# Patient Record
Sex: MALE | Race: BLACK OR AFRICAN AMERICAN | Employment: OTHER | URBAN - METROPOLITAN AREA
[De-identification: names, ages, dates, MRNs, and addresses within clinical notes are randomized per-mention and may not be internally consistent; named-entity substitution may affect disease eponyms.]

---

## 2022-08-31 ENCOUNTER — APPOINTMENT (OUTPATIENT)
Dept: ULTRASOUND IMAGING | Age: 57
DRG: 871 | End: 2022-08-31
Payer: MEDICARE

## 2022-08-31 ENCOUNTER — HOSPITAL ENCOUNTER (INPATIENT)
Age: 57
LOS: 7 days | Discharge: HOME HEALTH | DRG: 871 | End: 2022-09-07
Attending: GENERAL PRACTICE | Admitting: FAMILY MEDICINE
Payer: MEDICARE

## 2022-08-31 ENCOUNTER — APPOINTMENT (OUTPATIENT)
Dept: CT IMAGING | Age: 57
DRG: 871 | End: 2022-08-31
Payer: MEDICARE

## 2022-08-31 ENCOUNTER — APPOINTMENT (OUTPATIENT)
Dept: GENERAL RADIOLOGY | Age: 57
DRG: 871 | End: 2022-08-31
Payer: MEDICARE

## 2022-08-31 DIAGNOSIS — E11.621 DIABETIC ULCER OF BOTH FEET (HCC): ICD-10-CM

## 2022-08-31 DIAGNOSIS — E16.2 HYPOGLYCEMIA: ICD-10-CM

## 2022-08-31 DIAGNOSIS — L97.519 DIABETIC ULCER OF BOTH FEET (HCC): ICD-10-CM

## 2022-08-31 DIAGNOSIS — R53.1 ACUTE WEAKNESS: ICD-10-CM

## 2022-08-31 DIAGNOSIS — N17.9 ACUTE KIDNEY INJURY (HCC): Primary | ICD-10-CM

## 2022-08-31 DIAGNOSIS — E87.6 HYPOKALEMIA: ICD-10-CM

## 2022-08-31 DIAGNOSIS — L97.529 DIABETIC ULCER OF BOTH FEET (HCC): ICD-10-CM

## 2022-08-31 DIAGNOSIS — N10 ACUTE PYELONEPHRITIS: ICD-10-CM

## 2022-08-31 PROBLEM — E87.1 HYPONATREMIA: Status: ACTIVE | Noted: 2022-08-31

## 2022-08-31 PROBLEM — K27.9 PUD (PEPTIC ULCER DISEASE): Chronic | Status: ACTIVE | Noted: 2021-05-10

## 2022-08-31 PROBLEM — E78.5 HYPERLIPIDEMIA: Status: ACTIVE | Noted: 2017-10-16

## 2022-08-31 PROBLEM — K29.50 CHRONIC GASTRITIS: Status: ACTIVE | Noted: 2021-03-12

## 2022-08-31 PROBLEM — B96.89 ACUTE PYELONEPHRITIS DUE TO BACTERIA: Status: ACTIVE | Noted: 2022-08-31

## 2022-08-31 PROBLEM — E11.42 DIABETIC PERIPHERAL NEUROPATHY (HCC): Status: ACTIVE | Noted: 2017-10-16

## 2022-08-31 PROBLEM — N18.31 ACUTE RENAL FAILURE SUPERIMPOSED ON STAGE 3A CHRONIC KIDNEY DISEASE (HCC): Status: ACTIVE | Noted: 2022-08-31

## 2022-08-31 PROBLEM — E83.51 HYPOCALCEMIA: Status: ACTIVE | Noted: 2022-08-31

## 2022-08-31 PROBLEM — E88.09 HYPOALBUMINEMIA: Status: ACTIVE | Noted: 2022-08-31

## 2022-08-31 PROBLEM — F10.20 ALCOHOL DEPENDENCE (HCC): Status: ACTIVE | Noted: 2017-10-16

## 2022-08-31 PROBLEM — H10.9 CONJUNCTIVITIS: Status: ACTIVE | Noted: 2022-05-13

## 2022-08-31 PROBLEM — K27.9 PEPTIC ULCER: Status: ACTIVE | Noted: 2021-05-10

## 2022-08-31 PROBLEM — Z79.4 TYPE 2 DIABETES MELLITUS WITH HYPOGLYCEMIA, WITH LONG-TERM CURRENT USE OF INSULIN (HCC): Chronic | Status: ACTIVE | Noted: 2022-08-31

## 2022-08-31 PROBLEM — K52.9 ACUTE GASTROENTERITIS: Status: ACTIVE | Noted: 2022-08-31

## 2022-08-31 PROBLEM — E11.649 TYPE 2 DIABETES MELLITUS WITH HYPOGLYCEMIA, WITH LONG-TERM CURRENT USE OF INSULIN (HCC): Chronic | Status: ACTIVE | Noted: 2022-08-31

## 2022-08-31 LAB
ABO + RH BLD: NORMAL
ALBUMIN SERPL-MCNC: 2.1 G/DL (ref 3.5–5)
ALBUMIN/GLOB SERPL: 0.4 {RATIO} (ref 1.2–3.5)
ALP SERPL-CCNC: 122 U/L (ref 50–136)
ALT SERPL-CCNC: 25 U/L (ref 12–65)
ANION GAP SERPL CALC-SCNC: 10 MMOL/L (ref 4–13)
APPEARANCE UR: CLEAR
AST SERPL-CCNC: 53 U/L (ref 15–37)
BACTERIA URNS QL MICRO: 0 /HPF
BASOPHILS # BLD: 0.1 K/UL (ref 0–0.2)
BASOPHILS NFR BLD: 0 % (ref 0–2)
BILIRUB SERPL-MCNC: 1 MG/DL (ref 0.2–1.1)
BILIRUB UR QL: NEGATIVE
BILIRUB UR QL: NEGATIVE
BLOOD BANK CMNT PATIENT-IMP: NORMAL
BLOOD GROUP ANTIBODIES SERPL: NORMAL
BUN SERPL-MCNC: 16 MG/DL (ref 6–23)
CALCIUM SERPL-MCNC: 6.8 MG/DL (ref 8.3–10.4)
CHLORIDE SERPL-SCNC: 98 MMOL/L (ref 101–110)
CO2 SERPL-SCNC: 23 MMOL/L (ref 21–32)
COLOR UR: ABNORMAL
CREAT SERPL-MCNC: 1.6 MG/DL (ref 0.8–1.5)
DIFFERENTIAL METHOD BLD: ABNORMAL
EOSINOPHIL # BLD: 0.3 K/UL (ref 0–0.8)
EOSINOPHIL NFR BLD: 2 % (ref 0.5–7.8)
ERYTHROCYTE [DISTWIDTH] IN BLOOD BY AUTOMATED COUNT: 14.2 % (ref 11.9–14.6)
FERRITIN SERPL-MCNC: 287 NG/ML (ref 8–388)
FLUAV AG NPH QL IA: NEGATIVE
FLUBV AG NPH QL IA: NEGATIVE
GLOBULIN SER CALC-MCNC: 5.3 G/DL (ref 2.3–3.5)
GLUCOSE BLD STRIP.AUTO-MCNC: 29 MG/DL (ref 65–100)
GLUCOSE BLD STRIP.AUTO-MCNC: 51 MG/DL (ref 65–100)
GLUCOSE BLD STRIP.AUTO-MCNC: 55 MG/DL (ref 65–100)
GLUCOSE BLD STRIP.AUTO-MCNC: 88 MG/DL (ref 65–100)
GLUCOSE BLD STRIP.AUTO-MCNC: 95 MG/DL (ref 65–100)
GLUCOSE SERPL-MCNC: 50 MG/DL (ref 65–100)
GLUCOSE UR QL STRIP.AUTO: NEGATIVE MG/DL
GLUCOSE UR STRIP.AUTO-MCNC: 100 MG/DL
HCT VFR BLD AUTO: 21.7 % (ref 41.1–50.3)
HCT VFR BLD AUTO: 23.4 % (ref 41.1–50.3)
HEMOCCULT STL QL: NEGATIVE
HGB BLD-MCNC: 7.7 G/DL (ref 13.6–17.2)
HGB BLD-MCNC: 8.3 G/DL (ref 13.6–17.2)
HGB UR QL STRIP: NEGATIVE
IMM GRANULOCYTES # BLD AUTO: 0.2 K/UL (ref 0–0.5)
IMM GRANULOCYTES NFR BLD AUTO: 2 % (ref 0–5)
INR PPP: 1.2
IRON SATN MFR SERPL: 24 %
IRON SERPL-MCNC: 34 UG/DL (ref 35–150)
KETONES UR QL STRIP.AUTO: NEGATIVE MG/DL
KETONES UR-MCNC: NEGATIVE MG/DL
LEUKOCYTE ESTERASE UR QL STRIP.AUTO: NEGATIVE
LEUKOCYTE ESTERASE UR QL STRIP: NEGATIVE
LIPASE SERPL-CCNC: 130 U/L (ref 73–393)
LYMPHOCYTES # BLD: 2 K/UL (ref 0.5–4.6)
LYMPHOCYTES NFR BLD: 15 % (ref 13–44)
MAGNESIUM SERPL-MCNC: 1.1 MG/DL (ref 1.8–2.4)
MCH RBC QN AUTO: 30.3 PG (ref 26.1–32.9)
MCHC RBC AUTO-ENTMCNC: 35.5 G/DL (ref 31.4–35)
MCV RBC AUTO: 85.4 FL (ref 79.6–97.8)
MONOCYTES # BLD: 1.9 K/UL (ref 0.1–1.3)
MONOCYTES NFR BLD: 14 % (ref 4–12)
NEUTS SEG # BLD: 9.1 K/UL (ref 1.7–8.2)
NEUTS SEG NFR BLD: 67 % (ref 43–78)
NITRITE UR QL STRIP.AUTO: NEGATIVE
NITRITE UR QL: NEGATIVE
NRBC # BLD: 0 K/UL (ref 0–0.2)
PH UR STRIP: 6 [PH] (ref 5–9)
PH UR: 6 [PH] (ref 5–9)
PLATELET # BLD AUTO: 270 K/UL (ref 150–450)
PMV BLD AUTO: 11.9 FL (ref 9.4–12.3)
POTASSIUM SERPL-SCNC: 2.5 MMOL/L (ref 3.5–5.1)
POTASSIUM SERPL-SCNC: 2.6 MMOL/L (ref 3.5–5.1)
PROT SERPL-MCNC: 7.4 G/DL (ref 6.3–8.2)
PROT UR QL: NEGATIVE MG/DL
PROT UR STRIP-MCNC: NEGATIVE MG/DL
PROTHROMBIN TIME: 15.5 SEC (ref 12.6–14.5)
RBC # BLD AUTO: 2.74 M/UL (ref 4.23–5.6)
RBC # UR STRIP: NEGATIVE /UL
SARS-COV-2 RDRP RESP QL NAA+PROBE: NOT DETECTED
SERVICE CMNT-IMP: ABNORMAL
SERVICE CMNT-IMP: NORMAL
SODIUM SERPL-SCNC: 131 MMOL/L (ref 138–145)
SOURCE: NORMAL
SP GR UR REFRACTOMETRY: 1.02 (ref 1–1.02)
SP GR UR: 1.01 (ref 1–1.02)
SPECIMEN EXP DATE BLD: NORMAL
SPECIMEN SOURCE: NORMAL
TIBC SERPL-MCNC: 142 UG/DL (ref 250–450)
UROBILINOGEN UR QL STRIP.AUTO: 1 EU/DL (ref 0.2–1)
UROBILINOGEN UR QL: 1 EU/DL (ref 0.2–1)
WBC # BLD AUTO: 13.5 K/UL (ref 4.3–11.1)
WBC #/AREA STL HPF: NORMAL /HPF (ref 0–4)

## 2022-08-31 PROCEDURE — 87804 INFLUENZA ASSAY W/OPTIC: CPT

## 2022-08-31 PROCEDURE — 82962 GLUCOSE BLOOD TEST: CPT

## 2022-08-31 PROCEDURE — 1100000003 HC PRIVATE W/ TELEMETRY

## 2022-08-31 PROCEDURE — 2580000003 HC RX 258: Performed by: GENERAL PRACTICE

## 2022-08-31 PROCEDURE — 87046 STOOL CULTR AEROBIC BACT EA: CPT

## 2022-08-31 PROCEDURE — 2580000003 HC RX 258: Performed by: STUDENT IN AN ORGANIZED HEALTH CARE EDUCATION/TRAINING PROGRAM

## 2022-08-31 PROCEDURE — 80053 COMPREHEN METABOLIC PANEL: CPT

## 2022-08-31 PROCEDURE — 6370000000 HC RX 637 (ALT 250 FOR IP): Performed by: FAMILY MEDICINE

## 2022-08-31 PROCEDURE — 87086 URINE CULTURE/COLONY COUNT: CPT

## 2022-08-31 PROCEDURE — 73650 X-RAY EXAM OF HEEL: CPT

## 2022-08-31 PROCEDURE — 84132 ASSAY OF SERUM POTASSIUM: CPT

## 2022-08-31 PROCEDURE — 1100000000 HC RM PRIVATE

## 2022-08-31 PROCEDURE — 87449 NOS EACH ORGANISM AG IA: CPT

## 2022-08-31 PROCEDURE — 81003 URINALYSIS AUTO W/O SCOPE: CPT

## 2022-08-31 PROCEDURE — 96374 THER/PROPH/DIAG INJ IV PUSH: CPT

## 2022-08-31 PROCEDURE — 82272 OCCULT BLD FECES 1-3 TESTS: CPT

## 2022-08-31 PROCEDURE — 6360000002 HC RX W HCPCS: Performed by: FAMILY MEDICINE

## 2022-08-31 PROCEDURE — 87209 SMEAR COMPLEX STAIN: CPT

## 2022-08-31 PROCEDURE — 87186 SC STD MICRODIL/AGAR DIL: CPT

## 2022-08-31 PROCEDURE — 87077 CULTURE AEROBIC IDENTIFY: CPT

## 2022-08-31 PROCEDURE — 2500000003 HC RX 250 WO HCPCS: Performed by: GENERAL PRACTICE

## 2022-08-31 PROCEDURE — 93971 EXTREMITY STUDY: CPT

## 2022-08-31 PROCEDURE — 6360000004 HC RX CONTRAST MEDICATION: Performed by: GENERAL PRACTICE

## 2022-08-31 PROCEDURE — 85610 PROTHROMBIN TIME: CPT

## 2022-08-31 PROCEDURE — 83540 ASSAY OF IRON: CPT

## 2022-08-31 PROCEDURE — 2580000003 HC RX 258: Performed by: FAMILY MEDICINE

## 2022-08-31 PROCEDURE — 6360000002 HC RX W HCPCS: Performed by: GENERAL PRACTICE

## 2022-08-31 PROCEDURE — 83690 ASSAY OF LIPASE: CPT

## 2022-08-31 PROCEDURE — 96376 TX/PRO/DX INJ SAME DRUG ADON: CPT

## 2022-08-31 PROCEDURE — 87641 MR-STAPH DNA AMP PROBE: CPT

## 2022-08-31 PROCEDURE — 87635 SARS-COV-2 COVID-19 AMP PRB: CPT

## 2022-08-31 PROCEDURE — 83735 ASSAY OF MAGNESIUM: CPT

## 2022-08-31 PROCEDURE — 87040 BLOOD CULTURE FOR BACTERIA: CPT

## 2022-08-31 PROCEDURE — 87075 CULTR BACTERIA EXCEPT BLOOD: CPT

## 2022-08-31 PROCEDURE — 85025 COMPLETE CBC W/AUTO DIFF WBC: CPT

## 2022-08-31 PROCEDURE — 86901 BLOOD TYPING SEROLOGIC RH(D): CPT

## 2022-08-31 PROCEDURE — 96361 HYDRATE IV INFUSION ADD-ON: CPT

## 2022-08-31 PROCEDURE — 87177 OVA AND PARASITES SMEARS: CPT

## 2022-08-31 PROCEDURE — 85014 HEMATOCRIT: CPT

## 2022-08-31 PROCEDURE — 87070 CULTURE OTHR SPECIMN AEROBIC: CPT

## 2022-08-31 PROCEDURE — 82728 ASSAY OF FERRITIN: CPT

## 2022-08-31 PROCEDURE — 89055 LEUKOCYTE ASSESSMENT FECAL: CPT

## 2022-08-31 PROCEDURE — 99285 EMERGENCY DEPT VISIT HI MDM: CPT

## 2022-08-31 PROCEDURE — 74177 CT ABD & PELVIS W/CONTRAST: CPT

## 2022-08-31 RX ORDER — ENOXAPARIN SODIUM 100 MG/ML
40 INJECTION SUBCUTANEOUS DAILY
Status: DISCONTINUED | OUTPATIENT
Start: 2022-08-31 | End: 2022-08-31

## 2022-08-31 RX ORDER — TOBRAMYCIN 3 MG/ML
1 SOLUTION/ DROPS OPHTHALMIC
Status: DISCONTINUED | OUTPATIENT
Start: 2022-09-29 | End: 2022-09-07 | Stop reason: HOSPADM

## 2022-08-31 RX ORDER — POTASSIUM CHLORIDE 7.45 MG/ML
10 INJECTION INTRAVENOUS
Status: COMPLETED | OUTPATIENT
Start: 2022-09-01 | End: 2022-09-01

## 2022-08-31 RX ORDER — DEXTROSE MONOHYDRATE 100 MG/ML
25 INJECTION, SOLUTION INTRAVENOUS
Status: COMPLETED | OUTPATIENT
Start: 2022-08-31 | End: 2022-08-31

## 2022-08-31 RX ORDER — SODIUM CHLORIDE 0.9 % (FLUSH) 0.9 %
5-40 SYRINGE (ML) INJECTION PRN
Status: DISCONTINUED | OUTPATIENT
Start: 2022-08-31 | End: 2022-09-07 | Stop reason: HOSPADM

## 2022-08-31 RX ORDER — CALCIUM CARBONATE 200(500)MG
500 TABLET,CHEWABLE ORAL 3 TIMES DAILY PRN
Status: DISCONTINUED | OUTPATIENT
Start: 2022-08-31 | End: 2022-09-03

## 2022-08-31 RX ORDER — MAGNESIUM SULFATE IN WATER 40 MG/ML
4000 INJECTION, SOLUTION INTRAVENOUS ONCE
Status: COMPLETED | OUTPATIENT
Start: 2022-09-01 | End: 2022-09-01

## 2022-08-31 RX ORDER — SODIUM CHLORIDE 0.9 % (FLUSH) 0.9 %
5-40 SYRINGE (ML) INJECTION EVERY 12 HOURS SCHEDULED
Status: DISCONTINUED | OUTPATIENT
Start: 2022-08-31 | End: 2022-09-07 | Stop reason: HOSPADM

## 2022-08-31 RX ORDER — POTASSIUM CHLORIDE 7.45 MG/ML
10 INJECTION INTRAVENOUS
Status: COMPLETED | OUTPATIENT
Start: 2022-08-31 | End: 2022-08-31

## 2022-08-31 RX ORDER — DEXTROSE MONOHYDRATE 25 G/50ML
25 INJECTION, SOLUTION INTRAVENOUS
Status: COMPLETED | OUTPATIENT
Start: 2022-08-31 | End: 2022-08-31

## 2022-08-31 RX ORDER — POTASSIUM CHLORIDE 20 MEQ/1
40 TABLET, EXTENDED RELEASE ORAL EVERY 6 HOURS
Status: COMPLETED | OUTPATIENT
Start: 2022-08-31 | End: 2022-09-01

## 2022-08-31 RX ORDER — SODIUM CHLORIDE 9 MG/ML
INJECTION, SOLUTION INTRAVENOUS PRN
Status: DISCONTINUED | OUTPATIENT
Start: 2022-08-31 | End: 2022-09-07 | Stop reason: HOSPADM

## 2022-08-31 RX ORDER — ONDANSETRON 4 MG/1
4 TABLET, ORALLY DISINTEGRATING ORAL EVERY 8 HOURS PRN
Status: DISCONTINUED | OUTPATIENT
Start: 2022-08-31 | End: 2022-09-07 | Stop reason: HOSPADM

## 2022-08-31 RX ORDER — PANTOPRAZOLE SODIUM 40 MG/1
40 TABLET, DELAYED RELEASE ORAL
Status: DISCONTINUED | OUTPATIENT
Start: 2022-09-01 | End: 2022-09-05

## 2022-08-31 RX ORDER — INSULIN LISPRO 100 [IU]/ML
0-8 INJECTION, SOLUTION INTRAVENOUS; SUBCUTANEOUS
Status: DISCONTINUED | OUTPATIENT
Start: 2022-09-01 | End: 2022-09-07 | Stop reason: HOSPADM

## 2022-08-31 RX ORDER — DEXTROSE MONOHYDRATE 100 MG/ML
INJECTION, SOLUTION INTRAVENOUS CONTINUOUS PRN
Status: DISCONTINUED | OUTPATIENT
Start: 2022-08-31 | End: 2022-09-07 | Stop reason: HOSPADM

## 2022-08-31 RX ORDER — DEXTROSE MONOHYDRATE 100 MG/ML
INJECTION, SOLUTION INTRAVENOUS
Status: DISPENSED
Start: 2022-08-31 | End: 2022-09-01

## 2022-08-31 RX ORDER — SODIUM CHLORIDE 0.9 % (FLUSH) 0.9 %
10 SYRINGE (ML) INJECTION
Status: COMPLETED | OUTPATIENT
Start: 2022-08-31 | End: 2022-08-31

## 2022-08-31 RX ORDER — INSULIN LISPRO 100 [IU]/ML
0-4 INJECTION, SOLUTION INTRAVENOUS; SUBCUTANEOUS NIGHTLY
Status: DISCONTINUED | OUTPATIENT
Start: 2022-08-31 | End: 2022-09-07 | Stop reason: HOSPADM

## 2022-08-31 RX ORDER — THIAMINE HYDROCHLORIDE 100 MG/ML
500 INJECTION, SOLUTION INTRAMUSCULAR; INTRAVENOUS DAILY
Status: COMPLETED | OUTPATIENT
Start: 2022-09-01 | End: 2022-09-03

## 2022-08-31 RX ORDER — 0.9 % SODIUM CHLORIDE 0.9 %
1000 INTRAVENOUS SOLUTION INTRAVENOUS ONCE
Status: DISCONTINUED | OUTPATIENT
Start: 2022-08-31 | End: 2022-09-07 | Stop reason: HOSPADM

## 2022-08-31 RX ORDER — LANOLIN ALCOHOL/MO/W.PET/CERES
1000 CREAM (GRAM) TOPICAL DAILY
Status: DISCONTINUED | OUTPATIENT
Start: 2022-09-01 | End: 2022-09-07 | Stop reason: HOSPADM

## 2022-08-31 RX ORDER — ACETAMINOPHEN 650 MG/1
650 SUPPOSITORY RECTAL EVERY 6 HOURS PRN
Status: DISCONTINUED | OUTPATIENT
Start: 2022-08-31 | End: 2022-09-07 | Stop reason: HOSPADM

## 2022-08-31 RX ORDER — FOLIC ACID 1 MG/1
1 TABLET ORAL DAILY
Status: DISCONTINUED | OUTPATIENT
Start: 2022-09-01 | End: 2022-09-07 | Stop reason: HOSPADM

## 2022-08-31 RX ORDER — AMLODIPINE BESYLATE 10 MG/1
10 TABLET ORAL DAILY
Status: DISCONTINUED | OUTPATIENT
Start: 2022-09-01 | End: 2022-09-07 | Stop reason: HOSPADM

## 2022-08-31 RX ORDER — ONDANSETRON 2 MG/ML
4 INJECTION INTRAMUSCULAR; INTRAVENOUS EVERY 6 HOURS PRN
Status: DISCONTINUED | OUTPATIENT
Start: 2022-08-31 | End: 2022-09-07 | Stop reason: HOSPADM

## 2022-08-31 RX ORDER — ACETAMINOPHEN 325 MG/1
650 TABLET ORAL EVERY 6 HOURS PRN
Status: DISCONTINUED | OUTPATIENT
Start: 2022-08-31 | End: 2022-09-07 | Stop reason: HOSPADM

## 2022-08-31 RX ORDER — 0.9 % SODIUM CHLORIDE 0.9 %
1000 INTRAVENOUS SOLUTION INTRAVENOUS
Status: COMPLETED | OUTPATIENT
Start: 2022-08-31 | End: 2022-08-31

## 2022-08-31 RX ORDER — 0.9 % SODIUM CHLORIDE 0.9 %
100 INTRAVENOUS SOLUTION INTRAVENOUS
Status: COMPLETED | OUTPATIENT
Start: 2022-08-31 | End: 2022-08-31

## 2022-08-31 RX ORDER — DEXTROSE, SODIUM CHLORIDE, AND POTASSIUM CHLORIDE 5; .45; .3 G/100ML; G/100ML; G/100ML
INJECTION INTRAVENOUS CONTINUOUS
Status: DISCONTINUED | OUTPATIENT
Start: 2022-08-31 | End: 2022-09-01

## 2022-08-31 RX ORDER — POLYETHYLENE GLYCOL 3350 17 G/17G
17 POWDER, FOR SOLUTION ORAL DAILY PRN
Status: DISCONTINUED | OUTPATIENT
Start: 2022-08-31 | End: 2022-09-07 | Stop reason: HOSPADM

## 2022-08-31 RX ORDER — METRONIDAZOLE 500 MG/100ML
500 INJECTION, SOLUTION INTRAVENOUS EVERY 8 HOURS
Status: COMPLETED | OUTPATIENT
Start: 2022-08-31 | End: 2022-09-06

## 2022-08-31 RX ADMIN — DEXTROSE MONOHYDRATE 25 G: 25 INJECTION, SOLUTION INTRAVENOUS at 18:05

## 2022-08-31 RX ADMIN — IOPAMIDOL 100 ML: 755 INJECTION, SOLUTION INTRAVENOUS at 17:42

## 2022-08-31 RX ADMIN — VANCOMYCIN HYDROCHLORIDE 1500 MG: 10 INJECTION, POWDER, LYOPHILIZED, FOR SOLUTION INTRAVENOUS at 22:06

## 2022-08-31 RX ADMIN — SODIUM CHLORIDE 100 ML: 9 INJECTION, SOLUTION INTRAVENOUS at 17:42

## 2022-08-31 RX ADMIN — DEXTROSE MONOHYDRATE 25 G: 100 INJECTION, SOLUTION INTRAVENOUS at 21:33

## 2022-08-31 RX ADMIN — SODIUM CHLORIDE, PRESERVATIVE FREE 10 ML: 5 INJECTION INTRAVENOUS at 17:42

## 2022-08-31 RX ADMIN — SODIUM CHLORIDE 1000 ML: 900 INJECTION, SOLUTION INTRAVENOUS at 16:19

## 2022-08-31 RX ADMIN — POTASSIUM CHLORIDE 10 MEQ: 7.46 INJECTION, SOLUTION INTRAVENOUS at 18:40

## 2022-08-31 RX ADMIN — CEFTRIAXONE 2000 MG: 2 INJECTION, POWDER, FOR SOLUTION INTRAMUSCULAR; INTRAVENOUS at 21:00

## 2022-08-31 RX ADMIN — POTASSIUM CHLORIDE 40 MEQ: 1500 TABLET, EXTENDED RELEASE ORAL at 21:00

## 2022-08-31 RX ADMIN — POTASSIUM CHLORIDE 10 MEQ: 7.46 INJECTION, SOLUTION INTRAVENOUS at 17:42

## 2022-08-31 ASSESSMENT — ENCOUNTER SYMPTOMS
SORE THROAT: 0
NAUSEA: 1
DIARRHEA: 1
SHORTNESS OF BREATH: 0
COUGH: 0
EYE DISCHARGE: 1
TROUBLE SWALLOWING: 0
ABDOMINAL PAIN: 1
COLOR CHANGE: 1
VOMITING: 0

## 2022-08-31 ASSESSMENT — PAIN - FUNCTIONAL ASSESSMENT: PAIN_FUNCTIONAL_ASSESSMENT: 0-10

## 2022-08-31 ASSESSMENT — PAIN SCALES - GENERAL
PAINLEVEL_OUTOF10: 0
PAINLEVEL_OUTOF10: 0

## 2022-08-31 NOTE — ED TRIAGE NOTES
50-year-old male patient with history of hypertension, GERD, hyperlipidemia, diabetes presents complaining of diarrhea for the past 2 weeks. States he has had approximately 10 episodes of loose stools daily. He describes them as dark black. He states he has been using Pepto-Bismol but notes that he also has history of having low hemoglobin. He notes associated weakness and fatigue today. Denies chest pain, dyspnea, abdominal pain. Physical exam shows middle-aged male in no acute distress. Nontender abdomen. Normal lung sounds. Patient evaluated initially in triage. Rapid Medical Evaluation was conducted and necessary orders have been placed. I have performed a medical screening exam.  Care will now be transferred to the provider in the back of the emergency department.   LAN Nguyen 4:09 PM

## 2022-08-31 NOTE — ED PROVIDER NOTES
patient: no  Obtain history from someone other than the patient: no  Review and summarize past medical records: no  Discuss the patient with other providers: no  Independent visualization of images, tracings, or specimens: yes    Risk of Complications, Morbidity, and/or Mortality  Presenting problems: moderate  Diagnostic procedures: low  Management options: moderate    Patient Progress  Patient progress: stable       Orders Placed This Encounter   Procedures    Clostridium Difficile Toxin/Antigen    Ova and Parasite Examination    CT ABDOMEN PELVIS W IV CONTRAST Additional Contrast? None    CBC with Diff    CMP    Lipase    Protime-INR    Stool for WBC #1    Diet NPO    POCT Urine Dipstick    Enteric Contact Isolation    POCT Glucose    TYPE AND SCREEN    Saline lock IV        Medications   0.9 % sodium chloride bolus (1,000 mLs IntraVENous New Bag 8/31/22 1619)   potassium chloride 10 mEq/100 mL IVPB (Peripheral Line) (has no administration in time range)   0.9 % sodium chloride bolus (has no administration in time range)       New Prescriptions    No medications on file        Providence Favre is a 65 y.o. male who presents to the Emergency Department with chief complaint of    Chief Complaint   Patient presents with    Hypotension      HPI      Review of Systems   All other systems reviewed and are negative. No past medical history on file. No past surgical history on file. No family history on file. Social History     Socioeconomic History    Marital status:          Patient has no known allergies. Previous Medications    No medications on file        Vitals signs and nursing note reviewed. Patient Vitals for the past 4 hrs:   Temp Pulse Resp BP SpO2   08/31/22 1715 -- 72 -- (!) 149/90 100 %   08/31/22 1611 98.2 °F (36.8 °C) 75 18 82/64 100 %          Physical Exam  Constitutional:       General: He is not in acute distress. HENT:      Head: Normocephalic and atraumatic.       Right Ear: External ear normal.      Left Ear: External ear normal.      Nose: No congestion or rhinorrhea. Mouth/Throat:      Mouth: Mucous membranes are moist.   Eyes:      Extraocular Movements: Extraocular movements intact. Pupils: Pupils are equal, round, and reactive to light. Cardiovascular:      Rate and Rhythm: Normal rate and regular rhythm. Heart sounds: Normal heart sounds. Pulmonary:      Effort: Pulmonary effort is normal.      Breath sounds: Normal breath sounds. Abdominal:      General: There is no distension. Palpations: Abdomen is soft. Tenderness: There is no abdominal tenderness. Musculoskeletal:         General: No swelling or tenderness. Normal range of motion. Cervical back: Normal range of motion. Skin:     Findings: No rash. Neurological:      General: No focal deficit present. Mental Status: He is alert and oriented to person, place, and time. Psychiatric:         Mood and Affect: Mood normal.        Procedures      [unfilled]     CT ABDOMEN PELVIS W IV CONTRAST Additional Contrast? None    (Results Pending)                          Voice dictation software was used during the making of this note. This software is not perfect and grammatical and other typographical errors may be present. This note has not been completely proofread for errors.      Ricky Wylie DO  08/31/22 1953

## 2022-08-31 NOTE — ED TRIAGE NOTES
Pt arrives via [facility] from home, called out for diarrhea. Pt has had diarrhea for two weeks, approx 10 episodes of watery stools, states his stool has been black in color. Pt adds he has also had intermittent \"sharp pains that shoot across my abdomen. \"  Pt denies fever/chills, body aches. Pt has a hx of normocytic, normochromic anemia requiring blood transfusion. Pt denies taking blood thinners. Pt's last colonoscopy was in 2016 and unremarkable, per pt.

## 2022-09-01 ENCOUNTER — APPOINTMENT (OUTPATIENT)
Dept: ULTRASOUND IMAGING | Age: 57
DRG: 871 | End: 2022-09-01
Payer: MEDICARE

## 2022-09-01 LAB
25(OH)D3 SERPL-MCNC: 13.1 NG/ML (ref 30–100)
ALBUMIN SERPL-MCNC: 2.1 G/DL (ref 3.5–5)
ALBUMIN/GLOB SERPL: 0.4 {RATIO} (ref 1.2–3.5)
ALP SERPL-CCNC: 97 U/L (ref 50–136)
ALT SERPL-CCNC: 24 U/L (ref 12–65)
ANION GAP SERPL CALC-SCNC: 9 MMOL/L (ref 4–13)
AST SERPL-CCNC: 53 U/L (ref 15–37)
BACTERIA SPEC CULT: ABNORMAL
BACTERIA SPEC CULT: ABNORMAL
BILIRUB SERPL-MCNC: 0.9 MG/DL (ref 0.2–1.1)
BUN SERPL-MCNC: 12 MG/DL (ref 6–23)
C DIFF GDH STL QL: NORMAL
C DIFF TOX A+B STL QL IA: NORMAL
C DIFF TOXIN INTERPRETATION: NORMAL
CALCIUM SERPL-MCNC: 6.4 MG/DL (ref 8.3–10.4)
CHLORIDE SERPL-SCNC: 101 MMOL/L (ref 101–110)
CLINICAL CONSIDERATION: NORMAL
CO2 SERPL-SCNC: 19 MMOL/L (ref 21–32)
CREAT SERPL-MCNC: 1.2 MG/DL (ref 0.8–1.5)
GLOBULIN SER CALC-MCNC: 5 G/DL (ref 2.3–3.5)
GLUCOSE BLD STRIP.AUTO-MCNC: 106 MG/DL (ref 65–100)
GLUCOSE BLD STRIP.AUTO-MCNC: 152 MG/DL (ref 65–100)
GLUCOSE BLD STRIP.AUTO-MCNC: 188 MG/DL (ref 65–100)
GLUCOSE BLD STRIP.AUTO-MCNC: 50 MG/DL (ref 65–100)
GLUCOSE BLD STRIP.AUTO-MCNC: 69 MG/DL (ref 65–100)
GLUCOSE BLD STRIP.AUTO-MCNC: 72 MG/DL (ref 65–100)
GLUCOSE BLD STRIP.AUTO-MCNC: 91 MG/DL (ref 65–100)
GLUCOSE SERPL-MCNC: 49 MG/DL (ref 65–100)
MAGNESIUM SERPL-MCNC: 1.8 MG/DL (ref 1.8–2.4)
PHOSPHATE SERPL-MCNC: 2.6 MG/DL (ref 2.5–4.5)
POTASSIUM SERPL-SCNC: 3.9 MMOL/L (ref 3.5–5.1)
PROT SERPL-MCNC: 7.1 G/DL (ref 6.3–8.2)
REFLEX: NORMAL
SERVICE CMNT-IMP: ABNORMAL
SERVICE CMNT-IMP: NORMAL
SODIUM SERPL-SCNC: 129 MMOL/L (ref 138–145)

## 2022-09-01 PROCEDURE — 6370000000 HC RX 637 (ALT 250 FOR IP): Performed by: FAMILY MEDICINE

## 2022-09-01 PROCEDURE — 2580000003 HC RX 258: Performed by: FAMILY MEDICINE

## 2022-09-01 PROCEDURE — 83735 ASSAY OF MAGNESIUM: CPT

## 2022-09-01 PROCEDURE — 83036 HEMOGLOBIN GLYCOSYLATED A1C: CPT

## 2022-09-01 PROCEDURE — 1100000003 HC PRIVATE W/ TELEMETRY

## 2022-09-01 PROCEDURE — 97530 THERAPEUTIC ACTIVITIES: CPT

## 2022-09-01 PROCEDURE — 80053 COMPREHEN METABOLIC PANEL: CPT

## 2022-09-01 PROCEDURE — 6360000002 HC RX W HCPCS: Performed by: FAMILY MEDICINE

## 2022-09-01 PROCEDURE — 82306 VITAMIN D 25 HYDROXY: CPT

## 2022-09-01 PROCEDURE — 2500000003 HC RX 250 WO HCPCS: Performed by: FAMILY MEDICINE

## 2022-09-01 PROCEDURE — 97535 SELF CARE MNGMENT TRAINING: CPT

## 2022-09-01 PROCEDURE — 36415 COLL VENOUS BLD VENIPUNCTURE: CPT

## 2022-09-01 PROCEDURE — 93922 UPR/L XTREMITY ART 2 LEVELS: CPT

## 2022-09-01 PROCEDURE — 97165 OT EVAL LOW COMPLEX 30 MIN: CPT

## 2022-09-01 PROCEDURE — 85025 COMPLETE CBC W/AUTO DIFF WBC: CPT

## 2022-09-01 PROCEDURE — 84100 ASSAY OF PHOSPHORUS: CPT

## 2022-09-01 PROCEDURE — 97161 PT EVAL LOW COMPLEX 20 MIN: CPT

## 2022-09-01 PROCEDURE — 82962 GLUCOSE BLOOD TEST: CPT

## 2022-09-01 PROCEDURE — 94760 N-INVAS EAR/PLS OXIMETRY 1: CPT

## 2022-09-01 RX ORDER — DEXTROSE MONOHYDRATE 50 MG/ML
INJECTION, SOLUTION INTRAVENOUS CONTINUOUS
Status: DISCONTINUED | OUTPATIENT
Start: 2022-09-01 | End: 2022-09-03

## 2022-09-01 RX ADMIN — AMLODIPINE BESYLATE 10 MG: 10 TABLET ORAL at 08:17

## 2022-09-01 RX ADMIN — DEXTROSE MONOHYDRATE: 50 INJECTION, SOLUTION INTRAVENOUS at 08:26

## 2022-09-01 RX ADMIN — PANTOPRAZOLE SODIUM 40 MG: 40 TABLET, DELAYED RELEASE ORAL at 06:08

## 2022-09-01 RX ADMIN — METRONIDAZOLE 500 MG: 500 INJECTION, SOLUTION INTRAVENOUS at 06:08

## 2022-09-01 RX ADMIN — TUBERCULIN PURIFIED PROTEIN DERIVATIVE 5 UNITS: 5 INJECTION, SOLUTION INTRADERMAL at 00:33

## 2022-09-01 RX ADMIN — POTASSIUM CHLORIDE 40 MEQ: 1500 TABLET, EXTENDED RELEASE ORAL at 08:17

## 2022-09-01 RX ADMIN — DEXTROSE MONOHYDRATE: 50 INJECTION, SOLUTION INTRAVENOUS at 20:34

## 2022-09-01 RX ADMIN — THIAMINE HYDROCHLORIDE 500 MG: 100 INJECTION, SOLUTION INTRAMUSCULAR; INTRAVENOUS at 08:17

## 2022-09-01 RX ADMIN — METRONIDAZOLE 500 MG: 500 INJECTION, SOLUTION INTRAVENOUS at 14:30

## 2022-09-01 RX ADMIN — METRONIDAZOLE 500 MG: 500 INJECTION, SOLUTION INTRAVENOUS at 21:35

## 2022-09-01 RX ADMIN — POTASSIUM CHLORIDE 40 MEQ: 1500 TABLET, EXTENDED RELEASE ORAL at 02:24

## 2022-09-01 RX ADMIN — SODIUM CHLORIDE, PRESERVATIVE FREE 10 ML: 5 INJECTION INTRAVENOUS at 00:24

## 2022-09-01 RX ADMIN — CYANOCOBALAMIN TAB 1000 MCG 1000 MCG: 1000 TAB at 08:17

## 2022-09-01 RX ADMIN — FOLIC ACID 1 MG: 1 TABLET ORAL at 08:17

## 2022-09-01 RX ADMIN — CEFTRIAXONE 2000 MG: 2 INJECTION, POWDER, FOR SOLUTION INTRAMUSCULAR; INTRAVENOUS at 20:36

## 2022-09-01 RX ADMIN — SODIUM CHLORIDE, PRESERVATIVE FREE 10 ML: 5 INJECTION INTRAVENOUS at 08:29

## 2022-09-01 RX ADMIN — MAGNESIUM SULFATE HEPTAHYDRATE 4000 MG: 40 INJECTION, SOLUTION INTRAVENOUS at 01:33

## 2022-09-01 RX ADMIN — POTASSIUM CHLORIDE 10 MEQ: 7.46 INJECTION, SOLUTION INTRAVENOUS at 00:02

## 2022-09-01 RX ADMIN — PANTOPRAZOLE SODIUM 40 MG: 40 TABLET, DELAYED RELEASE ORAL at 16:42

## 2022-09-01 RX ADMIN — POTASSIUM CHLORIDE 10 MEQ: 7.46 INJECTION, SOLUTION INTRAVENOUS at 01:09

## 2022-09-01 RX ADMIN — VANCOMYCIN HYDROCHLORIDE 1000 MG: 1 INJECTION, POWDER, LYOPHILIZED, FOR SOLUTION INTRAVENOUS at 16:42

## 2022-09-01 RX ADMIN — METRONIDAZOLE 500 MG: 500 INJECTION, SOLUTION INTRAVENOUS at 00:23

## 2022-09-01 RX ADMIN — POTASSIUM CHLORIDE, DEXTROSE MONOHYDRATE AND SODIUM CHLORIDE: 300; 5; 450 INJECTION, SOLUTION INTRAVENOUS at 00:02

## 2022-09-01 ASSESSMENT — PAIN SCALES - GENERAL
PAINLEVEL_OUTOF10: 0

## 2022-09-01 NOTE — PROGRESS NOTES
VANCO RENAL INSUFFICIENCY NOTE 36[address] [facility] Pharmacokinetic Monitoring Service - Vancomycin    [place] is a 65 y.o. male starting on vancomycin therapy for SSTI. Pharmacy consulted by Dr. Ismael Blanca for monitoring and adjustment. Target Concentration: Random level ? 15 mg/L  Additional Antimicrobials: Flagyl    Pertinent Laboratory Values: Wt Readings from Last 1 Encounters:   08/31/22 200 lb (90.7 kg)     Temp Readings from Last 1 Encounters:   08/31/22 98.2 °F (36.8 °C) (Oral)     Recent Labs     08/31/22  1616   BUN 16   CREATININE 1.60*   WBC 13.5*       No results found for: Cielo Miles    MRSA Nasal Swab: N/A. Non-respiratory infection. .    Plan:  Concentration-guided dosing due to renal impairment- MARS  Start vancomycin intermittent dosing after 1500mg x1.   Vancomycin concentrations will be ordered as clinically appropriate   Pharmacy will continue to monitor patient and adjust therapy as indicated    Thank you for the consult,  Keegan Joyce, [facility]

## 2022-09-01 NOTE — DIABETES MGMT
Patient admitted with acute gastroenteritis. Blood glucose ranged 29-95 yesterday with patient receiving D10 bolus once. Blood glucose this morning was 50. Reviewed patient current regimen: Humalog correctional insulin and D5 infusion at 100 ml/hr. Attempted to see patient for diabetes assessment. MD and therapy at bedside. Will follow along. Update 0120: Attempted again to see patient for diabetes assessment. Patient off floor.

## 2022-09-01 NOTE — PROGRESS NOTES
TRANSFER - IN REPORT:    Verbal report received from TERESA Castaneda on Ammon Shells  being received from ED for routine progression of patient care      Report consisted of patient's Situation, Background, Assessment and   Recommendations(SBAR). Information from the following report(s) Adult Overview, Intake/Output, MAR, and Recent Results was reviewed with the receiving nurse. Opportunity for questions and clarification was provided. Assessment to be completed upon patient's arrival to unit and care assumed.

## 2022-09-01 NOTE — ED NOTES
TRANSFER - OUT REPORT:    Verbal report given to 2nd floor RN on Chris Oliva  being transferred to room 215  for routine progression of patient care       Report consisted of patient's Situation, Background, Assessment and   Recommendations(SBAR). Information from the following report(s) ED SBAR was reviewed with the receiving nurse. Lines:   Peripheral IV 08/31/22 Left Antecubital (Active)       Peripheral IV 08/31/22 Right Forearm (Active)       Peripheral IV 08/31/22 Right Hand (Active)   Site Assessment Clean, dry & intact 08/31/22 2221   Line Status Blood return noted 08/31/22 2221   Phlebitis Assessment No symptoms 08/31/22 2221   Infiltration Assessment 0 08/31/22 2221        Opportunity for questions and clarification was provided.       Patient transported with:  Donovan Blackburn RN  08/31/22 2225

## 2022-09-01 NOTE — PROGRESS NOTES
VANCO DAILY FOLLOW UP NOTE  [address] [facility] Pharmacokinetic Monitoring Service - Vancomycin    Consulting Provider: Dr. Hari Hdez   Indication: SSTI (Left heel ulcer)   Target Concentration: Goal AUC/HERI 400-600 mg*hr/L  Day of Therapy: 1  Additional Antimicrobials: Ceftriaxone, Flagyl     Pertinent Laboratory Values: Wt Readings from Last 1 Encounters:   08/31/22 200 lb (90.7 kg)     Temp Readings from Last 1 Encounters:   09/01/22 98.1 °F (36.7 °C) (Oral)     Recent Labs     08/31/22  1616 09/01/22  1206   BUN 16 12   CREATININE 1.60* 1.20   WBC 13.5*  --      Estimated Creatinine Clearance: 79 mL/min (based on SCr of 1.2 mg/dL). [facility] results found for: [facility]    MRSA Nasal Swab: N/A. Non-respiratory infection. Assessment:  Date/Time Dose Concentration AUC         Note: Serum concentrations collected for AUC dosing may appear elevated if collected in close proximity to the dose administered, this is not necessarily an indication of toxicity    Plan:  Dosing recommendations based on Bayesian software  Start vancomycin 1000 mg q12h   Anticipated AUC of 561 and trough concentration of 18.7 at steady state  Renal labs as indicated. SCr trending down.    Vancomycin concentrations will ordered for @0900 9/2   Pharmacy will continue to monitor patient and adjust therapy as indicated    Thank you for the consult,  Enedelia Chavez, [facility]

## 2022-09-01 NOTE — PROGRESS NOTES
OCCUPATIONAL THERAPY Initial Assessment       OT Visit Days: 1  Acknowledge Orders  Time  OT Charge Capture  Rehab Caseload Tracker      Randell Cosby is a 65 y.o. male   PRIMARY DIAGNOSIS: Acute gastroenteritis  Hypokalemia [E87.6]  Hypoglycemia [E16.2]  Acute pyelonephritis [N10]  Acute kidney injury ([address] [N17.9]  Acute weakness [R53.1]  Diabetic ulcer of both feet ([address] [H79.737, L97.519, L97.529]  Acute pyelonephritis due to bacteria [N10, B96.89]       Reason for Referral: Generalized Muscle Weakness (M62.81)  Inpatient: Payor: MEDICARE / Plan: MEDICARE PART A AND B / Product Type: *No Product type* /     ASSESSMENT:     REHAB RECOMMENDATIONS:   Recommendation to date pending progress:  Setting:  [facility]:    3 in 1 Bedside Commode     ASSESSMENT:  Mr. Keisha Palmer is a 65 y M with a hx of HTN, GERD, HLD, DM. Pt was admitted for acute gastroenteritis. Pt very recently has been needing A for ADLs and walking with cane, but is typically I. Pt was received supine in bed with wife present. In bed toileting Max A for pericare, rolling SBA. Bed mobility CGA supine > sit due to weakness, fatigue. Sitting EOB donning socks Mod A for v/c to use cross legged method, throughness, donning L ft. Functional mobility bed > chair > standing EOS > chair CGA x2 via no AD/RW due to unsteadiness, deconditioning. Standing EOS brushing teeth, washing face SBA. Pt has deficits in strength, balance, activity tolerance, and performing ADLs. Pt requires continued skilled OT services due to performing functionally below baseline.    [address] AM-PAC 6 Clicks Daily Activity Inpatient Short Form:    AM-PAC Daily Activity Inpatient   How much help for putting on and taking off regular lower body clothing?: A Lot  How much help for Bathing?: A Little  How much help for Toileting?: A Little  How much help for putting on and taking off regular upper body clothing?: None  How much help for taking care of personal grooming?: None  How much help for eating meals?: None  AM-PAC Inpatient Daily Activity Raw Score: 20  AM-PAC Inpatient ADL T-Scale Score : 42.03  ADL Inpatient CMS 0-100% Score: 38.32  ADL Inpatient CMS G-Code Modifier : CJ           SUBJECTIVE:     Mr. Veronica Flowers states, \"I have been walking just not a lot. \"     Social/Functional Lives With: Spouse  Type of Home: House  Home Layout: One level  Home Access: Stairs to enter without rails  Entrance Stairs - Number of Steps: 2  Home Equipment: Catapulter Help From: Family  ADL Assistance: Independent  Ambulation Assistance: Independent  Active : No  Patient's  Info: family  Mode of Transportation: Car  Occupation: On disability    OBJECTIVE:     LINES / Soco Pall / AIRWAY: IV    RESTRICTIONS/PRECAUTIONS:  Restrictions/Precautions: Fall Risk    PAIN: VITALS / O2:   Pre Treatment:   Pain Assessment: None - Denies Pain      Post Treatment: none       Vitals          Oxygen            GROSS EVALUATION: INTACT IMPAIRED   (See Comments)   UE AROM [x] []   UE PROM [] []   Strength []  Generally decreased     Posture / Balance [] Sitting - Static: +, Fair  Sitting - Dynamic: Fair, +  Standing - Static: Fair, +  Standing - Dynamic: Fair, +   Sensation [x]     Coordination [x]       Tone [x]       Edema [x]    Activity Tolerance [] Patient Tolerated treatment well, Patient limited by fatigue     Hand Dominance R [] L []      COGNITION/  PERCEPTION: INTACT IMPAIRED   (See Comments)   Orientation [x]     Vision [x]     Hearing [x]     Cognition  [x]     Perception []       MOBILITY: I Mod I S SBA CGA Min Mod Max Total  NT x2 Comments:   Bed Mobility    Rolling [] [] [] [] [] [] [] [] [] [] []    Supine to Sit [] [] [] [] [x] [] [] [] [] [] []    Scooting [] [] [] [] [] [] [] [] [] [] []    Sit to Supine [] [] [] [] [] [] [] [] [] [] []    Transfers    Sit to Stand [] [] [] [] [x] [] [] [] [] [] [x]    Bed to Chair [] [] [] [] [x] [] [] [] [] [] [x] Stand to Sit [] [] [] [] [x] [] [] [] [] [] [x]    Tub/Shower [] [] [] [] [] [] [] [] [] [] []     Toilet [] [] [] [] [] [] [] [] [] [] []      [] [] [] [] [] [] [] [] [] [] []    I=Independent, Mod I=Modified Independent, S=Supervision/Setup, SBA=Standby Assistance, CGA=Contact Guard Assistance, Min=Minimal Assistance, Mod=Moderate Assistance, Max=Maximal Assistance, Total=Total Assistance, NT=Not Tested    ACTIVITIES OF DAILY LIVING: I Mod I S SBA CGA Min Mod Max Total NT Comments   BASIC ADLs:              Upper Body Bathing  [] [] [] [] [] [] [] [] [] []    Lower Body Bathing [] [] [] [] [] [] [] [] [] []    Toileting [] [] [] [] [] [] [] [x] [] []    Upper Body Dressing [] [] [x] [] [] [] [] [] [] []    Lower Body Dressing [] [] [] [] [] [] [] [] [] []    Feeding [] [] [] [] [] [] [] [] [] []    Grooming [] [] [] [x] [] [] [] [] [] []    Personal Device Care [] [] [] [] [] [] [] [] [] []    Functional Mobility [] [] [] [] [x] [] [] [] [] [] x2   I=Independent, Mod I=Modified Independent, S=Supervision/Setup, SBA=Standby Assistance, CGA=Contact Guard Assistance, Min=Minimal Assistance, Mod=Moderate Assistance, Max=Maximal Assistance, Total=Total Assistance, NT=Not Tested    PLAN:     FREQUENCY/DURATION   OT Plan of Care: 3 times/week for duration of hospital stay or until stated goals are met, whichever comes first.    ACUTE OCCUPATIONAL THERAPY GOALS:   (Developed with and agreed upon by patient and/or caregiver.)  1. Pt will complete LB ADL set up only with AE as needed. 2. Pt will complete toileting supervision only with AE as needed. 3. Pt will complete UB ADL set up only. 4. Pt will tolerate 25 minutes of OT treatment requiring 1-2 breaks as needed. 5. Pt will complete grooming tasks while standing at sink I.  6. Pt will complete functional mobility via cane or RW supervision. 7. Pt will tolerate BUE exercises to increase strength for safe, functional transfers, ADL participation.        PROBLEM LIST:   (Skilled intervention is medically necessary to address:)  Decreased ADL/Functional Activities  Decreased Activity Tolerance  Decreased Balance  Decreased Gait Ability  Decreased Strength   INTERVENTIONS PLANNED:  (Benefits and precautions of occupational therapy have been discussed with the patient.)  Self Care Training  Therapeutic Activity  Therapeutic Exercise/HEP  Neuromuscular Re-education  Education         TREATMENT:     EVALUATION: LOW COMPLEXITY: (Untimed Charge)    TREATMENT:   Co-Treatment PT/OT necessary due to patient's decreased overall endurance/tolerance levels, as well as need for high level skilled assistance to complete functional transfers/mobility and functional tasks  Self Care (30 minutes): Patient participated in toileting, lower body dressing, and grooming ADLs in unsupported sitting, standing, and supine with no verbal cueing to increase independence, decrease assistance required, and increase activity tolerance. Patient also participated in functional mobility training to increase independence, decrease assistance required, and increase activity tolerance. TREATMENT GRID:  N/A    AFTER TREATMENT PRECAUTIONS: Bed/Chair Locked, Call light within reach, Chair, Needs within reach, RN notified, and Visitors at bedside    INTERDISCIPLINARY COLLABORATION:  RN/ PCT, PT/ PTA, and OT/ HAYDEN    EDUCATION:  Education Given To: Patient; Family  Education Provided: Role of Therapy;Plan of Care; Fall Prevention Strategies  Education Outcome: Verbalized understanding    TOTAL TREATMENT DURATION AND TIME:  Time In: 7661  Time Out: [place] [facility] 7715  Minutes: Jeffy Durán OT

## 2022-09-01 NOTE — CARE COORDINATION
RNCM met with patient in room 215 to discuss discharge planning. Patient awake in bed, A/O on RA. Spouse, Addis at bedside. Patient lives with spouse in one level home with two steps to enter. Patient uses cane for mobility and ADL's; has glucometer at home. Denies home health and rehab. Patient see's PCP at [facility], Dr Lian Paiz and uses [facility] for pharmacy. CM following for potential needs.        ASSESSMENT NOTE    Attending Physician: Selinda Fothergill, MD  Admit Problem: Hypokalemia [E87.6]  Hypoglycemia [E16.2]  Acute pyelonephritis [N10]  Acute kidney injury [facility]) [N17.9]  Acute weakness [R53.1]  Diabetic ulcer of both feet ([address] [Z42.419, L97.519, L97.529]  Acute pyelonephritis due to bacteria [N10, B96.89]  Date/Time of Admission: 8/31/2022  4:18 PM  Problem List:  Patient Active Problem List   Diagnosis    Acute gastroenteritis    Conjunctivitis    Chronic gastritis    Alcohol dependence ([address]    Diabetic peripheral neuropathy ([address]    Essential hypertension    Hyperlipidemia    PUD (peptic ulcer disease)    Type 2 diabetes mellitus with hypoglycemia, with long-term current use of insulin ([address]    Diabetic ulcer of both feet associated with type 2 diabetes mellitus ([address]    Hypocalcemia    Hypoalbuminemia    Hyponatremia    Hypokalemia due to excessive gastrointestinal loss of potassium    Acute renal failure superimposed on stage 3a chronic kidney disease [facility])       Service Assessment  Patient Orientation Alert and Oriented   Cognition Alert   History Provided By Patient   Primary Caregiver Self   Accompanied By/Relationship Spouse: John Petty  [phone]   Support Systems Spouse/Significant Other   Patient's Healthcare Decision Maker is: Legal [facility] [address]   PCP Verified by CM Yes (Dr Lian Paiz at [facility]; gets scripts at [facility])   Last Visit to PCP Within last 3 months   Prior Functional Level     Current Functional Level     Can patient return to prior living arrangement Yes   Ability to make needs known: Good   Family able to assist with home care needs: Yes   Would you like for me to discuss the discharge plan with any other family members/significant others, and if so, who? Financial Resources [facility]     CM/KAMRYN Referral       Social/Functional History  Lives With Spouse   Type of [address] One level   Home Access Stairs to enter without rails   Entrance Stairs - Number of Steps 2   Entrance Stairs - Rails     Bathroom Shower/Tub     [address] Work     Driving     Shopping          Other (Comment)     [address] [facility] [employer] Paying/Finance [address] [facility] Management     Other (Comment)     Ambulation Assistance     Transfer Assistance     Active  No   Patient's  Info family   Mode of Transportation Car   Education     Occupation On disability   Type of Occupation       Discharge Planning   Type of [address] Spouse/Significant Other   Current Services Prior To Admission [address] [facility]   DME Glucometer, [address] [facility]   DME     DME Ordered? No   Potential Assistance Purchasing Medications No   Meds-to-Beds: Does the patient want to have any new prescriptions delivered to bedside prior to discharge?      Type of Home Care Services None   Patient expects to be discharged to: House   Follow Up Appointment: Best Day/Time Tuesday AM (Anytime)   One/Two Story Residence: One story   # of Interior Steps     Height of Each Step (in)     [employer] Available     History of Falls? No     Services At/After Discharge  Transition of Care Consult (CM Consult): Discharge Planning, Home Health   Internal Home Health No   Internal Hospice     Reason Outside Agency Chosen Patient already serviced by other home care/hospice agency   Partner SNF     Reason Why Partner SNF Not Chosen     Internal Comfort Care     Reason Outside [address] Discharge [address] Resource Information Provided? No   Mode of Transport at Discharge [address] Time of Discharge     Confirm Follow Up Transport Family     Condition of Participation: Discharge Planning  The plan for Transition of Care is related to the following treatment goals: return to baseline   The Patient and/or Patient Representative was provided with a Choice of Provider? Patient   Name of the Patient Representative who was provided with the Choice of Provider and agrees with the Discharge Plan? The Patient and/or Patient Representative Agree with the Discharge Plan? Yes   Freedom of Choice list was provided with basic dialogue that supports the individualized plan of care/goals, treatment preferences, and shares the quality data associated with the providers?  Yes     Documentation for Discharge Appeal  Discharge Appealed by     Date notified by QIO of appeal request:     Time notified by QIO of appeal request:     Detailed Notice of Discharge given to:     Date Notice of Discharge given:     Time Notice of Discharge given:     Date records sent to [address]     Time records sent to [employer] [facility]     Date Notified of Outcome     Time Notified of Outcome     Outcome of appeal           Brennon Pratt RN 09/01/22 11:17 AM

## 2022-09-01 NOTE — PROGRESS NOTES
PHYSICAL THERAPY Initial Assessment, Daily Note, and AM  (Link to Caseload Tracking: PT Visit Days : 1  Acknowledge Orders  Time In/Out  PT Charge Capture  Rehab Caseload Tracker    Daniel Barrera is a 65 y.o. male   PRIMARY DIAGNOSIS: Acute gastroenteritis  Hypokalemia [E87.6]  Hypoglycemia [E16.2]  Acute pyelonephritis [N10]  Acute kidney injury ([address] [N17.9]  Acute weakness [R53.1]  Diabetic ulcer of both feet ([address] [C15.974, L97.519, L97.529]  Acute pyelonephritis due to bacteria [N10, B96.89]       Reason for Referral: Generalized Muscle Weakness (M62.81)  Difficulty in walking, Not elsewhere classified (R26.2)  Inpatient: Payor: MEDICARE / Plan: MEDICARE PART A AND B / Product Type: *No Product type* /     ASSESSMENT:     REHAB RECOMMENDATIONS:   Recommendation to date pending progress:  Setting:  [address]: Pt has a cane he has been using   3 in 1 Bedside Commode     ASSESSMENT:  Mr. Levi Casper is a 65year old M who presents admitted with gastroenteritis due to abdominal pain and diarrhea x2 weeks that have limited his mobility due to overall generalized weakness. Due to lack of mobility, pateint has developed B heel wounds. Pt lives with wife in 1 level home with 2 VICKY and is Independent with ADLs at baseline and gait, however due to weakness in the past couple of weeks he has been using a SPC for gait. This date pt performs mobility including rolling and supine>sit transfer with CGA x2 and fair + seated balance demonstrated EOB. CGA x2 with bed>chair transfer with HHA and furniture walking for balance. CGA x2 for gait x8ft from chair to sink for ADLs with with OT with fair+ standing balance. Pt presents as functioning below his baseline, with deficits in mobility including transfers, gait, balance, and activity tolerance. Pt will benefit from skilled therapy services to address stated deficits to promote return to highest level of function, independence, and safety.  HHPT recommended at this time. Will continue to follow.        [address] [facility] [address] [address] AM-PAC 6 Clicks Basic Mobility Inpatient Short Form  AM-PAC Mobility Inpatient   How much difficulty turning over in bed?: None  How much difficulty sitting down on / standing up from a chair with arms?: None  How much difficulty moving from lying on back to sitting on side of bed?: None  How much help from another person moving to and from a bed to a chair?: A Little  How much help from another person needed to walk in hospital room?: A Little  How much help from another person for climbing 3-5 steps with a railing?: A Little  AM-[facility] Inpatient Mobility Raw Score : 21  AM-PAC Inpatient T-Scale Score : 50.25  Mobility Inpatient CMS 0-100% Score: 28.97  Mobility Inpatient CMS G-Code Modifier : CJ    SUBJECTIVE:   Mr. Norma Williamson states, \"This feels good\" -in reference to being able to sit up in the recliner     Social/Functional Lives With: Spouse  Type of Home: House  Home Layout: One level  Home Access: Stairs to enter without rails  Entrance Stairs - Number of Steps: 2  Home Equipment: [employer] [place] [employer] Help From: Family  ADL Assistance: Independent  Ambulation Assistance: Independent  Active : No  Patient's  Info: family  Mode of Transportation: Car  Occupation: On disability    OBJECTIVE:     PAIN: Gennett Juancho / O2: PRECAUTION / Terrilyn Banker / DRAINS:   Pre Treatment:          Post Treatment: 0 Vitals        Oxygen      IV    RESTRICTIONS/PRECAUTIONS:  Restrictions/Precautions: Fall Risk                 GROSS EVALUATION: Intact Impaired (Comments):   AROM []     PROM []    Strength []  Generalized weakness   Balance [] Posture: Fair  Sitting - Static: Fair, +  Sitting - Dynamic: Fair, +  Standing - Static: Fair, +  Standing - Dynamic: Fair, +   Posture [] Rounded Shoulders   Sensation []     Coordination []      Tone []     Edema []    Activity Tolerance [] Patient Tolerated treatment well    []      COGNITION/  PERCEPTION: Intact Impaired treatment day(s). (3.)Mr. Dion Abel will ambulate with MODIFIED INDEPENDENCE for 400 feet with the least restrictive device within 7 treatment day(s). (4.)Mr. Dion Abel will perform standing static and dynamic balance activities x 23 minutes with SUPERVISION to improve safety within 7 treatment day(s). ________________________________________________________________________________________________      FREQUENCY AND DURATION: 3 times/week for duration of hospital stay or until stated goals are met, whichever comes first.    THERAPY PROGNOSIS: Fair    PROBLEM LIST:   (Skilled intervention is medically necessary to address:)  Decreased ADL/Functional Activities  Decreased Activity Tolerance  Decreased Balance  Decreased Gait Ability  Decreased Safety Awareness  Decreased Strength  Decreased Transfer Abilities  Increased Pain INTERVENTIONS PLANNED:   (Benefits and precautions of physical therapy have been discussed with the patient.)  Self Care Training  Therapeutic Activity  Therapeutic Exercise/HEP  Neuromuscular Re-education  Gait Training  Education       TREATMENT:   EVALUATION: LOW COMPLEXITY: (Untimed Charge)    TREATMENT:   Co-Treatment PT/OT necessary due to patient's decreased overall endurance/tolerance levels, as well as need for high level skilled assistance to complete functional transfers/mobility and functional tasks  Therapeutic Activity (32 Minutes): Therapeutic activity included Rolling, Supine to Sit, Scooting, Transfer Training, Ambulation on level ground, Sitting balance , and Standing balance to improve functional Activity tolerance, Balance, Mobility, and Strength. TREATMENT GRID:  N/A    AFTER TREATMENT PRECAUTIONS: Bed/Chair Locked, Call light within reach, Chair, Needs within reach, RN at bedside, and RN notified    INTERDISCIPLINARY COLLABORATION:  MD/ PA/ NP , RN/ PCT, PT/ PTA, and RN Case Manager/      EDUCATION: Education Given To: Patient; Family  Education Provided: Role of Therapy;Plan of Care;Transfer Training  Education Method: Demonstration;Verbal  Barriers to Learning: None  Education Outcome: Verbalized understanding;Demonstrated understanding    TIME IN/OUT:  Time In: 0944  Time Out: [facility] 3175  Minutes: [address], PT

## 2022-09-01 NOTE — PROGRESS NOTES
END OF SHIFT NOTE:    INTAKE/OUTPUT  08/31 0701 - 09/01 0700  In: 1616.8 [I.V.:1616.8]  Out: 1160 [Urine:1160]  Voiding: Yes  Catheter: No          Flatus: Patient does have flatus present. Stool:  7 occurrences. Emesis: 0 occurrences. VITAL SIGNS  Patient Vitals for the past 12 hrs:   Temp Pulse Resp BP SpO2   09/01/22 0222 97.9 °F (36.6 °C) 82 18 (!) 150/95 100 %   08/31/22 2325 97.5 °F (36.4 °C) 72 19 125/85 100 %   08/31/22 2215 -- 76 -- 132/81 98 %   08/31/22 2200 -- -- -- 121/75 --   08/31/22 2145 -- -- -- 103/73 --   08/31/22 2130 -- -- -- 116/75 --   08/31/22 2115 -- -- -- (!) 112/95 --   08/31/22 2100 -- -- -- 109/79 --   08/31/22 2045 -- -- -- 118/84 --   08/31/22 2030 -- -- -- (!) 130/91 --   08/31/22 2015 -- -- -- (!) 124/104 --   08/31/22 1915 -- -- -- 120/85 --   08/31/22 1900 -- -- -- (!) 163/136 92 %       Ambulating  No    Shift report given to oncoming nurse at the bedside.     Zoë Dozier RN

## 2022-09-01 NOTE — PROGRESS NOTES
Hospitalist Progress Note   Admit Date:  2022  4:18 PM   Name:  Shannon Torres   Age:  65 y.o. Sex:  male  :  1965   MRN:  [id number]   Room:      Presenting Complaint: Hypotension     Reason(s) for Admission: Hypokalemia [E87.6]  Hypoglycemia [E16.2]  Acute pyelonephritis [N10]  Acute kidney injury [facility]) [N17.9]  Acute weakness [R53.1]  Diabetic ulcer of both feet ([address] [Y04.971, L97.519, L97.529]  Acute pyelonephritis due to bacteria [N10, B96.89]     Hospital Course:   Shannon Torres is a 65 y.o. male with medical history of Obesity, T2DM on insulin, DM neuropathy, HTN, HLD, PUD, chronic gastritis, AUD, GERD, Upper GIB who presented with abdominal pain and diarrhea x 2 weeks associated with fatigue and generalized weakness. Reported ~5-10 episodes loose stool daily. Described as black. Has been taking pepto bismol. Abdominal pain described as crampy. Denies recent abx use. Had URI couple weeks prior to onset of GI symptoms. No contacts with similar symptoms. No uncooked meat. Has been essentially bedbound for 2 weeks per wife. Developed bilateral heel wounds, left worse than right and left leg with itchy pustules and discoloration up entire leg. He has been soiling the bed. He was seen at [facility] on  and Dc'd from ED yesterday. In ED, BP 82/64 on arrival.    Labs: Sna 131 K 2.6 cl 98 Scr 1.6 GLU 50 calcium 6.8 Salb 2.1 AST 53 WBC 13.5 hgb 8.3 INR 1.2 fecal leukocytes negative. C diff in process. CT      1. Cholelithiasis without pericholecystic inflammatory changes to suggest acute   cholecystitis or appendicitis. 2.  Bilateral perinephric soft tissue stranding and small perinephric fluid   collections as well as a small amount of fluid in the pelvic cul-de-sac are   nonspecific, but the possibility of pyelonephritis should be considered.             Subjective & 24hr Events (22):   Spouse at bedside  C/o generalized weakness  Says diarrhea since past 2 weeks  Persistent hypoglycemia      Assessment & Plan:   Acute gastroenteritis - admit remote tele. IVF. Replete electrolytes. CLD, check stool culture and fecal occult. C diff in process. Bilateral T2DM foot ulcers - infected. obtain XR of hargrove calcanus. Consider MRI to r/o deeper infection or osto. Obtain wound cx and BC. MRSA PCR screen. Start rocephin, vanc, flagyl. Consult wound RN. Offload. PT/OT. Wound conslt. Check arterial duplex. Wound clinic referral on DC. Concern for possible disseminated infection with perinephric fat stranding and fluid collection raising concern for pyelo. No urinary symptoms. UA w/o inflammatory cells. 9/1- dressing in place  No blisters over leg  Wound care consulted. Hypokalemia 2/2 GI losses - s/p kcl 20 meq IV in ED. Start kcl 40 q6h x3 doses. IVF with kcl 40. Remote tele. Repeat K now. Check mag. 9/1- resolved     Hyponatremia - 2/2 hypovolemia. IVF. Trend bmp/cmp   9/1- improving     Hypocalcemia - perhaps from severe infection. Start calcium carbonate. Check vitamin D and Phos in AM. Check Mag now   9/1- calcium 6.2, albumin 2.1     MARS on CKD3a  2/2 PRA/ATN   Improving since yesterday @ aracely Scr 2.03   Scr 1.6 08/31/22 Bcr ~1.2)non-oligouric  CT BL perinephric soft tissue stranding and small perinephric fluid collections. IVF. Maintain MAP >65 mm hg   Avoid NSAIDs, anti-RAAS agents, nephrotoxic agents, and phosphate enemas   Renal Dosing Strict I&O, Daily Weights, Daily BMP/CMP   9/1- normal creatinine     Anemia - reports of dark stool. H/o UGIB. Cont PPI BID. Check fecal occult. Repeat hemoglobin now. Hold AC until GIB r/o. Check anemia labs. 9/1- hb 7.7     T2DM with polyneuropathy and hypoglycemia - persistent hypoglycemia in ED with GLU 50-60. S/p dextrose 50% 25g. Improved to 95. Took lantus 30 units this AM. Hold basal. Dextrose IVF. DM RN consult. SSI for now.  Check A1C in AM   9/1- persistent hypoglycemia - changed fluids to d5 water cont hypoglycemic protocol. Physical debility - consult PT/OT, PPD. AUD, moderate, dependence - last drink ~2 weeks ago. PO vitamines. IV thiaminex3 days. PPI. Bacterial Conjunctivitis - cont tobramycin eye drops. PUD / h/o UGIB / GERD w/ esophagitis - PPI BID. Avoid NSAIDs. HTN - cont amlodipine in AM. Hold metoprolol 50 BID pending vitals. HLD -     Anticipated discharge needs:     HH vs STR      Diet: ADULT DIET; Clear Liquid; GI [place] (GERD/Peptic Ulcer); Lactose-Controlled, No red dye  VTE ppx: SCDs  Code status: Full Code      Hospital Problems:  Principal Problem:    Acute gastroenteritis  Active Problems:    Conjunctivitis    Chronic gastritis    Alcohol dependence ([address]    Diabetic peripheral neuropathy ([facility] [address]    Essential hypertension    Hyperlipidemia    PUD (peptic ulcer disease)    Type 2 diabetes mellitus with hypoglycemia, with long-term current use of insulin (HCC)    Diabetic ulcer of both feet associated with type 2 diabetes mellitus (HCC)    Hypocalcemia    Hypoalbuminemia    Hyponatremia    Hypokalemia due to excessive gastrointestinal loss of potassium    Acute renal failure superimposed on stage 3a chronic kidney disease ([facility] [address]  Resolved Problems:    * No resolved hospital problems.  *      Objective:   Patient Vitals for the past 24 hrs:   Temp Pulse Resp BP SpO2   09/01/22 0754 98.1 °F (36.7 °C) 78 19 116/73 99 %   09/01/22 0222 97.9 °F (36.6 °C) 82 18 (!) 150/95 100 %   08/31/22 2325 97.5 °F (36.4 °C) 72 19 125/85 100 %   08/31/22 2215 -- 76 -- 132/81 98 %   08/31/22 2200 -- -- -- 121/75 --   08/31/22 2145 -- -- -- 103/73 --   08/31/22 2130 -- -- -- 116/75 --   08/31/22 2115 -- -- -- (!) 112/95 --   08/31/22 2100 -- -- -- 109/79 --   08/31/22 2045 -- -- -- 118/84 --   08/31/22 2030 -- -- -- (!) 130/91 --   08/31/22 2015 -- -- -- (!) 124/104 --   08/31/22 1915 -- -- -- 120/85 --   08/31/22 1900 -- -- -- (!) 163/136 92 %   08/31/22 1730 -- -- -- 136/86 100 %   08/31/22 1715 -- 72 -- (!) 149/90 100 %   08/31/22 1611 98.2 °F (36.8 °C) -- 18 82/64 100 %       Oxygen Therapy  SpO2: 99 %  Pulse Oximeter Device Mode: Intermittent  O2 Device: None (Room air)    Estimated body mass index is 27.89 kg/m² as calculated from the following:    Height as of this encounter: 5' 11\" (1.803 m). Weight as of this encounter: 200 lb (90.7 kg). Intake/Output Summary (Last 24 hours) at 9/1/2022 0955  Last data filed at 9/1/2022 0916  Gross per 24 hour   Intake 1856.83 ml   Output 1485 ml   Net 371.83 ml         Physical Exam:     Blood pressure 116/73, pulse 78, temperature 98.1 °F (36.7 °C), temperature source Oral, resp. rate 19, height 5' 11\" (1.803 m), weight 200 lb (90.7 kg), SpO2 99 %. General:    Well nourished. Head:  Normocephalic, atraumatic  Eyes:  Sclerae appear normal.  Pupils equally round. ENT:  Nares appear normal, no drainage. Moist oral mucosa  Neck:  No restricted ROM. Trachea midline   CV:   RRR. No m/r/g. No jugular venous distension. Lungs:   CTAB. No wheezing, rhonchi, or rales. Symmetric expansion. Abdomen: Bowel sounds present. Soft, nontender, nondistended. Extremities: No cyanosis or clubbing. No edema  Skin:     Dressing to bilateral heels. .    Neuro:  CN II-XII grossly intact. Sensation intact. A&Ox3  Psych:  Normal mood and affect.       I have personally reviewed labs and tests showing:  Recent Labs:  Recent Results (from the past 48 hour(s))   CBC with Diff    Collection Time: 08/31/22  4:16 PM   Result Value Ref Range    WBC 13.5 (H) 4.3 - 11.1 K/uL    RBC 2.74 (L) 4.23 - 5.6 M/uL    Hemoglobin 8.3 (L) 13.6 - 17.2 g/dL    Hematocrit 23.4 (L) 41.1 - 50.3 %    MCV 85.4 79.6 - 97.8 FL    MCH 30.3 26.1 - 32.9 PG    MCHC 35.5 (H) 31.4 - 35.0 g/dL    RDW 14.2 11.9 - 14.6 %    Platelets 284 051 - 693 K/uL    MPV 11.9 9.4 - 12.3 FL    nRBC 0.00 0.0 - 0.2 K/uL    Differential Type AUTOMATED      Seg Neutrophils 67 43 - 78 %    Lymphocytes 15 13 - 44 % Monocytes 14 (H) 4.0 - 12.0 %    Eosinophils % 2 0.5 - 7.8 %    Basophils 0 0.0 - 2.0 %    Immature Granulocytes 2 0.0 - 5.0 %    Segs Absolute 9.1 (H) 1.7 - 8.2 K/UL    Absolute Lymph # 2.0 0.5 - 4.6 K/UL    Absolute Mono # 1.9 (H) 0.1 - 1.3 K/UL    Absolute Eos # 0.3 0.0 - 0.8 K/UL    Basophils Absolute 0.1 0.0 - 0.2 K/UL    Absolute Immature Granulocyte 0.2 0.0 - 0.5 K/UL   CMP    Collection Time: 08/31/22  4:16 PM   Result Value Ref Range    Sodium 131 (L) 138 - 145 mmol/L    Potassium 2.6 (L) 3.5 - 5.1 mmol/L    Chloride 98 (L) 101 - 110 mmol/L    CO2 23 21 - 32 mmol/L    Anion Gap 10 4 - 13 mmol/L    Glucose 50 (L) 65 - 100 mg/dL    BUN 16 6 - 23 MG/DL    Creatinine 1.60 (H) 0.8 - 1.5 MG/DL    GFR African American 58 (L) >60 ml/min/1.73m2    GFR Non- 48 (L) >60 ml/min/1.73m2    Calcium 6.8 (L) 8.3 - 10.4 MG/DL    Total Bilirubin 1.0 0.2 - 1.1 MG/DL    ALT 25 12 - 65 U/L    AST 53 (H) 15 - 37 U/L    Alk Phosphatase 122 50 - 136 U/L    Total Protein 7.4 6.3 - 8.2 g/dL    Albumin 2.1 (L) 3.5 - 5.0 g/dL    Globulin 5.3 (H) 2.3 - 3.5 g/dL    Albumin/Globulin Ratio 0.4 (L) 1.2 - 3.5     Lipase    Collection Time: 08/31/22  4:16 PM   Result Value Ref Range    Lipase 130 73 - 393 U/L   Protime-INR    Collection Time: 08/31/22  4:16 PM   Result Value Ref Range    Protime 15.5 (H) 12.6 - 14.5 sec    INR 1.2     TYPE AND SCREEN    Collection Time: 08/31/22  4:18 PM   Result Value Ref Range    Crossmatch expiration date 09/03/2022,8178     ABO/Rh O POSITIVE     Antibody Screen NEG     Blood Bank Comment       CALLED ER POD A AT 1639 8.31.22, CONFIRMATION TUBE NEEDED SPOKE TO SUZE.    Stool for WBC #1    Collection Time: 08/31/22  5:19 PM   Result Value Ref Range    Fecal Leukocytes NO WBC'S SEEN 0 - 4 /HPF   Culture, Stool    Collection Time: 08/31/22  5:19 PM    Specimen: Stool   Result Value Ref Range    Special Requests NO SPECIAL REQUESTS      Culture        No growth after short period of 08/31/22  8:50 PM    Specimen: Blood   Result Value Ref Range    Special Requests RIGHT  FOREARM        Culture NO GROWTH AFTER 9 HOURS     Potassium    Collection Time: 08/31/22  8:50 PM   Result Value Ref Range    Potassium 2.5 (L) 3.5 - 5.1 mmol/L   Hemoglobin and Hematocrit    Collection Time: 08/31/22  8:50 PM   Result Value Ref Range    Hemoglobin 7.7 (L) 13.6 - 17.2 g/dL    Hematocrit 21.7 (L) 41.1 - 50.3 %   Transferrin Saturation    Collection Time: 08/31/22  8:50 PM   Result Value Ref Range    Iron 34 (L) 35 - 150 ug/dL    TIBC 142 (L) 250 - 450 ug/dL    TRANSFERRIN SATURATION 24 >20 %   Ferritin    Collection Time: 08/31/22  8:50 PM   Result Value Ref Range    Ferritin 287 8 - 388 NG/ML   Magnesium    Collection Time: 08/31/22  8:50 PM   Result Value Ref Range    Magnesium 1.1 (L) 1.8 - 2.4 mg/dL   Culture, Blood 1    Collection Time: 08/31/22  8:56 PM    Specimen: Blood   Result Value Ref Range    Special Requests RIGHT  HAND        Culture NO GROWTH AFTER 9 HOURS     MSSA/MRSA Screen BY PCR    Collection Time: 08/31/22  9:07 PM    Specimen: Nares; Blood   Result Value Ref Range    Special Requests NO SPECIAL REQUESTS      Culture (A)       MRSA target DNA not detected, SA target DNA detected. A MRSA negative, SA positive test result does not preclude MRSA nasal colonization. Culture        RESULTS VERIFIED, PHONED TO AND READ BACK BY MAIA ORTIZ @ 0023 ON 09.01.22 [facility]   Culture, Wound Aerobic Only    Collection Time: 08/31/22  9:10 PM    Specimen: Ulcer    LEFT   Result Value Ref Range    Special Requests NO SPECIAL REQUESTS      Gram stain PENDING     Culture        No growth after short period of incubation. Further results to follow after overnight incubation.    Culture, Wound Aerobic Only    Collection Time: 08/31/22  9:10 PM    Specimen: Ulcer    RIGHT   Result Value Ref Range    Special Requests NO SPECIAL REQUESTS      Gram stain PENDING     Culture        No growth after short period of incubation. Further results to follow after overnight incubation. POCT Glucose    Collection Time: 08/31/22  9:29 PM   Result Value Ref Range    POC Glucose 29 (LL) 65 - 100 mg/dL    Performed by: Hai    POCT Glucose    Collection Time: 08/31/22 10:05 PM   Result Value Ref Range    POC Glucose 88 65 - 100 mg/dL    Performed by: Hai    POCT Glucose    Collection Time: 08/31/22 11:16 PM   Result Value Ref Range    POC Glucose 51 (L) 65 - 100 mg/dL    Performed by: Lisa    POCT Glucose    Collection Time: 09/01/22 12:27 AM   Result Value Ref Range    POC Glucose 69 65 - 100 mg/dL    Performed by: Lisa    POCT Glucose    Collection Time: 09/01/22  1:30 AM   Result Value Ref Range    POC Glucose 91 65 - 100 mg/dL    Performed by: Lisa    POCT Glucose    Collection Time: 09/01/22  7:55 AM   Result Value Ref Range    POC Glucose 50 (L) 65 - 100 mg/dL    Performed by: Angie        I have personally reviewed imaging studies showing: Other Studies:  Vascular duplex lower extremity venous left   Final Result   No evidence of left leg DVT. XR CALCANEUS LEFT (MIN 2 VIEWS)   Final Result   No acute process. XR CALCANEUS RIGHT (MIN 2 VIEWS)   Final Result   No acute process. CT ABDOMEN PELVIS W IV CONTRAST Additional Contrast? None   Final Result      1. Cholelithiasis without pericholecystic inflammatory changes to suggest acute   cholecystitis or appendicitis. 2.  Bilateral perinephric soft tissue stranding and small perinephric fluid   collections as well as a small amount of fluid in the pelvic cul-de-sac are   nonspecific, but the possibility of pyelonephritis should be considered.       Vascular duplex lower extremity arteries bilateral    (Results Pending)       Current Meds:  Current Facility-Administered Medications   Medication Dose Route Frequency    dextrose 5 % solution   IntraVENous Continuous    0.9 % sodium chloride bolus  1,000 Other RX Placeholder       Signed:  Carroll Moore MD

## 2022-09-01 NOTE — H&P
Gastrointestinal:  Positive for abdominal pain, diarrhea and nausea. Negative for vomiting. Endocrine: Negative for polydipsia, polyphagia and polyuria. Genitourinary:  Negative for difficulty urinating, dysuria and frequency. Skin:  Positive for color change, rash and wound. Allergic/Immunologic: Negative for food allergies. Neurological:  Positive for dizziness, weakness and light-headedness. Hematological:  Bruises/bleeds easily. Psychiatric/Behavioral:  Negative for behavioral problems, dysphoric mood and hallucinations. Assessment & Plan:     Acute gastroenteritis - admit remote tele. IVF. Replete electrolytes. CLD, check stool culture and fecal occult. C diff in process. Bilateral T2DM foot ulcers - infected. obtain XR of hargrove calcanus. Consider MRI to r/o deeper infection or osto. Obtain wound cx and BC. MRSA PCR screen. Start rocephin, vanc, flagyl. Consult wound RN. Offload. PT/OT. Wound conslt. Check arterial duplex. Wound clinic referral on DC. Concern for possible disseminated infection with perinephric fat stranding and fluid collection raising concern for pyelo. No urinary symptoms. UA w/o inflammatory cells. Hypokalemia 2/2 GI losses - s/p kcl 20 meq IV in ED. Start kcl 40 q6h x3 doses. IVF with kcl 40. Remote tele. Repeat K now. Check mag. Hyponatremia - 2/2 hypovolemia. IVF. Trend bmp/cmp     Hypocalcemia - perhaps from severe infection. Start calcium carbonate. Check vitamin D and Phos in AM. Check Mag now     MARS on CKD3a  2/2 PRA/ATN   Improving since yesterday @ aracely Scr 2.03   Scr 1.6 08/31/22 Bcr ~1.2)non-oligouric  CT BL perinephric soft tissue stranding and small perinephric fluid collections. IVF. Maintain MAP >65 mm hg   Avoid NSAIDs, anti-RAAS agents, nephrotoxic agents, and phosphate enemas   Renal Dosing Strict I&O, Daily Weights, Daily BMP/CMP     Anemia - reports of dark stool. H/o UGIB. Cont PPI BID. Check fecal occult. Repeat hemoglobin now. Hold AC until GIB r/o. Check anemia labs. T2DM with polyneuropathy and hypoglycemia - persistent hypoglycemia in ED with GLU 50-60. S/p dextrose 50% 25g. Improved to 95. Took lantus 30 units this AM. Hold basal. Dextrose IVF. DM RN consult. SSI for now. Check A1C in AM     Physical debility - consult PT/OT, PPD. AUD, moderate, dependence - last drink ~2 weeks ago. PO vitamines. IV thiaminex3 days. PPI. Bacterial Conjunctivitis - cont tobramycin eye drops. PUD / h/o UGIB / GERD w/ esophagitis - PPI BID. Avoid NSAIDs. HTN - cont amlodipine in AM. Hold metoprolol 50 BID pending vitals. HLD -     Anticipated discharge needs:     HH vs STR     Diet: ADULT DIET; Clear Liquid; GI Green (GERD/Peptic Ulcer); Lactose-Controlled, No red dye  VTE ppx: SCDs  Code status: Full Code    Hospital Problems:  Principal Problem:    Acute gastroenteritis  Active Problems:    Conjunctivitis    Chronic gastritis    Alcohol dependence ([address]    Diabetic peripheral neuropathy ([address]    Essential hypertension    Hyperlipidemia    PUD (peptic ulcer disease)    Type 2 diabetes mellitus with hypoglycemia, with long-term current use of insulin (HCC)    Diabetic ulcer of both feet associated with type 2 diabetes mellitus (HCC)    Hypocalcemia    Hypoalbuminemia    Hyponatremia    Hypokalemia due to excessive gastrointestinal loss of potassium    Acute renal failure superimposed on stage 3a chronic kidney disease ([address]  Resolved Problems:    * No resolved hospital problems.  *       Past History:     Past Medical History:   Diagnosis Date    Anemia     Gastroesophageal reflux disease with esophagitis and hemorrhage     HLD (hyperlipidemia)     Hyperplastic colon polyp     Hypertension     Obesity     PUD (peptic ulcer disease)     Type 2 diabetes mellitus with diabetic polyneuropathy, with long-term current use of insulin (HCC)     Ulcer of the duodenum caused by bacteria (H. pylori)     Upper GI bleed 03/28/2016    Last Assessment & Plan:  Formatting of this note might be different from the original. Status post EGD yesterday which revealed esophagitis, gastric and duodenal ulcers Continue PPI, GI follow-up. Monitor hemoglobin Avoid and states Biopsy results- pending       Past Surgical History:   Procedure Laterality Date    COLONOSCOPY      ESOPHAGOGASTRODUODENOSCOPY          Social History     Tobacco Use    Smoking status: Never    Smokeless tobacco: Never   Substance Use Topics    Alcohol use: Yes      Social History     Substance and Sexual Activity   Drug Use Never       Family History   Problem Relation Age of Onset    Hypertension Sister     Diabetes Neg Hx         There is no immunization history for the selected administration types on file for this patient. No Known Allergies  Prior to Admit Medications:  No current outpatient medications      Objective:   Patient Vitals for the past 24 hrs:   Temp Pulse Resp BP SpO2   08/31/22 2030 -- -- -- (!) 130/91 --   08/31/22 2015 -- -- -- (!) 124/104 --   08/31/22 1915 -- -- -- 120/85 --   08/31/22 1900 -- -- -- (!) 163/136 92 %   08/31/22 1730 -- -- -- 136/86 100 %   08/31/22 1715 -- 72 -- (!) 149/90 100 %   08/31/22 1611 98.2 °F (36.8 °C) 75 18 82/64 100 %       Oxygen Therapy  SpO2: 92 %  Pulse Oximeter Device Mode: Intermittent  O2 Device: None (Room air)    Estimated body mass index is 27.89 kg/m² as calculated from the following:    Height as of this encounter: 5' 11\" (1.803 m). Weight as of this encounter: 200 lb (90.7 kg). No intake or output data in the 24 hours ending 08/31/22 2112      Physical Exam:    Blood pressure (!) 130/91, pulse 72, temperature 98.2 °F (36.8 °C), temperature source Oral, resp. rate 18, height 5' 11\" (1.803 m), weight 200 lb (90.7 kg), SpO2 92 %. Physical Exam  Vitals and nursing note reviewed. Constitutional:       Appearance: He is obese. He is ill-appearing.       Comments: Diffuse pallor    HENT:      Head: Normocephalic and atraumatic. Nose: Nose normal.      Mouth/Throat:      Mouth: Mucous membranes are dry. Eyes:      General:         Right eye: No discharge. Left eye: Discharge present. Extraocular Movements: Extraocular movements intact. Pupils: Pupils are equal, round, and reactive to light. Cardiovascular:      Rate and Rhythm: Normal rate and regular rhythm. Heart sounds: No murmur heard. Pulmonary:      Effort: Pulmonary effort is normal.      Breath sounds: Normal breath sounds. No wheezing or rales. Abdominal:      General: Abdomen is flat. Bowel sounds are normal.      Palpations: Abdomen is soft. Tenderness: There is no abdominal tenderness. There is no guarding. Musculoskeletal:      Cervical back: Neck supple. Right lower leg: No edema. Left lower leg: No edema. Right foot: Deformity present. Left foot: Deformity present. Feet:      Right foot:      Skin integrity: Ulcer, erythema and dry skin present. Left foot:      Skin integrity: Ulcer, erythema and dry skin present. Skin:     Capillary Refill: Capillary refill takes 2 to 3 seconds. Coloration: Skin is pale. Comments: Bilateral open heel wounds with moderate drainage, some purulence. Left leg with dark discoloration and pustules/excoriations to distal thigh    Neurological:      General: No focal deficit present. Mental Status: Mental status is at baseline. Psychiatric:         Mood and Affect: Affect is flat. Speech: Speech is delayed. Behavior: Behavior is slowed and withdrawn.          I have personally reviewed labs and tests showing:  Recent Labs:  Recent Results (from the past 24 hour(s))   CBC with Diff    Collection Time: 08/31/22  4:16 PM   Result Value Ref Range    WBC 13.5 (H) 4.3 - 11.1 K/uL    RBC 2.74 (L) 4.23 - 5.6 M/uL    Hemoglobin 8.3 (L) 13.6 - 17.2 g/dL    Hematocrit 23.4 (L) 41.1 - 50.3 %    MCV 85.4 79.6 - 97.8 FL    MCH 30.3 26.1 - 32.9 PG MCHC 35.5 (H) 31.4 - 35.0 g/dL    RDW 14.2 11.9 - 14.6 %    Platelets 955 527 - 958 K/uL    MPV 11.9 9.4 - 12.3 FL    nRBC 0.00 0.0 - 0.2 K/uL    Differential Type AUTOMATED      Seg Neutrophils 67 43 - 78 %    Lymphocytes 15 13 - 44 %    Monocytes 14 (H) 4.0 - 12.0 %    Eosinophils % 2 0.5 - 7.8 %    Basophils 0 0.0 - 2.0 %    Immature Granulocytes 2 0.0 - 5.0 %    Segs Absolute 9.1 (H) 1.7 - 8.2 K/UL    Absolute Lymph # 2.0 0.5 - 4.6 K/UL    Absolute Mono # 1.9 (H) 0.1 - 1.3 K/UL    Absolute Eos # 0.3 0.0 - 0.8 K/UL    Basophils Absolute 0.1 0.0 - 0.2 K/UL    Absolute Immature Granulocyte 0.2 0.0 - 0.5 K/UL   CMP    Collection Time: 08/31/22  4:16 PM   Result Value Ref Range    Sodium 131 (L) 138 - 145 mmol/L    Potassium 2.6 (L) 3.5 - 5.1 mmol/L    Chloride 98 (L) 101 - 110 mmol/L    CO2 23 21 - 32 mmol/L    Anion Gap 10 4 - 13 mmol/L    Glucose 50 (L) 65 - 100 mg/dL    BUN 16 6 - 23 MG/DL    Creatinine 1.60 (H) 0.8 - 1.5 MG/DL    GFR African American 58 (L) >60 ml/min/1.73m2    GFR Non- 48 (L) >60 ml/min/1.73m2    Calcium 6.8 (L) 8.3 - 10.4 MG/DL    Total Bilirubin 1.0 0.2 - 1.1 MG/DL    ALT 25 12 - 65 U/L    AST 53 (H) 15 - 37 U/L    Alk Phosphatase 122 50 - 136 U/L    Total Protein 7.4 6.3 - 8.2 g/dL    Albumin 2.1 (L) 3.5 - 5.0 g/dL    Globulin 5.3 (H) 2.3 - 3.5 g/dL    Albumin/Globulin Ratio 0.4 (L) 1.2 - 3.5     Lipase    Collection Time: 08/31/22  4:16 PM   Result Value Ref Range    Lipase 130 73 - 393 U/L   Protime-INR    Collection Time: 08/31/22  4:16 PM   Result Value Ref Range    Protime 15.5 (H) 12.6 - 14.5 sec    INR 1.2     TYPE AND SCREEN    Collection Time: 08/31/22  4:18 PM   Result Value Ref Range    Crossmatch expiration date 09/03/2022,2359     ABO/Rh O POSITIVE     Antibody Screen NEG     Blood Bank Comment       CALLED ER POD A AT 1639 8.31.22, CONFIRMATION TUBE NEEDED SPOKE TO SUZE.    Stool for WBC #1    Collection Time: 08/31/22  5:19 PM   Result Value Ref Range Fecal Leukocytes NO WBC'S SEEN 0 - 4 /HPF   POCT Glucose    Collection Time: 08/31/22  5:54 PM   Result Value Ref Range    POC Glucose 55 (L) 65 - 100 mg/dL    Performed by: [place]    POCT Glucose    Collection Time: 08/31/22  6:59 PM   Result Value Ref Range    POC Glucose 95 65 - 100 mg/dL    Performed by: [place]    POCT Urinalysis no Micro    Collection Time: 08/31/22  7:03 PM   Result Value Ref Range    Specific Gravity, Urine, POC 1.015 1.001 - 1.023      pH, Urine, POC 6.0 5.0 - 9.0      Protein, Urine, POC Negative NEG mg/dL    Glucose, UA POC Negative NEG mg/dL    Ketones, Urine, POC Negative NEG mg/dL    Bilirubin, Urine, POC Negative NEG      Blood, UA POC Negative NEG      URINE UROBILINOGEN POC 1.0 0.2 - 1.0 EU/dL    Nitrate, Urine, POC Negative NEG      Leukocyte Est, UA POC Negative NEG      Performed by: [place]     Urinalysis    Collection Time: 08/31/22  7:39 PM   Result Value Ref Range    Color, UA YELLOW/STRAW      Appearance CLEAR      Specific Gravity, UA 1.025 (H) 1.001 - 1.023      pH, Urine 6.0 5.0 - 9.0      Protein, UA Negative NEG mg/dL    Glucose,  mg/dL    Ketones, Urine Negative NEG mg/dL    Bilirubin Urine Negative NEG      Blood, Urine Negative NEG      Urobilinogen, Urine 1.0 0.2 - 1.0 EU/dL    Nitrite, Urine Negative NEG      Leukocyte Esterase, Urine Negative NEG      BACTERIA, URINE 0 0 /hpf       I have personally reviewed imaging studies showing:  CT ABDOMEN PELVIS W IV CONTRAST Additional Contrast? None    Result Date: 8/31/2022  CT OF THE ABDOMEN AND PELVIS WITH CONTRAST:          CLINICAL HISTORY:   Diarrhea for 2 weeks with intermittent, sharp abdominal pain. History of normocytic, normochromic anemia requiring transfusions. Now with marked anemia and leukocytosis. TECHNIQUE:  Oral and nonionic intravenous contrast was administered, and the abdomen and pelvis were scanned with spiral technique.   Radiation dose reduction was achieved using one or all of the following techniques: automated exposure control, weight-based dosing, iterative reconstruction. COMPARISON:  None. CT ABDOMEN FINDINGS:  The lung bases are clear as imaged. There are innumerable calcified stones in the gallbladder without biliary ductal dilatation or significant pericholecystic inflammatory changes. No definite calcified stone is identified, but there is soft tissue stranding and small fluid collections in the perinephric spaces bilaterally The liver, spleen, pancreas, and adrenals appear unremarkable. Extensive atherosclerotic calcifications without definite aneurysm. CT PELVIS FINDINGS: The appendix is not distended. No pathologically enlarged lymph nodes. There is a small amount of free fluid in the pelvic cul-de-sac. Prostate is not enlarged. Bone windows demonstrate no definite aggressive process, accounting for degenerative changes. 1. Cholelithiasis without pericholecystic inflammatory changes to suggest acute cholecystitis or appendicitis. 2.  Bilateral perinephric soft tissue stranding and small perinephric fluid collections as well as a small amount of fluid in the pelvic cul-de-sac are nonspecific, but the possibility of pyelonephritis should be considered. Echocardiogram:  No results found for this or any previous visit.         Orders Placed This Encounter   Medications    0.9 % sodium chloride bolus    potassium chloride 10 mEq/100 mL IVPB (Peripheral Line)    0.9 % sodium chloride bolus    0.9 % sodium chloride bolus    sodium chloride flush 0.9 % injection 10 mL    iopamidol (ISOVUE-370) 76 % injection 100 mL    dextrose 10 % infusion    dextrose 50 % IV solution    DISCONTD: cefTRIAXone (ROCEPHIN) 1,000 mg in sodium chloride 0.9 % 50 mL IVPB mini-bag     Order Specific Question:   Antimicrobial Indications     Answer:   Urinary Tract Infection    sodium chloride flush 0.9 % injection 5-40 mL    sodium chloride flush 0.9 % injection 5-40 mL    0.9 % sodium chloride infusion    DISCONTD: enoxaparin (LOVENOX) injection 40 mg     Order Specific Question:   Indication of Use     Answer:   Prophylaxis-DVT/PE    OR Linked Order Group     ondansetron (ZOFRAN-ODT) disintegrating tablet 4 mg     ondansetron (ZOFRAN) injection 4 mg    OR Linked Order Group     acetaminophen (TYLENOL) tablet 650 mg     acetaminophen (TYLENOL) suppository 650 mg    polyethylene glycol (GLYCOLAX) packet 17 g    potassium chloride (KLOR-CON M) extended release tablet 40 mEq    dextrose 5 % and 0.45 % NaCl with KCl 40 mEq infusion    hyoscyamine (LEVSIN/SL) sublingual tablet 125 mcg    metronidazole (FLAGYL) 500 mg in 0.9% NaCl 100 mL IVPB premix     Order Specific Question:   Antimicrobial Indications     Answer:   Infectious Diarrhea    cefTRIAXone (ROCEPHIN) 2,000 mg in sodium chloride 0.9 % 50 mL IVPB mini-bag     Order Specific Question:   Antimicrobial Indications     Answer:   Skin and Soft Tissue Infection     Order Specific Question:   Antimicrobial Indications     Answer:   Urinary Tract Infection     Order Specific Question:   Skin duration of therapy     Answer:   7 days     Order Specific Question:   UTI duration of therapy     Answer:   7 days    zinc oxide 40 % paste    amLODIPine (NORVASC) tablet 10 mg    folic acid (FOLVITE) tablet 1 mg    vitamin B-12 (CYANOCOBALAMIN) tablet 1,000 mcg    tobramycin (TOBREX) 0.3 % ophthalmic solution 1 drop    pantoprazole (PROTONIX) tablet 40 mg    tuberculin injection 5 Units    calcium carbonate (TUMS) chewable tablet 500 mg    glucose chewable tablet 16 g    OR Linked Order Group     dextrose bolus 10% 125 mL     dextrose bolus 10% 250 mL    glucagon (rDNA) injection 1 mg    dextrose 10 % infusion    insulin lispro (HUMALOG) injection vial 0-8 Units    insulin lispro (HUMALOG) injection vial 0-4 Units    thiamine (B-1) injection 500 mg         Signed:  Johanny Watkins DO DO    Part of this note may have been written by using a voice dictation software. The note has been proof read but may still contain some grammatical/other typographical errors.

## 2022-09-02 PROBLEM — I73.9 PAD (PERIPHERAL ARTERY DISEASE) (HCC): Status: ACTIVE | Noted: 2022-09-02

## 2022-09-02 LAB
ALBUMIN SERPL-MCNC: 2.1 G/DL (ref 3.5–5)
ALBUMIN/GLOB SERPL: 0.4 {RATIO} (ref 1.2–3.5)
ALP SERPL-CCNC: 93 U/L (ref 50–136)
ALT SERPL-CCNC: 26 U/L (ref 12–65)
ANION GAP SERPL CALC-SCNC: 8 MMOL/L (ref 4–13)
AST SERPL-CCNC: 40 U/L (ref 15–37)
BASOPHILS # BLD: 0.1 K/UL (ref 0–0.2)
BASOPHILS NFR BLD: 1 % (ref 0–2)
BILIRUB SERPL-MCNC: 0.7 MG/DL (ref 0.2–1.1)
BUN SERPL-MCNC: 10 MG/DL (ref 6–23)
CALCIUM SERPL-MCNC: 6.4 MG/DL (ref 8.3–10.4)
CHLORIDE SERPL-SCNC: 101 MMOL/L (ref 101–110)
CO2 SERPL-SCNC: 18 MMOL/L (ref 21–32)
CREAT SERPL-MCNC: 1.5 MG/DL (ref 0.8–1.5)
DIFFERENTIAL METHOD BLD: ABNORMAL
EOSINOPHIL # BLD: 0.2 K/UL (ref 0–0.8)
EOSINOPHIL NFR BLD: 2 % (ref 0.5–7.8)
ERYTHROCYTE [DISTWIDTH] IN BLOOD BY AUTOMATED COUNT: 13.8 % (ref 11.9–14.6)
GLOBULIN SER CALC-MCNC: 5.2 G/DL (ref 2.3–3.5)
GLUCOSE BLD STRIP.AUTO-MCNC: 106 MG/DL (ref 65–100)
GLUCOSE BLD STRIP.AUTO-MCNC: 139 MG/DL (ref 65–100)
GLUCOSE BLD STRIP.AUTO-MCNC: 275 MG/DL (ref 65–100)
GLUCOSE BLD STRIP.AUTO-MCNC: 295 MG/DL (ref 65–100)
GLUCOSE SERPL-MCNC: 112 MG/DL (ref 65–100)
HCT VFR BLD AUTO: 22.8 % (ref 41.1–50.3)
HGB BLD-MCNC: 8 G/DL (ref 13.6–17.2)
IMM GRANULOCYTES # BLD AUTO: 0.1 K/UL (ref 0–0.5)
IMM GRANULOCYTES NFR BLD AUTO: 1 % (ref 0–5)
LYMPHOCYTES # BLD: 1.8 K/UL (ref 0.5–4.6)
LYMPHOCYTES NFR BLD: 16 % (ref 13–44)
MAGNESIUM SERPL-MCNC: 1.6 MG/DL (ref 1.8–2.4)
MCH RBC QN AUTO: 30.3 PG (ref 26.1–32.9)
MCHC RBC AUTO-ENTMCNC: 35.1 G/DL (ref 31.4–35)
MCV RBC AUTO: 86.4 FL (ref 79.6–97.8)
MM INDURATION, POC: 0 MM (ref 0–5)
MM INDURATION, POC: 0 MM (ref 0–5)
MONOCYTES # BLD: 1.6 K/UL (ref 0.1–1.3)
MONOCYTES NFR BLD: 14 % (ref 4–12)
NEUTS SEG # BLD: 7.6 K/UL (ref 1.7–8.2)
NEUTS SEG NFR BLD: 66 % (ref 43–78)
NRBC # BLD: 0 K/UL (ref 0–0.2)
PLATELET # BLD AUTO: 283 K/UL (ref 150–450)
PMV BLD AUTO: 11.6 FL (ref 9.4–12.3)
POTASSIUM SERPL-SCNC: 3.9 MMOL/L (ref 3.5–5.1)
PPD, POC: NEGATIVE
PPD, POC: NEGATIVE
PROT SERPL-MCNC: 7.3 G/DL (ref 6.3–8.2)
RBC # BLD AUTO: 2.64 M/UL (ref 4.23–5.6)
SERVICE CMNT-IMP: ABNORMAL
SODIUM SERPL-SCNC: 127 MMOL/L (ref 138–145)
VANCOMYCIN SERPL-MCNC: 23.1 UG/ML
WBC # BLD AUTO: 11.3 K/UL (ref 4.3–11.1)

## 2022-09-02 PROCEDURE — 6370000000 HC RX 637 (ALT 250 FOR IP): Performed by: FAMILY MEDICINE

## 2022-09-02 PROCEDURE — 1100000003 HC PRIVATE W/ TELEMETRY

## 2022-09-02 PROCEDURE — 36415 COLL VENOUS BLD VENIPUNCTURE: CPT

## 2022-09-02 PROCEDURE — 2580000003 HC RX 258: Performed by: FAMILY MEDICINE

## 2022-09-02 PROCEDURE — 2500000003 HC RX 250 WO HCPCS: Performed by: FAMILY MEDICINE

## 2022-09-02 PROCEDURE — 83735 ASSAY OF MAGNESIUM: CPT

## 2022-09-02 PROCEDURE — 1100000000 HC RM PRIVATE

## 2022-09-02 PROCEDURE — 82962 GLUCOSE BLOOD TEST: CPT

## 2022-09-02 PROCEDURE — 6360000002 HC RX W HCPCS: Performed by: FAMILY MEDICINE

## 2022-09-02 PROCEDURE — 80053 COMPREHEN METABOLIC PANEL: CPT

## 2022-09-02 PROCEDURE — 99222 1ST HOSP IP/OBS MODERATE 55: CPT | Performed by: NURSE PRACTITIONER

## 2022-09-02 PROCEDURE — 97530 THERAPEUTIC ACTIVITIES: CPT

## 2022-09-02 PROCEDURE — 80202 ASSAY OF VANCOMYCIN: CPT

## 2022-09-02 PROCEDURE — 85025 COMPLETE CBC W/AUTO DIFF WBC: CPT

## 2022-09-02 RX ORDER — CHOLESTYRAMINE LIGHT 4 G/5.7G
4 POWDER, FOR SUSPENSION ORAL 2 TIMES DAILY
Status: DISCONTINUED | OUTPATIENT
Start: 2022-09-02 | End: 2022-09-07 | Stop reason: HOSPADM

## 2022-09-02 RX ORDER — MAGNESIUM SULFATE IN WATER 40 MG/ML
2000 INJECTION, SOLUTION INTRAVENOUS ONCE
Status: COMPLETED | OUTPATIENT
Start: 2022-09-02 | End: 2022-09-02

## 2022-09-02 RX ORDER — OXYCODONE HYDROCHLORIDE 5 MG/1
5 TABLET ORAL EVERY 8 HOURS PRN
Status: DISCONTINUED | OUTPATIENT
Start: 2022-09-02 | End: 2022-09-07 | Stop reason: HOSPADM

## 2022-09-02 RX ORDER — SODIUM BICARBONATE 650 MG/1
1300 TABLET ORAL 2 TIMES DAILY
Status: DISCONTINUED | OUTPATIENT
Start: 2022-09-02 | End: 2022-09-07 | Stop reason: HOSPADM

## 2022-09-02 RX ORDER — ASPIRIN 81 MG/1
81 TABLET, CHEWABLE ORAL DAILY
Status: DISCONTINUED | OUTPATIENT
Start: 2022-09-02 | End: 2022-09-07 | Stop reason: HOSPADM

## 2022-09-02 RX ORDER — LOPERAMIDE HYDROCHLORIDE 2 MG/1
2 CAPSULE ORAL 4 TIMES DAILY PRN
Status: DISCONTINUED | OUTPATIENT
Start: 2022-09-02 | End: 2022-09-07 | Stop reason: HOSPADM

## 2022-09-02 RX ADMIN — CEFTRIAXONE 2000 MG: 2 INJECTION, POWDER, FOR SOLUTION INTRAMUSCULAR; INTRAVENOUS at 20:47

## 2022-09-02 RX ADMIN — ACETAMINOPHEN 650 MG: 325 TABLET ORAL at 22:42

## 2022-09-02 RX ADMIN — SODIUM BICARBONATE 650 MG TABLET 1300 MG: at 20:53

## 2022-09-02 RX ADMIN — DEXTROSE MONOHYDRATE: 50 INJECTION, SOLUTION INTRAVENOUS at 21:32

## 2022-09-02 RX ADMIN — METRONIDAZOLE 500 MG: 500 INJECTION, SOLUTION INTRAVENOUS at 05:44

## 2022-09-02 RX ADMIN — PANTOPRAZOLE SODIUM 40 MG: 40 TABLET, DELAYED RELEASE ORAL at 16:02

## 2022-09-02 RX ADMIN — FOLIC ACID 1 MG: 1 TABLET ORAL at 09:47

## 2022-09-02 RX ADMIN — METRONIDAZOLE 500 MG: 500 INJECTION, SOLUTION INTRAVENOUS at 16:03

## 2022-09-02 RX ADMIN — MAGNESIUM SULFATE HEPTAHYDRATE 2000 MG: 40 INJECTION, SOLUTION INTRAVENOUS at 11:35

## 2022-09-02 RX ADMIN — INSULIN LISPRO 4 UNITS: 100 INJECTION, SOLUTION INTRAVENOUS; SUBCUTANEOUS at 17:15

## 2022-09-02 RX ADMIN — THIAMINE HYDROCHLORIDE 500 MG: 100 INJECTION, SOLUTION INTRAMUSCULAR; INTRAVENOUS at 09:46

## 2022-09-02 RX ADMIN — AMLODIPINE BESYLATE 10 MG: 10 TABLET ORAL at 09:47

## 2022-09-02 RX ADMIN — PANTOPRAZOLE SODIUM 40 MG: 40 TABLET, DELAYED RELEASE ORAL at 05:44

## 2022-09-02 RX ADMIN — VANCOMYCIN HYDROCHLORIDE 1000 MG: 1 INJECTION, POWDER, LYOPHILIZED, FOR SOLUTION INTRAVENOUS at 03:40

## 2022-09-02 RX ADMIN — SODIUM CHLORIDE, PRESERVATIVE FREE 10 ML: 5 INJECTION INTRAVENOUS at 09:49

## 2022-09-02 RX ADMIN — CYANOCOBALAMIN TAB 1000 MCG 1000 MCG: 1000 TAB at 09:47

## 2022-09-02 RX ADMIN — ASPIRIN 81 MG: 81 TABLET, CHEWABLE ORAL at 09:45

## 2022-09-02 RX ADMIN — CHOLESTYRAMINE 4 G: 4 POWDER, FOR SUSPENSION ORAL at 11:36

## 2022-09-02 RX ADMIN — DEXTROSE MONOHYDRATE: 50 INJECTION, SOLUTION INTRAVENOUS at 09:54

## 2022-09-02 RX ADMIN — METRONIDAZOLE 500 MG: 500 INJECTION, SOLUTION INTRAVENOUS at 21:32

## 2022-09-02 RX ADMIN — CHOLESTYRAMINE 4 G: 4 POWDER, FOR SUSPENSION ORAL at 20:53

## 2022-09-02 ASSESSMENT — PAIN SCALES - GENERAL
PAINLEVEL_OUTOF10: 0
PAINLEVEL_OUTOF10: 0
PAINLEVEL_OUTOF10: 3
PAINLEVEL_OUTOF10: 0

## 2022-09-02 ASSESSMENT — PAIN DESCRIPTION - ORIENTATION: ORIENTATION: RIGHT;LEFT

## 2022-09-02 ASSESSMENT — PAIN DESCRIPTION - PAIN TYPE: TYPE: ACUTE PAIN

## 2022-09-02 ASSESSMENT — PAIN - FUNCTIONAL ASSESSMENT: PAIN_FUNCTIONAL_ASSESSMENT: ACTIVITIES ARE NOT PREVENTED

## 2022-09-02 ASSESSMENT — PAIN DESCRIPTION - LOCATION: LOCATION: FOOT

## 2022-09-02 ASSESSMENT — PAIN DESCRIPTION - DESCRIPTORS: DESCRIPTORS: ACHING;DISCOMFORT

## 2022-09-02 NOTE — PLAN OF CARE
Problem: Discharge Planning  Goal: Discharge to home or other facility with appropriate resources  Outcome: Progressing  Flowsheets (Taken 9/1/2022 1910)  Discharge to home or other facility with appropriate resources: Identify barriers to discharge with patient and caregiver     Problem: Pain  Goal: Verbalizes/displays adequate comfort level or baseline comfort level  Outcome: Progressing     Problem: Skin/Tissue Integrity  Goal: Absence of new skin breakdown  Description: 1. Monitor for areas of redness and/or skin breakdown  2. Assess vascular access sites hourly  3. Every 4-6 hours minimum:  Change oxygen saturation probe site  4. Every 4-6 hours:  If on nasal continuous positive airway pressure, respiratory therapy assess nares and determine need for appliance change or resting period.   Outcome: Progressing     Problem: Safety - Adult  Goal: Free from fall injury  Outcome: Progressing     Problem: ABCDS Injury Assessment  Goal: Absence of physical injury  Outcome: Progressing

## 2022-09-02 NOTE — PROGRESS NOTES
VANCO DAILY FOLLOW UP NOTE  [address] [facility] Pharmacokinetic Monitoring Service - Vancomycin    Consulting Provider: Dr. Tonio Mesa   Indication: SSTI (Left heel ulcer)   Target Concentration: Goal AUC/HERI 400-600 mg*hr/L  Day of Therapy: 2  Additional Antimicrobials: Ceftriaxone, Flagyl     Pertinent Laboratory Values: Wt Readings from Last 1 Encounters:   08/31/22 200 lb (90.7 kg)     Temp Readings from Last 1 Encounters:   09/01/22 98.1 °F (36.7 °C) (Oral)     Recent Labs     08/31/22  1616 09/01/22  1206   BUN 16 12   CREATININE 1.60* 1.20   WBC 13.5*  --      Estimated Creatinine Clearance: 79 mL/min (based on SCr of 1.2 mg/dL). No results found for: Valeriy Rey    MRSA Nasal Swab: N/A. Non-respiratory infection.       Assessment:  Date/Time Dose Concentration AUC   9/2 0928 1000 mg q 12 hours 23.1 756   Note: Serum concentrations collected for AUC dosing may appear elevated if collected in close proximity to the dose administered, this is not necessarily an indication of toxicity    Plan:  Current dosing regimen is supra-therapeutic  Decrease dose to 1250 mg q 24 hours for predicted AUC/Tr of 494/14.3  Repeat vancomycin concentrations will be ordered as clinically appropriate   Pharmacy will continue to monitor patient and adjust therapy as indicated    Thank you for the consult,  Samira Wu [facility]

## 2022-09-02 NOTE — DIABETES MGMT
Patient admitted 8/31/22 with diabetic ulcer both feet, hypoglycemia, MARS and pyelonephritis. Blood glucose on admission 50. FBS: 106. Blood glucose range yesterday . Pt has IV with Dextrose 5% at 100 mL/hr. History of DM (pt states he was diagnosed at age 30 yrs old), neuropathy, HTN, HLD, chronic gastritis, GERD, AUD, and obesity. Pt states that prior to admission medications include Basaglar 35 units in the am and Basaglar 25 units hs. Pt states that he has a working blood glucose meter at home , but it is over 11years old. Explained the importance of blood glucose monitoring to patient and how this will provide their primary care provider with more information to help them safely titrate their regimen. Recommended frequency of qid, and to record in log book to take to primary care provider appointment. Educated patient that all glucometers are similar but differ in small ways. Educated patient that they should try to get the glucometer covered by their insurance formulary to keep their costs down. Reviewed target blood glucose goals per American Diabetes Association recommendations. Patient will need prescription for glucometer kit, test strips, and lancets at discharge so that the patient may obtain the meter covered by their insurance. Patient given educational material, \"Diabetes Self-Management: A Patient Teaching Guide\", which was reviewed with patient. Explained basic physiology of diabetes, as well as causes, signs and symptoms, and treatments for hypoglycemia and hyperglycemia. Described the effects of poor glycemic control and the development of long-term complications such as renal, eye, nerve, and cardiovascular disease. Patient has numbness and tingling in feet. Described proper diabetic foot care and the importance of checking feet daily. Reviewed effects of sweetened beverages on glycemic control and discussed alternative beverages to help improve glycemic control.  Educated re: effects of carbohydrates on blood glucose, the \"plate method\" of healthy meal planning, basics of healthy meal plan, Consistent Carbohydrate Diet, discussed the basics of carb counting and how to read a nutrition label. Educated patient regarding the benefits of physical activity (as cleared by provider) on glycemic control. Also explained the relationship between hyperglycemia and infection and delayed healing. Discussed target goals for blood glucose and A1C. Educated patient regarding diabetic medications including mechanism of action, timing, and possible side effects. Patient verbalizes understanding of teaching. Educated patient regarding current insulin regimen of Humalog correctional scale coverage 4x/day ac and hs, including type of insulin, timing with meals, onset, duration of effect, and peak of insulin dose. Instructed patient to always seek guidance from their primary care provider about adjusting their insulin doses and not to adjust them on their own as this could negatively impact their glycemic control or result in hypoglycemia. Patient verbalizes understanding. Stressed the importance of follow up care for diabetes management with PCP. Pt is seen at [facility] medicine, Dr. Shey Oconnor. Pt would likely benefit from further diabetes education. Referral has been initiated to [facility] and pt is agreeable to plan. Given blood glucose on admission of 50, pt would likely benefit from a reduction in Basal dosing at discharge to decrease risk of hypoglcyemia and close follow up with PCP. Pt has no further questions at this time.

## 2022-09-02 NOTE — PROGRESS NOTES
Hospitalist Progress Note   Admit Date:  2022  4:18 PM   Name:  Mariel Otto   Age:  65 y.o. Sex:  male  :  1965   MRN:  [id number]   Room:      Presenting Complaint: Hypotension     Reason(s) for Admission: Hypokalemia [E87.6]  Hypoglycemia [E16.2]  Acute pyelonephritis [N10]  Acute kidney injury [facility]) [N17.9]  Acute weakness [R53.1]  Diabetic ulcer of both feet ([address] [N74.979, L97.519, L97.529]  Acute pyelonephritis due to bacteria [N10, B96.89]     Hospital Course:   Mariel Otto is a 65 y.o. male with medical history of Obesity, T2DM on insulin, DM neuropathy, HTN, HLD, PUD, chronic gastritis, AUD, GERD, Upper GIB who presented with abdominal pain and diarrhea x 2 weeks associated with fatigue and generalized weakness. Reported ~5-10 episodes loose stool daily. Described as black. Has been taking pepto bismol. Abdominal pain described as crampy. Denies recent abx use. Had URI couple weeks prior to onset of GI symptoms. No contacts with similar symptoms. No uncooked meat. Has been essentially bedbound for 2 weeks per wife. Developed bilateral heel wounds, left worse than right and left leg with itchy pustules and discoloration up entire leg. He has been soiling the bed. He was seen at [facility] on  and Dc'd from ED yesterday. In ED, BP 82/64 on arrival.    Labs: Sna 131 K 2.6 cl 98 Scr 1.6 GLU 50 calcium 6.8 Salb 2.1 AST 53 WBC 13.5 hgb 8.3 INR 1.2 fecal leukocytes negative. C diff in process. CT      1. Cholelithiasis without pericholecystic inflammatory changes to suggest acute   cholecystitis or appendicitis. 2.  Bilateral perinephric soft tissue stranding and small perinephric fluid   collections as well as a small amount of fluid in the pelvic cul-de-sac are   nonspecific, but the possibility of pyelonephritis should be considered. Subjective & 24hr Events (22):    Says feeling better  Improving diarrhea, C. difficile negative  Wound care evaluated for bilateral heel wounds  Arterial Doppler pulse to for PAD, vascular consulted, no intervention required, signed off  Mild hyponatremia, hypomagnesemia, mild decrease in bicarb  Wound culture gram negative rods, staph aureus      Assessment & Plan:   Sepsis at admission    Acute gastroenteritis - admit remote tele. IVF. Replete electrolytes. CLD, check stool culture and fecal occult. C diff in process. 9/2-C. difficile negative, ordered as needed Imodium     Bilateral T2DM foot ulcers - infected. obtain XR of hargrove calcanus. Consider MRI to r/o deeper infection or osto. Obtain wound cx and BC. MRSA PCR screen. Start rocephin, vanc, flagyl. Consult wound RN. Offload. PT/OT. Wound conslt. Check arterial duplex. Wound clinic referral on DC. Concern for possible disseminated infection with perinephric fat stranding and fluid collection raising concern for pyelo. No urinary symptoms. UA w/o inflammatory cells. 9/2- wound evaluated - need out pt wound care follow at discharge. Cont antibiotics    PAD  9/2-arterial Doppler positive for peripheral artery disease. Vascular consulted, no intervention required, enough circulation for healing, signed off. I started pt on asa 81 mg q daily     Hypokalemia 2/2 GI losses - s/p kcl 20 meq IV in ED. Start kcl 40 q6h x3 doses. IVF with kcl 40. Remote tele. Repeat K now. Check mag. 9/1- resolved     Hyponatremia - 2/2 hypovolemia. IVF. Trend bmp/cmp   9/2-sodium of 127    Hypomagnesemia  9/2-Magnesium of 1.6-replace with 2 g admission sulfate. Hypocalcemia - perhaps from severe infection. Start calcium carbonate. Check vitamin D and Phos in AM. Check Mag now   9/2- calcium 6.4, albumin 2.1     MARS on CKD3a  2/2 PRA/ATN   Improving since yesterday @ aracely Scr 2.03   Scr 1.6 08/31/22 Bcr ~1.2)non-oligouric  CT BL perinephric soft tissue stranding and small perinephric fluid collections. IVF.    Maintain MAP >65 mm hg   Avoid NSAIDs, anti-RAAS agents, nephrotoxic agents, and phosphate enemas   Renal Dosing Strict I&O, Daily Weights, Daily BMP/CMP   9/1- normal creatinine     Anemia - reports of dark stool. H/o UGIB. Cont PPI BID. Check fecal occult. Repeat hemoglobin now. Hold AC until GIB r/o. Check anemia labs. 9/2- hb 8. Occult blood negative     T2DM with polyneuropathy and hypoglycemia - persistent hypoglycemia in ED with GLU 50-60. S/p dextrose 50% 25g. Improved to 95. Took lantus 30 units this AM. Hold basal. Dextrose IVF. DM RN consult. SSI for now. Check A1C in AM   9/1- persistent hypoglycemia - changed fluids to d5 water cont hypoglycemic protocol. 9/2-improving hypoglycemia, decrease D5 water to 75 cc from 100 cc. Physical debility - consult PT/OT, PPD. AUD, moderate, dependence - last drink ~2 weeks ago. PO vitamines. IV thiaminex3 days. PPI. Bacterial Conjunctivitis - cont tobramycin eye drops. 9/2- Improving conjunctival injection     PUD / h/o UGIB / GERD w/ esophagitis - PPI BID. Avoid NSAIDs. HTN - cont amlodipine in AM. Hold metoprolol 50 BID pending vitals. HLD -     Anticipated discharge needs:     HH vs STR      Diet: ADULT DIET; Clear Liquid; GI [place] (GERD/Peptic Ulcer); Lactose-Controlled, No red dye  VTE ppx: heparin  Code status: Full Code      Hospital Problems:  Principal Problem:    Acute gastroenteritis  Active Problems:    Conjunctivitis    Chronic gastritis    Alcohol dependence ([address]    Diabetic peripheral neuropathy ([address]    Essential hypertension    Hyperlipidemia    PUD (peptic ulcer disease)    Type 2 diabetes mellitus with hypoglycemia, with long-term current use of insulin (HCC)    Diabetic ulcer of both feet associated with type 2 diabetes mellitus (HCC)    Hypocalcemia    Hypoalbuminemia    Hyponatremia    Hypokalemia due to excessive gastrointestinal loss of potassium    Acute renal failure superimposed on stage 3a chronic kidney disease ([address]  Resolved Problems:    * No resolved hospital problems. *      Objective:   Patient Vitals for the past 24 hrs:   Temp Pulse Resp BP SpO2   09/02/22 1142 97.9 °F (36.6 °C) 84 19 125/88 100 %   09/02/22 0947 -- -- -- 107/76 --   09/02/22 0747 98.2 °F (36.8 °C) 90 19 107/76 100 %   09/02/22 0417 98.4 °F (36.9 °C) 82 19 114/76 100 %   09/01/22 2301 98.2 °F (36.8 °C) 85 19 131/82 99 %   09/01/22 2007 97.5 °F (36.4 °C) 73 19 110/72 100 %   09/01/22 1539 98.1 °F (36.7 °C) 81 19 121/83 100 %       Oxygen Therapy  SpO2: 100 %  Pulse Oximeter Device Mode: Intermittent  O2 Device: None (Room air)    Estimated body mass index is 27.89 kg/m² as calculated from the following:    Height as of this encounter: 5' 11\" (1.803 m). Weight as of this encounter: 200 lb (90.7 kg). Intake/Output Summary (Last 24 hours) at 9/2/2022 1517  Last data filed at 9/2/2022 8267  Gross per 24 hour   Intake 1760 ml   Output 500 ml   Net 1260 ml         Physical Exam:     Blood pressure 125/88, pulse 84, temperature 97.9 °F (36.6 °C), temperature source Axillary, resp. rate 19, height 5' 11\" (1.803 m), weight 200 lb (90.7 kg), SpO2 100 %. General:    Well nourished. Head:  Normocephalic, atraumatic  Eyes:  No significant discharge or drainage. Marked improved conjunctival injection  ENT:  Nares appear normal, no drainage. Moist oral mucosa  Neck:  No restricted ROM. Trachea midline   CV:   RRR. No m/r/g. No jugular venous distension. Lungs:   CTAB. No wheezing, rhonchi, or rales. Symmetric expansion. Abdomen: Bowel sounds present. Soft, nontender, nondistended. Extremities: No cyanosis or clubbing. No edema  Skin:     Dressing to bilateral heels. .    Neuro:  CN II-XII grossly intact. Sensation intact. A&Ox3  Psych:  Normal mood and affect.                       I have personally reviewed labs and tests showing:  Recent Labs:  Recent Results (from the past 48 hour(s))   CBC with Diff    Collection Time: 08/31/22  4:16 PM   Result Value Ref Range    WBC 13.5 (H) 4.3 - 11.1 K/uL RBC 2.74 (L) 4.23 - 5.6 M/uL    Hemoglobin 8.3 (L) 13.6 - 17.2 g/dL    Hematocrit 23.4 (L) 41.1 - 50.3 %    MCV 85.4 79.6 - 97.8 FL    MCH 30.3 26.1 - 32.9 PG    MCHC 35.5 (H) 31.4 - 35.0 g/dL    RDW 14.2 11.9 - 14.6 %    Platelets 773 297 - 977 K/uL    MPV 11.9 9.4 - 12.3 FL    nRBC 0.00 0.0 - 0.2 K/uL    Differential Type AUTOMATED      Seg Neutrophils 67 43 - 78 %    Lymphocytes 15 13 - 44 %    Monocytes 14 (H) 4.0 - 12.0 %    Eosinophils % 2 0.5 - 7.8 %    Basophils 0 0.0 - 2.0 %    Immature Granulocytes 2 0.0 - 5.0 %    Segs Absolute 9.1 (H) 1.7 - 8.2 K/UL    Absolute Lymph # 2.0 0.5 - 4.6 K/UL    Absolute Mono # 1.9 (H) 0.1 - 1.3 K/UL    Absolute Eos # 0.3 0.0 - 0.8 K/UL    Basophils Absolute 0.1 0.0 - 0.2 K/UL    Absolute Immature Granulocyte 0.2 0.0 - 0.5 K/UL   CMP    Collection Time: 08/31/22  4:16 PM   Result Value Ref Range    Sodium 131 (L) 138 - 145 mmol/L    Potassium 2.6 (L) 3.5 - 5.1 mmol/L    Chloride 98 (L) 101 - 110 mmol/L    CO2 23 21 - 32 mmol/L    Anion Gap 10 4 - 13 mmol/L    Glucose 50 (L) 65 - 100 mg/dL    BUN 16 6 - 23 MG/DL    Creatinine 1.60 (H) 0.8 - 1.5 MG/DL    GFR African American 58 (L) >60 ml/min/1.73m2    GFR Non- 48 (L) >60 ml/min/1.73m2    Calcium 6.8 (L) 8.3 - 10.4 MG/DL    Total Bilirubin 1.0 0.2 - 1.1 MG/DL    ALT 25 12 - 65 U/L    AST 53 (H) 15 - 37 U/L    Alk Phosphatase 122 50 - 136 U/L    Total Protein 7.4 6.3 - 8.2 g/dL    Albumin 2.1 (L) 3.5 - 5.0 g/dL    Globulin 5.3 (H) 2.3 - 3.5 g/dL    Albumin/Globulin Ratio 0.4 (L) 1.2 - 3.5     Lipase    Collection Time: 08/31/22  4:16 PM   Result Value Ref Range    Lipase 130 73 - 393 U/L   Protime-INR    Collection Time: 08/31/22  4:16 PM   Result Value Ref Range    Protime 15.5 (H) 12.6 - 14.5 sec    INR 1.2     TYPE AND SCREEN    Collection Time: 08/31/22  4:18 PM   Result Value Ref Range    Crossmatch expiration date 09/03/2022,1603     ABO/Rh O POSITIVE     Antibody Screen NEG     Blood Bank Comment CALLED ER POD A AT 8857 8.31.22, CONFIRMATION TUBE NEEDED SPOKE TO SUZE. Clostridium Difficile Toxin/Antigen    Collection Time: 08/31/22  5:19 PM    Specimen: Stool   Result Value Ref Range    GDH Antigen C. DIFFICILE GDH ANTIGEN-NEGATIVE      C difficile Toxin, EIA C. DIFFICILE TOXIN-NEGATIVE      Reflex NOT APPLICABLE      C Diff Toxin Interpretation NEGATIVE FOR TOXIGENIC C. DIFFICILE NTXCD      CLINICAL CONSIDERATION NEGATIVE FOR TOXIGENIC C. DIFFICILE     Stool for WBC #1    Collection Time: 08/31/22  5:19 PM   Result Value Ref Range    Fecal Leukocytes NO WBC'S SEEN 0 - 4 /HPF   Culture, Stool    Collection Time: 08/31/22  5:19 PM    Specimen: Stool   Result Value Ref Range    Special Requests NO SPECIAL REQUESTS      Culture        NO GROWTH OF SALMONELLA OR SHIGELLA IS EVIDENT ON FIRST READING, FINAL REPORT TO FOLLOW AFTER FURTHER INCUBATION OF CULTURE   Occult Blood, Fecal    Collection Time: 08/31/22  5:19 PM   Result Value Ref Range    POC Occult Blood, Fecal Negative NEG     POCT Glucose    Collection Time: 08/31/22  5:54 PM   Result Value Ref Range    POC Glucose 55 (L) 65 - 100 mg/dL    Performed by: [place]    POCT Glucose    Collection Time: 08/31/22  6:59 PM   Result Value Ref Range    POC Glucose 95 65 - 100 mg/dL    Performed by: ChiScYolie    POCT Urinalysis no Micro    Collection Time: 08/31/22  7:03 PM   Result Value Ref Range    Specific Gravity, Urine, POC 1.015 1.001 - 1.023      pH, Urine, POC 6.0 5.0 - 9.0      Protein, Urine, POC Negative NEG mg/dL    Glucose, UA POC Negative NEG mg/dL    Ketones, Urine, POC Negative NEG mg/dL    Bilirubin, Urine, POC Negative NEG      Blood, UA POC Negative NEG      URINE UROBILINOGEN POC 1.0 0.2 - 1.0 EU/dL    Nitrate, Urine, POC Negative NEG      Leukocyte Est, UA POC Negative NEG      Performed by:  Mukund Burgos    Urinalysis    Collection Time: 08/31/22  7:39 PM   Result Value Ref Range    Color, UA YELLOW/STRAW      Appearance CLEAR Specific Gravity, UA 1.025 (H) 1.001 - 1.023      pH, Urine 6.0 5.0 - 9.0      Protein, UA Negative NEG mg/dL    Glucose,  mg/dL    Ketones, Urine Negative NEG mg/dL    Bilirubin Urine Negative NEG      Blood, Urine Negative NEG      Urobilinogen, Urine 1.0 0.2 - 1.0 EU/dL    Nitrite, Urine Negative NEG      Leukocyte Esterase, Urine Negative NEG      BACTERIA, URINE 0 0 /hpf   Culture, Urine    Collection Time: 08/31/22  7:39 PM    Specimen: URINE-CLEAN CATCH   Result Value Ref Range    Special Requests NO SPECIAL REQUESTS      Culture        10,000 to 50,000 COLONIES/mL MIXED SKIN XIMENA ISOLATED   COVID-19, Rapid    Collection Time: 08/31/22  8:49 PM    Specimen: Nasopharyngeal   Result Value Ref Range    Source NASAL      SARS-CoV-2, Rapid Not detected NOTD     Rapid influenza A/B antigens    Collection Time: 08/31/22  8:49 PM    Specimen: Nasal Washing   Result Value Ref Range    Influenza A Ag Negative NEG      Influenza B Ag Negative NEG      Source Nasopharyngeal     Culture, Blood 1    Collection Time: 08/31/22  8:50 PM    Specimen: Blood   Result Value Ref Range    Special Requests RIGHT  FOREARM        Culture NO GROWTH 2 DAYS     Potassium    Collection Time: 08/31/22  8:50 PM   Result Value Ref Range    Potassium 2.5 (L) 3.5 - 5.1 mmol/L   Hemoglobin and Hematocrit    Collection Time: 08/31/22  8:50 PM   Result Value Ref Range    Hemoglobin 7.7 (L) 13.6 - 17.2 g/dL    Hematocrit 21.7 (L) 41.1 - 50.3 %   Transferrin Saturation    Collection Time: 08/31/22  8:50 PM   Result Value Ref Range    Iron 34 (L) 35 - 150 ug/dL    TIBC 142 (L) 250 - 450 ug/dL    TRANSFERRIN SATURATION 24 >20 %   Ferritin    Collection Time: 08/31/22  8:50 PM   Result Value Ref Range    Ferritin 287 8 - 388 NG/ML   Magnesium    Collection Time: 08/31/22  8:50 PM   Result Value Ref Range    Magnesium 1.1 (L) 1.8 - 2.4 mg/dL   Culture, Blood 1    Collection Time: 08/31/22  8:56 PM    Specimen: Blood   Result Value Ref Range Special Requests RIGHT  HAND        Culture NO GROWTH 2 DAYS     MSSA/MRSA Screen BY PCR    Collection Time: 08/31/22  9:07 PM    Specimen: Nares; Blood   Result Value Ref Range    Special Requests NO SPECIAL REQUESTS      Culture (A)       MRSA target DNA not detected, SA target DNA detected. A MRSA negative, SA positive test result does not preclude MRSA nasal colonization. Culture        RESULTS VERIFIED, PHONED TO AND READ BACK BY MAIA Bernardrasta Carmichaele @ 0023 ON 09.01.22 SCOTT   Culture, Wound Aerobic Only    Collection Time: 08/31/22  9:10 PM    Specimen: Ulcer    LEFT   Result Value Ref Range    Special Requests NO SPECIAL REQUESTS      Gram stain NO WBC'S SEEN      Gram stain FEW GRAM POSITIVE COCCI      Culture LIGHT STAPHYLOCOCCUS AUREUS SENSITIVITY TO FOLLOW (A)     Culture, Wound Aerobic Only    Collection Time: 08/31/22  9:10 PM    Specimen: Ulcer    RIGHT   Result Value Ref Range    Special Requests NO SPECIAL REQUESTS      Gram stain NO WBC'S SEEN      Gram stain NO DEFINITE ORGANISM SEEN      Culture LIGHT GRAM NEGATIVE RODS SUBCULTURE IN PROGRESS (A)      Culture (A)       MODERATE STAPHYLOCOCCUS AUREUS SUBCULTURE IN PROGRESS    Culture MODERATE NORMAL SKIN XIMENA ISOLATED     Culture, Anaerobic    Collection Time: 08/31/22  9:10 PM    Specimen: Ulcer   Result Value Ref Range    Special Requests NO SPECIAL REQUESTS      Culture        SUBCULTURE IS NECESSARY TO DETERMINE PRESENCE OR ABSENCE OF ANAEROBIC BACTERIA IN THIS CULTURE. FURTHER REPORT TO FOLLOW AFTER INCUBATION OF SUBCULTURE. Culture, Anaerobic    Collection Time: 08/31/22  9:10 PM    Specimen: Ulcer   Result Value Ref Range    Special Requests NO SPECIAL REQUESTS      Culture        SUBCULTURE IS NECESSARY TO DETERMINE PRESENCE OR ABSENCE OF ANAEROBIC BACTERIA IN THIS CULTURE. FURTHER REPORT TO FOLLOW AFTER INCUBATION OF SUBCULTURE.    POCT Glucose    Collection Time: 08/31/22  9:29 PM   Result Value Ref Range    POC Glucose 29 (LL) 65 - 100 mg/dL    Performed by: Hai    POCT Glucose    Collection Time: 08/31/22 10:05 PM   Result Value Ref Range    POC Glucose 88 65 - 100 mg/dL    Performed by: Hai    POCT Glucose    Collection Time: 08/31/22 11:16 PM   Result Value Ref Range    POC Glucose 51 (L) 65 - 100 mg/dL    Performed by: Lisa    POCT Glucose    Collection Time: 09/01/22 12:27 AM   Result Value Ref Range    POC Glucose 69 65 - 100 mg/dL    Performed by: Lisa    POCT Glucose    Collection Time: 09/01/22  1:30 AM   Result Value Ref Range    POC Glucose 91 65 - 100 mg/dL    Performed by: Lisa    POCT Glucose    Collection Time: 09/01/22  7:55 AM   Result Value Ref Range    POC Glucose 50 (L) 65 - 100 mg/dL    Performed by: Angie    POCT Glucose    Collection Time: 09/01/22 11:51 AM   Result Value Ref Range    POC Glucose 72 65 - 100 mg/dL    Performed by: Angie    Magnesium    Collection Time: 09/01/22 12:06 PM   Result Value Ref Range    Magnesium 1.8 1.8 - 2.4 mg/dL   Comprehensive Metabolic Panel    Collection Time: 09/01/22 12:06 PM   Result Value Ref Range    Sodium 129 (L) 138 - 145 mmol/L    Potassium 3.9 3.5 - 5.1 mmol/L    Chloride 101 101 - 110 mmol/L    CO2 19 (L) 21 - 32 mmol/L    Anion Gap 9 4 - 13 mmol/L    Glucose 49 (L) 65 - 100 mg/dL    BUN 12 6 - 23 MG/DL    Creatinine 1.20 0.8 - 1.5 MG/DL    GFR African American >60 >60 ml/min/1.73m2    GFR Non- >60 >60 ml/min/1.73m2    Calcium 6.4 (L) 8.3 - 10.4 MG/DL    Total Bilirubin 0.9 0.2 - 1.1 MG/DL    ALT 24 12 - 65 U/L    AST 53 (H) 15 - 37 U/L    Alk Phosphatase 97 50 - 136 U/L    Total Protein 7.1 6.3 - 8.2 g/dL    Albumin 2.1 (L) 3.5 - 5.0 g/dL    Globulin 5.0 (H) 2.3 - 3.5 g/dL    Albumin/Globulin Ratio 0.4 (L) 1.2 - 3.5     Vitamin D 25 Hydroxy    Collection Time: 09/01/22 12:06 PM   Result Value Ref Range    Vit D, 25-Hydroxy 13.1 (L) 30.0 - 100.0 ng/mL   Phosphorus    Collection Time: 09/01/22 12:06 PM   Result Value Ref Range    Phosphorus 2.6 2.5 - 4.5 MG/DL   POCT Glucose    Collection Time: 09/01/22  2:26 PM   Result Value Ref Range    POC Glucose 106 (H) 65 - 100 mg/dL    Performed by: CombsCourtneyPCA    POCT Glucose    Collection Time: 09/01/22  4:36 PM   Result Value Ref Range    POC Glucose 152 (H) 65 - 100 mg/dL    Performed by: [facility]    POCT Glucose    Collection Time: 09/01/22  9:16 PM   Result Value Ref Range    POC Glucose 188 (H) 65 - 100 mg/dL    Performed by: [address] PPD TEST IN 24 HRS    Collection Time: 09/02/22 12:00 AM   Result Value Ref Range    PPD, (POC) Negative Negative    mm Induration 0 0 - 5 mm   POCT Glucose    Collection Time: 09/02/22  7:49 AM   Result Value Ref Range    POC Glucose 106 (H) 65 - 100 mg/dL    Performed by: [facility]UnfoldPCA    CBC with Auto Differential    Collection Time: 09/02/22  9:28 AM   Result Value Ref Range    WBC 11.3 (H) 4.3 - 11.1 K/uL    RBC 2.64 (L) 4.23 - 5.6 M/uL    Hemoglobin 8.0 (L) 13.6 - 17.2 g/dL    Hematocrit 22.8 (L) 41.1 - 50.3 %    MCV 86.4 79.6 - 97.8 FL    MCH 30.3 26.1 - 32.9 PG    MCHC 35.1 (H) 31.4 - 35.0 g/dL    RDW 13.8 11.9 - 14.6 %    Platelets 982 705 - 402 K/uL    MPV 11.6 9.4 - 12.3 FL    nRBC 0.00 0.0 - 0.2 K/uL    Differential Type AUTOMATED      Seg Neutrophils 66 43 - 78 %    Lymphocytes 16 13 - 44 %    Monocytes 14 (H) 4.0 - 12.0 %    Eosinophils % 2 0.5 - 7.8 %    Basophils 1 0.0 - 2.0 %    Immature Granulocytes 1 0.0 - 5.0 %    Segs Absolute 7.6 1.7 - 8.2 K/UL    Absolute Lymph # 1.8 0.5 - 4.6 K/UL    Absolute Mono # 1.6 (H) 0.1 - 1.3 K/UL    Absolute Eos # 0.2 0.0 - 0.8 K/UL    Basophils Absolute 0.1 0.0 - 0.2 K/UL    Absolute Immature Granulocyte 0.1 0.0 - 0.5 K/UL   Magnesium    Collection Time: 09/02/22  9:28 AM   Result Value Ref Range    Magnesium 1.6 (L) 1.8 - 2.4 mg/dL   Comprehensive Metabolic Panel    Collection Time: 09/02/22  9:28 AM   Result Value Ref Range    Sodium 127 (L) 138 - 145 mmol/L    Potassium 3.9 3.5 - 5.1 mmol/L    Chloride 101 101 - 110 mmol/L    CO2 18 (L) 21 - 32 mmol/L    Anion Gap 8 4 - 13 mmol/L    Glucose 112 (H) 65 - 100 mg/dL    BUN 10 6 - 23 MG/DL    Creatinine 1.50 0.8 - 1.5 MG/DL    GFR African American >60 >60 ml/min/1.73m2    GFR Non- 51 (L) >60 ml/min/1.73m2    Calcium 6.4 (L) 8.3 - 10.4 MG/DL    Total Bilirubin 0.7 0.2 - 1.1 MG/DL    ALT 26 12 - 65 U/L    AST 40 (H) 15 - 37 U/L    Alk Phosphatase 93 50 - 136 U/L    Total Protein 7.3 6.3 - 8.2 g/dL    Albumin 2.1 (L) 3.5 - 5.0 g/dL    Globulin 5.2 (H) 2.3 - 3.5 g/dL    Albumin/Globulin Ratio 0.4 (L) 1.2 - 3.5     Vancomycin Level, Random    Collection Time: 09/02/22  9:28 AM   Result Value Ref Range    Vancomycin Rm 23.1 UG/ML   POCT Glucose    Collection Time: 09/02/22 11:40 AM   Result Value Ref Range    POC Glucose 139 (H) 65 - 100 mg/dL    Performed by: Angie        I have personally reviewed imaging studies showing: Other Studies:  Vascular duplex lower extremity arteries bilateral with ALECIA   Final Result   1. Noncompressible pressures at the level the ankle consistent with small vessel   calcinosis. 2. Right-sided mild-to-moderate superficial femoral artery and popliteal artery   stenoses   3. Very sluggish flow noted in the left peroneal artery and right posterior   tibial artery. Vascular duplex lower extremity venous left   Final Result   No evidence of left leg DVT. XR CALCANEUS LEFT (MIN 2 VIEWS)   Final Result   No acute process. XR CALCANEUS RIGHT (MIN 2 VIEWS)   Final Result   No acute process. CT ABDOMEN PELVIS W IV CONTRAST Additional Contrast? None   Final Result      1. Cholelithiasis without pericholecystic inflammatory changes to suggest acute   cholecystitis or appendicitis.       2.  Bilateral perinephric soft tissue stranding and small perinephric fluid   collections as well as a small amount of fluid in the pelvic cul-de-sac are   nonspecific, but the possibility of pyelonephritis should be considered.           Current Meds:  Current Facility-Administered Medications   Medication Dose Route Frequency    loperamide (IMODIUM) capsule 2 mg  2 mg Oral 4x Daily PRN    cholestyramine light packet 4 g  4 g Oral BID    aspirin chewable tablet 81 mg  81 mg Oral Daily    [START ON 9/3/2022] vancomycin (VANCOCIN) 1250 mg in sodium chloride 0.9% 250 mL IVPB  1,250 mg IntraVENous Q24H    dextrose 5 % solution   IntraVENous Continuous    0.9 % sodium chloride bolus  1,000 mL IntraVENous Once    sodium chloride flush 0.9 % injection 5-40 mL  5-40 mL IntraVENous 2 times per day    sodium chloride flush 0.9 % injection 5-40 mL  5-40 mL IntraVENous PRN    0.9 % sodium chloride infusion   IntraVENous PRN    ondansetron (ZOFRAN-ODT) disintegrating tablet 4 mg  4 mg Oral Q8H PRN    Or    ondansetron (ZOFRAN) injection 4 mg  4 mg IntraVENous Q6H PRN    acetaminophen (TYLENOL) tablet 650 mg  650 mg Oral Q6H PRN    Or    acetaminophen (TYLENOL) suppository 650 mg  650 mg Rectal Q6H PRN    polyethylene glycol (GLYCOLAX) packet 17 g  17 g Oral Daily PRN    hyoscyamine (LEVSIN/SL) sublingual tablet 125 mcg  125 mcg SubLINGual Q4H PRN    metronidazole (FLAGYL) 500 mg in 0.9% NaCl 100 mL IVPB premix  500 mg IntraVENous Q8H    cefTRIAXone (ROCEPHIN) 2,000 mg in sodium chloride 0.9 % 50 mL IVPB mini-bag  2,000 mg IntraVENous Q24H    zinc oxide 40 % paste   Topical 4x Daily PRN    amLODIPine (NORVASC) tablet 10 mg  10 mg Oral Daily    folic acid (FOLVITE) tablet 1 mg  1 mg Oral Daily    vitamin B-12 (CYANOCOBALAMIN) tablet 1,000 mcg  1,000 mcg Oral Daily    [START ON 9/29/2022] tobramycin (TOBREX) 0.3 % ophthalmic solution 1 drop  1 drop Both Eyes 6 times per day    pantoprazole (PROTONIX) tablet 40 mg  40 mg Oral BID AC    calcium carbonate (TUMS) chewable tablet 500 mg  500 mg Oral TID PRN    glucose chewable tablet 16 g  4 tablet Oral PRN dextrose bolus 10% 125 mL  125 mL IntraVENous PRN    Or    dextrose bolus 10% 250 mL  250 mL IntraVENous PRN    glucagon (rDNA) injection 1 mg  1 mg SubCUTAneous PRN    dextrose 10 % infusion   IntraVENous Continuous PRN    insulin lispro (HUMALOG) injection vial 0-8 Units  0-8 Units SubCUTAneous TID WC    insulin lispro (HUMALOG) injection vial 0-4 Units  0-4 Units SubCUTAneous Nightly    thiamine (B-1) injection 500 mg  500 mg IntraVENous Daily       Signed:  Amie Higuera MD

## 2022-09-02 NOTE — PROGRESS NOTES
END OF SHIFT NOTE:    INTAKE/OUTPUT  09/01 0701 - 09/02 0700  In: 1920 [P.O.:780; I.V.:1140]  Out: 325 [Urine:325]  Voiding: Yes  Catheter: No  Drain:              Flatus: Patient does have flatus present. Stool:  1 occurrences. Characteristics:  Stool Appearance: Loose  Stool Color: Yajaira Otto  Stool Amount: Medium  Stool Assessment  Incontinence: Yes  Stool Appearance: Loose  Stool Color: Yajaira Otto  Stool Amount: Medium  Stool Source: Rectum  Last BM (including prior to admit): 09/01/22    Emesis:0  occurrences. Characteristics:        VITAL SIGNS  Patient Vitals for the past 12 hrs:   Temp Pulse Resp BP SpO2   09/02/22 0417 98.4 °F (36.9 °C) 82 19 114/76 100 %   09/01/22 2301 98.2 °F (36.8 °C) 85 19 131/82 99 %   09/01/22 2007 97.5 °F (36.4 °C) 73 19 110/72 100 %       Pain Assessment  @BSHSIFLOW(13)@          Ambulating  Yes - from chair to bed    Shift report given to oncoming nurse at the bedside.     Maple Kanner, RN

## 2022-09-02 NOTE — DISCHARGE INSTR - DIET
Monitoring Weight and Nutrition Status At Home  -Check in with yourself regarding your appetite, and if you are eating more or less than you usually would for more than one week  -If possible, weigh yourself weekly and watch for changes in weight status  -Watch for changes in the way your clothes fit, if they fit baggier or tighter than what is normal for you  -Monitor energy levels, especially if you are feeling weaker or more fatigued than usual    Food and Meal Time Tips  Eat smaller meals and add snacks if you find large portion meals are overwhelming  Keep Ready-to-Eat snacks with you and ready to eat at all times.  -trail mix with nuts, seeds, dried fruits, and pretzels  -peanut butter or cheese crackers  -add protein and milk powder to puddings and yogurts to increase protein and caloric content  -hard boiled eggs  -hummus with vegetables and crackers     Easy to Prepare Foods- Freezer / Ana Litten / Shelf- Stable   -low sodium canned soups, tuna, salmon,  and beans  -frozen or canned vegetables and fruitsPALM  -dry or fresh pasta and low sodium pasta sauce   -peanut butter or other nut butters  -crackers and breadsticks  -rice, oats, lentils, and barley  -milk powder    ***  Nutrition Supplements  If you are not able to eat enough food or if you have extra calorie requirements, oral supplements can provide you with additional calories, protein, vitamins and minerals. Recommend Mohan Ohm or a comparable/similar product Twice daily for 30 days unless otherwise directed by your Primary Care Physician. Where do you find oral Nutrition Supplements? These supplements are available at [employer], grocery retailers like [employer], [employer], [employer], [employer], [facility], [employer], [employer], [employer], and many others. How do I pay for these oral supplements? 1. Use coupons  2. Go online to register for coupons and health tips!   www.NeedFeed. com - click register  www.boost/com - click coupons and videos  www.u.sitentials. com - click breakfast club  3. Investigate your eligibility for prescription assistance: www. pparx. org/en/prescription_assistance_programs/discount_cards  4.  Get co-pay card to get a discount of at least $10 (with your 30 day prescription), on some insurance plans with card will cover the ensure fee of oral supplements (making it free!)        For Further Nutrition Needs and Nutrition Counseling, Contact:  [facility]  Appointment Line: [phone]

## 2022-09-02 NOTE — CONSULTS
History and Physical/Surgical Consult   Jodi López    Admit date: 2022    MRN: [id number]     : 1965     Age: 65 y.o.          2022 12:29 PM    Subjective/HPI:   This patient is a 65 y.o. seen and evaluated for bilateral heel ulcerations. He presented to the hospital with abdominal pain, diarrhea, fatigue and weakness. He has been so fatigued that he has been bed bound for 2 weeks which is where he developed the wounds. PMH: GERD, hyperlipidemia, HTN, PUD, DM, GI bleed    Review of Systems  Constitutional: negative  Respiratory: negative  Cardiovascular: negative  Musculoskeletal:positive for generalized weakness. Bilateral heel ulcerations  Past Medical History:   Diagnosis Date    Anemia     Gastroesophageal reflux disease with esophagitis and hemorrhage     HLD (hyperlipidemia)     Hyperplastic colon polyp     Hypertension     Obesity     PUD (peptic ulcer disease)     Type 2 diabetes mellitus with diabetic polyneuropathy, with long-term current use of insulin (HCC)     Ulcer of the duodenum caused by bacteria (H. pylori)     Upper GI bleed 2016    Last Assessment & Plan:  Formatting of this note might be different from the original. Status post EGD yesterday which revealed esophagitis, gastric and duodenal ulcers Continue PPI, GI follow-up.  Monitor hemoglobin Avoid and states Biopsy results- pending      Past Surgical History:   Procedure Laterality Date    COLONOSCOPY      ESOPHAGOGASTRODUODENOSCOPY        No Known Allergies   Social History     Tobacco Use    Smoking status: Never    Smokeless tobacco: Never   Substance Use Topics    Alcohol use: Yes      Social History     Social History Narrative    Not on file     Family History   Problem Relation Age of Onset    Hypertension Sister     Diabetes Neg Hx       [unfilled]  Current Facility-Administered Medications   Medication Dose Route Frequency    loperamide (IMODIUM) capsule 2 mg  2 mg Oral 4x Daily PRN cholestyramine light packet 4 g  4 g Oral BID    aspirin chewable tablet 81 mg  81 mg Oral Daily    magnesium sulfate 2000 mg in 50 mL IVPB premix  2,000 mg IntraVENous Once    [START ON 9/3/2022] vancomycin (VANCOCIN) 1250 mg in sodium chloride 0.9% 250 mL IVPB  1,250 mg IntraVENous Q24H    dextrose 5 % solution   IntraVENous Continuous    0.9 % sodium chloride bolus  1,000 mL IntraVENous Once    sodium chloride flush 0.9 % injection 5-40 mL  5-40 mL IntraVENous 2 times per day    sodium chloride flush 0.9 % injection 5-40 mL  5-40 mL IntraVENous PRN    0.9 % sodium chloride infusion   IntraVENous PRN    ondansetron (ZOFRAN-ODT) disintegrating tablet 4 mg  4 mg Oral Q8H PRN    Or    ondansetron (ZOFRAN) injection 4 mg  4 mg IntraVENous Q6H PRN    acetaminophen (TYLENOL) tablet 650 mg  650 mg Oral Q6H PRN    Or    acetaminophen (TYLENOL) suppository 650 mg  650 mg Rectal Q6H PRN    polyethylene glycol (GLYCOLAX) packet 17 g  17 g Oral Daily PRN    hyoscyamine (LEVSIN/SL) sublingual tablet 125 mcg  125 mcg SubLINGual Q4H PRN    metronidazole (FLAGYL) 500 mg in 0.9% NaCl 100 mL IVPB premix  500 mg IntraVENous Q8H    cefTRIAXone (ROCEPHIN) 2,000 mg in sodium chloride 0.9 % 50 mL IVPB mini-bag  2,000 mg IntraVENous Q24H    zinc oxide 40 % paste   Topical 4x Daily PRN    amLODIPine (NORVASC) tablet 10 mg  10 mg Oral Daily    folic acid (FOLVITE) tablet 1 mg  1 mg Oral Daily    vitamin B-12 (CYANOCOBALAMIN) tablet 1,000 mcg  1,000 mcg Oral Daily    [START ON 9/29/2022] tobramycin (TOBREX) 0.3 % ophthalmic solution 1 drop  1 drop Both Eyes 6 times per day    pantoprazole (PROTONIX) tablet 40 mg  40 mg Oral BID AC    calcium carbonate (TUMS) chewable tablet 500 mg  500 mg Oral TID PRN    glucose chewable tablet 16 g  4 tablet Oral PRN    dextrose bolus 10% 125 mL  125 mL IntraVENous PRN    Or    dextrose bolus 10% 250 mL  250 mL IntraVENous PRN    glucagon (rDNA) injection 1 mg  1 mg SubCUTAneous PRN    dextrose 10 % infusion   IntraVENous Continuous PRN    insulin lispro (HUMALOG) injection vial 0-8 Units  0-8 Units SubCUTAneous TID WC    insulin lispro (HUMALOG) injection vial 0-4 Units  0-4 Units SubCUTAneous Nightly    thiamine (B-1) injection 500 mg  500 mg IntraVENous Daily     Objective:     Vitals:    09/02/22 0417 09/02/22 0747 09/02/22 0947 09/02/22 1142   BP: 114/76 107/76 107/76 125/88   Pulse: 82 90  84   Resp: 19 19 19   Temp: 98.4 °F (36.9 °C) 98.2 °F (36.8 °C)  97.9 °F (36.6 °C)   TempSrc: Oral Oral  Axillary   SpO2: 100% 100%  100%   Weight:       Height:         09/02 0701 - 09/02 1900  In: 320 [P.O.:320]  Out: -   08/31 1901 - 09/02 0700  In: 3536.8 [P.O.:780; I.V.:2756.8]  Out: 1985 [SBJXY:6248]    Interpretation Summary    History: Diabetic ulcer on both feet     Comments:     Arterial duplex of the lower extremity arterial vasculature was performed with  ABIs. FINDINGS:     Right brachial pressure is 111. Ankle pressures are noncompressible. Digit  pressures 85. Left brachial pressure is 108. Ankle pressures are noncompressible. Digit  pressure 74. Digit waveforms are slightly decreased on the left as compared to  the right. Waveforms on the right lower extremity are triphasic in the inflow vasculature. Waveforms are triphasic and superficial femoral artery however there is  increased velocities between 2 distal segments measuring 43.6 cm/s systolic to  515 cm/s systolic. The popliteal artery also demonstrates decreased velocities  of 118 cm per 2nd to 55 cm/s systolic. Monophasic waveforms are noted in the  anterior tibial artery and posterior tibial artery. Waveforms on left are triphasic to the level the tibial vasculature in which  there are biphasic to monophasic waveforms. The peroneal artery demonstrates  extremely sluggish flow at 12 cm/s systolic. The aorta is patent. IMPRESSION:  1.  Noncompressible pressures at the level the ankle consistent with small vessel  calcinosis. 2. Right-sided mild-to-moderate superficial femoral artery and popliteal artery  stenoses  3. Very sluggish flow noted in the left peroneal artery and right posterior  tibial artery. Procedure Staff    Technologist/Clinician: Padmini Bertrand  Supporting Staff: Denilson Ennis  Performing Physician/Midlevel: None     Exam Completion Date/Time:    Signed    Electronically signed by Mervin Awad MD on 9/1/22 at 66 65 76 EDT     Physical Exam:   Gen- the patient is well developed and in no acute distress  HEENT- PERRL, EOMI, no scleral icterus       nose without alar flaring or epistaxis                  oral muscosa moist without cyanosis  Neck- no JVD or retractions  Heart- RRR   Abd- soft  Ext- warm without cyanosis. There is lower extremity edema. Bilateral heel ulcerations with eschar. No pus like drainage. Skin- no jaundice or rashes  Neuro- alert and oriented x 3. No gross sensorimotor deficits are present. Data Review   Recent Labs     08/31/22 1616 08/31/22 2050 09/02/22  0928   WBC 13.5*  --  11.3*   HGB 8.3* 7.7* 8.0*   HCT 23.4* 21.7* 22.8*     --  283     Recent Labs     08/31/22 1616 08/31/22 2050 09/01/22  1206 09/02/22  0928   *  --  129* 127*   K 2.6* 2.5* 3.9 3.9   CL 98*  --  101 101   CO2 23  --  19* 18*   BUN 16  --  12 10   MG  --  1.1* 1.8 1.6*   PHOS  --   --  2.6  --    INR 1.2  --   --   --        Assessment/Plan:      Patient is a 43-year-old male who presented to the emergency department for complaints of abdominal pain, diarrhea, fatigue, generalized weakness and bilateral heel ulcerations. Duplex study was reviewed. Toe pressures are 74 and 85 mmHg. He should have sufficient blood for wound healing. Recommend continued wound care and offloading. I will place an order for materials to set up a pair of Rooke boots. No further intervention from a vascular standpoint. Vascular will sign off.     Patient seen and examined with Dr. Zain Barillas      50 minutes of time was spent on this encounter with greater than 50% of time in direct patient contact including patient education, counseling, review of medical history and imaging, and medical decision making.        Analisa Aquino Standard, APRN - CNP

## 2022-09-02 NOTE — H&P (VIEW-ONLY)
History and Physical/Surgical Consult   Devi Baird    Admit date: 2022    MRN: [id number]     : 1965     Age: 65 y.o.          2022 12:29 PM    Subjective/HPI:   This patient is a 65 y.o. seen and evaluated for bilateral heel ulcerations. He presented to the hospital with abdominal pain, diarrhea, fatigue and weakness. He has been so fatigued that he has been bed bound for 2 weeks which is where he developed the wounds. PMH: GERD, hyperlipidemia, HTN, PUD, DM, GI bleed    Review of Systems  Constitutional: negative  Respiratory: negative  Cardiovascular: negative  Musculoskeletal:positive for generalized weakness. Bilateral heel ulcerations  Past Medical History:   Diagnosis Date    Anemia     Gastroesophageal reflux disease with esophagitis and hemorrhage     HLD (hyperlipidemia)     Hyperplastic colon polyp     Hypertension     Obesity     PUD (peptic ulcer disease)     Type 2 diabetes mellitus with diabetic polyneuropathy, with long-term current use of insulin (HCC)     Ulcer of the duodenum caused by bacteria (H. pylori)     Upper GI bleed 2016    Last Assessment & Plan:  Formatting of this note might be different from the original. Status post EGD yesterday which revealed esophagitis, gastric and duodenal ulcers Continue PPI, GI follow-up.  Monitor hemoglobin Avoid and states Biopsy results- pending      Past Surgical History:   Procedure Laterality Date    COLONOSCOPY      ESOPHAGOGASTRODUODENOSCOPY        No Known Allergies   Social History     Tobacco Use    Smoking status: Never    Smokeless tobacco: Never   Substance Use Topics    Alcohol use: Yes      Social History     Social History Narrative    Not on file     Family History   Problem Relation Age of Onset    Hypertension Sister     Diabetes Neg Hx       [unfilled]  Current Facility-Administered Medications   Medication Dose Route Frequency    loperamide (IMODIUM) capsule 2 mg  2 mg Oral 4x Daily PRN cholestyramine light packet 4 g  4 g Oral BID    aspirin chewable tablet 81 mg  81 mg Oral Daily    magnesium sulfate 2000 mg in 50 mL IVPB premix  2,000 mg IntraVENous Once    [START ON 9/3/2022] vancomycin (VANCOCIN) 1250 mg in sodium chloride 0.9% 250 mL IVPB  1,250 mg IntraVENous Q24H    dextrose 5 % solution   IntraVENous Continuous    0.9 % sodium chloride bolus  1,000 mL IntraVENous Once    sodium chloride flush 0.9 % injection 5-40 mL  5-40 mL IntraVENous 2 times per day    sodium chloride flush 0.9 % injection 5-40 mL  5-40 mL IntraVENous PRN    0.9 % sodium chloride infusion   IntraVENous PRN    ondansetron (ZOFRAN-ODT) disintegrating tablet 4 mg  4 mg Oral Q8H PRN    Or    ondansetron (ZOFRAN) injection 4 mg  4 mg IntraVENous Q6H PRN    acetaminophen (TYLENOL) tablet 650 mg  650 mg Oral Q6H PRN    Or    acetaminophen (TYLENOL) suppository 650 mg  650 mg Rectal Q6H PRN    polyethylene glycol (GLYCOLAX) packet 17 g  17 g Oral Daily PRN    hyoscyamine (LEVSIN/SL) sublingual tablet 125 mcg  125 mcg SubLINGual Q4H PRN    metronidazole (FLAGYL) 500 mg in 0.9% NaCl 100 mL IVPB premix  500 mg IntraVENous Q8H    cefTRIAXone (ROCEPHIN) 2,000 mg in sodium chloride 0.9 % 50 mL IVPB mini-bag  2,000 mg IntraVENous Q24H    zinc oxide 40 % paste   Topical 4x Daily PRN    amLODIPine (NORVASC) tablet 10 mg  10 mg Oral Daily    folic acid (FOLVITE) tablet 1 mg  1 mg Oral Daily    vitamin B-12 (CYANOCOBALAMIN) tablet 1,000 mcg  1,000 mcg Oral Daily    [START ON 9/29/2022] tobramycin (TOBREX) 0.3 % ophthalmic solution 1 drop  1 drop Both Eyes 6 times per day    pantoprazole (PROTONIX) tablet 40 mg  40 mg Oral BID AC    calcium carbonate (TUMS) chewable tablet 500 mg  500 mg Oral TID PRN    glucose chewable tablet 16 g  4 tablet Oral PRN    dextrose bolus 10% 125 mL  125 mL IntraVENous PRN    Or    dextrose bolus 10% 250 mL  250 mL IntraVENous PRN    glucagon (rDNA) injection 1 mg  1 mg SubCUTAneous PRN    dextrose 10 % infusion   IntraVENous Continuous PRN    insulin lispro (HUMALOG) injection vial 0-8 Units  0-8 Units SubCUTAneous TID WC    insulin lispro (HUMALOG) injection vial 0-4 Units  0-4 Units SubCUTAneous Nightly    thiamine (B-1) injection 500 mg  500 mg IntraVENous Daily     Objective:     Vitals:    09/02/22 0417 09/02/22 0747 09/02/22 0947 09/02/22 1142   BP: 114/76 107/76 107/76 125/88   Pulse: 82 90  84   Resp: 19 19 19   Temp: 98.4 °F (36.9 °C) 98.2 °F (36.8 °C)  97.9 °F (36.6 °C)   TempSrc: Oral Oral  Axillary   SpO2: 100% 100%  100%   Weight:       Height:         09/02 0701 - 09/02 1900  In: 320 [P.O.:320]  Out: -   08/31 1901 - 09/02 0700  In: 3536.8 [P.O.:780; I.V.:2756.8]  Out: 1985 [[id number]]    Interpretation Summary    History: Diabetic ulcer on both feet     Comments:     Arterial duplex of the lower extremity arterial vasculature was performed with  ABIs. FINDINGS:     Right brachial pressure is 111. Ankle pressures are noncompressible. Digit  pressures 85. Left brachial pressure is 108. Ankle pressures are noncompressible. Digit  pressure 74. Digit waveforms are slightly decreased on the left as compared to  the right. Waveforms on the right lower extremity are triphasic in the inflow vasculature. Waveforms are triphasic and superficial femoral artery however there is  increased velocities between 2 distal segments measuring 20.3 cm/s systolic to  598 cm/s systolic. The popliteal artery also demonstrates decreased velocities  of 118 cm per 2nd to 55 cm/s systolic. Monophasic waveforms are noted in the  anterior tibial artery and posterior tibial artery. Waveforms on left are triphasic to the level the tibial vasculature in which  there are biphasic to monophasic waveforms. The peroneal artery demonstrates  extremely sluggish flow at 12 cm/s systolic. The aorta is patent. IMPRESSION:  1.  Noncompressible pressures at the level the ankle consistent with small vessel  calcinosis. 2. Right-sided mild-to-moderate superficial femoral artery and popliteal artery  stenoses  3. Very sluggish flow noted in the left peroneal artery and right posterior  tibial artery. Procedure Staff    Technologist/Clinician: Patricia Bertrand  Supporting Staff: Binu Burdick  Performing Physician/Midlevel: None     Exam Completion Date/Time:    Signed    Electronically signed by Sirena Weathers MD on 9/1/22 at 66 65 76 EDT     Physical Exam:   Gen- the patient is well developed and in no acute distress  HEENT- PERRL, EOMI, no scleral icterus       nose without alar flaring or epistaxis                  oral muscosa moist without cyanosis  Neck- no JVD or retractions  Heart- RRR   Abd- soft  Ext- warm without cyanosis. There is lower extremity edema. Bilateral heel ulcerations with eschar. No pus like drainage. Skin- no jaundice or rashes  Neuro- alert and oriented x 3. No gross sensorimotor deficits are present. Data Review   Recent Labs     08/31/22 1616 08/31/22 2050 09/02/22  0928   WBC 13.5*  --  11.3*   HGB 8.3* 7.7* 8.0*   HCT 23.4* 21.7* 22.8*     --  283     Recent Labs     08/31/22  1616 08/31/22 2050 09/01/22  1206 09/02/22  0928   *  --  129* 127*   K 2.6* 2.5* 3.9 3.9   CL 98*  --  101 101   CO2 23  --  19* 18*   BUN 16  --  12 10   MG  --  1.1* 1.8 1.6*   PHOS  --   --  2.6  --    INR 1.2  --   --   --        Assessment/Plan:      Patient is a 76-year-old male who presented to the emergency department for complaints of abdominal pain, diarrhea, fatigue, generalized weakness and bilateral heel ulcerations. Duplex study was reviewed. Toe pressures are 74 and 85 mmHg. He should have sufficient blood for wound healing. Recommend continued wound care and offloading. I will place an order for materials to set up a pair of Rooke boots. No further intervention from a vascular standpoint. Vascular will sign off.     Patient seen and examined with Dr. Kaye Blevins      50 minutes of time was spent on this encounter with greater than 50% of time in direct patient contact including patient education, counseling, review of medical history and imaging, and medical decision making.        Joel Myers Standard, APRN - CNP

## 2022-09-02 NOTE — PROGRESS NOTES
PHYSICAL THERAPY: Daily Note PM   (Link to Caseload Tracking: PT Visit Days : 2  Time In/Out PT Charge Capture  Rehab Caseload Tracker  Orders    Layne De La Vega is a 65 y.o. male   PRIMARY DIAGNOSIS: Acute gastroenteritis  Hypokalemia [E87.6]  Hypoglycemia [E16.2]  Acute pyelonephritis [N10]  Acute kidney injury ([facility] [address] [N17.9]  Acute weakness [R53.1]  Diabetic ulcer of both feet ([facility] [address] [Y81.903, L97.519, L97.529]  Acute pyelonephritis due to bacteria [N10, B96.89]       Inpatient: Payor: MEDICARE / Plan: MEDICARE PART A AND B / Product Type: *No Product type* /     ASSESSMENT:     REHAB RECOMMENDATIONS:   Recommendation to date pending progress:  Setting:  Home Health Therapy    Equipment:    Rolling Walker     ASSESSMENT:  Mr. Ramses Juarez is making good progress toward goals. He performed transfers and gait with CGA/SBA throughout. Ambulation is accelerated at times, with cueing for pacing. He also ambulates with a WBOS. B foot wounds are wrapped and patient wore anti-slip socks on top.       SUBJECTIVE:   Mr. Ramses Juarez states, \"I walk out to my building at home\"     Social/Functional Lives With: Spouse  Type of Home: House  Home Layout: One level  Home Access: Stairs to enter without rails  Entrance Stairs - Number of Steps: 2  Home Equipment: [place] Help From: Family  ADL Assistance: Independent  Ambulation Assistance: Independent  Active : No  Patient's  Info: family  Mode of Transportation: Car  Occupation: On disability  OBJECTIVE:     PAIN: VITALS / O2: PRECAUTION / Georgie Jump / Karen [place]:   Pre Treatment: 0         Post Treatment: 0 Vitals        Oxygen    IV    RESTRICTIONS/PRECAUTIONS:  Restrictions/Precautions  Restrictions/Precautions: Fall Risk  Restrictions/Precautions: Fall Risk     MOBILITY: I Mod I S SBA CGA Min Mod Max Total  NT x2 Comments:   Bed Mobility    Rolling [] [] [] [] [] [] [] [] [] [] []    Supine to Sit [] [] [] [] [] [] [] [] [] [] []    Scooting [] [] [] [] [] [] [] [] [] [] []    Sit to Supine [] [] [] [] [] [] [] [] [] [] []    Transfers    Sit to Stand [] [] [] [x] [x] [] [] [] [] [] []    Bed to Chair [] [] [] [] [] [] [] [] [] [] []    Stand to Sit [] [] [] [x] [] [] [] [] [] [] []     [] [] [] [] [] [] [] [] [] [] []    I=Independent, Mod I=Modified Independent, S=Supervision, SBA=Standby Assistance, CGA=Contact Guard Assistance,   Min=Minimal Assistance, Mod=Moderate Assistance, Max=Maximal Assistance, Total=Total Assistance, NT=Not Tested    BALANCE: Good Fair+ Fair Fair- Poor NT Comments   Sitting Static [x] [] [] [] [] []    Sitting Dynamic [x] [] [] [] [] []              Standing Static [x] [] [] [] [] []    Standing Dynamic [] [x] [] [] [] []      GAIT: I Mod I S SBA CGA Min Mod Max Total  NT x2 Comments:   Level of Assistance [] [] [] [x] [x] [] [] [] [] [] []    Distance 500 feet    DME Rolling Walker    Gait Quality Trunk sway increased and Wide base of support    Weightbearing Status      Stairs      I=Independent, Mod I=Modified Independent, S=Supervision, SBA=Standby Assistance, CGA=Contact Guard Assistance,   Min=Minimal Assistance, Mod=Moderate Assistance, Max=Maximal Assistance, Total=Total Assistance, NT=Not Tested    PLAN:   ACUTE PHYSICAL THERAPY GOALS:   (Developed with and agreed upon by patient and/or caregiver. )  LTG:  (1.)Mr. Reynaldo Kumar will move from supine to sit and sit to supine  in bed with INDEPENDENCE within 7 treatment day(s). (2.)Mr. Reynaldo Kumar will transfer from bed to chair and chair to bed with MODIFIED INDEPENDENCE using the least restrictive device within 7 treatment day(s). (3.)Mr. Reynaldo Kumar will ambulate with MODIFIED INDEPENDENCE for 400 feet with the least restrictive device within 7 treatment day(s). (4.)Mr. Reynaldo Kumar will perform standing static and dynamic balance activities x 23 minutes with SUPERVISION to improve safety within 7 treatment day(s).     FREQUENCY AND DURATION: 3 times/week for duration of hospital stay or

## 2022-09-02 NOTE — PROGRESS NOTES
Pt heart monitor expires at midnight. Messaged hospitalist. Johana Barnett to d/c. Called tele and let them know pt was coming off the monitor.

## 2022-09-02 NOTE — PROGRESS NOTES
Comprehensive Nutrition Assessment  This assessment was completed remotely. Patient consent was obtained for remote assessment. Type and Reason for Visit: Initial, Positive Nutrition Screen  Malnutrition Screening Tool: Malnutrition Screen  Have you recently lost weight without trying?: 34 or more pounds (4 points)  Have you been eating poorly because of a decreased appetite?: Yes (1 point)  Malnutrition Screening Tool Score: 5    Nutrition Recommendations/Plan:   Food and/or Nutrient Delivery: Continue Current Diet, Start Oral Nutrition Supplement  Medical food supplement therapy:  Initiate Jacques twice per day (this provides 90 kcal and 2.5 grams protein per packet)      Coordination of Nutrition Care: Continue to monitor while inpatient          Malnutrition Assessment:  Malnutrition Status: At risk for malnutrition (Comment) (decreased po for 2 weeks PTA, remote h/o weight los)    Nutrition Assessment:  Nutrition History: 9/2: Pt reports UBW of 260# last summer. That is when his weight loss started which he r/t loss of appetite. He reports his weight has been pretty stable at around 200# for the past couple of months with last weight at [facility] of 199# about a month ago. He reprots appetite \"comes and goes\". He was eating normally (Unable to provide any quantifiable nutrition history) upnuntil the diarrhea started 2 weeks ago. Since then he has been eating very little or nothing at all some days. Note weight at  OV in May 2021 was 202# which is not c/w stated UBW of 260#      No NFPE d/t remote assessment  Nutrition Background:   H/O: HTN, HLD, GERD, PUD, chronic gastritis, DM, neuropathy, MO, ETOH use  P/W abdominal pain and diarrhea x 2 weeks associated with fatigue and generalized weakness. Has been essentially bed bound and developed bilateral heel wounds, left worse than right and left leg with itchy pustules and discoloration up entire leg.   Findings of hypotension,Low K, Mg and Ca, MARS, acute gastroenteritis, bilateral infected DM foot ulcers, bacterial conjunctivitis, persistent hypoglycemia  CT Pelvis  Cholelithiasis without pericholecystic inflammatory changes to suggest acute cholecystitis or appendicitis. 2.  Bilateral perinephric soft tissue stranding and small perinephric fluid collections as well as a small amount of fluid in the pelvic cul-de-sac are nonspecific, but the possibility of pyelonephritis should be considered. Nutrition Interval:  8/31: NPO, then CLD, no red dye  9/1: GI bland, lactose controlled, no red dye    9/2; Talked to pt via room phone. He reports he is eating lunch now and has been eating 100% of all meals since admission. Denies any barriers to po intake at this time. Lab Results   Component Value Date/Time    POCGLU 139 09/02/2022 11:40 AM    POCGLU 106 09/02/2022 07:49 AM    POCGLU 188 09/01/2022 09:16 PM    POCGLU 152 09/01/2022 04:36 PM    POCGLU 106 09/01/2022 02:26 PM    POCGLU 72 09/01/2022 11:51 AM   .  Hypoglycemia and MARS resolved      Wound Type:  (as per wound care note)    Current Nutrition Therapies:  ADULT DIET; Regular; GI [facility] (GERD/Peptic Ulcer); Lactose-Controlled, No red dye    Current Intake:   Average Meal Intake: % (For 4 recorded meals and per pt report)        Anthropometric Measures:  Height: 5' 11\" (180.3 cm)  Current Body Wt: 200 lb (90.7 kg) (8/31), Weight source: Stated  BMI: 27.9  Admission Body Weight: 200 lb (90.7 kg) (stated)  Ideal Body Weight (Kg) (Calculated): 78 kg (172 lbs),    Usual Body Wt:  ,  Weight History Weight Weight Weight Method   510/2021 202 lbs 90.7 kg [facility]    Percent weight change:  None noted based in stated admission weight       Edema: No data recorded  BMI Category Overweight (BMI 25.0-29. 9)  Estimated Daily Nutrient Needs:  Energy (kcal/day): 3834-5773 (20-25 kcal/kg) (Kcal/kg Weight used: 90.7 kg Admission (stated)  Protein (g/day): 73-91 (0.8-1 g/kg) Weight Used: (Admission) 90.7 kg  Fluid (ml/day): (1 ml/kcal)    Nutrition Diagnosis:   Increased nutrient needs related to increase demand for energy/nutrients as evidenced by wounds    Goals:       Active Goal:  (Continued intake >75% of estimated needs)       Nutrition Monitoring and Evaluation:      Food/Nutrient Intake Outcomes: Food and Nutrient Intake       Discharge Planning:    Continue current diet, Continue Oral Nutrition Supplement    Alona Arriaga RD,LD, [id number]

## 2022-09-02 NOTE — PROGRESS NOTES
Physician Progress Note      PATIENT:               Citlali Sweeney  CSN #:                  [id number]  :                       1965  ADMIT DATE:       2022 4:18 PM  DISCH DATE:  RESPONDING  PROVIDER #:        Mariam Stark MD          QUERY TEXT:    Pt admitted with abd pain, diarrhea, dm foot ulcers. . Pt noted to have a bp   of 82/64 with a wbc of 13. If possible, please document in the progress notes   and discharge summary if you are evaluating and /or treating any of the   following: The medical record reflects the following:  Risk Factors: DM, HTN, Obesity, MARS  Clinical Indicators: wbc 13, bp 82/64, cr ---2.03 day before at [facility]. .1.6 on   admission here. bacterial conjunctivitis  Treatment: iv abx, wound care, vasc consult , ct abd, fluids, labs,  Options provided:  -- Sepsis, present on admission  -- Sepsis, present on admission, now resolved  -- Sepsis, not present on admission  -- gastroenteritis/dm ft ulcers  without Sepsis  -- Other - I will add my own diagnosis  -- Disagree - Not applicable / Not valid  -- Disagree - Clinically unable to determine / Unknown  -- Refer to Clinical Documentation Reviewer    PROVIDER RESPONSE TEXT:    This patient has sepsis which was present on admission.     Query created by: Boubacar Alba on 2022 1:13 PM      Electronically signed by:  Mariam Stark MD 2022 1:20 PM

## 2022-09-03 LAB
ALBUMIN SERPL-MCNC: 1.9 G/DL (ref 3.5–5)
ALBUMIN/GLOB SERPL: 0.4 {RATIO} (ref 1.2–3.5)
ALP SERPL-CCNC: 95 U/L (ref 50–136)
ALT SERPL-CCNC: 20 U/L (ref 12–65)
ANION GAP SERPL CALC-SCNC: 8 MMOL/L (ref 4–13)
AST SERPL-CCNC: 27 U/L (ref 15–37)
BACTERIA SPEC CULT: NORMAL
BACTERIA SPEC CULT: NORMAL
BASOPHILS # BLD: 0.1 K/UL (ref 0–0.2)
BASOPHILS NFR BLD: 1 % (ref 0–2)
BILIRUB SERPL-MCNC: 0.6 MG/DL (ref 0.2–1.1)
BUN SERPL-MCNC: 10 MG/DL (ref 6–23)
CALCIUM SERPL-MCNC: 6.5 MG/DL (ref 8.3–10.4)
CHLORIDE SERPL-SCNC: 99 MMOL/L (ref 101–110)
CO2 SERPL-SCNC: 17 MMOL/L (ref 21–32)
CREAT SERPL-MCNC: 1.5 MG/DL (ref 0.8–1.5)
DIFFERENTIAL METHOD BLD: ABNORMAL
EOSINOPHIL # BLD: 0.3 K/UL (ref 0–0.8)
EOSINOPHIL NFR BLD: 3 % (ref 0.5–7.8)
ERYTHROCYTE [DISTWIDTH] IN BLOOD BY AUTOMATED COUNT: 13.5 % (ref 11.9–14.6)
GLOBULIN SER CALC-MCNC: 5 G/DL (ref 2.3–3.5)
GLUCOSE BLD STRIP.AUTO-MCNC: 133 MG/DL (ref 65–100)
GLUCOSE BLD STRIP.AUTO-MCNC: 220 MG/DL (ref 65–100)
GLUCOSE BLD STRIP.AUTO-MCNC: 235 MG/DL (ref 65–100)
GLUCOSE BLD STRIP.AUTO-MCNC: 273 MG/DL (ref 65–100)
GLUCOSE SERPL-MCNC: 127 MG/DL (ref 65–100)
HCT VFR BLD AUTO: 20.1 % (ref 41.1–50.3)
HGB BLD-MCNC: 7.1 G/DL (ref 13.6–17.2)
IMM GRANULOCYTES # BLD AUTO: 0.1 K/UL (ref 0–0.5)
IMM GRANULOCYTES NFR BLD AUTO: 1 % (ref 0–5)
LYMPHOCYTES # BLD: 1.8 K/UL (ref 0.5–4.6)
LYMPHOCYTES NFR BLD: 17 % (ref 13–44)
MAGNESIUM SERPL-MCNC: 1.9 MG/DL (ref 1.8–2.4)
MCH RBC QN AUTO: 29.8 PG (ref 26.1–32.9)
MCHC RBC AUTO-ENTMCNC: 35.3 G/DL (ref 31.4–35)
MCV RBC AUTO: 84.5 FL (ref 79.6–97.8)
MONOCYTES # BLD: 1.3 K/UL (ref 0.1–1.3)
MONOCYTES NFR BLD: 12 % (ref 4–12)
NEUTS SEG # BLD: 7.1 K/UL (ref 1.7–8.2)
NEUTS SEG NFR BLD: 66 % (ref 43–78)
NRBC # BLD: 0 K/UL (ref 0–0.2)
PLATELET # BLD AUTO: 270 K/UL (ref 150–450)
PMV BLD AUTO: 11.6 FL (ref 9.4–12.3)
POTASSIUM SERPL-SCNC: 4.1 MMOL/L (ref 3.5–5.1)
PROT SERPL-MCNC: 6.9 G/DL (ref 6.3–8.2)
RBC # BLD AUTO: 2.38 M/UL (ref 4.23–5.6)
SERVICE CMNT-IMP: ABNORMAL
SERVICE CMNT-IMP: NORMAL
SERVICE CMNT-IMP: NORMAL
SODIUM SERPL-SCNC: 124 MMOL/L (ref 138–145)
WBC # BLD AUTO: 10.6 K/UL (ref 4.3–11.1)

## 2022-09-03 PROCEDURE — 1100000000 HC RM PRIVATE

## 2022-09-03 PROCEDURE — 2580000003 HC RX 258: Performed by: FAMILY MEDICINE

## 2022-09-03 PROCEDURE — 6360000002 HC RX W HCPCS: Performed by: FAMILY MEDICINE

## 2022-09-03 PROCEDURE — 82962 GLUCOSE BLOOD TEST: CPT

## 2022-09-03 PROCEDURE — 36415 COLL VENOUS BLD VENIPUNCTURE: CPT

## 2022-09-03 PROCEDURE — 6370000000 HC RX 637 (ALT 250 FOR IP): Performed by: FAMILY MEDICINE

## 2022-09-03 PROCEDURE — 6370000000 HC RX 637 (ALT 250 FOR IP): Performed by: HOSPITALIST

## 2022-09-03 PROCEDURE — 2500000003 HC RX 250 WO HCPCS: Performed by: FAMILY MEDICINE

## 2022-09-03 PROCEDURE — 80053 COMPREHEN METABOLIC PANEL: CPT

## 2022-09-03 PROCEDURE — 83735 ASSAY OF MAGNESIUM: CPT

## 2022-09-03 PROCEDURE — 85025 COMPLETE CBC W/AUTO DIFF WBC: CPT

## 2022-09-03 RX ORDER — CALCIUM CARBONATE-CHOLECALCIFEROL TAB 250 MG-125 UNIT 250-125 MG-UNIT
1 TAB ORAL DAILY
Status: DISCONTINUED | OUTPATIENT
Start: 2022-09-03 | End: 2022-09-07 | Stop reason: HOSPADM

## 2022-09-03 RX ADMIN — SODIUM BICARBONATE 650 MG TABLET 1300 MG: at 20:44

## 2022-09-03 RX ADMIN — METRONIDAZOLE 500 MG: 500 INJECTION, SOLUTION INTRAVENOUS at 05:31

## 2022-09-03 RX ADMIN — PANTOPRAZOLE SODIUM 40 MG: 40 TABLET, DELAYED RELEASE ORAL at 15:08

## 2022-09-03 RX ADMIN — OXYCODONE 5 MG: 5 TABLET ORAL at 00:25

## 2022-09-03 RX ADMIN — CHOLESTYRAMINE 4 G: 4 POWDER, FOR SUSPENSION ORAL at 09:27

## 2022-09-03 RX ADMIN — CEFTRIAXONE 2000 MG: 2 INJECTION, POWDER, FOR SOLUTION INTRAMUSCULAR; INTRAVENOUS at 20:44

## 2022-09-03 RX ADMIN — SODIUM CHLORIDE, PRESERVATIVE FREE 10 ML: 5 INJECTION INTRAVENOUS at 09:28

## 2022-09-03 RX ADMIN — INSULIN LISPRO 2 UNITS: 100 INJECTION, SOLUTION INTRAVENOUS; SUBCUTANEOUS at 13:01

## 2022-09-03 RX ADMIN — VANCOMYCIN HYDROCHLORIDE 1250 MG: 100 INJECTION, POWDER, LYOPHILIZED, FOR SOLUTION INTRAVENOUS at 10:13

## 2022-09-03 RX ADMIN — METRONIDAZOLE 500 MG: 500 INJECTION, SOLUTION INTRAVENOUS at 21:42

## 2022-09-03 RX ADMIN — METRONIDAZOLE 500 MG: 500 INJECTION, SOLUTION INTRAVENOUS at 15:09

## 2022-09-03 RX ADMIN — OXYCODONE 5 MG: 5 TABLET ORAL at 17:03

## 2022-09-03 RX ADMIN — PANTOPRAZOLE SODIUM 40 MG: 40 TABLET, DELAYED RELEASE ORAL at 05:31

## 2022-09-03 RX ADMIN — FOLIC ACID 1 MG: 1 TABLET ORAL at 09:27

## 2022-09-03 RX ADMIN — SODIUM BICARBONATE 650 MG TABLET 1300 MG: at 09:22

## 2022-09-03 RX ADMIN — THIAMINE HYDROCHLORIDE 500 MG: 100 INJECTION, SOLUTION INTRAMUSCULAR; INTRAVENOUS at 09:22

## 2022-09-03 RX ADMIN — INSULIN LISPRO 4 UNITS: 100 INJECTION, SOLUTION INTRAVENOUS; SUBCUTANEOUS at 17:02

## 2022-09-03 RX ADMIN — CHOLESTYRAMINE 4 G: 4 POWDER, FOR SUSPENSION ORAL at 20:44

## 2022-09-03 RX ADMIN — CYANOCOBALAMIN TAB 1000 MCG 1000 MCG: 1000 TAB at 09:22

## 2022-09-03 RX ADMIN — SODIUM CHLORIDE, PRESERVATIVE FREE 10 ML: 5 INJECTION INTRAVENOUS at 20:45

## 2022-09-03 RX ADMIN — OXYCODONE 5 MG: 5 TABLET ORAL at 09:27

## 2022-09-03 RX ADMIN — AMLODIPINE BESYLATE 10 MG: 10 TABLET ORAL at 09:22

## 2022-09-03 RX ADMIN — ASPIRIN 81 MG: 81 TABLET, CHEWABLE ORAL at 09:27

## 2022-09-03 ASSESSMENT — PAIN SCALES - GENERAL
PAINLEVEL_OUTOF10: 0
PAINLEVEL_OUTOF10: 0
PAINLEVEL_OUTOF10: 5
PAINLEVEL_OUTOF10: 6
PAINLEVEL_OUTOF10: 0
PAINLEVEL_OUTOF10: 0
PAINLEVEL_OUTOF10: 6

## 2022-09-03 ASSESSMENT — PAIN DESCRIPTION - DESCRIPTORS
DESCRIPTORS: ACHING;BURNING
DESCRIPTORS: ACHING;DISCOMFORT;SORE
DESCRIPTORS: ACHING;BURNING

## 2022-09-03 ASSESSMENT — PAIN DESCRIPTION - LOCATION
LOCATION: FOOT

## 2022-09-03 ASSESSMENT — PAIN - FUNCTIONAL ASSESSMENT: PAIN_FUNCTIONAL_ASSESSMENT: ACTIVITIES ARE NOT PREVENTED

## 2022-09-03 ASSESSMENT — PAIN DESCRIPTION - ORIENTATION
ORIENTATION: RIGHT;LEFT
ORIENTATION: RIGHT;LEFT;POSTERIOR
ORIENTATION: RIGHT;LEFT

## 2022-09-03 ASSESSMENT — PAIN DESCRIPTION - PAIN TYPE: TYPE: ACUTE PAIN

## 2022-09-03 NOTE — PROGRESS NOTES
Hospitalist Progress Note   Admit Date:  2022  4:18 PM   Name:  Julio Ceja   Age:  65 y.o. Sex:  male  :  1965   MRN:  [id number]   Room:  /    Presenting Complaint: Hypotension     Reason(s) for Admission: Hypokalemia [E87.6]  Hypoglycemia [E16.2]  Acute pyelonephritis [N10]  Acute kidney injury [facility]) [N17.9]  Acute weakness [R53.1]  Diabetic ulcer of both feet ([address] [D46.114, L97.519, L97.529]  Acute pyelonephritis due to bacteria [N10, B96.89]     Hospital Course:   Julio Ceja is a 65 y.o. male with medical history of Obesity, T2DM on insulin, DM neuropathy, HTN, HLD, PUD, chronic gastritis, AUD, GERD, Upper GIB who presented with abdominal pain and diarrhea x 2 weeks associated with fatigue and generalized weakness. Reported ~5-10 episodes loose stool daily. Described as black. Has been taking pepto bismol. Abdominal pain described as crampy. Denies recent abx use. Had URI couple weeks prior to onset of GI symptoms. No contacts with similar symptoms. No uncooked meat. Has been essentially bedbound for 2 weeks per wife. Developed bilateral heel wounds, left worse than right and left leg with itchy pustules and discoloration up entire leg. He has been soiling the bed. He was seen at [facility] on  and Dc'd from ED yesterday. In ED, BP 82/64 on arrival.    Labs: Sna 131 K 2.6 cl 98 Scr 1.6 GLU 50 calcium 6.8 Salb 2.1 AST 53 WBC 13.5 hgb 8.3 INR 1.2 fecal leukocytes negative. C diff in process. CT      1. Cholelithiasis without pericholecystic inflammatory changes to suggest acute   cholecystitis or appendicitis. 2.  Bilateral perinephric soft tissue stranding and small perinephric fluid   collections as well as a small amount of fluid in the pelvic cul-de-sac are   nonspecific, but the possibility of pyelonephritis should be considered. Subjective & 24hr Events (22):    Doing well,  No acute events overnight      Assessment & Plan:   Sepsis at admission    Acute gastroenteritis - admit remote tele. IVF. Replete electrolytes. CLD, check stool culture and fecal occult. C diff in process. 9/2-C. difficile negative, ordered as needed Imodium     Bilateral T2DM foot ulcers - infected. obtain XR of hargrove calcanus. Consider MRI to r/o deeper infection or osto. Obtain wound cx and BC. MRSA PCR screen. Start rocephin, vanc, flagyl. Consult wound RN. Offload. PT/OT. Wound conslt. Check arterial duplex. Wound clinic referral on DC. Concern for possible disseminated infection with perinephric fat stranding and fluid collection raising concern for pyelo. No urinary symptoms. UA w/o inflammatory cells. 9/2- wound evaluated - need out pt wound care follow at discharge. Cont antibiotics  9/3 - Vasc Sx recommended wound care and off loading. Pair of Rooke boots ordered. Vasc s/o    PAD  9/2-arterial Doppler positive for peripheral artery disease. Vascular consulted, no intervention required, enough circulation for healing, signed off. I started pt on asa 81 mg q daily     Hypokalemia 2/2 GI losses - s/p kcl 20 meq IV in ED. Start kcl 40 q6h x3 doses. IVF with kcl 40. Remote tele. Repeat K now. Check mag. 9/1- resolved     Hyponatremia - 2/2 hypovolemia. IVF. Trend bmp/cmp   9/2-sodium of 127  9/3 - sodium 124. Will fluid restrict and check a repeat Sodium    Hypomagnesemia  9/2-Magnesium of 1.6-replace with 2 g admission sulfate. 9/3 - Mg wnl. Resolved     Hypocalcemia - perhaps from severe infection. Start calcium carbonate. Check vitamin D and Phos in AM. Check Mag now   9/2- calcium 6.4, albumin 2.1  9/3 - phos wnl and vit d low. Will start Oscal     MARS on CKD3a  2/2 PRA/ATN   Improving since yesterday @ [facility] Scr 2.03   Scr 1.6 08/31/22 Bcr ~1.2)non-oligouric  CT BL perinephric soft tissue stranding and small perinephric fluid collections. IVF.    Maintain MAP >65 mm hg   Avoid NSAIDs, anti-RAAS agents, nephrotoxic agents, and phosphate enemas Renal Dosing Strict I&O, Daily Weights, Daily BMP/CMP   9/1- normal creatinine     Anemia - reports of dark stool. H/o UGIB. Cont PPI BID. Check fecal occult. Repeat hemoglobin now. Hold AC until GIB r/o. Check anemia labs. 9/2- hb 8. Occult blood negative     T2DM with polyneuropathy and hypoglycemia - persistent hypoglycemia in ED with GLU 50-60. S/p dextrose 50% 25g. Improved to 95. Took lantus 30 units this AM. Hold basal. Dextrose IVF. DM RN consult. SSI for now. Check A1C in AM   9/1- persistent hypoglycemia - changed fluids to d5 water cont hypoglycemic protocol. 9/2-improving hypoglycemia, decrease D5 water to 75 cc from 100 cc.  9/3 - will dc D5 water as his blood sugar is > 200     Physical debility - consult PT/OT, PPD. AUD, moderate, dependence - last drink ~2 weeks ago. PO vitamines. IV thiaminex3 days. PPI. Bacterial Conjunctivitis - cont tobramycin eye drops. 9/2- Improving conjunctival injection     PUD / h/o UGIB / GERD w/ esophagitis - PPI BID. Avoid NSAIDs. HTN - cont amlodipine in AM. Hold metoprolol 50 BID pending vitals. HLD -     Anticipated discharge needs:     HH     Diet: ADULT DIET; Clear Liquid; GI [place] (GERD/Peptic Ulcer);  Lactose-Controlled, No red dye  VTE ppx: heparin  Code status: Full Code      Hospital Problems:  Principal Problem:    Acute gastroenteritis  Active Problems:    Conjunctivitis    Chronic gastritis    Alcohol dependence ([address]    Diabetic peripheral neuropathy ([address]    Essential hypertension    Hyperlipidemia    PUD (peptic ulcer disease)    Type 2 diabetes mellitus with hypoglycemia, with long-term current use of insulin (HCC)    Diabetic ulcer of both feet associated with type 2 diabetes mellitus (HCC)    Hypocalcemia    Hypoalbuminemia    Hyponatremia    Hypokalemia due to excessive gastrointestinal loss of potassium    Acute renal failure superimposed on stage 3a chronic kidney disease (HCC)    PAD (peripheral artery disease) [facility])  Resolved Problems:    * No resolved hospital problems. *      Objective:   Patient Vitals for the past 24 hrs:   Temp Pulse Resp BP SpO2   09/03/22 1154 97.7 °F (36.5 °C) 77 16 (!) 132/92 100 %   09/03/22 0957 -- -- 18 -- --   09/03/22 0922 -- 80 -- 120/82 --   09/03/22 0350 98.2 °F (36.8 °C) 86 18 130/68 100 %   09/03/22 0055 -- -- 18 -- --   09/03/22 0016 97.5 °F (36.4 °C) 88 18 133/87 100 %   09/02/22 2300 -- -- 18 -- --   09/02/22 1955 97.9 °F (36.6 °C) 87 18 (!) 140/91 100 %   09/02/22 1541 98.2 °F (36.8 °C) 89 19 115/86 100 %       Oxygen Therapy  SpO2: 100 %  Pulse Oximeter Device Mode: Intermittent  O2 Device: None (Room air)    Estimated body mass index is 27.89 kg/m² as calculated from the following:    Height as of this encounter: 5' 11\" (1.803 m). Weight as of this encounter: 200 lb (90.7 kg). Intake/Output Summary (Last 24 hours) at 9/3/2022 1531  Last data filed at 9/3/2022 0555  Gross per 24 hour   Intake 970 ml   Output 300 ml   Net 670 ml         Physical Exam:     Blood pressure (!) 132/92, pulse 77, temperature 97.7 °F (36.5 °C), resp. rate 16, height 5' 11\" (1.803 m), weight 200 lb (90.7 kg), SpO2 100 %. General:    Well nourished. Head:  Normocephalic, atraumatic  Eyes:  No significant discharge or drainage. Marked improved conjunctival injection  ENT:  Nares appear normal, no drainage. Moist oral mucosa  Neck:  No restricted ROM. Trachea midline   CV:   RRR. No m/r/g. No jugular venous distension. Lungs:   CTAB. No wheezing, rhonchi, or rales. Symmetric expansion. Abdomen: Bowel sounds present. Soft, nontender, nondistended. Extremities: No cyanosis or clubbing. No edema  Skin:     Dressing to bilateral heels. See photo below   Neuro:  CN II-XII grossly intact. Sensation intact. A&Ox3  Psych:  Normal mood and affect.                       I have personally reviewed labs and tests showing:  Recent Labs:  Recent Results (from the past 48 hour(s))   POCT Glucose Collection Time: 09/01/22  4:36 PM   Result Value Ref Range    POC Glucose 152 (H) 65 - 100 mg/dL    Performed by: [employer]    POCT Glucose    Collection Time: 09/01/22  9:16 PM   Result Value Ref Range    POC Glucose 188 (H) 65 - 100 mg/dL    Performed by: [address] PPD TEST IN 24 HRS    Collection Time: 09/02/22 12:00 AM   Result Value Ref Range    PPD, (POC) Negative Negative    mm Induration 0 0 - 5 mm   POCT Glucose    Collection Time: 09/02/22  7:49 AM   Result Value Ref Range    POC Glucose 106 (H) 65 - 100 mg/dL    Performed by: [facility] [employer]    CBC with Auto Differential    Collection Time: 09/02/22  9:28 AM   Result Value Ref Range    WBC 11.3 (H) 4.3 - 11.1 K/uL    RBC 2.64 (L) 4.23 - 5.6 M/uL    Hemoglobin 8.0 (L) 13.6 - 17.2 g/dL    Hematocrit 22.8 (L) 41.1 - 50.3 %    MCV 86.4 79.6 - 97.8 FL    MCH 30.3 26.1 - 32.9 PG    MCHC 35.1 (H) 31.4 - 35.0 g/dL    RDW 13.8 11.9 - 14.6 %    Platelets 263 424 - 267 K/uL    MPV 11.6 9.4 - 12.3 FL    nRBC 0.00 0.0 - 0.2 K/uL    Differential Type AUTOMATED      Seg Neutrophils 66 43 - 78 %    Lymphocytes 16 13 - 44 %    Monocytes 14 (H) 4.0 - 12.0 %    Eosinophils % 2 0.5 - 7.8 %    Basophils 1 0.0 - 2.0 %    Immature Granulocytes 1 0.0 - 5.0 %    Segs Absolute 7.6 1.7 - 8.2 K/UL    Absolute Lymph # 1.8 0.5 - 4.6 K/UL    Absolute Mono # 1.6 (H) 0.1 - 1.3 K/UL    Absolute Eos # 0.2 0.0 - 0.8 K/UL    Basophils Absolute 0.1 0.0 - 0.2 K/UL    Absolute Immature Granulocyte 0.1 0.0 - 0.5 K/UL   Magnesium    Collection Time: 09/02/22  9:28 AM   Result Value Ref Range    Magnesium 1.6 (L) 1.8 - 2.4 mg/dL   Comprehensive Metabolic Panel    Collection Time: 09/02/22  9:28 AM   Result Value Ref Range    Sodium 127 (L) 138 - 145 mmol/L    Potassium 3.9 3.5 - 5.1 mmol/L    Chloride 101 101 - 110 mmol/L    CO2 18 (L) 21 - 32 mmol/L    Anion Gap 8 4 - 13 mmol/L    Glucose 112 (H) 65 - 100 mg/dL    BUN 10 6 - 23 MG/DL    Creatinine 1.50 0.8 - 1.5 MG/DL    GFR African American >60 >60 ml/min/1.73m2    GFR Non- 51 (L) >60 ml/min/1.73m2    Calcium 6.4 (L) 8.3 - 10.4 MG/DL    Total Bilirubin 0.7 0.2 - 1.1 MG/DL    ALT 26 12 - 65 U/L    AST 40 (H) 15 - 37 U/L    Alk Phosphatase 93 50 - 136 U/L    Total Protein 7.3 6.3 - 8.2 g/dL    Albumin 2.1 (L) 3.5 - 5.0 g/dL    Globulin 5.2 (H) 2.3 - 3.5 g/dL    Albumin/Globulin Ratio 0.4 (L) 1.2 - 3.5     Vancomycin Level, Random    Collection Time: 09/02/22  9:28 AM   Result Value Ref Range    Vancomycin Rm 23.1 UG/ML   POCT Glucose    Collection Time: 09/02/22 11:40 AM   Result Value Ref Range    POC Glucose 139 (H) 65 - 100 mg/dL    Performed by: [employer]    POCT Glucose    Collection Time: 09/02/22  4:43 PM   Result Value Ref Range    POC Glucose 275 (H) 65 - 100 mg/dL    Performed by: [employer]    PLEASE READ & DOCUMENT PPD TEST IN 48 HRS    Collection Time: 09/02/22  5:00 PM   Result Value Ref Range    PPD, (POC) Negative Negative    mm Induration 0 0 - 5 mm   POCT Glucose    Collection Time: 09/02/22  8:34 PM   Result Value Ref Range    POC Glucose 295 (H) 65 - 100 mg/dL    Performed by: Abdoulaye Beltran    CBC with Auto Differential    Collection Time: 09/03/22  7:43 AM   Result Value Ref Range    WBC 10.6 4.3 - 11.1 K/uL    RBC 2.38 (L) 4.23 - 5.6 M/uL    Hemoglobin 7.1 (L) 13.6 - 17.2 g/dL    Hematocrit 20.1 (L) 41.1 - 50.3 %    MCV 84.5 79.6 - 97.8 FL    MCH 29.8 26.1 - 32.9 PG    MCHC 35.3 (H) 31.4 - 35.0 g/dL    RDW 13.5 11.9 - 14.6 %    Platelets 031 573 - 521 K/uL    MPV 11.6 9.4 - 12.3 FL    nRBC 0.00 0.0 - 0.2 K/uL    Differential Type AUTOMATED      Seg Neutrophils 66 43 - 78 %    Lymphocytes 17 13 - 44 %    Monocytes 12 4.0 - 12.0 %    Eosinophils % 3 0.5 - 7.8 %    Basophils 1 0.0 - 2.0 %    Immature Granulocytes 1 0.0 - 5.0 %    Segs Absolute 7.1 1.7 - 8.2 K/UL    Absolute Lymph # 1.8 0.5 - 4.6 K/UL    Absolute Mono # 1.3 0.1 - 1.3 K/UL    Absolute Eos # 0.3 0.0 - 0.8 K/UL    Basophils Absolute 0.1 0.0 - 0.2 K/UL    Absolute Immature Granulocyte 0.1 0.0 - 0.5 K/UL   Magnesium    Collection Time: 09/03/22  7:43 AM   Result Value Ref Range    Magnesium 1.9 1.8 - 2.4 mg/dL   Comprehensive Metabolic Panel    Collection Time: 09/03/22  7:43 AM   Result Value Ref Range    Sodium 124 (L) 138 - 145 mmol/L    Potassium 4.1 3.5 - 5.1 mmol/L    Chloride 99 (L) 101 - 110 mmol/L    CO2 17 (L) 21 - 32 mmol/L    Anion Gap 8 4 - 13 mmol/L    Glucose 127 (H) 65 - 100 mg/dL    BUN 10 6 - 23 MG/DL    Creatinine 1.50 0.8 - 1.5 MG/DL    GFR African American >60 >60 ml/min/1.73m2    GFR Non- 51 (L) >60 ml/min/1.73m2    Calcium 6.5 (L) 8.3 - 10.4 MG/DL    Total Bilirubin 0.6 0.2 - 1.1 MG/DL    ALT 20 12 - 65 U/L    AST 27 15 - 37 U/L    Alk Phosphatase 95 50 - 136 U/L    Total Protein 6.9 6.3 - 8.2 g/dL    Albumin 1.9 (L) 3.5 - 5.0 g/dL    Globulin 5.0 (H) 2.3 - 3.5 g/dL    Albumin/Globulin Ratio 0.4 (L) 1.2 - 3.5     POCT Glucose    Collection Time: 09/03/22  8:32 AM   Result Value Ref Range    POC Glucose 133 (H) 65 - 100 mg/dL    Performed by: Micah    POCT Glucose    Collection Time: 09/03/22 11:58 AM   Result Value Ref Range    POC Glucose 235 (H) 65 - 100 mg/dL    Performed by: Micah        I have personally reviewed imaging studies showing: Other Studies:  Vascular duplex lower extremity arteries bilateral with ALECIA   Final Result   1. Noncompressible pressures at the level the ankle consistent with small vessel   calcinosis. 2. Right-sided mild-to-moderate superficial femoral artery and popliteal artery   stenoses   3. Very sluggish flow noted in the left peroneal artery and right posterior   tibial artery. Vascular duplex lower extremity venous left   Final Result   No evidence of left leg DVT. XR CALCANEUS LEFT (MIN 2 VIEWS)   Final Result   No acute process. XR CALCANEUS RIGHT (MIN 2 VIEWS)   Final Result   No acute process.        CT ABDOMEN PELVIS W IV CONTRAST Additional Contrast? None   Final Result      1. Cholelithiasis without pericholecystic inflammatory changes to suggest acute   cholecystitis or appendicitis. 2.  Bilateral perinephric soft tissue stranding and small perinephric fluid   collections as well as a small amount of fluid in the pelvic cul-de-sac are   nonspecific, but the possibility of pyelonephritis should be considered.           Current Meds:  Current Facility-Administered Medications   Medication Dose Route Frequency    loperamide (IMODIUM) capsule 2 mg  2 mg Oral 4x Daily PRN    cholestyramine light packet 4 g  4 g Oral BID    aspirin chewable tablet 81 mg  81 mg Oral Daily    vancomycin (VANCOCIN) 1250 mg in sodium chloride 0.9% 250 mL IVPB  1,250 mg IntraVENous Q24H    sodium bicarbonate tablet 1,300 mg  1,300 mg Oral BID    oxyCODONE (ROXICODONE) immediate release tablet 5 mg  5 mg Oral Q8H PRN    dextrose 5 % solution   IntraVENous Continuous    0.9 % sodium chloride bolus  1,000 mL IntraVENous Once    sodium chloride flush 0.9 % injection 5-40 mL  5-40 mL IntraVENous 2 times per day    sodium chloride flush 0.9 % injection 5-40 mL  5-40 mL IntraVENous PRN    0.9 % sodium chloride infusion   IntraVENous PRN    ondansetron (ZOFRAN-ODT) disintegrating tablet 4 mg  4 mg Oral Q8H PRN    Or    ondansetron (ZOFRAN) injection 4 mg  4 mg IntraVENous Q6H PRN    acetaminophen (TYLENOL) tablet 650 mg  650 mg Oral Q6H PRN    Or    acetaminophen (TYLENOL) suppository 650 mg  650 mg Rectal Q6H PRN    polyethylene glycol (GLYCOLAX) packet 17 g  17 g Oral Daily PRN    hyoscyamine (LEVSIN/SL) sublingual tablet 125 mcg  125 mcg SubLINGual Q4H PRN    metronidazole (FLAGYL) 500 mg in 0.9% NaCl 100 mL IVPB premix  500 mg IntraVENous Q8H    cefTRIAXone (ROCEPHIN) 2,000 mg in sodium chloride 0.9 % 50 mL IVPB mini-bag  2,000 mg IntraVENous Q24H    zinc oxide 40 % paste   Topical 4x Daily PRN    amLODIPine (NORVASC) tablet 10 mg  10 mg Oral Daily    folic acid (FOLVITE) tablet 1 mg  1 mg Oral Daily    vitamin B-12 (CYANOCOBALAMIN) tablet 1,000 mcg  1,000 mcg Oral Daily    [START ON 9/29/2022] tobramycin (TOBREX) 0.3 % ophthalmic solution 1 drop  1 drop Both Eyes 6 times per day    pantoprazole (PROTONIX) tablet 40 mg  40 mg Oral BID AC    calcium carbonate (TUMS) chewable tablet 500 mg  500 mg Oral TID PRN    glucose chewable tablet 16 g  4 tablet Oral PRN    dextrose bolus 10% 125 mL  125 mL IntraVENous PRN    Or    dextrose bolus 10% 250 mL  250 mL IntraVENous PRN    glucagon (rDNA) injection 1 mg  1 mg SubCUTAneous PRN    dextrose 10 % infusion   IntraVENous Continuous PRN    insulin lispro (HUMALOG) injection vial 0-8 Units  0-8 Units SubCUTAneous TID WC    insulin lispro (HUMALOG) injection vial 0-4 Units  0-4 Units SubCUTAneous Nightly       Signed:  Madelaine Walden MD

## 2022-09-03 NOTE — PROGRESS NOTES
END OF SHIFT NOTE:    INTAKE/OUTPUT  09/02 0701 - 09/03 0700  In: 1610 [P.O.:960; I.V.:650]  Out: 300 [Urine:300]  Voiding: Yes  Catheter: No  Drain:              Flatus: Patient does have flatus present. Stool: 1 occurrences. Characteristics:  Stool Appearance: Loose  Stool Color: Tan  Stool Amount: Medium  Stool Assessment  Incontinence: Yes  Stool Appearance: Loose  Stool Color: Tan  Stool Amount: Medium  Stool Source: Rectum  Last BM (including prior to admit): 09/02/22    Emesis:0  occurrences. Characteristics:        VITAL SIGNS  Patient Vitals for the past 12 hrs:   Temp Pulse Resp BP SpO2   09/03/22 0350 98.2 °F (36.8 °C) 86 18 130/68 100 %   09/03/22 0055 -- -- 18 -- --   09/03/22 0016 97.5 °F (36.4 °C) 88 18 133/87 100 %   09/02/22 2300 -- -- 18 -- --   09/02/22 1955 97.9 °F (36.6 °C) 87 18 (!) 140/91 100 %       Pain Assessment  @BSHSIFLOW(13)@  Pain Location: Foot  Patient's Stated Pain Goal: 0 - No pain    Ambulating  Yes    Shift report given to oncoming nurse at the bedside.     Bridget Whipple RN

## 2022-09-03 NOTE — PROGRESS NOTES
Reviewed notes for new spiritual concerns. Florinda unknown    Local    Wife -  jessica    Full code              Will follow as needed.

## 2022-09-03 NOTE — PROGRESS NOTES
VANCO DAILY FOLLOW UP NOTE  [facility] Pharmacokinetic Monitoring Service - Vancomycin    Consulting Provider: Dr. Hernandez Six   Indication: SSTI (Left heel ulcer)   Target Concentration: Goal AUC/HERI 400-600 mg*hr/L  Day of Therapy: 3  Additional Antimicrobials: Ceftriaxone, Flagyl     Ht Readings from Last 3 Encounters:   08/31/22 5' 11\" (1.803 m)     Wt Readings from Last 3 Encounters:   08/31/22 200 lb (90.7 kg)        Recent Labs     08/31/22  1616 09/01/22  1206 09/02/22  0928   BUN 16 12 10   CREATININE 1.60* 1.20 1.50   WBC 13.5*  --  11.3*     Estimated Creatinine Clearance: 63 mL/min (based on SCr of 1.5 mg/dL). Recent Labs     09/02/22  0928   VANCORANDOM 23.1       MRSA Nasal Swab: N/A. Non-respiratory infection. Assessment:  Date/Time Dose Concentration AUC   9/2 0928 1000 mg q 12 hours 23.1 756   Note: Serum concentrations collected for AUC dosing may appear elevated if collected in close proximity to the dose administered, this is not necessarily an indication of toxicity    Plan:  Continue vancomycin 1250 mg IV q24h for predicted AUC/Tr of 494/14.3  Repeat vancomycin concentration ordered for 9/4 @ 0400  Pharmacy will continue to monitor patient and adjust therapy as indicated    Thank you for the consult,  Lilliam Amaya, [facility]

## 2022-09-04 LAB
ANION GAP SERPL CALC-SCNC: 6 MMOL/L (ref 4–13)
BASOPHILS # BLD: 0.1 K/UL (ref 0–0.2)
BASOPHILS NFR BLD: 1 % (ref 0–2)
BUN SERPL-MCNC: 18 MG/DL (ref 6–23)
CALCIUM SERPL-MCNC: 7.3 MG/DL (ref 8.3–10.4)
CHLORIDE SERPL-SCNC: 100 MMOL/L (ref 101–110)
CO2 SERPL-SCNC: 18 MMOL/L (ref 21–32)
CREAT SERPL-MCNC: 1.5 MG/DL (ref 0.8–1.5)
DIFFERENTIAL METHOD BLD: ABNORMAL
EOSINOPHIL # BLD: 0.2 K/UL (ref 0–0.8)
EOSINOPHIL NFR BLD: 2 % (ref 0.5–7.8)
ERYTHROCYTE [DISTWIDTH] IN BLOOD BY AUTOMATED COUNT: 13.3 % (ref 11.9–14.6)
GLUCOSE BLD STRIP.AUTO-MCNC: 159 MG/DL (ref 65–100)
GLUCOSE BLD STRIP.AUTO-MCNC: 191 MG/DL (ref 65–100)
GLUCOSE BLD STRIP.AUTO-MCNC: 195 MG/DL (ref 65–100)
GLUCOSE BLD STRIP.AUTO-MCNC: 214 MG/DL (ref 65–100)
GLUCOSE SERPL-MCNC: 149 MG/DL (ref 65–100)
HCT VFR BLD AUTO: 20.9 % (ref 41.1–50.3)
HGB BLD-MCNC: 7.3 G/DL (ref 13.6–17.2)
IMM GRANULOCYTES # BLD AUTO: 0.1 K/UL (ref 0–0.5)
IMM GRANULOCYTES NFR BLD AUTO: 1 % (ref 0–5)
LYMPHOCYTES # BLD: 2.2 K/UL (ref 0.5–4.6)
LYMPHOCYTES NFR BLD: 17 % (ref 13–44)
MCH RBC QN AUTO: 30 PG (ref 26.1–32.9)
MCHC RBC AUTO-ENTMCNC: 34.9 G/DL (ref 31.4–35)
MCV RBC AUTO: 86 FL (ref 79.6–97.8)
MONOCYTES # BLD: 1.5 K/UL (ref 0.1–1.3)
MONOCYTES NFR BLD: 12 % (ref 4–12)
NEUTS SEG # BLD: 8.6 K/UL (ref 1.7–8.2)
NEUTS SEG NFR BLD: 67 % (ref 43–78)
NRBC # BLD: 0 K/UL (ref 0–0.2)
OSMOLALITY UR: 162 MOSM/KG H2O (ref 50–1400)
PLATELET # BLD AUTO: 299 K/UL (ref 150–450)
PMV BLD AUTO: 11.4 FL (ref 9.4–12.3)
POTASSIUM SERPL-SCNC: 4.7 MMOL/L (ref 3.5–5.1)
RBC # BLD AUTO: 2.43 M/UL (ref 4.23–5.6)
SERVICE CMNT-IMP: ABNORMAL
SODIUM SERPL-SCNC: 124 MMOL/L (ref 136–145)
SODIUM UR-SCNC: 15 MMOL/L
VANCOMYCIN SERPL-MCNC: 18.2 UG/ML
WBC # BLD AUTO: 12.8 K/UL (ref 4.3–11.1)

## 2022-09-04 PROCEDURE — 84300 ASSAY OF URINE SODIUM: CPT

## 2022-09-04 PROCEDURE — 6370000000 HC RX 637 (ALT 250 FOR IP): Performed by: FAMILY MEDICINE

## 2022-09-04 PROCEDURE — 83935 ASSAY OF URINE OSMOLALITY: CPT

## 2022-09-04 PROCEDURE — 36415 COLL VENOUS BLD VENIPUNCTURE: CPT

## 2022-09-04 PROCEDURE — 2580000003 HC RX 258: Performed by: FAMILY MEDICINE

## 2022-09-04 PROCEDURE — 85025 COMPLETE CBC W/AUTO DIFF WBC: CPT

## 2022-09-04 PROCEDURE — 6370000000 HC RX 637 (ALT 250 FOR IP): Performed by: INTERNAL MEDICINE

## 2022-09-04 PROCEDURE — 80202 ASSAY OF VANCOMYCIN: CPT

## 2022-09-04 PROCEDURE — 6360000002 HC RX W HCPCS: Performed by: FAMILY MEDICINE

## 2022-09-04 PROCEDURE — 6370000000 HC RX 637 (ALT 250 FOR IP): Performed by: HOSPITALIST

## 2022-09-04 PROCEDURE — 1100000000 HC RM PRIVATE

## 2022-09-04 PROCEDURE — 80048 BASIC METABOLIC PNL TOTAL CA: CPT

## 2022-09-04 PROCEDURE — 82962 GLUCOSE BLOOD TEST: CPT

## 2022-09-04 PROCEDURE — 2500000003 HC RX 250 WO HCPCS: Performed by: FAMILY MEDICINE

## 2022-09-04 RX ADMIN — ASPIRIN 81 MG: 81 TABLET, CHEWABLE ORAL at 09:02

## 2022-09-04 RX ADMIN — SODIUM CHLORIDE, PRESERVATIVE FREE 10 ML: 5 INJECTION INTRAVENOUS at 09:03

## 2022-09-04 RX ADMIN — PANTOPRAZOLE SODIUM 40 MG: 40 TABLET, DELAYED RELEASE ORAL at 16:09

## 2022-09-04 RX ADMIN — SODIUM BICARBONATE 650 MG TABLET 1300 MG: at 22:01

## 2022-09-04 RX ADMIN — PANTOPRAZOLE SODIUM 40 MG: 40 TABLET, DELAYED RELEASE ORAL at 05:46

## 2022-09-04 RX ADMIN — CHOLESTYRAMINE 4 G: 4 POWDER, FOR SUSPENSION ORAL at 09:02

## 2022-09-04 RX ADMIN — CYANOCOBALAMIN TAB 1000 MCG 1000 MCG: 1000 TAB at 09:03

## 2022-09-04 RX ADMIN — CHOLESTYRAMINE 4 G: 4 POWDER, FOR SUSPENSION ORAL at 21:18

## 2022-09-04 RX ADMIN — FOLIC ACID 1 MG: 1 TABLET ORAL at 09:02

## 2022-09-04 RX ADMIN — AMLODIPINE BESYLATE 10 MG: 10 TABLET ORAL at 09:03

## 2022-09-04 RX ADMIN — SODIUM BICARBONATE 650 MG TABLET 1300 MG: at 09:02

## 2022-09-04 RX ADMIN — SODIUM CHLORIDE, PRESERVATIVE FREE 10 ML: 5 INJECTION INTRAVENOUS at 21:18

## 2022-09-04 RX ADMIN — INSULIN LISPRO 2 UNITS: 100 INJECTION, SOLUTION INTRAVENOUS; SUBCUTANEOUS at 17:10

## 2022-09-04 RX ADMIN — OXYCODONE 5 MG: 5 TABLET ORAL at 01:54

## 2022-09-04 RX ADMIN — OXYCODONE 5 MG: 5 TABLET ORAL at 16:09

## 2022-09-04 RX ADMIN — METRONIDAZOLE 500 MG: 500 INJECTION, SOLUTION INTRAVENOUS at 13:54

## 2022-09-04 RX ADMIN — CALCIUM CARBONATE-CHOLECALCIFEROL TAB 250 MG-125 UNIT 1 TABLET: 250-125 TAB at 09:02

## 2022-09-04 RX ADMIN — METRONIDAZOLE 500 MG: 500 INJECTION, SOLUTION INTRAVENOUS at 22:00

## 2022-09-04 RX ADMIN — METRONIDAZOLE 500 MG: 500 INJECTION, SOLUTION INTRAVENOUS at 05:46

## 2022-09-04 RX ADMIN — VANCOMYCIN HYDROCHLORIDE 1250 MG: 100 INJECTION, POWDER, LYOPHILIZED, FOR SOLUTION INTRAVENOUS at 09:02

## 2022-09-04 RX ADMIN — CEFTRIAXONE 2000 MG: 2 INJECTION, POWDER, FOR SOLUTION INTRAMUSCULAR; INTRAVENOUS at 21:18

## 2022-09-04 ASSESSMENT — PAIN SCALES - GENERAL
PAINLEVEL_OUTOF10: 6
PAINLEVEL_OUTOF10: 0
PAINLEVEL_OUTOF10: 8

## 2022-09-04 ASSESSMENT — PAIN DESCRIPTION - DESCRIPTORS
DESCRIPTORS: ACHING;SORE
DESCRIPTORS: ACHING;SORE

## 2022-09-04 ASSESSMENT — PAIN DESCRIPTION - ORIENTATION
ORIENTATION: RIGHT;LEFT
ORIENTATION: RIGHT;LEFT

## 2022-09-04 ASSESSMENT — PAIN DESCRIPTION - ONSET: ONSET: ON-GOING

## 2022-09-04 ASSESSMENT — PAIN DESCRIPTION - LOCATION
LOCATION: FOOT
LOCATION: LEG;FOOT

## 2022-09-04 ASSESSMENT — PAIN - FUNCTIONAL ASSESSMENT
PAIN_FUNCTIONAL_ASSESSMENT: ACTIVITIES ARE NOT PREVENTED
PAIN_FUNCTIONAL_ASSESSMENT: ACTIVITIES ARE NOT PREVENTED

## 2022-09-04 ASSESSMENT — PAIN DESCRIPTION - PAIN TYPE
TYPE: ACUTE PAIN
TYPE: ACUTE PAIN;CHRONIC PAIN

## 2022-09-04 ASSESSMENT — PAIN DESCRIPTION - FREQUENCY: FREQUENCY: INTERMITTENT

## 2022-09-04 NOTE — PROGRESS NOTES
VANCO DAILY FOLLOW UP NOTE  [address] [facility] Pharmacokinetic Monitoring Service - Vancomycin    Consulting Provider: Dr. Gladis Braga   Indication: SSTI (Left heel ulcer)   Target Concentration: Goal AUC/HERI 400-600 mg*hr/L  Day of Therapy: 4  Additional Antimicrobials: Ceftriaxone, metronidazole    Ht Readings from Last 3 Encounters:   08/31/22 5' 11\" (1.803 m)     Wt Readings from Last 3 Encounters:   08/31/22 200 lb (90.7 kg)        Recent Labs     09/02/22  0928 09/03/22  0743 09/04/22  0811   BUN 10 10 18   CREATININE 1.50 1.50 1.50   WBC 11.3* 10.6 12.8*     Estimated Creatinine Clearance: 63 mL/min (based on SCr of 1.5 mg/dL). Recent Labs     09/04/22  0811   VANCORANDOM 18.2       MRSA Nasal Swab: N/A. Non-respiratory infection. Assessment:  Date/Time Dose Concentration AUC   9/2 0928 1000 mg q 12 hours 23.1 756   9/4 0811 1250 mg q24h 18.2 574   Note: Serum concentrations collected for AUC dosing may appear elevated if collected in close proximity to the dose administered, this is not necessarily an indication of toxicity    Plan:  Current dosing regimen is therapeutic  Continue current dose  Repeat vancomycin concentrations will be ordered as clinically appropriate   Pharmacy will continue to monitor patient and adjust therapy as indicated    Thank you for the consult,  Lilliam Julian, [facility]

## 2022-09-04 NOTE — PROGRESS NOTES
Medical Student Progress Note   Admit Date:  2022  4:18 PM   Name:  Daina Painter   Age:  65 y.o. Sex:  male  :  1965   MRN:  [id number]     Presenting Complaint: Hypotension    Reason(s) for Admission: Hypokalemia [E87.6]  Hypoglycemia [E16.2]  Acute pyelonephritis [N10]  Acute kidney injury [facility]) [N17.9]  Acute weakness [R53.1]  Diabetic ulcer of both feet ([address] [Y98.373, L97.519, L97.529]  Acute pyelonephritis due to bacteria [N10, B96.89]       Hospital Course & Interval History:   Mr. Carline Santiago is a 64 y/o male with history of T2DM on insulin, neuropathy, HTN, HLD, PUD, chronic gastritis, GERD, Upper GIB who presented with cramping abd pain and diarrhea x 2 weeks associated with fatigue and generalized weakness. Reported ~5-10 episodes loose stool daily that he described as black. Had been taking pepto bismol. Denies recent abx use. Per wife, the pt had been essentially bedbound for 2 weeks. He then developed bilateral heel wounds, left worse than right and left leg with itchy pustules and discoloration up entire leg. He was seen at [facility] on . Subjective (22): He reports no overnight events. He denies any chest pain, abd pain, or worsened foot pain. Overall, he is doing well. Assessment & Plan:     Acute gastroenteritis - admit remote tele. IVF. Replete electrolytes. CLD, check stool culture and fecal occult. C diff in process. -C. difficile negative, ordered as needed Imodium     Bilateral T2DM foot ulcers - infected. obtain XR of hargrove calcanus. Consider MRI to r/o deeper infection or osto. Obtain wound cx and BC. MRSA PCR screen. Start rocephin, vanc, flagyl. Consult wound RN. Offload. PT/OT. Wound conslt. Check arterial duplex. Wound clinic referral on DC. Concern for possible disseminated infection with perinephric fat stranding and fluid collection raising concern for pyelo. No urinary symptoms. UA w/o inflammatory cells.    - wound evaluated - need out pt wound care follow at discharge. Cont antibiotics  9/3 - Vasc Sx recommended wound care and off loading. Pair of [place] boots ordered. Vasc s/o  9/4- No new complaints today. Will have outpatient follow up. PAD  9/2-arterial Doppler positive for peripheral artery disease. Vascular consulted, no intervention required, enough circulation for healing, signed off. Continue with 81 mg ASA daily. Hypokalemia 2/2 GI losses - s/p kcl 20 meq IV in ED. Start kcl 40 q6h x3 doses. IVF with kcl 40. Remote tele. Repeat K now. Check mag. 9/1- resolved     Hyponatremia - 2/2 hypovolemia. IVF. Trend bmp/cmp   9/2-sodium of 127  9/3 - sodium 124. Will fluid restrict and check a repeat Sodium  9/4- pending labs     Hypomagnesemia  9/2-Magnesium of 1.6-replace with 2 g admission sulfate. 9/3 - Mg wnl. Resolved     Hypocalcemia - perhaps from severe infection. Start calcium carbonate. Check vitamin D and Phos in AM. Check Mag now   9/2- calcium 6.4, albumin 2.1  9/3 - phos wnl and vit d low. Will start Oscal     MARS on CKD3a  2/2 PRA/ATN   Improving since yesterday @ aracely Scr 2.03   Scr 1.6 08/31/22 Bcr ~1.2)non-oligouric  CT BL perinephric soft tissue stranding and small perinephric fluid collections. IVF. Maintain MAP >65 mm hg   Avoid NSAIDs, anti-RAAS agents, nephrotoxic agents, and phosphate enemas   Renal Dosing Strict I&O, Daily Weights, Daily BMP/CMP   9/1- normal creatinine  9/3- GFR and creatinine within normal limits. Anemia - reports of dark stool. H/o UGIB. Cont PPI BID. Check fecal occult. Repeat hemoglobin now. Hold AC until GIB r/o. Check anemia labs. 9/2- hb 8. Occult blood negative  9/3- Hb 7.1. T2DM with polyneuropathy and hypoglycemia - persistent hypoglycemia in ED with GLU 50-60. S/p dextrose 50% 25g. Improved to 95. Took lantus 30 units this AM. Hold basal. Dextrose IVF. DM RN consult. SSI for now.  Check A1C in AM   9/1- persistent hypoglycemia - changed fluids to d5 water cont hypoglycemic protocol. 9/2-improving hypoglycemia, decrease D5 water to 75 cc from 100 cc.  9/3 - will dc D5 water as his blood sugar is > 200  9/4- hypoglycemia resolved. Continue with outpatient diabetic management. Physical debility - consult PT/OT, PPD. Likely dc with home health. AUD, moderate, dependence - last drink ~2 weeks ago. PO vitamines. IV thiaminex3 days. PPI. Bacterial Conjunctivitis - cont tobramycin eye drops. 9/2- Improving conjunctival injection  9/4- continual improvement. No complaints at this time. PUD / h/o UGIB / GERD w/ esophagitis - PPI BID. Avoid NSAIDs. HTN - cont amlodipine in AM. Hold metoprolol 50 BID pending vitals. /76 on 9/4 @ 0732        Diet:  ADULT ORAL NUTRITION SUPPLEMENT; Breakfast, Dinner; Wound Healing Oral Supplement  ADULT DIET;  Regular; GI [place] (GERD/Peptic Ulcer); 1500 ml; Lactose-Controlled, No red dye  DVT PPx: Heparin  Code status: Full code    Active Hospital Problems    Diagnosis Date Noted    PAD (peripheral artery disease) ([facility] [address] 09/02/2022     Priority: Medium    Acute gastroenteritis 08/31/2022     Priority: Medium    Type 2 diabetes mellitus with hypoglycemia, with long-term current use of insulin ([facility] [address] 08/31/2022     Priority: Medium    Diabetic ulcer of both feet associated with type 2 diabetes mellitus ([facility] [address] 08/31/2022     Priority: Medium    Hypocalcemia 08/31/2022     Priority: Medium    Hypoalbuminemia 08/31/2022     Priority: Medium    Hyponatremia 08/31/2022     Priority: Medium    Hypokalemia due to excessive gastrointestinal loss of potassium 08/31/2022     Priority: Medium    Acute renal failure superimposed on stage 3a chronic kidney disease ([facility] 08/31/2022     Priority: Medium    Conjunctivitis 05/13/2022     Priority: Medium    PUD (peptic ulcer disease) 05/10/2021     Priority: Medium     Formatting of this note might be different from the original.  Added automatically from request for surgery [id number] Chronic gastritis 03/12/2021     Priority: Medium    Alcohol dependence ([facility] [address] 10/16/2017     Priority: Medium    Diabetic peripheral neuropathy ([facility] 10/16/2017     Priority: Medium    Hyperlipidemia 10/16/2017     Priority: Medium    Essential hypertension 03/28/2016     Priority: Medium     Last Assessment & Plan:   Formatting of this note might be different from the original.  Still poor controlled. Continue avoid Cozaar due to acute kidney injury. Started on Norvasc, hydralazine when necessary, clonidine when necessary       Objective:   Patient Vitals for the past 24 hrs:   Temp Pulse Resp BP SpO2   09/04/22 0732 98.6 °F (37 °C) 86 18 102/76 100 %   09/04/22 0334 98.1 °F (36.7 °C) 89 18 123/87 100 %   09/04/22 0224 -- -- 18 -- --   09/03/22 2306 98.1 °F (36.7 °C) 89 18 123/89 100 %   09/03/22 1900 97.9 °F (36.6 °C) 87 19 117/87 100 %   09/03/22 1733 -- -- 18 -- --   09/03/22 1628 97.9 °F (36.6 °C) 76 16 127/87 100 %   09/03/22 1154 97.7 °F (36.5 °C) 77 16 (!) 132/92 100 %   09/03/22 0957 -- -- 18 -- --   09/03/22 0922 -- 80 -- 120/82 --     @MICHAEL([id number])@    Estimated body mass index is 27.89 kg/m² as calculated from the following:    Height as of this encounter: 5' 11\" (1.803 m). Weight as of this encounter: 200 lb (90.7 kg). Intake/Output Summary (Last 24 hours) at 9/4/2022 0818  Last data filed at 9/4/2022 0072  Gross per 24 hour   Intake 1300 ml   Output 650 ml   Net 650 ml         Physical Exam:     General:    Well nourished. No overt distress. Head:  Normocephalic, atraumatic  Eyes:  No drainage or discharge bilaterally. Minimal conjunctival injection. ENT:  Nares appear normal, no drainage. Moist oral mucosa  Neck:  No restricted ROM. Trachea midline   CV:   RRR. No m/r/g. No jugular venous distension. Lungs:   CTAB. No wheezing, rhonchi, or rales. Respirations even, unlabored  Abdomen: Bowel sounds present. Soft, nontender, nondistended. Extremities: No cyanosis or clubbing. No edema  Skin:     Dressing to bilateral heels is intact, dry, and without signs of drainage or bleeding. Neuro:  Cranial nerves II-XII grossly intact. Psych:  Normal mood and affect.   Alert and oriented x3    I have reviewed ordered lab tests and independently visualized imaging below:    Last 24hr Labs:  Recent Results (from the past 24 hour(s))   POCT Glucose    Collection Time: 09/03/22  8:32 AM   Result Value Ref Range    POC Glucose 133 (H) 65 - 100 mg/dL    Performed by: Micah    POCT Glucose    Collection Time: 09/03/22 11:58 AM   Result Value Ref Range    POC Glucose 235 (H) 65 - 100 mg/dL    Performed by: Micah    POCT Glucose    Collection Time: 09/03/22  4:31 PM   Result Value Ref Range    POC Glucose 273 (H) 65 - 100 mg/dL    Performed by: Micah    POCT Glucose    Collection Time: 09/03/22  9:08 PM   Result Value Ref Range    POC Glucose 220 (H) 65 - 100 mg/dL    Performed by: Ara    POCT Glucose    Collection Time: 09/04/22  7:34 AM   Result Value Ref Range    POC Glucose 159 (H) 65 - 100 mg/dL    Performed by: Hamlet Mina        @[employer]@    Other Studies:  [unfilled]    Current Meds:  Current Facility-Administered Medications   Medication Dose Route Frequency    calcium carb-cholecalciferol 250-125 MG-UNIT per tab 1 tablet  1 tablet Oral Daily    loperamide (IMODIUM) capsule 2 mg  2 mg Oral 4x Daily PRN    cholestyramine light packet 4 g  4 g Oral BID    aspirin chewable tablet 81 mg  81 mg Oral Daily    vancomycin (VANCOCIN) 1250 mg in sodium chloride 0.9% 250 mL IVPB  1,250 mg IntraVENous Q24H    sodium bicarbonate tablet 1,300 mg  1,300 mg Oral BID    oxyCODONE (ROXICODONE) immediate release tablet 5 mg  5 mg Oral Q8H PRN    0.9 % sodium chloride bolus  1,000 mL IntraVENous Once    sodium chloride flush 0.9 % injection 5-40 mL  5-40 mL IntraVENous 2 times per day    sodium chloride flush 0.9 % injection 5-40 mL  5-40 mL IntraVENous PRN    0.9 % sodium chloride infusion   IntraVENous PRN    ondansetron (ZOFRAN-ODT) disintegrating tablet 4 mg  4 mg Oral Q8H PRN    Or    ondansetron (ZOFRAN) injection 4 mg  4 mg IntraVENous Q6H PRN    acetaminophen (TYLENOL) tablet 650 mg  650 mg Oral Q6H PRN    Or    acetaminophen (TYLENOL) suppository 650 mg  650 mg Rectal Q6H PRN    polyethylene glycol (GLYCOLAX) packet 17 g  17 g Oral Daily PRN    hyoscyamine (LEVSIN/SL) sublingual tablet 125 mcg  125 mcg SubLINGual Q4H PRN    metronidazole (FLAGYL) 500 mg in 0.9% NaCl 100 mL IVPB premix  500 mg IntraVENous Q8H    cefTRIAXone (ROCEPHIN) 2,000 mg in sodium chloride 0.9 % 50 mL IVPB mini-bag  2,000 mg IntraVENous Q24H    zinc oxide 40 % paste   Topical 4x Daily PRN    amLODIPine (NORVASC) tablet 10 mg  10 mg Oral Daily    folic acid (FOLVITE) tablet 1 mg  1 mg Oral Daily    vitamin B-12 (CYANOCOBALAMIN) tablet 1,000 mcg  1,000 mcg Oral Daily    [START ON 9/29/2022] tobramycin (TOBREX) 0.3 % ophthalmic solution 1 drop  1 drop Both Eyes 6 times per day    pantoprazole (PROTONIX) tablet 40 mg  40 mg Oral BID AC    glucose chewable tablet 16 g  4 tablet Oral PRN    dextrose bolus 10% 125 mL  125 mL IntraVENous PRN    Or    dextrose bolus 10% 250 mL  250 mL IntraVENous PRN    glucagon (rDNA) injection 1 mg  1 mg SubCUTAneous PRN    dextrose 10 % infusion   IntraVENous Continuous PRN    insulin lispro (HUMALOG) injection vial 0-8 Units  0-8 Units SubCUTAneous TID WC    insulin lispro (HUMALOG) injection vial 0-4 Units  0-4 Units SubCUTAneous Nightly       Signed:  Keila Babcock OMS-III    Part of this note may have been written by using a voice dictation software. The note has been proof read but may still contain some grammatical/other typographical errors.

## 2022-09-04 NOTE — PROGRESS NOTES
Hospitalist Progress Note   Admit Date:  2022  4:18 PM   Name:  Jamil Mcnair   Age:  65 y.o. Sex:  male  :  1965   MRN:  [id number]   Room:  /    Presenting Complaint: Hypotension     Reason(s) for Admission: Hypokalemia [E87.6]  Hypoglycemia [E16.2]  Acute pyelonephritis [N10]  Acute kidney injury [facility]) [N17.9]  Acute weakness [R53.1]  Diabetic ulcer of both feet ([address] [X89.490, L97.519, L97.529]  Acute pyelonephritis due to bacteria [N10, B96.89]     Hospital Course:     Mr. Jelena De Luna is a 64 yo male with PMH of obesity, DM2, HTN, HLP, upper GI bleed admitted with abdominal pain, diarrhea, increasing debility. He developed bilateral heel wounds with rash up LE. CTAP showed\"       Impression       1. Cholelithiasis without pericholecystic inflammatory changes to suggest acute   cholecystitis or appendicitis. 2.  Bilateral perinephric soft tissue stranding and small perinephric fluid   collections as well as a small amount of fluid in the pelvic cul-de-sac are   nonspecific, but the possibility of pyelonephritis should be considered. \"    UA negative. Cdiff negative. He has been seen by wound care and xrays bilateral LE negative. Seen by vascular s/p arterial dopplers showing PAD. Started on asa. No surgical needs. He is on course of rocephin/flagyl./vancomycin    He has diet advanced. No sven bleeding. HGB overly stable. Heme negative stool. He has developed progressive hyponatremia. Discharge plans are pending to home with home health. Subjective & 24hr Events (22):        No dyspnea, no abdominal pain, moving about ok, no abdominal pain, ate ok, ,no bleeding or diarrhea, does drink water a lot       Assessment & Plan:     Principal Problem:    Acute gastroenteritis  Plan:   -Resolved  -Trend HGB  -Needs GI followup  -On oral protonix         Active Problems:    Conjunctivitis  Plan:   -S/p topical agent            Alcohol dependence ([facility]  Plan:   -Needs cessation  -Continued folate         Essential hypertension  Plan:   -Continued norvasc     -        Type 2 diabetes mellitus with hypoglycemia, with long-term current use of insulin ([facility])  Plan:   -Sliding scale insulin         Diabetic ulcer of both feet associated with type 2 diabetes mellitus ([facility])  Plan:   -Followup wound care   -On course of rocephin/ flagyl /vancomycin         Hyponatremia  Plan:   -  -Continued oral fluid 1.5 L restriction  -Check TSH, cortisol, urine sodium, serum and urine osmolality           Acute renal failure superimposed on stage 3a chronic kidney disease ([facility])  Plan:   -Improved  -Trend BMP  -Continued bicarb        PAD (peripheral artery disease) ([facility])  Plan:   -Followup vascular  -Asa 81 mg daily      Anticipated discharge needs:      Pending to home    Diet:  [address]; Breakfast, Dinner; Wound Healing Oral Supplement  ADULT DIET; Regular; GI [place] (GERD/Peptic Ulcer); 1500 ml; Lactose-Controlled, No red dye  DVT PPx: held - anemia   Code status: Full Code    Hospital Problems:  Principal Problem:    Acute gastroenteritis  Active Problems:    Conjunctivitis    Chronic gastritis    Alcohol dependence ([facility]    Diabetic peripheral neuropathy ([facility]    Essential hypertension    Hyperlipidemia    PUD (peptic ulcer disease)    Type 2 diabetes mellitus with hypoglycemia, with long-term current use of insulin ([facility])    Diabetic ulcer of both feet associated with type 2 diabetes mellitus ([facility])    Hypocalcemia    Hypoalbuminemia    Hyponatremia    Hypokalemia due to excessive gastrointestinal loss of potassium    Acute renal failure superimposed on stage 3a chronic kidney disease ([facility])    PAD (peripheral artery disease) ([facility]  Resolved Problems:    * No resolved hospital problems.  *      Objective:   Patient Vitals for the past 24 hrs:   Temp Pulse Resp BP SpO2   09/04/22 0903 -- -- -- 102/76 --   09/04/22 0732 98.6 °F (37 °C) 86 18 102/76 100 %   09/04/22 0334 98.1 °F (36.7 °C) 89 18 123/87 100 %   09/04/22 0224 -- -- 18 -- --   09/03/22 2306 98.1 °F (36.7 °C) 89 18 123/89 100 %   09/03/22 1900 97.9 °F (36.6 °C) 87 19 117/87 100 %   09/03/22 1733 -- -- 18 -- --   09/03/22 1628 97.9 °F (36.6 °C) 76 16 127/87 100 %       Oxygen Therapy  SpO2: 100 %  Pulse Oximeter Device Mode: Intermittent  O2 Device: None (Room air)    Estimated body mass index is 27.89 kg/m² as calculated from the following:    Height as of this encounter: 5' 11\" (1.803 m). Weight as of this encounter: 200 lb (90.7 kg). Intake/Output Summary (Last 24 hours) at 9/4/2022 1215  Last data filed at 9/4/2022 0903  Gross per 24 hour   Intake 1300 ml   Output 825 ml   Net 475 ml         Physical Exam:     Blood pressure 102/76, pulse 86, temperature 98.6 °F (37 °C), temperature source Oral, resp. rate 18, height 5' 11\" (1.803 m), weight 200 lb (90.7 kg), SpO2 100 %. General:    Well nourished. Alert, no distress   CV:   RRR. No m/r/g. No jugular venous distension. 1+ edema   Lungs:   CTAB. No wheezing, rhonchi, or rales. Symmetric expansion. anterior   Abdomen: Bowel sounds present. Soft, nontender, nondistended. Extremities: No cyanosis or clubbing. No edema  Skin:     No rashes and normal coloration. Warm and dry. Neuro:  grossly intact. Psych:  Normal mood and affect.       I have personally reviewed labs and tests showing:  Recent Labs:  Recent Results (from the past 48 hour(s))   POCT Glucose    Collection Time: 09/02/22  4:43 PM   Result Value Ref Range    POC Glucose 275 (H) 65 - 100 mg/dL    Performed by: Angie    PLEASE READ & DOCUMENT PPD TEST IN 48 HRS    Collection Time: 09/02/22  5:00 PM   Result Value Ref Range    PPD, (POC) Negative Negative    mm Induration 0 0 - 5 mm   POCT Glucose    Collection Time: 09/02/22  8:34 PM   Result Value Ref Range    POC Glucose 295 (H) 65 - 100 mg/dL    Performed by: Luis    CBC with Auto Differential Collection Time: 09/03/22  7:43 AM   Result Value Ref Range    WBC 10.6 4.3 - 11.1 K/uL    RBC 2.38 (L) 4.23 - 5.6 M/uL    Hemoglobin 7.1 (L) 13.6 - 17.2 g/dL    Hematocrit 20.1 (L) 41.1 - 50.3 %    MCV 84.5 79.6 - 97.8 FL    MCH 29.8 26.1 - 32.9 PG    MCHC 35.3 (H) 31.4 - 35.0 g/dL    RDW 13.5 11.9 - 14.6 %    Platelets 195 622 - 319 K/uL    MPV 11.6 9.4 - 12.3 FL    nRBC 0.00 0.0 - 0.2 K/uL    Differential Type AUTOMATED      Seg Neutrophils 66 43 - 78 %    Lymphocytes 17 13 - 44 %    Monocytes 12 4.0 - 12.0 %    Eosinophils % 3 0.5 - 7.8 %    Basophils 1 0.0 - 2.0 %    Immature Granulocytes 1 0.0 - 5.0 %    Segs Absolute 7.1 1.7 - 8.2 K/UL    Absolute Lymph # 1.8 0.5 - 4.6 K/UL    Absolute Mono # 1.3 0.1 - 1.3 K/UL    Absolute Eos # 0.3 0.0 - 0.8 K/UL    Basophils Absolute 0.1 0.0 - 0.2 K/UL    Absolute Immature Granulocyte 0.1 0.0 - 0.5 K/UL   Magnesium    Collection Time: 09/03/22  7:43 AM   Result Value Ref Range    Magnesium 1.9 1.8 - 2.4 mg/dL   Comprehensive Metabolic Panel    Collection Time: 09/03/22  7:43 AM   Result Value Ref Range    Sodium 124 (L) 138 - 145 mmol/L    Potassium 4.1 3.5 - 5.1 mmol/L    Chloride 99 (L) 101 - 110 mmol/L    CO2 17 (L) 21 - 32 mmol/L    Anion Gap 8 4 - 13 mmol/L    Glucose 127 (H) 65 - 100 mg/dL    BUN 10 6 - 23 MG/DL    Creatinine 1.50 0.8 - 1.5 MG/DL    GFR African American >60 >60 ml/min/1.73m2    GFR Non- 51 (L) >60 ml/min/1.73m2    Calcium 6.5 (L) 8.3 - 10.4 MG/DL    Total Bilirubin 0.6 0.2 - 1.1 MG/DL    ALT 20 12 - 65 U/L    AST 27 15 - 37 U/L    Alk Phosphatase 95 50 - 136 U/L    Total Protein 6.9 6.3 - 8.2 g/dL    Albumin 1.9 (L) 3.5 - 5.0 g/dL    Globulin 5.0 (H) 2.3 - 3.5 g/dL    Albumin/Globulin Ratio 0.4 (L) 1.2 - 3.5     POCT Glucose    Collection Time: 09/03/22  8:32 AM   Result Value Ref Range    POC Glucose 133 (H) 65 - 100 mg/dL    Performed by: Micah    POCT Glucose    Collection Time: 09/03/22 11:58 AM   Result Value Ref Range POC Glucose 235 (H) 65 - 100 mg/dL    Performed by: Micah    POCT Glucose    Collection Time: 09/03/22  4:31 PM   Result Value Ref Range    POC Glucose 273 (H) 65 - 100 mg/dL    Performed by: Micah    POCT Glucose    Collection Time: 09/03/22  9:08 PM   Result Value Ref Range    POC Glucose 220 (H) 65 - 100 mg/dL    Performed by: Ara    POCT Glucose    Collection Time: 09/04/22  7:34 AM   Result Value Ref Range    POC Glucose 159 (H) 65 - 100 mg/dL    Performed by: Dre Porter    Vancomycin Level, Random    Collection Time: 09/04/22  8:11 AM   Result Value Ref Range    Vancomycin Rm 18.2 UG/ML   Basic Metabolic Panel    Collection Time: 09/04/22  8:11 AM   Result Value Ref Range    Sodium 124 (L) 136 - 145 mmol/L    Potassium 4.7 3.5 - 5.1 mmol/L    Chloride 100 (L) 101 - 110 mmol/L    CO2 18 (L) 21 - 32 mmol/L    Anion Gap 6 4 - 13 mmol/L    Glucose 149 (H) 65 - 100 mg/dL    BUN 18 6 - 23 MG/DL    Creatinine 1.50 0.8 - 1.5 MG/DL    GFR African American >60 >60 ml/min/1.73m2    GFR Non- 51 (L) >60 ml/min/1.73m2    Calcium 7.3 (L) 8.3 - 10.4 MG/DL   CBC with Auto Differential    Collection Time: 09/04/22  8:11 AM   Result Value Ref Range    WBC 12.8 (H) 4.3 - 11.1 K/uL    RBC 2.43 (L) 4.23 - 5.6 M/uL    Hemoglobin 7.3 (L) 13.6 - 17.2 g/dL    Hematocrit 20.9 (L) 41.1 - 50.3 %    MCV 86.0 79.6 - 97.8 FL    MCH 30.0 26.1 - 32.9 PG    MCHC 34.9 31.4 - 35.0 g/dL    RDW 13.3 11.9 - 14.6 %    Platelets 098 563 - 904 K/uL    MPV 11.4 9.4 - 12.3 FL    nRBC 0.00 0.0 - 0.2 K/uL    Differential Type AUTOMATED      Seg Neutrophils 67 43 - 78 %    Lymphocytes 17 13 - 44 %    Monocytes 12 4.0 - 12.0 %    Eosinophils % 2 0.5 - 7.8 %    Basophils 1 0.0 - 2.0 %    Immature Granulocytes 1 0.0 - 5.0 %    Segs Absolute 8.6 (H) 1.7 - 8.2 K/UL    Absolute Lymph # 2.2 0.5 - 4.6 K/UL    Absolute Mono # 1.5 (H) 0.1 - 1.3 K/UL    Absolute Eos # 0.2 0.0 - 0.8 K/UL    Basophils Absolute 0.1 0.0 - 0.2 K/UL    Absolute Immature Granulocyte 0.1 0.0 - 0.5 K/UL       I have personally reviewed imaging studies showing: Other Studies:  Vascular duplex lower extremity arteries bilateral with ALECIA   Final Result   1. Noncompressible pressures at the level the ankle consistent with small vessel   calcinosis. 2. Right-sided mild-to-moderate superficial femoral artery and popliteal artery   stenoses   3. Very sluggish flow noted in the left peroneal artery and right posterior   tibial artery. Vascular duplex lower extremity venous left   Final Result   No evidence of left leg DVT. XR CALCANEUS LEFT (MIN 2 VIEWS)   Final Result   No acute process. XR CALCANEUS RIGHT (MIN 2 VIEWS)   Final Result   No acute process. CT ABDOMEN PELVIS W IV CONTRAST Additional Contrast? None   Final Result      1. Cholelithiasis without pericholecystic inflammatory changes to suggest acute   cholecystitis or appendicitis. 2.  Bilateral perinephric soft tissue stranding and small perinephric fluid   collections as well as a small amount of fluid in the pelvic cul-de-sac are   nonspecific, but the possibility of pyelonephritis should be considered.           Current Meds:  Current Facility-Administered Medications   Medication Dose Route Frequency    calcium carb-cholecalciferol 250-125 MG-UNIT per tab 1 tablet  1 tablet Oral Daily    loperamide (IMODIUM) capsule 2 mg  2 mg Oral 4x Daily PRN    cholestyramine light packet 4 g  4 g Oral BID    aspirin chewable tablet 81 mg  81 mg Oral Daily    vancomycin (VANCOCIN) 1250 mg in sodium chloride 0.9% 250 mL IVPB  1,250 mg IntraVENous Q24H    sodium bicarbonate tablet 1,300 mg  1,300 mg Oral BID    oxyCODONE (ROXICODONE) immediate release tablet 5 mg  5 mg Oral Q8H PRN    0.9 % sodium chloride bolus  1,000 mL IntraVENous Once    sodium chloride flush 0.9 % injection 5-40 mL  5-40 mL IntraVENous 2 times per day    sodium chloride flush 0.9 % injection 5-40 mL  5-40 mL IntraVENous PRN    0.9 % sodium chloride infusion   IntraVENous PRN    ondansetron (ZOFRAN-ODT) disintegrating tablet 4 mg  4 mg Oral Q8H PRN    Or    ondansetron (ZOFRAN) injection 4 mg  4 mg IntraVENous Q6H PRN    acetaminophen (TYLENOL) tablet 650 mg  650 mg Oral Q6H PRN    Or    acetaminophen (TYLENOL) suppository 650 mg  650 mg Rectal Q6H PRN    polyethylene glycol (GLYCOLAX) packet 17 g  17 g Oral Daily PRN    hyoscyamine (LEVSIN/SL) sublingual tablet 125 mcg  125 mcg SubLINGual Q4H PRN    metronidazole (FLAGYL) 500 mg in 0.9% NaCl 100 mL IVPB premix  500 mg IntraVENous Q8H    cefTRIAXone (ROCEPHIN) 2,000 mg in sodium chloride 0.9 % 50 mL IVPB mini-bag  2,000 mg IntraVENous Q24H    zinc oxide 40 % paste   Topical 4x Daily PRN    amLODIPine (NORVASC) tablet 10 mg  10 mg Oral Daily    folic acid (FOLVITE) tablet 1 mg  1 mg Oral Daily    vitamin B-12 (CYANOCOBALAMIN) tablet 1,000 mcg  1,000 mcg Oral Daily    [START ON 9/29/2022] tobramycin (TOBREX) 0.3 % ophthalmic solution 1 drop  1 drop Both Eyes 6 times per day    pantoprazole (PROTONIX) tablet 40 mg  40 mg Oral BID AC    glucose chewable tablet 16 g  4 tablet Oral PRN    dextrose bolus 10% 125 mL  125 mL IntraVENous PRN    Or    dextrose bolus 10% 250 mL  250 mL IntraVENous PRN    glucagon (rDNA) injection 1 mg  1 mg SubCUTAneous PRN    dextrose 10 % infusion   IntraVENous Continuous PRN    insulin lispro (HUMALOG) injection vial 0-8 Units  0-8 Units SubCUTAneous TID WC    insulin lispro (HUMALOG) injection vial 0-4 Units  0-4 Units SubCUTAneous Nightly       Signed:  Kourtney Landon MD    Part of this note may have been written by using a voice dictation software. The note has been proof read but may still contain some grammatical/other typographical errors.

## 2022-09-04 NOTE — PROGRESS NOTES
END OF SHIFT NOTE:    INTAKE/OUTPUT  09/03 0701 - 09/04 0700  In: 1300 [I.V.:1300]  Out: 650 [Urine:650]  Voiding: Yes  Catheter: No  Drain:              Flatus: Patient does have flatus present. Stool: 3 occurrences. Characteristics:  Stool Appearance: Loose  Stool Color: Brown  Stool Amount: Small  Stool Assessment  Incontinence: Yes  Stool Appearance: Loose  Stool Color: Brown  Stool Amount: Small  Stool Source: Rectum  Last BM (including prior to admit): 09/04/22    Emesis: 0 occurrences. Characteristics:        VITAL SIGNS  Patient Vitals for the past 12 hrs:   Temp Pulse Resp BP SpO2   09/04/22 1528 98.2 °F (36.8 °C) 79 18 121/82 100 %   09/04/22 1132 98.3 °F (36.8 °C) 88 18 138/88 99 %   09/04/22 0903 -- -- -- 102/76 --   09/04/22 0732 98.6 °F (37 °C) 86 18 102/76 100 %       Pain Assessment  @BSHSIFLOW(13)@  Pain Location: Foot  Patient's Stated Pain Goal: 0 - No pain    Ambulating  Yes    Shift report given to oncoming nurse at the bedside.     Brooke Keenan RN

## 2022-09-04 NOTE — PLAN OF CARE
Problem: Discharge Planning  Goal: Discharge to home or other facility with appropriate resources  9/4/2022 1005 by Dereje Meza RN  Outcome: Progressing  9/3/2022 2339 by Samuel Levi RN  Outcome: Progressing     Problem: Pain  Goal: Verbalizes/displays adequate comfort level or baseline comfort level  9/4/2022 1005 by Dereje Meza RN  Outcome: Progressing  9/3/2022 2339 by Samuel Levi RN  Outcome: Progressing     Problem: Skin/Tissue Integrity  Goal: Absence of new skin breakdown  Description: 1. Monitor for areas of redness and/or skin breakdown  2. Assess vascular access sites hourly  3. Every 4-6 hours minimum:  Change oxygen saturation probe site  4. Every 4-6 hours:  If on nasal continuous positive airway pressure, respiratory therapy assess nares and determine need for appliance change or resting period.   9/4/2022 1005 by Dereje Meza RN  Outcome: Progressing  9/3/2022 2339 by Samuel Levi RN  Outcome: Progressing     Problem: Safety - Adult  Goal: Free from fall injury  9/4/2022 1005 by Dereje Meza RN  Outcome: Progressing  Flowsheets (Taken 9/4/2022 0736)  Free From Fall Injury: Instruct family/caregiver on patient safety  9/3/2022 2339 by Samuel Levi RN  Outcome: Progressing     Problem: ABCDS Injury Assessment  Goal: Absence of physical injury  9/4/2022 1005 by Dereje Meza RN  Outcome: Progressing  Flowsheets (Taken 9/4/2022 0736)  Absence of Physical Injury: Implement safety measures based on patient assessment  9/3/2022 2339 by Samuel Levi RN  Outcome: Progressing     Problem: Chronic Conditions and Co-morbidities  Goal: Patient's chronic conditions and co-morbidity symptoms are monitored and maintained or improved  9/4/2022 1005 by Dereje Meza RN  Outcome: Progressing  9/3/2022 2339 by Samuel Levi RN  Outcome: Progressing

## 2022-09-05 LAB
ANION GAP SERPL CALC-SCNC: 6 MMOL/L (ref 4–13)
BACTERIA SPEC CULT: ABNORMAL
BACTERIA SPEC CULT: ABNORMAL
BACTERIA SPEC CULT: NORMAL
BACTERIA SPEC CULT: NORMAL
BASOPHILS # BLD: 0.1 K/UL (ref 0–0.2)
BASOPHILS NFR BLD: 1 % (ref 0–2)
BUN SERPL-MCNC: 17 MG/DL (ref 6–23)
CALCIUM SERPL-MCNC: 7.7 MG/DL (ref 8.3–10.4)
CHLORIDE SERPL-SCNC: 104 MMOL/L (ref 101–110)
CO2 SERPL-SCNC: 19 MMOL/L (ref 21–32)
CORTIS AM PEAK SERPL-MCNC: 10.4 UG/DL (ref 7–25)
CREAT SERPL-MCNC: 1.4 MG/DL (ref 0.8–1.5)
DIFFERENTIAL METHOD BLD: ABNORMAL
EOSINOPHIL # BLD: 0.2 K/UL (ref 0–0.8)
EOSINOPHIL NFR BLD: 2 % (ref 0.5–7.8)
ERYTHROCYTE [DISTWIDTH] IN BLOOD BY AUTOMATED COUNT: 13.2 % (ref 11.9–14.6)
GLUCOSE BLD STRIP.AUTO-MCNC: 182 MG/DL (ref 65–100)
GLUCOSE BLD STRIP.AUTO-MCNC: 184 MG/DL (ref 65–100)
GLUCOSE BLD STRIP.AUTO-MCNC: 193 MG/DL (ref 65–100)
GLUCOSE BLD STRIP.AUTO-MCNC: 202 MG/DL (ref 65–100)
GLUCOSE BLD STRIP.AUTO-MCNC: 229 MG/DL (ref 65–100)
GLUCOSE SERPL-MCNC: 172 MG/DL (ref 65–100)
GRAM STN SPEC: ABNORMAL
GRAM STN SPEC: ABNORMAL
HCT VFR BLD AUTO: 19 % (ref 41.1–50.3)
HCT VFR BLD AUTO: 20.2 % (ref 41.1–50.3)
HCT VFR BLD AUTO: 26 % (ref 41.1–50.3)
HGB BLD-MCNC: 6.6 G/DL (ref 13.6–17.2)
HGB BLD-MCNC: 7 G/DL (ref 13.6–17.2)
HGB BLD-MCNC: 8.8 G/DL (ref 13.6–17.2)
HISTORY CHECK: NORMAL
IMM GRANULOCYTES # BLD AUTO: 0.1 K/UL (ref 0–0.5)
IMM GRANULOCYTES NFR BLD AUTO: 1 % (ref 0–5)
LYMPHOCYTES # BLD: 1.5 K/UL (ref 0.5–4.6)
LYMPHOCYTES NFR BLD: 15 % (ref 13–44)
MCH RBC QN AUTO: 29.5 PG (ref 26.1–32.9)
MCHC RBC AUTO-ENTMCNC: 34.7 G/DL (ref 31.4–35)
MCV RBC AUTO: 84.8 FL (ref 79.6–97.8)
MONOCYTES # BLD: 1.5 K/UL (ref 0.1–1.3)
MONOCYTES NFR BLD: 14 % (ref 4–12)
NEUTS SEG # BLD: 6.8 K/UL (ref 1.7–8.2)
NEUTS SEG NFR BLD: 67 % (ref 43–78)
NRBC # BLD: 0 K/UL (ref 0–0.2)
OSMOLALITY SERPL: 273 MOSM/KG H2O (ref 275–295)
PLATELET # BLD AUTO: 264 K/UL (ref 150–450)
PMV BLD AUTO: 11.8 FL (ref 9.4–12.3)
POTASSIUM SERPL-SCNC: 4.4 MMOL/L (ref 3.5–5.1)
RBC # BLD AUTO: 2.24 M/UL (ref 4.23–5.6)
SERVICE CMNT-IMP: ABNORMAL
SERVICE CMNT-IMP: NORMAL
SERVICE CMNT-IMP: NORMAL
SODIUM SERPL-SCNC: 129 MMOL/L (ref 136–145)
TSH W FREE THYROID IF ABNORMAL: 2.64 UIU/ML (ref 0.36–3.74)
WBC # BLD AUTO: 10.1 K/UL (ref 4.3–11.1)

## 2022-09-05 PROCEDURE — 6370000000 HC RX 637 (ALT 250 FOR IP): Performed by: HOSPITALIST

## 2022-09-05 PROCEDURE — 97530 THERAPEUTIC ACTIVITIES: CPT

## 2022-09-05 PROCEDURE — 86901 BLOOD TYPING SEROLOGIC RH(D): CPT

## 2022-09-05 PROCEDURE — 36415 COLL VENOUS BLD VENIPUNCTURE: CPT

## 2022-09-05 PROCEDURE — 85014 HEMATOCRIT: CPT

## 2022-09-05 PROCEDURE — 30233N1 TRANSFUSION OF NONAUTOLOGOUS RED BLOOD CELLS INTO PERIPHERAL VEIN, PERCUTANEOUS APPROACH: ICD-10-PCS | Performed by: INTERNAL MEDICINE

## 2022-09-05 PROCEDURE — 2580000003 HC RX 258: Performed by: INTERNAL MEDICINE

## 2022-09-05 PROCEDURE — 2580000003 HC RX 258: Performed by: FAMILY MEDICINE

## 2022-09-05 PROCEDURE — A4216 STERILE WATER/SALINE, 10 ML: HCPCS | Performed by: INTERNAL MEDICINE

## 2022-09-05 PROCEDURE — 85025 COMPLETE CBC W/AUTO DIFF WBC: CPT

## 2022-09-05 PROCEDURE — 97535 SELF CARE MNGMENT TRAINING: CPT

## 2022-09-05 PROCEDURE — 82533 TOTAL CORTISOL: CPT

## 2022-09-05 PROCEDURE — 6370000000 HC RX 637 (ALT 250 FOR IP): Performed by: FAMILY MEDICINE

## 2022-09-05 PROCEDURE — 84443 ASSAY THYROID STIM HORMONE: CPT

## 2022-09-05 PROCEDURE — P9016 RBC LEUKOCYTES REDUCED: HCPCS

## 2022-09-05 PROCEDURE — P9040 RBC LEUKOREDUCED IRRADIATED: HCPCS

## 2022-09-05 PROCEDURE — 6360000002 HC RX W HCPCS: Performed by: FAMILY MEDICINE

## 2022-09-05 PROCEDURE — 2500000003 HC RX 250 WO HCPCS: Performed by: FAMILY MEDICINE

## 2022-09-05 PROCEDURE — C9113 INJ PANTOPRAZOLE SODIUM, VIA: HCPCS | Performed by: INTERNAL MEDICINE

## 2022-09-05 PROCEDURE — 36430 TRANSFUSION BLD/BLD COMPNT: CPT

## 2022-09-05 PROCEDURE — 80048 BASIC METABOLIC PNL TOTAL CA: CPT

## 2022-09-05 PROCEDURE — 82962 GLUCOSE BLOOD TEST: CPT

## 2022-09-05 PROCEDURE — 86923 COMPATIBILITY TEST ELECTRIC: CPT

## 2022-09-05 PROCEDURE — 6370000000 HC RX 637 (ALT 250 FOR IP): Performed by: INTERNAL MEDICINE

## 2022-09-05 PROCEDURE — 1100000000 HC RM PRIVATE

## 2022-09-05 PROCEDURE — 6360000002 HC RX W HCPCS: Performed by: INTERNAL MEDICINE

## 2022-09-05 PROCEDURE — 83930 ASSAY OF BLOOD OSMOLALITY: CPT

## 2022-09-05 RX ORDER — SODIUM CHLORIDE 9 MG/ML
INJECTION, SOLUTION INTRAVENOUS PRN
Status: DISCONTINUED | OUTPATIENT
Start: 2022-09-05 | End: 2022-09-07 | Stop reason: HOSPADM

## 2022-09-05 RX ADMIN — OXYCODONE 5 MG: 5 TABLET ORAL at 11:32

## 2022-09-05 RX ADMIN — METRONIDAZOLE 500 MG: 500 INJECTION, SOLUTION INTRAVENOUS at 19:32

## 2022-09-05 RX ADMIN — SODIUM BICARBONATE 650 MG TABLET 1300 MG: at 21:50

## 2022-09-05 RX ADMIN — INSULIN LISPRO 2 UNITS: 100 INJECTION, SOLUTION INTRAVENOUS; SUBCUTANEOUS at 17:47

## 2022-09-05 RX ADMIN — CHOLESTYRAMINE 4 G: 4 POWDER, FOR SUSPENSION ORAL at 21:50

## 2022-09-05 RX ADMIN — AMLODIPINE BESYLATE 10 MG: 10 TABLET ORAL at 09:07

## 2022-09-05 RX ADMIN — OXYCODONE 5 MG: 5 TABLET ORAL at 19:32

## 2022-09-05 RX ADMIN — METRONIDAZOLE 500 MG: 500 INJECTION, SOLUTION INTRAVENOUS at 05:33

## 2022-09-05 RX ADMIN — SODIUM CHLORIDE 40 MG: 9 INJECTION, SOLUTION INTRAMUSCULAR; INTRAVENOUS; SUBCUTANEOUS at 21:50

## 2022-09-05 RX ADMIN — CYANOCOBALAMIN TAB 1000 MCG 1000 MCG: 1000 TAB at 09:07

## 2022-09-05 RX ADMIN — CALCIUM CARBONATE-CHOLECALCIFEROL TAB 250 MG-125 UNIT 1 TABLET: 250-125 TAB at 09:07

## 2022-09-05 RX ADMIN — PANTOPRAZOLE SODIUM 40 MG: 40 TABLET, DELAYED RELEASE ORAL at 05:33

## 2022-09-05 RX ADMIN — CHOLESTYRAMINE 4 G: 4 POWDER, FOR SUSPENSION ORAL at 09:08

## 2022-09-05 RX ADMIN — SODIUM BICARBONATE 650 MG TABLET 1300 MG: at 09:07

## 2022-09-05 RX ADMIN — ASPIRIN 81 MG: 81 TABLET, CHEWABLE ORAL at 09:07

## 2022-09-05 RX ADMIN — INSULIN LISPRO 2 UNITS: 100 INJECTION, SOLUTION INTRAVENOUS; SUBCUTANEOUS at 11:48

## 2022-09-05 RX ADMIN — SODIUM CHLORIDE, PRESERVATIVE FREE 10 ML: 5 INJECTION INTRAVENOUS at 09:08

## 2022-09-05 RX ADMIN — CEFTRIAXONE 2000 MG: 2 INJECTION, POWDER, FOR SOLUTION INTRAMUSCULAR; INTRAVENOUS at 21:23

## 2022-09-05 RX ADMIN — FOLIC ACID 1 MG: 1 TABLET ORAL at 09:07

## 2022-09-05 ASSESSMENT — PAIN SCALES - GENERAL
PAINLEVEL_OUTOF10: 0
PAINLEVEL_OUTOF10: 9
PAINLEVEL_OUTOF10: 6

## 2022-09-05 ASSESSMENT — PAIN DESCRIPTION - ORIENTATION
ORIENTATION: RIGHT;LEFT
ORIENTATION: RIGHT;LEFT

## 2022-09-05 ASSESSMENT — PAIN - FUNCTIONAL ASSESSMENT
PAIN_FUNCTIONAL_ASSESSMENT: ACTIVITIES ARE NOT PREVENTED
PAIN_FUNCTIONAL_ASSESSMENT: PREVENTS OR INTERFERES SOME ACTIVE ACTIVITIES AND ADLS

## 2022-09-05 ASSESSMENT — PAIN DESCRIPTION - PAIN TYPE
TYPE: CHRONIC PAIN
TYPE: CHRONIC PAIN

## 2022-09-05 ASSESSMENT — PAIN DESCRIPTION - DESCRIPTORS
DESCRIPTORS: ACHING;DISCOMFORT;SORE
DESCRIPTORS: ACHING

## 2022-09-05 ASSESSMENT — PAIN DESCRIPTION - LOCATION
LOCATION: FOOT
LOCATION: FOOT

## 2022-09-05 ASSESSMENT — PAIN DESCRIPTION - ONSET: ONSET: ON-GOING

## 2022-09-05 ASSESSMENT — PAIN DESCRIPTION - FREQUENCY: FREQUENCY: CONTINUOUS

## 2022-09-05 NOTE — CARE COORDINATION
LOS 5d    Chart reviewed by [facility]. Patient UA negative, Cdiff negative. Vascular seen for bilateral heel ulcerations, no surgical intervention. Tolerating advanced diet. Home with home health when stable. CM following.

## 2022-09-05 NOTE — CONSULTS
[facility] Consult Note       Primary GI Physician: Dr. Lynn Rich    Referring Provider:  Dr. Norman Mendoza Date:  9/5/2022    Admit Date:  8/31/2022    Chief Complaint:  anemia requesting repeat EGD    Subjective:     History of Present Illness:  Patient is a 65 y.o. male with PMH including but not limited to, DM, neuropathy, HTN, HLD, PUD, GERD who is seen in consultation at the request of Dr. Jett Meeks for anemia requesting repeat EGD. Mr. Sunshine Waite was admitted on 8/31/2022 with acute gastroenteritis and electrolye abnormalities. C. Difficile negative. Positive wound culture with gram - rods, staph aureus for diabetic foot ulcer. He had a negative FOBT on 9/2. Current Hgb 7.0 today (8.3) on 8/31. It was up to 12.4 on 4/10/21. He is on pantoprazole 40mg one po bid. Mr. Sunshine Waite has a long standing history of anemia dating back to at least 2016. He is followed by [facility] and most recently underwent procedures for anemia on 3/2/2021. EGD on that date revealed chronic acute gastritis, duodenopathy and positive H. Pylori. Pt doesn't recall being treated for H. Pylori. He has been on oral OTC iron, but no PPI or H2 blocker. He has been having \"black stools again when I my bowels were running off last week. \" He describes at least 3 BM daily at that time, but none currently. Denies any abdominal pain or GERD. He has been taking NSAIDS, but denies any tobacco or ETOH. He believe Dr. Kunal Garnica discussed capsule endoscopy, but this has not been done. Colonoscopy on 3/2/21 revealed prolapsing, reducible hemorrhoids and hyperplastic polyp. Suboptimal prep. Repeat in 6 mos.      PMH:  Past Medical History:   Diagnosis Date    Anemia     Gastroesophageal reflux disease with esophagitis and hemorrhage     HLD (hyperlipidemia)     Hyperplastic colon polyp     Hypertension     Obesity     PUD (peptic ulcer disease)     Type 2 diabetes mellitus with diabetic polyneuropathy, with long-term current use of insulin (HCC)     Ulcer of the duodenum caused by bacteria (H. pylori)     Upper GI bleed 03/28/2016    Last Assessment & Plan:  Formatting of this note might be different from the original. Status post EGD yesterday which revealed esophagitis, gastric and duodenal ulcers Continue PPI, GI follow-up.  Monitor hemoglobin Avoid and states Biopsy results- pending       PSH:  Past Surgical History:   Procedure Laterality Date    COLONOSCOPY      ESOPHAGOGASTRODUODENOSCOPY         Allergies:  No Known Allergies    Home Medications:  Prior to Admission medications    Not on File       Hospital Medications:  Current Facility-Administered Medications   Medication Dose Route Frequency    pantoprazole (PROTONIX) 40 mg in sodium chloride (PF) 10 mL injection  40 mg IntraVENous Q12H    calcium carb-cholecalciferol 250-125 MG-UNIT per tab 1 tablet  1 tablet Oral Daily    loperamide (IMODIUM) capsule 2 mg  2 mg Oral 4x Daily PRN    cholestyramine light packet 4 g  4 g Oral BID    [Held by provider] aspirin chewable tablet 81 mg  81 mg Oral Daily    sodium bicarbonate tablet 1,300 mg  1,300 mg Oral BID    oxyCODONE (ROXICODONE) immediate release tablet 5 mg  5 mg Oral Q8H PRN    0.9 % sodium chloride bolus  1,000 mL IntraVENous Once    sodium chloride flush 0.9 % injection 5-40 mL  5-40 mL IntraVENous 2 times per day    sodium chloride flush 0.9 % injection 5-40 mL  5-40 mL IntraVENous PRN    0.9 % sodium chloride infusion   IntraVENous PRN    ondansetron (ZOFRAN-ODT) disintegrating tablet 4 mg  4 mg Oral Q8H PRN    Or    ondansetron (ZOFRAN) injection 4 mg  4 mg IntraVENous Q6H PRN    acetaminophen (TYLENOL) tablet 650 mg  650 mg Oral Q6H PRN    Or    acetaminophen (TYLENOL) suppository 650 mg  650 mg Rectal Q6H PRN    polyethylene glycol (GLYCOLAX) packet 17 g  17 g Oral Daily PRN    hyoscyamine (LEVSIN/SL) sublingual tablet 125 mcg  125 mcg SubLINGual Q4H PRN    metronidazole (FLAGYL) breathing with no accessory muscle use. Clear breath sounds anteriorly with no wheezes, rales, or rhonchi. GI:  Abdomen nondistended, soft, and nontender. Normal active bowel sounds. No enlargement of the liver or spleen. No masses palpable. Musculoskeletal:  BILATERAL HEELS WITH DRESSINGS  Neurological:  Gross memory appears intact. Patient is alert and oriented. Psychiatric:  Mood appears appropriate with judgement intact. Lymphatic:  No cervical or supraclavicular adenopathy. Laboratory:    Recent Labs     09/03/22  0743 09/04/22  0811 09/05/22  0744 09/05/22  1103   WBC 10.6 12.8* 10.1  --    HGB 7.1* 7.3* 6.6* 7.0*   HCT 20.1* 20.9* 19.0* 20.2*    299 264  --    MCV 84.5 86.0 84.8  --    * 124* 129*  --    K 4.1 4.7 4.4  --    CL 99* 100* 104  --    CO2 17* 18* 19*  --    BUN 10 18 17  --    CREATININE 1.50 1.50 1.40  --    CALCIUM 6.5* 7.3* 7.7*  --    MG 1.9  --   --   --    GLUCOSE 127* 149* 172*  --    ALKPHOS 95  --   --   --    AST 27  --   --   --    ALT 20  --   --   --    BILITOT 0.6  --   --   --    ALBUMIN 0.4*  --   --   --    PROT 6.9  --   --   --          Assessment:     Principal Problem:    Acute gastroenteritis  Active Problems:    Conjunctivitis    Chronic gastritis    Alcohol dependence (HCC)    Diabetic peripheral neuropathy (HCC)    Essential hypertension    Hyperlipidemia    PUD (peptic ulcer disease)    Type 2 diabetes mellitus with hypoglycemia, with long-term current use of insulin (HCC)    Diabetic ulcer of both feet associated with type 2 diabetes mellitus (HCC)    Hypocalcemia    Hypoalbuminemia    Hyponatremia    Hypokalemia due to excessive gastrointestinal loss of potassium    Acute renal failure superimposed on stage 3a chronic kidney disease (HCC)    PAD (peripheral artery disease) (HCC)  Resolved Problems:    * No resolved hospital problems.  *  65 y.o. male with PMH including but not limited to, DM, neuropathy, HTN, HLD, PUD, GERD who is seen in consultation at the request of Dr. Juan Baker for anemia requesting repeat EGD. Mr. Adolfo Vila has a long standing history of chronic anemia followed by Dr. David Willingham at [address] [place] [address]. He had recent dark stools with Current Hgb 7.0 today (8.3) on 8/31. It was up to 12.4 on 4/10/21. He takes NSAIDS and has had H pylori that is unclear if ever treated. Plan:     -Plan for EGD by Dr. Curtis Pena tomorrow 9/6/2022. Discussed risks including but not limited to bleeding, perforation, acute cardiopulmonary event, sedation risks, IV complications, aspiration, and pt states understanding and agrees to proceed. Will treat H pylori if positive.  -Agree with Pantoprazole 40mg IV bid  -Clear liquid diet nothing red  -NPO after midnight  -Serial H&H and transfuse as needed  -May need capsule endoscopy in the future, but will defer to his Primary GI, Dr. David Willingham at [facility]. Melissa Jean    [facility]       Patient is seen and examined in collaboration with Dr. Janet Aguillon. Assessment and plan as per Dr. Janet Aguillon.

## 2022-09-05 NOTE — PROGRESS NOTES
OCCUPATIONAL THERAPY: Daily Note     OT Visit Days: 2   Time  OT Charge Capture  Rehab Caseload Tracker  OT Orders    Alphia Burkitt is a 65 y.o. male   PRIMARY DIAGNOSIS: Acute gastroenteritis  Hypokalemia [E87.6]  Hypoglycemia [E16.2]  Acute pyelonephritis [N10]  Acute kidney injury ([address] [N17.9]  Acute weakness [R53.1]  Diabetic ulcer of both feet ([address] [W36.428, L97.519, L97.529]  Acute pyelonephritis due to bacteria [N10, B96.89]       Inpatient: Payor: Tonja Cisse / Plan: Tonja Maid / Product Type: *No Product type* /     ASSESSMENT:     REHAB RECOMMENDATIONS: CURRENT LEVEL OF FUNCTION:  (Most Recently Demonstrated)   Recommendation to date pending progress:  Setting:  [facility] 13:    3 in 1 Bedside Commode  Rolling Walker Bathing:  Not Tested  Dressing:  Stand by Assist  Feeding/Grooming:  Not Tested  Toileting:  Not Tested  Functional Mobility:  Not Tested     ASSESSMENT:  Mr. Yesenia Anguiano was received sitting up in chair. Aspen/donning socks close SBA with increased time due to general weakness, bilateral dressing on heels. This is improvement from Mod A for donning socks last week. In PT's note, also describes improvement with mobility. Nursing staff states pt is taking himself to and from [facility]. Pt's BUE strength 4/5 MMT overall today. Continue POC. SUBJECTIVE:     Mr. Yesenia Anguiano states, Alveria Olga those the right size? \"     Social/Functional Lives With: Spouse  Type of Home: House  Home Layout: One level  Home Access: Stairs to enter without rails  Entrance Stairs - Number of Steps: 2  Home Equipment: Jose Francisco Pea Help From: Family  ADL Assistance: Independent  Ambulation Assistance: Independent  Active : No  Patient's  Info: family  Mode of Transportation: Car  Occupation: On disability    OBJECTIVE:     LINES / Melinda Dawley / AIRWAY: IV    RESTRICTIONS/PRECAUTIONS:  Restrictions/Precautions  Restrictions/Precautions: Fall Risk        PAIN: VITALS / O2:   Pre Treatment:   Pain duration of hospital stay or until stated goals are met, whichever comes first.    ACUTE OCCUPATIONAL THERAPY GOALS:   (Developed with and agreed upon by patient and/or caregiver.)  1. Pt will complete LB ADL set up only with AE as needed. 2. Pt will complete toileting supervision only with AE as needed. 3. Pt will complete UB ADL set up only. 4. Pt will tolerate 25 minutes of OT treatment requiring 1-2 breaks as needed. 5. Pt will complete grooming tasks while standing at sink I.  6. Pt will complete functional mobility via cane or RW supervision. 7. Pt will tolerate BUE exercises to increase strength for safe, functional transfers, ADL participation. TREATMENT:     TREATMENT:   Self Care (16 minutes): Patient participated in lower body dressing ADLs in supported sitting with no verbal and tactile cueing to increase independence, decrease assistance required, and increase activity tolerance. Patient also participated in ADL training to increase independence, decrease assistance required, and increase activity tolerance.      TREATMENT GRID:  N/A    AFTER TREATMENT PRECAUTIONS: Bed/Chair Locked, Call light within reach, Chair, Needs within reach, and RN notified    INTERDISCIPLINARY COLLABORATION:  RN/ PCT and OT/ HAYDEN    EDUCATION:  Education Given To: Patient  Education Provided: Role of Therapy;Plan of Care  Education Outcome: Verbalized understanding    TOTAL TREATMENT DURATION AND TIME:  Time In: 1344  Time Out: 1400  Minutes: Shanel Braga OT

## 2022-09-05 NOTE — PROGRESS NOTES
END OF SHIFT NOTE:    INTAKE/OUTPUT  09/04 0701 - 09/05 0700  In: -   Out: 965 [Urine:965]  Voiding: Yes  Catheter: No  Drain:              Flatus: Patient does have flatus present. Stool: 1 occurrences. Characteristics:  Stool Appearance: Soft  Stool Color: Brown  Stool Amount: Large  Stool Assessment  Incontinence: No  Stool Appearance: Soft  Stool Color: Brown  Stool Amount: Large  Stool Source: Rectum  Last BM (including prior to admit): 09/04/22 (Per Patient)    Emesis: 0 occurrences. Characteristics:        VITAL SIGNS  Patient Vitals for the past 12 hrs:   Temp Pulse Resp BP SpO2   09/05/22 1637 98.2 °F (36.8 °C) 85 18 119/89 100 %   09/05/22 1636 98.2 °F (36.8 °C) 85 19 119/89 100 %   09/05/22 1559 97.9 °F (36.6 °C) 86 18 (!) 134/90 100 %   09/05/22 1411 98 °F (36.7 °C) 97 18 129/84 99 %   09/05/22 1356 98.1 °F (36.7 °C) 82 18 (!) 135/94 96 %   09/05/22 1202 -- -- 16 -- --   09/05/22 1139 98.6 °F (37 °C) 93 17 122/86 100 %   09/05/22 0907 -- -- -- 124/82 --   09/05/22 0724 99.7 °F (37.6 °C) 89 17 124/82 97 %       Pain Assessment  @BSHSIFLOW(13)@  Pain Location: Foot  Patient's Stated Pain Goal: 0 - No pain    Ambulating  Yes    Shift report given to oncoming nurse at the bedside.     Param Xiao RN

## 2022-09-05 NOTE — PROGRESS NOTES
To whom it may concern,    Syd Rodriguez was admitted to [facility] on August 31, 2022 and is still admitted as of September 5, 2022. If you have any questions please call [phone].

## 2022-09-05 NOTE — PLAN OF CARE
Problem: Discharge Planning  Goal: Discharge to home or other facility with appropriate resources  Outcome: Progressing     Problem: Pain  Goal: Verbalizes/displays adequate comfort level or baseline comfort level  Outcome: Progressing     Problem: Skin/Tissue Integrity  Goal: Absence of new skin breakdown  Description: 1. Monitor for areas of redness and/or skin breakdown  2. Assess vascular access sites hourly  3. Every 4-6 hours minimum:  Change oxygen saturation probe site  4. Every 4-6 hours:  If on nasal continuous positive airway pressure, respiratory therapy assess nares and determine need for appliance change or resting period.   Outcome: Progressing     Problem: Safety - Adult  Goal: Free from fall injury  Outcome: Progressing  Flowsheets (Taken 9/5/2022 4799)  Free From Fall Injury: Instruct family/caregiver on patient safety     Problem: ABCDS Injury Assessment  Goal: Absence of physical injury  Outcome: Progressing     Problem: Chronic Conditions and Co-morbidities  Goal: Patient's chronic conditions and co-morbidity symptoms are monitored and maintained or improved  Outcome: Progressing

## 2022-09-05 NOTE — PROGRESS NOTES
Consent for EGD signed and placed in pt's chart. Opportunity for questions and clarification provided to pt.

## 2022-09-05 NOTE — PROGRESS NOTES
Hospitalist Progress Note   Admit Date:  2022  4:18 PM   Name:  Layne De La Vega   Age:  65 y.o. Sex:  male  :  1965   MRN:  [id number]   Room:  /    Presenting Complaint: Hypotension     Reason(s) for Admission: Hypokalemia [E87.6]  Hypoglycemia [E16.2]  Acute pyelonephritis [N10]  Acute kidney injury [facility]) [N17.9]  Acute weakness [R53.1]  Diabetic ulcer of both feet ([address] [P11.214, L97.519, L97.529]  Acute pyelonephritis due to bacteria [N10, B96.89]     Hospital Course:     Mr. Ramses Juarez is a 66 yo male with PMH of obesity, DM2, HTN, HLP, upper GI bleed admitted with abdominal pain, diarrhea, increasing debility. He developed bilateral heel wounds with rash up LE. CTAP showed\"       Impression       1. Cholelithiasis without pericholecystic inflammatory changes to suggest acute   cholecystitis or appendicitis. 2.  Bilateral perinephric soft tissue stranding and small perinephric fluid   collections as well as a small amount of fluid in the pelvic cul-de-sac are   nonspecific, but the possibility of pyelonephritis should be considered. \"    UA negative. Cdiff negative. HGB down to 6.6- 7 on recheck. No sven bleeding. Occult negative. He has been seen by wound care and xrays bilateral LE negative. Seen by vascular s/p arterial dopplers showing PAD. Started on asa. No surgical needs. He is on course of rocephin/flagyl./vancomycin. cultures with klebsiella and MSSA. He has developed progressive hyponatremia. He is on fluid restriction. Discharge plans are pending to home with home health. Subjective & 24hr Events (22):        Wife present, ok with clears for now and agrees to PRBC and GI workup, has edema, no dyspnea, no bleeding      Assessment & Plan:     Principal Problem:    Acute gastroenteritis  Plan:   Acute blood loss anemia:  -Trend HGB  -HGB 7.0  -Agrees to 1 unit PRBC  -IV Protonix every 12 hours   -GI consult  -Clear liquids        Active Problems:    Conjunctivitis  Plan:   -S/p topical agent            Alcohol dependence ([facility]  Plan:   -Needs cessation  -Continued folate / b12        Essential hypertension  Plan:   -Continued norvasc           Type 2 diabetes mellitus with hypoglycemia, with long-term current use of insulin ([facility])  Plan:   -Sliding scale insulin         Diabetic ulcer of both feet associated with type 2 diabetes mellitus ([facility]  Plan:   -Followup wound care   -On course of rocephin/ flagyl /vancomycin stopped 9-4         Hyponatremia  Plan:   -  -Continued oral fluid 1.5 L restriction  -normal TSH, cortisol  -Low  urine sodium, urine osmolality   -pending serum osmolality           Acute renal failure superimposed on stage 3a chronic kidney disease ([facility])  Plan:   -Improved  -Trend BMP  -Continued bicarb        PAD (peripheral artery disease) ([facility])  Plan:   -Followup vascular  -Asa 81 mg daily on hold with anemia      Anticipated discharge needs:      Pending to home    Diet:  [address]; Breakfast, Dinner; Wound Healing Oral Supplement  ADULT DIET; Clear Liquid; No red dye  Diet NPO Exceptions are: Sips of Water with Meds  DVT PPx: held - anemia   Code status: Full Code    Hospital Problems:  Principal Problem:    Acute gastroenteritis  Active Problems:    Conjunctivitis    Chronic gastritis    Alcohol dependence ([facility]    Diabetic peripheral neuropathy ([facility]    Essential hypertension    Hyperlipidemia    PUD (peptic ulcer disease)    Type 2 diabetes mellitus with hypoglycemia, with long-term current use of insulin ([facility])    Diabetic ulcer of both feet associated with type 2 diabetes mellitus ([facility])    Hypocalcemia    Hypoalbuminemia    Hyponatremia    Hypokalemia due to excessive gastrointestinal loss of potassium    Acute renal failure superimposed on stage 3a chronic kidney disease ([facility])    PAD (peripheral artery disease) ([facility]  Resolved Problems:    * No resolved hospital problems. *      Objective:   Patient Vitals for the past 24 hrs:   Temp Pulse Resp BP SpO2   09/05/22 1411 98 °F (36.7 °C) 97 18 129/84 99 %   09/05/22 1356 98.1 °F (36.7 °C) 82 18 (!) 135/94 96 %   09/05/22 1202 -- -- 16 -- --   09/05/22 1139 98.6 °F (37 °C) 93 17 122/86 100 %   09/05/22 0907 -- -- -- 124/82 --   09/05/22 0724 99.7 °F (37.6 °C) 89 17 124/82 97 %   09/05/22 0401 99.1 °F (37.3 °C) 92 16 139/84 98 %   09/05/22 0054 -- 86 -- -- --   09/05/22 0017 99 °F (37.2 °C) 87 16 128/86 98 %   09/04/22 1958 -- 84 -- -- 100 %   09/04/22 1937 98.1 °F (36.7 °C) 84 20 (!) 142/85 100 %   09/04/22 1528 98.2 °F (36.8 °C) 79 18 121/82 100 %       Oxygen Therapy  SpO2: 99 %  Pulse Oximeter Device Mode: Intermittent  O2 Device: None (Room air)    Estimated body mass index is 27.89 kg/m² as calculated from the following:    Height as of this encounter: 5' 11\" (1.803 m). Weight as of this encounter: 200 lb (90.7 kg). Intake/Output Summary (Last 24 hours) at 9/5/2022 1439  Last data filed at 9/5/2022 0558  Gross per 24 hour   Intake --   Output 540 ml   Net -540 ml         Physical Exam:     Blood pressure 129/84, pulse 97, temperature 98 °F (36.7 °C), temperature source Oral, resp. rate 18, height 5' 11\" (1.803 m), weight 200 lb (90.7 kg), SpO2 99 %. General:    Well nourished. Alert, no distress , pleasant   CV:   RRR. No m/r/g. No jugular venous distension. 1+ edema   Lungs:   CTAB. No wheezing, rhonchi, or rales. Symmetric expansion. anterior   Abdomen: Bowel sounds present. Soft, nontender, nondistended. Extremities: No cyanosis or clubbing. No edema  Skin:     No rashes and normal coloration. Warm and dry. Neuro:  grossly intact. Psych:  Normal mood and affect.       I have personally reviewed labs and tests showing:  Recent Labs:  Recent Results (from the past 48 hour(s))   POCT Glucose    Collection Time: 09/03/22  4:31 PM   Result Value Ref Range    POC Glucose 273 (H) 65 - 100 mg/dL    Performed by: Micah    POCT Glucose    Collection Time: 09/03/22  9:08 PM   Result Value Ref Range    POC Glucose 220 (H) 65 - 100 mg/dL    Performed by: Ara    POCT Glucose    Collection Time: 09/04/22  7:34 AM   Result Value Ref Range    POC Glucose 159 (H) 65 - 100 mg/dL    Performed by: Mauricio Becker    Vancomycin Level, Random    Collection Time: 09/04/22  8:11 AM   Result Value Ref Range    Vancomycin Rm 18.2 UG/ML   Basic Metabolic Panel    Collection Time: 09/04/22  8:11 AM   Result Value Ref Range    Sodium 124 (L) 136 - 145 mmol/L    Potassium 4.7 3.5 - 5.1 mmol/L    Chloride 100 (L) 101 - 110 mmol/L    CO2 18 (L) 21 - 32 mmol/L    Anion Gap 6 4 - 13 mmol/L    Glucose 149 (H) 65 - 100 mg/dL    BUN 18 6 - 23 MG/DL    Creatinine 1.50 0.8 - 1.5 MG/DL    GFR African American >60 >60 ml/min/1.73m2    GFR Non- 51 (L) >60 ml/min/1.73m2    Calcium 7.3 (L) 8.3 - 10.4 MG/DL   CBC with Auto Differential    Collection Time: 09/04/22  8:11 AM   Result Value Ref Range    WBC 12.8 (H) 4.3 - 11.1 K/uL    RBC 2.43 (L) 4.23 - 5.6 M/uL    Hemoglobin 7.3 (L) 13.6 - 17.2 g/dL    Hematocrit 20.9 (L) 41.1 - 50.3 %    MCV 86.0 79.6 - 97.8 FL    MCH 30.0 26.1 - 32.9 PG    MCHC 34.9 31.4 - 35.0 g/dL    RDW 13.3 11.9 - 14.6 %    Platelets 692 249 - 061 K/uL    MPV 11.4 9.4 - 12.3 FL    nRBC 0.00 0.0 - 0.2 K/uL    Differential Type AUTOMATED      Seg Neutrophils 67 43 - 78 %    Lymphocytes 17 13 - 44 %    Monocytes 12 4.0 - 12.0 %    Eosinophils % 2 0.5 - 7.8 %    Basophils 1 0.0 - 2.0 %    Immature Granulocytes 1 0.0 - 5.0 %    Segs Absolute 8.6 (H) 1.7 - 8.2 K/UL    Absolute Lymph # 2.2 0.5 - 4.6 K/UL    Absolute Mono # 1.5 (H) 0.1 - 1.3 K/UL    Absolute Eos # 0.2 0.0 - 0.8 K/UL    Basophils Absolute 0.1 0.0 - 0.2 K/UL    Absolute Immature Granulocyte 0.1 0.0 - 0.5 K/UL   POCT Glucose    Collection Time: 09/04/22 12:15 PM   Result Value Ref Range    POC Glucose 191 (H) 65 - 100 mg/dL    Performed by: 7.8 %    Basophils 1 0.0 - 2.0 %    Immature Granulocytes 1 0.0 - 5.0 %    Segs Absolute 6.8 1.7 - 8.2 K/UL    Absolute Lymph # 1.5 0.5 - 4.6 K/UL    Absolute Mono # 1.5 (H) 0.1 - 1.3 K/UL    Absolute Eos # 0.2 0.0 - 0.8 K/UL    Basophils Absolute 0.1 0.0 - 0.2 K/UL    Absolute Immature Granulocyte 0.1 0.0 - 0.5 K/UL   TSH with Reflex    Collection Time: 09/05/22  7:44 AM   Result Value Ref Range    TSH w Free Thyroid if Abnormal 2.64 0.358 - 3.740 UIU/ML   Cortisol AM, Total    Collection Time: 09/05/22  7:44 AM   Result Value Ref Range    Cortisol - AM 10.4 7 - 25 ug/dL   TYPE AND SCREEN    Collection Time: 09/05/22 11:01 AM   Result Value Ref Range    Crossmatch expiration date 09/08/2022,2359     ABO/Rh O POSITIVE     Antibody Screen NEG     Unit Number [id number]     Product Code Blood Bank [facility]     Unit Divison 00     Dispense Status Blood Bank ISSUED     Crossmatch Result Compatible    Hemoglobin and Hematocrit    Collection Time: 09/05/22 11:03 AM   Result Value Ref Range    Hemoglobin 7.0 (L) 13.6 - 17.2 g/dL    Hematocrit 20.2 (L) 41.1 - 50.3 %   POCT Glucose    Collection Time: 09/05/22 11:40 AM   Result Value Ref Range    POC Glucose 229 (H) 65 - 100 mg/dL    Performed by: Aurelia Medina    PREPARE RBC (CROSSMATCH), 1 Units    Collection Time: 09/05/22 12:45 PM   Result Value Ref Range    History Check Historical check performed        I have personally reviewed imaging studies showing: Other Studies:  Vascular duplex lower extremity arteries bilateral with ALECIA   Final Result   1. Noncompressible pressures at the level the ankle consistent with small vessel   calcinosis. 2. Right-sided mild-to-moderate superficial femoral artery and popliteal artery   stenoses   3. Very sluggish flow noted in the left peroneal artery and right posterior   tibial artery. Vascular duplex lower extremity venous left   Final Result   No evidence of left leg DVT.       XR CALCANEUS LEFT (MIN 2 VIEWS)   Final Result   No acute process. XR CALCANEUS RIGHT (MIN 2 VIEWS)   Final Result   No acute process. CT ABDOMEN PELVIS W IV CONTRAST Additional Contrast? None   Final Result      1. Cholelithiasis without pericholecystic inflammatory changes to suggest acute   cholecystitis or appendicitis. 2.  Bilateral perinephric soft tissue stranding and small perinephric fluid   collections as well as a small amount of fluid in the pelvic cul-de-sac are   nonspecific, but the possibility of pyelonephritis should be considered.           Current Meds:  Current Facility-Administered Medications   Medication Dose Route Frequency    pantoprazole (PROTONIX) 40 mg in sodium chloride (PF) 10 mL injection  40 mg IntraVENous Q12H    0.9 % sodium chloride infusion   IntraVENous PRN    calcium carb-cholecalciferol 250-125 MG-UNIT per tab 1 tablet  1 tablet Oral Daily    loperamide (IMODIUM) capsule 2 mg  2 mg Oral 4x Daily PRN    cholestyramine light packet 4 g  4 g Oral BID    [Held by provider] aspirin chewable tablet 81 mg  81 mg Oral Daily    sodium bicarbonate tablet 1,300 mg  1,300 mg Oral BID    oxyCODONE (ROXICODONE) immediate release tablet 5 mg  5 mg Oral Q8H PRN    0.9 % sodium chloride bolus  1,000 mL IntraVENous Once    sodium chloride flush 0.9 % injection 5-40 mL  5-40 mL IntraVENous 2 times per day    sodium chloride flush 0.9 % injection 5-40 mL  5-40 mL IntraVENous PRN    0.9 % sodium chloride infusion   IntraVENous PRN    ondansetron (ZOFRAN-ODT) disintegrating tablet 4 mg  4 mg Oral Q8H PRN    Or    ondansetron (ZOFRAN) injection 4 mg  4 mg IntraVENous Q6H PRN    acetaminophen (TYLENOL) tablet 650 mg  650 mg Oral Q6H PRN    Or    acetaminophen (TYLENOL) suppository 650 mg  650 mg Rectal Q6H PRN    polyethylene glycol (GLYCOLAX) packet 17 g  17 g Oral Daily PRN    hyoscyamine (LEVSIN/SL) sublingual tablet 125 mcg  125 mcg SubLINGual Q4H PRN    metronidazole (FLAGYL) 500 mg in 0.9% NaCl 100 mL IVPB premix  500 mg IntraVENous Q8H    cefTRIAXone (ROCEPHIN) 2,000 mg in sodium chloride 0.9 % 50 mL IVPB mini-bag  2,000 mg IntraVENous Q24H    zinc oxide 40 % paste   Topical 4x Daily PRN    amLODIPine (NORVASC) tablet 10 mg  10 mg Oral Daily    folic acid (FOLVITE) tablet 1 mg  1 mg Oral Daily    vitamin B-12 (CYANOCOBALAMIN) tablet 1,000 mcg  1,000 mcg Oral Daily    [START ON 9/29/2022] tobramycin (TOBREX) 0.3 % ophthalmic solution 1 drop  1 drop Both Eyes 6 times per day    glucose chewable tablet 16 g  4 tablet Oral PRN    dextrose bolus 10% 125 mL  125 mL IntraVENous PRN    Or    dextrose bolus 10% 250 mL  250 mL IntraVENous PRN    glucagon (rDNA) injection 1 mg  1 mg SubCUTAneous PRN    dextrose 10 % infusion   IntraVENous Continuous PRN    insulin lispro (HUMALOG) injection vial 0-8 Units  0-8 Units SubCUTAneous TID WC    insulin lispro (HUMALOG) injection vial 0-4 Units  0-4 Units SubCUTAneous Nightly       Signed:  Oilver Irvin MD    Part of this note may have been written by using a voice dictation software. The note has been proof read but may still contain some grammatical/other typographical errors.

## 2022-09-05 NOTE — PROGRESS NOTES
END OF SHIFT NOTE:    INTAKE/OUTPUT  09/04 0701 - 09/05 0700  In: -   Out: 965 [Urine:965]  Voiding: Yes  Catheter: No          Flatus: Patient does have flatus present. Stool:  2 occurrences. Emesis: 0 occurrences. VITAL SIGNS  Patient Vitals for the past 12 hrs:   Temp Pulse Resp BP SpO2   09/05/22 0401 99.1 °F (37.3 °C) 92 16 139/84 98 %   09/05/22 0054 -- 86 -- -- --   09/05/22 0017 99 °F (37.2 °C) 87 16 128/86 98 %   09/04/22 1958 -- 84 -- -- 100 %   09/04/22 1937 98.1 °F (36.7 °C) 84 20 (!) 142/85 100 %         Ambulating  Yes    Shift report given to oncoming nurse at the bedside.     Teodora Jean-Baptiste RN

## 2022-09-05 NOTE — PROGRESS NOTES
Medical Student Progress Note   Admit Date:  2022  4:18 PM   Name:  Jeannie Lundberg   Age:  65 y.o. Sex:  male  :  1965   MRN:  [id number]     Presenting Complaint: Hypotension    Reason(s) for Admission: Hypokalemia [E87.6]  Hypoglycemia [E16.2]  Acute pyelonephritis [N10]  Acute kidney injury [facility]) [N17.9]  Acute weakness [R53.1]  Diabetic ulcer of both feet ([address] [W86.525, L97.519, L97.529]  Acute pyelonephritis due to bacteria [N10, B96.89]       Hospital Course & Interval History:   Mr. Veronica Flowers is a 64 y/o male with history of T2DM on insulin, neuropathy, HTN, HLD, PUD, chronic gastritis, GERD, Upper GIB who presented with cramping abd pain and diarrhea x 2 weeks associated with fatigue and generalized weakness. Reported ~5-10 episodes loose stool daily that he described as black. Had been taking pepto bismol. Denies recent abx use. Per wife, the pt had been essentially bedbound for 2 weeks. He then developed bilateral heel wounds, left worse than right and left leg with itchy pustules and discoloration up entire leg. He was seen at [facility] on . Subjective (22): No overnight events. Pt reports he is feeling much better this am. He denies any chest pain, SOB, diarrhea, abd pain. Says the pain involving his feet is also better.    Assessment & Plan:       Pt was seen and examined at the bedside with my attending,  Dr. Kourtney Landon MD.    Acute gastroenteritis  -Resolved  -Trend HGB  -Needs GI followup  -On PO protonix           Conjunctivitis  -Resolved s/p topical agent           Alcohol dependence ([address]  -Needs cessation  -Continued folate           Essential hypertension  -Continue norvasc           Type 2 diabetes mellitus with hypoglycemia, with long-term current use of insulin (HCC)  -Sliding scale insulin           Diabetic ulcer of both feet associated with type 2 diabetes mellitus ([address]  -Followup wound care   -On course of rocephin/ flagyl           Hyponatremia  -NA 124  -Continued oral fluid 1.5 L restriction  -TSH, cortisol, urine sodium, serum and urine osmolality pending          Acute renal failure superimposed on stage 3a chronic kidney disease (HCC)  -Improved  -Trend BMPs  -Continued bicarb          PAD (peripheral artery disease) ([facility])  -Followup vascular  -Continue Asa 81 mg daily    Diet:  ADULT ORAL NUTRITION SUPPLEMENT; Breakfast, Dinner; Wound Healing Oral Supplement  ADULT DIET;  Regular; 4 carb choices (60 gm/meal); GI [place] (GERD/Peptic Ulcer); 1500 ml; Lactose-Controlled, No red dye  DVT PPx: held 2/2 anemia  Code status: Full code    Active Hospital Problems    Diagnosis Date Noted    PAD (peripheral artery disease) ([facility] 09/02/2022     Priority: Medium    Acute gastroenteritis 08/31/2022     Priority: Medium    Type 2 diabetes mellitus with hypoglycemia, with long-term current use of insulin ([address] 08/31/2022     Priority: Medium    Diabetic ulcer of both feet associated with type 2 diabetes mellitus ([address] 08/31/2022     Priority: Medium    Hypocalcemia 08/31/2022     Priority: Medium    Hypoalbuminemia 08/31/2022     Priority: Medium    Hyponatremia 08/31/2022     Priority: Medium    Hypokalemia due to excessive gastrointestinal loss of potassium 08/31/2022     Priority: Medium    Acute renal failure superimposed on stage 3a chronic kidney disease ([address] 08/31/2022     Priority: Medium    Conjunctivitis 05/13/2022     Priority: Medium    PUD (peptic ulcer disease) 05/10/2021     Priority: Medium     Formatting of this note might be different from the original.  Added automatically from request for surgery [id number]      Chronic gastritis 03/12/2021     Priority: Medium    Alcohol dependence ([address] 10/16/2017     Priority: Medium    Diabetic peripheral neuropathy ([address] 10/16/2017     Priority: Medium    Hyperlipidemia 10/16/2017     Priority: Medium    Essential hypertension 03/28/2016     Priority: Medium     Last Assessment & Plan:   Formatting of this note PM   Result Value Ref Range    POC Glucose 191 (H) 65 - 100 mg/dL    Performed by: Tacho Grant    Sodium, urine, random    Collection Time: 09/04/22  1:17 PM   Result Value Ref Range    SODIUM, RANDOM URINE 15 MMOL/L   Osmolality, Urine    Collection Time: 09/04/22  1:17 PM   Result Value Ref Range    Osmolality, Ur 162 50 - 1400 MOSM/kg H2O   POCT Glucose    Collection Time: 09/04/22  4:35 PM   Result Value Ref Range    POC Glucose 214 (H) 65 - 100 mg/dL    Performed by: Tacho Grant    POCT Glucose    Collection Time: 09/04/22  9:02 PM   Result Value Ref Range    POC Glucose 195 (H) 65 - 100 mg/dL    Performed by: Dougie Terrell)    POCT Glucose    Collection Time: 09/05/22  6:05 AM   Result Value Ref Range    POC Glucose 193 (H) 65 - 100 mg/dL    Performed by: Dougie Terrell)    POCT Glucose    Collection Time: 09/05/22  7:25 AM   Result Value Ref Range    POC Glucose 182 (H) 65 - 100 mg/dL    Performed by: Tacho Grant        [unfilled]    Other Studies:  [unfilled]    Current Meds:  Current Facility-Administered Medications   Medication Dose Route Frequency    calcium carb-cholecalciferol 250-125 MG-UNIT per tab 1 tablet  1 tablet Oral Daily    loperamide (IMODIUM) capsule 2 mg  2 mg Oral 4x Daily PRN    cholestyramine light packet 4 g  4 g Oral BID    aspirin chewable tablet 81 mg  81 mg Oral Daily    sodium bicarbonate tablet 1,300 mg  1,300 mg Oral BID    oxyCODONE (ROXICODONE) immediate release tablet 5 mg  5 mg Oral Q8H PRN    0.9 % sodium chloride bolus  1,000 mL IntraVENous Once    sodium chloride flush 0.9 % injection 5-40 mL  5-40 mL IntraVENous 2 times per day    sodium chloride flush 0.9 % injection 5-40 mL  5-40 mL IntraVENous PRN    0.9 % sodium chloride infusion   IntraVENous PRN    ondansetron (ZOFRAN-ODT) disintegrating tablet 4 mg  4 mg Oral Q8H PRN    Or    ondansetron (ZOFRAN) injection 4 mg  4 mg IntraVENous Q6H PRN    acetaminophen (TYLENOL) tablet 650 mg  650 mg Oral Q6H PRN    Or    acetaminophen (TYLENOL) suppository 650 mg  650 mg Rectal Q6H PRN    polyethylene glycol (GLYCOLAX) packet 17 g  17 g Oral Daily PRN    hyoscyamine (LEVSIN/SL) sublingual tablet 125 mcg  125 mcg SubLINGual Q4H PRN    metronidazole (FLAGYL) 500 mg in 0.9% NaCl 100 mL IVPB premix  500 mg IntraVENous Q8H    cefTRIAXone (ROCEPHIN) 2,000 mg in sodium chloride 0.9 % 50 mL IVPB mini-bag  2,000 mg IntraVENous Q24H    zinc oxide 40 % paste   Topical 4x Daily PRN    amLODIPine (NORVASC) tablet 10 mg  10 mg Oral Daily    folic acid (FOLVITE) tablet 1 mg  1 mg Oral Daily    vitamin B-12 (CYANOCOBALAMIN) tablet 1,000 mcg  1,000 mcg Oral Daily    [START ON 9/29/2022] tobramycin (TOBREX) 0.3 % ophthalmic solution 1 drop  1 drop Both Eyes 6 times per day    pantoprazole (PROTONIX) tablet 40 mg  40 mg Oral BID AC    glucose chewable tablet 16 g  4 tablet Oral PRN    dextrose bolus 10% 125 mL  125 mL IntraVENous PRN    Or    dextrose bolus 10% 250 mL  250 mL IntraVENous PRN    glucagon (rDNA) injection 1 mg  1 mg SubCUTAneous PRN    dextrose 10 % infusion   IntraVENous Continuous PRN    insulin lispro (HUMALOG) injection vial 0-8 Units  0-8 Units SubCUTAneous TID WC    insulin lispro (HUMALOG) injection vial 0-4 Units  0-4 Units SubCUTAneous Nightly       Signed:  Wes Garcia OMS-III    Part of this note may have been written by using a voice dictation software. The note has been proof read but may still contain some grammatical/other typographical errors.

## 2022-09-05 NOTE — PROGRESS NOTES
PHYSICAL THERAPY: Daily Note PM   (Link to Caseload Tracking: PT Visit Days : 3  Time In/Out PT Charge Capture  Rehab Caseload Tracker  Orders    Xochilt Kitchen is a 65 y.o. male   PRIMARY DIAGNOSIS: Acute gastroenteritis  Hypokalemia [E87.6]  Hypoglycemia [E16.2]  Acute pyelonephritis [N10]  Acute kidney injury ([facility] [N17.9]  Acute weakness [R53.1]  Diabetic ulcer of both feet ([facility] [W21.339, L97.519, L97.529]  Acute pyelonephritis due to bacteria [N10, B96.89]       Inpatient: Payor: Simeon Severance / Plan: Taurus Severance / Product Type: *No Product type* /     ASSESSMENT:     REHAB RECOMMENDATIONS:   Recommendation to date pending progress:  Setting:  Home Health Therapy    Equipment:    Rolling Walker     ASSESSMENT:  Mr. Yaya Baxter is making good progress toward goals, required less assistance with transfers and gait today. Elliott López He performed transfers and gait with supervision to SBA throughout. Ambulation is accelerated at times, with cueing for pacing. He also ambulates with a .  B foot wounds are wrapped and patient wore anti-slip socks on top.       SUBJECTIVE:   Mr. Yaya Baxter states, \"I hope I'm going home today\"     Social/Functional Lives With: Spouse  Type of Home: House  Home Layout: One level  Home Access: Stairs to enter without rails  Entrance Stairs - Number of Steps: 2  Home Equipment: [employer] Help From: Family  ADL Assistance: Independent  Ambulation Assistance: Independent  Active : No  Patient's  Info: family  Mode of Transportation: Car  Occupation: On disability  OBJECTIVE:     PAIN: VITALS / O2: Candida Lipps / [facility] / DRAINS:   Pre Treatment:   Pain Assessment: None - Denies Pain      Post Treatment: 0 Vitals        Oxygen    IV    RESTRICTIONS/PRECAUTIONS:  Restrictions/Precautions  Restrictions/Precautions: Fall Risk  Restrictions/Precautions: Fall Risk     MOBILITY: I Mod I S SBA CGA Min Mod Max Total  NT x2 Comments:   Bed Mobility    Rolling [] [] [] [] [] [] [] [] [] [x] [] Supine to Sit [] [] [] [] [] [] [] [] [] [x] []    Scooting [] [] [] [] [] [] [] [] [] [x] []    Sit to Supine [] [] [] [] [] [] [] [] [] [x] []    Transfers    Sit to Stand [] [] [x] [] [] [] [] [] [] [] []    Bed to Chair [] [] [x] [] [] [] [] [] [] [] []    Stand to Sit [] [] [x] [] [] [] [] [] [] [] []     [] [] [] [] [] [] [] [] [] [] []    I=Independent, Mod I=Modified Independent, S=Supervision, SBA=Standby Assistance, CGA=Contact Guard Assistance,   Min=Minimal Assistance, Mod=Moderate Assistance, Max=Maximal Assistance, Total=Total Assistance, NT=Not Tested    BALANCE: Good Fair+ Fair Fair- Poor NT Comments   Sitting Static [x] [] [] [] [] []    Sitting Dynamic [x] [] [] [] [] []              Standing Static [] [] [x] [] [] []    Standing Dynamic [] [] [x] [] [] []      GAIT: I Mod I S SBA CGA Min Mod Max Total  NT x2 Comments:   Level of Assistance [] [] [] [x] [] [] [] [] [] [] []    Distance 500 feet    DME Rolling Walker    Gait Quality Trunk flexion and Wide base of support    Weightbearing Status      Stairs      I=Independent, Mod I=Modified Independent, S=Supervision, SBA=Standby Assistance, CGA=Contact Guard Assistance,   Min=Minimal Assistance, Mod=Moderate Assistance, Max=Maximal Assistance, Total=Total Assistance, NT=Not Tested    PLAN:   ACUTE PHYSICAL THERAPY GOALS:   (Developed with and agreed upon by patient and/or caregiver. )  LTG:  (1.)Mr. Luiz Thornton will move from supine to sit and sit to supine  in bed with INDEPENDENCE within 7 treatment day(s). (2.)Mr. Luiz Thornton will transfer from bed to chair and chair to bed with MODIFIED INDEPENDENCE using the least restrictive device within 7 treatment day(s). (3.)Mr. Luiz Thornton will ambulate with MODIFIED INDEPENDENCE for 400 feet with the least restrictive device within 7 treatment day(s). (4.)Mr. Luiz Thornton will perform standing static and dynamic balance activities x 23 minutes with SUPERVISION to improve safety within 7 treatment day(s). FREQUENCY AND DURATION: 3 times/week for duration of hospital stay or until stated goals are met, whichever comes first.    TREATMENT:   TREATMENT:   Therapeutic Activity (25 Minutes): Therapeutic activity included Scooting, Transfer Training, Ambulation on level ground, Sitting balance , and Standing balance to improve functional Activity tolerance, Balance, and Mobility.     TREATMENT GRID:  N/A   DATE: 9/5/22       Ambulation        Hip Flexion 2x10 AB       Long Arc Quads 2x10 AB       Hip ab/ad        Heel Raises X20 AB       Toe Raises X20 AB                                Key:  A=active, AA=active assisted, P=passive, B=bilaterally, R=right, L=left   DF=dorsiflexion, PF=plantarflexion      AFTER TREATMENT PRECAUTIONS: Call light within reach, Chair, Heels floated, Needs within reach, and RN notified    INTERDISCIPLINARY COLLABORATION:  RN/ PCT and PT/ PTA    EDUCATION:      TIME IN/OUT:  Time In: 1138  Time Out: [address]  Minutes: [facility] [place]

## 2022-09-05 NOTE — PROGRESS NOTES
I have discussed with the patient the rationale for blood component transfusion; its benefits in treating or preventing fatigue, organ damage, or death; and its risk which includes mild transfusion reactions, rare risk of blood borne infection, or more serious but rare reactions. I have discussed the alternatives to transfusion, including the risk and consequences of not receiving transfusion. The patient had an opportunity to ask questions and had agreed to proceed with transfusion of blood components.      Nathan Chaney MD

## 2022-09-06 ENCOUNTER — ANESTHESIA EVENT (OUTPATIENT)
Dept: ENDOSCOPY | Age: 57
DRG: 871 | End: 2022-09-06
Payer: MEDICARE

## 2022-09-06 ENCOUNTER — ANESTHESIA (OUTPATIENT)
Dept: ENDOSCOPY | Age: 57
DRG: 871 | End: 2022-09-06
Payer: MEDICARE

## 2022-09-06 LAB
ABO + RH BLD: NORMAL
ANION GAP SERPL CALC-SCNC: 4 MMOL/L (ref 4–13)
BASOPHILS # BLD: 0.1 K/UL (ref 0–0.2)
BASOPHILS NFR BLD: 1 % (ref 0–2)
BLD PROD TYP BPU: NORMAL
BLOOD BANK DISPENSE STATUS: NORMAL
BLOOD GROUP ANTIBODIES SERPL: NORMAL
BPU ID: NORMAL
BUN SERPL-MCNC: 16 MG/DL (ref 6–23)
CALCIUM SERPL-MCNC: 8.3 MG/DL (ref 8.3–10.4)
CHLORIDE SERPL-SCNC: 106 MMOL/L (ref 101–110)
CO2 SERPL-SCNC: 19 MMOL/L (ref 21–32)
CREAT SERPL-MCNC: 1.2 MG/DL (ref 0.8–1.5)
CROSSMATCH RESULT: NORMAL
DIFFERENTIAL METHOD BLD: ABNORMAL
EOSINOPHIL # BLD: 0.3 K/UL (ref 0–0.8)
EOSINOPHIL NFR BLD: 3 % (ref 0.5–7.8)
ERYTHROCYTE [DISTWIDTH] IN BLOOD BY AUTOMATED COUNT: 13.3 % (ref 11.9–14.6)
GLUCOSE BLD STRIP.AUTO-MCNC: 128 MG/DL (ref 65–100)
GLUCOSE BLD STRIP.AUTO-MCNC: 130 MG/DL (ref 65–100)
GLUCOSE BLD STRIP.AUTO-MCNC: 220 MG/DL (ref 65–100)
GLUCOSE SERPL-MCNC: 137 MG/DL (ref 65–100)
HCT VFR BLD AUTO: 23.5 % (ref 41.1–50.3)
HGB BLD-MCNC: 8.1 G/DL (ref 13.6–17.2)
IMM GRANULOCYTES # BLD AUTO: 0.1 K/UL (ref 0–0.5)
IMM GRANULOCYTES NFR BLD AUTO: 1 % (ref 0–5)
LYMPHOCYTES # BLD: 1.4 K/UL (ref 0.5–4.6)
LYMPHOCYTES NFR BLD: 14 % (ref 13–44)
MCH RBC QN AUTO: 29.6 PG (ref 26.1–32.9)
MCHC RBC AUTO-ENTMCNC: 34.5 G/DL (ref 31.4–35)
MCV RBC AUTO: 85.8 FL (ref 79.6–97.8)
MONOCYTES # BLD: 1.5 K/UL (ref 0.1–1.3)
MONOCYTES NFR BLD: 15 % (ref 4–12)
NEUTS SEG # BLD: 6.5 K/UL (ref 1.7–8.2)
NEUTS SEG NFR BLD: 66 % (ref 43–78)
NRBC # BLD: 0 K/UL (ref 0–0.2)
PLATELET # BLD AUTO: 275 K/UL (ref 150–450)
PMV BLD AUTO: 11.4 FL (ref 9.4–12.3)
POTASSIUM SERPL-SCNC: 4.1 MMOL/L (ref 3.5–5.1)
RBC # BLD AUTO: 2.74 M/UL (ref 4.23–5.6)
SERVICE CMNT-IMP: ABNORMAL
SODIUM SERPL-SCNC: 129 MMOL/L (ref 136–145)
SPECIMEN EXP DATE BLD: NORMAL
UNIT DIVISION: 0
WBC # BLD AUTO: 9.9 K/UL (ref 4.3–11.1)

## 2022-09-06 PROCEDURE — 7100000011 HC PHASE II RECOVERY - ADDTL 15 MIN: Performed by: INTERNAL MEDICINE

## 2022-09-06 PROCEDURE — 6370000000 HC RX 637 (ALT 250 FOR IP): Performed by: INTERNAL MEDICINE

## 2022-09-06 PROCEDURE — 2709999900 HC NON-CHARGEABLE SUPPLY: Performed by: INTERNAL MEDICINE

## 2022-09-06 PROCEDURE — 2580000003 HC RX 258

## 2022-09-06 PROCEDURE — 2500000003 HC RX 250 WO HCPCS

## 2022-09-06 PROCEDURE — 3700000001 HC ADD 15 MINUTES (ANESTHESIA): Performed by: INTERNAL MEDICINE

## 2022-09-06 PROCEDURE — 6370000000 HC RX 637 (ALT 250 FOR IP): Performed by: FAMILY MEDICINE

## 2022-09-06 PROCEDURE — 2580000003 HC RX 258: Performed by: FAMILY MEDICINE

## 2022-09-06 PROCEDURE — 2580000003 HC RX 258: Performed by: INTERNAL MEDICINE

## 2022-09-06 PROCEDURE — 0DJ08ZZ INSPECTION OF UPPER INTESTINAL TRACT, VIA NATURAL OR ARTIFICIAL OPENING ENDOSCOPIC: ICD-10-PCS | Performed by: INTERNAL MEDICINE

## 2022-09-06 PROCEDURE — 6360000002 HC RX W HCPCS

## 2022-09-06 PROCEDURE — 3609017100 HC EGD: Performed by: INTERNAL MEDICINE

## 2022-09-06 PROCEDURE — 6360000002 HC RX W HCPCS: Performed by: FAMILY MEDICINE

## 2022-09-06 PROCEDURE — 3700000000 HC ANESTHESIA ATTENDED CARE: Performed by: INTERNAL MEDICINE

## 2022-09-06 PROCEDURE — A4216 STERILE WATER/SALINE, 10 ML: HCPCS | Performed by: INTERNAL MEDICINE

## 2022-09-06 PROCEDURE — 1100000000 HC RM PRIVATE

## 2022-09-06 PROCEDURE — 82962 GLUCOSE BLOOD TEST: CPT

## 2022-09-06 PROCEDURE — 85025 COMPLETE CBC W/AUTO DIFF WBC: CPT

## 2022-09-06 PROCEDURE — 2500000003 HC RX 250 WO HCPCS: Performed by: INTERNAL MEDICINE

## 2022-09-06 PROCEDURE — 36415 COLL VENOUS BLD VENIPUNCTURE: CPT

## 2022-09-06 PROCEDURE — 6370000000 HC RX 637 (ALT 250 FOR IP): Performed by: HOSPITALIST

## 2022-09-06 PROCEDURE — 80048 BASIC METABOLIC PNL TOTAL CA: CPT

## 2022-09-06 PROCEDURE — C9113 INJ PANTOPRAZOLE SODIUM, VIA: HCPCS | Performed by: INTERNAL MEDICINE

## 2022-09-06 PROCEDURE — 7100000010 HC PHASE II RECOVERY - FIRST 15 MIN: Performed by: INTERNAL MEDICINE

## 2022-09-06 PROCEDURE — 2500000003 HC RX 250 WO HCPCS: Performed by: FAMILY MEDICINE

## 2022-09-06 PROCEDURE — 6360000002 HC RX W HCPCS: Performed by: INTERNAL MEDICINE

## 2022-09-06 RX ORDER — ONDANSETRON 2 MG/ML
4 INJECTION INTRAMUSCULAR; INTRAVENOUS
Status: CANCELLED | OUTPATIENT
Start: 2022-09-06 | End: 2022-09-06

## 2022-09-06 RX ORDER — LIDOCAINE HYDROCHLORIDE 20 MG/ML
INJECTION, SOLUTION EPIDURAL; INFILTRATION; INTRACAUDAL; PERINEURAL PRN
Status: DISCONTINUED | OUTPATIENT
Start: 2022-09-06 | End: 2022-09-06 | Stop reason: SDUPTHER

## 2022-09-06 RX ORDER — SODIUM CHLORIDE 9 MG/ML
INJECTION, SOLUTION INTRAVENOUS CONTINUOUS PRN
Status: DISCONTINUED | OUTPATIENT
Start: 2022-09-06 | End: 2022-09-06 | Stop reason: SDUPTHER

## 2022-09-06 RX ORDER — SODIUM CHLORIDE 0.9 % (FLUSH) 0.9 %
5-40 SYRINGE (ML) INJECTION PRN
Status: CANCELLED | OUTPATIENT
Start: 2022-09-06

## 2022-09-06 RX ORDER — SODIUM CHLORIDE, SODIUM LACTATE, POTASSIUM CHLORIDE, CALCIUM CHLORIDE 600; 310; 30; 20 MG/100ML; MG/100ML; MG/100ML; MG/100ML
INJECTION, SOLUTION INTRAVENOUS CONTINUOUS
Status: DISCONTINUED | OUTPATIENT
Start: 2022-09-06 | End: 2022-09-06

## 2022-09-06 RX ORDER — SODIUM CHLORIDE 0.9 % (FLUSH) 0.9 %
5-40 SYRINGE (ML) INJECTION EVERY 12 HOURS SCHEDULED
Status: CANCELLED | OUTPATIENT
Start: 2022-09-06

## 2022-09-06 RX ORDER — GLYCOPYRROLATE 0.2 MG/ML
INJECTION INTRAMUSCULAR; INTRAVENOUS PRN
Status: DISCONTINUED | OUTPATIENT
Start: 2022-09-06 | End: 2022-09-06 | Stop reason: SDUPTHER

## 2022-09-06 RX ORDER — PROPOFOL 10 MG/ML
INJECTION, EMULSION INTRAVENOUS CONTINUOUS PRN
Status: DISCONTINUED | OUTPATIENT
Start: 2022-09-06 | End: 2022-09-06 | Stop reason: SDUPTHER

## 2022-09-06 RX ORDER — LIDOCAINE HYDROCHLORIDE 10 MG/ML
1 INJECTION, SOLUTION INFILTRATION; PERINEURAL
Status: CANCELLED | OUTPATIENT
Start: 2022-09-06 | End: 2022-09-06

## 2022-09-06 RX ORDER — SODIUM CHLORIDE, SODIUM LACTATE, POTASSIUM CHLORIDE, CALCIUM CHLORIDE 600; 310; 30; 20 MG/100ML; MG/100ML; MG/100ML; MG/100ML
INJECTION, SOLUTION INTRAVENOUS CONTINUOUS
Status: CANCELLED | OUTPATIENT
Start: 2022-09-06

## 2022-09-06 RX ORDER — MORPHINE SULFATE 2 MG/ML
2 INJECTION, SOLUTION INTRAMUSCULAR; INTRAVENOUS EVERY 4 HOURS PRN
Status: DISCONTINUED | OUTPATIENT
Start: 2022-09-06 | End: 2022-09-07 | Stop reason: HOSPADM

## 2022-09-06 RX ORDER — SODIUM CHLORIDE 9 MG/ML
INJECTION, SOLUTION INTRAVENOUS PRN
Status: CANCELLED | OUTPATIENT
Start: 2022-09-06

## 2022-09-06 RX ORDER — PROPOFOL 10 MG/ML
INJECTION, EMULSION INTRAVENOUS PRN
Status: DISCONTINUED | OUTPATIENT
Start: 2022-09-06 | End: 2022-09-06 | Stop reason: SDUPTHER

## 2022-09-06 RX ORDER — FAMOTIDINE 20 MG/1
20 TABLET, FILM COATED ORAL 2 TIMES DAILY
Status: DISCONTINUED | OUTPATIENT
Start: 2022-09-06 | End: 2022-09-07 | Stop reason: HOSPADM

## 2022-09-06 RX ADMIN — SODIUM CHLORIDE, PRESERVATIVE FREE 10 ML: 5 INJECTION INTRAVENOUS at 08:11

## 2022-09-06 RX ADMIN — FAMOTIDINE 20 MG: 20 TABLET, FILM COATED ORAL at 21:50

## 2022-09-06 RX ADMIN — LIDOCAINE HYDROCHLORIDE 100 MG: 20 INJECTION, SOLUTION EPIDURAL; INFILTRATION; INTRACAUDAL; PERINEURAL at 10:05

## 2022-09-06 RX ADMIN — PROPOFOL 180 MCG/KG/MIN: 10 INJECTION, EMULSION INTRAVENOUS at 10:05

## 2022-09-06 RX ADMIN — METRONIDAZOLE 500 MG: 500 INJECTION, SOLUTION INTRAVENOUS at 12:35

## 2022-09-06 RX ADMIN — AMLODIPINE BESYLATE 10 MG: 10 TABLET ORAL at 08:11

## 2022-09-06 RX ADMIN — SODIUM CHLORIDE 40 MG: 9 INJECTION, SOLUTION INTRAMUSCULAR; INTRAVENOUS; SUBCUTANEOUS at 21:50

## 2022-09-06 RX ADMIN — PROPOFOL 50 MG: 10 INJECTION, EMULSION INTRAVENOUS at 10:05

## 2022-09-06 RX ADMIN — CEFTRIAXONE 2000 MG: 2 INJECTION, POWDER, FOR SOLUTION INTRAMUSCULAR; INTRAVENOUS at 22:51

## 2022-09-06 RX ADMIN — METRONIDAZOLE 500 MG: 500 INJECTION, SOLUTION INTRAVENOUS at 21:45

## 2022-09-06 RX ADMIN — CHOLESTYRAMINE 4 G: 4 POWDER, FOR SUSPENSION ORAL at 21:50

## 2022-09-06 RX ADMIN — SODIUM CHLORIDE, PRESERVATIVE FREE 10 ML: 5 INJECTION INTRAVENOUS at 21:56

## 2022-09-06 RX ADMIN — SODIUM CHLORIDE 40 MG: 9 INJECTION, SOLUTION INTRAMUSCULAR; INTRAVENOUS; SUBCUTANEOUS at 08:11

## 2022-09-06 RX ADMIN — SODIUM BICARBONATE 650 MG TABLET 1300 MG: at 21:50

## 2022-09-06 RX ADMIN — SODIUM CHLORIDE: 9 INJECTION, SOLUTION INTRAVENOUS at 09:41

## 2022-09-06 RX ADMIN — FAMOTIDINE 20 MG: 20 TABLET, FILM COATED ORAL at 12:35

## 2022-09-06 RX ADMIN — OXYCODONE 5 MG: 5 TABLET ORAL at 22:36

## 2022-09-06 RX ADMIN — GLYCOPYRROLATE 0.2 MG: 0.2 INJECTION, SOLUTION INTRAMUSCULAR; INTRAVENOUS at 10:01

## 2022-09-06 RX ADMIN — INSULIN LISPRO 2 UNITS: 100 INJECTION, SOLUTION INTRAVENOUS; SUBCUTANEOUS at 16:46

## 2022-09-06 RX ADMIN — METRONIDAZOLE 500 MG: 500 INJECTION, SOLUTION INTRAVENOUS at 03:32

## 2022-09-06 ASSESSMENT — PAIN SCALES - GENERAL
PAINLEVEL_OUTOF10: 0
PAINLEVEL_OUTOF10: 5
PAINLEVEL_OUTOF10: 0

## 2022-09-06 ASSESSMENT — PAIN - FUNCTIONAL ASSESSMENT
PAIN_FUNCTIONAL_ASSESSMENT: 0-10
PAIN_FUNCTIONAL_ASSESSMENT: 0-10

## 2022-09-06 ASSESSMENT — PAIN DESCRIPTION - LOCATION: LOCATION: LEG

## 2022-09-06 ASSESSMENT — PAIN DESCRIPTION - ORIENTATION: ORIENTATION: LEFT

## 2022-09-06 ASSESSMENT — PAIN DESCRIPTION - DESCRIPTORS: DESCRIPTORS: ACHING

## 2022-09-06 NOTE — PROGRESS NOTES
END OF SHIFT NOTE:    INTAKE/OUTPUT  09/05 0701 - 09/06 0700  In: 7529 [I.V.:1413]  Out: -   Voiding: Yes  Catheter: No  Drain:              Flatus: Patient does have flatus present. Stool: 1 occurrences. Characteristics:  Stool Appearance: Soft  Stool Color: Brown  Stool Amount: Large  Stool Assessment  Incontinence: No  Stool Appearance: Soft  Stool Color: Brown  Stool Amount: Large  Stool Source: Rectum  Last BM (including prior to admit): 09/04/22 (Per Patient)    Emesis: 0 occurrences. Characteristics:        VITAL SIGNS  Patient Vitals for the past 12 hrs:   Temp Pulse Resp BP SpO2   09/06/22 1948 98.4 °F (36.9 °C) 80 18 (!) 129/90 100 %   09/06/22 1537 98.1 °F (36.7 °C) 82 17 (!) 137/90 100 %   09/06/22 1144 97.7 °F (36.5 °C) 83 18 (!) 139/91 100 %   09/06/22 1115 -- 72 18 138/89 99 %   09/06/22 1057 -- 75 18 121/85 100 %   09/06/22 1047 -- 73 16 120/81 98 %   09/06/22 1037 -- 76 18 115/77 98 %   09/06/22 1027 -- 73 16 103/70 97 %   09/06/22 1018 98.6 °F (37 °C) 79 15 104/69 97 %   09/06/22 0932 98.3 °F (36.8 °C) 82 16 116/75 99 %   09/06/22 0926 98.4 °F (36.9 °C) 80 16 116/75 97 %   09/06/22 0811 -- -- -- 122/83 --       Pain Assessment  @BSHSIFLOW(13)@  Pain Location: Foot  Patient's Stated Pain Goal: 0 - No pain    Ambulating  Yes    Shift report given to oncoming nurse at the bedside.     Seda Nuno RN

## 2022-09-06 NOTE — ANESTHESIA PRE PROCEDURE
Department of Anesthesiology  Preprocedure Note       Name:  Jeannie Lundberg   Age:  65 y.o.  :  1965                                          MRN:  [id number]         Date:  2022      Surgeon: Jay Query):  Nabil Palomino MD    Procedure: Procedure(s):  EGD ESOPHAGOGASTRODUODENOSCOPY/ Rm 215    Medications prior to admission:   Prior to Admission medications    Not on File       Current medications:    Current Facility-Administered Medications   Medication Dose Route Frequency Provider Last Rate Last Admin    morphine injection 2 mg  2 mg IntraVENous Q4H PRN Kourtney Landon MD        lactated ringers infusion   IntraVENous Continuous Nabil Palomino  mL/hr at 22 0941 NoRateChange at 22 0941    lactated ringers infusion   IntraVENous Continuous Nabil Palomino MD        famotidine (PEPCID) tablet 20 mg  20 mg Oral BID Nabil Palomino MD        Or    famotidine (PEPCID) 20 mg in sodium chloride (PF) 10 mL injection  20 mg IntraVENous BID Nabil Palomino MD        pantoprazole (PROTONIX) 40 mg in sodium chloride (PF) 10 mL injection  40 mg IntraVENous Q12H Kourtney Landon MD   40 mg at 22 0811    0.9 % sodium chloride infusion   IntraVENous PRN Kourtney Landon MD        calcium carb-cholecalciferol 250-125 MG-UNIT per tab 1 tablet  1 tablet Oral Daily Berny Noyola MD   1 tablet at 22 4640    loperamide (IMODIUM) capsule 2 mg  2 mg Oral 4x Daily PRN Drake Sifuentes MD        cholestyramine light packet 4 g  4 g Oral BID Drake Sifuentes MD   4 g at 22    [Held by provider] aspirin chewable tablet 81 mg  81 mg Oral Daily Drake Sifuentes MD   81 mg at 22 1950    sodium bicarbonate tablet 1,300 mg  1,300 mg Oral BID Drake Sifuentes MD   1,300 mg at 22    oxyCODONE (ROXICODONE) immediate release tablet 5 mg  5 mg Oral Q8H PRN David Earl MD   5 mg at 22 193    0.9 % sodium chloride bolus 1,000 mL IntraVENous Once Desma Sportsman, DO        sodium chloride flush 0.9 % injection 5-40 mL  5-40 mL IntraVENous 2 times per day [place] Hind, DO   10 mL at 09/06/22 0811    sodium chloride flush 0.9 % injection 5-40 mL  5-40 mL IntraVENous PRN [place] Hind, DO        0.9 % sodium chloride infusion   IntraVENous PRN [place] Hind, DO        ondansetron (ZOFRAN-ODT) disintegrating tablet 4 mg  4 mg Oral Q8H PRN [place] Hind, DO        Or    ondansetron (ZOFRAN) injection 4 mg  4 mg IntraVENous Q6H PRN Glynn Hind, DO        acetaminophen (TYLENOL) tablet 650 mg  650 mg Oral Q6H PRN [place] Hind, DO   650 mg at 09/02/22 2242    Or    acetaminophen (TYLENOL) suppository 650 mg  650 mg Rectal Q6H PRN [place] Hind, DO        polyethylene glycol (GLYCOLAX) packet 17 g  17 g Oral Daily PRN [place] Hind, DO        hyoscyamine (LEVSIN/SL) sublingual tablet 125 mcg  125 mcg SubLINGual Q4H PRN Glynn Hind, DO        metronidazole (FLAGYL) 500 mg in 0.9% NaCl 100 mL IVPB premix  500 mg IntraVENous Q8H Nathan Chaney MD   Stopped at 09/06/22 0437    cefTRIAXone (ROCEPHIN) 2,000 mg in sodium chloride 0.9 % 50 mL IVPB mini-bag  2,000 mg IntraVENous Q24H Glynn Hind, DO   Stopped at 09/05/22 2155    zinc oxide 40 % paste   Topical 4x Daily PRN [place] Hind, DO        amLODIPine (NORVASC) tablet 10 mg  10 mg Oral Daily [place] Hind, DO   10 mg at 06/68/49 9487    folic acid (FOLVITE) tablet 1 mg  1 mg Oral Daily [place] Hind, DO   1 mg at 09/05/22 0344    vitamin B-12 (CYANOCOBALAMIN) tablet 1,000 mcg  1,000 mcg Oral Daily Glynn Hind, DO   1,000 mcg at 09/05/22 0907    [START ON 9/29/2022] tobramycin (TOBREX) 0.3 % ophthalmic solution 1 drop  1 drop Both Eyes 6 times per day Glynn Hind, DO        glucose chewable tablet 16 g  4 tablet Oral PRN [place] Hind, DO        dextrose bolus 10% 125 mL  125 mL IntraVENous PRN [place] Hind, DO        Or    dextrose bolus 10% 250 mL  250 mL IntraVENous PRN Claria Reusing, DO        glucagon (rDNA) injection 1 mg  1 mg SubCUTAneous PRN Claria Reusing, DO        dextrose 10 % infusion   IntraVENous Continuous PRN Claria Reusing, DO        insulin lispro (HUMALOG) injection vial 0-8 Units  0-8 Units SubCUTAneous TID WC Claria Reusing, DO   2 Units at 09/05/22 1747    insulin lispro (HUMALOG) injection vial 0-4 Units  0-4 Units SubCUTAneous Nightly Claria Reusing, DO           Allergies:  No Known Allergies    Problem List:    Patient Active Problem List   Diagnosis Code    Acute gastroenteritis K52.9    Conjunctivitis H10.9    Chronic gastritis K29.50    Alcohol dependence ([facility] F10.20    Diabetic peripheral neuropathy (HCC) E11.42    Essential hypertension I10    Hyperlipidemia E78.5    PUD (peptic ulcer disease) K27.9    Type 2 diabetes mellitus with hypoglycemia, with long-term current use of insulin (HCC) E11.649, Z79.4    Diabetic ulcer of both feet associated with type 2 diabetes mellitus ([facility] E11.621, L97.519, L97.529    Hypocalcemia E83.51    Hypoalbuminemia E88.09    Hyponatremia E87.1    Hypokalemia due to excessive gastrointestinal loss of potassium E87.6    Acute renal failure superimposed on stage 3a chronic kidney disease (HCC) N17.9, N18.31    PAD (peripheral artery disease) ([facility]) I73.9       Past Medical History:        Diagnosis Date    Anemia     Gastroesophageal reflux disease with esophagitis and hemorrhage     HLD (hyperlipidemia)     Hyperplastic colon polyp     Hypertension     Obesity     PUD (peptic ulcer disease)     Type 2 diabetes mellitus with diabetic polyneuropathy, with long-term current use of insulin (HCC)     Ulcer of the duodenum caused by bacteria (H. pylori)     Upper GI bleed 03/28/2016    Last Assessment & Plan:  Formatting of this note might be different from the original. Status post EGD yesterday which revealed esophagitis, gastric and duodenal ulcers Continue PPI, GI follow-up.  Monitor hemoglobin Avoid and states Biopsy results- pending       Past Surgical History:        Procedure Laterality Date    COLONOSCOPY      ESOPHAGOGASTRODUODENOSCOPY         Social History:    Social History     Tobacco Use    Smoking status: Never    Smokeless tobacco: Never   Substance Use Topics    Alcohol use: Yes                                Counseling given: Yes      Vital Signs (Current):   Vitals:    09/06/22 0709 09/06/22 0811 09/06/22 0926 09/06/22 0932   BP: 122/83 122/83 116/75 116/75   Pulse: 78  80 82   Resp: 17  16 16   Temp: 98.1 °F (36.7 °C)  98.4 °F (36.9 °C) 98.3 °F (36.8 °C)   TempSrc: Oral  Oral Oral   SpO2: 99%  97% 99%   Weight:       Height:                                                  BP Readings from Last 3 Encounters:   09/06/22 116/75       NPO Status: Time of last liquid consumption: 2000                        Time of last solid consumption: 2000                        Date of last liquid consumption: 09/05/22                        Date of last solid food consumption: 09/05/22    BMI:   Wt Readings from Last 3 Encounters:   08/31/22 200 lb (90.7 kg)     Body mass index is 27.89 kg/m².     CBC:   Lab Results   Component Value Date/Time    WBC 9.9 09/06/2022 06:40 AM    RBC 2.74 09/06/2022 06:40 AM    HGB 8.1 09/06/2022 06:40 AM    HCT 23.5 09/06/2022 06:40 AM    MCV 85.8 09/06/2022 06:40 AM    RDW 13.3 09/06/2022 06:40 AM     09/06/2022 06:40 AM       CMP:   Lab Results   Component Value Date/Time     09/06/2022 06:40 AM    K 4.1 09/06/2022 06:40 AM     09/06/2022 06:40 AM    CO2 19 09/06/2022 06:40 AM    BUN 16 09/06/2022 06:40 AM    CREATININE 1.20 09/06/2022 06:40 AM    GFRAA >60 09/06/2022 06:40 AM    LABGLOM >60 09/06/2022 06:40 AM    GLUCOSE 137 09/06/2022 06:40 AM    PROT 6.9 09/03/2022 07:43 AM    CALCIUM 8.3 09/06/2022 06:40 AM    BILITOT 0.6 09/03/2022 07:43 AM    ALKPHOS 95 09/03/2022 07:43 AM    AST 27 09/03/2022 07:43 AM    ALT 20 09/03/2022 07:43 AM POC Tests:   Recent Labs     09/06/22  0710   POCGLU 128*       Coags:   Lab Results   Component Value Date/Time    PROTIME 15.5 08/31/2022 04:16 PM    INR 1.2 08/31/2022 04:16 PM       HCG (If Applicable): No results found for: PREGTESTUR, PREGSERUM, HCG, HCGQUANT     ABGs: No results found for: PHART, PO2ART, ROG8GGL, XYV9AOP, BEART, C9NFMQET     Type & Screen (If Applicable):  No results found for: LABABO, LABRH    Drug/Infectious Status (If Applicable):  No results found for: HIV, HEPCAB    COVID-19 Screening (If Applicable):   Lab Results   Component Value Date/Time    COVID19 Not detected 08/31/2022 08:49 PM           Anesthesia Evaluation  Patient summary reviewed and Nursing notes reviewed  Airway: Mallampati: I  TM distance: >3 FB   Neck ROM: full     Dental: normal exam         Pulmonary:Negative Pulmonary ROS breath sounds clear to auscultation                             Cardiovascular:  Exercise tolerance: good (>4 METS),   (+) hypertension:, hyperlipidemia        Rhythm: regular  Rate: normal                    Neuro/Psych:   (+) psychiatric history:            GI/Hepatic/Renal:   (+) PUD, renal disease: ARF,          ROS comment: GIB/melena    bs 128. Endo/Other:    (+) DiabetesType II DM, using insulin, . ROS comment: Admitted with hypotension, hyponatremia, pyelonephritis    ETOH daily  Abdominal:             Vascular: Other Findings:           Anesthesia Plan      TIVA     ASA 3       Induction: intravenous.                             Suzy Salas MD   9/6/2022

## 2022-09-06 NOTE — PLAN OF CARE
Problem: Discharge Planning  Goal: Discharge to home or other facility with appropriate resources  9/6/2022 0919 by Gary Miles RN  Outcome: Progressing  9/6/2022 0138 by Aguilar West RN  Outcome: Progressing     Problem: Pain  Goal: Verbalizes/displays adequate comfort level or baseline comfort level  9/6/2022 0919 by Gary Miles RN  Outcome: Progressing  9/6/2022 0138 by Aguilar West RN  Outcome: Progressing     Problem: Skin/Tissue Integrity  Goal: Absence of new skin breakdown  Description: 1. Monitor for areas of redness and/or skin breakdown  2. Assess vascular access sites hourly  3. Every 4-6 hours minimum:  Change oxygen saturation probe site  4. Every 4-6 hours:  If on nasal continuous positive airway pressure, respiratory therapy assess nares and determine need for appliance change or resting period.   9/6/2022 0919 by Gary Miles RN  Outcome: Progressing  9/6/2022 0138 by Aguilar West RN  Outcome: Progressing     Problem: Safety - Adult  Goal: Free from fall injury  9/6/2022 0919 by Gary Miles RN  Outcome: Progressing  Flowsheets (Taken 9/6/2022 0720)  Free From Fall Injury: Instruct family/caregiver on patient safety  9/6/2022 0138 by Aguilar West RN  Outcome: Progressing  Flowsheets (Taken 9/5/2022 1400 by Gary Miles RN)  Free From Fall Injury: Instruct family/caregiver on patient safety     Problem: ABCDS Injury Assessment  Goal: Absence of physical injury  9/6/2022 0919 by Gary Miles RN  Outcome: Progressing  9/6/2022 0138 by Aguilar West RN  Outcome: Progressing     Problem: Chronic Conditions and Co-morbidities  Goal: Patient's chronic conditions and co-morbidity symptoms are monitored and maintained or improved  9/6/2022 0919 by Gary Miles RN  Outcome: Progressing  9/6/2022 0138 by Aguilar West RN  Outcome: Progressing

## 2022-09-06 NOTE — INTERVAL H&P NOTE
Update History & Physical    The patient's History and Physical of September , plan was reviewed with the patient and I examined the patient. There was no change. The surgical site was confirmed by the patient and me. Plan: The risks, benefits, expected outcome, and alternative to the recommended procedure have been discussed with the patient. Patient understands and wants to proceed with the procedure.      Electronically signed by Nabil Palomino MD on 9/6/2022 at 9:29 AM yes

## 2022-09-06 NOTE — PROGRESS NOTES
TRANSFER - OUT REPORT:    Verbal report given to TERESA Guerra on Eulalia Messer  being transferred to GI Lab for ordered procedure       Report consisted of patient's Situation, Background, Assessment and   Recommendations(SBAR). Information from the following report(s) Nurse Handoff Report, Intake/Output, MAR, and Recent Results was reviewed with the receiving nurse. Lines:   Peripheral IV 09/04/22 Distal;Right Forearm (Active)   Site Assessment Clean, dry & intact 09/06/22 0449   Line Status Infusing 09/06/22 0449   Line Care Cap changed 09/06/22 0315   Phlebitis Assessment No symptoms 09/06/22 0315   Infiltration Assessment 0 09/06/22 0315   Alcohol Cap Used Yes 09/06/22 0315   Dressing Status Clean, dry & intact 09/06/22 0449   Dressing Type Transparent 09/06/22 0315        Opportunity for questions and clarification was provided.       Patient transported with:  [employer]

## 2022-09-06 NOTE — DIABETES MGMT
Patient admitted with acute gastroenteritis. Blood glucose ranged 172-229 yesterday with patient receiving Humalog 4 units. Blood glucose this morning was 128. Creatinine 1.20. GFR > 60. Reviewed patient current regimen: Humalog correctional insulin. Noted patient NPO. Glycemic control stable on current regimen.

## 2022-09-06 NOTE — PLAN OF CARE
Problem: Discharge Planning  Goal: Discharge to home or other facility with appropriate resources  Outcome: Progressing     Problem: Pain  Goal: Verbalizes/displays adequate comfort level or baseline comfort level  Outcome: Progressing     Problem: Skin/Tissue Integrity  Goal: Absence of new skin breakdown  Description: 1. Monitor for areas of redness and/or skin breakdown  2. Assess vascular access sites hourly  3. Every 4-6 hours minimum:  Change oxygen saturation probe site  4. Every 4-6 hours:  If on nasal continuous positive airway pressure, respiratory therapy assess nares and determine need for appliance change or resting period.   Outcome: Progressing     Problem: Safety - Adult  Goal: Free from fall injury  Outcome: Progressing  Flowsheets (Taken 9/5/2022 1400 by Omer Licona RN)  Free From Fall Injury: Instruct family/caregiver on patient safety     Problem: ABCDS Injury Assessment  Goal: Absence of physical injury  Outcome: Progressing     Problem: Chronic Conditions and Co-morbidities  Goal: Patient's chronic conditions and co-morbidity symptoms are monitored and maintained or improved  Outcome: Progressing

## 2022-09-06 NOTE — PROGRESS NOTES
Medical Student Progress Note   Admit Date:  2022  4:18 PM   Name:  Ken Gaston   Age:  65 y.o. Sex:  male  :  1965   MRN:  [id number]     Presenting Complaint: Hypotension    Reason(s) for Admission: Hypokalemia [E87.6]  Hypoglycemia [E16.2]  Acute pyelonephritis [N10]  Acute kidney injury [facility]) [N17.9]  Acute weakness [R53.1]  Diabetic ulcer of both feet ([address] [B71.116, L97.519, L97.529]  Acute pyelonephritis due to bacteria [N10, B96.89]       Hospital Course & Interval History:   Mr. Marsha Arechiga is a 66 y/o male with history of T2DM on insulin, neuropathy, HTN, HLD, PUD, chronic gastritis, GERD, Upper GIB who presented with cramping abd pain and diarrhea x 2 weeks associated with fatigue and generalized weakness. Reported ~5-10 episodes loose stool daily that he described as black. Had been taking pepto bismol. Denies recent abx use. Per wife, the pt had been essentially bedbound for 2 weeks. He then developed bilateral heel wounds, left worse than right and left leg with itchy pustules and discoloration up entire leg. He was seen at [address] [place] [address] on . Previous colonoscopy on 3/2/22 with internal hemorrhoids and hyperplastic polyp with recommended follow up in 6 months. Subjective (22): Pt reports he had a good night. He is NPO. He denies any chest pain, abd pain, blood in the stool. He is complaining of pain in his feet, and requests pain medication.      Assessment & Plan:      Pt was seen and examined at the bedside with my attending,  Dr. Lewanda Bumpers, MD.     Acute gastroenteritis  -Resolved  -Trend HGB. 8.1 this am.   -Scheduled for EGD today.   -On PO protonix           Conjunctivitis  -Resolved s/p topical agent           Alcohol dependence ([address]  -Needs cessation  -Continued folate           Essential hypertension  -Continue norvasc           Type 2 diabetes mellitus with hypoglycemia, with long-term current use of insulin ([address]  -Sliding scale insulin           Diabetic ulcer of both feet associated with type 2 diabetes mellitus ([facility]  -Followup wound care   -On course of rocephin/ flagyl           Hyponatremia  -.  -TSH, cortisol within normal limits.   -Serum osmolality slightly decreased, urine Na borderline. Consider SIADH          Acute renal failure superimposed on stage 3a chronic kidney disease ([facility]  -Improved  -Trend BMPs. At baseline thus far.    -Continued bicarb          PAD (peripheral artery disease) ([facility])  -Followup vascular  -Continue Asa 81 mg daily      Diet:  Diet NPO Exceptions are: Sips of Water with Meds  DVT PPx: held 2/2 anemia  Code status: Full code    C/[address] Problems    Diagnosis Date Noted    PAD (peripheral artery disease) ([facility] 09/02/2022     Priority: Medium    Acute gastroenteritis 08/31/2022     Priority: Medium    Type 2 diabetes mellitus with hypoglycemia, with long-term current use of insulin ([facility] 08/31/2022     Priority: Medium    Diabetic ulcer of both feet associated with type 2 diabetes mellitus ([facility] 08/31/2022     Priority: Medium    Hypocalcemia 08/31/2022     Priority: Medium    Hypoalbuminemia 08/31/2022     Priority: Medium    Hyponatremia 08/31/2022     Priority: Medium    Hypokalemia due to excessive gastrointestinal loss of potassium 08/31/2022     Priority: Medium    Acute renal failure superimposed on stage 3a chronic kidney disease ([facility] 08/31/2022     Priority: Medium    Conjunctivitis 05/13/2022     Priority: Medium    PUD (peptic ulcer disease) 05/10/2021     Priority: Medium     Formatting of this note might be different from the original.  Added automatically from request for surgery [id number]      Chronic gastritis 03/12/2021     Priority: Medium    Alcohol dependence ([facility] 10/16/2017     Priority: Medium    Diabetic peripheral neuropathy ([facility] 10/16/2017     Priority: Medium    Hyperlipidemia 10/16/2017     Priority: Medium    Essential hypertension 03/28/2016     Priority: Medium     Last Assessment & Plan: Formatting of this note might be different from the original.  Still poor controlled. Continue avoid Cozaar due to acute kidney injury. Started on Norvasc, hydralazine when necessary, clonidine when necessary       Objective:   Patient Vitals for the past 24 hrs:   Temp Pulse Resp BP SpO2   09/06/22 0811 -- -- -- 122/83 --   09/06/22 0709 98.1 °F (36.7 °C) 78 17 122/83 99 %   09/06/22 0333 97.9 °F (36.6 °C) 78 18 129/86 100 %   09/05/22 2323 97.9 °F (36.6 °C) 86 18 (!) 134/92 99 %   09/05/22 2002 -- -- 18 -- --   09/05/22 1936 98.2 °F (36.8 °C) 88 19 117/83 100 %   09/05/22 1637 98.2 °F (36.8 °C) 85 18 119/89 100 %   09/05/22 1636 98.2 °F (36.8 °C) 85 19 119/89 100 %   09/05/22 1559 97.9 °F (36.6 °C) 86 18 (!) 134/90 100 %   09/05/22 1411 98 °F (36.7 °C) 97 18 129/84 99 %   09/05/22 1356 98.1 °F (36.7 °C) 82 18 (!) 135/94 96 %   09/05/22 1202 -- -- 16 -- --   09/05/22 1139 98.6 °F (37 °C) 93 17 122/86 100 %   09/05/22 0907 -- -- -- 124/82 --     @MARQLTRKG(7890:RAFO)@    Estimated body mass index is 27.89 kg/m² as calculated from the following:    Height as of this encounter: 5' 11\" (1.803 m). Weight as of this encounter: 200 lb (90.7 kg). Intake/Output Summary (Last 24 hours) at 9/6/2022 0905  Last data filed at 9/6/2022 0509  Gross per 24 hour   Intake 1788 ml   Output --   Net 1788 ml         Physical Exam:   Blood pressure 122/83, pulse 78, temperature 98.1 °F (36.7 °C), temperature source Oral, resp. rate 17, height 5' 11\" (1.803 m), weight 200 lb (90.7 kg), SpO2 99 %. General:    Well nourished. No overt distress  Head:  Normocephalic, atraumatic  Eyes:  Sclerae appear normal.  Pupils equally round. ENT:  Nares appear normal, no drainage. Moist oral mucosa  Neck:  Trachea midline   CV:   RRR. No m/r/g. No jugular venous distension. Lungs:   CTAB. No wheezing, rhonchi, or rales. Respirations even, unlabored  Abdomen: Bowel sounds present. Soft, nontender, nondistended.   Extremities: No cyanosis or clubbing. No edema  Skin:       Warm and dry. There are bilateral bandages to both feet that are intact and without signs of drainage. Neuro:  Grossly intact. Psych:  Normal mood and affect.   Alert and oriented x3    I have reviewed ordered lab tests and independently visualized imaging below:    Last 24hr Labs:  Recent Results (from the past 24 hour(s))   TYPE AND SCREEN    Collection Time: 09/05/22 11:01 AM   Result Value Ref Range    Crossmatch expiration date 09/08/2022,2359     ABO/Rh O POSITIVE     Antibody Screen NEG     Unit Number [id number]     Product Code Blood Bank [facility] LRIR     Unit Divison 00     Dispense Status Blood Bank TRANSFUSED     Crossmatch Result Compatible    Hemoglobin and Hematocrit    Collection Time: 09/05/22 11:03 AM   Result Value Ref Range    Hemoglobin 7.0 (L) 13.6 - 17.2 g/dL    Hematocrit 20.2 (L) 41.1 - 50.3 %   POCT Glucose    Collection Time: 09/05/22 11:40 AM   Result Value Ref Range    POC Glucose 229 (H) 65 - 100 mg/dL    Performed by: Tyler Tavarez    PREPARE RBC (CROSSMATCH), 1 Units    Collection Time: 09/05/22 12:45 PM   Result Value Ref Range    History Check Historical check performed    POCT Glucose    Collection Time: 09/05/22  4:34 PM   Result Value Ref Range    POC Glucose 202 (H) 65 - 100 mg/dL    Performed by: Tyler Tavarez    Hemoglobin and Hematocrit    Collection Time: 09/05/22  5:33 PM   Result Value Ref Range    Hemoglobin 8.8 (L) 13.6 - 17.2 g/dL    Hematocrit 26.0 (L) 41.1 - 50.3 %   POCT Glucose    Collection Time: 09/05/22 10:07 PM   Result Value Ref Range    POC Glucose 184 (H) 65 - 100 mg/dL    Performed by: Ioana    Basic Metabolic Panel    Collection Time: 09/06/22  6:40 AM   Result Value Ref Range    Sodium 129 (L) 136 - 145 mmol/L    Potassium 4.1 3.5 - 5.1 mmol/L    Chloride 106 101 - 110 mmol/L    CO2 19 (L) 21 - 32 mmol/L    Anion Gap 4 4 - 13 mmol/L    Glucose 137 (H) 65 - 100 mg/dL    BUN 16 6 - 23 MG/DL Creatinine 1.20 0.8 - 1.5 MG/DL    GFR African American >60 >60 ml/min/1.73m2    GFR Non- >60 >60 ml/min/1.73m2    Calcium 8.3 8.3 - 10.4 MG/DL   CBC with Auto Differential    Collection Time: 09/06/22  6:40 AM   Result Value Ref Range    WBC 9.9 4.3 - 11.1 K/uL    RBC 2.74 (L) 4.23 - 5.6 M/uL    Hemoglobin 8.1 (L) 13.6 - 17.2 g/dL    Hematocrit 23.5 (L) 41.1 - 50.3 %    MCV 85.8 79.6 - 97.8 FL    MCH 29.6 26.1 - 32.9 PG    MCHC 34.5 31.4 - 35.0 g/dL    RDW 13.3 11.9 - 14.6 %    Platelets 978 772 - 666 K/uL    MPV 11.4 9.4 - 12.3 FL    nRBC 0.00 0.0 - 0.2 K/uL    Differential Type AUTOMATED      Seg Neutrophils 66 43 - 78 %    Lymphocytes 14 13 - 44 %    Monocytes 15 (H) 4.0 - 12.0 %    Eosinophils % 3 0.5 - 7.8 %    Basophils 1 0.0 - 2.0 %    Immature Granulocytes 1 0.0 - 5.0 %    Segs Absolute 6.5 1.7 - 8.2 K/UL    Absolute Lymph # 1.4 0.5 - 4.6 K/UL    Absolute Mono # 1.5 (H) 0.1 - 1.3 K/UL    Absolute Eos # 0.3 0.0 - 0.8 K/UL    Basophils Absolute 0.1 0.0 - 0.2 K/UL    Absolute Immature Granulocyte 0.1 0.0 - 0.5 K/UL   POCT Glucose    Collection Time: 09/06/22  7:10 AM   Result Value Ref Range    POC Glucose 128 (H) 65 - 100 mg/dL    Performed by: Ramiro Reyna        @[employer]@    Other Studies:  [unfilled]    Current Meds:  Current Facility-Administered Medications   Medication Dose Route Frequency    morphine injection 2 mg  2 mg IntraVENous Q4H PRN    pantoprazole (PROTONIX) 40 mg in sodium chloride (PF) 10 mL injection  40 mg IntraVENous Q12H    0.9 % sodium chloride infusion   IntraVENous PRN    calcium carb-cholecalciferol 250-125 MG-UNIT per tab 1 tablet  1 tablet Oral Daily    loperamide (IMODIUM) capsule 2 mg  2 mg Oral 4x Daily PRN    cholestyramine light packet 4 g  4 g Oral BID    [Held by provider] aspirin chewable tablet 81 mg  81 mg Oral Daily    sodium bicarbonate tablet 1,300 mg  1,300 mg Oral BID    oxyCODONE (ROXICODONE) immediate release tablet 5 mg  5 mg Oral Q8H PRN 0.9 % sodium chloride bolus  1,000 mL IntraVENous Once    sodium chloride flush 0.9 % injection 5-40 mL  5-40 mL IntraVENous 2 times per day    sodium chloride flush 0.9 % injection 5-40 mL  5-40 mL IntraVENous PRN    0.9 % sodium chloride infusion   IntraVENous PRN    ondansetron (ZOFRAN-ODT) disintegrating tablet 4 mg  4 mg Oral Q8H PRN    Or    ondansetron (ZOFRAN) injection 4 mg  4 mg IntraVENous Q6H PRN    acetaminophen (TYLENOL) tablet 650 mg  650 mg Oral Q6H PRN    Or    acetaminophen (TYLENOL) suppository 650 mg  650 mg Rectal Q6H PRN    polyethylene glycol (GLYCOLAX) packet 17 g  17 g Oral Daily PRN    hyoscyamine (LEVSIN/SL) sublingual tablet 125 mcg  125 mcg SubLINGual Q4H PRN    metronidazole (FLAGYL) 500 mg in 0.9% NaCl 100 mL IVPB premix  500 mg IntraVENous Q8H    cefTRIAXone (ROCEPHIN) 2,000 mg in sodium chloride 0.9 % 50 mL IVPB mini-bag  2,000 mg IntraVENous Q24H    zinc oxide 40 % paste   Topical 4x Daily PRN    amLODIPine (NORVASC) tablet 10 mg  10 mg Oral Daily    folic acid (FOLVITE) tablet 1 mg  1 mg Oral Daily    vitamin B-12 (CYANOCOBALAMIN) tablet 1,000 mcg  1,000 mcg Oral Daily    [START ON 9/29/2022] tobramycin (TOBREX) 0.3 % ophthalmic solution 1 drop  1 drop Both Eyes 6 times per day    glucose chewable tablet 16 g  4 tablet Oral PRN    dextrose bolus 10% 125 mL  125 mL IntraVENous PRN    Or    dextrose bolus 10% 250 mL  250 mL IntraVENous PRN    glucagon (rDNA) injection 1 mg  1 mg SubCUTAneous PRN    dextrose 10 % infusion   IntraVENous Continuous PRN    insulin lispro (HUMALOG) injection vial 0-8 Units  0-8 Units SubCUTAneous TID WC    insulin lispro (HUMALOG) injection vial 0-4 Units  0-4 Units SubCUTAneous Nightly       Signed:  Дмитрий Quick OMS-III    Part of this note may have been written by using a voice dictation software. The note has been proof read but may still contain some grammatical/other typographical errors.

## 2022-09-06 NOTE — PROGRESS NOTES
TRANSFER - IN REPORT:    Verbal report received from TERESA Haile on [place]  being received from GI Lab for routine progression of patient care      Report consisted of patient's Situation, Background, Assessment and   Recommendations(SBAR). Information from the following report(s) Nurse Handoff Report, Intake/Output, MAR, and Recent Results was reviewed with the receiving nurse. Opportunity for questions and clarification was provided. Assessment completed upon patient's arrival to unit and care assumed.

## 2022-09-06 NOTE — PROGRESS NOTES
Hospitalist Progress Note   Admit Date:  2022  4:18 PM   Name:  Devi Baird   Age:  65 y.o. Sex:  male  :  1965   MRN:  [id number]   Room:      Presenting Complaint: Hypotension     Reason(s) for Admission: Hypokalemia [E87.6]  Hypoglycemia [E16.2]  Acute pyelonephritis [N10]  Acute kidney injury [facility]) [N17.9]  Acute weakness [R53.1]  Diabetic ulcer of both feet ([address] [E94.890, L97.519, L97.529]  Acute pyelonephritis due to bacteria [N10, B96.89]     Hospital Course:       Mr. Mark Pedraza is a 64 yo male with PMH of obesity, DM2, HTN, HLP, upper GI bleed admitted with abdominal pain, diarrhea, increasing debility. He developed bilateral heel wounds with rash up LE. CTAP showed\"       Impression       1. Cholelithiasis without pericholecystic inflammatory changes to suggest acute   cholecystitis or appendicitis. 2.  Bilateral perinephric soft tissue stranding and small perinephric fluid   collections as well as a small amount of fluid in the pelvic cul-de-sac are   nonspecific, but the possibility of pyelonephritis should be considered. \"    UA negative. Cdiff negative. HGB down to 6.6- 7 on recheck. No sven bleeding. Occult negative. S/p 1 unit PRBC 22. He has been seen by GI and plans for EGD on 22. He has been seen by wound care and xrays bilateral LE negative. Seen by vascular s/p arterial dopplers showing PAD. Started on asa though held with progressive anemia. No surgical needs. He is on course of rocephin/flagyl./vancomycin- EOT 22. cultures with klebsiella and MSSA. He has developed progressive hyponatremia. He is on fluid restriction. Sodum trending upward. Discharge plans are pending to home with home health/ wife.          Subjective & 24hr Events (22):     NPO for EGD, no abdominal pain or bleeding, has pain LLE, no dyspnea or cough, no chest pain, has edema       Assessment & Plan:     Principal Problem:    Acute gastroenteritis  Plan:   Acute blood loss anemia:  -Trend HGB  -Agrees to 1 unit PRBC 9-5-22   -IV Protonix every 12 hours   -GI consulted and plans for EGD today  -NPO        Active Problems:    Conjunctivitis  Plan:   -S/p topical agent            Alcohol dependence ([facility]  Plan:   -Needs cessation  -Continued folate / b12        Essential hypertension  Plan:   -Continued norvasc           Type 2 diabetes mellitus with hypoglycemia, with long-term current use of insulin ([facility])  Plan:   -Sliding scale insulin         Diabetic ulcer of both feet associated with type 2 diabetes mellitus ([facility])  Plan:   -Followup wound care   -On course of rocephin/ flagyl  EOT 9-6-22/vancomycin stopped 9-4         Hyponatremia  Plan:   -  -Continued oral fluid 1.5 L restriction  -normal TSH, cortisol  -Low  urine sodium, urine osmolality   -low serum osmolality           Acute renal failure superimposed on stage 3a chronic kidney disease ([facility])  Plan:   -Improved  -Trend BMP  -Continued bicarb        PAD (peripheral artery disease) ([facility])  Plan:   -Followup vascular  -Asa 81 mg daily on hold with anemia      Anticipated discharge needs:      Pending to home    Diet:  Diet NPO Exceptions are: Sips of Water with Meds  DVT PPx: held - anemia   Code status: Full Code    Hospital Problems:  Principal Problem:    Acute gastroenteritis  Active Problems:    Conjunctivitis    Chronic gastritis    Alcohol dependence ([address]    Diabetic peripheral neuropathy ([facility]    Essential hypertension    Hyperlipidemia    PUD (peptic ulcer disease)    Type 2 diabetes mellitus with hypoglycemia, with long-term current use of insulin ([facility])    Diabetic ulcer of both feet associated with type 2 diabetes mellitus ([facility])    Hypocalcemia    Hypoalbuminemia    Hyponatremia    Hypokalemia due to excessive gastrointestinal loss of potassium    Acute renal failure superimposed on stage 3a chronic kidney disease ([facility])    PAD (peripheral artery disease) ([facility])  Resolved Problems:    * No resolved hospital problems. *      Objective:   Patient Vitals for the past 24 hrs:   Temp Pulse Resp BP SpO2   09/06/22 0811 -- -- -- 122/83 --   09/06/22 0709 98.1 °F (36.7 °C) 78 17 122/83 99 %   09/06/22 0333 97.9 °F (36.6 °C) 78 18 129/86 100 %   09/05/22 2323 97.9 °F (36.6 °C) 86 18 (!) 134/92 99 %   09/05/22 2002 -- -- 18 -- --   09/05/22 1936 98.2 °F (36.8 °C) 88 19 117/83 100 %   09/05/22 1637 98.2 °F (36.8 °C) 85 18 119/89 100 %   09/05/22 1636 98.2 °F (36.8 °C) 85 19 119/89 100 %   09/05/22 1559 97.9 °F (36.6 °C) 86 18 (!) 134/90 100 %   09/05/22 1411 98 °F (36.7 °C) 97 18 129/84 99 %   09/05/22 1356 98.1 °F (36.7 °C) 82 18 (!) 135/94 96 %   09/05/22 1202 -- -- 16 -- --   09/05/22 1139 98.6 °F (37 °C) 93 17 122/86 100 %   09/05/22 0907 -- -- -- 124/82 --       Oxygen Therapy  SpO2: 99 %  Pulse Oximeter Device Mode: Intermittent  O2 Device: None (Room air)    Estimated body mass index is 27.89 kg/m² as calculated from the following:    Height as of this encounter: 5' 11\" (1.803 m). Weight as of this encounter: 200 lb (90.7 kg). Intake/Output Summary (Last 24 hours) at 9/6/2022 0849  Last data filed at 9/6/2022 0509  Gross per 24 hour   Intake 1788 ml   Output --   Net 1788 ml         Physical Exam:     Blood pressure 122/83, pulse 78, temperature 98.1 °F (36.7 °C), temperature source Oral, resp. rate 17, height 5' 11\" (1.803 m), weight 200 lb (90.7 kg), SpO2 99 %. General:    Well nourished. Alert, no distress   CV:   RRR. No m/r/g. No jugular venous distension. 1+ edema   Lungs:   CTAB. No wheezing, rhonchi, or rales. Symmetric expansion. anterior   Abdomen: Bowel sounds present. Soft, nontender, nondistended. Extremities: No cyanosis or clubbing. Skin:     No rashes and normal coloration. Warm and dry. Neuro:  grossly intact. Psych:  Normal mood and affect.       I have personally reviewed labs and tests showing:  Recent Labs:  Recent Results (from the past 48 hour(s))   POCT Glucose    Collection Time: 09/04/22 12:15 PM   Result Value Ref Range    POC Glucose 191 (H) 65 - 100 mg/dL    Performed by: Jd Sparks    Sodium, urine, random    Collection Time: 09/04/22  1:17 PM   Result Value Ref Range    SODIUM, RANDOM URINE 15 MMOL/L   Osmolality, Urine    Collection Time: 09/04/22  1:17 PM   Result Value Ref Range    Osmolality, Ur 162 50 - 1400 MOSM/kg H2O   POCT Glucose    Collection Time: 09/04/22  4:35 PM   Result Value Ref Range    POC Glucose 214 (H) 65 - 100 mg/dL    Performed by: Jd Sparks    POCT Glucose    Collection Time: 09/04/22  9:02 PM   Result Value Ref Range    POC Glucose 195 (H) 65 - 100 mg/dL    Performed by: Melba Jay)    POCT Glucose    Collection Time: 09/05/22  6:05 AM   Result Value Ref Range    POC Glucose 193 (H) 65 - 100 mg/dL    Performed by: Melba Jay)    POCT Glucose    Collection Time: 09/05/22  7:25 AM   Result Value Ref Range    POC Glucose 182 (H) 65 - 100 mg/dL    Performed by: Jd Sparks    Basic Metabolic Panel    Collection Time: 09/05/22  7:44 AM   Result Value Ref Range    Sodium 129 (L) 136 - 145 mmol/L    Potassium 4.4 3.5 - 5.1 mmol/L    Chloride 104 101 - 110 mmol/L    CO2 19 (L) 21 - 32 mmol/L    Anion Gap 6 4 - 13 mmol/L    Glucose 172 (H) 65 - 100 mg/dL    BUN 17 6 - 23 MG/DL    Creatinine 1.40 0.8 - 1.5 MG/DL    GFR African American >60 >60 ml/min/1.73m2    GFR Non- 56 (L) >60 ml/min/1.73m2    Calcium 7.7 (L) 8.3 - 10.4 MG/DL   CBC with Auto Differential    Collection Time: 09/05/22  7:44 AM   Result Value Ref Range    WBC 10.1 4.3 - 11.1 K/uL    RBC 2.24 (L) 4.23 - 5.6 M/uL    Hemoglobin 6.6 (LL) 13.6 - 17.2 g/dL    Hematocrit 19.0 (L) 41.1 - 50.3 %    MCV 84.8 79.6 - 97.8 FL    MCH 29.5 26.1 - 32.9 PG    MCHC 34.7 31.4 - 35.0 g/dL    RDW 13.2 11.9 - 14.6 %    Platelets 736 652 - 563 K/uL    MPV 11.8 9.4 - 12.3 FL    nRBC 0.00 0.0 - 0.2 K/uL Differential Type AUTOMATED      Seg Neutrophils 67 43 - 78 %    Lymphocytes 15 13 - 44 %    Monocytes 14 (H) 4.0 - 12.0 %    Eosinophils % 2 0.5 - 7.8 %    Basophils 1 0.0 - 2.0 %    Immature Granulocytes 1 0.0 - 5.0 %    Segs Absolute 6.8 1.7 - 8.2 K/UL    Absolute Lymph # 1.5 0.5 - 4.6 K/UL    Absolute Mono # 1.5 (H) 0.1 - 1.3 K/UL    Absolute Eos # 0.2 0.0 - 0.8 K/UL    Basophils Absolute 0.1 0.0 - 0.2 K/UL    Absolute Immature Granulocyte 0.1 0.0 - 0.5 K/UL   TSH with Reflex    Collection Time: 09/05/22  7:44 AM   Result Value Ref Range    TSH w Free Thyroid if Abnormal 2.64 0.358 - 3.740 UIU/ML   Cortisol AM, Total    Collection Time: 09/05/22  7:44 AM   Result Value Ref Range    Cortisol - AM 10.4 7 - 25 ug/dL   Osmolality    Collection Time: 09/05/22  7:44 AM   Result Value Ref Range    Serum Osmolality 273 (L) 275 - 295 MOSM/kg H2O   TYPE AND SCREEN    Collection Time: 09/05/22 11:01 AM   Result Value Ref Range    Crossmatch expiration date 09/08/2022,6691     ABO/Rh O POSITIVE     Antibody Screen NEG     Unit Number [id number]     Product Code Blood Bank [facility] LRIR     Unit Divison 00     Dispense Status Blood Bank TRANSFUSED     Crossmatch Result Compatible    Hemoglobin and Hematocrit    Collection Time: 09/05/22 11:03 AM   Result Value Ref Range    Hemoglobin 7.0 (L) 13.6 - 17.2 g/dL    Hematocrit 20.2 (L) 41.1 - 50.3 %   POCT Glucose    Collection Time: 09/05/22 11:40 AM   Result Value Ref Range    POC Glucose 229 (H) 65 - 100 mg/dL    Performed by: Yeyo Blake    PREPARE RBC (CROSSMATCH), 1 Units    Collection Time: 09/05/22 12:45 PM   Result Value Ref Range    History Check Historical check performed    POCT Glucose    Collection Time: 09/05/22  4:34 PM   Result Value Ref Range    POC Glucose 202 (H) 65 - 100 mg/dL    Performed by: Yeyo Blake    Hemoglobin and Hematocrit    Collection Time: 09/05/22  5:33 PM   Result Value Ref Range    Hemoglobin 8.8 (L) 13.6 - 17.2 g/dL    Hematocrit 26.0 (L) 41.1 - 50.3 %   POCT Glucose    Collection Time: 09/05/22 10:07 PM   Result Value Ref Range    POC Glucose 184 (H) 65 - 100 mg/dL    Performed by: IngridViClonetim    Basic Metabolic Panel    Collection Time: 09/06/22  6:40 AM   Result Value Ref Range    Sodium 129 (L) 136 - 145 mmol/L    Potassium 4.1 3.5 - 5.1 mmol/L    Chloride 106 101 - 110 mmol/L    CO2 19 (L) 21 - 32 mmol/L    Anion Gap 4 4 - 13 mmol/L    Glucose 137 (H) 65 - 100 mg/dL    BUN 16 6 - 23 MG/DL    Creatinine 1.20 0.8 - 1.5 MG/DL    GFR African American >60 >60 ml/min/1.73m2    GFR Non- >60 >60 ml/min/1.73m2    Calcium 8.3 8.3 - 10.4 MG/DL   CBC with Auto Differential    Collection Time: 09/06/22  6:40 AM   Result Value Ref Range    WBC 9.9 4.3 - 11.1 K/uL    RBC 2.74 (L) 4.23 - 5.6 M/uL    Hemoglobin 8.1 (L) 13.6 - 17.2 g/dL    Hematocrit 23.5 (L) 41.1 - 50.3 %    MCV 85.8 79.6 - 97.8 FL    MCH 29.6 26.1 - 32.9 PG    MCHC 34.5 31.4 - 35.0 g/dL    RDW 13.3 11.9 - 14.6 %    Platelets 030 225 - 857 K/uL    MPV 11.4 9.4 - 12.3 FL    nRBC 0.00 0.0 - 0.2 K/uL    Differential Type AUTOMATED      Seg Neutrophils 66 43 - 78 %    Lymphocytes 14 13 - 44 %    Monocytes 15 (H) 4.0 - 12.0 %    Eosinophils % 3 0.5 - 7.8 %    Basophils 1 0.0 - 2.0 %    Immature Granulocytes 1 0.0 - 5.0 %    Segs Absolute 6.5 1.7 - 8.2 K/UL    Absolute Lymph # 1.4 0.5 - 4.6 K/UL    Absolute Mono # 1.5 (H) 0.1 - 1.3 K/UL    Absolute Eos # 0.3 0.0 - 0.8 K/UL    Basophils Absolute 0.1 0.0 - 0.2 K/UL    Absolute Immature Granulocyte 0.1 0.0 - 0.5 K/UL   POCT Glucose    Collection Time: 09/06/22  7:10 AM   Result Value Ref Range    POC Glucose 128 (H) 65 - 100 mg/dL    Performed by: Sofiya Alejandra        I have personally reviewed imaging studies showing: Other Studies:  Vascular duplex lower extremity arteries bilateral with ALECIA   Final Result   1. Noncompressible pressures at the level the ankle consistent with small vessel   calcinosis.    2. Right-sided mild-to-moderate superficial femoral artery and popliteal artery   stenoses   3. Very sluggish flow noted in the left peroneal artery and right posterior   tibial artery. Vascular duplex lower extremity venous left   Final Result   No evidence of left leg DVT. XR CALCANEUS LEFT (MIN 2 VIEWS)   Final Result   No acute process. XR CALCANEUS RIGHT (MIN 2 VIEWS)   Final Result   No acute process. CT ABDOMEN PELVIS W IV CONTRAST Additional Contrast? None   Final Result      1. Cholelithiasis without pericholecystic inflammatory changes to suggest acute   cholecystitis or appendicitis. 2.  Bilateral perinephric soft tissue stranding and small perinephric fluid   collections as well as a small amount of fluid in the pelvic cul-de-sac are   nonspecific, but the possibility of pyelonephritis should be considered.           Current Meds:  Current Facility-Administered Medications   Medication Dose Route Frequency    morphine injection 2 mg  2 mg IntraVENous Q4H PRN    pantoprazole (PROTONIX) 40 mg in sodium chloride (PF) 10 mL injection  40 mg IntraVENous Q12H    0.9 % sodium chloride infusion   IntraVENous PRN    calcium carb-cholecalciferol 250-125 MG-UNIT per tab 1 tablet  1 tablet Oral Daily    loperamide (IMODIUM) capsule 2 mg  2 mg Oral 4x Daily PRN    cholestyramine light packet 4 g  4 g Oral BID    [Held by provider] aspirin chewable tablet 81 mg  81 mg Oral Daily    sodium bicarbonate tablet 1,300 mg  1,300 mg Oral BID    oxyCODONE (ROXICODONE) immediate release tablet 5 mg  5 mg Oral Q8H PRN    0.9 % sodium chloride bolus  1,000 mL IntraVENous Once    sodium chloride flush 0.9 % injection 5-40 mL  5-40 mL IntraVENous 2 times per day    sodium chloride flush 0.9 % injection 5-40 mL  5-40 mL IntraVENous PRN    0.9 % sodium chloride infusion   IntraVENous PRN    ondansetron (ZOFRAN-ODT) disintegrating tablet 4 mg  4 mg Oral Q8H PRN    Or    ondansetron (ZOFRAN) injection 4 mg  4 mg IntraVENous Q6H PRN    acetaminophen (TYLENOL) tablet 650 mg  650 mg Oral Q6H PRN    Or    acetaminophen (TYLENOL) suppository 650 mg  650 mg Rectal Q6H PRN    polyethylene glycol (GLYCOLAX) packet 17 g  17 g Oral Daily PRN    hyoscyamine (LEVSIN/SL) sublingual tablet 125 mcg  125 mcg SubLINGual Q4H PRN    metronidazole (FLAGYL) 500 mg in 0.9% NaCl 100 mL IVPB premix  500 mg IntraVENous Q8H    cefTRIAXone (ROCEPHIN) 2,000 mg in sodium chloride 0.9 % 50 mL IVPB mini-bag  2,000 mg IntraVENous Q24H    zinc oxide 40 % paste   Topical 4x Daily PRN    amLODIPine (NORVASC) tablet 10 mg  10 mg Oral Daily    folic acid (FOLVITE) tablet 1 mg  1 mg Oral Daily    vitamin B-12 (CYANOCOBALAMIN) tablet 1,000 mcg  1,000 mcg Oral Daily    [START ON 9/29/2022] tobramycin (TOBREX) 0.3 % ophthalmic solution 1 drop  1 drop Both Eyes 6 times per day    glucose chewable tablet 16 g  4 tablet Oral PRN    dextrose bolus 10% 125 mL  125 mL IntraVENous PRN    Or    dextrose bolus 10% 250 mL  250 mL IntraVENous PRN    glucagon (rDNA) injection 1 mg  1 mg SubCUTAneous PRN    dextrose 10 % infusion   IntraVENous Continuous PRN    insulin lispro (HUMALOG) injection vial 0-8 Units  0-8 Units SubCUTAneous TID WC    insulin lispro (HUMALOG) injection vial 0-4 Units  0-4 Units SubCUTAneous Nightly       Signed:  Yoli Ibarra MD    Part of this note may have been written by using a voice dictation software. The note has been proof read but may still contain some grammatical/other typographical errors.

## 2022-09-06 NOTE — PROGRESS NOTES
END OF SHIFT NOTE:    INTAKE/OUTPUT  09/05 0701 - 09/06 0700  In: 6477 [I.V.:1413]  Out: -   Voiding: Yes  Catheter: No  Drain:              Flatus: Patient does have flatus present. Stool: 1 occurrences. Characteristics:  Stool Appearance: Soft  Stool Color: Brown  Stool Amount: Large  Stool Assessment  Incontinence: No  Stool Appearance: Soft  Stool Color: Brown  Stool Amount: Large  Stool Source: Rectum  Last BM (including prior to admit): 09/04/22 (Per Patient)    Emesis: 0 occurrences. Characteristics:        VITAL SIGNS  Patient Vitals for the past 12 hrs:   Temp Pulse Resp BP SpO2   09/06/22 0333 97.9 °F (36.6 °C) 78 18 129/86 100 %   09/05/22 2323 97.9 °F (36.6 °C) 86 18 (!) 134/92 99 %   09/05/22 2002 -- -- 18 -- --   09/05/22 1936 98.2 °F (36.8 °C) 88 19 117/83 100 %       Pain Assessment  @BSHSIFLOW(13)@  Pain Location: Foot  Patient's Stated Pain Goal: 2, Functional Goal (Comment)    Ambulating  Yes    Shift report given to oncoming nurse at the bedside.     Damian Hernandez RN

## 2022-09-06 NOTE — OP NOTE
PROCEDURE: Esophagogastroduodenoscopy    ENDOSCOPIST: Pepper Jj MD    PREOPERATIVE DIAGNOSIS: CHUY, Melena, Hx of PUD    POSTOPERATIVE DIAGNOSIS: Normal    INSTRUMENTS: GIF H190    SEDATION: MAC    DESCRIPTION: After informed consent was obtained, the patient was taken to the endoscopy suite and placed in the left lateral decubitus position. Intravenous sedation was administered by the Anesthesia provider. After adequate sedation had been achieved the endoscope was inserted over the tongue and through the posterior pharynx under direct visualization down the esophagus to the stomach and into the second portion of the duodenum. The endoscopic was withdrawn from that point, performing a careful survey of the mucosa. Retroflexion was performed in the gastric fundus. FINDINGS:  Esophagus: Normal esophageal mucosa without esophagitis. Stomach: No large hiatal hernia. Normal mucosa without gastritis or ulcers. Duodenum: Deep  intubation to D3 showed normal mucosa. Estimated blood loss:  0 cc-minimal    IMPRESSION:   Normal EGD  No etiology for anemia found    PLAN:  - Start diet  - F/u with [facility] for capsule endoscopy  - We will sign off.  Please call with questions    P Joseline Hummel MD

## 2022-09-06 NOTE — PROGRESS NOTES
1045-TRANSFER - OUT REPORT:    Verbal report given to Amie Lind RN on Melina Nails  being transferred to 2nd floor for routine progression of patient care       Report consisted of patient's Situation, Background, Assessment and   Recommendations(SBAR). Information from the following report(s) Nurse Handoff Report was reviewed with the receiving nurse. Lines:   Peripheral IV 09/04/22 Distal;Right Forearm (Active)   Site Assessment Clean, dry & intact 09/06/22 0720   Line Status Flushed; Infusing 09/06/22 0927   Line Care Connections checked and tightened 09/06/22 0720   Phlebitis Assessment No symptoms 09/06/22 0720   Infiltration Assessment 0 09/06/22 0720   Alcohol Cap Used Yes 09/06/22 0720   Dressing Status Clean, dry & intact 09/06/22 0720   Dressing Type Transparent 09/06/22 0720        Opportunity for questions and clarification was provided.       Patient will be transported with:  Tech  1121-To 2nd floor with transport tech

## 2022-09-07 ENCOUNTER — HOME HEALTH ADMISSION (OUTPATIENT)
Dept: HOME HEALTH SERVICES | Facility: HOME HEALTH | Age: 57
End: 2022-09-07
Payer: MEDICARE

## 2022-09-07 VITALS
OXYGEN SATURATION: 100 % | HEART RATE: 85 BPM | HEIGHT: 71 IN | RESPIRATION RATE: 18 BRPM | TEMPERATURE: 97.5 F | BODY MASS INDEX: 28 KG/M2 | WEIGHT: 200 LBS | DIASTOLIC BLOOD PRESSURE: 98 MMHG | SYSTOLIC BLOOD PRESSURE: 141 MMHG

## 2022-09-07 PROBLEM — E83.51 HYPOCALCEMIA: Status: RESOLVED | Noted: 2022-08-31 | Resolved: 2022-09-07

## 2022-09-07 PROBLEM — E87.6 HYPOKALEMIA DUE TO EXCESSIVE GASTROINTESTINAL LOSS OF POTASSIUM: Status: RESOLVED | Noted: 2022-08-31 | Resolved: 2022-09-07

## 2022-09-07 PROBLEM — H10.9 CONJUNCTIVITIS: Status: RESOLVED | Noted: 2022-05-13 | Resolved: 2022-09-07

## 2022-09-07 PROBLEM — K52.9 ACUTE GASTROENTERITIS: Status: RESOLVED | Noted: 2022-08-31 | Resolved: 2022-09-07

## 2022-09-07 PROBLEM — E88.09 HYPOALBUMINEMIA: Status: RESOLVED | Noted: 2022-08-31 | Resolved: 2022-09-07

## 2022-09-07 PROBLEM — K29.50 CHRONIC GASTRITIS: Status: RESOLVED | Noted: 2021-03-12 | Resolved: 2022-09-07

## 2022-09-07 LAB
ANION GAP SERPL CALC-SCNC: 4 MMOL/L (ref 4–13)
BASOPHILS # BLD: 0.1 K/UL (ref 0–0.2)
BASOPHILS NFR BLD: 1 % (ref 0–2)
BUN SERPL-MCNC: 20 MG/DL (ref 6–23)
CALCIUM SERPL-MCNC: 8.6 MG/DL (ref 8.3–10.4)
CHLORIDE SERPL-SCNC: 106 MMOL/L (ref 101–110)
CO2 SERPL-SCNC: 20 MMOL/L (ref 21–32)
CREAT SERPL-MCNC: 1.3 MG/DL (ref 0.8–1.5)
DIFFERENTIAL METHOD BLD: ABNORMAL
EOSINOPHIL # BLD: 0.4 K/UL (ref 0–0.8)
EOSINOPHIL NFR BLD: 4 % (ref 0.5–7.8)
ERYTHROCYTE [DISTWIDTH] IN BLOOD BY AUTOMATED COUNT: 13.5 % (ref 11.9–14.6)
GLUCOSE BLD STRIP.AUTO-MCNC: 212 MG/DL (ref 65–100)
GLUCOSE BLD STRIP.AUTO-MCNC: 233 MG/DL (ref 65–100)
GLUCOSE BLD STRIP.AUTO-MCNC: 247 MG/DL (ref 65–100)
GLUCOSE BLD STRIP.AUTO-MCNC: 252 MG/DL (ref 65–100)
GLUCOSE SERPL-MCNC: 210 MG/DL (ref 65–100)
HCT VFR BLD AUTO: 25.3 % (ref 41.1–50.3)
HGB BLD-MCNC: 8.8 G/DL (ref 13.6–17.2)
IMM GRANULOCYTES # BLD AUTO: 0.1 K/UL (ref 0–0.5)
IMM GRANULOCYTES NFR BLD AUTO: 1 % (ref 0–5)
LYMPHOCYTES # BLD: 1.6 K/UL (ref 0.5–4.6)
LYMPHOCYTES NFR BLD: 15 % (ref 13–44)
MCH RBC QN AUTO: 30 PG (ref 26.1–32.9)
MCHC RBC AUTO-ENTMCNC: 34.8 G/DL (ref 31.4–35)
MCV RBC AUTO: 86.3 FL (ref 79.6–97.8)
MONOCYTES # BLD: 1.3 K/UL (ref 0.1–1.3)
MONOCYTES NFR BLD: 13 % (ref 4–12)
NEUTS SEG # BLD: 7 K/UL (ref 1.7–8.2)
NEUTS SEG NFR BLD: 66 % (ref 43–78)
NRBC # BLD: 0 K/UL (ref 0–0.2)
PLATELET # BLD AUTO: 288 K/UL (ref 150–450)
PMV BLD AUTO: 11.1 FL (ref 9.4–12.3)
POTASSIUM SERPL-SCNC: 4.4 MMOL/L (ref 3.5–5.1)
RBC # BLD AUTO: 2.93 M/UL (ref 4.23–5.6)
SERVICE CMNT-IMP: ABNORMAL
SODIUM SERPL-SCNC: 130 MMOL/L (ref 136–145)
WBC # BLD AUTO: 10.5 K/UL (ref 4.3–11.1)

## 2022-09-07 PROCEDURE — 80048 BASIC METABOLIC PNL TOTAL CA: CPT

## 2022-09-07 PROCEDURE — 6370000000 HC RX 637 (ALT 250 FOR IP): Performed by: FAMILY MEDICINE

## 2022-09-07 PROCEDURE — 6370000000 HC RX 637 (ALT 250 FOR IP): Performed by: INTERNAL MEDICINE

## 2022-09-07 PROCEDURE — 85025 COMPLETE CBC W/AUTO DIFF WBC: CPT

## 2022-09-07 PROCEDURE — C9113 INJ PANTOPRAZOLE SODIUM, VIA: HCPCS | Performed by: INTERNAL MEDICINE

## 2022-09-07 PROCEDURE — 6360000002 HC RX W HCPCS: Performed by: INTERNAL MEDICINE

## 2022-09-07 PROCEDURE — A4216 STERILE WATER/SALINE, 10 ML: HCPCS | Performed by: INTERNAL MEDICINE

## 2022-09-07 PROCEDURE — 2580000003 HC RX 258: Performed by: FAMILY MEDICINE

## 2022-09-07 PROCEDURE — 36415 COLL VENOUS BLD VENIPUNCTURE: CPT

## 2022-09-07 PROCEDURE — 2580000003 HC RX 258: Performed by: INTERNAL MEDICINE

## 2022-09-07 RX ORDER — SODIUM BICARBONATE 650 MG/1
1300 TABLET ORAL 2 TIMES DAILY
Qty: 60 TABLET | Refills: 0 | Status: SHIPPED | OUTPATIENT
Start: 2022-09-07

## 2022-09-07 RX ORDER — INSULIN GLARGINE 100 [IU]/ML
10 INJECTION, SOLUTION SUBCUTANEOUS DAILY
Status: DISCONTINUED | OUTPATIENT
Start: 2022-09-07 | End: 2022-09-07 | Stop reason: HOSPADM

## 2022-09-07 RX ORDER — FOLIC ACID 1 MG/1
1 TABLET ORAL DAILY
Qty: 30 TABLET | Refills: 0 | Status: SHIPPED | OUTPATIENT
Start: 2022-09-08

## 2022-09-07 RX ORDER — INSULIN GLARGINE 100 [IU]/ML
10 INJECTION, SOLUTION SUBCUTANEOUS DAILY
Qty: 5 ADJUSTABLE DOSE PRE-FILLED PEN SYRINGE | Refills: 0 | Status: SHIPPED | OUTPATIENT
Start: 2022-09-07

## 2022-09-07 RX ORDER — PANTOPRAZOLE SODIUM 40 MG/1
40 TABLET, DELAYED RELEASE ORAL
Qty: 30 TABLET | Refills: 0 | Status: SHIPPED | OUTPATIENT
Start: 2022-09-07

## 2022-09-07 RX ORDER — OXYCODONE HYDROCHLORIDE 5 MG/1
5 TABLET ORAL EVERY 8 HOURS PRN
Qty: 10 TABLET | Refills: 0 | Status: SHIPPED | OUTPATIENT
Start: 2022-09-07 | End: 2022-09-10

## 2022-09-07 RX ORDER — BLOOD-GLUCOSE METER
1 KIT MISCELLANEOUS DAILY
Qty: 1 KIT | Refills: 0 | Status: SHIPPED | OUTPATIENT
Start: 2022-09-07

## 2022-09-07 RX ORDER — CHOLESTYRAMINE LIGHT 4 G/5.7G
4 POWDER, FOR SUSPENSION ORAL 2 TIMES DAILY
Qty: 60 PACKET | Refills: 0 | Status: SHIPPED | OUTPATIENT
Start: 2022-09-07

## 2022-09-07 RX ORDER — CALCIUM CARBONATE-CHOLECALCIFEROL TAB 250 MG-125 UNIT 250-125 MG-UNIT
1 TAB ORAL DAILY
Qty: 60 TABLET | Refills: 0 | Status: SHIPPED | OUTPATIENT
Start: 2022-09-08

## 2022-09-07 RX ORDER — AMLODIPINE BESYLATE 10 MG/1
10 TABLET ORAL DAILY
Qty: 30 TABLET | Refills: 0 | Status: SHIPPED | OUTPATIENT
Start: 2022-09-08

## 2022-09-07 RX ADMIN — INSULIN GLARGINE 10 UNITS: 100 INJECTION, SOLUTION SUBCUTANEOUS at 13:55

## 2022-09-07 RX ADMIN — INSULIN LISPRO 2 UNITS: 100 INJECTION, SOLUTION INTRAVENOUS; SUBCUTANEOUS at 17:08

## 2022-09-07 RX ADMIN — SODIUM BICARBONATE 650 MG TABLET 1300 MG: at 08:36

## 2022-09-07 RX ADMIN — SODIUM CHLORIDE, PRESERVATIVE FREE 10 ML: 5 INJECTION INTRAVENOUS at 08:36

## 2022-09-07 RX ADMIN — INSULIN LISPRO 2 UNITS: 100 INJECTION, SOLUTION INTRAVENOUS; SUBCUTANEOUS at 08:46

## 2022-09-07 RX ADMIN — SODIUM CHLORIDE 40 MG: 9 INJECTION, SOLUTION INTRAMUSCULAR; INTRAVENOUS; SUBCUTANEOUS at 08:35

## 2022-09-07 RX ADMIN — CALCIUM CARBONATE-CHOLECALCIFEROL TAB 250 MG-125 UNIT 1 TABLET: 250-125 TAB at 08:34

## 2022-09-07 RX ADMIN — FOLIC ACID 1 MG: 1 TABLET ORAL at 08:35

## 2022-09-07 RX ADMIN — FAMOTIDINE 20 MG: 20 TABLET, FILM COATED ORAL at 08:35

## 2022-09-07 RX ADMIN — AMLODIPINE BESYLATE 10 MG: 10 TABLET ORAL at 08:33

## 2022-09-07 RX ADMIN — INSULIN LISPRO 4 UNITS: 100 INJECTION, SOLUTION INTRAVENOUS; SUBCUTANEOUS at 13:56

## 2022-09-07 RX ADMIN — CYANOCOBALAMIN TAB 1000 MCG 1000 MCG: 1000 TAB at 08:36

## 2022-09-07 RX ADMIN — CHOLESTYRAMINE 4 G: 4 POWDER, FOR SUSPENSION ORAL at 08:48

## 2022-09-07 ASSESSMENT — PAIN SCALES - GENERAL: PAINLEVEL_OUTOF10: 0

## 2022-09-07 NOTE — CARE COORDINATION
Patient with discharge orders today. PT/OT evals recommending home health, referral sent to [facility]. DME: 3 in 1 and rolling walker provided to patient, via [employer]. Patient's spouse, at bedside, to provide transportation home. Patient has met all treatment goals and milestones. CM following until discharged. ASSESSMENT NOTE    Attending Physician: Lupe Lua MD  Admit Problem: Hypokalemia [E87.6]  Hypoglycemia [E16.2]  Acute pyelonephritis [N10]  Acute kidney injury [facility]) [N17.9]  Acute weakness [R53.1]  Diabetic ulcer of both feet ([facility] [S47.502, L97.519, L97.529]  Acute pyelonephritis due to bacteria [N10, B96.89]  Date/Time of Admission: 8/31/2022  4:18 PM  Problem List:  Patient Active Problem List   Diagnosis    Alcohol dependence ([address]    Diabetic peripheral neuropathy ([address]    Essential hypertension    Hyperlipidemia    PUD (peptic ulcer disease)    Type 2 diabetes mellitus with hypoglycemia, with long-term current use of insulin ([address]    Diabetic ulcer of both feet associated with type 2 diabetes mellitus ([address]    Hyponatremia    Acute renal failure superimposed on stage 3a chronic kidney disease ([address]    PAD (peripheral artery disease) [facility])       Service Assessment  Patient Orientation Alert and Oriented   Cognition Alert   History Provided By Patient   Primary Caregiver Self   Accompanied By/Relationship Spouse: Yahaira Cobb  [phone]   Support Systems Spouse/Significant Other   Patient's Healthcare Decision Maker is: Legal Next of Mariana 70   PCP Verified by CM Yes (Dr Ezekiel Escobar at [place]; gets scripts at [facility])   Last Visit to PCP Within last 3 months   Prior Functional Level     Current Functional Level     Can patient return to prior living arrangement Yes   Ability to make needs known: Good   Family able to assist with home care needs: Yes   Would you like for me to discuss the discharge plan with any other family members/significant others, and if so, who?      Financial Resources [facility] Resources     CM/SW Referral       Social/Functional History  Lives With Spouse   Type of [address] One level   Home Access Stairs to enter without rails   Entrance Stairs - Number of Steps 2   Entrance Stairs - Rails     Bathroom Shower/Tub     [address] Work     Driving     Shopping          Other (Comment)     [address] [place] Paying/Finance [facility]     Other (Comment)     Ambulation Assistance Independent   Transfer Assistance     Active  No   Patient's  Info family   Mode of Transportation Car   Education     Occupation On disability   Type of Occupation       Discharge Planning   Type of [facility] [facility] Spouse/Significant Other   Current Services Prior To Admission [facility]   DME [address], [place] [address]   DME     DME Ordered? Bedside commode, Walker   Potential Assistance Purchasing Medications No   Meds-to-Beds: Does the patient want to have any new prescriptions delivered to bedside prior to discharge? Type of Home Care Services None   Patient expects to be discharged to: House   Follow Up Appointment: Best Day/Time Tuesday AM (Anytime)   One/Two Story Residence: One story   # of Interior Steps     Height of Each Step (in)     [employer] Available     History of Falls?  No     Services At/After Discharge  Transition of Care Consult (CM Consult): Discharge Planning, [address]   Internal Home Health Yes   Internal Hospice     Reason Outside Agency [address]     Partner SNF     Reason Why Partner SNF Not Chosen     Internal Comfort Care     Reason Outside [address] Discharge [address] Resource Information Provided? No   Mode of Transport at Discharge [facility] [facility] Time of Discharge     Confirm Follow Up Transport Family     Condition of Participation: Discharge Planning  The plan for Transition of Care is related to the following treatment goals: return to baseline   The Patient and/or Patient Representative was provided with a Choice of Provider? Patient   Name of the Patient Representative who was provided with the Choice of Provider and agrees with the Discharge Plan? The Patient and/or Patient Representative Agree with the Discharge Plan? Yes   Freedom of Choice list was provided with basic dialogue that supports the individualized plan of care/goals, treatment preferences, and shares the quality data associated with the providers?  Yes     Documentation for Discharge Appeal  Discharge Appealed by     Date notified by [employer] of appeal request:     Time notified by [employer] of appeal request:     Detailed Notice of Discharge given to:     Date Notice of Discharge given:     Time Notice of Discharge given:     Date records sent to [facility]     Time records sent to [employer] [employer]     Date Notified of Outcome     Time Notified of Outcome     Outcome of appeal           Dario Nix RN 09/07/22 12:12 PM

## 2022-09-07 NOTE — DISCHARGE INSTRUCTIONS
Upper GI Endoscopy: What to Expect at [address] had an upper GI endoscopy. Your doctor used a thin, lighted tube that bends to look at the inside of your esophagus, your stomach, and the first part ofthe small intestine, called the duodenum. After you have an endoscopy, you will stay at the hospital or clinic for 1 to 2 hours. This will allow the medicine to wear off. You will be able to go home after your doctor or nurse checks to make sure that you're not having anyproblems. You may have to stay overnight if you had treatment during the test. You mayhave a sore throat for a day or two after the test.  This care sheet gives you a general idea about what to expect after the test.  How can you care for yourself at home? Activity   Rest as much as you need to after you go home. You should be able to go back to your usual activities the day after the test.  Diet   Follow your doctor's directions for eating after the test.  Drink plenty of fluids (unless your doctor has told you not to). Medications   If you have a sore throat the day after the test, use an over-the-counter spray to numb your throat. Follow-up care is a key part of your treatment and safety. Be sure to make and go to all appointments, and call your doctor if you are having problems. It's also a good idea to know your test results and keep alist of the medicines you take. When should you call for help? Call 911 anytime you think you may need emergency care. For example, call if:    You passed out (lost consciousness). You have trouble breathing. You pass maroon or bloody stools. Call your doctor now or seek immediate medical care if:    You have pain that does not get better after your take pain medicine. You have new or worse belly pain. You have blood in your stools. You are sick to your stomach and cannot keep fluids down. You have a fever. You cannot pass stools or gas.    Watch closely for changes in your health, and be sure to contact your doctor if:    Your throat still hurts after a day or two. You do not get better as expected. Where can you learn more? Go to https://theScorejaycerubén.[employer]. org and sign in to your [employer] account. Enter W738 in the [facility] box to learn more about \"Upper GI Endoscopy: What to Expect at Home. \"     If you do not have an account, please click on the \"Sign Up Now\" link. Current as of: September 8, 2021               Content Version: 13.3  © 5427-7764 [employer]. Care instructions adapted under license by [facility] ([facility]). If you have questions about a medical condition or this instruction, always ask your healthcare professional. [address] any warranty or liability for your use of this information. Gastroenteritis: Care Instructions  Overview     Gastroenteritis is an illness that may cause nausea, vomiting, and diarrhea. Itcan be caused by bacteria or a virus. You will probably begin to feel better in 1 to 2 days. In the meantime, get plenty of rest and make sure you do not become dehydrated. Dehydration occurswhen your body loses too much fluid. Follow-up care is a key part of your treatment and safety. Be sure to make and go to all appointments, and call your doctor if you are having problems. It's also a good idea to know your test results and keep alist of the medicines you take. How can you care for yourself at home? If your doctor prescribed antibiotics, take them as directed. Do not stop taking them just because you feel better. You need to take the full course of antibiotics. Drink plenty of fluids to prevent dehydration. Choose water and other clear liquids until you feel better. If you have kidney, heart, or liver disease and have to limit fluids, talk with your doctor before you increase your fluid intake.   Drink fluids slowly, in frequent, small amounts, because drinking too much too fast can cause vomiting. Begin eating mild foods, such as dry toast, yogurt, applesauce, bananas, and rice. Avoid spicy, hot, or high-fat foods, and do not drink alcohol or caffeine for a day or two. Do not drink milk or eat ice cream until you are feeling better. How to prevent gastroenteritis  Keep hot foods hot and cold foods cold. Do not eat meats, dressings, salads, or other foods that have been kept at room temperature for more than 2 hours. Use a thermometer to check your refrigerator. It should be between 34°F and 40°F.  Defrost meats in the refrigerator or microwave, not on the kitchen counter. Keep your hands and your kitchen clean. Wash your hands, cutting boards, and countertops with hot soapy water frequently. Cook meat until it is well done. Do not eat raw eggs or uncooked sauces made with raw eggs. Do not take chances. If food looks or tastes spoiled, throw it out. When should you call for help? Call 911 anytime you think you may need emergency care. For example, call if:    You vomit blood or what looks like coffee grounds. You passed out (lost consciousness). You pass maroon or very bloody stools. Call your doctor now or seek immediate medical care if:    You have severe belly pain. You have signs of needing more fluids. You have sunken eyes, a dry mouth, and pass only a little urine. You feel like you are going to faint. You have increased belly pain that does not go away in 1 to 2 days. You have new or increased nausea, or you are vomiting. You have a new or higher fever. Your stools are black and tarlike or have streaks of blood. Watch closely for changes in your health, and be sure to contact your doctor if:    You are dizzy or lightheaded. You urinate less than usual, or your urine is dark yellow or brown. You do not feel better with each day that goes by. Where can you learn more? Go to https://adrien.health-partners. org and sign in to your [employer] account. Enter N142 in the [facility] box to learn more about \"Gastroenteritis: Care Instructions. \"     If you do not have an account, please click on the \"Sign Up Now\" link. Current as of: February 9, 2022               Content Version: 13.3  © 2006-2022 [employer]. Care instructions adapted under license by [facility] ([facility]). If you have questions about a medical condition or this instruction, always ask your healthcare professional. [place] [address] any warranty or liability for your use of this information. Diabetic Foot Ulcer: Care Instructions  Overview  Diabetes can damage the nerve endings and blood vessels in your feet. That means you are less likely to notice when your feet are injured. A small skin problem like a callus, blister, or cracked skin can turn into a larger sore, called a foot ulcer. Foot ulcers form most often on the pad (ball) of the foot or the bottom of the big toe. You can also get them on the top and bottom ofeach toe. Foot ulcers can get infected. If the infection is severe, then tissue in the foot can die. This is called gangrene. In that case, one or more of the toes, part or all of the foot, and sometimes part of the leg may have to be removed(amputated). Your doctor may have removed the dead tissue and cleaned the ulcer. Your foot wound may be wrapped in a protective bandage. It is very important to keep your weight off your injured foot. After a foot ulcer has formed, it will not healas long as you keep putting weight on the area. Always get early treatment for foot problems. A minor irritation can lead to amajor problem if it's not taken care of soon. Follow-up care is a key part of your treatment and safety. Be sure to make and go to all appointments, and call your doctor if you are having problems. It's also a good idea to know your test results and keep alist of the medicines you take.   How can you care for yourself at home? Follow your doctor's instructions about keeping pressure off the foot ulcer. You may need to use crutches or a wheelchair. Or you may wear a cast or a walking boot. Follow your doctor's instructions on how to clean the ulcer and change the bandage. If your doctor prescribed antibiotics, take them as directed. Do not stop taking them just because you feel better. You need to take the full course of antibiotics. To prevent foot ulcers  Keep your blood sugar in your target range by watching what and how much you eat. Track your blood sugar, take medicines if prescribed, and get regular exercise. Do not smoke. Smoking affects blood flow and can make foot problems worse. If you need help quitting, talk to your doctor about stop-smoking programs and medicines. These can increase your chances of quitting for good. Do not go barefoot. Protect your feet by wearing shoes that fit well. Choose shoes that are made of materials that are flexible and breathable, such as leather or cloth. Inspect your feet daily for blisters, cuts, cracks, or sores. If you can't see well, use a mirror or have someone help you. Have your doctor check your feet during each visit. If you have a foot problem, see your doctor. Do not try to treat your foot problem on your own. Home remedies or treatments that you can buy without a prescription (such as corn removers) can be harmful. When should you call for help? Call your doctor now or seek immediate medical care if:    You have symptoms of infection, such as: Increased pain, swelling, warmth, or redness. Red streaks leading from the area. Pus draining from the area. A fever. Watch closely for changes in your health, and be sure to contact your doctor if:    You have a new problem with your feet, such as:  A new sore or ulcer. A break in the skin that is not healing after several days. Bleeding corns or calluses. An ingrown toenail.      You do not get better as expected. Where can you learn more? Go to https://chpepiceweb.[email or web address] org and sign in to your [employer] account. Enter T131 in the [facility] box to learn more about \"Diabetic Foot Ulcer: Care Instructions. \"     If you do not have an account, please click on the \"Sign Up Now\" link. Current as of: July 28, 2021               Content Version: 13.3  © 2006-2022 [employer]. Care instructions adapted under license by [facility] ([facility]). If you have questions about a medical condition or this instruction, always ask your healthcare professional. [address] any warranty or liability for your use of this information.

## 2022-09-07 NOTE — DIABETES MGMT
Patient admitted with acute gastroenteritis. Blood glucose ranged 128-233 yesterday with patient receiving Humalog 2 units. Blood glucose this morning was 212. Creatinine 1.30. GFR > 60. Reviewed patient current regimen: Humalog correctional insulin. Noted diet ordered. Patient would likely benefit from initiation of basal insulin as fasting glucose is not at goal.  Provider updated via [employer] regarding recommendation and glycemic control. Patient will need prescription for glucometer kit, test strips, and lancets at discharge so that the patient may obtain the meter covered by their insurance. Given blood glucose on admission of 50, pt would likely benefit from a reduction in Basal dosing at discharge to decrease risk of hypoglcyemia and close follow up with PCP.

## 2022-09-07 NOTE — DISCHARGE SUMMARY
Hospitalist Discharge Summary   Admit Date:  2022  4:18 PM   DC Note date: 2022  Name:  Glorious Duty   Age:  65 y.o. Sex:  male  :  1965   MRN:  [id number]   Room:    PCP:  None Provider    Presenting Complaint: Hypotension     Initial Admission Diagnosis: Hypokalemia [E87.6]  Hypoglycemia [E16.2]  Acute pyelonephritis [N10]  Acute kidney injury [facility]) [N17.9]  Acute weakness [R53.1]  Diabetic ulcer of both feet ([address] [O80.886, L97.519, L97.529]  Acute pyelonephritis due to bacteria [N10, B96.89]     Problem List for this Hospitalization (present on admission):    Principal Problem (Resolved):    Acute gastroenteritis  Active Problems:    Alcohol dependence ([address]    Diabetic peripheral neuropathy ([address]    Essential hypertension    Hyperlipidemia    PUD (peptic ulcer disease)    Type 2 diabetes mellitus with hypoglycemia, with long-term current use of insulin ([address]    Diabetic ulcer of both feet associated with type 2 diabetes mellitus ([address]    Hyponatremia    Acute renal failure superimposed on stage 3a chronic kidney disease (HCC)    PAD (peripheral artery disease) ([address]  Resolved Problems:    Conjunctivitis    Chronic gastritis    Hypocalcemia    Hypoalbuminemia    Hypokalemia due to excessive gastrointestinal loss of potassium      Hospital Course:      Mr. Levi Casper is a 66 yo male with PMH of obesity, DM2, HTN, HLP, upper GI bleed admitted with abdominal pain, diarrhea, increasing debility. He developed bilateral heel wounds with rash up LE. CTAP showed\"         Impression       1. Cholelithiasis without pericholecystic inflammatory changes to suggest acute   cholecystitis or appendicitis. 2.  Bilateral perinephric soft tissue stranding and small perinephric fluid   collections as well as a small amount of fluid in the pelvic cul-de-sac are   nonspecific, but the possibility of pyelonephritis should be considered. \"     UA negative. Cdiff negative.    HGB down to 6. 6- 7.0 on recheck. No sven bleeding. Occult negative. S/p 1 unit PRBC 9-5-22. He has been seen by GI and s/p EGD on 9-6-22 without obvious issues. Recommendations are to followup with pre-established GI at [facility] for possible capsule endoscopy. He has been seen by wound care and xrays bilateral LE negative. Seen by vascular s/p arterial dopplers showing PAD. Started on asa though held with progressive anemia. No surgical needs. He is on course of rocephin/flagyl./vancomycin- EOT 9-6-22. cultures with klebsiella and MSSA. He will have wound care at home. He has developed progressive hyponatremia. He is on fluid restriction. Sodum trending upward. he is advised ongoing 1.5 L fluid restriction. He should have sodium rechecked in 1 week. He had hypoglycemia on admit and glucoses are progressively elevated. He has been seen by DM2 teaching with recommendations to start lantus 10 units once daily. He previously took lantus by pen 30 units every 12 hours per wife. We discussed this adjustment. His PCP can followup up his glucose regimen. Discharge plans are  to home with home health/ wife. Disposition: home        Diet: ADULT DIET; Regular; 4 carb choices (60 gm/meal)  Code Status: Full Code    Follow Ups:   Contact information for follow-up providers     None Provider Follow up in 1 week(s). [facility] Follow up in 1 month(s). Contact information:  [phone]           None Provider Follow up in 1 week(s). Contact information for after-discharge care     Discharge [id number] [facility] . Service: Durable Medical Equipment  Contact information:  [id number]  Radha Schumacher [address] [facility]  [phone]                           Time spent in patient discharge and coordination 35  minutes.         Follow up labs/diagnostics (ultimately defer to outpatient provider):      CBC, BMP  Heel wounds   [facility] GI    Plan was discussed with patient and then wife by phone. All questions answered. Patient was stable at time of discharge. Instructions given to call a physician or return if any concerns. Current Discharge Medication List        START taking these medications    Details   oxyCODONE (ROXICODONE) 5 MG immediate release tablet Take 1 tablet by mouth every 8 hours as needed for Pain for up to 3 days.   Qty: 10 tablet, Refills: 0    Comments: Reduce doses taken as pain becomes manageable  Associated Diagnoses: Diabetic ulcer of both feet (HCC)      Naloxone HCl (NALOXONE OPIATE OVERDOSE KIT) 1 each by Nasal route once for 1 dose If needed for narcotic overdose  Qty: 1 kit, Refills: 0      sodium bicarbonate 650 MG tablet Take 2 tablets by mouth 2 times daily  Qty: 60 tablet, Refills: 0      cholestyramine light 4 g packet Take 1 packet by mouth 2 times daily  Qty: 60 packet, Refills: 0      amLODIPine (NORVASC) 10 MG tablet Take 1 tablet by mouth daily  Qty: 30 tablet, Refills: 0      folic acid (FOLVITE) 1 MG tablet Take 1 tablet by mouth daily  Qty: 30 tablet, Refills: 0      vitamin B-12 1000 MCG tablet Take 1 tablet by mouth daily  Qty: 30 tablet, Refills: 0      calcium carb-cholecalciferol 250-125 MG-UNIT TABS Take 1 tablet by mouth daily  Qty: 60 tablet, Refills: 0      pantoprazole (PROTONIX) 40 MG tablet Take 1 tablet by mouth 2 times daily (before meals)  Qty: 30 tablet, Refills: 0      insulin glargine (LANTUS SOLOSTAR) 100 UNIT/ML injection pen Inject 10 Units into the skin daily  Qty: 5 Adjustable Dose Pre-filled Pen Syringe, Refills: 0      glucose monitoring (FREESTYLE FREEDOM) kit 1 kit by Does not apply route daily Check glucose twice daily  Qty: 1 kit, Refills: 0             Procedures done this admission:  Procedure(s):  EGD ESOPHAGOGASTRODUODENOSCOPY/ Rm 215    Consults this admission:  IP CONSULT TO DIABETES MANAGEMENT  IP WOUND CARE NURSE CONSULT TO EVAL  IP CONSULT TO VASCULAR SURGERY  IP [address] TO GI    Echocardiogram results:  No results found for this or any previous visit. Diagnostic Imaging/Tests:   XR CALCANEUS LEFT (MIN 2 VIEWS)    Result Date: 8/31/2022  No acute process. XR CALCANEUS RIGHT (MIN 2 VIEWS)    Result Date: 8/31/2022  No acute process. CT ABDOMEN PELVIS W IV CONTRAST Additional Contrast? None    Result Date: 8/31/2022  1. Cholelithiasis without pericholecystic inflammatory changes to suggest acute cholecystitis or appendicitis. 2.  Bilateral perinephric soft tissue stranding and small perinephric fluid collections as well as a small amount of fluid in the pelvic cul-de-sac are nonspecific, but the possibility of pyelonephritis should be considered. Vascular duplex lower extremity arteries bilateral with ALECIA    Result Date: 9/1/2022  1. Noncompressible pressures at the level the ankle consistent with small vessel calcinosis. 2. Right-sided mild-to-moderate superficial femoral artery and popliteal artery stenoses 3. Very sluggish flow noted in the left peroneal artery and right posterior tibial artery. Vascular duplex lower extremity venous left    Result Date: 8/31/2022  No evidence of left leg DVT.        Labs: Results:       BMP, Mg, Phos Recent Labs     09/05/22  0744 09/06/22  0640 09/07/22  0738   * 129* 130*   K 4.4 4.1 4.4    106 106   CO2 19* 19* 20*   ANIONGAP 6 4 4   BUN 17 16 20   CREATININE 1.40 1.20 1.30   LABGLOM 56* >60 >60   GFRAA >60 >60 >60   CALCIUM 7.7* 8.3 8.6   GLUCOSE 172* 137* 210*      CBC Recent Labs     09/05/22  0744 09/05/22  1103 09/05/22  1733 09/06/22  0640 09/07/22  0738   WBC 10.1  --   --  9.9 10.5   RBC 2.24*  --   --  2.74* 2.93*   HGB 6.6*   < > 8.8* 8.1* 8.8*   HCT 19.0*   < > 26.0* 23.5* 25.3*   MCV 84.8  --   --  85.8 86.3   MCH 29.5  --   --  29.6 30.0   MCHC 34.7  --   --  34.5 34.8   RDW 13.2  --   --  13.3 13.5     --   --  275 288   MPV 11.8  --   --  11.4 11.1   NRBC 0.00 --   --  0.00 0.00   SEGS 67  --   --  66 66   LYMPHOPCT 15  --   --  14 15   EOSRELPCT 2  --   --  3 4   MONOPCT 14*  --   --  15* 13*   BASOPCT 1  --   --  1 1   IMMGRAN 1  --   --  1 1   SEGSABS 6.8  --   --  6.5 7.0   LYMPHSABS 1.5  --   --  1.4 1.6   EOSABS 0.2  --   --  0.3 0.4   MONOSABS 1.5*  --   --  1.5* 1.3   BASOSABS 0.1  --   --  0.1 0.1   ABSIMMGRAN 0.1  --   --  0.1 0.1    < > = values in this interval not displayed. LFT No results for input(s): BILITOT, BILIDIR, ALKPHOS, AST, ALT, PROT, LABALBU, GLOB in the last 72 hours.    Cardiac  No results found for: NTPROBNP, TROPHS   Coags Lab Results   Component Value Date/Time    PROTIME 15.5 08/31/2022 04:16 PM    INR 1.2 08/31/2022 04:16 PM      A1c No results found for: LABA1C, EAG   Lipids No results found for: CHOL, LDLCALC, LABVLDL, HDL, CHOLHDLRATIO, TRIG   Thyroid  Lab Results   Component Value Date/Time    TSHELE 2.64 09/05/2022 07:44 AM        Most Recent UA Lab Results   Component Value Date/Time    COLORU YELLOW/STRAW 08/31/2022 07:39 PM    APPEARANCE CLEAR 08/31/2022 07:39 PM    SPECGRAV 1.025 08/31/2022 07:39 PM    LABPH 6.0 08/31/2022 07:39 PM    PROTEINU Negative 08/31/2022 07:39 PM    GLUCOSEU 100 08/31/2022 07:39 PM    GLUCOSEU Negative 08/31/2022 07:03 PM    KETUA Negative 08/31/2022 07:39 PM    BILIRUBINUR Negative 08/31/2022 07:39 PM    BLOODU Negative 08/31/2022 07:39 PM    BLOODU Negative 08/31/2022 07:03 PM    UROBILINOGEN 1.0 08/31/2022 07:39 PM    NITRU Negative 08/31/2022 07:39 PM    LEUKOCYTESUR Negative 08/31/2022 07:39 PM    BACTERIA 0 08/31/2022 07:39 PM        Recent Labs     08/31/22 2110 08/31/22 2107 08/31/22 2056 08/31/22 2050 08/31/22 1939   CULTURE NO ANAEROBES ISOLATED 5 DAYS  NO ANAEROBES ISOLATED 5 DAYS  LIGHT KLEBSIELLA PNEUMONIAE*  MODERATE  STAPHYLOCOCCUS AUREUS  CORRECTION TO THE MEDICAL RECORD PREVIOUSLY REPORTED AS:  * Methicillin Resistant Staphylococcus aureus *  *  CALLED TO AND CORRECTLY REPEATED BY: Samira Bahena RN @ 8904 ON 9/05/22 AK. MODERATE NORMAL SKIN XIMENA ISOLATED  LIGHT STAPHYLOCOCCUS AUREUS*  SCANT NORMAL SKIN XIMENA ISOLATED MRSA target DNA not detected, SA target DNA detected. A MRSA negative, SA positive test result does not preclude MRSA nasal colonization. *  RESULTS VERIFIED, PHONED TO AND READ BACK BY MAIA ANGEL @ 0023 ON 09.01.22 [place] NO GROWTH 5 DAYS NO GROWTH 5 DAYS 10,000 to 50,000 COLONIES/mL MIXED SKIN XIMENA ISOLATED       All Labs from Last 24 Hrs:  Recent Results (from the past 24 hour(s))   POCT Glucose    Collection Time: 09/06/22 11:46 AM   Result Value Ref Range    POC Glucose 130 (H) 65 - 100 mg/dL    Performed by: Luisito Myles    POCT Glucose    Collection Time: 09/06/22  4:15 PM   Result Value Ref Range    POC Glucose 220 (H) 65 - 100 mg/dL    Performed by: Luisito Myles    POCT Glucose    Collection Time: 09/06/22  8:57 PM   Result Value Ref Range    POC Glucose 233 (H) 65 - 100 mg/dL    Performed by: Ioana    Basic Metabolic Panel    Collection Time: 09/07/22  7:38 AM   Result Value Ref Range    Sodium 130 (L) 136 - 145 mmol/L    Potassium 4.4 3.5 - 5.1 mmol/L    Chloride 106 101 - 110 mmol/L    CO2 20 (L) 21 - 32 mmol/L    Anion Gap 4 4 - 13 mmol/L    Glucose 210 (H) 65 - 100 mg/dL    BUN 20 6 - 23 MG/DL    Creatinine 1.30 0.8 - 1.5 MG/DL    GFR African American >60 >60 ml/min/1.73m2    GFR Non- >60 >60 ml/min/1.73m2    Calcium 8.6 8.3 - 10.4 MG/DL   CBC with Auto Differential    Collection Time: 09/07/22  7:38 AM   Result Value Ref Range    WBC 10.5 4.3 - 11.1 K/uL    RBC 2.93 (L) 4.23 - 5.6 M/uL    Hemoglobin 8.8 (L) 13.6 - 17.2 g/dL    Hematocrit 25.3 (L) 41.1 - 50.3 %    MCV 86.3 79.6 - 97.8 FL    MCH 30.0 26.1 - 32.9 PG    MCHC 34.8 31.4 - 35.0 g/dL    RDW 13.5 11.9 - 14.6 %    Platelets 642 736 - 191 K/uL    MPV 11.1 9.4 - 12.3 FL    nRBC 0.00 0.0 - 0.2 K/uL    Differential Type AUTOMATED      Seg Neutrophils 66 43 - 78 %    Lymphocytes 15 13 - 44 %    Monocytes 13 (H) 4.0 - 12.0 %    Eosinophils % 4 0.5 - 7.8 %    Basophils 1 0.0 - 2.0 %    Immature Granulocytes 1 0.0 - 5.0 %    Segs Absolute 7.0 1.7 - 8.2 K/UL    Absolute Lymph # 1.6 0.5 - 4.6 K/UL    Absolute Mono # 1.3 0.1 - 1.3 K/UL    Absolute Eos # 0.4 0.0 - 0.8 K/UL    Basophils Absolute 0.1 0.0 - 0.2 K/UL    Absolute Immature Granulocyte 0.1 0.0 - 0.5 K/UL   POCT Glucose    Collection Time: 09/07/22  7:58 AM   Result Value Ref Range    POC Glucose 212 (H) 65 - 100 mg/dL    Performed by: Flex        No Known Allergies  Immunization History   Administered Date(s) Administered    PPD Test 09/01/2022       Recent Vital Data:  Patient Vitals for the past 24 hrs:   Temp Pulse Resp BP SpO2   09/07/22 0833 -- -- -- (!) 139/96 --   09/07/22 0758 97.5 °F (36.4 °C) 79 18 (!) 139/96 100 %   09/07/22 0435 97.7 °F (36.5 °C) 80 18 126/78 100 %   09/06/22 2339 98.2 °F (36.8 °C) 85 -- 120/83 100 %   09/06/22 1948 98.4 °F (36.9 °C) 80 18 (!) 129/90 100 %   09/06/22 1537 98.1 °F (36.7 °C) 82 17 (!) 137/90 100 %   09/06/22 1144 97.7 °F (36.5 °C) 83 18 (!) 139/91 100 %   09/06/22 1115 -- 72 18 138/89 99 %       Oxygen Therapy  SpO2: 100 %  Pulse via Oximetry: 73 beats per minute  Pulse Oximeter Device Mode: Continuous  O2 Device: None (Room air)  Oxygen Therapy: None (Room air)    Estimated body mass index is 27.89 kg/m² as calculated from the following:    Height as of this encounter: 5' 11\" (1.803 m). Weight as of this encounter: 200 lb (90.7 kg). Intake/Output Summary (Last 24 hours) at 9/7/2022 1104  Last data filed at 9/7/2022 0659  Gross per 24 hour   Intake 293.47 ml   Output 325 ml   Net -31.53 ml         Physical Exam:    General:    Well nourished. No overt distress  CV:   RRR. No m/r/g. No JVD, trace to 1+ edema   Lungs:   CTAB. No wheezing, rhonchi, or rales. Respirations even, unlabored anterior   Abdomen:   Soft, nontender, nondistended. Extremities: Warm and dry. No cyanosis or clubbing. Skin:     No rashes. Normal coloration  Neuro:  grossly intact. Psych:  Normal mood and affect. Signed:  Kathi Crow MD    Part of this note may have been written by using a voice dictation software. The note has been proof read but may still contain some grammatical/other typographical errors.

## 2022-09-08 ENCOUNTER — HOME CARE VISIT (OUTPATIENT)
Dept: SCHEDULING | Facility: HOME HEALTH | Age: 57
End: 2022-09-08
Payer: MEDICARE

## 2022-09-08 VITALS
DIASTOLIC BLOOD PRESSURE: 78 MMHG | TEMPERATURE: 97.9 F | WEIGHT: 202 LBS | SYSTOLIC BLOOD PRESSURE: 127 MMHG | HEART RATE: 82 BPM | HEIGHT: 71 IN | RESPIRATION RATE: 18 BRPM | OXYGEN SATURATION: 97 % | BODY MASS INDEX: 28.28 KG/M2

## 2022-09-08 LAB
BACTERIA SPEC CULT: NORMAL
BACTERIA SPEC CULT: NORMAL
SERVICE CMNT-IMP: NORMAL
SERVICE CMNT-IMP: NORMAL

## 2022-09-08 PROCEDURE — 1090000001 HH PPS REVENUE CREDIT

## 2022-09-08 PROCEDURE — 400013 HH SOC

## 2022-09-08 PROCEDURE — G0299 HHS/HOSPICE OF RN EA 15 MIN: HCPCS

## 2022-09-08 PROCEDURE — 0221000100 HH NO PAY CLAIM PROCEDURE

## 2022-09-08 PROCEDURE — 1090000002 HH PPS REVENUE DEBIT

## 2022-09-08 ASSESSMENT — ENCOUNTER SYMPTOMS
PAIN LOCATION - PAIN QUALITY: ACHING
DYSPNEA ACTIVITY LEVEL: AFTER AMBULATING MORE THAN 20 FT

## 2022-09-09 ENCOUNTER — HOME CARE VISIT (OUTPATIENT)
Dept: SCHEDULING | Facility: HOME HEALTH | Age: 57
End: 2022-09-09
Payer: MEDICARE

## 2022-09-09 VITALS
OXYGEN SATURATION: 99 % | RESPIRATION RATE: 16 BRPM | HEART RATE: 100 BPM | DIASTOLIC BLOOD PRESSURE: 85 MMHG | TEMPERATURE: 98.1 F | SYSTOLIC BLOOD PRESSURE: 120 MMHG

## 2022-09-09 LAB — O+P STL MICRO: NORMAL

## 2022-09-09 PROCEDURE — G0299 HHS/HOSPICE OF RN EA 15 MIN: HCPCS

## 2022-09-09 PROCEDURE — 1090000001 HH PPS REVENUE CREDIT

## 2022-09-09 PROCEDURE — 1090000002 HH PPS REVENUE DEBIT

## 2022-09-09 PROCEDURE — G0151 HHCP-SERV OF PT,EA 15 MIN: HCPCS

## 2022-09-10 ENCOUNTER — HOME CARE VISIT (OUTPATIENT)
Dept: SCHEDULING | Facility: HOME HEALTH | Age: 57
End: 2022-09-10
Payer: MEDICARE

## 2022-09-10 VITALS
SYSTOLIC BLOOD PRESSURE: 136 MMHG | TEMPERATURE: 97.4 F | RESPIRATION RATE: 18 BRPM | HEART RATE: 86 BPM | OXYGEN SATURATION: 99 % | DIASTOLIC BLOOD PRESSURE: 82 MMHG

## 2022-09-10 VITALS
RESPIRATION RATE: 16 BRPM | DIASTOLIC BLOOD PRESSURE: 78 MMHG | SYSTOLIC BLOOD PRESSURE: 130 MMHG | HEART RATE: 78 BPM | OXYGEN SATURATION: 99 % | TEMPERATURE: 97.3 F

## 2022-09-10 LAB
BACTERIA SPEC CULT: ABNORMAL
GRAM STN SPEC: ABNORMAL
GRAM STN SPEC: ABNORMAL
SERVICE CMNT-IMP: ABNORMAL

## 2022-09-10 PROCEDURE — 1090000001 HH PPS REVENUE CREDIT

## 2022-09-10 PROCEDURE — G0299 HHS/HOSPICE OF RN EA 15 MIN: HCPCS

## 2022-09-10 PROCEDURE — 1090000002 HH PPS REVENUE DEBIT

## 2022-09-10 ASSESSMENT — ENCOUNTER SYMPTOMS
SKIN LESIONS: 1
STOOL DESCRIPTION: SOFT FORMED
PAIN LOCATION - PAIN QUALITY: ACHING

## 2022-09-11 ENCOUNTER — HOME CARE VISIT (OUTPATIENT)
Dept: SCHEDULING | Facility: HOME HEALTH | Age: 57
End: 2022-09-11
Payer: MEDICARE

## 2022-09-11 VITALS
TEMPERATURE: 97.6 F | SYSTOLIC BLOOD PRESSURE: 134 MMHG | RESPIRATION RATE: 16 BRPM | OXYGEN SATURATION: 99 % | HEART RATE: 80 BPM | DIASTOLIC BLOOD PRESSURE: 90 MMHG

## 2022-09-11 PROCEDURE — 1090000001 HH PPS REVENUE CREDIT

## 2022-09-11 PROCEDURE — G0299 HHS/HOSPICE OF RN EA 15 MIN: HCPCS

## 2022-09-11 PROCEDURE — 1090000002 HH PPS REVENUE DEBIT

## 2022-09-11 ASSESSMENT — ENCOUNTER SYMPTOMS
PAIN LOCATION - PAIN QUALITY: ACHE
STOOL DESCRIPTION: SOFT FORMED

## 2022-09-12 ENCOUNTER — HOME CARE VISIT (OUTPATIENT)
Dept: SCHEDULING | Facility: HOME HEALTH | Age: 57
End: 2022-09-12
Payer: MEDICARE

## 2022-09-12 VITALS
TEMPERATURE: 97.6 F | DIASTOLIC BLOOD PRESSURE: 82 MMHG | OXYGEN SATURATION: 98 % | HEART RATE: 86 BPM | SYSTOLIC BLOOD PRESSURE: 130 MMHG | RESPIRATION RATE: 12 BRPM

## 2022-09-12 PROCEDURE — 1090000002 HH PPS REVENUE DEBIT

## 2022-09-12 PROCEDURE — 1090000001 HH PPS REVENUE CREDIT

## 2022-09-12 PROCEDURE — G0299 HHS/HOSPICE OF RN EA 15 MIN: HCPCS

## 2022-09-12 ASSESSMENT — ENCOUNTER SYMPTOMS: PAIN LOCATION - PAIN QUALITY: ACHING

## 2022-09-13 ENCOUNTER — HOME CARE VISIT (OUTPATIENT)
Dept: SCHEDULING | Facility: HOME HEALTH | Age: 57
End: 2022-09-13
Payer: MEDICARE

## 2022-09-13 VITALS
TEMPERATURE: 98.2 F | HEART RATE: 88 BPM | RESPIRATION RATE: 18 BRPM | OXYGEN SATURATION: 100 % | SYSTOLIC BLOOD PRESSURE: 126 MMHG | DIASTOLIC BLOOD PRESSURE: 87 MMHG

## 2022-09-13 PROCEDURE — 1090000002 HH PPS REVENUE DEBIT

## 2022-09-13 PROCEDURE — 1090000001 HH PPS REVENUE CREDIT

## 2022-09-13 PROCEDURE — G0151 HHCP-SERV OF PT,EA 15 MIN: HCPCS

## 2022-09-13 PROCEDURE — G0299 HHS/HOSPICE OF RN EA 15 MIN: HCPCS

## 2022-09-13 ASSESSMENT — ENCOUNTER SYMPTOMS: PAIN LOCATION - PAIN QUALITY: ACHE

## 2022-09-14 ENCOUNTER — HOME CARE VISIT (OUTPATIENT)
Dept: SCHEDULING | Facility: HOME HEALTH | Age: 57
End: 2022-09-14
Payer: MEDICARE

## 2022-09-14 ENCOUNTER — APPOINTMENT (OUTPATIENT)
Dept: CT IMAGING | Age: 57
End: 2022-09-14
Payer: COMMERCIAL

## 2022-09-14 ENCOUNTER — HOSPITAL ENCOUNTER (OUTPATIENT)
Age: 57
Setting detail: OBSERVATION
Discharge: HOSPICE | End: 2022-09-16
Attending: EMERGENCY MEDICINE
Payer: COMMERCIAL

## 2022-09-14 ENCOUNTER — APPOINTMENT (OUTPATIENT)
Dept: GENERAL RADIOLOGY | Age: 57
End: 2022-09-14
Payer: COMMERCIAL

## 2022-09-14 VITALS
DIASTOLIC BLOOD PRESSURE: 84 MMHG | OXYGEN SATURATION: 96 % | SYSTOLIC BLOOD PRESSURE: 138 MMHG | RESPIRATION RATE: 18 BRPM | HEART RATE: 84 BPM | TEMPERATURE: 97.8 F

## 2022-09-14 VITALS
OXYGEN SATURATION: 99 % | HEART RATE: 106 BPM | SYSTOLIC BLOOD PRESSURE: 128 MMHG | TEMPERATURE: 97.8 F | DIASTOLIC BLOOD PRESSURE: 88 MMHG | RESPIRATION RATE: 16 BRPM

## 2022-09-14 DIAGNOSIS — R18.8 OTHER ASCITES: ICD-10-CM

## 2022-09-14 DIAGNOSIS — K29.00 ACUTE GASTRITIS, PRESENCE OF BLEEDING UNSPECIFIED, UNSPECIFIED GASTRITIS TYPE: Primary | ICD-10-CM

## 2022-09-14 DIAGNOSIS — R19.7 DIARRHEA, UNSPECIFIED TYPE: ICD-10-CM

## 2022-09-14 PROBLEM — N18.31 ACUTE RENAL FAILURE SUPERIMPOSED ON STAGE 3A CHRONIC KIDNEY DISEASE (HCC): Status: RESOLVED | Noted: 2022-08-31 | Resolved: 2022-09-14

## 2022-09-14 PROBLEM — K27.9 PUD (PEPTIC ULCER DISEASE): Chronic | Status: RESOLVED | Noted: 2021-05-10 | Resolved: 2022-09-14

## 2022-09-14 PROBLEM — D50.9 IRON DEFICIENCY ANEMIA: Chronic | Status: ACTIVE | Noted: 2022-09-14

## 2022-09-14 PROBLEM — E88.09 HYPOALBUMINEMIA: Chronic | Status: ACTIVE | Noted: 2022-09-14

## 2022-09-14 PROBLEM — N18.31 STAGE 3A CHRONIC KIDNEY DISEASE (HCC): Chronic | Status: ACTIVE | Noted: 2022-09-14

## 2022-09-14 PROBLEM — N17.9 ACUTE RENAL FAILURE SUPERIMPOSED ON STAGE 3A CHRONIC KIDNEY DISEASE (HCC): Status: RESOLVED | Noted: 2022-08-31 | Resolved: 2022-09-14

## 2022-09-14 PROBLEM — E11.9 TYPE 2 DIABETES MELLITUS, WITH LONG-TERM CURRENT USE OF INSULIN (HCC): Chronic | Status: ACTIVE | Noted: 2022-08-31

## 2022-09-14 PROBLEM — E87.1 HYPONATREMIA: Status: RESOLVED | Noted: 2022-08-31 | Resolved: 2022-09-14

## 2022-09-14 LAB
ALBUMIN SERPL-MCNC: 2.2 G/DL (ref 3.5–5)
ALBUMIN/GLOB SERPL: 0.4 (ref 1.2–3.5)
ALP SERPL-CCNC: 138 U/L (ref 50–136)
ALT SERPL-CCNC: 22 U/L (ref 12–65)
ANION GAP SERPL CALC-SCNC: 5 MMOL/L (ref 4–13)
AST SERPL-CCNC: 42 U/L (ref 15–37)
BASOPHILS # BLD: 0.1 K/UL (ref 0–0.2)
BASOPHILS NFR BLD: 0 % (ref 0–2)
BILIRUB SERPL-MCNC: 1.1 MG/DL (ref 0.2–1.1)
BUN SERPL-MCNC: 10 MG/DL (ref 6–23)
CALCIUM SERPL-MCNC: 8.6 MG/DL (ref 8.3–10.4)
CHLORIDE SERPL-SCNC: 109 MMOL/L (ref 101–110)
CHP ED QC CHECK: YES
CO2 SERPL-SCNC: 18 MMOL/L (ref 21–32)
CREAT SERPL-MCNC: 1.4 MG/DL (ref 0.8–1.5)
DIFFERENTIAL METHOD BLD: ABNORMAL
EOSINOPHIL # BLD: 0.5 K/UL (ref 0–0.8)
EOSINOPHIL NFR BLD: 5 % (ref 0.5–7.8)
ERYTHROCYTE [DISTWIDTH] IN BLOOD BY AUTOMATED COUNT: 14.1 % (ref 11.9–14.6)
ETHANOL SERPL-MCNC: <3 MG/DL (ref 0–0.08)
GLOBULIN SER CALC-MCNC: 4.9 G/DL (ref 2.3–3.5)
GLUCOSE BLD STRIP.AUTO-MCNC: 138 MG/DL (ref 65–100)
GLUCOSE BLD-MCNC: 138 MG/DL
GLUCOSE SERPL-MCNC: 318 MG/DL (ref 65–100)
HCT VFR BLD AUTO: 24.4 % (ref 41.1–50.3)
HGB BLD-MCNC: 8.4 G/DL (ref 13.6–17.2)
IMM GRANULOCYTES # BLD AUTO: 0.1 K/UL (ref 0–0.5)
IMM GRANULOCYTES NFR BLD AUTO: 0 % (ref 0–5)
LACTATE SERPL-SCNC: 2.7 MMOL/L (ref 0.4–2)
LIPASE SERPL-CCNC: 101 U/L (ref 73–393)
LYMPHOCYTES # BLD: 1.9 K/UL (ref 0.5–4.6)
LYMPHOCYTES NFR BLD: 17 % (ref 13–44)
MCH RBC QN AUTO: 29.4 PG (ref 26.1–32.9)
MCHC RBC AUTO-ENTMCNC: 34.4 G/DL (ref 31.4–35)
MCV RBC AUTO: 85.3 FL (ref 79.6–97.8)
MONOCYTES # BLD: 1 K/UL (ref 0.1–1.3)
MONOCYTES NFR BLD: 9 % (ref 4–12)
NEUTS SEG # BLD: 8 K/UL (ref 1.7–8.2)
NEUTS SEG NFR BLD: 69 % (ref 43–78)
NRBC # BLD: 0 K/UL (ref 0–0.2)
PLATELET # BLD AUTO: 213 K/UL (ref 150–450)
PMV BLD AUTO: 11.8 FL (ref 9.4–12.3)
POTASSIUM SERPL-SCNC: 4.5 MMOL/L (ref 3.5–5.1)
PROT SERPL-MCNC: 7.1 G/DL (ref 6.3–8.2)
RBC # BLD AUTO: 2.86 M/UL (ref 4.23–5.6)
SERVICE CMNT-IMP: ABNORMAL
SODIUM SERPL-SCNC: 132 MMOL/L (ref 138–145)
WBC # BLD AUTO: 11.6 K/UL (ref 4.3–11.1)

## 2022-09-14 PROCEDURE — 82077 ASSAY SPEC XCP UR&BREATH IA: CPT

## 2022-09-14 PROCEDURE — 1090000002 HH PPS REVENUE DEBIT

## 2022-09-14 PROCEDURE — G0152 HHCP-SERV OF OT,EA 15 MIN: HCPCS

## 2022-09-14 PROCEDURE — 85025 COMPLETE CBC W/AUTO DIFF WBC: CPT

## 2022-09-14 PROCEDURE — 99285 EMERGENCY DEPT VISIT HI MDM: CPT

## 2022-09-14 PROCEDURE — 6370000000 HC RX 637 (ALT 250 FOR IP)

## 2022-09-14 PROCEDURE — G0299 HHS/HOSPICE OF RN EA 15 MIN: HCPCS

## 2022-09-14 PROCEDURE — 83605 ASSAY OF LACTIC ACID: CPT

## 2022-09-14 PROCEDURE — G0378 HOSPITAL OBSERVATION PER HR: HCPCS

## 2022-09-14 PROCEDURE — 82962 GLUCOSE BLOOD TEST: CPT

## 2022-09-14 PROCEDURE — 80053 COMPREHEN METABOLIC PANEL: CPT

## 2022-09-14 PROCEDURE — 6360000004 HC RX CONTRAST MEDICATION

## 2022-09-14 PROCEDURE — 96361 HYDRATE IV INFUSION ADD-ON: CPT

## 2022-09-14 PROCEDURE — 71045 X-RAY EXAM CHEST 1 VIEW: CPT

## 2022-09-14 PROCEDURE — 2580000003 HC RX 258

## 2022-09-14 PROCEDURE — 1090000001 HH PPS REVENUE CREDIT

## 2022-09-14 PROCEDURE — 83690 ASSAY OF LIPASE: CPT

## 2022-09-14 PROCEDURE — 74177 CT ABD & PELVIS W/CONTRAST: CPT

## 2022-09-14 PROCEDURE — 96374 THER/PROPH/DIAG INJ IV PUSH: CPT

## 2022-09-14 RX ORDER — SODIUM CHLORIDE 0.9 % (FLUSH) 0.9 %
10 SYRINGE (ML) INJECTION
Status: COMPLETED | OUTPATIENT
Start: 2022-09-14 | End: 2022-09-14

## 2022-09-14 RX ORDER — 0.9 % SODIUM CHLORIDE 0.9 %
1000 INTRAVENOUS SOLUTION INTRAVENOUS ONCE
Status: DISCONTINUED | OUTPATIENT
Start: 2022-09-14 | End: 2022-09-14

## 2022-09-14 RX ORDER — 0.9 % SODIUM CHLORIDE 0.9 %
100 INTRAVENOUS SOLUTION INTRAVENOUS
Status: COMPLETED | OUTPATIENT
Start: 2022-09-14 | End: 2022-09-14

## 2022-09-14 RX ADMIN — INSULIN HUMAN 10 UNITS: 100 INJECTION, SOLUTION PARENTERAL at 18:24

## 2022-09-14 RX ADMIN — IOPAMIDOL 100 ML: 755 INJECTION, SOLUTION INTRAVENOUS at 18:02

## 2022-09-14 RX ADMIN — SODIUM CHLORIDE, PRESERVATIVE FREE 10 ML: 5 INJECTION INTRAVENOUS at 18:03

## 2022-09-14 RX ADMIN — SODIUM CHLORIDE 100 ML: 9 INJECTION, SOLUTION INTRAVENOUS at 18:03

## 2022-09-14 RX ADMIN — SODIUM CHLORIDE 1000 ML: 9 INJECTION, SOLUTION INTRAVENOUS at 18:24

## 2022-09-14 ASSESSMENT — ENCOUNTER SYMPTOMS
DIARRHEA: 1
ABDOMINAL DISTENTION: 1
COUGH: 0
ABDOMINAL PAIN: 1
SHORTNESS OF BREATH: 0
DIARRHEA: 1
PAIN LOCATION - PAIN QUALITY: ACHE
NAUSEA: 0
STOOL DESCRIPTION: LOOSE
VOMITING: 0

## 2022-09-14 ASSESSMENT — PAIN - FUNCTIONAL ASSESSMENT: PAIN_FUNCTIONAL_ASSESSMENT: NONE - DENIES PAIN

## 2022-09-14 NOTE — ED NOTES
Emergency Department Provider Note                   PCP:                None Provider               Age: 65 y.o. Sex: male       ICD-10-CM    1. Acute gastritis, presence of bleeding unspecified, unspecified gastritis type  K29.00       2. Other ascites  R18.8       3. Diarrhea, unspecified type  R19.7           DISPOSITION Decision To Admit 09/14/2022 08:06:53 PM       MDM  Number of Diagnoses or Management Options  Acute gastritis, presence of bleeding unspecified, unspecified gastritis type  Diarrhea, unspecified type  Other ascites  Diagnosis management comments: Vital signs reviewed, patient stable, NAD, afebrile, nontoxic in appearance     CBC, CMP obtained out of triage  Will obtain single view chest x-ray  Will obtain CT abdomen pelvis with IV contrast to assess for possible small bowel obstruction, colitis, other physical etiology of patient's possible diarrhea and bloating. Blood glucose was 318 with a sodium of 132. Sodium corrected for hyperglycemia is 135  Administered 10 units of insulin liter normal saline over 2 hours  No elevated white count on CBC, patient does have a hemoglobin 0.4. This hemoglobin is consistent with prior hemoglobin levels in the past.  Patient seems to hang around this point. Patient does have an elevated AST of 42 and an elevated alk phos of 138. Glucose after insulin and fluids is 138. No acute cardiopulmonary abnormality on chest x-ray. Lungs are hypoinflated. CT abdomen pelvis demonstrates acute gastritis with marked thickening of the gastric mucosa. There is ascites and a hiatal hernia as well as pelvis and abdomen. Savannah Brown MD on-call, Dr. Sebastien Mosqueda, via [facility] regarding this patient was requested by GI that patient be admitted to hospital medicine. Contacted hospitalist, Dr. Sammy Guevara putting this patient and care will be taken over by hospital medicine. Added an alcohol level, lactic acid, lipase to lab work.   Patient is currently stable at this time. I discussed admission to the hospital with the patient and he is in agreement with this. I discussed this patient with my attending, Dr. Brandon Aburto, who is in agreement with treatment and disposition. Amount and/or Complexity of Data Reviewed  Clinical lab tests: ordered and reviewed  Tests in the radiology section of CPT®: ordered and reviewed  Review and summarize past medical records: yes  Discuss the patient with other providers: yes  Independent visualization of images, tracings, or specimens: yes (Independent visualization of imaging)    Risk of Complications, Morbidity, and/or Mortality  Presenting problems: high  Diagnostic procedures: high  Management options: high    Patient Progress  Patient progress: stable       Orders Placed This Encounter   Procedures    XR CHEST PORTABLE    CT ABDOMEN PELVIS W IV CONTRAST Additional Contrast? None    CBC with Auto Differential    Comprehensive Metabolic Panel    Lactic Acid    Lipase    Ethanol    POCT Glucose    POCT Glucose        Medications   insulin regular (HUMULIN R;NOVOLIN R) injection 10 Units (10 Units IntraVENous Given 9/14/22 1824)   0.9 % sodium chloride bolus (100 mLs IntraVENous New Bag 9/14/22 1803)   sodium chloride flush 0.9 % injection 10 mL (10 mLs IntraVENous Given 9/14/22 1803)   iopamidol (ISOVUE-370) 76 % injection 100 mL (100 mLs IntraVENous Given 9/14/22 1802)       New Prescriptions    No medications on file        Ana Velásquez is a 65 y.o. male who presents to the Emergency Department with chief complaint of    Chief Complaint   Patient presents with    Diarrhea      81-year-old male with history of peptic ulcer disease, Cholelithiasis without cholecystitis type 2 diabetes, diabetic ulcer of both feet, hypertension, hyperlipidemia PAD, alcohol dependence stage III chronic kidney disease presents to the emergency department today with chief complaint of diarrhea beginning last night.   Patient states he has had 8 episodes of diarrhea since last night. Patient endorses some abdominal abdominal cramping and bloating. Patient denies nausea, vomiting, chest pain, shortness of breath, dysuria, fevers or chills. Nothing makes patient's condition better. Nothing makes patient's condition worse. No treatments tried. Patient's wife states that patient has not had any alcohol in 1 to 2 months. The history is provided by the patient. No  was used. All other systems reviewed and are negative unless otherwise stated in the history of present illness section. Review of Systems   Constitutional:  Negative for chills and fever. Respiratory:  Negative for cough and shortness of breath. Cardiovascular:  Negative for chest pain. Gastrointestinal:  Positive for abdominal distention, abdominal pain (Abdominal cramping) and diarrhea. Negative for nausea and vomiting. Genitourinary:  Negative for flank pain. Neurological:  Negative for headaches. All other systems reviewed and are negative. Past Medical History:   Diagnosis Date    Anemia     Gastroesophageal reflux disease with esophagitis and hemorrhage     HLD (hyperlipidemia)     Hyperplastic colon polyp     Hypertension     Obesity     PUD (peptic ulcer disease)     Type 2 diabetes mellitus with diabetic polyneuropathy, with long-term current use of insulin (HCC)     Ulcer of the duodenum caused by bacteria (H. pylori)     Upper GI bleed 03/28/2016    Last Assessment & Plan:  Formatting of this note might be different from the original. Status post EGD yesterday which revealed esophagitis, gastric and duodenal ulcers Continue PPI, GI follow-up.  Monitor hemoglobin Avoid and states Biopsy results- pending        Past Surgical History:   Procedure Laterality Date    COLONOSCOPY      ESOPHAGOGASTRODUODENOSCOPY      UPPER GASTROINTESTINAL ENDOSCOPY N/A 9/6/2022    EGD ESOPHAGOGASTRODUODENOSCOPY/  215 performed by Shira Burgess Cynthia Beckford MD at [facility] ENDOSCOPY        Family History   Problem Relation Age of Onset    Hypertension Sister     Diabetes Neg Hx         Social History     Socioeconomic History    Marital status:    Tobacco Use    Smoking status: Never    Smokeless tobacco: Never   Substance and Sexual Activity    Alcohol use: Yes    Drug use: Never        Allergies: Patient has no known allergies. Previous Medications    AMLODIPINE (NORVASC) 10 MG TABLET    Take 1 tablet by mouth daily    CALCIUM CARB-CHOLECALCIFEROL 250-125 MG-UNIT TABS    Take 1 tablet by mouth daily    CHOLESTYRAMINE LIGHT 4 G PACKET    Take 1 packet by mouth 2 times daily    FOLIC ACID (FOLVITE) 1 MG TABLET    Take 1 tablet by mouth daily    GLUCOSE MONITORING (FREESTYLE FREEDOM) KIT    1 kit by Does not apply route daily Check glucose twice daily    INSULIN GLARGINE (LANTUS SOLOSTAR) 100 UNIT/ML INJECTION PEN    Inject 10 Units into the skin daily    PANTOPRAZOLE (PROTONIX) 40 MG TABLET    Take 1 tablet by mouth 2 times daily (before meals)    SODIUM BICARBONATE 650 MG TABLET    Take 2 tablets by mouth 2 times daily    VITAMIN B-12 1000 MCG TABLET    Take 1 tablet by mouth daily        Vitals signs and nursing note reviewed. Patient Vitals for the past 4 hrs:   BP SpO2   09/14/22 1729 132/87 100 %          Physical Exam  Vitals and nursing note reviewed. Constitutional:       General: He is not in acute distress. Appearance: Normal appearance. He is normal weight. He is not ill-appearing, toxic-appearing or diaphoretic. HENT:      Head: Normocephalic and atraumatic. Nose: Nose normal. No congestion or rhinorrhea. Mouth/Throat:      Mouth: Mucous membranes are moist.      Pharynx: No oropharyngeal exudate or posterior oropharyngeal erythema. Eyes:      General: No scleral icterus. Extraocular Movements: Extraocular movements intact.       Conjunctiva/sclera: Conjunctivae normal.   Cardiovascular:      Rate and Rhythm: Normal rate.      Pulses: Normal pulses. Heart sounds: Normal heart sounds. Pulmonary:      Effort: Pulmonary effort is normal.      Breath sounds: Normal breath sounds. Abdominal:      General: Bowel sounds are normal. There is distension (Mild abdominal distention). Palpations: Abdomen is soft. Tenderness: There is no abdominal tenderness. There is no guarding or rebound. Musculoskeletal:         General: Normal range of motion. Cervical back: Normal range of motion. Right lower leg: Edema (Bilateral lower extremity edema 1+) present. Left lower leg: Edema present. Skin:     General: Skin is warm and dry. Capillary Refill: Capillary refill takes less than 2 seconds. Neurological:      General: No focal deficit present. Mental Status: He is alert and oriented to person, place, and time. Psychiatric:         Mood and Affect: Mood normal.         Behavior: Behavior normal.         Thought Content: Thought content normal.         Judgment: Judgment normal.        Procedures        Results for orders placed or performed during the hospital encounter of 09/14/22   XR CHEST PORTABLE    Narrative    XR CHEST PORTABLE 9/14/2022 5:39 PM    HISTORY: Bilateral lower extremity edema and dyspnea on exertion. COMPARISON: None available. A portable AP view of the chest was obtained. Impression    Lungs are hypoaerated but otherwise clear. No definite evidence of  edema. The heart is normal in size. No pneumothorax. No pleural effusions. CT ABDOMEN PELVIS W IV CONTRAST Additional Contrast? None    Narrative    CT OF THE ABDOMEN AND PELVIS WITH CONTRAST:              CLINICAL HISTORY:   Persistent diarrhea since last night. Now with mild  leukocytosis and history of normocytic, normochromic anemia requiring  transfusions,    TECHNIQUE:  Oral and nonionic intravenous contrast was administered, and the  abdomen and pelvis were scanned with spiral technique.   Radiation dose reduction  was achieved using one or all of the following techniques: automated exposure  control, weight-based dosing, iterative reconstruction. COMPARISON:  August 31, 2022. CT ABDOMEN FINDINGS: There is mild atelectasis at both lung bases. There is a  small amount of ascites in the abdomen and pelvis, with extension of a small  amount of fluid into a small hiatal hernia. However, there is marked thickening  and decreased attenuation of the stomach suggestive of acute gastritis. No  discrete ulcer is identified, and there is no free intraperitoneal air. Calcified stones are again noted in a contracted gallbladder without focal  pericholecystic inflammatory changes. The liver, spleen, pancreas, adrenals,  and kidneys appear unremarkable. There are extensive atherosclerotic  calcifications without definite aneurysm. CT PELVIS FINDINGS: The appendix is not distended. No small bowel or colonic  wall thickening is evident. No pathologically enlarged lymph nodes. Bone  windows demonstrate no definite aggressive process, accounting for degenerative  changes. Impression    1. Small amount of ascites associated with marked thickening and decreased  attenuation of the stomach which is new since August 31 and suspicious for acute  gastritis. Gastroenterology consultation should be considered. 2.  No definite acute small bowel or colonic abnormality identified. 3.  Cholelithiasis without changes to suggest acute cholecystitis or  appendicitis. This case was reviewed in consultation with colleagues.    CBC with Auto Differential   Result Value Ref Range    WBC 11.6 (H) 4.3 - 11.1 K/uL    RBC 2.86 (L) 4.23 - 5.6 M/uL    Hemoglobin 8.4 (L) 13.6 - 17.2 g/dL    Hematocrit 24.4 (L) 41.1 - 50.3 %    MCV 85.3 79.6 - 97.8 FL    MCH 29.4 26.1 - 32.9 PG    MCHC 34.4 31.4 - 35.0 g/dL    RDW 14.1 11.9 - 14.6 %    Platelets 172 342 - 698 K/uL    MPV 11.8 9.4 - 12.3 FL    nRBC 0.00 0.0 - 0.2 K/uL Differential Type AUTOMATED      Seg Neutrophils 69 43 - 78 %    Lymphocytes 17 13 - 44 %    Monocytes 9 4.0 - 12.0 %    Eosinophils % 5 0.5 - 7.8 %    Basophils 0 0.0 - 2.0 %    Immature Granulocytes 0 0.0 - 5.0 %    Segs Absolute 8.0 1.7 - 8.2 K/UL    Absolute Lymph # 1.9 0.5 - 4.6 K/UL    Absolute Mono # 1.0 0.1 - 1.3 K/UL    Absolute Eos # 0.5 0.0 - 0.8 K/UL    Basophils Absolute 0.1 0.0 - 0.2 K/UL    Absolute Immature Granulocyte 0.1 0.0 - 0.5 K/UL   Comprehensive Metabolic Panel   Result Value Ref Range    Sodium 132 (L) 138 - 145 mmol/L    Potassium 4.5 3.5 - 5.1 mmol/L    Chloride 109 101 - 110 mmol/L    CO2 18 (L) 21 - 32 mmol/L    Anion Gap 5 4 - 13 mmol/L    Glucose 318 (H) 65 - 100 mg/dL    BUN 10 6 - 23 MG/DL    Creatinine 1.40 0.8 - 1.5 MG/DL    GFR African American >60 >60 ml/min/1.73m2    GFR Non- 56 (L) >60 ml/min/1.73m2    Calcium 8.6 8.3 - 10.4 MG/DL    Total Bilirubin 1.1 0.2 - 1.1 MG/DL    ALT 22 12 - 65 U/L    AST 42 (H) 15 - 37 U/L    Alk Phosphatase 138 (H) 50 - 136 U/L    Total Protein 7.1 6.3 - 8.2 g/dL    Albumin 2.2 (L) 3.5 - 5.0 g/dL    Globulin 4.9 (H) 2.3 - 3.5 g/dL    Albumin/Globulin Ratio 0.4 (L) 1.2 - 3.5     POCT Glucose   Result Value Ref Range    POC Glucose 138 (H) 65 - 100 mg/dL    Performed by: Paige         CT ABDOMEN PELVIS W IV CONTRAST Additional Contrast? None   Final Result      1. Small amount of ascites associated with marked thickening and decreased   attenuation of the stomach which is new since August 31 and suspicious for acute   gastritis. Gastroenterology consultation should be considered. 2.  No definite acute small bowel or colonic abnormality identified. 3.  Cholelithiasis without changes to suggest acute cholecystitis or   appendicitis. This case was reviewed in consultation with colleagues. XR CHEST PORTABLE   Final Result   Lungs are hypoaerated but otherwise clear. No definite evidence of   edema.   The heart is normal in size. No pneumothorax. No pleural effusions. ED Course as of 09/14/22 2036   Wed Sep 14, 2022   1800 XR CHEST PORTABLE  A portable AP view of the chest was obtained. IMPRESSION:  Lungs are hypoaerated but otherwise clear. No definite evidence of  edema. The heart is normal in size. No pneumothorax. No pleural effusions. [JG]   1831 CBC with Auto Differential(!):    WBC 11.6(!)   RBC 2.86(!)   Hemoglobin Quant 8.4(!)   Hematocrit 24.4(!)   MCV 85.3   MCH 29.4   MCHC 34.4   RDW 14.1   Platelet Count 514   MPV 11.8   Nucleated Red Blood Cells 0.00   Differential Type AUTOMATED   Seg Neutrophils 69   Lymphocytes 17   Monocytes 9   Eosinophils % 5   Basophils 0   Immature Granulocytes 0   Segs Absolute 8.0   Absolute Lymph # 1.9   Absolute Mono # 1.0   Absolute Eos # 0.5   Basophils Absolute 0.1   Absolute Immature Granulocyte 0.1 [JG]   1831 Comprehensive Metabolic Panel(!):    Sodium 132(!)   Potassium 4.5   Chloride 109   CO2 18(!)   Anion Gap 5   Glucose, Random 318(!)   BUN,BUNPL 10   Creatinine 1.40   GFR African American >60   GFR Non- 56(!)   CALCIUM, SERUM, 455957 8.6   Bilirubin 1.1   ALT 22   AST 42(!)   Alk Phosphatase 138(!)   Total Protein 7.1   Albumin 2.2(!)   Globulin 4.9(!)   ALBUMIN/GLOBULIN RATIO 0.4(!) [JG]   1942 Contacting GI regarding this patient [JG]   2003 point-of-care glucose 138 after fluids and insulin [JG]      ED Course User Index  [JG] LAN Amin        Voice dictation software was used during the making of this note. This software is not perfect and grammatical and other typographical errors may be present. This note has not been completely proofread for errors.       LAN Amin  09/14/22 2019       Fabiola Amin  09/14/22 2036

## 2022-09-14 NOTE — ED TRIAGE NOTES
Patient to ER complaining of 8 loose stools started today. No meds taken prior to arrival.  He states he finished some antibiotics last week. Denies any fever no nausea or vomiting. No  Recent travel  Patient evaluated initially in triage. Rapid Medical Evaluation was conducted and necessary orders have been placed. I have performed a medical screening exam.  Care will now be transferred to the provider in the back of the emergency department.   LAN Perez 3:59 PM

## 2022-09-14 NOTE — ED TRIAGE NOTES
Pt arrives for complaint of diarrhea which started last night. Pt states he has gone 8 times since it first started. Denies fever, N/V, cough.

## 2022-09-15 ENCOUNTER — HOME CARE VISIT (OUTPATIENT)
Dept: SCHEDULING | Facility: HOME HEALTH | Age: 57
End: 2022-09-15
Payer: MEDICARE

## 2022-09-15 VITALS
TEMPERATURE: 97.8 F | DIASTOLIC BLOOD PRESSURE: 70 MMHG | HEART RATE: 96 BPM | OXYGEN SATURATION: 98 % | SYSTOLIC BLOOD PRESSURE: 108 MMHG | RESPIRATION RATE: 16 BRPM

## 2022-09-15 PROBLEM — E87.1 HYPONATREMIA: Chronic | Status: ACTIVE | Noted: 2022-09-15

## 2022-09-15 PROBLEM — E66.9 OBESITY: Chronic | Status: ACTIVE | Noted: 2022-09-15

## 2022-09-15 LAB
ANION GAP SERPL CALC-SCNC: 6 MMOL/L (ref 4–13)
BASOPHILS # BLD: 0.1 K/UL (ref 0–0.2)
BASOPHILS NFR BLD: 1 % (ref 0–2)
BUN SERPL-MCNC: 10 MG/DL (ref 6–23)
C DIFF GDH STL QL: NORMAL
C DIFF TOX A+B STL QL IA: NORMAL
C DIFF TOXIN INTERPRETATION: NORMAL
CALCIUM SERPL-MCNC: 8.2 MG/DL (ref 8.3–10.4)
CHLORIDE SERPL-SCNC: 113 MMOL/L (ref 101–110)
CLINICAL CONSIDERATION: NORMAL
CO2 SERPL-SCNC: 17 MMOL/L (ref 21–32)
CREAT SERPL-MCNC: 1.2 MG/DL (ref 0.8–1.5)
DIFFERENTIAL METHOD BLD: ABNORMAL
EOSINOPHIL # BLD: 0.5 K/UL (ref 0–0.8)
EOSINOPHIL NFR BLD: 5 % (ref 0.5–7.8)
ERYTHROCYTE [DISTWIDTH] IN BLOOD BY AUTOMATED COUNT: 14.2 % (ref 11.9–14.6)
EST. AVERAGE GLUCOSE BLD GHB EST-MCNC: 183 MG/DL
GLUCOSE BLD STRIP.AUTO-MCNC: 118 MG/DL (ref 65–100)
GLUCOSE BLD STRIP.AUTO-MCNC: 134 MG/DL (ref 65–100)
GLUCOSE BLD STRIP.AUTO-MCNC: 145 MG/DL (ref 65–100)
GLUCOSE BLD STRIP.AUTO-MCNC: 223 MG/DL (ref 65–100)
GLUCOSE BLD STRIP.AUTO-MCNC: 75 MG/DL (ref 65–100)
GLUCOSE SERPL-MCNC: 185 MG/DL (ref 65–100)
HBA1C MFR BLD: 8 % (ref 4.8–5.6)
HCT VFR BLD AUTO: 22.7 % (ref 41.1–50.3)
HGB BLD-MCNC: 7.8 G/DL (ref 13.6–17.2)
IMM GRANULOCYTES # BLD AUTO: 0 K/UL (ref 0–0.5)
IMM GRANULOCYTES NFR BLD AUTO: 0 % (ref 0–5)
LACTATE SERPL-SCNC: 0.9 MMOL/L (ref 0.4–2)
LYMPHOCYTES # BLD: 1.9 K/UL (ref 0.5–4.6)
LYMPHOCYTES NFR BLD: 18 % (ref 13–44)
MCH RBC QN AUTO: 29.9 PG (ref 26.1–32.9)
MCHC RBC AUTO-ENTMCNC: 34.4 G/DL (ref 31.4–35)
MCV RBC AUTO: 87 FL (ref 79.6–97.8)
MONOCYTES # BLD: 0.9 K/UL (ref 0.1–1.3)
MONOCYTES NFR BLD: 9 % (ref 4–12)
NEUTS SEG # BLD: 7.4 K/UL (ref 1.7–8.2)
NEUTS SEG NFR BLD: 67 % (ref 43–78)
NRBC # BLD: 0 K/UL (ref 0–0.2)
PLATELET # BLD AUTO: 176 K/UL (ref 150–450)
PMV BLD AUTO: 11.6 FL (ref 9.4–12.3)
POTASSIUM SERPL-SCNC: 4.1 MMOL/L (ref 3.5–5.1)
RBC # BLD AUTO: 2.61 M/UL (ref 4.23–5.6)
REFLEX: NORMAL
SERVICE CMNT-IMP: ABNORMAL
SERVICE CMNT-IMP: NORMAL
SODIUM SERPL-SCNC: 136 MMOL/L (ref 136–145)
WBC # BLD AUTO: 10.9 K/UL (ref 4.3–11.1)

## 2022-09-15 PROCEDURE — 80048 BASIC METABOLIC PNL TOTAL CA: CPT

## 2022-09-15 PROCEDURE — 83036 HEMOGLOBIN GLYCOSYLATED A1C: CPT

## 2022-09-15 PROCEDURE — 85025 COMPLETE CBC W/AUTO DIFF WBC: CPT

## 2022-09-15 PROCEDURE — 87324 CLOSTRIDIUM AG IA: CPT

## 2022-09-15 PROCEDURE — 82962 GLUCOSE BLOOD TEST: CPT

## 2022-09-15 PROCEDURE — 36415 COLL VENOUS BLD VENIPUNCTURE: CPT

## 2022-09-15 PROCEDURE — 1090000001 HH PPS REVENUE CREDIT

## 2022-09-15 PROCEDURE — 6370000000 HC RX 637 (ALT 250 FOR IP): Performed by: FAMILY MEDICINE

## 2022-09-15 PROCEDURE — 1090000002 HH PPS REVENUE DEBIT

## 2022-09-15 PROCEDURE — 96372 THER/PROPH/DIAG INJ SC/IM: CPT

## 2022-09-15 PROCEDURE — 2580000003 HC RX 258: Performed by: FAMILY MEDICINE

## 2022-09-15 PROCEDURE — 6360000002 HC RX W HCPCS: Performed by: FAMILY MEDICINE

## 2022-09-15 PROCEDURE — 94760 N-INVAS EAR/PLS OXIMETRY 1: CPT

## 2022-09-15 PROCEDURE — 6370000000 HC RX 637 (ALT 250 FOR IP): Performed by: INTERNAL MEDICINE

## 2022-09-15 PROCEDURE — G0378 HOSPITAL OBSERVATION PER HR: HCPCS

## 2022-09-15 RX ORDER — MAGNESIUM HYDROXIDE/ALUMINUM HYDROXICE/SIMETHICONE 120; 1200; 1200 MG/30ML; MG/30ML; MG/30ML
30 SUSPENSION ORAL EVERY 6 HOURS PRN
Status: DISCONTINUED | OUTPATIENT
Start: 2022-09-15 | End: 2022-09-16 | Stop reason: HOSPADM

## 2022-09-15 RX ORDER — AMLODIPINE BESYLATE 10 MG/1
10 TABLET ORAL DAILY
Status: DISCONTINUED | OUTPATIENT
Start: 2022-09-15 | End: 2022-09-16 | Stop reason: HOSPADM

## 2022-09-15 RX ORDER — MAGNESIUM SULFATE IN WATER 40 MG/ML
2000 INJECTION, SOLUTION INTRAVENOUS PRN
Status: DISCONTINUED | OUTPATIENT
Start: 2022-09-15 | End: 2022-09-16 | Stop reason: HOSPADM

## 2022-09-15 RX ORDER — ACETAMINOPHEN 650 MG/1
650 SUPPOSITORY RECTAL EVERY 6 HOURS PRN
Status: DISCONTINUED | OUTPATIENT
Start: 2022-09-15 | End: 2022-09-16 | Stop reason: HOSPADM

## 2022-09-15 RX ORDER — INSULIN LISPRO 100 [IU]/ML
0-8 INJECTION, SOLUTION INTRAVENOUS; SUBCUTANEOUS
Status: DISCONTINUED | OUTPATIENT
Start: 2022-09-15 | End: 2022-09-16 | Stop reason: HOSPADM

## 2022-09-15 RX ORDER — LANOLIN ALCOHOL/MO/W.PET/CERES
1000 CREAM (GRAM) TOPICAL DAILY
Status: DISCONTINUED | OUTPATIENT
Start: 2022-09-15 | End: 2022-09-16 | Stop reason: HOSPADM

## 2022-09-15 RX ORDER — DEXTROSE MONOHYDRATE 100 MG/ML
INJECTION, SOLUTION INTRAVENOUS CONTINUOUS PRN
Status: DISCONTINUED | OUTPATIENT
Start: 2022-09-15 | End: 2022-09-16 | Stop reason: HOSPADM

## 2022-09-15 RX ORDER — INSULIN LISPRO 100 [IU]/ML
0-4 INJECTION, SOLUTION INTRAVENOUS; SUBCUTANEOUS NIGHTLY
Status: DISCONTINUED | OUTPATIENT
Start: 2022-09-15 | End: 2022-09-16 | Stop reason: HOSPADM

## 2022-09-15 RX ORDER — SODIUM CHLORIDE 0.9 % (FLUSH) 0.9 %
5-40 SYRINGE (ML) INJECTION PRN
Status: DISCONTINUED | OUTPATIENT
Start: 2022-09-15 | End: 2022-09-16 | Stop reason: HOSPADM

## 2022-09-15 RX ORDER — POTASSIUM CHLORIDE 7.45 MG/ML
10 INJECTION INTRAVENOUS PRN
Status: DISCONTINUED | OUTPATIENT
Start: 2022-09-15 | End: 2022-09-16 | Stop reason: HOSPADM

## 2022-09-15 RX ORDER — INSULIN GLARGINE 100 [IU]/ML
10 INJECTION, SOLUTION SUBCUTANEOUS DAILY
Status: DISCONTINUED | OUTPATIENT
Start: 2022-09-15 | End: 2022-09-16 | Stop reason: HOSPADM

## 2022-09-15 RX ORDER — PROMETHAZINE HYDROCHLORIDE 12.5 MG/1
12.5 TABLET ORAL EVERY 6 HOURS PRN
Status: DISCONTINUED | OUTPATIENT
Start: 2022-09-15 | End: 2022-09-16 | Stop reason: HOSPADM

## 2022-09-15 RX ORDER — HYDROCODONE BITARTRATE AND ACETAMINOPHEN 5; 325 MG/1; MG/1
1 TABLET ORAL EVERY 6 HOURS PRN
Status: DISCONTINUED | OUTPATIENT
Start: 2022-09-15 | End: 2022-09-16 | Stop reason: HOSPADM

## 2022-09-15 RX ORDER — CHOLESTYRAMINE LIGHT 4 G/5.7G
4 POWDER, FOR SUSPENSION ORAL 2 TIMES DAILY
Status: DISCONTINUED | OUTPATIENT
Start: 2022-09-15 | End: 2022-09-16 | Stop reason: HOSPADM

## 2022-09-15 RX ORDER — ENOXAPARIN SODIUM 100 MG/ML
40 INJECTION SUBCUTANEOUS DAILY
Status: DISCONTINUED | OUTPATIENT
Start: 2022-09-15 | End: 2022-09-15

## 2022-09-15 RX ORDER — POTASSIUM CHLORIDE 20 MEQ/1
40 TABLET, EXTENDED RELEASE ORAL PRN
Status: DISCONTINUED | OUTPATIENT
Start: 2022-09-15 | End: 2022-09-16 | Stop reason: HOSPADM

## 2022-09-15 RX ORDER — SODIUM BICARBONATE 650 MG/1
1300 TABLET ORAL 2 TIMES DAILY
Status: DISCONTINUED | OUTPATIENT
Start: 2022-09-15 | End: 2022-09-16 | Stop reason: HOSPADM

## 2022-09-15 RX ORDER — ONDANSETRON 2 MG/ML
4 INJECTION INTRAMUSCULAR; INTRAVENOUS EVERY 6 HOURS PRN
Status: DISCONTINUED | OUTPATIENT
Start: 2022-09-15 | End: 2022-09-16 | Stop reason: HOSPADM

## 2022-09-15 RX ORDER — ACETAMINOPHEN 325 MG/1
650 TABLET ORAL EVERY 6 HOURS PRN
Status: DISCONTINUED | OUTPATIENT
Start: 2022-09-15 | End: 2022-09-16 | Stop reason: HOSPADM

## 2022-09-15 RX ORDER — SODIUM CHLORIDE 9 MG/ML
INJECTION, SOLUTION INTRAVENOUS CONTINUOUS
Status: DISCONTINUED | OUTPATIENT
Start: 2022-09-15 | End: 2022-09-15

## 2022-09-15 RX ORDER — SODIUM CHLORIDE 0.9 % (FLUSH) 0.9 %
5-40 SYRINGE (ML) INJECTION EVERY 12 HOURS SCHEDULED
Status: DISCONTINUED | OUTPATIENT
Start: 2022-09-15 | End: 2022-09-16 | Stop reason: HOSPADM

## 2022-09-15 RX ORDER — POLYETHYLENE GLYCOL 3350 17 G/17G
17 POWDER, FOR SOLUTION ORAL DAILY
Status: DISCONTINUED | OUTPATIENT
Start: 2022-09-15 | End: 2022-09-15

## 2022-09-15 RX ORDER — PANTOPRAZOLE SODIUM 40 MG/1
40 TABLET, DELAYED RELEASE ORAL
Status: DISCONTINUED | OUTPATIENT
Start: 2022-09-15 | End: 2022-09-16 | Stop reason: HOSPADM

## 2022-09-15 RX ORDER — SODIUM CHLORIDE 9 MG/ML
INJECTION, SOLUTION INTRAVENOUS PRN
Status: DISCONTINUED | OUTPATIENT
Start: 2022-09-15 | End: 2022-09-16 | Stop reason: HOSPADM

## 2022-09-15 RX ORDER — FOLIC ACID 1 MG/1
1 TABLET ORAL DAILY
Status: DISCONTINUED | OUTPATIENT
Start: 2022-09-15 | End: 2022-09-16 | Stop reason: HOSPADM

## 2022-09-15 RX ADMIN — Medication 0.5 TABLET: at 09:34

## 2022-09-15 RX ADMIN — CHOLESTYRAMINE 4 G: 4 POWDER, FOR SUSPENSION ORAL at 09:32

## 2022-09-15 RX ADMIN — SODIUM CHLORIDE, PRESERVATIVE FREE 10 ML: 5 INJECTION INTRAVENOUS at 09:35

## 2022-09-15 RX ADMIN — CHOLESTYRAMINE 4 G: 4 POWDER, FOR SUSPENSION ORAL at 01:14

## 2022-09-15 RX ADMIN — HYDROCODONE BITARTRATE AND ACETAMINOPHEN 1 TABLET: 5; 325 TABLET ORAL at 10:40

## 2022-09-15 RX ADMIN — INSULIN GLARGINE 10 UNITS: 100 INJECTION, SOLUTION SUBCUTANEOUS at 09:34

## 2022-09-15 RX ADMIN — CHOLESTYRAMINE 4 G: 4 POWDER, FOR SUSPENSION ORAL at 21:08

## 2022-09-15 RX ADMIN — FOLIC ACID 1 MG: 1 TABLET ORAL at 09:34

## 2022-09-15 RX ADMIN — CYANOCOBALAMIN TAB 1000 MCG 1000 MCG: 1000 TAB at 09:34

## 2022-09-15 RX ADMIN — SODIUM BICARBONATE 650 MG TABLET 1300 MG: at 09:34

## 2022-09-15 RX ADMIN — SODIUM CHLORIDE: 9 INJECTION, SOLUTION INTRAVENOUS at 01:14

## 2022-09-15 RX ADMIN — AMLODIPINE BESYLATE 10 MG: 10 TABLET ORAL at 09:34

## 2022-09-15 RX ADMIN — PANTOPRAZOLE SODIUM 40 MG: 40 TABLET, DELAYED RELEASE ORAL at 06:16

## 2022-09-15 RX ADMIN — ENOXAPARIN SODIUM 40 MG: 100 INJECTION SUBCUTANEOUS at 09:35

## 2022-09-15 RX ADMIN — SODIUM CHLORIDE, PRESERVATIVE FREE 10 ML: 5 INJECTION INTRAVENOUS at 21:08

## 2022-09-15 RX ADMIN — PANTOPRAZOLE SODIUM 40 MG: 40 TABLET, DELAYED RELEASE ORAL at 17:05

## 2022-09-15 RX ADMIN — SODIUM BICARBONATE 650 MG TABLET 1300 MG: at 21:08

## 2022-09-15 RX ADMIN — SODIUM CHLORIDE: 9 INJECTION, SOLUTION INTRAVENOUS at 14:16

## 2022-09-15 RX ADMIN — SODIUM BICARBONATE 650 MG TABLET 1300 MG: at 01:14

## 2022-09-15 ASSESSMENT — PAIN SCALES - GENERAL
PAINLEVEL_OUTOF10: 0
PAINLEVEL_OUTOF10: 7

## 2022-09-15 ASSESSMENT — PAIN DESCRIPTION - FREQUENCY: FREQUENCY: CONTINUOUS

## 2022-09-15 ASSESSMENT — PAIN DESCRIPTION - LOCATION: LOCATION: LEG

## 2022-09-15 ASSESSMENT — PAIN DESCRIPTION - DESCRIPTORS: DESCRIPTORS: ACHING;SORE

## 2022-09-15 ASSESSMENT — ENCOUNTER SYMPTOMS
PAIN LOCATION - PAIN QUALITY: ACHEY/SORE
DIARRHEA: 1

## 2022-09-15 ASSESSMENT — PAIN DESCRIPTION - ORIENTATION: ORIENTATION: LEFT

## 2022-09-15 ASSESSMENT — PAIN DESCRIPTION - ONSET: ONSET: ON-GOING

## 2022-09-15 ASSESSMENT — PAIN - FUNCTIONAL ASSESSMENT: PAIN_FUNCTIONAL_ASSESSMENT: PREVENTS OR INTERFERES SOME ACTIVE ACTIVITIES AND ADLS

## 2022-09-15 ASSESSMENT — PAIN DESCRIPTION - PAIN TYPE: TYPE: CHRONIC PAIN

## 2022-09-15 NOTE — PROGRESS NOTES
Hospitalist Progress Note   Admit Date:  2022  4:37 PM   Name:  Providence Favre   Age:  65 y.o. Sex:  male  :  1965   MRN:  [id number]   Room:  304/01    Presenting Complaint: Diarrhea     Reason(s) for Admission: Diarrhea [R19.7]  Other ascites [R18.8]  Acute gastritis, presence of bleeding unspecified, unspecified gastritis type [K29.00]  Diarrhea, unspecified type [R19.7]     Hospital Course:     Mr. Norma Williamson is a 64 yo male with PMH of obesity, Dm2, HTN, HLP, GI bleed/ chronic anemia, LE wounds/ PVD, ETOH use, hyponatremia, who is evaluated with acute on chronic anemia and acute diarrhea. He completed antibiotics for bilateral heel wounds on 22. He had been seen by GI during prior admit due to acute blood loss anemia s/p EGS that was unrevealing. He was to followup with [facility] for capsule endoscopy. On re-admit HGB 8.4, stool studies pending. CTAP shows\"       Impression       1. Small amount of ascites associated with marked thickening and decreased   attenuation of the stomach which is new since  and suspicious for acute   gastritis. Gastroenterology consultation should be considered. 2.  No definite acute small bowel or colonic abnormality identified. 3.  Cholelithiasis without changes to suggest acute cholecystitis or   appendicitis. \"      GI following. Discharge plans pending to home with wife. Subjective & 24hr Events (09/15/22):      Wounds nearly healed, no diarrhea since admit, not consuming ETOH, is monitoring water/ fluid intake, no dyspnea or cough      Assessment & Plan:     Principal Problem:    Diarrhea  Plan:   No current diarrhea  Pending cdiff   GI following   Stop miralax  Continued questran  On clear liquids  Stop IVF          Active Problems:    Essential hypertension  Plan:   Continued norvasc             Type 2 diabetes mellitus, with long-term current use of insulin ([address]  Plan:   Continued lantus and SSI Diabetic ulcer of both feet associated with type 2 diabetes mellitus ([facility]  Plan:   Wound care             Stage 3a chronic kidney disease ([facility]  Plan:   Trend lab  Continued bicarb         Hyponatremia:  Resolved             Anticipated discharge needs:      Pending     Diet:  ADULT DIET; Clear Liquid; No red dye  DVT PPx: hold lovenox due to anemia, no SCD due to LE wounds  Code status: Full Code    Hospital Problems:  Principal Problem:    Diarrhea  Active Problems:    Essential hypertension    Type 2 diabetes mellitus, with long-term current use of insulin (HCC)    Diabetic ulcer of both feet associated with type 2 diabetes mellitus ([facility])    Hypoalbuminemia    Stage 3a chronic kidney disease ([facility]  Resolved Problems:    * No resolved hospital problems. *      Objective:   Patient Vitals for the past 24 hrs:   Temp Pulse Resp BP SpO2   09/15/22 1235 98.6 °F (37 °C) 82 18 111/71 100 %   09/15/22 1040 -- -- 16 -- --   09/15/22 0934 -- -- -- 128/75 --   09/15/22 0748 98.6 °F (37 °C) 89 18 128/75 99 %   09/15/22 0536 98.1 °F (36.7 °C) 89 16 133/83 100 %   09/15/22 0015 98 °F (36.7 °C) 98 18 (!) 154/97 100 %   09/14/22 2239 -- 93 18 -- 99 %   09/14/22 2120 98.5 °F (36.9 °C) 94 16 -- 100 %   09/14/22 2119 -- -- -- (!) 150/100 100 %   09/14/22 2049 -- -- -- (!) 145/121 100 %   09/14/22 2048 -- 90 17 -- 99 %   09/14/22 1729 -- -- -- 132/87 100 %   09/14/22 1555 98.4 °F (36.9 °C) 93 16 129/89 98 %       Oxygen Therapy  SpO2: 100 %  O2 Device: None (Room air)    Estimated body mass index is 27.89 kg/m² as calculated from the following:    Height as of this encounter: 5' 11\" (1.803 m). Weight as of this encounter: 200 lb (90.7 kg). Intake/Output Summary (Last 24 hours) at 9/15/2022 1439  Last data filed at 9/15/2022 1213  Gross per 24 hour   Intake 760 ml   Output 0 ml   Net 760 ml         Physical Exam:     Blood pressure 111/71, pulse 82, temperature 98.6 °F (37 °C), temperature source Oral, resp.  rate 18, height 5' 11\" (1.803 m), weight 200 lb (90.7 kg), SpO2 100 %. General:    Well nourished. Alert, elderly appearing, no distress   CV:   RRR. No m/r/g. No jugular venous distension. Trace edema   Lungs:   CTAB. No wheezing, rhonchi, or rales. Symmetric expansion. Abdomen: Bowel sounds present. Soft, nontender, nondistended. Extremities: No cyanosis or clubbing. Skin:     No rashes and normal coloration. Warm and dry. Neuro:  grossly intact. Psych:  Normal mood and affect.       I have personally reviewed labs and tests showing:  Recent Labs:  Recent Results (from the past 48 hour(s))   CBC with Auto Differential    Collection Time: 09/14/22  3:59 PM   Result Value Ref Range    WBC 11.6 (H) 4.3 - 11.1 K/uL    RBC 2.86 (L) 4.23 - 5.6 M/uL    Hemoglobin 8.4 (L) 13.6 - 17.2 g/dL    Hematocrit 24.4 (L) 41.1 - 50.3 %    MCV 85.3 79.6 - 97.8 FL    MCH 29.4 26.1 - 32.9 PG    MCHC 34.4 31.4 - 35.0 g/dL    RDW 14.1 11.9 - 14.6 %    Platelets 720 080 - 344 K/uL    MPV 11.8 9.4 - 12.3 FL    nRBC 0.00 0.0 - 0.2 K/uL    Differential Type AUTOMATED      Seg Neutrophils 69 43 - 78 %    Lymphocytes 17 13 - 44 %    Monocytes 9 4.0 - 12.0 %    Eosinophils % 5 0.5 - 7.8 %    Basophils 0 0.0 - 2.0 %    Immature Granulocytes 0 0.0 - 5.0 %    Segs Absolute 8.0 1.7 - 8.2 K/UL    Absolute Lymph # 1.9 0.5 - 4.6 K/UL    Absolute Mono # 1.0 0.1 - 1.3 K/UL    Absolute Eos # 0.5 0.0 - 0.8 K/UL    Basophils Absolute 0.1 0.0 - 0.2 K/UL    Absolute Immature Granulocyte 0.1 0.0 - 0.5 K/UL   Comprehensive Metabolic Panel    Collection Time: 09/14/22  3:59 PM   Result Value Ref Range    Sodium 132 (L) 138 - 145 mmol/L    Potassium 4.5 3.5 - 5.1 mmol/L    Chloride 109 101 - 110 mmol/L    CO2 18 (L) 21 - 32 mmol/L    Anion Gap 5 4 - 13 mmol/L    Glucose 318 (H) 65 - 100 mg/dL    BUN 10 6 - 23 MG/DL    Creatinine 1.40 0.8 - 1.5 MG/DL    GFR African American >60 >60 ml/min/1.73m2    GFR Non- 56 (L) >60 ml/min/1.73m2 Calcium 8.6 8.3 - 10.4 MG/DL    Total Bilirubin 1.1 0.2 - 1.1 MG/DL    ALT 22 12 - 65 U/L    AST 42 (H) 15 - 37 U/L    Alk Phosphatase 138 (H) 50 - 136 U/L    Total Protein 7.1 6.3 - 8.2 g/dL    Albumin 2.2 (L) 3.5 - 5.0 g/dL    Globulin 4.9 (H) 2.3 - 3.5 g/dL    Albumin/Globulin Ratio 0.4 (L) 1.2 - 3.5     POCT Glucose    Collection Time: 09/14/22  8:02 PM   Result Value Ref Range    POC Glucose 138 (H) 65 - 100 mg/dL    Performed by: Marina Wolff    Lactic Acid    Collection Time: 09/14/22  8:30 PM   Result Value Ref Range    Lactic Acid, Plasma 2.7 (H) 0.4 - 2.0 MMOL/L   Lipase    Collection Time: 09/14/22  8:30 PM   Result Value Ref Range    Lipase 101 73 - 393 U/L   Ethanol    Collection Time: 09/14/22  8:30 PM   Result Value Ref Range    Ethanol Lvl <3 MG/DL   POCT Glucose    Collection Time: 09/14/22  9:52 PM   Result Value Ref Range    Glucose 138 mg/dL    QC OK?  yes    Lactic Acid    Collection Time: 09/14/22 11:10 PM   Result Value Ref Range    Lactic Acid, Plasma 0.9 0.4 - 2.0 MMOL/L   Clostridium Difficile Toxin/Antigen    Collection Time: 09/15/22 12:42 AM    Specimen: Stool   Result Value Ref Range    GDH Antigen C. DIFFICILE GDH ANTIGEN-NEGATIVE      C difficile Toxin, EIA C. DIFFICILE TOXIN-NEGATIVE      Reflex NOT APPLICABLE      C Diff Toxin Interpretation NEGATIVE FOR TOXIGENIC C. DIFFICILE NTXCD      CLINICAL CONSIDERATION NEGATIVE FOR TOXIGENIC C. DIFFICILE     POCT Glucose    Collection Time: 09/15/22  1:26 AM   Result Value Ref Range    POC Glucose 223 (H) 65 - 100 mg/dL    Performed by: Marvel    Basic Metabolic Panel w/ Reflex to MG    Collection Time: 09/15/22  4:16 AM   Result Value Ref Range    Sodium 136 136 - 145 mmol/L    Potassium 4.1 3.5 - 5.1 mmol/L    Chloride 113 (H) 101 - 110 mmol/L    CO2 17 (L) 21 - 32 mmol/L    Anion Gap 6 4 - 13 mmol/L    Glucose 185 (H) 65 - 100 mg/dL    BUN 10 6 - 23 MG/DL    Creatinine 1.20 0.8 - 1.5 MG/DL    GFR  >60 >60 ml/min/1.73m2    GFR Non-African American >60 >60 ml/min/1.73m2    Calcium 8.2 (L) 8.3 - 10.4 MG/DL   CBC with Auto Differential    Collection Time: 09/15/22  4:16 AM   Result Value Ref Range    WBC 10.9 4.3 - 11.1 K/uL    RBC 2.61 (L) 4.23 - 5.6 M/uL    Hemoglobin 7.8 (L) 13.6 - 17.2 g/dL    Hematocrit 22.7 (L) 41.1 - 50.3 %    MCV 87.0 79.6 - 97.8 FL    MCH 29.9 26.1 - 32.9 PG    MCHC 34.4 31.4 - 35.0 g/dL    RDW 14.2 11.9 - 14.6 %    Platelets 616 727 - 607 K/uL    MPV 11.6 9.4 - 12.3 FL    nRBC 0.00 0.0 - 0.2 K/uL    Differential Type AUTOMATED      Seg Neutrophils 67 43 - 78 %    Lymphocytes 18 13 - 44 %    Monocytes 9 4.0 - 12.0 %    Eosinophils % 5 0.5 - 7.8 %    Basophils 1 0.0 - 2.0 %    Immature Granulocytes 0 0.0 - 5.0 %    Segs Absolute 7.4 1.7 - 8.2 K/UL    Absolute Lymph # 1.9 0.5 - 4.6 K/UL    Absolute Mono # 0.9 0.1 - 1.3 K/UL    Absolute Eos # 0.5 0.0 - 0.8 K/UL    Basophils Absolute 0.1 0.0 - 0.2 K/UL    Absolute Immature Granulocyte 0.0 0.0 - 0.5 K/UL   POCT Glucose    Collection Time: 09/15/22  7:51 AM   Result Value Ref Range    POC Glucose 145 (H) 65 - 100 mg/dL    Performed by: EspitaCosmeleCMOIRA    POCT Glucose    Collection Time: 09/15/22 11:42 AM   Result Value Ref Range    POC Glucose 134 (H) 65 - 100 mg/dL    Performed by: [id number]        I have personally reviewed imaging studies showing: Other Studies:  CT ABDOMEN PELVIS W IV CONTRAST Additional Contrast? None   Final Result      1. Small amount of ascites associated with marked thickening and decreased   attenuation of the stomach which is new since August 31 and suspicious for acute   gastritis. Gastroenterology consultation should be considered. 2.  No definite acute small bowel or colonic abnormality identified. 3.  Cholelithiasis without changes to suggest acute cholecystitis or   appendicitis. This case was reviewed in consultation with colleagues.       XR CHEST PORTABLE   Final Result   Lungs are hypoaerated but otherwise clear. No definite evidence of   edema. The heart is normal in size. No pneumothorax. No pleural effusions.              Current Meds:  Current Facility-Administered Medications   Medication Dose Route Frequency    amLODIPine (NORVASC) tablet 10 mg  10 mg Oral Daily    oyster shell calcium w/D 500-200 MG-UNIT tablet 0.5 tablet  0.5 tablet Oral Daily    cholestyramine light packet 4 g  4 g Oral BID    folic acid (FOLVITE) tablet 1 mg  1 mg Oral Daily    insulin glargine (LANTUS) injection vial 10 Units  10 Units SubCUTAneous Daily    pantoprazole (PROTONIX) tablet 40 mg  40 mg Oral BID AC    sodium bicarbonate tablet 1,300 mg  1,300 mg Oral BID    vitamin B-12 (CYANOCOBALAMIN) tablet 1,000 mcg  1,000 mcg Oral Daily    insulin lispro (HUMALOG) injection vial 0-8 Units  0-8 Units SubCUTAneous TID WC    insulin lispro (HUMALOG) injection vial 0-4 Units  0-4 Units SubCUTAneous Nightly    glucose chewable tablet 16 g  4 tablet Oral PRN    dextrose bolus 10% 125 mL  125 mL IntraVENous PRN    Or    dextrose bolus 10% 250 mL  250 mL IntraVENous PRN    glucagon (rDNA) injection 1 mg  1 mg SubCUTAneous PRN    dextrose 10 % infusion   IntraVENous Continuous PRN    0.9 % sodium chloride infusion   IntraVENous Continuous    sodium chloride flush 0.9 % injection 5-40 mL  5-40 mL IntraVENous 2 times per day    sodium chloride flush 0.9 % injection 5-40 mL  5-40 mL IntraVENous PRN    0.9 % sodium chloride infusion   IntraVENous PRN    potassium chloride (KLOR-CON M) extended release tablet 40 mEq  40 mEq Oral PRN    Or    potassium bicarb-citric acid (EFFER-K) effervescent tablet 40 mEq  40 mEq Oral PRN    Or    potassium chloride 10 mEq/100 mL IVPB (Peripheral Line)  10 mEq IntraVENous PRN    magnesium sulfate 2000 mg in 50 mL IVPB premix  2,000 mg IntraVENous PRN    enoxaparin (LOVENOX) injection 40 mg  40 mg SubCUTAneous Daily    promethazine (PHENERGAN) tablet 12.5 mg  12.5 mg Oral Q6H PRN    Or ondansetron (ZOFRAN) injection 4 mg  4 mg IntraVENous Q6H PRN    polyethylene glycol (GLYCOLAX) packet 17 g  17 g Oral Daily    bisacodyl (DULCOLAX) EC tablet 5 mg  5 mg Oral Daily PRN    aluminum & magnesium hydroxide-simethicone (MAALOX) 200-200-20 MG/5ML suspension 30 mL  30 mL Oral Q6H PRN    acetaminophen (TYLENOL) tablet 650 mg  650 mg Oral Q6H PRN    Or    acetaminophen (TYLENOL) suppository 650 mg  650 mg Rectal Q6H PRN    HYDROcodone-acetaminophen (NORCO) 5-325 MG per tablet 1 tablet  1 tablet Oral Q6H PRN       Signed:  Toribio Brown MD    Part of this note may have been written by using a voice dictation software. The note has been proof read but may still contain some grammatical/other typographical errors.

## 2022-09-15 NOTE — H&P
Hospitalist History and Physical   Admit Date:  2022  4:37 PM   Name:  Ammon Medeiros   Age:  65 y.o. Sex:  male  :  1965   MRN:  [id number]   Room:  ER/    Presenting Complaint: Diarrhea     Reason(s) for Admission: Diarrhea [R19.7]     History of Present Illness:   Patient was discussed with the ER provider prior to seeing the patient. Ammon Medeiros is a 65 y.o. male with medical history of diabetes, CKD, hypertension, who presented with diarrhea. He reports that this began last night and he has had at least 8 episodes of diarrhea since last night. He denies melena or bleeding. He states the last time this happened, his diarrhea was black. That was a couple weeks ago. He states his stool is now brown and runny. He also reports abdominal cramping and bloating. He denies fever/chills, nausea/vomiting, chest pain, shortness of breath, or cough. He states nothing has seemed to make it any better or worse. His wife reports that he really has not eaten all day, because he is afraid it will make it worse. He has previously been noted to have alcoholism. His wife reports that he has not had any alcohol in over a month. He was just recently inpatient and discharged on  for similar symptoms. But he does report that this is a separate episode that he was well after he left the hospital and then this just started again last night. The ER provider contacted GI regarding his symptoms, and they requested he be admitted and they will see him tomorrow. Review of Systems:  10 systems reviewed and negative except as noted in HPI.   Assessment & Plan:     Principal Problem:    Diarrhea  Plan: C. difficile ordered  IV fluids  N.p.o. after midnight except for medications  Consult GI, he does report having a follow-up visit with GI but is not until October or November  Active Problems:    Type 2 diabetes mellitus, with long-term current use of insulin ([facility]  Plan: Continue patient's long-acting insulin at a reduced dosage since he will be n.p.o.; add sliding scale insulin coverage    Diabetic ulcer of both feet associated with type 2 diabetes mellitus ([address]  Plan: Consult wound care; he does get outpatient wound care since May    Hypoalbuminemia  Plan: Noted, likely secondary to some degree of liver damage from his history of alcoholism    Stage 3a chronic kidney disease ([address]  Plan: avoid nephrotoxic medications; monitor labs    Essential hypertension  Plan: Continue Norvasc      Dispo/Discharge Planning:   Observation, anticipate less than 2 midnights    Diet: Diabetic, n.p.o. after midnight  VTE ppx:  Lovenox  Code status: Full    Hospital Problems             Last Modified POA    * (Principal) Diarrhea 9/14/2022 Yes    Type 2 diabetes mellitus, with long-term current use of insulin ([address] (Chronic) 9/14/2022 Yes    Diabetic ulcer of both feet associated with type 2 diabetes mellitus ([address] (Chronic) 9/14/2022 Yes    Hypoalbuminemia (Chronic) 9/14/2022 Yes    Stage 3a chronic kidney disease ([address] (Chronic) 9/14/2022 Yes    Overview Signed 9/14/2022  9:02 PM by Reyes Juarez MD     With HTN and DM2         Essential hypertension (Chronic) 9/14/2022 Yes    Overview Signed 8/31/2022  8:40 PM by Shereen Abdi DO     Last Assessment & Plan:   Formatting of this note might be different from the original.  Still poor controlled. Continue avoid Cozaar due to acute kidney injury. Started on Norvasc, hydralazine when necessary, clonidine when necessary            Past History:  Past Medical History:   Diagnosis Date    Anemia     Gastroesophageal reflux disease with esophagitis and hemorrhage     HLD (hyperlipidemia)     Hyperplastic colon polyp     Hypertension     Obesity     PUD (peptic ulcer disease)     Type 2 diabetes mellitus with diabetic polyneuropathy, with long-term current use of insulin (HCC)     Ulcer of the duodenum caused by bacteria (H. pylori)     Upper GI bleed 03/28/2016    Last Assessment & Plan:  Formatting of this note might be different from the original. Status post EGD yesterday which revealed esophagitis, gastric and duodenal ulcers Continue PPI, GI follow-up. Monitor hemoglobin Avoid and states Biopsy results- pending     Past Surgical History:   Procedure Laterality Date    COLONOSCOPY      ESOPHAGOGASTRODUODENOSCOPY      UPPER GASTROINTESTINAL ENDOSCOPY N/A 9/6/2022    EGD ESOPHAGOGASTRODUODENOSCOPY/ Rm 215 performed by Yosvany Chowdhury MD at [facility] ENDOSCOPY      No Known Allergies   Social History     Tobacco Use    Smoking status: Never    Smokeless tobacco: Never   Substance Use Topics    Alcohol use: Yes      Family History   Problem Relation Age of Onset    Hypertension Sister     Diabetes Neg Hx         Immunization History   Administered Date(s) Administered    PPD Test 09/01/2022     Prior to Admit Medications:  Current Outpatient Medications   Medication Instructions    amLODIPine (NORVASC) 10 mg, Oral, DAILY    calcium carb-cholecalciferol 250-125 MG-UNIT TABS 1 tablet, Oral, DAILY    cholestyramine light 4 g, Oral, 2 TIMES DAILY    cyanocobalamin 1,000 mcg, Oral, DAILY    folic acid (FOLVITE) 1 mg, Oral, DAILY    glucose monitoring (FREESTYLE FREEDOM) kit 1 kit, Does not apply, DAILY, Check glucose twice daily    Lantus SoloStar 10 Units, SubCUTAneous, DAILY    pantoprazole (PROTONIX) 40 mg, Oral, 2 TIMES DAILY BEFORE MEALS    sodium bicarbonate 1,300 mg, Oral, 2 TIMES DAILY         Objective:   Patient Vitals for the past 24 hrs:   Temp Pulse Resp BP SpO2   09/14/22 1729 -- -- -- 132/87 100 %   09/14/22 1555 98.4 °F (36.9 °C) 93 16 129/89 98 %       Estimated body mass index is 28.17 kg/m² as calculated from the following:    Height as of 9/8/22: 5' 11\" (1.803 m). Weight as of 9/8/22: 202 lb (91.6 kg).   No intake or output data in the 24 hours ending 09/14/22 2123      Physical Exam:    Blood pressure 132/87, pulse 93, temperature 98.4 °F (36.9 °C), temperature source Oral, resp. rate 16, SpO2 100 %. General:    WD and WN, No apparent distress. Eyes:  PERRL; EOMI; sclera normal/non-icteric  HENT:  Normocephalic, without obvious abnormality; Oropharynx is clear with tacky mucous membranes   Neck:  No restricted ROM. Trachea midline   Resp:    Clear to auscultation bilaterally. Resp are even and unlabored  CVS/Heart: Regular rate and rhythm,  1+  LE edema    GI:    Soft, non-tender. Not distended. Bowel sounds normal.     Musc/SK: Muscle strength is appropriate for age/condition; No cyanosis. No clubbing  Skin:  No rashes and normal coloration. Warm and dry.     Neurologic: CN II - XII are grossly intact - Eye exam as noted above; moves extremities equally  Psych:  Alert and oriented x 4;  Judgement and insight are normal    I have reviewed ordered lab tests and independently visualized imaging below:    Last 24hr Labs:  Recent Results (from the past 24 hour(s))   CBC with Auto Differential    Collection Time: 09/14/22  3:59 PM   Result Value Ref Range    WBC 11.6 (H) 4.3 - 11.1 K/uL    RBC 2.86 (L) 4.23 - 5.6 M/uL    Hemoglobin 8.4 (L) 13.6 - 17.2 g/dL    Hematocrit 24.4 (L) 41.1 - 50.3 %    MCV 85.3 79.6 - 97.8 FL    MCH 29.4 26.1 - 32.9 PG    MCHC 34.4 31.4 - 35.0 g/dL    RDW 14.1 11.9 - 14.6 %    Platelets 204 814 - 382 K/uL    MPV 11.8 9.4 - 12.3 FL    nRBC 0.00 0.0 - 0.2 K/uL    Differential Type AUTOMATED      Seg Neutrophils 69 43 - 78 %    Lymphocytes 17 13 - 44 %    Monocytes 9 4.0 - 12.0 %    Eosinophils % 5 0.5 - 7.8 %    Basophils 0 0.0 - 2.0 %    Immature Granulocytes 0 0.0 - 5.0 %    Segs Absolute 8.0 1.7 - 8.2 K/UL    Absolute Lymph # 1.9 0.5 - 4.6 K/UL    Absolute Mono # 1.0 0.1 - 1.3 K/UL    Absolute Eos # 0.5 0.0 - 0.8 K/UL    Basophils Absolute 0.1 0.0 - 0.2 K/UL    Absolute Immature Granulocyte 0.1 0.0 - 0.5 K/UL   Comprehensive Metabolic Panel    Collection Time: 09/14/22  3:59 PM   Result Value Ref Range    Sodium 132 (L) 138 - 145 mmol/L    Potassium 4.5 3.5 - 5.1 mmol/L    Chloride 109 101 - 110 mmol/L    CO2 18 (L) 21 - 32 mmol/L    Anion Gap 5 4 - 13 mmol/L    Glucose 318 (H) 65 - 100 mg/dL    BUN 10 6 - 23 MG/DL    Creatinine 1.40 0.8 - 1.5 MG/DL    GFR African American >60 >60 ml/min/1.73m2    GFR Non- 56 (L) >60 ml/min/1.73m2    Calcium 8.6 8.3 - 10.4 MG/DL    Total Bilirubin 1.1 0.2 - 1.1 MG/DL    ALT 22 12 - 65 U/L    AST 42 (H) 15 - 37 U/L    Alk Phosphatase 138 (H) 50 - 136 U/L    Total Protein 7.1 6.3 - 8.2 g/dL    Albumin 2.2 (L) 3.5 - 5.0 g/dL    Globulin 4.9 (H) 2.3 - 3.5 g/dL    Albumin/Globulin Ratio 0.4 (L) 1.2 - 3.5     POCT Glucose    Collection Time: 09/14/22  8:02 PM   Result Value Ref Range    POC Glucose 138 (H) 65 - 100 mg/dL    Performed by: Sivakumar Ortega          Other Studies:  CT ABDOMEN PELVIS W IV CONTRAST Additional Contrast? None    Result Date: 9/14/2022  CT OF THE ABDOMEN AND PELVIS WITH CONTRAST:          CLINICAL HISTORY:   Persistent diarrhea since last night. Now with mild leukocytosis and history of normocytic, normochromic anemia requiring transfusions, TECHNIQUE:  Oral and nonionic intravenous contrast was administered, and the abdomen and pelvis were scanned with spiral technique. Radiation dose reduction was achieved using one or all of the following techniques: automated exposure control, weight-based dosing, iterative reconstruction. COMPARISON:  August 31, 2022. CT ABDOMEN FINDINGS: There is mild atelectasis at both lung bases. There is a small amount of ascites in the abdomen and pelvis, with extension of a small amount of fluid into a small hiatal hernia. However, there is marked thickening and decreased attenuation of the stomach suggestive of acute gastritis. No discrete ulcer is identified, and there is no free intraperitoneal air. Calcified stones are again noted in a contracted gallbladder without focal pericholecystic inflammatory changes.   The liver, spleen, pancreas, adrenals, and kidneys appear unremarkable. There are extensive atherosclerotic calcifications without definite aneurysm. CT PELVIS FINDINGS: The appendix is not distended. No small bowel or colonic wall thickening is evident. No pathologically enlarged lymph nodes. Bone windows demonstrate no definite aggressive process, accounting for degenerative changes. 1. Small amount of ascites associated with marked thickening and decreased attenuation of the stomach which is new since August 31 and suspicious for acute gastritis. Gastroenterology consultation should be considered. 2.  No definite acute small bowel or colonic abnormality identified. 3.  Cholelithiasis without changes to suggest acute cholecystitis or appendicitis. This case was reviewed in consultation with colleagues. XR CHEST PORTABLE    Result Date: 9/14/2022  XR CHEST PORTABLE 9/14/2022 5:39 PM HISTORY: Bilateral lower extremity edema and dyspnea on exertion. COMPARISON: None available. A portable AP view of the chest was obtained. Lungs are hypoaerated but otherwise clear. No definite evidence of edema. The heart is normal in size. No pneumothorax. No pleural effusions. No results found for this or any previous visit. Signed:  Kali Monsivais MD    Part of this note may have been written by using a voice dictation software. The note has been proof read but may still contain some grammatical/other typographical errors.

## 2022-09-15 NOTE — PROGRESS NOTES
Spiritual Care Visit, initial visit. Visited with patient at bedside. Prayed for patient's healing and health. Visit by Antoinette Encinas Walling, Staff .  Cale., Son.B., B.A.

## 2022-09-15 NOTE — WOUND CARE
Patient seen for several wounds to [facility] that were present on admission. Noted dry ulcerations to posterior calf, 3.9x3x0.2 cm, 2.5x1.8x0.2cm, 0.7x0. 4x0.2cm. these were dry pink-yellow based. No drainage or signs of local infection. Left heel 2.4x2. 2x0.2cm and 2cm long fissure noted. These had small serous drainage, no signs of local infection. Silcone foam dressings placed. Right  heel had old blister that has healed and peeled. Cleaned area and used skin prep to protect. Discussed with patient, he has home care assisting with wounds. Recommend continued care at home when discharged. Answered all questions. Will follow as needed.

## 2022-09-15 NOTE — PROGRESS NOTES
Pt co/ of L leg pain 7/10. Only has tylenol ordered for pain 1-3. Notified Dr Figueroa Cerda. Will await response.

## 2022-09-15 NOTE — CARE COORDINATION
Pt presented with diarrhea; has had at least 8 episodes of diarrhea. He also reported abdominal cramping and bloating. Pt previously noted to have alcoholism, however, his wife reported that he has not had any alcohol in over a month. Has a PMHx of DM2, CKD and HTN. Pt was discharged from [facility] on 9/07/22. Demographics remain the same per pt. He lives with his spouse in a one level home. Fairly indep at baseline with his ADLs. Has a RW, cane and a BSC at home. Is current with [facility], will send a CELSO for an RN/PT/OT. On RA. Pts PCP is with [facility], Dr Lucio Welch, and uses [facility]. Will continue to follow. 09/15/22 1023   Service Assessment   Patient Orientation Alert and Oriented   Cognition Alert   History Provided By Patient   Primary Caregiver Self   Support Systems Spouse/Significant Other;Children;Family Members;[place];Friends/Neighbors   PCP Verified by CM Yes  (Lucio Welch ([facility]))   Prior Functional Level Independent in ADLs/IADLs   Current Functional Level Independent in ADLs/IADLs   Can patient return to prior living arrangement Yes   Ability to make needs known: Good   Family able to assist with home care needs: Yes   Would you like for me to discuss the discharge plan with any other family members/significant others, and if so, who? No   Financial Resources Medicare   Social/Functional History   Lives With Spouse   Type of [address] [place] [address] One level   [address]  [address] assistance   Active  Yes   Occupation On disability   Discharge [facility] Prior To Admission [address] Needed N/A   DME Elieser Wells; Bedside [employer]   DME Ordered?  No   Potential Assistance Purchasing Medications No   Type of Home Care Services Nursing Services;OT;PT   Patient expects to be discharged to: [facility] Discharge   Transition of Care Consult (CM Consult) [facility] Discharge Home Health;OT;PT;Nursing services   [employer] Information Provided?  No   Mode of Transport at Discharge Other (see comment)  (Family)   Confirm Follow Up Transport Family

## 2022-09-15 NOTE — ED NOTES
TRANSFER - OUT REPORT:    Verbal report given to [facility] RN  on Holland Spurling  being transferred to room 304 for routine progression of patient care       Report consisted of patient's Situation, Background, Assessment and   Recommendations(SBAR). Information from the following report(s) ED SBAR was reviewed with the receiving nurse. Lines:   Peripheral IV 09/14/22 Left Antecubital (Active)   Line Status Blood return noted; Flushed;Normal saline locked 09/14/22 9789        Opportunity for questions and clarification was provided.       Patient transported with:  Zaina Cornell RN  09/14/22 8643

## 2022-09-15 NOTE — CONSULTS
[facility] Consult Note       Primary GI Physician: Dr. Kyleigh Fierro at [facility]. Consulted GI Physician: Dr. Aurelia Jeffrey    Referring Provider:  Dr. Akila Donato    Consult Date:  9/15/2022    Admit Date:  9/14/2022    Chief Complaint:  diarrhea, anemia. Subjective:     History of Present Illness:  Patient is a 65 y.o. male with PMH including but not limited to DM2, CKD, HTN, GERD and chronic anemia who is seen in consultation at the request of Dr. Akila Donato for diarrhea and anemia. Pt reports sudden onset diarrhea 9/13/22 with 8 episodes without overt bleeding. Diarrhea associated with abdominal cramping and bloating but no pain, fever, N/V. He denies chest pain, shortness of breath, or cough. Patient states that he was treated with antibiotics during his last hospitalization. Cdiff is pending. He was just recently inpatient and discharged on September 7 for similar symptoms but stools at that time were black. EGD 9/6/22 normal.    Colonoscopy on 3/2/21 revealed prolapsing, reducible hemorrhoids and hyperplastic polyp. Suboptimal prep. Repeat in 6 mos. PMH:  Past Medical History:   Diagnosis Date    Anemia     Gastroesophageal reflux disease with esophagitis and hemorrhage     HLD (hyperlipidemia)     Hyperplastic colon polyp     Hypertension     Obesity     PUD (peptic ulcer disease)     Type 2 diabetes mellitus with diabetic polyneuropathy, with long-term current use of insulin (HCC)     Ulcer of the duodenum caused by bacteria (H. pylori)     Upper GI bleed 03/28/2016    Last Assessment & Plan:  Formatting of this note might be different from the original. Status post EGD yesterday which revealed esophagitis, gastric and duodenal ulcers Continue PPI, GI follow-up.  Monitor hemoglobin Avoid and states Biopsy results- pending       PSH:  Past Surgical History:   Procedure Laterality Date    COLONOSCOPY      ESOPHAGOGASTRODUODENOSCOPY      UPPER GASTROINTESTINAL ENDOSCOPY N/A 9/6/2022 EGD ESOPHAGOGASTRODUODENOSCOPY/ Rm 215 performed by Gail Giang MD at [facility] ENDOSCOPY       Allergies:  No Known Allergies    Home Medications:  Prior to Admission medications    Medication Sig Start Date End Date Taking?  Authorizing Provider   sodium bicarbonate 650 MG tablet Take 2 tablets by mouth 2 times daily 9/7/22   Lela Velarde MD   cholestyramine light 4 g packet Take 1 packet by mouth 2 times daily 9/7/22   Lela Velarde MD   amLODIPine (NORVASC) 10 MG tablet Take 1 tablet by mouth daily 9/8/22   Lela Velarde MD   folic acid (FOLVITE) 1 MG tablet Take 1 tablet by mouth daily 9/8/22   Lela Velarde MD   vitamin B-12 1000 MCG tablet Take 1 tablet by mouth daily 9/8/22   Lela Velarde MD   calcium carb-cholecalciferol 250-125 MG-UNIT TABS Take 1 tablet by mouth daily 9/8/22   Lela Velarde MD   pantoprazole (PROTONIX) 40 MG tablet Take 1 tablet by mouth 2 times daily (before meals) 9/7/22   Lela Velarde MD   insulin glargine (LANTUS SOLOSTAR) 100 UNIT/ML injection pen Inject 10 Units into the skin daily 9/7/22   Lela Velarde MD   glucose monitoring (FREESTYLE FREEDOM) kit 1 kit by Does not apply route daily Check glucose twice daily 9/7/22   Lela Velarde MD       Hospital Medications:  Current Facility-Administered Medications   Medication Dose Route Frequency    amLODIPine (NORVASC) tablet 10 mg  10 mg Oral Daily    oyster shell calcium w/D 500-200 MG-UNIT tablet 0.5 tablet  0.5 tablet Oral Daily    cholestyramine light packet 4 g  4 g Oral BID    folic acid (FOLVITE) tablet 1 mg  1 mg Oral Daily    insulin glargine (LANTUS) injection vial 10 Units  10 Units SubCUTAneous Daily    pantoprazole (PROTONIX) tablet 40 mg  40 mg Oral BID AC    sodium bicarbonate tablet 1,300 mg  1,300 mg Oral BID    vitamin B-12 (CYANOCOBALAMIN) tablet 1,000 mcg  1,000 mcg Oral Daily    insulin lispro (HUMALOG) injection vial 0-8 Units  0-8 Units SubCUTAneous TID WC insulin lispro (HUMALOG) injection vial 0-4 Units  0-4 Units SubCUTAneous Nightly    glucose chewable tablet 16 g  4 tablet Oral PRN    dextrose bolus 10% 125 mL  125 mL IntraVENous PRN    Or    dextrose bolus 10% 250 mL  250 mL IntraVENous PRN    glucagon (rDNA) injection 1 mg  1 mg SubCUTAneous PRN    dextrose 10 % infusion   IntraVENous Continuous PRN    0.9 % sodium chloride infusion   IntraVENous Continuous    sodium chloride flush 0.9 % injection 5-40 mL  5-40 mL IntraVENous 2 times per day    sodium chloride flush 0.9 % injection 5-40 mL  5-40 mL IntraVENous PRN    0.9 % sodium chloride infusion   IntraVENous PRN    potassium chloride (KLOR-CON M) extended release tablet 40 mEq  40 mEq Oral PRN    Or    potassium bicarb-citric acid (EFFER-K) effervescent tablet 40 mEq  40 mEq Oral PRN    Or    potassium chloride 10 mEq/100 mL IVPB (Peripheral Line)  10 mEq IntraVENous PRN    magnesium sulfate 2000 mg in 50 mL IVPB premix  2,000 mg IntraVENous PRN    enoxaparin (LOVENOX) injection 40 mg  40 mg SubCUTAneous Daily    promethazine (PHENERGAN) tablet 12.5 mg  12.5 mg Oral Q6H PRN    Or    ondansetron (ZOFRAN) injection 4 mg  4 mg IntraVENous Q6H PRN    polyethylene glycol (GLYCOLAX) packet 17 g  17 g Oral Daily    bisacodyl (DULCOLAX) EC tablet 5 mg  5 mg Oral Daily PRN    aluminum & magnesium hydroxide-simethicone (MAALOX) 200-200-20 MG/5ML suspension 30 mL  30 mL Oral Q6H PRN    acetaminophen (TYLENOL) tablet 650 mg  650 mg Oral Q6H PRN    Or    acetaminophen (TYLENOL) suppository 650 mg  650 mg Rectal Q6H PRN       Social History:  Social History     Tobacco Use    Smoking status: Never    Smokeless tobacco: Never   Substance Use Topics    Alcohol use: Yes       Pt denies any history of drug use, blood transfusions, or tattoos.     Family History:  Family History   Problem Relation Age of Onset    Hypertension Sister     Diabetes Neg Hx        Review of Systems:  A detailed 10 system ROS is obtained, with pertinent positives as listed above. All others are negative. Diet:  NPO    Objective:     Physical Exam:  Vitals:  /83   Pulse 89   Temp 98.1 °F (36.7 °C) (Oral)   Resp 16   Ht 5' 11\" (1.803 m)   Wt 200 lb (90.7 kg)   SpO2 100%   BMI 27.89 kg/m²   Gen:  Pt is alert, cooperative, no acute distress  Skin:  Extremities and face reveal no rashes. HEENT: Sclerae anicteric. Extra-occular muscles are intact. No oral ulcers. No abnormal pigmentation of the lips. The neck is supple. Cardiovascular: Regular rate and rhythm. No murmurs, gallops, or rubs. Respiratory:  Comfortable breathing with no accessory muscle use. Clear breath sounds anteriorly with no wheezes, rales, or rhonchi. GI:  Abdomen nondistended, soft, and  TTP upper abdomen but no rebound. Normal active bowel sounds. No enlargement of the liver or spleen. No masses palpable. Rectal:  Deferred  Musculoskeletal:  No pitting edema of the lower legs. Neurological:  Gross memory appears intact. Patient is alert and oriented. Psychiatric:  Mood appears appropriate with judgement intact. Lymphatic:  No cervical or supraclavicular adenopathy.     Laboratory:    Recent Labs     09/14/22  1559 09/14/22  2030 09/14/22  2152 09/15/22  0416   WBC 11.6*  --   --  10.9   HGB 8.4*  --   --  7.8*   HCT 24.4*  --   --  22.7*     --   --  176   MCV 85.3  --   --  87.0   *  --   --  136   K 4.5  --   --  4.1     --   --  113*   CO2 18*  --   --  17*   BUN 10  --   --  10   CREATININE 1.40  --   --  1.20   CALCIUM 8.6  --   --  8.2*   GLUCOSE 318*  --  138 185*   ALKPHOS 138*  --   --   --    AST 42*  --   --   --    ALT 22  --   --   --    BILITOT 1.1  --   --   --    LABALBU 2.2*  --   --   --    PROT 7.1  --   --   --    LIPASE  --  101  --   --        Assessment:     Principal Problem:    Diarrhea  Active Problems:    Essential hypertension    Type 2 diabetes mellitus, with long-term current use of insulin (HCC)    Diabetic ulcer of both feet associated with type 2 diabetes mellitus ([facility] [address]    Hypoalbuminemia    Stage 3a chronic kidney disease ([facility] [address]  Resolved Problems:    * No resolved hospital problems. *    65 y.o. male with PMH including but not limited to DM2, CKD, HTN, GERD and chronic anemia who is seen in consultation at the request of Dr. Camron Mcmahon for diarrhea and anemia. Diarrhea is currently without bleeding and Cdiff is pending. Patient had a normal eGD in September. However, last colonoscopy was incomplete and has not been repeated. Plan:     - if Cdiff is positive will treat with Vancomycin  - if negative, will need to consider Colonoscopy +/- repeat EGD. - ok to have clears today  - trend H/H and transfuse prn. Fabiola Clifford      Patient is seen and examined in collaboration with Dr. Darrel Lua. Assessment and plan as per Dr. Darrel Lua.

## 2022-09-15 NOTE — PROGRESS NOTES
TRANSFER - IN REPORT:    Verbal report received from Frank Coates on Jamil Mcnair being received from ED for routine progression of care. Report consisted of patients Situation, Background, Assessment and Recommendations(SBAR). Information from the following report(s) SBAR, ED Summary, and Recent Results was reviewed. Opportunity for questions and clarification was provided. Assessment completed upon patients arrival to unit and care assumed. Patient received to room 304. Patient connected to monitor and assessment completed. Plan of care reviewed. Patient oriented to room and call light. Patient aware to use call light to communicate any chest pain or needs. Admission skin assessment completed with second RN and reveals the following:   Bilateral heels wrapped in gauze, eschar ulcers back of L calf, small brown blisters scattered on abdomen. 3+ pitting edema BLE. No redness or pressure injury noted on sacrum.

## 2022-09-16 ENCOUNTER — HOME CARE VISIT (OUTPATIENT)
Dept: SCHEDULING | Facility: HOME HEALTH | Age: 57
End: 2022-09-16
Payer: MEDICARE

## 2022-09-16 VITALS
HEIGHT: 71 IN | BODY MASS INDEX: 31.47 KG/M2 | DIASTOLIC BLOOD PRESSURE: 92 MMHG | TEMPERATURE: 98.1 F | HEART RATE: 86 BPM | SYSTOLIC BLOOD PRESSURE: 142 MMHG | WEIGHT: 224.8 LBS | RESPIRATION RATE: 18 BRPM | OXYGEN SATURATION: 99 %

## 2022-09-16 PROBLEM — R19.7 DIARRHEA: Status: RESOLVED | Noted: 2022-09-14 | Resolved: 2022-09-16

## 2022-09-16 PROBLEM — E87.1 HYPONATREMIA: Chronic | Status: RESOLVED | Noted: 2022-09-15 | Resolved: 2022-09-16

## 2022-09-16 LAB
ANION GAP SERPL CALC-SCNC: 5 MMOL/L (ref 4–13)
BASOPHILS # BLD: 0.1 K/UL (ref 0–0.2)
BASOPHILS NFR BLD: 1 % (ref 0–2)
BUN SERPL-MCNC: 7 MG/DL (ref 6–23)
CALCIUM SERPL-MCNC: 8.2 MG/DL (ref 8.3–10.4)
CHLORIDE SERPL-SCNC: 113 MMOL/L (ref 101–110)
CO2 SERPL-SCNC: 19 MMOL/L (ref 21–32)
CREAT SERPL-MCNC: 1.1 MG/DL (ref 0.8–1.5)
DIFFERENTIAL METHOD BLD: ABNORMAL
EOSINOPHIL # BLD: 0.5 K/UL (ref 0–0.8)
EOSINOPHIL NFR BLD: 5 % (ref 0.5–7.8)
ERYTHROCYTE [DISTWIDTH] IN BLOOD BY AUTOMATED COUNT: 14.4 % (ref 11.9–14.6)
GLUCOSE BLD STRIP.AUTO-MCNC: 105 MG/DL (ref 65–100)
GLUCOSE BLD STRIP.AUTO-MCNC: 66 MG/DL (ref 65–100)
GLUCOSE SERPL-MCNC: 99 MG/DL (ref 65–100)
HCT VFR BLD AUTO: 23 % (ref 41.1–50.3)
HGB BLD-MCNC: 7.8 G/DL (ref 13.6–17.2)
IMM GRANULOCYTES # BLD AUTO: 0 K/UL (ref 0–0.5)
IMM GRANULOCYTES NFR BLD AUTO: 0 % (ref 0–5)
LYMPHOCYTES # BLD: 1.9 K/UL (ref 0.5–4.6)
LYMPHOCYTES NFR BLD: 20 % (ref 13–44)
MCH RBC QN AUTO: 29.9 PG (ref 26.1–32.9)
MCHC RBC AUTO-ENTMCNC: 33.9 G/DL (ref 31.4–35)
MCV RBC AUTO: 88.1 FL (ref 79.6–97.8)
MONOCYTES # BLD: 0.8 K/UL (ref 0.1–1.3)
MONOCYTES NFR BLD: 9 % (ref 4–12)
NEUTS SEG # BLD: 6.4 K/UL (ref 1.7–8.2)
NEUTS SEG NFR BLD: 65 % (ref 43–78)
NRBC # BLD: 0 K/UL (ref 0–0.2)
PLATELET # BLD AUTO: 182 K/UL (ref 150–450)
PMV BLD AUTO: 12 FL (ref 9.4–12.3)
POTASSIUM SERPL-SCNC: 4.1 MMOL/L (ref 3.5–5.1)
RBC # BLD AUTO: 2.61 M/UL (ref 4.23–5.6)
SERVICE CMNT-IMP: ABNORMAL
SERVICE CMNT-IMP: NORMAL
SODIUM SERPL-SCNC: 137 MMOL/L (ref 138–145)
WBC # BLD AUTO: 9.7 K/UL (ref 4.3–11.1)

## 2022-09-16 PROCEDURE — 80048 BASIC METABOLIC PNL TOTAL CA: CPT

## 2022-09-16 PROCEDURE — G0378 HOSPITAL OBSERVATION PER HR: HCPCS

## 2022-09-16 PROCEDURE — 97161 PT EVAL LOW COMPLEX 20 MIN: CPT

## 2022-09-16 PROCEDURE — 36415 COLL VENOUS BLD VENIPUNCTURE: CPT

## 2022-09-16 PROCEDURE — 97535 SELF CARE MNGMENT TRAINING: CPT

## 2022-09-16 PROCEDURE — 1090000002 HH PPS REVENUE DEBIT

## 2022-09-16 PROCEDURE — 1090000001 HH PPS REVENUE CREDIT

## 2022-09-16 PROCEDURE — 6370000000 HC RX 637 (ALT 250 FOR IP): Performed by: FAMILY MEDICINE

## 2022-09-16 PROCEDURE — 85025 COMPLETE CBC W/AUTO DIFF WBC: CPT

## 2022-09-16 PROCEDURE — 97530 THERAPEUTIC ACTIVITIES: CPT

## 2022-09-16 PROCEDURE — 97165 OT EVAL LOW COMPLEX 30 MIN: CPT

## 2022-09-16 PROCEDURE — 2580000003 HC RX 258: Performed by: FAMILY MEDICINE

## 2022-09-16 PROCEDURE — 82962 GLUCOSE BLOOD TEST: CPT

## 2022-09-16 RX ADMIN — SODIUM BICARBONATE 650 MG TABLET 1300 MG: at 08:08

## 2022-09-16 RX ADMIN — CYANOCOBALAMIN TAB 1000 MCG 1000 MCG: 1000 TAB at 08:08

## 2022-09-16 RX ADMIN — PANTOPRAZOLE SODIUM 40 MG: 40 TABLET, DELAYED RELEASE ORAL at 05:44

## 2022-09-16 RX ADMIN — SODIUM CHLORIDE, PRESERVATIVE FREE 10 ML: 5 INJECTION INTRAVENOUS at 08:09

## 2022-09-16 RX ADMIN — AMLODIPINE BESYLATE 10 MG: 10 TABLET ORAL at 08:08

## 2022-09-16 RX ADMIN — FOLIC ACID 1 MG: 1 TABLET ORAL at 08:08

## 2022-09-16 RX ADMIN — INSULIN GLARGINE 10 UNITS: 100 INJECTION, SOLUTION SUBCUTANEOUS at 09:58

## 2022-09-16 RX ADMIN — CHOLESTYRAMINE 4 G: 4 POWDER, FOR SUSPENSION ORAL at 08:08

## 2022-09-16 RX ADMIN — Medication 0.5 TABLET: at 08:08

## 2022-09-16 ASSESSMENT — PAIN SCALES - GENERAL
PAINLEVEL_OUTOF10: 0

## 2022-09-16 NOTE — PROGRESS NOTES
OCCUPATIONAL THERAPY Initial Assessment and Daily Note       OT Visit Days: 1  Acknowledge Orders  Time  OT Charge Capture  Rehab Caseload Tracker      Kne Gaston is a 65 y.o. male   PRIMARY DIAGNOSIS: Diarrhea  Diarrhea [R19.7]  Other ascites [R18.8]  Acute gastritis, presence of bleeding unspecified, unspecified gastritis type [K29.00]  Diarrhea, unspecified type [R19.7]       Reason for Referral: Generalized Muscle Weakness (M62.81)  Other lack of cordination (R27.8)  Difficulty in walking, Not elsewhere classified (R26.2)  Observation: Payor: [employer] MEDICARE / Plan: [employer] CHOICE-PPO MEDICARE / Product Type: *No Product type* /     ASSESSMENT:     REHAB RECOMMENDATIONS:   Recommendation to date pending progress:  Setting:  Home Health Therapy    Equipment:    None     ASSESSMENT:  Mr. Marsha Arechiga presented to the hospital with frequent diarrhea. At baseline, pt lives with his spouse and nephew--independent for ADLs. Today, pt was received supine in bed. Completed LB/UB dressing, functional transfers, and ambulation with RW with overall SBA-CGA. Pt likely close to his baseline but will continue to follow throughout hospital saty--[facility]. Ken Gaston currently demonstrates overall deficits in strength, balance, activity tolerance, and ADL performance. Patient would benefit from skilled OT services at this time in order to address functional deficits and OT goals stated above.       [address] [place] [address] [address] AM-PAC 6 Clicks Daily Activity Inpatient Short Form:    AM-PAC Daily Activity Inpatient   How much help for putting on and taking off regular lower body clothing?: A Little  How much help for Bathing?: A Little  How much help for Toileting?: A Little  How much help for putting on and taking off regular upper body clothing?: A Little  How much help for taking care of personal grooming?: A Little  How much help for eating meals?: None  AM-PAC Inpatient Daily Activity Raw Score: 19  AM-PAC Inpatient ADL T-Scale Score : 40.22  ADL Inpatient CMS 0-100% Score: 42.8  ADL Inpatient CMS G-Code Modifier : CK       SUBJECTIVE:     Mr. Adolfo Vila states, \"I am going home today. \"     Social/Functional Lives With: Spouse  Type of Home: House  Home Layout: One level  Receives Help From: Family  ADL Assistance: Independent  Ambulation Assistance: Needs assistance  Active : Yes  Occupation: On disability    OBJECTIVE:     [place] / Radha Alias / AIRWAY: NA    RESTRICTIONS/PRECAUTIONS:       PAIN: [place] / O2:   Pre Treatment:   Pain Assessment: None - Denies Pain      Post Treatment: no change        Vitals          Oxygen            GROSS EVALUATION: INTACT IMPAIRED   (See Comments)   UE AROM [x] []   UE PROM [x] []   Strength []  Generalized weakness, functional for bADls     Posture / Balance []  Fair+ sitting and standing    Sensation [x]     Coordination []       Tone [x]       Edema [x]    Activity Tolerance []  Seated rest breaks required      Hand Dominance R [] L []      COGNITION/  PERCEPTION: INTACT IMPAIRED   (See Comments)   Orientation [x]     Vision [x]     Hearing [x]     Cognition  [x]     Perception [x]       MOBILITY: I Mod I S SBA CGA Min Mod Max Total  NT x2 Comments:   Bed Mobility    Rolling [] [] [] [] [] [] [] [] [] [x] []    Supine to Sit [] [] [] [] [] [] [] [] [] [x] [] Received EOB   Scooting [] [] [] [] [] [] [] [] [] [x] []    Sit to Supine [] [] [] [] [] [] [] [] [] [x] [] Left sitting in the chair    Transfers    Sit to Stand [] [] [] [x] [] [] [] [] [] [] []    Bed to Chair [] [] [] [] [x] [] [] [] [] [] []    Stand to Sit [] [] [] [] [x] [] [] [] [] [] []    Tub/Shower [] [] [] [] [] [] [] [] [] [x] []     Toilet [] [] [] [] [] [] [] [] [] [x] []      [] [] [] [] [] [] [] [] [] [] []    I=Independent, Mod I=Modified Independent, S=Supervision/Setup, SBA=Standby Assistance, CGA=Contact Guard Assistance, Min=Minimal Assistance, Mod=Moderate Assistance, Max=Maximal Assistance, Total=Total Assistance, NT=Not Tested    ACTIVITIES OF DAILY LIVING: I Mod I S SBA CGA Min Mod Max Total NT Comments   BASIC ADLs:              Upper Body Bathing  [] [] [] [] [] [] [] [] [] [x]    Lower Body Bathing [] [] [] [] [] [] [] [] [] [x]    Toileting [] [] [] [] [] [] [] [] [] [x]    Upper Body Dressing [] [] [] [x] [] [] [] [] [] [] shirt   Lower Body Dressing [] [] [] [] [x] [] [] [] [] [] Underwear, pants, socks   Feeding [] [] [] [] [] [] [] [] [] [x]    Grooming [] [] [] [] [] [] [] [] [] [x]    Personal Device Care [] [] [] [] [] [] [] [] [] [x]    Functional Mobility [] [] [] [] [x] [] [] [] [] [] RW   I=Independent, Mod I=Modified Independent, S=Supervision/Setup, SBA=Standby Assistance, CGA=Contact Guard Assistance, Min=Minimal Assistance, Mod=Moderate Assistance, Max=Maximal Assistance, Total=Total Assistance, NT=Not Tested    PLAN:     FREQUENCY/DURATION   OT Plan of Care: 3 times/week for duration of hospital stay or until stated goals are met, whichever comes first.    ACUTE OCCUPATIONAL THERAPY GOALS:   (Developed with and agreed upon by patient and/or caregiver.)  1. Patient will complete lower body bathing and dressing with MOD I and adaptive equipment as needed. 2.Patient will complete upper body bathing and dressing with MOD I and adaptive equipment as needed. 3. Patient will complete toileting with MOD I.   4. Patient will tolerate 30 minutes of OT treatment with 1-2 rest breaks to increase activity tolerance for ADLs. 5. Patient will complete functional transfers with MOD I and adaptive equipment as needed. 6. Patient will complete functional activity with MOD I and adaptive equipment as needed.     Timeframe: 7 visits      PROBLEM LIST:   (Skilled intervention is medically necessary to address:)  Decreased ADL/Functional Activities  Decreased Activity Tolerance  Decreased Balance  Decreased Safety Awareness  Decreased Strength  Decreased Transfer Abilities INTERVENTIONS PLANNED:  (Benefits and precautions of occupational therapy have been discussed with the patient.)  Self Care Training  Therapeutic Activity  Therapeutic Exercise/HEP  Neuromuscular Re-education  Manual Therapy  Education         TREATMENT:     EVALUATION: LOW COMPLEXITY: (Untimed Charge)    TREATMENT:   Co-Treatment PT/OT necessary due to patient's decreased overall endurance/tolerance levels, as well as need for high level skilled assistance to complete functional transfers/mobility and functional tasks  Self Care (8 minutes): Patient participated in upper body dressing and lower body dressing ADLs in unsupported sitting and standing with no verbal cueing to increase independence, decrease assistance required, increase activity tolerance, and increase safety awareness. Patient also participated in functional mobility and functional transfer training to increase independence, decrease assistance required, increase activity tolerance, and increase safety awareness. TREATMENT GRID:  N/A    AFTER TREATMENT PRECAUTIONS: Call light within reach, Chair, Needs within reach, and RN notified    INTERDISCIPLINARY COLLABORATION:  RN/ PCT, PT/ PTA, and OT/ HAYDEN    EDUCATION:  Education Given To: Patient  Education Provided: Role of Therapy;Plan of Care; Fall Prevention Strategies  Education Outcome: Verbalized understanding;Demonstrated understanding    TOTAL TREATMENT DURATION AND TIME:  Time In: 1120  Time Out: [address]  Minutes: Latrice Hardwick, OT

## 2022-09-16 NOTE — PROGRESS NOTES
[facility] Progress Note         Admit Date:  9/14/2022    Today's Date:  9/16/2022    CC:  diarrhea    Subjective:     Patient reports 2 stools that are more formed in the last 24 hours. He is on Questran. Anemia with HGB stable and no overt bleed.     Medications:   Current Facility-Administered Medications   Medication Dose Route Frequency    amLODIPine (NORVASC) tablet 10 mg  10 mg Oral Daily    oyster shell calcium w/D 500-200 MG-UNIT tablet 0.5 tablet  0.5 tablet Oral Daily    cholestyramine light packet 4 g  4 g Oral BID    folic acid (FOLVITE) tablet 1 mg  1 mg Oral Daily    insulin glargine (LANTUS) injection vial 10 Units  10 Units SubCUTAneous Daily    pantoprazole (PROTONIX) tablet 40 mg  40 mg Oral BID AC    sodium bicarbonate tablet 1,300 mg  1,300 mg Oral BID    vitamin B-12 (CYANOCOBALAMIN) tablet 1,000 mcg  1,000 mcg Oral Daily    insulin lispro (HUMALOG) injection vial 0-8 Units  0-8 Units SubCUTAneous TID WC    insulin lispro (HUMALOG) injection vial 0-4 Units  0-4 Units SubCUTAneous Nightly    glucose chewable tablet 16 g  4 tablet Oral PRN    dextrose bolus 10% 125 mL  125 mL IntraVENous PRN    Or    dextrose bolus 10% 250 mL  250 mL IntraVENous PRN    glucagon (rDNA) injection 1 mg  1 mg SubCUTAneous PRN    dextrose 10 % infusion   IntraVENous Continuous PRN    sodium chloride flush 0.9 % injection 5-40 mL  5-40 mL IntraVENous 2 times per day    sodium chloride flush 0.9 % injection 5-40 mL  5-40 mL IntraVENous PRN    0.9 % sodium chloride infusion   IntraVENous PRN    potassium chloride (KLOR-CON M) extended release tablet 40 mEq  40 mEq Oral PRN    Or    potassium bicarb-citric acid (EFFER-K) effervescent tablet 40 mEq  40 mEq Oral PRN    Or    potassium chloride 10 mEq/100 mL IVPB (Peripheral Line)  10 mEq IntraVENous PRN    magnesium sulfate 2000 mg in 50 mL IVPB premix  2,000 mg IntraVENous PRN    promethazine (PHENERGAN) tablet 12.5 mg  12.5 mg Oral Q6H PRN    Or ondansetron (ZOFRAN) injection 4 mg  4 mg IntraVENous Q6H PRN    bisacodyl (DULCOLAX) EC tablet 5 mg  5 mg Oral Daily PRN    aluminum & magnesium hydroxide-simethicone (MAALOX) 200-200-20 MG/5ML suspension 30 mL  30 mL Oral Q6H PRN    acetaminophen (TYLENOL) tablet 650 mg  650 mg Oral Q6H PRN    Or    acetaminophen (TYLENOL) suppository 650 mg  650 mg Rectal Q6H PRN    HYDROcodone-acetaminophen (NORCO) 5-325 MG per tablet 1 tablet  1 tablet Oral Q6H PRN       Review of Systems:  ROS was obtained, with pertinent positives as listed above. No chest pain or SOB. Diet:  regular    Objective:   Vitals:  BP (!) 142/92   Pulse 86   Temp 98.1 °F (36.7 °C) (Oral)   Resp 18   Ht 5' 11\" (1.803 m)   Wt 224 lb 12.8 oz (102 kg)   SpO2 99%   BMI 31.35 kg/m²   Intake/Output:  09/16 0701 - 09/16 1900  In: -   Out: 350 [Urine:350]  09/14 1901 - 09/16 0700  In: 1140 [P.O.:1140]  Out: 426 [Urine:425]  Exam:  General appearance: alert, cooperative, no distress  Lungs: clear to auscultation bilaterally anteriorly  Heart: regular rate and rhythm  Abdomen: soft, non-tender.  Bowel sounds normal. No masses, no organomegaly  Extremities: extremities normal, atraumatic, no cyanosis or edema  Neuro:  alert and oriented    Data Review (Labs):    Recent Labs     09/14/22  1559 09/14/22  2030 09/14/22  2152 09/15/22  0416 09/16/22  0332   WBC 11.6*  --   --  10.9 9.7   HGB 8.4*  --   --  7.8* 7.8*   HCT 24.4*  --   --  22.7* 23.0*     --   --  176 182   MCV 85.3  --   --  87.0 88.1   *  --   --  136 137*   K 4.5  --   --  4.1 4.1     --   --  113* 113*   CO2 18*  --   --  17* 19*   BUN 10  --   --  10 7   CREATININE 1.40  --   --  1.20 1.10   CALCIUM 8.6  --   --  8.2* 8.2*   GLUCOSE 318*  --  138 185* 99   ALKPHOS 138*  --   --   --   --    AST 42*  --   --   --   --    ALT 22  --   --   --   --    BILITOT 1.1  --   --   --   --    LABALBU 2.2*  --   --   --   --    PROT 7.1  --   --   --   --    LIPASE  --  101  -- --   --      CT OF THE ABDOMEN AND PELVIS WITH CONTRAST: 9/14/22:                 CLINICAL HISTORY:   Persistent diarrhea since last night. Now with mild   leukocytosis and history of normocytic, normochromic anemia requiring   transfusions,       TECHNIQUE:  Oral and nonionic intravenous contrast was administered, and the   abdomen and pelvis were scanned with spiral technique. Radiation dose reduction   was achieved using one or all of the following techniques: automated exposure   control, weight-based dosing, iterative reconstruction. COMPARISON:  August 31, 2022. CT ABDOMEN FINDINGS: There is mild atelectasis at both lung bases. There is a   small amount of ascites in the abdomen and pelvis, with extension of a small   amount of fluid into a small hiatal hernia. However, there is marked thickening   and decreased attenuation of the stomach suggestive of acute gastritis. No   discrete ulcer is identified, and there is no free intraperitoneal air. Calcified stones are again noted in a contracted gallbladder without focal   pericholecystic inflammatory changes. The liver, spleen, pancreas, adrenals,   and kidneys appear unremarkable. There are extensive atherosclerotic   calcifications without definite aneurysm. CT PELVIS FINDINGS: The appendix is not distended. No small bowel or colonic   wall thickening is evident. No pathologically enlarged lymph nodes. Bone   windows demonstrate no definite aggressive process, accounting for degenerative   changes. Impression       1. Small amount of ascites associated with marked thickening and decreased   attenuation of the stomach which is new since August 31 and suspicious for acute   gastritis. Gastroenterology consultation should be considered. 2.  No definite acute small bowel or colonic abnormality identified. 3.  Cholelithiasis without changes to suggest acute cholecystitis or   appendicitis.            This case was reviewed in consultation with colleagues. Assessment:     Principal Problem:    Diarrhea  Active Problems:    Obesity    Hyponatremia    Alcohol dependence (HCC)    Diabetic peripheral neuropathy (HCC)    Essential hypertension    Hyperlipidemia    Type 2 diabetes mellitus, with long-term current use of insulin (HCC)    Diabetic ulcer of both feet associated with type 2 diabetes mellitus (HCC)    PAD (peripheral artery disease) (HCC)    Hypoalbuminemia    Iron deficiency anemia    Stage 3a chronic kidney disease ([address]  Resolved Problems:    * No resolved hospital problems. *    65 y.o. male with PMH including but not limited to DM2, CKD, HTN, GERD and chronic anemia who is seen in consultation at the request of Dr. Mohit Newell for diarrhea and anemia. Diarrhea is currently without bleeding and Cdiff is pending. Patient had a normal EGD in September. However, last colonoscopy was incomplete and has not been repeated. 9/16/22: Cdiff negative. Diarrhea improved with questran. HGB stable. Plan:     - continue Questran. - trend H/H and transfuse prn. - we will sign off. Pt should follow up with [facility]       Patient is seen and examined in collaboration with Dr. Gerardo De La Vega. Assessment and plan as per Dr. Gerardo De La Vega.

## 2022-09-16 NOTE — PROGRESS NOTES
PHYSICAL THERAPY Initial Assessment, Daily Note, and AM  (Link to Caseload Tracking: PT Visit Days : 1  Acknowledge Orders  Time In/Out  PT Charge Capture  Rehab Caseload Tracker    Dave Cerrato is a 65 y.o. male   PRIMARY DIAGNOSIS: Diarrhea  Diarrhea [R19.7]  Other ascites [R18.8]  Acute gastritis, presence of bleeding unspecified, unspecified gastritis type [K29.00]  Diarrhea, unspecified type [R19.7]       Reason for Referral: Difficulty in walking, Not elsewhere classified (R26.2)  Other abnormalities of gait and mobility (R26.89)  Observation: Payor: [employer]ARE / Plan: Chacha Richards / Product Type: *No Product type* /     ASSESSMENT:     REHAB RECOMMENDATIONS:   Recommendation to date pending progress:  Setting:  Home Health Therapy    Equipment:    None  To Be Determined     ASSESSMENT:  Mr. Cooper Chahal Is a 65 y.o. male presenting to PT after coming to hospital on 9/14 for GI disturbances. At time of initial evaluation, pt presents at near-baseline LOF with only slight deficits in standing dynamic balance, gait and activity tolerance limiting his overall functional mobility. Today, pt performed all mobility Mod (I)/ SB (A) while requiring additional use of RW for ambulation of 250'x1. Of note, pt ambulating c notably inc sway, wide STU and quasi-steppage pattern although he denies any complaints/ difficulty while moving about. Furthermore, pt denying any dizziness, lightheadedness or SOB with activity. At this time, pt is an appropriate candidate for skilled PT at a decreased frequency and will benefit from POC designed to address the aforementioned deficits. Upon completion of treatment, pt was positioned to comfort in chair with needs in reach. RN was made aware of pt performance.      DC Recommendation: [address] [facility] AM-PAC 6 Clicks Basic Mobility Inpatient Short Form  AM-PAC Mobility Inpatient   How much difficulty turning over in bed?: None  How much difficulty sitting down on / standing up from a chair with arms?: None  How much difficulty moving from lying on back to sitting on side of bed?: None  How much help from another person moving to and from a bed to a chair?: None  How much help from another person needed to walk in hospital room?: A Little  How much help from another person for climbing 3-5 steps with a railing?: A Little  AM-[facility] Inpatient Mobility Raw Score : 22  AM-PAC Inpatient T-Scale Score : 53.28  Mobility Inpatient CMS 0-100% Score: 20.91  Mobility Inpatient CMS G-Code Modifier : CJ    SUBJECTIVE:   Mr. Luis M Fuller states, \"I'm feeling good\"     Social/Functional Lives With: Spouse  Type of Home: House  Home Layout: One level  Receives Help From: Family  ADL Assistance: Independent  Ambulation Assistance: Needs assistance  Active : Yes  Occupation: On disability    OBJECTIVE:     PAIN: Dajuan Muskrat / O2: Julio Emmer / Lewis Laughter / Kojo Curly:   Pre Treatment: 0/10         Post Treatment: 0/10   Vitals        Oxygen      None    RESTRICTIONS/PRECAUTIONS:                    GROSS EVALUATION: Intact Impaired (Comments):   AROM [x]     PROM []    Strength [x]     Balance []  Wide STU and inc sway   Posture [] Rounded Shoulders   Sensation [x]     Coordination [x]      Tone []     Edema [] Slight swelling ABIMAEL feet   Activity Tolerance []  Near-baseline level    []      COGNITION/  PERCEPTION: Intact Impaired (Comments):   Orientation [x]     Vision [x]  Not Formally Assessed    Hearing [x]  Not Formally Assessed    Cognition  [x]       MOBILITY: I Mod I S SBA CGA Min Mod Max Total  NT x2 Comments:   Bed Mobility    Rolling [] [x] [] [] [] [] [] [] [] [] []    Supine to Sit [] [x] [] [] [] [] [] [] [] [] []    Scooting [] [x] [] [] [] [] [] [] [] [] []    Sit to Supine [] [] [] [] [] [] [] [] [] [x] []    Transfers    Sit to Stand [] [x] [] [] [] [] [] [] [] [] []    Bed to Chair [] [] [] [] [] [] [] [] [] [] []    Stand to Sit [] [x] [] [] [] [] [] [] [] [] []     [] [] [] [] [] [] [] [] [] [] []    I=Independent, Mod I=Modified Independent, S=Supervision, SBA=Standby Assistance, CGA=Contact Guard Assistance,   Min=Minimal Assistance, Mod=Moderate Assistance, Max=Maximal Assistance, Total=Total Assistance, NT=Not Tested    GAIT: I Mod I S SBA CGA Min Mod Max Total  NT x2 Comments:   Level of Assistance [] [] [] [x] [] [] [] [] [] [] []    Distance  250'x1    DME Rolling Walker    Gait Quality Steppage, Trunk sway increased, and Wide base of support    Weightbearing Status      Stairs      I=Independent, Mod I=Modified Independent, S=Supervision, SBA=Standby Assistance, CGA=Contact Guard Assistance,   Min=Minimal Assistance, Mod=Moderate Assistance, Max=Maximal Assistance, Total=Total Assistance, NT=Not Tested    PLAN:   ACUTE PHYSICAL THERAPY GOALS:   (Developed with and agreed upon by patient and/or caregiver.)  Pt will ambulate 500 ft Mod (I) with use of LRAD and breaks as needed in 7 therapy sessions. Pt will perform standing dynamic balance activities with minimal postural sway in 7 therapy sessions. Pt will tolerate multiple sets and reps of BLE exercises in 7 therapy sessions. FREQUENCY AND DURATION: 2 times/week for duration of hospital stay or until stated goals are met, whichever comes first.    THERAPY PROGNOSIS: Good    PROBLEM LIST:   (Skilled intervention is medically necessary to address:)  Decreased Activity Tolerance  Decreased Balance  Decreased Gait Ability INTERVENTIONS PLANNED:   (Benefits and precautions of physical therapy have been discussed with the patient.)  Therapeutic Activity  Therapeutic Exercise/HEP  Gait Training  Education       TREATMENT:   EVALUATION: LOW COMPLEXITY: (Untimed Charge)    TREATMENT:   Therapeutic Activity (8 Minutes):  Therapeutic activity included Rolling, Supine to Sit, Scooting, Lateral Scooting, Transfer Training, Ambulation on level ground, Sitting balance , and Standing balance to improve functional Activity tolerance, Balance, Mobility, and Strength.     TREATMENT GRID:  N/A    AFTER TREATMENT PRECAUTIONS: Bed/Chair Locked, Call light within reach, Chair, Needs within reach, and RN notified    INTERDISCIPLINARY COLLABORATION:  RN/ PCT, PT/ PTA, and OT/ HAYDEN    EDUCATION: Education Given To: Patient  Education Provided: Role of Therapy    TIME IN/OUT:  Time In: [facility]  Time Out: 307 [facility]  Minutes: 17    Jose Alvarado PT

## 2022-09-16 NOTE — PLAN OF CARE
Problem: Discharge Planning  Goal: Discharge to home or other facility with appropriate resources  Outcome: Adequate for Discharge     Problem: Safety - Adult  Goal: Free from fall injury  Outcome: Adequate for Discharge     Problem: Chronic Conditions and Co-morbidities  Goal: Patient's chronic conditions and co-morbidity symptoms are monitored and maintained or improved  Outcome: Adequate for Discharge

## 2022-09-16 NOTE — CARE COORDINATION
Discharge order is in. Pt is discharging home in stable condition. [facility] with RN/PT/OT arranged. No other discharge needs identified. Tx goals have been met.       09/16/22 1124   Services At/After Discharge   Transition of Care Consult (CM Consult) Discharge Planning;Home Health   Internal [address] Discharge None    Resource Information Provided? No   Mode of Transport at Discharge Other (see comment)  (Family)   Confirm Follow Up Transport Family   Condition of Participation: Discharge Planning   The Patient and/or Patient Representative was provided with a Choice of Provider? Patient   The Patient and/Or Patient Representative agree with the Discharge Plan? Yes   Freedom of Choice list was provided with basic dialogue that supports the patient's individualized plan of care/goals, treatment preferences, and shares the quality data associated with the providers?   Yes

## 2022-09-16 NOTE — DISCHARGE SUMMARY
Hospitalist Discharge Summary   Admit Date:  2022  4:37 PM   DC Note date: 2022  Name:  Ania Bermudez   Age:  65 y.o. Sex:  male  :  1965   MRN:  [id number]   Room:  [facility]  PCP:  None Provider    Presenting Complaint: Diarrhea     Initial Admission Diagnosis: Diarrhea [R19.7]  Other ascites [R18.8]  Acute gastritis, presence of bleeding unspecified, unspecified gastritis type [K29.00]  Diarrhea, unspecified type [R19.7]     Problem List for this Hospitalization (present on admission):    Principal Problem (Resolved):    Diarrhea  Active Problems:    Obesity    Alcohol dependence ([address]    Diabetic peripheral neuropathy ([address]    Essential hypertension    Hyperlipidemia    Type 2 diabetes mellitus, with long-term current use of insulin ([address]    Diabetic ulcer of both feet associated with type 2 diabetes mellitus ([address]    PAD (peripheral artery disease) (HCC)    Hypoalbuminemia    Iron deficiency anemia    Stage 3a chronic kidney disease ([address]  Resolved Problems:    Hyponatremia      Hospital Course:    Mr. Rachid Flor is a 66 yo male with PMH of obesity, Dm2, HTN, HLP, GI bleed/ chronic anemia, LE wounds/ PVD, ETOH use, hyponatremia, who is evaluated with acute on chronic anemia and acute diarrhea. He completed antibiotics for bilateral heel wounds on 22. He had been seen by GI during prior admit due to acute blood loss anemia s/p EGD that was unrevealing. He was to followup with [facility] for capsule endoscopy. On re-admit HGB 8.4, stool studies cdiff negative. CTAP shows\"         Impression       1. Small amount of ascites associated with marked thickening and decreased   attenuation of the stomach which is new since  and suspicious for acute   gastritis. Gastroenterology consultation should be considered. 2.  No definite acute small bowel or colonic abnormality identified. 3.  Cholelithiasis without changes to suggest acute cholecystitis or   appendicitis. \"        GI following. He has continued questran and bowel movements improve. Diet advanced. Discharge plans to home with wife. Disposition: home  Diet: ADULT DIET; Regular; 3 carb choices (45 gm/meal)  Code Status: Full Code    Follow Ups:   Follow-up Information     None Provider Follow up in 1 week(s). [facility] Follow up in 1 month(s). Contact information:  [phone]                     Time spent in patient discharge and coordination 35  minutes. Follow up labs/diagnostics (ultimately defer to outpatient provider):PCP and GI    Plan was discussed with patient. All questions answered. Patient was stable at time of discharge. Instructions given to call a physician or return if any concerns.     Current Discharge Medication List        CONTINUE these medications which have NOT CHANGED    Details   sodium bicarbonate 650 MG tablet Take 2 tablets by mouth 2 times daily  Qty: 60 tablet, Refills: 0      cholestyramine light 4 g packet Take 1 packet by mouth 2 times daily  Qty: 60 packet, Refills: 0      amLODIPine (NORVASC) 10 MG tablet Take 1 tablet by mouth daily  Qty: 30 tablet, Refills: 0      folic acid (FOLVITE) 1 MG tablet Take 1 tablet by mouth daily  Qty: 30 tablet, Refills: 0      vitamin B-12 1000 MCG tablet Take 1 tablet by mouth daily  Qty: 30 tablet, Refills: 0      calcium carb-cholecalciferol 250-125 MG-UNIT TABS Take 1 tablet by mouth daily  Qty: 60 tablet, Refills: 0      pantoprazole (PROTONIX) 40 MG tablet Take 1 tablet by mouth 2 times daily (before meals)  Qty: 30 tablet, Refills: 0      insulin glargine (LANTUS SOLOSTAR) 100 UNIT/ML injection pen Inject 10 Units into the skin daily  Qty: 5 Adjustable Dose Pre-filled Pen Syringe, Refills: 0      glucose monitoring (FREESTYLE FREEDOM) kit 1 kit by Does not apply route daily Check glucose twice daily  Qty: 1 kit, Refills: 0             Procedures done this admission:  * No surgery found *    Consults this admission:  IP WOUND CARE NURSE CONSULT TO EVAL  IP CONSULT TO GI  IP CONSULT HOME HEALTH    Echocardiogram results:  No results found for this or any previous visit. Diagnostic Imaging/Tests:   XR CALCANEUS LEFT (MIN 2 VIEWS)    Result Date: 8/31/2022  No acute process. XR CALCANEUS RIGHT (MIN 2 VIEWS)    Result Date: 8/31/2022  No acute process. CT ABDOMEN PELVIS W IV CONTRAST Additional Contrast? None    Result Date: 9/14/2022  1. Small amount of ascites associated with marked thickening and decreased attenuation of the stomach which is new since August 31 and suspicious for acute gastritis. Gastroenterology consultation should be considered. 2.  No definite acute small bowel or colonic abnormality identified. 3.  Cholelithiasis without changes to suggest acute cholecystitis or appendicitis. This case was reviewed in consultation with colleagues. CT ABDOMEN PELVIS W IV CONTRAST Additional Contrast? None    Result Date: 8/31/2022  1. Cholelithiasis without pericholecystic inflammatory changes to suggest acute cholecystitis or appendicitis. 2.  Bilateral perinephric soft tissue stranding and small perinephric fluid collections as well as a small amount of fluid in the pelvic cul-de-sac are nonspecific, but the possibility of pyelonephritis should be considered. XR CHEST PORTABLE    Result Date: 9/14/2022  Lungs are hypoaerated but otherwise clear. No definite evidence of edema. The heart is normal in size. No pneumothorax. No pleural effusions. Vascular duplex lower extremity arteries bilateral with ALECIA    Result Date: 9/1/2022  1. Noncompressible pressures at the level the ankle consistent with small vessel calcinosis. 2. Right-sided mild-to-moderate superficial femoral artery and popliteal artery stenoses 3. Very sluggish flow noted in the left peroneal artery and right posterior tibial artery.     Vascular duplex lower extremity venous left    Result Date: 8/31/2022  No evidence of left leg DVT.        Labs: Results:       BMP, Mg, Phos Recent Labs     09/14/22  1559 09/14/22  2152 09/15/22  0416 09/16/22  0332   *  --  136 137*   K 4.5  --  4.1 4.1     --  113* 113*   CO2 18*  --  17* 19*   ANIONGAP 5  --  6 5   BUN 10  --  10 7   CREATININE 1.40  --  1.20 1.10   LABGLOM 56*  --  >60 >60   GFRAA >60  --  >60 >60   CALCIUM 8.6  --  8.2* 8.2*   GLUCOSE 318* 138 185* 99      CBC Recent Labs     09/14/22  1559 09/15/22  0416 09/16/22  0332   WBC 11.6* 10.9 9.7   RBC 2.86* 2.61* 2.61*   HGB 8.4* 7.8* 7.8*   HCT 24.4* 22.7* 23.0*   MCV 85.3 87.0 88.1   MCH 29.4 29.9 29.9   MCHC 34.4 34.4 33.9   RDW 14.1 14.2 14.4    176 182   MPV 11.8 11.6 12.0   NRBC 0.00 0.00 0.00   SEGS 69 67 65   LYMPHOPCT 17 18 20   EOSRELPCT 5 5 5   MONOPCT 9 9 9   BASOPCT 0 1 1   IMMGRAN 0 0 0   SEGSABS 8.0 7.4 6.4   LYMPHSABS 1.9 1.9 1.9   EOSABS 0.5 0.5 0.5   MONOSABS 1.0 0.9 0.8   BASOSABS 0.1 0.1 0.1   ABSIMMGRAN 0.1 0.0 0.0      LFT Recent Labs     09/14/22  1559   BILITOT 1.1   ALKPHOS 138*   AST 42*   ALT 22   PROT 7.1   LABALBU 2.2*   GLOB 4.9*      Cardiac  No results found for: NTPROBNP, TROPHS   Coags Lab Results   Component Value Date/Time    PROTIME 15.5 08/31/2022 04:16 PM    INR 1.2 08/31/2022 04:16 PM      A1c Lab Results   Component Value Date/Time    LABA1C 8.0 09/15/2022 04:16 AM     09/15/2022 04:16 AM      Lipids No results found for: CHOL, LDLCALC, LABVLDL, HDL, CHOLHDLRATIO, TRIG   Thyroid  Lab Results   Component Value Date/Time    TSHELE 2.64 09/05/2022 07:44 AM        Most Recent UA Lab Results   Component Value Date/Time    COLORU YELLOW/STRAW 08/31/2022 07:39 PM    APPEARANCE CLEAR 08/31/2022 07:39 PM    SPECGRAV 1.025 08/31/2022 07:39 PM    LABPH 6.0 08/31/2022 07:39 PM    PROTEINU Negative 08/31/2022 07:39 PM    GLUCOSEU 100 08/31/2022 07:39 PM    GLUCOSEU Negative 08/31/2022 07:03 PM    KETUA Negative 08/31/2022 07:39 PM    BILIRUBINUR Negative 08/31/2022 07:39 PM    BLOODU Negative 08/31/2022 07:39 PM    BLOODU Negative 08/31/2022 07:03 PM    UROBILINOGEN 1.0 08/31/2022 07:39 PM    NITRU Negative 08/31/2022 07:39 PM    LEUKOCYTESUR Negative 08/31/2022 07:39 PM    BACTERIA 0 08/31/2022 07:39 PM        Recent Labs     08/31/22 2110 08/31/22 2107 08/31/22 2056 08/31/22 2050 08/31/22  1939   CULTURE LIGHT KLEBSIELLA PNEUMONIAE*  MODERATE STAPHYLOCOCCUS AUREUS*  CORRECTION TO THE MEDICAL RECORD PREVIOUSLY REPORTED AS: * Methicillin Resistant Staphylococcus aureus **  RESULTS VERIFIED, PHONED TO AND READ BACK BY Saundra Ernst RN @ 0540 ON 9/05/22 AK. MODERATE NORMAL SKIN XIMENA ISOLATED  NO ANAEROBES ISOLATED 7 DAYS  NO ANAEROBES ISOLATED 7 DAYS  LIGHT STAPHYLOCOCCUS AUREUS*  SCANT NORMAL SKIN XIMENA ISOLATED MRSA target DNA not detected, SA target DNA detected. A MRSA negative, SA positive test result does not preclude MRSA nasal colonization. *  RESULTS VERIFIED, PHONED TO AND READ BACK BY MAIA ORTIZ @ 0023 ON 09.01.22 SCOTT NO GROWTH 5 DAYS NO GROWTH 5 DAYS 10,000 to 50,000 COLONIES/mL MIXED SKIN XIMENA ISOLATED       All Labs from Last 24 Hrs:  Recent Results (from the past 24 hour(s))   POCT Glucose    Collection Time: 09/15/22 11:42 AM   Result Value Ref Range    POC Glucose 134 (H) 65 - 100 mg/dL    Performed by: [id number]    POCT Glucose    Collection Time: 09/15/22  4:46 PM   Result Value Ref Range    POC Glucose 75 65 - 100 mg/dL    Performed by: [address]    POCT Glucose    Collection Time: 09/15/22  8:24 PM   Result Value Ref Range    POC Glucose 118 (H) 65 - 100 mg/dL    Performed by: Maria Elena    CBC with Auto Differential    Collection Time: 09/16/22  3:32 AM   Result Value Ref Range    WBC 9.7 4.3 - 11.1 K/uL    RBC 2.61 (L) 4.23 - 5.6 M/uL    Hemoglobin 7.8 (L) 13.6 - 17.2 g/dL    Hematocrit 23.0 (L) 41.1 - 50.3 %    MCV 88.1 79.6 - 97.8 FL    MCH 29.9 26.1 - 32.9 PG    MCHC 33.9 31.4 - 35.0 g/dL    RDW 14.4 11.9 - 14.6 %    Platelets 196 801 - 009 K/uL    MPV 12.0 9.4 - 12.3 FL    nRBC 0.00 0.0 - 0.2 K/uL    Differential Type AUTOMATED      Seg Neutrophils 65 43 - 78 %    Lymphocytes 20 13 - 44 %    Monocytes 9 4.0 - 12.0 %    Eosinophils % 5 0.5 - 7.8 %    Basophils 1 0.0 - 2.0 %    Immature Granulocytes 0 0.0 - 5.0 %    Segs Absolute 6.4 1.7 - 8.2 K/UL    Absolute Lymph # 1.9 0.5 - 4.6 K/UL    Absolute Mono # 0.8 0.1 - 1.3 K/UL    Absolute Eos # 0.5 0.0 - 0.8 K/UL    Basophils Absolute 0.1 0.0 - 0.2 K/UL    Absolute Immature Granulocyte 0.0 0.0 - 0.5 K/UL   Basic Metabolic Panel    Collection Time: 09/16/22  3:32 AM   Result Value Ref Range    Sodium 137 (L) 138 - 145 mmol/L    Potassium 4.1 3.5 - 5.1 mmol/L    Chloride 113 (H) 101 - 110 mmol/L    CO2 19 (L) 21 - 32 mmol/L    Anion Gap 5 4 - 13 mmol/L    Glucose 99 65 - 100 mg/dL    BUN 7 6 - 23 MG/DL    Creatinine 1.10 0.8 - 1.5 MG/DL    GFR African American >60 >60 ml/min/1.73m2    GFR Non- >60 >60 ml/min/1.73m2    Calcium 8.2 (L) 8.3 - 10.4 MG/DL   POCT Glucose    Collection Time: 09/16/22  7:31 AM   Result Value Ref Range    POC Glucose 66 65 - 100 mg/dL    Performed by: [employer]    POCT Glucose    Collection Time: 09/16/22  8:02 AM   Result Value Ref Range    POC Glucose 105 (H) 65 - 100 mg/dL    Performed by: [employer]        No Known Allergies  Immunization History   Administered Date(s) Administered    PPD Test 09/01/2022       Recent Vital Data:  Patient Vitals for the past 24 hrs:   Temp Pulse Resp BP SpO2   09/16/22 0734 98.1 °F (36.7 °C) 86 18 (!) 142/92 99 %   09/16/22 0428 98.1 °F (36.7 °C) 86 18 (!) 151/101 100 %   09/16/22 0016 98 °F (36.7 °C) 94 18 (!) 136/90 100 %   09/15/22 2022 98.1 °F (36.7 °C) 95 18 (!) 142/93 100 %   09/15/22 1608 98.2 °F (36.8 °C) 83 18 127/79 100 %   09/15/22 1235 98.6 °F (37 °C) 82 18 111/71 100 %       Oxygen Therapy  SpO2: 99 %  O2 Device: None (Room air)    Estimated body mass index is 31.35 kg/m² as calculated from the following:    Height as of this encounter: 5' 11\" (1.803 m). Weight as of this encounter: 224 lb 12.8 oz (102 kg). Intake/Output Summary (Last 24 hours) at 9/16/2022 1057  Last data filed at 9/16/2022 0801  Gross per 24 hour   Intake 1000 ml   Output 776 ml   Net 224 ml         Physical Exam:    General:    Well nourished. No overt distress, alert and pleasant   CV:   RRR. No m/r/g. No JVD, 1 + edema  Lungs:   CTAB. No wheezing, rhonchi, or rales. Respirations even, unlabored  Abdomen:   Soft, nontender, nondistended. Extremities: Warm and dry. No cyanosis or clubbing. Skin:     No rashes. Normal coloration  Neuro:  grossly intact. Psych:  Normal mood and affect. Signed:  Manish Simeon MD    Part of this note may have been written by using a voice dictation software. The note has been proof read but may still contain some grammatical/other typographical errors.

## 2022-09-17 PROCEDURE — 1090000001 HH PPS REVENUE CREDIT

## 2022-09-17 PROCEDURE — 1090000002 HH PPS REVENUE DEBIT

## 2022-09-18 PROCEDURE — 1090000001 HH PPS REVENUE CREDIT

## 2022-09-18 PROCEDURE — 1090000002 HH PPS REVENUE DEBIT

## 2022-09-19 ENCOUNTER — TELEPHONE (OUTPATIENT)
Dept: INTERNAL MEDICINE CLINIC | Facility: CLINIC | Age: 57
End: 2022-09-19

## 2022-09-19 ENCOUNTER — HOME CARE VISIT (OUTPATIENT)
Dept: SCHEDULING | Facility: HOME HEALTH | Age: 57
End: 2022-09-19
Payer: MEDICARE

## 2022-09-19 VITALS
HEART RATE: 91 BPM | TEMPERATURE: 98.2 F | OXYGEN SATURATION: 99 % | SYSTOLIC BLOOD PRESSURE: 124 MMHG | DIASTOLIC BLOOD PRESSURE: 70 MMHG | RESPIRATION RATE: 18 BRPM

## 2022-09-19 PROCEDURE — 1090000002 HH PPS REVENUE DEBIT

## 2022-09-19 PROCEDURE — 1090000001 HH PPS REVENUE CREDIT

## 2022-09-19 PROCEDURE — G0299 HHS/HOSPICE OF RN EA 15 MIN: HCPCS

## 2022-09-19 NOTE — TELEPHONE ENCOUNTER
Called patient to schedule TCV appt following discharge from [facility] 09/16/2022. Dx Acute gastritis, presence of bleeding unspecified, unspecified gastritis type . Patient to follow up in 1 month with [facility] and PCP in 1 week.     Patient states he has appt this week with Dr Huey Walker for follow up and does not have questions or concerns

## 2022-09-20 ENCOUNTER — HOME CARE VISIT (OUTPATIENT)
Dept: SCHEDULING | Facility: HOME HEALTH | Age: 57
End: 2022-09-20
Payer: MEDICARE

## 2022-09-20 PROCEDURE — 1090000002 HH PPS REVENUE DEBIT

## 2022-09-20 PROCEDURE — 1090000001 HH PPS REVENUE CREDIT

## 2022-09-20 PROCEDURE — G0157 HHC PT ASSISTANT EA 15: HCPCS

## 2022-09-21 ENCOUNTER — HOME CARE VISIT (OUTPATIENT)
Dept: SCHEDULING | Facility: HOME HEALTH | Age: 57
End: 2022-09-21
Payer: MEDICARE

## 2022-09-21 VITALS
HEART RATE: 88 BPM | DIASTOLIC BLOOD PRESSURE: 86 MMHG | TEMPERATURE: 99.3 F | OXYGEN SATURATION: 99 % | RESPIRATION RATE: 16 BRPM | SYSTOLIC BLOOD PRESSURE: 130 MMHG

## 2022-09-21 VITALS
TEMPERATURE: 98.2 F | RESPIRATION RATE: 16 BRPM | HEART RATE: 92 BPM | SYSTOLIC BLOOD PRESSURE: 120 MMHG | DIASTOLIC BLOOD PRESSURE: 84 MMHG | OXYGEN SATURATION: 100 %

## 2022-09-21 PROBLEM — R19.5 DARK STOOLS: Status: ACTIVE | Noted: 2020-12-21

## 2022-09-21 PROBLEM — R79.89 HIGH SERUM FERRITIN: Status: ACTIVE | Noted: 2019-11-27

## 2022-09-21 PROBLEM — E87.6 HYPOKALEMIA: Status: ACTIVE | Noted: 2021-04-15

## 2022-09-21 PROBLEM — K92.1 HEMATOCHEZIA: Status: ACTIVE | Noted: 2021-02-04

## 2022-09-21 PROBLEM — E66.9 OBESITY WITH BODY MASS INDEX 30 OR GREATER: Status: ACTIVE | Noted: 2017-10-16

## 2022-09-21 PROBLEM — L85.3 DRY SKIN DERMATITIS: Status: ACTIVE | Noted: 2022-05-13

## 2022-09-21 PROBLEM — D64.9 NORMOCYTIC ANEMIA: Status: ACTIVE | Noted: 2020-12-21

## 2022-09-21 LAB — SPECIMEN SOURCE: NORMAL

## 2022-09-21 PROCEDURE — G0299 HHS/HOSPICE OF RN EA 15 MIN: HCPCS

## 2022-09-21 PROCEDURE — 1090000002 HH PPS REVENUE DEBIT

## 2022-09-21 PROCEDURE — 1090000001 HH PPS REVENUE CREDIT

## 2022-09-21 ASSESSMENT — ENCOUNTER SYMPTOMS
DYSPNEA ACTIVITY LEVEL: AFTER AMBULATING MORE THAN 20 FT
PAIN LOCATION - PAIN QUALITY: ACHE
PAIN LOCATION - PAIN QUALITY: SORE

## 2022-09-22 ENCOUNTER — HOME CARE VISIT (OUTPATIENT)
Dept: SCHEDULING | Facility: HOME HEALTH | Age: 57
End: 2022-09-22
Payer: MEDICARE

## 2022-09-22 VITALS
HEART RATE: 88 BPM | RESPIRATION RATE: 16 BRPM | DIASTOLIC BLOOD PRESSURE: 74 MMHG | OXYGEN SATURATION: 100 % | TEMPERATURE: 97.1 F | SYSTOLIC BLOOD PRESSURE: 124 MMHG

## 2022-09-22 PROCEDURE — 1090000002 HH PPS REVENUE DEBIT

## 2022-09-22 PROCEDURE — G0158 HHC OT ASSISTANT EA 15: HCPCS

## 2022-09-22 PROCEDURE — 1090000001 HH PPS REVENUE CREDIT

## 2022-09-22 PROCEDURE — G0157 HHC PT ASSISTANT EA 15: HCPCS

## 2022-09-22 ASSESSMENT — ENCOUNTER SYMPTOMS: PAIN LOCATION - PAIN QUALITY: ACHING

## 2022-09-23 ENCOUNTER — HOME CARE VISIT (OUTPATIENT)
Dept: SCHEDULING | Facility: HOME HEALTH | Age: 57
End: 2022-09-23
Payer: MEDICARE

## 2022-09-23 VITALS
OXYGEN SATURATION: 99 % | SYSTOLIC BLOOD PRESSURE: 128 MMHG | TEMPERATURE: 98.7 F | DIASTOLIC BLOOD PRESSURE: 78 MMHG | RESPIRATION RATE: 18 BRPM | HEART RATE: 88 BPM

## 2022-09-23 PROCEDURE — 1090000001 HH PPS REVENUE CREDIT

## 2022-09-23 PROCEDURE — 1090000002 HH PPS REVENUE DEBIT

## 2022-09-23 PROCEDURE — G0299 HHS/HOSPICE OF RN EA 15 MIN: HCPCS

## 2022-09-24 VITALS
DIASTOLIC BLOOD PRESSURE: 75 MMHG | SYSTOLIC BLOOD PRESSURE: 128 MMHG | OXYGEN SATURATION: 97 % | TEMPERATURE: 97.5 F | HEART RATE: 76 BPM

## 2022-09-24 PROCEDURE — 1090000001 HH PPS REVENUE CREDIT

## 2022-09-24 PROCEDURE — 1090000002 HH PPS REVENUE DEBIT

## 2022-09-25 PROCEDURE — 1090000001 HH PPS REVENUE CREDIT

## 2022-09-25 PROCEDURE — 1090000002 HH PPS REVENUE DEBIT

## 2022-09-26 ENCOUNTER — HOME CARE VISIT (OUTPATIENT)
Dept: SCHEDULING | Facility: HOME HEALTH | Age: 57
End: 2022-09-26
Payer: MEDICARE

## 2022-09-26 VITALS
OXYGEN SATURATION: 98 % | HEART RATE: 94 BPM | SYSTOLIC BLOOD PRESSURE: 134 MMHG | TEMPERATURE: 98.4 F | RESPIRATION RATE: 16 BRPM | DIASTOLIC BLOOD PRESSURE: 80 MMHG

## 2022-09-26 PROCEDURE — G0299 HHS/HOSPICE OF RN EA 15 MIN: HCPCS

## 2022-09-26 PROCEDURE — 1090000001 HH PPS REVENUE CREDIT

## 2022-09-26 PROCEDURE — 1090000002 HH PPS REVENUE DEBIT

## 2022-09-26 ASSESSMENT — ENCOUNTER SYMPTOMS: PAIN LOCATION - PAIN QUALITY: ACHE

## 2022-09-27 ENCOUNTER — HOME CARE VISIT (OUTPATIENT)
Dept: SCHEDULING | Facility: HOME HEALTH | Age: 57
End: 2022-09-27
Payer: MEDICARE

## 2022-09-27 VITALS
SYSTOLIC BLOOD PRESSURE: 132 MMHG | DIASTOLIC BLOOD PRESSURE: 74 MMHG | OXYGEN SATURATION: 99 % | HEART RATE: 96 BPM | RESPIRATION RATE: 17 BRPM | TEMPERATURE: 98.3 F

## 2022-09-27 PROCEDURE — G0157 HHC PT ASSISTANT EA 15: HCPCS

## 2022-09-27 PROCEDURE — 1090000001 HH PPS REVENUE CREDIT

## 2022-09-27 PROCEDURE — G0155 HHCP-SVS OF CSW,EA 15 MIN: HCPCS

## 2022-09-27 PROCEDURE — 1090000002 HH PPS REVENUE DEBIT

## 2022-09-27 ASSESSMENT — ENCOUNTER SYMPTOMS: PAIN LOCATION - PAIN QUALITY: DEEP ACHE

## 2022-09-28 ENCOUNTER — HOME CARE VISIT (OUTPATIENT)
Dept: SCHEDULING | Facility: HOME HEALTH | Age: 57
End: 2022-09-28
Payer: MEDICARE

## 2022-09-28 VITALS
RESPIRATION RATE: 18 BRPM | OXYGEN SATURATION: 98 % | SYSTOLIC BLOOD PRESSURE: 137 MMHG | HEART RATE: 82 BPM | DIASTOLIC BLOOD PRESSURE: 82 MMHG | TEMPERATURE: 97.9 F

## 2022-09-28 PROCEDURE — 1090000001 HH PPS REVENUE CREDIT

## 2022-09-28 PROCEDURE — 1090000002 HH PPS REVENUE DEBIT

## 2022-09-28 PROCEDURE — G0299 HHS/HOSPICE OF RN EA 15 MIN: HCPCS

## 2022-09-28 ASSESSMENT — ENCOUNTER SYMPTOMS: PAIN LOCATION - PAIN QUALITY: ACHING

## 2022-09-29 ENCOUNTER — HOME CARE VISIT (OUTPATIENT)
Dept: SCHEDULING | Facility: HOME HEALTH | Age: 57
End: 2022-09-29
Payer: MEDICARE

## 2022-09-29 VITALS
DIASTOLIC BLOOD PRESSURE: 84 MMHG | TEMPERATURE: 97.8 F | SYSTOLIC BLOOD PRESSURE: 132 MMHG | OXYGEN SATURATION: 98 % | RESPIRATION RATE: 18 BRPM | HEART RATE: 78 BPM

## 2022-09-29 PROCEDURE — G0151 HHCP-SERV OF PT,EA 15 MIN: HCPCS

## 2022-09-29 PROCEDURE — 1090000001 HH PPS REVENUE CREDIT

## 2022-09-29 PROCEDURE — 1090000002 HH PPS REVENUE DEBIT

## 2022-09-29 ASSESSMENT — ENCOUNTER SYMPTOMS
SKIN LESIONS: 1
PAIN LOCATION - PAIN QUALITY: DEEP ACHE

## 2022-09-30 ENCOUNTER — HOME CARE VISIT (OUTPATIENT)
Dept: SCHEDULING | Facility: HOME HEALTH | Age: 57
End: 2022-09-30
Payer: MEDICARE

## 2022-09-30 PROCEDURE — 1090000002 HH PPS REVENUE DEBIT

## 2022-09-30 PROCEDURE — 1090000001 HH PPS REVENUE CREDIT

## 2022-10-01 PROCEDURE — 1090000002 HH PPS REVENUE DEBIT

## 2022-10-01 PROCEDURE — 1090000001 HH PPS REVENUE CREDIT

## 2022-10-02 PROCEDURE — 1090000001 HH PPS REVENUE CREDIT

## 2022-10-02 PROCEDURE — 1090000002 HH PPS REVENUE DEBIT

## 2022-10-03 ENCOUNTER — HOME CARE VISIT (OUTPATIENT)
Dept: SCHEDULING | Facility: HOME HEALTH | Age: 57
End: 2022-10-03
Payer: MEDICARE

## 2022-10-03 VITALS
RESPIRATION RATE: 16 BRPM | SYSTOLIC BLOOD PRESSURE: 144 MMHG | OXYGEN SATURATION: 98 % | TEMPERATURE: 98.1 F | HEART RATE: 89 BPM | DIASTOLIC BLOOD PRESSURE: 84 MMHG

## 2022-10-03 PROCEDURE — 1090000002 HH PPS REVENUE DEBIT

## 2022-10-03 PROCEDURE — G0299 HHS/HOSPICE OF RN EA 15 MIN: HCPCS

## 2022-10-03 PROCEDURE — 1090000001 HH PPS REVENUE CREDIT

## 2022-10-03 ASSESSMENT — ENCOUNTER SYMPTOMS: PAIN LOCATION - PAIN QUALITY: SORE, ACHE

## 2022-10-04 ENCOUNTER — HOME CARE VISIT (OUTPATIENT)
Dept: SCHEDULING | Facility: HOME HEALTH | Age: 57
End: 2022-10-04
Payer: MEDICARE

## 2022-10-04 VITALS
SYSTOLIC BLOOD PRESSURE: 132 MMHG | RESPIRATION RATE: 16 BRPM | OXYGEN SATURATION: 98 % | TEMPERATURE: 97.8 F | DIASTOLIC BLOOD PRESSURE: 76 MMHG | HEART RATE: 98 BPM

## 2022-10-04 PROCEDURE — G0152 HHCP-SERV OF OT,EA 15 MIN: HCPCS

## 2022-10-04 PROCEDURE — 1090000001 HH PPS REVENUE CREDIT

## 2022-10-04 PROCEDURE — 1090000002 HH PPS REVENUE DEBIT

## 2022-10-04 ASSESSMENT — ENCOUNTER SYMPTOMS
DYSPNEA ACTIVITY LEVEL: AFTER AMBULATING LESS THAN 10 FT
PAIN LOCATION - PAIN QUALITY: MOVEMENT

## 2022-10-05 ENCOUNTER — HOME CARE VISIT (OUTPATIENT)
Dept: SCHEDULING | Facility: HOME HEALTH | Age: 57
End: 2022-10-05
Payer: MEDICARE

## 2022-10-05 VITALS
RESPIRATION RATE: 18 BRPM | HEART RATE: 88 BPM | TEMPERATURE: 97.5 F | DIASTOLIC BLOOD PRESSURE: 83 MMHG | SYSTOLIC BLOOD PRESSURE: 140 MMHG | OXYGEN SATURATION: 98 %

## 2022-10-05 PROCEDURE — G0299 HHS/HOSPICE OF RN EA 15 MIN: HCPCS

## 2022-10-05 PROCEDURE — 1090000002 HH PPS REVENUE DEBIT

## 2022-10-05 PROCEDURE — 1090000001 HH PPS REVENUE CREDIT

## 2022-10-05 ASSESSMENT — ENCOUNTER SYMPTOMS: PAIN LOCATION - PAIN QUALITY: ACHING

## 2022-10-06 PROCEDURE — 1090000002 HH PPS REVENUE DEBIT

## 2022-10-06 PROCEDURE — 1090000001 HH PPS REVENUE CREDIT

## 2022-10-07 ENCOUNTER — HOME CARE VISIT (OUTPATIENT)
Dept: SCHEDULING | Facility: HOME HEALTH | Age: 57
End: 2022-10-07
Payer: MEDICARE

## 2022-10-07 PROCEDURE — G0299 HHS/HOSPICE OF RN EA 15 MIN: HCPCS

## 2022-10-07 PROCEDURE — 1090000002 HH PPS REVENUE DEBIT

## 2022-10-07 PROCEDURE — 1090000001 HH PPS REVENUE CREDIT

## 2022-10-08 VITALS
DIASTOLIC BLOOD PRESSURE: 88 MMHG | TEMPERATURE: 98 F | HEART RATE: 94 BPM | OXYGEN SATURATION: 100 % | RESPIRATION RATE: 18 BRPM | SYSTOLIC BLOOD PRESSURE: 137 MMHG

## 2022-10-08 PROCEDURE — 1090000002 HH PPS REVENUE DEBIT

## 2022-10-08 PROCEDURE — 1090000001 HH PPS REVENUE CREDIT

## 2022-10-08 ASSESSMENT — ENCOUNTER SYMPTOMS: PAIN LOCATION - PAIN QUALITY: ACHING

## 2022-10-09 PROCEDURE — 1090000002 HH PPS REVENUE DEBIT

## 2022-10-09 PROCEDURE — 1090000001 HH PPS REVENUE CREDIT

## 2022-10-10 ENCOUNTER — HOME CARE VISIT (OUTPATIENT)
Dept: SCHEDULING | Facility: HOME HEALTH | Age: 57
End: 2022-10-10
Payer: MEDICARE

## 2022-10-10 VITALS
TEMPERATURE: 97.8 F | SYSTOLIC BLOOD PRESSURE: 128 MMHG | OXYGEN SATURATION: 98 % | DIASTOLIC BLOOD PRESSURE: 80 MMHG | RESPIRATION RATE: 18 BRPM | HEART RATE: 93 BPM

## 2022-10-10 PROCEDURE — 1090000002 HH PPS REVENUE DEBIT

## 2022-10-10 PROCEDURE — 400013 HH SOC

## 2022-10-10 PROCEDURE — G0299 HHS/HOSPICE OF RN EA 15 MIN: HCPCS

## 2022-10-10 PROCEDURE — 1090000001 HH PPS REVENUE CREDIT

## 2022-10-10 ASSESSMENT — ENCOUNTER SYMPTOMS
HEMOPTYSIS: 0
DIARRHEA: 1
DYSPNEA ACTIVITY LEVEL: AFTER AMBULATING 10 - 20 FT

## 2022-10-11 ENCOUNTER — TELEPHONE (OUTPATIENT)
Dept: DIABETES SERVICES | Age: 57
End: 2022-10-11

## 2022-10-11 PROCEDURE — 1090000001 HH PPS REVENUE CREDIT

## 2022-10-11 PROCEDURE — 1090000002 HH PPS REVENUE DEBIT

## 2022-10-12 ENCOUNTER — HOME CARE VISIT (OUTPATIENT)
Dept: SCHEDULING | Facility: HOME HEALTH | Age: 57
End: 2022-10-12
Payer: MEDICARE

## 2022-10-12 PROCEDURE — G0299 HHS/HOSPICE OF RN EA 15 MIN: HCPCS

## 2022-10-12 PROCEDURE — 1090000001 HH PPS REVENUE CREDIT

## 2022-10-12 PROCEDURE — 1090000002 HH PPS REVENUE DEBIT

## 2022-10-12 PROCEDURE — 1090000003 HH PPS REV ADJ

## 2022-10-13 VITALS
RESPIRATION RATE: 18 BRPM | TEMPERATURE: 98 F | HEART RATE: 87 BPM | DIASTOLIC BLOOD PRESSURE: 82 MMHG | SYSTOLIC BLOOD PRESSURE: 133 MMHG | OXYGEN SATURATION: 97 %

## 2022-11-07 ENCOUNTER — FOLLOWUP TELEPHONE ENCOUNTER (OUTPATIENT)
Dept: DIABETES SERVICES | Age: 57
End: 2022-11-07

## 2022-11-07 NOTE — TELEPHONE ENCOUNTER
Third call to pt to r/s diabetes assessment. Left message. If we don't hear by 11/14/22 we will send a letter.  If we don't hear by 11/28/22 we will close referral.

## 2022-12-13 ENCOUNTER — APPOINTMENT (OUTPATIENT)
Dept: GENERAL RADIOLOGY | Age: 57
DRG: 433 | End: 2022-12-13
Payer: MEDICARE

## 2022-12-13 ENCOUNTER — HOSPITAL ENCOUNTER (INPATIENT)
Age: 57
LOS: 21 days | Discharge: HOME HEALTH | DRG: 433 | End: 2023-01-03
Attending: STUDENT IN AN ORGANIZED HEALTH CARE EDUCATION/TRAINING PROGRAM | Admitting: FAMILY MEDICINE
Payer: MEDICARE

## 2022-12-13 ENCOUNTER — HOSPITAL ENCOUNTER (EMERGENCY)
Dept: GENERAL RADIOLOGY | Age: 57
Discharge: HOME | DRG: 433 | End: 2022-12-16
Payer: MEDICARE

## 2022-12-13 ENCOUNTER — APPOINTMENT (OUTPATIENT)
Dept: CT IMAGING | Age: 57
DRG: 433 | End: 2022-12-13
Payer: MEDICARE

## 2022-12-13 DIAGNOSIS — R10.9 ABDOMINAL PAIN, UNSPECIFIED ABDOMINAL LOCATION: Primary | ICD-10-CM

## 2022-12-13 DIAGNOSIS — I10 ESSENTIAL HYPERTENSION: Chronic | ICD-10-CM

## 2022-12-13 DIAGNOSIS — M79.89 SWELLING OF LOWER EXTREMITY: ICD-10-CM

## 2022-12-13 DIAGNOSIS — A41.9 SEPTICEMIA (HCC): ICD-10-CM

## 2022-12-13 PROBLEM — R65.10 SIRS (SYSTEMIC INFLAMMATORY RESPONSE SYNDROME) (HCC): Status: ACTIVE | Noted: 2022-12-13

## 2022-12-13 LAB
ALBUMIN SERPL-MCNC: 1.7 G/DL (ref 3.5–5)
ALBUMIN/GLOB SERPL: 0.3 (ref 0.4–1.6)
ALP SERPL-CCNC: 131 U/L (ref 50–136)
ALT SERPL-CCNC: 8 U/L (ref 12–65)
ANION GAP SERPL CALC-SCNC: 8 MMOL/L (ref 2–11)
AST SERPL-CCNC: 27 U/L (ref 15–37)
BASOPHILS # BLD: 0.1 K/UL (ref 0–0.2)
BASOPHILS NFR BLD: 0 % (ref 0–2)
BILIRUB SERPL-MCNC: 1.1 MG/DL (ref 0.2–1.1)
BUN SERPL-MCNC: 22 MG/DL (ref 6–23)
CALCIUM SERPL-MCNC: 8 MG/DL (ref 8.3–10.4)
CHLORIDE SERPL-SCNC: 98 MMOL/L (ref 101–110)
CO2 SERPL-SCNC: 26 MMOL/L (ref 21–32)
CREAT SERPL-MCNC: 1.8 MG/DL (ref 0.8–1.5)
D DIMER PPP FEU-MCNC: 9.31 UG/ML(FEU)
DIFFERENTIAL METHOD BLD: ABNORMAL
EOSINOPHIL # BLD: 0.1 K/UL (ref 0–0.8)
EOSINOPHIL NFR BLD: 1 % (ref 0.5–7.8)
ERYTHROCYTE [DISTWIDTH] IN BLOOD BY AUTOMATED COUNT: 17.4 % (ref 11.9–14.6)
FLUAV AG NPH QL IA: NEGATIVE
FLUBV AG NPH QL IA: NEGATIVE
GLOBULIN SER CALC-MCNC: 6.2 G/DL (ref 2.8–4.5)
GLUCOSE BLD STRIP.AUTO-MCNC: 59 MG/DL (ref 65–100)
GLUCOSE BLD STRIP.AUTO-MCNC: 61 MG/DL (ref 65–100)
GLUCOSE BLD STRIP.AUTO-MCNC: 75 MG/DL (ref 65–100)
GLUCOSE BLD STRIP.AUTO-MCNC: 75 MG/DL (ref 65–100)
GLUCOSE BLD STRIP.AUTO-MCNC: 89 MG/DL (ref 65–100)
GLUCOSE SERPL-MCNC: 64 MG/DL (ref 65–100)
HCT VFR BLD AUTO: 30.5 % (ref 41.1–50.3)
HGB BLD-MCNC: 10.1 G/DL (ref 13.6–17.2)
IMM GRANULOCYTES # BLD AUTO: 0.1 K/UL (ref 0–0.5)
IMM GRANULOCYTES NFR BLD AUTO: 1 % (ref 0–5)
LACTATE SERPL-SCNC: 2.2 MMOL/L (ref 0.4–2)
LYMPHOCYTES # BLD: 2.8 K/UL (ref 0.5–4.6)
LYMPHOCYTES NFR BLD: 20 % (ref 13–44)
MCH RBC QN AUTO: 27.8 PG (ref 26.1–32.9)
MCHC RBC AUTO-ENTMCNC: 33.1 G/DL (ref 31.4–35)
MCV RBC AUTO: 84 FL (ref 82–102)
MONOCYTES # BLD: 1.6 K/UL (ref 0.1–1.3)
MONOCYTES NFR BLD: 11 % (ref 4–12)
NEUTS SEG # BLD: 9.2 K/UL (ref 1.7–8.2)
NEUTS SEG NFR BLD: 67 % (ref 43–78)
NRBC # BLD: 0.02 K/UL (ref 0–0.2)
NT PRO BNP: 1280 PG/ML (ref 5–125)
PLATELET # BLD AUTO: 286 K/UL (ref 150–450)
PMV BLD AUTO: 10.2 FL (ref 9.4–12.3)
POTASSIUM SERPL-SCNC: 3.5 MMOL/L (ref 3.5–5.1)
PROT SERPL-MCNC: 7.9 G/DL (ref 6.3–8.2)
RBC # BLD AUTO: 3.63 M/UL (ref 4.23–5.6)
SARS-COV-2 RDRP RESP QL NAA+PROBE: NOT DETECTED
SERVICE CMNT-IMP: ABNORMAL
SERVICE CMNT-IMP: ABNORMAL
SERVICE CMNT-IMP: NORMAL
SODIUM SERPL-SCNC: 132 MMOL/L (ref 133–143)
SOURCE: NORMAL
SPECIMEN SOURCE: NORMAL
TROPONIN I SERPL HS-MCNC: 12.1 PG/ML (ref 0–14)
WBC # BLD AUTO: 13.9 K/UL (ref 4.3–11.1)

## 2022-12-13 PROCEDURE — A4216 STERILE WATER/SALINE, 10 ML: HCPCS | Performed by: FAMILY MEDICINE

## 2022-12-13 PROCEDURE — 87804 INFLUENZA ASSAY W/OPTIC: CPT

## 2022-12-13 PROCEDURE — 96366 THER/PROPH/DIAG IV INF ADDON: CPT

## 2022-12-13 PROCEDURE — 87635 SARS-COV-2 COVID-19 AMP PRB: CPT

## 2022-12-13 PROCEDURE — 0D9670Z DRAINAGE OF STOMACH WITH DRAINAGE DEVICE, VIA NATURAL OR ARTIFICIAL OPENING: ICD-10-PCS | Performed by: FAMILY MEDICINE

## 2022-12-13 PROCEDURE — C9113 INJ PANTOPRAZOLE SODIUM, VIA: HCPCS | Performed by: FAMILY MEDICINE

## 2022-12-13 PROCEDURE — 87040 BLOOD CULTURE FOR BACTERIA: CPT

## 2022-12-13 PROCEDURE — 74018 RADEX ABDOMEN 1 VIEW: CPT

## 2022-12-13 PROCEDURE — 84484 ASSAY OF TROPONIN QUANT: CPT

## 2022-12-13 PROCEDURE — 1100000000 HC RM PRIVATE

## 2022-12-13 PROCEDURE — 96367 TX/PROPH/DG ADDL SEQ IV INF: CPT

## 2022-12-13 PROCEDURE — 83605 ASSAY OF LACTIC ACID: CPT

## 2022-12-13 PROCEDURE — 82962 GLUCOSE BLOOD TEST: CPT

## 2022-12-13 PROCEDURE — 99285 EMERGENCY DEPT VISIT HI MDM: CPT

## 2022-12-13 PROCEDURE — 6360000002 HC RX W HCPCS: Performed by: NURSE PRACTITIONER

## 2022-12-13 PROCEDURE — 83880 ASSAY OF NATRIURETIC PEPTIDE: CPT

## 2022-12-13 PROCEDURE — 85025 COMPLETE CBC W/AUTO DIFF WBC: CPT

## 2022-12-13 PROCEDURE — 96365 THER/PROPH/DIAG IV INF INIT: CPT

## 2022-12-13 PROCEDURE — 85379 FIBRIN DEGRADATION QUANT: CPT

## 2022-12-13 PROCEDURE — 80053 COMPREHEN METABOLIC PANEL: CPT

## 2022-12-13 PROCEDURE — 87205 SMEAR GRAM STAIN: CPT

## 2022-12-13 PROCEDURE — 71260 CT THORAX DX C+: CPT | Performed by: NURSE PRACTITIONER

## 2022-12-13 PROCEDURE — 71046 X-RAY EXAM CHEST 2 VIEWS: CPT

## 2022-12-13 PROCEDURE — 6360000002 HC RX W HCPCS: Performed by: FAMILY MEDICINE

## 2022-12-13 PROCEDURE — 6360000004 HC RX CONTRAST MEDICATION: Performed by: NURSE PRACTITIONER

## 2022-12-13 PROCEDURE — 2580000003 HC RX 258: Performed by: NURSE PRACTITIONER

## 2022-12-13 PROCEDURE — 87150 DNA/RNA AMPLIFIED PROBE: CPT

## 2022-12-13 PROCEDURE — 2580000003 HC RX 258: Performed by: FAMILY MEDICINE

## 2022-12-13 RX ORDER — LANOLIN ALCOHOL/MO/W.PET/CERES
1000 CREAM (GRAM) TOPICAL DAILY
Status: DISCONTINUED | OUTPATIENT
Start: 2022-12-14 | End: 2023-01-03 | Stop reason: HOSPADM

## 2022-12-13 RX ORDER — ONDANSETRON 2 MG/ML
4 INJECTION INTRAMUSCULAR; INTRAVENOUS EVERY 6 HOURS PRN
Status: DISCONTINUED | OUTPATIENT
Start: 2022-12-13 | End: 2023-01-03 | Stop reason: HOSPADM

## 2022-12-13 RX ORDER — SODIUM CHLORIDE 0.9 % (FLUSH) 0.9 %
5-40 SYRINGE (ML) INJECTION EVERY 12 HOURS SCHEDULED
Status: DISCONTINUED | OUTPATIENT
Start: 2022-12-13 | End: 2023-01-03 | Stop reason: HOSPADM

## 2022-12-13 RX ORDER — ONDANSETRON 4 MG/1
4 TABLET, ORALLY DISINTEGRATING ORAL EVERY 8 HOURS PRN
Status: DISCONTINUED | OUTPATIENT
Start: 2022-12-13 | End: 2023-01-03 | Stop reason: HOSPADM

## 2022-12-13 RX ORDER — ACETAMINOPHEN 325 MG/1
650 TABLET ORAL EVERY 6 HOURS PRN
Status: DISCONTINUED | OUTPATIENT
Start: 2022-12-13 | End: 2023-01-03 | Stop reason: HOSPADM

## 2022-12-13 RX ORDER — MORPHINE SULFATE 2 MG/ML
1 INJECTION, SOLUTION INTRAMUSCULAR; INTRAVENOUS EVERY 4 HOURS PRN
Status: DISCONTINUED | OUTPATIENT
Start: 2022-12-13 | End: 2023-01-03 | Stop reason: HOSPADM

## 2022-12-13 RX ORDER — SODIUM CHLORIDE 0.9 % (FLUSH) 0.9 %
5-40 SYRINGE (ML) INJECTION PRN
Status: DISCONTINUED | OUTPATIENT
Start: 2022-12-13 | End: 2023-01-03 | Stop reason: HOSPADM

## 2022-12-13 RX ORDER — POLYETHYLENE GLYCOL 3350 17 G/17G
17 POWDER, FOR SOLUTION ORAL DAILY PRN
Status: DISCONTINUED | OUTPATIENT
Start: 2022-12-13 | End: 2022-12-25

## 2022-12-13 RX ORDER — 0.9 % SODIUM CHLORIDE 0.9 %
100 INTRAVENOUS SOLUTION INTRAVENOUS ONCE
Status: COMPLETED | OUTPATIENT
Start: 2022-12-13 | End: 2022-12-13

## 2022-12-13 RX ORDER — INSULIN LISPRO 100 [IU]/ML
0-4 INJECTION, SOLUTION INTRAVENOUS; SUBCUTANEOUS
Status: DISCONTINUED | OUTPATIENT
Start: 2022-12-13 | End: 2022-12-30

## 2022-12-13 RX ORDER — SODIUM CHLORIDE 9 MG/ML
INJECTION, SOLUTION INTRAVENOUS CONTINUOUS
Status: DISCONTINUED | OUTPATIENT
Start: 2022-12-13 | End: 2022-12-14

## 2022-12-13 RX ORDER — INSULIN LISPRO 100 [IU]/ML
0-4 INJECTION, SOLUTION INTRAVENOUS; SUBCUTANEOUS NIGHTLY
Status: DISCONTINUED | OUTPATIENT
Start: 2022-12-13 | End: 2022-12-30

## 2022-12-13 RX ORDER — SODIUM BICARBONATE 650 MG/1
1300 TABLET ORAL 2 TIMES DAILY
Status: DISCONTINUED | OUTPATIENT
Start: 2022-12-13 | End: 2022-12-17

## 2022-12-13 RX ORDER — AMLODIPINE BESYLATE 10 MG/1
10 TABLET ORAL DAILY
Status: DISCONTINUED | OUTPATIENT
Start: 2022-12-14 | End: 2022-12-15

## 2022-12-13 RX ORDER — SODIUM CHLORIDE 0.9 % (FLUSH) 0.9 %
10 SYRINGE (ML) INJECTION
Status: COMPLETED | OUTPATIENT
Start: 2022-12-13 | End: 2022-12-13

## 2022-12-13 RX ORDER — HYDRALAZINE HYDROCHLORIDE 20 MG/ML
10 INJECTION INTRAMUSCULAR; INTRAVENOUS EVERY 6 HOURS PRN
Status: DISCONTINUED | OUTPATIENT
Start: 2022-12-13 | End: 2023-01-03 | Stop reason: HOSPADM

## 2022-12-13 RX ORDER — SODIUM CHLORIDE 9 MG/ML
INJECTION, SOLUTION INTRAVENOUS PRN
Status: DISCONTINUED | OUTPATIENT
Start: 2022-12-13 | End: 2023-01-03 | Stop reason: HOSPADM

## 2022-12-13 RX ORDER — ACETAMINOPHEN 650 MG/1
650 SUPPOSITORY RECTAL EVERY 6 HOURS PRN
Status: DISCONTINUED | OUTPATIENT
Start: 2022-12-13 | End: 2023-01-03 | Stop reason: HOSPADM

## 2022-12-13 RX ORDER — CHOLESTYRAMINE LIGHT 4 G/5.7G
4 POWDER, FOR SUSPENSION ORAL 2 TIMES DAILY
Status: DISCONTINUED | OUTPATIENT
Start: 2022-12-13 | End: 2023-01-03 | Stop reason: HOSPADM

## 2022-12-13 RX ORDER — FOLIC ACID 1 MG/1
1 TABLET ORAL DAILY
Status: DISCONTINUED | OUTPATIENT
Start: 2022-12-14 | End: 2023-01-03 | Stop reason: HOSPADM

## 2022-12-13 RX ORDER — DEXTROSE MONOHYDRATE 100 MG/ML
INJECTION, SOLUTION INTRAVENOUS CONTINUOUS PRN
Status: DISCONTINUED | OUTPATIENT
Start: 2022-12-13 | End: 2023-01-03 | Stop reason: HOSPADM

## 2022-12-13 RX ADMIN — DEXTROSE MONOHYDRATE 250 ML: 100 INJECTION, SOLUTION INTRAVENOUS at 17:15

## 2022-12-13 RX ADMIN — AZITHROMYCIN 500 MG: 500 INJECTION, POWDER, LYOPHILIZED, FOR SOLUTION INTRAVENOUS at 18:45

## 2022-12-13 RX ADMIN — PANTOPRAZOLE SODIUM 40 MG: 40 INJECTION, POWDER, LYOPHILIZED, FOR SOLUTION INTRAVENOUS at 23:23

## 2022-12-13 RX ADMIN — CEFTRIAXONE 1000 MG: 1 INJECTION, POWDER, FOR SOLUTION INTRAMUSCULAR; INTRAVENOUS at 15:42

## 2022-12-13 RX ADMIN — SODIUM CHLORIDE, PRESERVATIVE FREE 10 ML: 5 INJECTION INTRAVENOUS at 18:18

## 2022-12-13 RX ADMIN — SODIUM CHLORIDE: 9 INJECTION, SOLUTION INTRAVENOUS at 18:45

## 2022-12-13 RX ADMIN — IOPAMIDOL 100 ML: 755 INJECTION, SOLUTION INTRAVENOUS at 18:17

## 2022-12-13 RX ADMIN — SODIUM CHLORIDE 100 ML: 9 INJECTION, SOLUTION INTRAVENOUS at 18:18

## 2022-12-13 RX ADMIN — SODIUM CHLORIDE, PRESERVATIVE FREE 10 ML: 5 INJECTION INTRAVENOUS at 23:23

## 2022-12-13 RX ADMIN — PIPERACILLIN AND TAZOBACTAM 4500 MG: 4; .5 INJECTION, POWDER, FOR SOLUTION INTRAVENOUS at 23:24

## 2022-12-13 ASSESSMENT — PAIN SCALES - GENERAL: PAINLEVEL_OUTOF10: 0

## 2022-12-13 ASSESSMENT — PAIN - FUNCTIONAL ASSESSMENT: PAIN_FUNCTIONAL_ASSESSMENT: 0-10

## 2022-12-13 NOTE — ED PROVIDER NOTES
[employer] Emergency Department Provider Note                   PCP:                Md Terra Irizarry               Age: 65 y.o. Sex: male       ICD-10-CM    1. Abdominal pain, unspecified abdominal location  R10.9       2. Swelling of lower extremity  M79.89       3. Septicemia ([address]  A41.9           DISPOSITION    Admit    MDM     [facility] as charted. Pt neck is supple, no meningeal signs. Lungs are clear to auscultation, no diminished sounds. Abdomen is soft and diffusely tender. He has bilateral pitting edema. There is no calf tenderness. He denies dizziness or active chest pain. Reports persistent cough and feeling poorly. I evaluate the patient's immediately upon his arrival in triage room and completing my exam, recommended the patient be placed in earliest possible exam room, and ordered this, lactic acid, blood cultures as well as IV antibiotics with concern for possible sepsis. The patient is afebrile, though tachycardic and has had persistent cough. I will refrain from giving 30 mg/KG fluid bolus at this time as I have significant concern for CHF/fluid volume overload and feel this would be harmful to the patient. Unfortunately, patient sat in ED waiting area for numerous hours without labs being drawn, antibiotics given. Multiple attempts with nursing staff in charge to get patient placed in room and have asked labs/cultures obtained and antibiotics administered. Again unfortunately, RN informed that she checked the patient's blood sugar and it was quite low. He was given some juice without improvement. Ordered IV therapy. Room was assigned the patient and I have assessed the patient from wheelchair to his exam room. He is hemodynamically stable at this time, he is afebrile. MARS, elevated lactic acid, and white count among other lab abnormalities noted. Patient has not yet been able to provide a urine sample.   I feel patient will require inpatient management and have discussed with ED attending, consulted hospitalist for admission. Thank you. Orders Placed This Encounter   Procedures    Blood Culture 1    Rapid influenza A/B antigens    COVID-19, Rapid    XR CHEST (2 VW)    Troponin    Urinalysis    CBC with Auto Differential    CMP    Lactate, Sepsis    D-Dimer, Quantitative    Brain Natriuretic Peptide    EKG 12 Lead        Cheryle Harlem is a 65 y.o. male who presents to the Emergency Department with chief complaint of    Chief Complaint   Patient presents with    Emesis      HPI  Pleasant 28-year-old male presents to the ED with complaints of feeling of abdominal bloating and distention, decreased appetite and decreased oral intake. States he has had almost nothing to eat or drink in the last 2-3 days because any oral intake causes him to feel \"follow-up. \"  Endorses nausea and has had a few episodes of vomiting reporting clear emesis. States he has had a productive cough with green-colored expectorate for approximately 1 month and reports getting short of breath when he ambulates. Aside from this, denies any chest pain, dizziness, diaphoresis, shortness of breath, syncope or near syncope, hemoptysis, abdominal pain. Denies change in bowel habits though has had some watery brown stools in recent days, no black or bloody stools. No leg pain. Denies cardiac history, denies history of of CHF, PE or DVT. He is alert and oriented, mildly hypertensive and a bit tachycardic, afebrile. Denies fever/chills, change in appetite, weight loss, decreased oral intake, blurred vision, headaches, neck pain/stiffness. Alert & oriented x 3, afebrile, hemodynamically stable, non-toxic appearing, appears in no distress. Medical/surgical/social history reviewed with the patient. Review of Systems  Constitutional: Negative for fever. Negative for appetite change, chills, diaphoresis and unexpected weight change. HENT: As in HPI     Eyes: Negative.      Respiratory: As in HPI Cardiovascular: Negative. GI/: As in HPI      Musculoskeletal: As in HPI   Skin: Negative. Allergic/Immunologic: Negative. Neurological: Negative. Past Medical History:   Diagnosis Date    Anemia     Gastroesophageal reflux disease with esophagitis and hemorrhage     HLD (hyperlipidemia)     Hyperplastic colon polyp     Hypertension     Obesity     PUD (peptic ulcer disease)     Type 2 diabetes mellitus with diabetic polyneuropathy, with long-term current use of insulin (HCC)     Ulcer of the duodenum caused by bacteria (H. pylori)     Upper GI bleed 03/28/2016    Last Assessment & Plan:  Formatting of this note might be different from the original. Status post EGD yesterday which revealed esophagitis, gastric and duodenal ulcers Continue PPI, GI follow-up. Monitor hemoglobin Avoid and states Biopsy results- pending        Past Surgical History:   Procedure Laterality Date    COLONOSCOPY      ESOPHAGOGASTRODUODENOSCOPY      UPPER GASTROINTESTINAL ENDOSCOPY N/A 9/6/2022    EGD ESOPHAGOGASTRODUODENOSCOPY/ Rm 215 performed by Stefania Wang MD at [facility] ENDOSCOPY        Family History   Problem Relation Age of Onset    Hypertension Sister     Diabetes Neg Hx         Social History     Socioeconomic History    Marital status:    Tobacco Use    Smoking status: Never    Smokeless tobacco: Never   Substance and Sexual Activity    Alcohol use: Yes    Drug use: Never         Patient has no known allergies. Previous Medications    ACETAMINOPHEN (TYLENOL) 325 MG TABLET    Take 650 mg by mouth every 6 hours as needed for Pain.     AMLODIPINE (NORVASC) 10 MG TABLET    Take 1 tablet by mouth daily    CALCIUM CARB-CHOLECALCIFEROL 250-125 MG-UNIT TABS    Take 1 tablet by mouth daily    CHOLESTYRAMINE LIGHT 4 G PACKET    Take 1 packet by mouth 2 times daily    FOLIC ACID (FOLVITE) 1 MG TABLET    Take 1 tablet by mouth daily    GLUCOSE MONITORING (FREESTYLE FREEDOM) KIT    1 kit by Does not apply route daily Check glucose twice daily    INSULIN GLARGINE (LANTUS SOLOSTAR) 100 UNIT/ML INJECTION PEN    Inject 10 Units into the skin daily    NALOXONE 4 MG/0.1ML LIQD NASAL SPRAY    1 SPRAY INTO A NOSTRIL AS NEEDED FOR OPIOID OVERDOSE. PANTOPRAZOLE (PROTONIX) 40 MG TABLET    Take 1 tablet by mouth 2 times daily (before meals)    SODIUM BICARBONATE 650 MG TABLET    Take 2 tablets by mouth 2 times daily    VITAMIN B-12 1000 MCG TABLET    Take 1 tablet by mouth daily        Vitals signs and nursing note reviewed. Patient Vitals for the past 4 hrs:   Temp Pulse Resp BP SpO2   12/13/22 1238 98.2 °F (36.8 °C) (!) 108 17 (!) 130/99 98 %          Physical Exam   Constitutional: Oriented to person, place, and time. Appears well-developed and well-nourished. No distress. HENT:    Head: Normocephalic and atraumatic. Right Ear: External ear normal.    Left Ear: External ear normal.    Nose: Nose normal.    Mouth/Throat: Mouth normal. Uvula midline, no erythema/exudates/edema. No drooling, no voice change. No pain with ROM of neck. Eyes: Conjunctivae are normal.   Neck: Supple. No tracheal deviation. Not tender, not rigid, no menningeal signs. Cardiovascular: Normal rate, intact distal pulses. Bilateral pitting edema noted. No calf tenderness or palpable cords. Pulmonary/Chest: No respiratory distress. Clear and equal without diminished or adventitious sounds, no chest wall tenderness. Abdominal: Soft. Nontender. Musculoskeletal: No obvious deformity, erythema, edema. Neurological: Alert and oriented to person, place, and time. Skin:  No abrasion, no lesion, no petechiae and no rash noted. Not diaphoretic. No cyanosis, erythema, or pallor. Psychiatric: Normal mood and affect. Behavior is normal.    Nursing note and vitals reviewed.      Procedures      Labs Reviewed   CULTURE, BLOOD 1   RAPID INFLUENZA A/B ANTIGENS   COVID-19, RAPID   TROPONIN   URINALYSIS   CBC WITH AUTO DIFFERENTIAL   COMPREHENSIVE METABOLIC PANEL   LACTATE, SEPSIS   D-DIMER, QUANTITATIVE   BRAIN NATRIURETIC PEPTIDE        XR CHEST (2 VW)    (Results Pending)                          Voice dictation software was used during the making of this note. This software is not perfect and grammatical and other typographical errors may be present. This note has not been completely proofread for errors.          Gurney Denver, DESIRE - CNP  12/13/22 1956

## 2022-12-13 NOTE — ED TRIAGE NOTES
Pt presents to triage in wheelchair, A&OX4 cc not eating well in 3 days, vomiting yesterday, and diarrhea. Pt further reports concern with abdominal bloating and a cold/cough for about 1 month.

## 2022-12-14 ENCOUNTER — APPOINTMENT (OUTPATIENT)
Dept: GENERAL RADIOLOGY | Age: 57
DRG: 433 | End: 2022-12-14
Payer: MEDICARE

## 2022-12-14 PROBLEM — R10.9 ABDOMINAL PAIN: Status: ACTIVE | Noted: 2022-12-14

## 2022-12-14 LAB
ACCESSION NUMBER, LLC1M: ABNORMAL
ACINETOBACTER CALCOAC BAUMANNII COMPLEX BY PCR: NOT DETECTED
ANION GAP SERPL CALC-SCNC: 7 MMOL/L (ref 2–11)
BACTEROIDES FRAGILIS BY PCR: NOT DETECTED
BASOPHILS # BLD: 0 K/UL (ref 0–0.2)
BASOPHILS NFR BLD: 0 % (ref 0–2)
BILIRUB UR QL: NEGATIVE
BIOFIRE TEST COMMENT: ABNORMAL
BUN SERPL-MCNC: 22 MG/DL (ref 6–23)
C ALBICANS DNA BLD POS QL NAA+NON-PROBE: NOT DETECTED
C GLABRATA DNA BLD POS QL NAA+NON-PROBE: NOT DETECTED
C KRUSEI DNA BLD POS QL NAA+NON-PROBE: NOT DETECTED
C PARAP DNA BLD POS QL NAA+NON-PROBE: NOT DETECTED
C TROPICLS DNA BLD POS QL NAA+NON-PROBE: NOT DETECTED
CALCIUM SERPL-MCNC: 7.4 MG/DL (ref 8.3–10.4)
CANDIDA AURIS BY PCR: NOT DETECTED
CHLORIDE SERPL-SCNC: 101 MMOL/L (ref 101–110)
CO2 SERPL-SCNC: 24 MMOL/L (ref 21–32)
CREAT SERPL-MCNC: 1.6 MG/DL (ref 0.8–1.5)
CRYPTOCOCCUS NEOFORMANS/GATTII BY PCR: NOT DETECTED
DIFFERENTIAL METHOD BLD: ABNORMAL
E CLOAC COMP DNA BLD POS NAA+NON-PROBE: NOT DETECTED
E COLI DNA BLD POS QL NAA+NON-PROBE: NOT DETECTED
ENTEROBACTERALES BY PCR: NOT DETECTED
ENTEROCOCCUS FAECALIS BY PCR: NOT DETECTED
ENTEROCOCCUS FAECIUM BY PCR: NOT DETECTED
EOSINOPHIL # BLD: 0.2 K/UL (ref 0–0.8)
EOSINOPHIL NFR BLD: 1 % (ref 0.5–7.8)
ERYTHROCYTE [DISTWIDTH] IN BLOOD BY AUTOMATED COUNT: 17.9 % (ref 11.9–14.6)
GLUCOSE BLD STRIP.AUTO-MCNC: 168 MG/DL (ref 65–100)
GLUCOSE BLD STRIP.AUTO-MCNC: 83 MG/DL (ref 65–100)
GLUCOSE SERPL-MCNC: 83 MG/DL (ref 65–100)
GLUCOSE UR QL STRIP.AUTO: NEGATIVE MG/DL
GP B STREP DNA BLD POS QL NAA+NON-PROBE: NOT DETECTED
HAEM INFLU DNA BLD POS QL NAA+NON-PROBE: NOT DETECTED
HCT VFR BLD AUTO: 27.5 % (ref 41.1–50.3)
HGB BLD-MCNC: 9 G/DL (ref 13.6–17.2)
IMM GRANULOCYTES # BLD AUTO: 0.1 K/UL (ref 0–0.5)
IMM GRANULOCYTES NFR BLD AUTO: 1 % (ref 0–5)
K OXYTOCA DNA BLD POS QL NAA+NON-PROBE: NOT DETECTED
KETONES UR-MCNC: NEGATIVE MG/DL
KLEBSIELLA AEROGENES BY PCR: NOT DETECTED
KLEBSIELLA PNEUMONIAE GROUP BY PCR: NOT DETECTED
L MONOCYTOG DNA BLD POS QL NAA+NON-PROBE: NOT DETECTED
LEUKOCYTE ESTERASE UR QL STRIP: NEGATIVE
LYMPHOCYTES # BLD: 2.2 K/UL (ref 0.5–4.6)
LYMPHOCYTES NFR BLD: 17 % (ref 13–44)
MCH RBC QN AUTO: 27.8 PG (ref 26.1–32.9)
MCHC RBC AUTO-ENTMCNC: 32.7 G/DL (ref 31.4–35)
MCV RBC AUTO: 84.9 FL (ref 82–102)
MECA+MECC ISLT/SPM QL: NOT DETECTED
MONOCYTES # BLD: 1.5 K/UL (ref 0.1–1.3)
MONOCYTES NFR BLD: 11 % (ref 4–12)
N MEN DNA BLD POS QL NAA+NON-PROBE: NOT DETECTED
NEUTS SEG # BLD: 8.8 K/UL (ref 1.7–8.2)
NEUTS SEG NFR BLD: 70 % (ref 43–78)
NITRITE UR QL: NEGATIVE
NRBC # BLD: 0.02 K/UL (ref 0–0.2)
P AERUGINOSA DNA BLD POS NAA+NON-PROBE: NOT DETECTED
PH UR: 5 (ref 5–9)
PLATELET # BLD AUTO: 260 K/UL (ref 150–450)
PMV BLD AUTO: 11 FL (ref 9.4–12.3)
POTASSIUM SERPL-SCNC: 5.2 MMOL/L (ref 3.5–5.1)
PROT UR QL: NEGATIVE MG/DL
PROTEUS SP DNA BLD POS QL NAA+NON-PROBE: NOT DETECTED
RBC # BLD AUTO: 3.24 M/UL (ref 4.23–5.6)
RBC # UR STRIP: NEGATIVE
RESISTANT GENE TARGETS: ABNORMAL
S AUREUS DNA BLD POS QL NAA+NON-PROBE: NOT DETECTED
S AUREUS+CONS DNA BLD POS NAA+NON-PROBE: DETECTED
S MARCESCENS DNA BLD POS NAA+NON-PROBE: NOT DETECTED
S PNEUM DNA BLD POS QL NAA+NON-PROBE: NOT DETECTED
S PYO DNA BLD POS QL NAA+NON-PROBE: NOT DETECTED
SALMONELLA SPECIES BY PCR: NOT DETECTED
SERVICE CMNT-IMP: ABNORMAL
SERVICE CMNT-IMP: NORMAL
SERVICE CMNT-IMP: NORMAL
SODIUM SERPL-SCNC: 132 MMOL/L (ref 133–143)
SP GR UR: 1.01 (ref 1–1.02)
STAPHYLOCOCCUS EPIDERMIDIS BY PCR: DETECTED
STAPHYLOCOCCUS LUGDUNENSIS BY PCR: NOT DETECTED
STENOTROPHOMONAS MALTOPHILIA BY PCR: NOT DETECTED
STREPTOCOCCUS DNA BLD POS NAA+NON-PROBE: NOT DETECTED
UROBILINOGEN UR QL: 0.2 EU/DL (ref 0.2–1)
WBC # BLD AUTO: 12.7 K/UL (ref 4.3–11.1)

## 2022-12-14 PROCEDURE — 6360000002 HC RX W HCPCS: Performed by: FAMILY MEDICINE

## 2022-12-14 PROCEDURE — 85025 COMPLETE CBC W/AUTO DIFF WBC: CPT

## 2022-12-14 PROCEDURE — 83615 LACTATE (LD) (LDH) ENZYME: CPT

## 2022-12-14 PROCEDURE — 2580000003 HC RX 258: Performed by: FAMILY MEDICINE

## 2022-12-14 PROCEDURE — 84157 ASSAY OF PROTEIN OTHER: CPT

## 2022-12-14 PROCEDURE — 82962 GLUCOSE BLOOD TEST: CPT

## 2022-12-14 PROCEDURE — A4216 STERILE WATER/SALINE, 10 ML: HCPCS | Performed by: FAMILY MEDICINE

## 2022-12-14 PROCEDURE — 82042 OTHER SOURCE ALBUMIN QUAN EA: CPT

## 2022-12-14 PROCEDURE — C9113 INJ PANTOPRAZOLE SODIUM, VIA: HCPCS | Performed by: FAMILY MEDICINE

## 2022-12-14 PROCEDURE — 81003 URINALYSIS AUTO W/O SCOPE: CPT

## 2022-12-14 PROCEDURE — 82945 GLUCOSE OTHER FLUID: CPT

## 2022-12-14 PROCEDURE — 99222 1ST HOSP IP/OBS MODERATE 55: CPT | Performed by: SURGERY

## 2022-12-14 PROCEDURE — 1100000003 HC PRIVATE W/ TELEMETRY

## 2022-12-14 PROCEDURE — 80048 BASIC METABOLIC PNL TOTAL CA: CPT

## 2022-12-14 PROCEDURE — 74018 RADEX ABDOMEN 1 VIEW: CPT

## 2022-12-14 PROCEDURE — 82150 ASSAY OF AMYLASE: CPT

## 2022-12-14 PROCEDURE — 36415 COLL VENOUS BLD VENIPUNCTURE: CPT

## 2022-12-14 PROCEDURE — 89050 BODY FLUID CELL COUNT: CPT

## 2022-12-14 RX ORDER — DEXTROSE AND SODIUM CHLORIDE 5; .9 G/100ML; G/100ML
INJECTION, SOLUTION INTRAVENOUS CONTINUOUS
Status: DISCONTINUED | OUTPATIENT
Start: 2022-12-14 | End: 2022-12-15

## 2022-12-14 RX ADMIN — PIPERACILLIN AND TAZOBACTAM 3375 MG: 3; .375 INJECTION, POWDER, FOR SOLUTION INTRAVENOUS at 15:10

## 2022-12-14 RX ADMIN — DEXTROSE MONOHYDRATE AND SODIUM CHLORIDE: 5; .9 INJECTION, SOLUTION INTRAVENOUS at 03:38

## 2022-12-14 RX ADMIN — VANCOMYCIN HYDROCHLORIDE 2000 MG: 10 INJECTION, POWDER, LYOPHILIZED, FOR SOLUTION INTRAVENOUS at 02:03

## 2022-12-14 RX ADMIN — DEXTROSE MONOHYDRATE AND SODIUM CHLORIDE: 5; .9 INJECTION, SOLUTION INTRAVENOUS at 21:57

## 2022-12-14 RX ADMIN — PIPERACILLIN AND TAZOBACTAM 3375 MG: 3; .375 INJECTION, POWDER, FOR SOLUTION INTRAVENOUS at 22:01

## 2022-12-14 RX ADMIN — PANTOPRAZOLE SODIUM 40 MG: 40 INJECTION, POWDER, LYOPHILIZED, FOR SOLUTION INTRAVENOUS at 09:05

## 2022-12-14 NOTE — PROGRESS NOTES
[facility] Pharmacokinetic Monitoring Service - Vancomycin     Jared Lui is a 65 y.o. male starting on vancomycin therapy for sepsis from possible SBO. Pharmacy consulted by Dr. Trena Logan for monitoring and adjustment. Target Concentration: Goal AUC/HERI 400-600 mg*hr/L    Additional Antimicrobials: Zosyn    Patient eligible for piperacillin-tazobactam to cefepime auto-substitution per P&T approved protocol? NO    Pertinent Laboratory Values: Wt Readings from Last 1 Encounters:   12/13/22 200 lb (90.7 kg)     Temp Readings from Last 1 Encounters:   12/13/22 98.2 °F (36.8 °C) (Oral)     Recent Labs     12/13/22  1540   BUN 22   CREATININE 1.80*   WBC 13.9*     Estimated Creatinine Clearance: 53 mL/min (A) (based on SCr of 1.8 mg/dL (H)). Lab Results   Component Value Date/Time    VANCORANDOM 18.2 09/04/2022 08:11 AM       MRSA Nasal Swab: N/A. Non-respiratory infection.     Plan:  Dosing recommendations based on Bayesian software  Start vancomycin 2000 mg x 1 followed by 1250 mg q24h  Anticipated AUC of 481 and trough concentration of 14.9 at steady state  Renal labs as indicated   Vancomycin concentrations will be ordered as clinically appropriate   Pharmacy will continue to monitor patient and adjust therapy as indicated    Thank you for the consult,  Asif Vega, [facility]

## 2022-12-14 NOTE — PROGRESS NOTES
Hospitalist Progress Note   Admit Date:  2022  3:24 PM   Name:  Ana Velásquez   Age:  65 y.o. Sex:  male  :  1965   MRN:  [id number]   Room:  ER31/31    Presenting Complaint: Emesis     Reason(s) for Admission: SIRS (systemic inflammatory response syndrome) [facility]) [R65.10]     Hospital Course:   Patient is a 65 y.o. male who presented to the ED for cc inability to tolerate PO, emesis, abdominal pain with distention, no flatus or BM since yesterday. Nothing seems to make better or worse. Hx of HLD, HTN, DM type II, GI bleed. Admits to history of alcohol use but nothing recent. Subjective & 24hr Events (22): Patient seen Saumya Saenz in bed. Abd distended and firm. Note nothing coming up in his NGT. Have requested RN to eval, repositioned and now is functioning per her report.    No BM, ?flatus      Assessment & Plan:     # SIRS (leukocytosis, tachycardia)  - likely secondary to SBO  - empiric vanco/zosyn  - CXR w/o infection  - UA w/o infection  - blood cultures pending  - likely DC atbx in AM pending culture reports      # partial SBO  - NGT to LIWS, repositioned and now working  - no output recorded - have requested RN to do so  - gen sx has seen, thinks ascites responsible for symptoms   - consider NGT clamp and clears in AM if has had BM/passing flatus  - order CT AP for further eval of ?obstruction and ascites    # Ascites  CT AP ordered  - suspect cirrhosis w/ hx of etoh intake  - IR requested for paracentesis    # MARS   - improved  - slow IVF while NPO    # DM2  - cont SSI      Anticipated discharge needs:      pending    Diet:  Diet NPO  DVT PPx: scd  Code status: Full Code    Hospital Problems:  Principal Problem:    SIRS (systemic inflammatory response syndrome) ([address]  Active Problems:    Alcohol dependence ([address]    Diabetic peripheral neuropathy ([address]    Essential hypertension    Hyperlipidemia    Type 2 diabetes mellitus ([address]    PAD (peripheral artery disease) ([address]  Resolved Problems:    * No resolved hospital problems. *      Objective:   Patient Vitals for the past 24 hrs:   Pulse BP SpO2   12/14/22 1030 -- (!) 136/97 --   12/14/22 1015 -- (!) 124/99 --   12/14/22 1000 -- (!) 122/96 --   12/14/22 0945 -- (!) 123/99 --   12/14/22 0915 -- -- 98 %   12/14/22 0900 -- 111/82 97 %   12/14/22 0845 -- 110/81 96 %   12/14/22 0830 -- 100/83 95 %   12/14/22 0815 -- 116/76 96 %   12/14/22 0800 -- (!) 126/95 97 %   12/14/22 0745 -- (!) 118/90 95 %   12/14/22 0730 -- (!) 132/98 94 %   12/14/22 0720 -- -- 95 %   12/14/22 0715 -- (!) 135/105 --   12/14/22 0030 -- (!) 127/103 98 %   12/14/22 0015 -- (!) 119/94 97 %   12/13/22 2031 -- (!) 140/109 97 %   12/13/22 1853 93 -- --       Oxygen Therapy  SpO2: 98 %  Pulse via Oximetry: 103 beats per minute  O2 Device: None (Room air)    Estimated body mass index is 27.89 kg/m² as calculated from the following:    Height as of this encounter: 5' 11\" (1.803 m). Weight as of this encounter: 200 lb (90.7 kg). No intake or output data in the 24 hours ending 12/14/22 1636      Physical Exam:     Blood pressure (!) 136/97, pulse 93, temperature 98.2 °F (36.8 °C), temperature source Oral, resp. rate 17, height 5' 11\" (1.803 m), weight 200 lb (90.7 kg), SpO2 98 %. General:    Well nourished. lethargic  Head:  Normocephalic, atraumatic  Eyes:  Sclerae appear normal.  Pupils equally round. ENT:  Nares appear normal, no drainage. Moist oral mucosa  Neck:  No restricted ROM. Trachea midline   CV:   RRR. No m/r/g. No jugular venous distension. Lungs:   CTAB. No wheezing, rhonchi, or rales. Symmetric expansion. Abdomen:   Firm, distended, bs hypoactive  Extremities: No cyanosis or clubbing. No edema  Skin:     No rashes and normal coloration. Warm and dry. Neuro:  CN II-XII grossly intact. Sensation intact. A&Ox3  Psych:  Normal mood and affect.       I have personally reviewed labs and tests showing:  Recent Labs:  Recent Results (from the past 48 hour(s))   Troponin    Collection Time: 12/13/22  3:40 PM   Result Value Ref Range    Troponin, High Sensitivity 12.1 0 - 14 pg/mL   CBC with Auto Differential    Collection Time: 12/13/22  3:40 PM   Result Value Ref Range    WBC 13.9 (H) 4.3 - 11.1 K/uL    RBC 3.63 (L) 4.23 - 5.6 M/uL    Hemoglobin 10.1 (L) 13.6 - 17.2 g/dL    Hematocrit 30.5 (L) 41.1 - 50.3 %    MCV 84.0 82 - 102 FL    MCH 27.8 26.1 - 32.9 PG    MCHC 33.1 31.4 - 35.0 g/dL    RDW 17.4 (H) 11.9 - 14.6 %    Platelets 710 074 - 251 K/uL    MPV 10.2 9.4 - 12.3 FL    nRBC 0.02 0.0 - 0.2 K/uL    Differential Type AUTOMATED      Seg Neutrophils 67 43 - 78 %    Lymphocytes 20 13 - 44 %    Monocytes 11 4.0 - 12.0 %    Eosinophils % 1 0.5 - 7.8 %    Basophils 0 0.0 - 2.0 %    Immature Granulocytes 1 0.0 - 5.0 %    Segs Absolute 9.2 (H) 1.7 - 8.2 K/UL    Absolute Lymph # 2.8 0.5 - 4.6 K/UL    Absolute Mono # 1.6 (H) 0.1 - 1.3 K/UL    Absolute Eos # 0.1 0.0 - 0.8 K/UL    Basophils Absolute 0.1 0.0 - 0.2 K/UL    Absolute Immature Granulocyte 0.1 0.0 - 0.5 K/UL   CMP    Collection Time: 12/13/22  3:40 PM   Result Value Ref Range    Sodium 132 (L) 133 - 143 mmol/L    Potassium 3.5 3.5 - 5.1 mmol/L    Chloride 98 (L) 101 - 110 mmol/L    CO2 26 21 - 32 mmol/L    Anion Gap 8 2 - 11 mmol/L    Glucose 64 (L) 65 - 100 mg/dL    BUN 22 6 - 23 MG/DL    Creatinine 1.80 (H) 0.8 - 1.5 MG/DL    Est, Glom Filt Rate 44 (L) >60 ml/min/1.73m2    Calcium 8.0 (L) 8.3 - 10.4 MG/DL    Total Bilirubin 1.1 0.2 - 1.1 MG/DL    ALT 8 (L) 12 - 65 U/L    AST 27 15 - 37 U/L    Alk Phosphatase 131 50 - 136 U/L    Total Protein 7.9 6.3 - 8.2 g/dL    Albumin 1.7 (L) 3.5 - 5.0 g/dL    Globulin 6.2 (H) 2.8 - 4.5 g/dL    Albumin/Globulin Ratio 0.3 (L) 0.4 - 1.6     Lactate, Sepsis    Collection Time: 12/13/22  3:40 PM   Result Value Ref Range    Lactic Acid, Sepsis 2.2 (H) 0.4 - 2.0 MMOL/L   Blood Culture 1    Collection Time: 12/13/22  3:40 PM    Specimen: Blood   Result Value Ref Range Special Requests LEFT  Antecubital        Culture NO GROWTH AFTER 14 HOURS     D-Dimer, Quantitative    Collection Time: 12/13/22  3:40 PM   Result Value Ref Range    D-Dimer, Quant 9.31 (H) <0.56 ug/ml(FEU)   Brain Natriuretic Peptide    Collection Time: 12/13/22  3:40 PM   Result Value Ref Range    NT Pro-BNP 1,280 (H) 5 - 125 PG/ML   POCT Glucose    Collection Time: 12/13/22  4:14 PM   Result Value Ref Range    POC Glucose 61 (L) 65 - 100 mg/dL    Performed by: [employer]    POCT Glucose    Collection Time: 12/13/22  4:31 PM   Result Value Ref Range    POC Glucose 59 (L) 65 - 100 mg/dL    Performed by: [employer]    Rapid influenza A/B antigens    Collection Time: 12/13/22  4:40 PM    Specimen: Nasal Washing   Result Value Ref Range    Influenza A Ag Negative NEG      Influenza B Ag Negative NEG      Source Nasopharyngeal     COVID-19, Rapid    Collection Time: 12/13/22  4:40 PM    Specimen: Nasopharyngeal   Result Value Ref Range    Source NASAL      SARS-CoV-2, Rapid Not detected NOTD     POCT Glucose    Collection Time: 12/13/22  5:08 PM   Result Value Ref Range    POC Glucose 75 65 - 100 mg/dL    Performed by:  Chaka    POCT Glucose    Collection Time: 12/13/22  5:49 PM   Result Value Ref Range    POC Glucose 89 65 - 100 mg/dL    Performed by: Jaison Mujica    POCT Glucose    Collection Time: 12/13/22 11:19 PM   Result Value Ref Range    POC Glucose 75 65 - 100 mg/dL    Performed by: Madiha    Basic Metabolic Panel w/ Reflex to MG    Collection Time: 12/14/22  7:55 AM   Result Value Ref Range    Sodium 132 (L) 133 - 143 mmol/L    Potassium 5.2 (H) 3.5 - 5.1 mmol/L    Chloride 101 101 - 110 mmol/L    CO2 24 21 - 32 mmol/L    Anion Gap 7 2 - 11 mmol/L    Glucose 83 65 - 100 mg/dL    BUN 22 6 - 23 MG/DL    Creatinine 1.60 (H) 0.8 - 1.5 MG/DL    Est, Glom Filt Rate 50 (L) >60 ml/min/1.73m2    Calcium 7.4 (L) 8.3 - 10.4 MG/DL   CBC with Auto Differential    Collection Time: 12/14/22 7:55 AM   Result Value Ref Range    WBC 12.7 (H) 4.3 - 11.1 K/uL    RBC 3.24 (L) 4.23 - 5.6 M/uL    Hemoglobin 9.0 (L) 13.6 - 17.2 g/dL    Hematocrit 27.5 (L) 41.1 - 50.3 %    MCV 84.9 82 - 102 FL    MCH 27.8 26.1 - 32.9 PG    MCHC 32.7 31.4 - 35.0 g/dL    RDW 17.9 (H) 11.9 - 14.6 %    Platelets 996 504 - 975 K/uL    MPV 11.0 9.4 - 12.3 FL    nRBC 0.02 0.0 - 0.2 K/uL    Differential Type AUTOMATED      Seg Neutrophils 70 43 - 78 %    Lymphocytes 17 13 - 44 %    Monocytes 11 4.0 - 12.0 %    Eosinophils % 1 0.5 - 7.8 %    Basophils 0 0.0 - 2.0 %    Immature Granulocytes 1 0.0 - 5.0 %    Segs Absolute 8.8 (H) 1.7 - 8.2 K/UL    Absolute Lymph # 2.2 0.5 - 4.6 K/UL    Absolute Mono # 1.5 (H) 0.1 - 1.3 K/UL    Absolute Eos # 0.2 0.0 - 0.8 K/UL    Basophils Absolute 0.0 0.0 - 0.2 K/UL    Absolute Immature Granulocyte 0.1 0.0 - 0.5 K/UL   POCT Glucose    Collection Time: 12/14/22  9:10 AM   Result Value Ref Range    POC Glucose 168 (H) 65 - 100 mg/dL    Performed by: Brice    POCT Urinalysis no Micro    Collection Time: 12/14/22  2:00 PM   Result Value Ref Range    Specific Gravity, Urine, POC 1.010 1.001 - 1.023      pH, Urine, POC 5.0 5.0 - 9.0      Protein, Urine, POC Negative NEG mg/dL    Glucose, UA POC Negative NEG mg/dL    Ketones, Urine, POC Negative NEG mg/dL    Bilirubin, Urine, POC Negative NEG      Blood, UA POC Negative NEG      URINE UROBILINOGEN POC 0.2 0.2 - 1.0 EU/dL    Nitrate, Urine, POC Negative NEG      Leukocyte Est, UA POC Negative NEG      Performed by: Shaheed Arboleda        I have personally reviewed imaging studies showing: Other Studies:  XR ABDOMEN (KUB) (SINGLE AP VIEW)   Final Result   1. The enteric tube tip is in the proximal to mid stomach. 2. Unchanged mildly distended small bowel loops. XR ABDOMEN (KUB) (SINGLE AP VIEW)   Final Result   1. Nonspecific bowel gas pattern. Partial or early small bowel obstruction not   excluded. 2.  Ascites.          CT CHEST PULMONARY EMBOLISM W CONTRAST   Final Result   1. No evidence of PE.   2.  Scattered bibasilar atelectasis. 3.  Coronary artery and aortic calcifications. 4.  Ascites. 5.  Gallstones. XR CHEST (2 VW)   Final Result   1. Diminished lung lines with associated opacification bibasilar atelectasis   without focal infiltrative opacity.          IR US GUIDED PARACENTESIS    (Results Pending)       Current Meds:  Current Facility-Administered Medications   Medication Dose Route Frequency    dextrose 5 % and 0.9 % sodium chloride infusion   IntraVENous Continuous    [Held by provider] amLODIPine (NORVASC) tablet 10 mg  10 mg Oral Daily    [Held by provider] cholestyramine light packet 4 g  4 g Oral BID    [Held by provider] folic acid (FOLVITE) tablet 1 mg  1 mg Oral Daily    [Held by provider] sodium bicarbonate tablet 1,300 mg  1,300 mg Oral BID    [Held by provider] vitamin B-12 (CYANOCOBALAMIN) tablet 1,000 mcg  1,000 mcg Oral Daily    insulin lispro (HUMALOG) injection vial 0-4 Units  0-4 Units SubCUTAneous TID WC    insulin lispro (HUMALOG) injection vial 0-4 Units  0-4 Units SubCUTAneous Nightly    glucose chewable tablet 16 g  4 tablet Oral PRN    dextrose bolus 10% 125 mL  125 mL IntraVENous PRN    Or    dextrose bolus 10% 250 mL  250 mL IntraVENous PRN    glucagon (rDNA) injection 1 mg  1 mg SubCUTAneous PRN    dextrose 10 % infusion   IntraVENous Continuous PRN    pantoprazole (PROTONIX) 40 mg in sodium chloride (PF) 0.9 % 10 mL injection  40 mg IntraVENous Daily    sodium chloride flush 0.9 % injection 5-40 mL  5-40 mL IntraVENous 2 times per day    sodium chloride flush 0.9 % injection 5-40 mL  5-40 mL IntraVENous PRN    0.9 % sodium chloride infusion   IntraVENous PRN    ondansetron (ZOFRAN-ODT) disintegrating tablet 4 mg  4 mg Oral Q8H PRN    Or    ondansetron (ZOFRAN) injection 4 mg  4 mg IntraVENous Q6H PRN    polyethylene glycol (GLYCOLAX) packet 17 g  17 g Oral Daily PRN    acetaminophen (TYLENOL) tablet 650 mg  650 mg Oral Q6H PRN    Or    acetaminophen (TYLENOL) suppository 650 mg  650 mg Rectal Q6H PRN    hydrALAZINE (APRESOLINE) injection 10 mg  10 mg IntraVENous Q6H PRN    morphine injection 1 mg  1 mg IntraVENous Q4H PRN    piperacillin-tazobactam (ZOSYN) 3,375 mg in sodium chloride 0.9 % 50 mL IVPB (mini-bag)  3,375 mg IntraVENous Q8H    [START ON 12/15/2022] vancomycin (VANCOCIN) 1250 mg in sodium chloride 0.9% 250 mL IVPB  1,250 mg IntraVENous Q24H     Current Outpatient Medications   Medication Sig    acetaminophen (TYLENOL) 325 MG tablet Take 650 mg by mouth every 6 hours as needed for Pain.    naloxone 4 MG/0.1ML LIQD nasal spray 1 SPRAY INTO A NOSTRIL AS NEEDED FOR OPIOID OVERDOSE.    sodium bicarbonate 650 MG tablet Take 2 tablets by mouth 2 times daily    cholestyramine light 4 g packet Take 1 packet by mouth 2 times daily    amLODIPine (NORVASC) 10 MG tablet Take 1 tablet by mouth daily    folic acid (FOLVITE) 1 MG tablet Take 1 tablet by mouth daily    vitamin B-12 1000 MCG tablet Take 1 tablet by mouth daily    calcium carb-cholecalciferol 250-125 MG-UNIT TABS Take 1 tablet by mouth daily    pantoprazole (PROTONIX) 40 MG tablet Take 1 tablet by mouth 2 times daily (before meals)    insulin glargine (LANTUS SOLOSTAR) 100 UNIT/ML injection pen Inject 10 Units into the skin daily    glucose monitoring (FREESTYLE FREEDOM) kit 1 kit by Does not apply route daily Check glucose twice daily       Signed:  Charles Dean DO    Part of this note may have been written by using a voice dictation software. The note has been proof read but may still contain some grammatical/other typographical errors.

## 2022-12-14 NOTE — PROGRESS NOTES
VANCO DAILY FOLLOW UP NOTE  [address] [facility] Pharmacokinetic Monitoring Service - Vancomycin    Consulting Provider: Dr. Brittany Rich   Indication: SBO  Target Concentration: Goal AUC/HERI 400-600 mg*hr/L  Day of Therapy: 2  Additional Antimicrobials: Pip/Tazo    Patient eligible for piperacillin-tazobactam to cefepime auto-substitution per P&T approved protocol? NO    Pertinent Laboratory Values: Wt Readings from Last 1 Encounters:   12/13/22 200 lb (90.7 kg)     Temp Readings from Last 1 Encounters:   12/13/22 98.2 °F (36.8 °C) (Oral)     Recent Labs     12/13/22  1540   BUN 22   CREATININE 1.80*   WBC 13.9*     Estimated Creatinine Clearance: 53 mL/min (A) (based on SCr of 1.8 mg/dL (H)). Lab Results   Component Value Date/Time    VANCORANDOM 18.2 09/04/2022 08:11 AM       MRSA Nasal Swab: N/A.  Non-respiratory infection      Assessment:  Date/Time Dose Concentration AUC         Note: Serum concentrations collected for AUC dosing may appear elevated if collected in close proximity to the dose administered, this is not necessarily an indication of toxicity    Plan:  Dosing recommendations based on Bayesian software  Continue vancomycin 1250 mg every 24 hours  Anticipated AUC of 496 and trough concentration of 15.3 at steady state  Renal labs as indicated   Vancomycin concentrations will be ordered as clinically appropriate   Pharmacy will continue to monitor patient and adjust therapy as indicated    Thank you for the consult,  Mook Larkin, [facility]

## 2022-12-14 NOTE — H&P
Hospitalist Admission History and Physical         NAME:            [place]    Age:                65 y.o.    :               1965    MRN:              [id number]    PCP: Md Deysi Hanson MD:    Treatment Team: Attending Provider: Torsten Hayward DO; Nurse Practitioner: Mynor Reynaga APRN - CNP; Registered Nurse: Ute Javed RN         Chief Complaint   Patient presents with    Emesis   HPI:    Patient is a 65 y.o. male who presented to the ED for cc inability to tolerate PO, emesis, abdominal pain with distention, no flatus or BM since yesterday. Nothing seems to make better or worse. Hx of HLD, HTN, DM type II, GI bleed.     Glucose 64 but now 89, proBNP 1,280. Na 132, Creatine 1.8 from baseline 1.3. WBC 13.9. D dimer 9.3    CT PE - no PE    Past Medical History:   Diagnosis Date    Anemia     Gastroesophageal reflux disease with esophagitis and hemorrhage     HLD (hyperlipidemia)     Hyperplastic colon polyp     Hypertension     Obesity     PUD (peptic ulcer disease)     Type 2 diabetes mellitus with diabetic polyneuropathy, with long-term current use of insulin (HCC)     Ulcer of the duodenum caused by bacteria (H. pylori)     Upper GI bleed 2016    Last Assessment & Plan:  Formatting of this note might be different from the original. Status post EGD yesterday which revealed esophagitis, gastric and duodenal ulcers Continue PPI, GI follow-up. Monitor hemoglobin Avoid and states Biopsy results- pending            Past Surgical History:   Procedure Laterality Date    COLONOSCOPY      ESOPHAGOGASTRODUODENOSCOPY      UPPER GASTROINTESTINAL ENDOSCOPY N/A 2022    EGD ESOPHAGOGASTRODUODENOSCOPY/ Rm 215 performed by Stefania Wang MD at [facility] ENDOSCOPY            Family History   Problem Relation Age of Onset    Hypertension Sister     Diabetes Neg Hx        Family history reviewed and negative except as noted above.          Social History     Social History Narrative    Not on file            Social History     Tobacco Use    Smoking status: Never    Smokeless tobacco: Never   Substance Use Topics    Alcohol use: Yes            Social History     Substance and Sexual Activity   Drug Use Never                 No Known Allergies         Prior to Admission medications    Medication Sig Start Date End Date Taking? Authorizing Provider   acetaminophen (TYLENOL) 325 MG tablet Take 650 mg by mouth every 6 hours as needed for Pain.     Historical Provider, MD   naloxone 4 MG/0.1ML LIQD nasal spray 1 SPRAY INTO A NOSTRIL AS NEEDED FOR OPIOID OVERDOSE. 9/8/22   Historical Provider, MD   metoprolol tartrate (LOPRESSOR) 50 MG tablet metoprolol tartrate 50 mg tablet 3/30/16 9/26/22  Historical Provider, MD   sodium bicarbonate 650 MG tablet Take 2 tablets by mouth 2 times daily 9/7/22   Kourtney Landon MD   cholestyramine light 4 g packet Take 1 packet by mouth 2 times daily 9/7/22   Kourtney Landon MD   amLODIPine (NORVASC) 10 MG tablet Take 1 tablet by mouth daily 9/8/22   Kourtney Landon MD   folic acid (FOLVITE) 1 MG tablet Take 1 tablet by mouth daily 9/8/22   Kourtney Landon MD   vitamin B-12 1000 MCG tablet Take 1 tablet by mouth daily 9/8/22   Kourtney Landon MD   calcium carb-cholecalciferol 250-125 MG-UNIT TABS Take 1 tablet by mouth daily 9/8/22   Kourtney Landon MD   pantoprazole (PROTONIX) 40 MG tablet Take 1 tablet by mouth 2 times daily (before meals) 9/7/22   Kourtney Landon MD   insulin glargine (LANTUS SOLOSTAR) 100 UNIT/ML injection pen Inject 10 Units into the skin daily 9/7/22   Kourtney Landon MD   glucose monitoring (FREESTYLE FREEDOM) kit 1 kit by Does not apply route daily Check glucose twice daily 9/7/22   Kourtney Landon MD                      Review of Systems         Constitutional:uncomfortable     Eyes:  no change in visual acuity, no photophobia    Ears, nose, mouth, throat, and face: no  Odynphagia, dysphagia, no thrush or exudate, negative for chronic sinus congestion, recurrent headaches    Respiratory: negative for SOB, hemoptysis or cough    Cardiovascular: negative for CP, palpitations, or PND    Gastrointestinal: abdominal pain, no flatus or Bm since yesterday    Genitourinary: no urgency, frequency, or dysuria, no nocturia    Integument/breast: negative for skin rash or skin lesions    Hematologic/lymphatic: negative for known bleeding disorder    Musculoskeletal:negative for joint pain or joint tenderness    Neurological: negative for lightheadedness, syncope or presyncopal events, no seizure or CVA history    Behavioral/Psych: negative for depression or chronic anxiety,    Endocrine: negative for polydyspia, polyuria or intolerance to heat or cold    Allergic/Immunologic: negative for chronic allergic rhinitis, or known connective tissue disorder              Objective:         Patient Vitals for the past 24 hrs:   Temp Pulse Resp BP SpO2   12/13/22 1853 -- 93 -- -- --   12/13/22 1238 98.2 °F (36.8 °C) (!) 108 17 (!) 130/99 98 %            No intake/output data recorded. No intake/output data recorded.          Data Review:   Recent Results (from the past 24 hour(s))   Troponin    Collection Time: 12/13/22  3:40 PM   Result Value Ref Range    Troponin, High Sensitivity 12.1 0 - 14 pg/mL   CBC with Auto Differential    Collection Time: 12/13/22  3:40 PM   Result Value Ref Range    WBC 13.9 (H) 4.3 - 11.1 K/uL    RBC 3.63 (L) 4.23 - 5.6 M/uL    Hemoglobin 10.1 (L) 13.6 - 17.2 g/dL    Hematocrit 30.5 (L) 41.1 - 50.3 %    MCV 84.0 82 - 102 FL    MCH 27.8 26.1 - 32.9 PG    MCHC 33.1 31.4 - 35.0 g/dL    RDW 17.4 (H) 11.9 - 14.6 %    Platelets 060 174 - 361 K/uL    MPV 10.2 9.4 - 12.3 FL    nRBC 0.02 0.0 - 0.2 K/uL    Differential Type AUTOMATED      Seg Neutrophils 67 43 - 78 %    Lymphocytes 20 13 - 44 %    Monocytes 11 4.0 - 12.0 %    Eosinophils % 1 0.5 - 7.8 %    Basophils 0 0.0 - 2.0 %    Immature Granulocytes 1 0.0 - 5.0 %    Segs Absolute 9.2 (H) 1.7 - 8.2 K/UL    Absolute Lymph # 2.8 0.5 - 4.6 K/UL    Absolute Mono # 1.6 (H) 0.1 - 1.3 K/UL    Absolute Eos # 0.1 0.0 - 0.8 K/UL    Basophils Absolute 0.1 0.0 - 0.2 K/UL    Absolute Immature Granulocyte 0.1 0.0 - 0.5 K/UL   CMP    Collection Time: 12/13/22  3:40 PM   Result Value Ref Range    Sodium 132 (L) 133 - 143 mmol/L    Potassium 3.5 3.5 - 5.1 mmol/L    Chloride 98 (L) 101 - 110 mmol/L    CO2 26 21 - 32 mmol/L    Anion Gap 8 2 - 11 mmol/L    Glucose 64 (L) 65 - 100 mg/dL    BUN 22 6 - 23 MG/DL    Creatinine 1.80 (H) 0.8 - 1.5 MG/DL    Est, Glom Filt Rate 44 (L) >60 ml/min/1.73m2    Calcium 8.0 (L) 8.3 - 10.4 MG/DL    Total Bilirubin 1.1 0.2 - 1.1 MG/DL    ALT 8 (L) 12 - 65 U/L    AST 27 15 - 37 U/L    Alk Phosphatase 131 50 - 136 U/L    Total Protein 7.9 6.3 - 8.2 g/dL    Albumin 1.7 (L) 3.5 - 5.0 g/dL    Globulin 6.2 (H) 2.8 - 4.5 g/dL    Albumin/Globulin Ratio 0.3 (L) 0.4 - 1.6     Lactate, Sepsis    Collection Time: 12/13/22  3:40 PM   Result Value Ref Range    Lactic Acid, Sepsis 2.2 (H) 0.4 - 2.0 MMOL/L   Blood Culture 1    Collection Time: 12/13/22  3:40 PM    Specimen: Blood   Result Value Ref Range    Special Requests LEFT  Antecubital        Culture PENDING    D-Dimer, Quantitative    Collection Time: 12/13/22  3:40 PM   Result Value Ref Range    D-Dimer, Quant 9.31 (H) <0.56 ug/ml(FEU)   Brain Natriuretic Peptide    Collection Time: 12/13/22  3:40 PM   Result Value Ref Range    NT Pro-BNP 1,280 (H) 5 - 125 PG/ML   POCT Glucose    Collection Time: 12/13/22  4:14 PM   Result Value Ref Range    POC Glucose 61 (L) 65 - 100 mg/dL    Performed by: Manson Libman    POCT Glucose    Collection Time: 12/13/22  4:31 PM   Result Value Ref Range    POC Glucose 59 (L) 65 - 100 mg/dL    Performed by: Manson Libman    Rapid influenza A/B antigens    Collection Time: 12/13/22  4:40 PM    Specimen: Nasal Washing   Result Value Ref Range    Influenza A Ag Negative NEG      Influenza B Ag Negative NEG      Source Nasopharyngeal     COVID-19, Rapid    Collection Time: 12/13/22  4:40 PM    Specimen: Nasopharyngeal   Result Value Ref Range    Source NASAL      SARS-CoV-2, Rapid Not detected NOTD     POCT Glucose    Collection Time: 12/13/22  5:08 PM   Result Value Ref Range    POC Glucose 75 65 - 100 mg/dL    Performed by: Chaka    POCT Glucose    Collection Time: 12/13/22  5:49 PM   Result Value Ref Range    POC Glucose 89 65 - 100 mg/dL    Performed by: Francesca Llamas             Physical Exam:         General:    Alert, cooperative, uncomfortable     Eyes:    Conjunctivae/corneas clear. PERRL    Ears:    Normal     Nose:    Nares normal.     Mouth/Throat:    Lips, mucosa, and tongue normal. Teeth and gums normal.    Neck:     no JVD. Back:     deferred    Lungs:     Clear to auscultation bilaterally. Heart:    Regular rate and rhythm, S1, S2 normal    Abdomen:     Distended, mild tenderness to palpation with no rebound. No BS appreciated. Extremities:    2+ edema to LE bilaterally     Skin:    Skin color, texture, turgor normal. No rashes or lesions    Neurologic:    CNII-XII intact. Normal strength, sensation and reflexes throughout. Assessment and Plan         Principal Problem:    SIRS (systemic inflammatory response syndrome) ([facility])  Active Problems:    Essential hypertension    Hyperlipidemia    Type 2 diabetes mellitus (HCC)    PAD (peripheral artery disease) ([facility])  Resolved Problems:    * No resolved hospital problems. *    SIRS criteria met by HR and WBC but no obvious source of infection. I am suspecting either viral etiology or due to my suspicion of SBO. Order blood cultures and Vanc/Zosyn if anything for today until I have more information. Inability to take PO and no flatus or BM since yesterday - Suspecting SBO. Order KUB STAT. NPO until results are back. MARS - gentle IV fluids.      Atelectasis - IS    Elevated d dimer - No PE on CT chest     HTN - PRN hydralazine. Holding oral meds for now. DM type II - SS    He denies tobacco or ETOH abuse. States he stopped after last discharge. Update@ 20:51- KUB with possible SBO. Consult gen surg in AM. NPO, NG tube insertion. DVT prophylaxis - SCDs due to hx of GI bleeding.    Signed By:    Eliza Lester DO    December 13, 2022

## 2022-12-14 NOTE — ED NOTES
TRANSFER - OUT REPORT:    Verbal report given to 7th floor RN on Jared Lui  being transferred to 7th floor for routine progression of patient care       Report consisted of patient's Situation, Background, Assessment and   Recommendations(SBAR). Information from the following report(s) Nurse Handoff Report was reviewed with the receiving nurse. [place] Assessment: Presents to emergency department  because of falls (Syncope, seizure, or loss of consciousness): No, Age > 79: No, Altered Mental Status, Intoxication with alcohol or substance confusion (Disorientation, impaired judgment, poor safety awaremess, or inability to follow instructions): No, Impaired Mobility: Ambulates or transfers with assistive devices or assistance; Unable to ambulate or transer.: Yes  Lines:   Peripheral IV 12/13/22 Left Antecubital (Active)       Peripheral IV 12/14/22 Right Antecubital (Active)        Opportunity for questions and clarification was provided.       Patient transported with:  Ruth Calhoun RN  12/14/22 3307

## 2022-12-14 NOTE — ED NOTES
Report to given to [facility], RN. Care transferred at this time.       Erna Knox RN  12/13/22 1922

## 2022-12-14 NOTE — ACP (ADVANCE CARE PLANNING)
[facility]  At the heart of better care     Advance Care Planning   Admit Date:  2022  3:24 PM   Name:  Homero Muñoz   Age:  65 y.o. Sex:  male  :  1965   MRN:  [id number]   Room:  [place] [address]    Homero Muñoz is able to make his own decisions:   Yes    Patient / surrogate decision-maker directed code status:  FULL    Patient or surrogate consented to discussion of the current conditions, workup, management plans, prognosis, and the risk for further deterioration. Time spent: 17 minutes in direct discussion.       Signed:  Vijay Montes DO

## 2022-12-14 NOTE — CONSULTS
H&P/Consult Note/Progress Note/Office Note:   Ana Velásquez  MRN: [id number]  :1965  Age:57 y.o.    HPI: Ana Velásquez is a 65 y.o. male who is seen in consultation for possible small bowel obstruction. The patient states that over the last few days he has had a decreased appetite with associated abdominal distention and discomfort with nausea and a few episodes of emesis. As the symptoms persisted he presented to the emergency department. The patient had a KUB which revealed nonspecific bowel gas pattern, partial or early small bowel obstruction not excluded. The patient then had a NG tube placed. The patient also had a CT PE, which was negative for PE, however he did have a large volume of ascites, likely secondary to cirrhosis. The patient states that he has been having bowel movements and passing flatus. He has been passing flatus today. He states that he has had some episodes of diarrhea. The patient has a past medical history significant for diabetes and hypertension. He also has a significant history of alcohol abuse. He has had a recent work-up for GI bleed with EGD and colonoscopy, he was found to have peptic ulcer disease. He has been following with GI. He has no significant past surgical history. Past Medical History:   Diagnosis Date    Anemia     Gastroesophageal reflux disease with esophagitis and hemorrhage     HLD (hyperlipidemia)     Hyperplastic colon polyp     Hypertension     Obesity     PUD (peptic ulcer disease)     Type 2 diabetes mellitus with diabetic polyneuropathy, with long-term current use of insulin (HCC)     Ulcer of the duodenum caused by bacteria (H. pylori)     Upper GI bleed 2016    Last Assessment & Plan:  Formatting of this note might be different from the original. Status post EGD yesterday which revealed esophagitis, gastric and duodenal ulcers Continue PPI, GI follow-up.  Monitor hemoglobin Avoid and states Biopsy results- pending Past Surgical History:   Procedure Laterality Date    COLONOSCOPY      ESOPHAGOGASTRODUODENOSCOPY      UPPER GASTROINTESTINAL ENDOSCOPY N/A 9/6/2022    EGD ESOPHAGOGASTRODUODENOSCOPY/ Rm 215 performed by Jemma Warner MD at [facility] ENDOSCOPY     Current Facility-Administered Medications   Medication Dose Route Frequency    dextrose 5 % and 0.9 % sodium chloride infusion   IntraVENous Continuous    [Held by provider] amLODIPine (NORVASC) tablet 10 mg  10 mg Oral Daily    [Held by provider] cholestyramine light packet 4 g  4 g Oral BID    [Held by provider] folic acid (FOLVITE) tablet 1 mg  1 mg Oral Daily    [Held by provider] sodium bicarbonate tablet 1,300 mg  1,300 mg Oral BID    [Held by provider] vitamin B-12 (CYANOCOBALAMIN) tablet 1,000 mcg  1,000 mcg Oral Daily    insulin lispro (HUMALOG) injection vial 0-4 Units  0-4 Units SubCUTAneous TID WC    insulin lispro (HUMALOG) injection vial 0-4 Units  0-4 Units SubCUTAneous Nightly    glucose chewable tablet 16 g  4 tablet Oral PRN    dextrose bolus 10% 125 mL  125 mL IntraVENous PRN    Or    dextrose bolus 10% 250 mL  250 mL IntraVENous PRN    glucagon (rDNA) injection 1 mg  1 mg SubCUTAneous PRN    dextrose 10 % infusion   IntraVENous Continuous PRN    pantoprazole (PROTONIX) 40 mg in sodium chloride (PF) 0.9 % 10 mL injection  40 mg IntraVENous Daily    sodium chloride flush 0.9 % injection 5-40 mL  5-40 mL IntraVENous 2 times per day    sodium chloride flush 0.9 % injection 5-40 mL  5-40 mL IntraVENous PRN    0.9 % sodium chloride infusion   IntraVENous PRN    ondansetron (ZOFRAN-ODT) disintegrating tablet 4 mg  4 mg Oral Q8H PRN    Or    ondansetron (ZOFRAN) injection 4 mg  4 mg IntraVENous Q6H PRN    polyethylene glycol (GLYCOLAX) packet 17 g  17 g Oral Daily PRN    acetaminophen (TYLENOL) tablet 650 mg  650 mg Oral Q6H PRN    Or    acetaminophen (TYLENOL) suppository 650 mg  650 mg Rectal Q6H PRN    hydrALAZINE (APRESOLINE) injection 10 mg  10 mg IntraVENous Q6H PRN    morphine injection 1 mg  1 mg IntraVENous Q4H PRN    piperacillin-tazobactam (ZOSYN) 3,375 mg in sodium chloride 0.9 % 50 mL IVPB (mini-bag)  3,375 mg IntraVENous Q8H    [START ON 12/15/2022] vancomycin (VANCOCIN) 1250 mg in sodium chloride 0.9% 250 mL IVPB  1,250 mg IntraVENous Q24H     Current Outpatient Medications   Medication Sig    acetaminophen (TYLENOL) 325 MG tablet Take 650 mg by mouth every 6 hours as needed for Pain.    naloxone 4 MG/0.1ML LIQD nasal spray 1 SPRAY INTO A NOSTRIL AS NEEDED FOR OPIOID OVERDOSE.    sodium bicarbonate 650 MG tablet Take 2 tablets by mouth 2 times daily    cholestyramine light 4 g packet Take 1 packet by mouth 2 times daily    amLODIPine (NORVASC) 10 MG tablet Take 1 tablet by mouth daily    folic acid (FOLVITE) 1 MG tablet Take 1 tablet by mouth daily    vitamin B-12 1000 MCG tablet Take 1 tablet by mouth daily    calcium carb-cholecalciferol 250-125 MG-UNIT TABS Take 1 tablet by mouth daily    pantoprazole (PROTONIX) 40 MG tablet Take 1 tablet by mouth 2 times daily (before meals)    insulin glargine (LANTUS SOLOSTAR) 100 UNIT/ML injection pen Inject 10 Units into the skin daily    glucose monitoring (FREESTYLE FREEDOM) kit 1 kit by Does not apply route daily Check glucose twice daily     Patient has no known allergies. Social History     Socioeconomic History    Marital status:    Tobacco Use    Smoking status: Never    Smokeless tobacco: Never   Substance and Sexual Activity    Alcohol use: Yes    Drug use: Never     Social History     Tobacco Use   Smoking Status Never   Smokeless Tobacco Never     Family History   Problem Relation Age of Onset    Hypertension Sister     Diabetes Neg Hx      ROS: The patient has no difficulty with chest pain or shortness of breath. No fever or chills. Comprehensive review of systems was otherwise unremarkable except as noted above.     Physical Exam:   /76   Pulse 93   Temp 98.2 °F (36.8 °C) (Oral) Resp 17   Ht 5' 11\" (1.803 m)   Wt 200 lb (90.7 kg)   SpO2 96%   BMI 27.89 kg/m²   Vitals:    12/14/22 0730 12/14/22 0745 12/14/22 0800 12/14/22 0815   BP: (!) 132/98 (!) 118/90 (!) 126/95 116/76   Pulse:       Resp:       Temp:       TempSrc:       SpO2: 94% 95% 97% 96%   Weight:       Height:         No intake/output data recorded. No intake/output data recorded. Constitutional: Alert, oriented, cooperative patient in no acute distress; appears stated age    Eyes:Sclera are clear. EOMs intact  ENMT: no external lesions gross hearing normal; no obvious neck masses, no ear or lip lesions, nares normal  CV: RRR. Normal perfusion  Resp: No JVD. Breathing is  non-labored; no audible wheezing. GI: soft and mildly-distended, minimally tender, no rebounding tenderness or guarding. No signs of peritonitis. ++ ascites    Musculoskeletal: unremarkable with normal function. No embolic signs or cyanosis.    Neuro:  Oriented; moves all 4; no focal deficits  Psychiatric: normal affect and mood, no memory impairment    Recent vitals (if inpt):  Patient Vitals for the past 24 hrs:   BP Temp Temp src Pulse Resp SpO2 Height Weight   12/14/22 0815 116/76 -- -- -- -- 96 % -- --   12/14/22 0800 (!) 126/95 -- -- -- -- 97 % -- --   12/14/22 0745 (!) 118/90 -- -- -- -- 95 % -- --   12/14/22 0730 (!) 132/98 -- -- -- -- 94 % -- --   12/14/22 0720 -- -- -- -- -- 95 % -- --   12/14/22 0715 (!) 135/105 -- -- -- -- -- -- --   12/14/22 0030 (!) 127/103 -- -- -- -- 98 % -- --   12/14/22 0015 (!) 119/94 -- -- -- -- 97 % -- --   12/13/22 2031 (!) 140/109 -- -- -- -- 97 % -- --   12/13/22 1853 -- -- -- 93 -- -- -- --   12/13/22 1238 (!) 130/99 98.2 °F (36.8 °C) Oral (!) 108 17 98 % 5' 11\" (1.803 m) 200 lb (90.7 kg)       Amount and/or Complexity of Data Reviewed and Analyzed:  I reviewed and analyzed all of the unique labs and radiologic studies that are shown below as well as any that are in the HPI, and any that are in the expanded problem list below  *Each unique test, order, or document contributes to the combination of 2 or combination of 3 in Category 1 below. For this visit I also reviewed old records and prior notes. Recent Labs     12/13/22  1540 12/14/22  0755   WBC 13.9* 12.7*   HGB 10.1* 9.0*    260   *  --    K 3.5  --    CL 98*  --    CO2 26  --    BUN 22  --    ALT 8*  --      Review of most recent CBC  Lab Results   Component Value Date    WBC 12.7 (H) 12/14/2022    HGB 9.0 (L) 12/14/2022    HCT 27.5 (L) 12/14/2022    MCV 84.9 12/14/2022     12/14/2022       Review of most recent BMP  Lab Results   Component Value Date/Time     12/13/2022 03:40 PM    K 3.5 12/13/2022 03:40 PM    CL 98 12/13/2022 03:40 PM    CO2 26 12/13/2022 03:40 PM    BUN 22 12/13/2022 03:40 PM    CREATININE 1.80 12/13/2022 03:40 PM    GLUCOSE 64 12/13/2022 03:40 PM    CALCIUM 8.0 12/13/2022 03:40 PM        Review of most recent LFTs (and lipase if done)  Lab Results   Component Value Date    ALT 8 (L) 12/13/2022    AST 27 12/13/2022    ALKPHOS 131 12/13/2022    BILITOT 1.1 12/13/2022     Lab Results   Component Value Date    LIPASE 101 09/14/2022       Lab Results   Component Value Date/Time    INR 1.2 08/31/2022 04:16 PM       Review of most recent HgbA1c  Hemoglobin A1C   Date Value Ref Range Status   09/15/2022 8.0 (H) 4.8 - 5.6 % Final       Nutritional assessment screen for wound healing issues:  Lab Results   Component Value Date    LABALBU 1.7 (L) 12/13/2022       @lastcovr@  Xray Result (most recent):  XR ABDOMEN (KUB) (SINGLE AP VIEW) 12/14/2022    Narrative  EXAM: Abdomen x-rays. INDICATION: NG tube placement and abdominal pain. COMPARISON: Yesterday's abdomen x-rays. TECHNIQUE: 2 portable supine views of the abdomen were obtained. FINDINGS: The enteric tube tip is in the proximal to mid stomach. No significant  change in a few mildly distended small bowel loops in the central abdomen.  No  gross free air is seen. There is residual contrast in the bladder from  yesterday's CT chest.    Impression  1. The enteric tube tip is in the proximal to mid stomach. 2. Unchanged mildly distended small bowel loops. CT Result (most recent):  CT CHEST PULMONARY EMBOLISM W CONTRAST 12/13/2022    Narrative  CT Chest with contrast    Clinical Indication:  Subacute pleuritic chest pain, vomiting, upper abdominal  discomfort, coughing. Leukocytosis, anemia, hyponatremia, elevated d-dimer and  BNP. Comparison:  CT abdomen 9/14/2022 and chest radiograph today    Technique:  Dose reduction technique used: Automated exposure Control and/or  adjustment of mA and kV according to patient size. Contiguous axial images were  obtained from the neck base through the upper abdomen following the uneventful  administration of 100 cc Isovue 370 intravenous contrast. Pulmonary embolus  protocol was used. Coronal reconstructions were done for further evaluation of  vessels. Findings:  Pulmonary arteries are well-opacified and demonstrate no filling  defect. There are scattered calcifications of coronary arteries and aorta. Heart is enlarged. Limited upper abdominal views demonstrate increased large volume ascites,  suggestion of cirrhotic liver, benign calcified granulomata in the liver and the  spleen, small hiatal hernia, gallstones. Bones demonstrate no acute osseous  lesions. Lung windows demonstrate subsegmental atelectasis scattered in the periphery of  both bases. No evidence of a pneumothorax, suspicious lung mass, or  consolidative pneumonia. No obvious thoracic lymphadenopathy. Patent central  airways. Impression  1. No evidence of PE.  2.  Scattered bibasilar atelectasis. 3.  Coronary artery and aortic calcifications. 4.  Ascites. 5.  Gallstones.         ASSESSMENT/PLAN:  [unfilled]  Principal Problem:    SIRS (systemic inflammatory response syndrome) (HCC)  Active Problems:    Essential hypertension Hyperlipidemia    Type 2 diabetes mellitus (HCC)    PAD (peripheral artery disease) ([facility] [address]  Resolved Problems:    * No resolved hospital problems. *       The patient's symptoms are likely secondary to his large volume of ascites, unlikely due to true small bowel obstruction, his bowel is functioning. no surgical intervention at this time  Would recommend paracentesis for drainage of the ascites  Would also recommend GI consultation for cirrhosis and ascites  Case management per hospitalist        Number and Complexity of Problems addressed and   Risks of complications and/or morbidity of management            Level of MDM (2/3 elements below)  Number and Complexity of Problems Addressed Amount and/or Complexity of Data to be Reviewed and Analyzed  *Each unique test, order, or document contributes to the combination of 2 or combination of 3 in Category 1 below. Risk of Complications and/or Morbidity or Mortality of pt Management     76092  12122 SF Minimal  ?1self-limited or minor problem Minimal or none Minimal risk of morbidity from additional diagnostic testing or Rx   10819  96356 Low Low  ? 2or more self-limited or minor problems;    or  ? 1stable chronic illness;    or  ?1acute, uncomplicated illness or injury   Limited  (Must meet the requirements of at least 1 of the 2 categories)  Category 1: Tests and documents   ? Any combination of 2 from the following:  ?Review of prior external note(s) from each unique source*;  ?review of the result(s) of each unique test*;   ?ordering of each unique test*    or   Category 2: Assessment requiring an independent historian(s)  (For the categories of independent interpretation of tests and discussion of management or test interpretation, see moderate or high) Low risk of morbidity from additional diagnostic testing or treatment     45981  63667 Mod Moderate  ? 1or more chronic illnesses with exacerbation, progression, or side effects of treatment;    or  ?2or more stable chronic illnesses;    or  ?1undiagnosed new problem with uncertain prognosis;    or  ?1acute illness with systemic symptoms;    or  ?1acute complicated injury   Moderate  (Must meet the requirements of at least 1 out of 3 categories)  Category 1: Tests, documents, or independent historian(s)  ? Any combination of 3 from the following:   ?Review of prior external note(s) from each unique source*;  ?Review of the result(s) of each unique test*;  ?Ordering of each unique test*;  ?Assessment requiring an independent historian(s)    or  Category 2: Independent interpretation of tests   ? Independent interpretation of a test performed by another physician/other qualified health care professional (not separately reported);     or  Category 3: Discussion of management or test interpretation  ? Discussion of management or test interpretation with external physician/other qualified health care professional/appropriate source (not separately reported)   Moderate risk of morbidity from additional diagnostic testing or treatment  Examples only:  ?Prescription drug management   ? Decision regarding minor surgery with identified patient or procedure risk factors  ? Decision regarding elective major surgery without identified patient or procedure risk factors   ? Diagnosis or treatment significantly limited by social determinants of health       59101  09708 High High  ? 1or more chronic illnesses with severe exacerbation, progression, or side effects of treatment;    or  ?1 acute or chronic illness or injury that poses a threat to life or bodily function   Extensive  (Must meet the requirements of at least 2 out of 3 categories)  Category 1: Tests, documents, or independent historian(s)  ? Any combination of 3 from the following:   ?Review of prior external note(s) from each unique source*;  ?Review of the result(s) of each unique test*;   ?Ordering of each unique test*;   ?Assessment requiring an independent historian(s)    or   Category 2: Independent interpretation of tests   ? Independent interpretation of a test performed by another physician/other qualified health care professional (not separately reported);     or  Category 3: Discussion of management or test interpretation  ? Discussion of management or test interpretation with external physician/other qualified health care professional/appropriate source (not separately reported)   High risk of morbidity from additional diagnostic testing or treatment  Examples only:  ?Drug therapy requiring intensive monitoring for toxicity  ? Decision regarding elective major surgery with identified patient or procedure risk factors  ? Decision regarding emergency major surgery  ? Decision regarding hospitalization  ? Decision not to resuscitate or to de-escalate care because of poor prognosis             I have personally performed a face-to-face diagnostic evaluation and management  service on this patient. I have independently seen the patient. I have independently obtained the above history from the patient/family. I have independently examined the patient with above findings. I have independently reviewed data/labs for this patient and developed the above plan of care (MDM).   Signed: Itzel Buenrostro MD, FACS

## 2022-12-15 ENCOUNTER — APPOINTMENT (OUTPATIENT)
Dept: INTERVENTIONAL RADIOLOGY/VASCULAR | Age: 57
DRG: 433 | End: 2022-12-15
Payer: MEDICARE

## 2022-12-15 ENCOUNTER — APPOINTMENT (OUTPATIENT)
Dept: CT IMAGING | Age: 57
DRG: 433 | End: 2022-12-15
Payer: MEDICARE

## 2022-12-15 PROBLEM — K65.2 SBP (SPONTANEOUS BACTERIAL PERITONITIS) (HCC): Status: ACTIVE | Noted: 2022-12-15

## 2022-12-15 PROBLEM — R18.8 CIRRHOSIS OF LIVER WITH ASCITES (HCC): Status: ACTIVE | Noted: 2022-12-15

## 2022-12-15 PROBLEM — N18.9 ACUTE KIDNEY INJURY SUPERIMPOSED ON CKD (HCC): Status: ACTIVE | Noted: 2022-08-31

## 2022-12-15 PROBLEM — R78.81 POSITIVE BLOOD CULTURE: Status: ACTIVE | Noted: 2022-12-15

## 2022-12-15 PROBLEM — K80.20 CHOLELITHIASES: Status: ACTIVE | Noted: 2022-12-15

## 2022-12-15 PROBLEM — K74.60 CIRRHOSIS OF LIVER WITH ASCITES (HCC): Status: ACTIVE | Noted: 2022-12-15

## 2022-12-15 PROBLEM — E44.0 MODERATE PROTEIN MALNUTRITION (HCC): Status: ACTIVE | Noted: 2022-12-15

## 2022-12-15 LAB
ALBUMIN FLD-MCNC: 0.9 G/DL
ALBUMIN SERPL-MCNC: 1.4 G/DL (ref 3.5–5)
ALBUMIN/GLOB SERPL: 0.3 (ref 0.4–1.6)
ALP SERPL-CCNC: 84 U/L (ref 50–136)
ALT SERPL-CCNC: 6 U/L (ref 12–65)
AMYLASE FLD-CCNC: 7 U/L
ANION GAP SERPL CALC-SCNC: 8 MMOL/L (ref 2–11)
AST SERPL-CCNC: 17 U/L (ref 15–37)
BASOPHILS # BLD: 0.1 K/UL (ref 0–0.2)
BASOPHILS NFR BLD: 0 % (ref 0–2)
BILIRUB SERPL-MCNC: 1 MG/DL (ref 0.2–1.1)
BUN SERPL-MCNC: 22 MG/DL (ref 6–23)
CALCIUM SERPL-MCNC: 7.6 MG/DL (ref 8.3–10.4)
CHLORIDE SERPL-SCNC: 103 MMOL/L (ref 101–110)
CO2 SERPL-SCNC: 25 MMOL/L (ref 21–32)
CREAT SERPL-MCNC: 1.7 MG/DL (ref 0.8–1.5)
DIFFERENTIAL METHOD BLD: ABNORMAL
EOSINOPHIL # BLD: 0.2 K/UL (ref 0–0.8)
EOSINOPHIL NFR BLD: 1 % (ref 0.5–7.8)
ERYTHROCYTE [DISTWIDTH] IN BLOOD BY AUTOMATED COUNT: 17.8 % (ref 11.9–14.6)
FERRITIN SERPL-MCNC: 554 NG/ML (ref 8–388)
GLOBULIN SER CALC-MCNC: 4.9 G/DL (ref 2.8–4.5)
GLUCOSE BLD STRIP.AUTO-MCNC: 123 MG/DL (ref 65–100)
GLUCOSE BLD STRIP.AUTO-MCNC: 133 MG/DL (ref 65–100)
GLUCOSE BLD STRIP.AUTO-MCNC: 186 MG/DL (ref 65–100)
GLUCOSE FLD-MCNC: 116 MG/DL
GLUCOSE SERPL-MCNC: 108 MG/DL (ref 65–100)
HAV IGM SER QL: NONREACTIVE
HBV CORE IGM SER QL: NONREACTIVE
HBV SURFACE AG SER QL: NONREACTIVE
HCT VFR BLD AUTO: 25.8 % (ref 41.1–50.3)
HCV AB SER QL: NONREACTIVE
HGB BLD-MCNC: 8.5 G/DL (ref 13.6–17.2)
IMM GRANULOCYTES # BLD AUTO: 0.1 K/UL (ref 0–0.5)
IMM GRANULOCYTES NFR BLD AUTO: 1 % (ref 0–5)
INR PPP: 1.3
IRON SATN MFR SERPL: 48 %
IRON SERPL-MCNC: 44 UG/DL (ref 35–150)
LDH FLD L TO P-CCNC: 105 U/L
LYMPHOCYTES # BLD: 2.2 K/UL (ref 0.5–4.6)
LYMPHOCYTES NFR BLD: 18 % (ref 13–44)
MCH RBC QN AUTO: 27.3 PG (ref 26.1–32.9)
MCHC RBC AUTO-ENTMCNC: 32.9 G/DL (ref 31.4–35)
MCV RBC AUTO: 83 FL (ref 82–102)
MONOCYTES # BLD: 1.5 K/UL (ref 0.1–1.3)
MONOCYTES NFR BLD: 12 % (ref 4–12)
NEUTS SEG # BLD: 8.5 K/UL (ref 1.7–8.2)
NEUTS SEG NFR BLD: 68 % (ref 43–78)
NRBC # BLD: 0 K/UL (ref 0–0.2)
PLATELET # BLD AUTO: 231 K/UL (ref 150–450)
PMV BLD AUTO: 10 FL (ref 9.4–12.3)
POTASSIUM SERPL-SCNC: 3.4 MMOL/L (ref 3.5–5.1)
PROCALCITONIN SERPL-MCNC: 3.47 NG/ML (ref 0–0.49)
PROT FLD-MCNC: 3.4 G/DL
PROT SERPL-MCNC: 6.3 G/DL (ref 6.3–8.2)
PROTHROMBIN TIME: 16.6 SEC (ref 12.6–14.3)
RBC # BLD AUTO: 3.11 M/UL (ref 4.23–5.6)
SERVICE CMNT-IMP: ABNORMAL
SODIUM SERPL-SCNC: 136 MMOL/L (ref 133–143)
SPECIMEN SOURCE FLD: NORMAL
TIBC SERPL-MCNC: 92 UG/DL (ref 250–450)
WBC # BLD AUTO: 12.5 K/UL (ref 4.3–11.1)

## 2022-12-15 PROCEDURE — 36415 COLL VENOUS BLD VENIPUNCTURE: CPT

## 2022-12-15 PROCEDURE — 82103 ALPHA-1-ANTITRYPSIN TOTAL: CPT

## 2022-12-15 PROCEDURE — 80053 COMPREHEN METABOLIC PANEL: CPT

## 2022-12-15 PROCEDURE — 83540 ASSAY OF IRON: CPT

## 2022-12-15 PROCEDURE — 6360000002 HC RX W HCPCS: Performed by: PHYSICIAN ASSISTANT

## 2022-12-15 PROCEDURE — 86015 ACTIN ANTIBODY EACH: CPT

## 2022-12-15 PROCEDURE — 82105 ALPHA-FETOPROTEIN SERUM: CPT

## 2022-12-15 PROCEDURE — 99231 SBSQ HOSP IP/OBS SF/LOW 25: CPT | Performed by: SURGERY

## 2022-12-15 PROCEDURE — 87205 SMEAR GRAM STAIN: CPT

## 2022-12-15 PROCEDURE — 97530 THERAPEUTIC ACTIVITIES: CPT

## 2022-12-15 PROCEDURE — 82962 GLUCOSE BLOOD TEST: CPT

## 2022-12-15 PROCEDURE — 2580000003 HC RX 258: Performed by: FAMILY MEDICINE

## 2022-12-15 PROCEDURE — 85610 PROTHROMBIN TIME: CPT

## 2022-12-15 PROCEDURE — A4216 STERILE WATER/SALINE, 10 ML: HCPCS | Performed by: FAMILY MEDICINE

## 2022-12-15 PROCEDURE — 82728 ASSAY OF FERRITIN: CPT

## 2022-12-15 PROCEDURE — P9047 ALBUMIN (HUMAN), 25%, 50ML: HCPCS | Performed by: PHYSICIAN ASSISTANT

## 2022-12-15 PROCEDURE — 49083 ABD PARACENTESIS W/IMAGING: CPT

## 2022-12-15 PROCEDURE — 85025 COMPLETE CBC W/AUTO DIFF WBC: CPT

## 2022-12-15 PROCEDURE — 2500000003 HC RX 250 WO HCPCS: Performed by: FAMILY MEDICINE

## 2022-12-15 PROCEDURE — 86381 MITOCHONDRIAL ANTIBODY EACH: CPT

## 2022-12-15 PROCEDURE — 74176 CT ABD & PELVIS W/O CONTRAST: CPT

## 2022-12-15 PROCEDURE — 6370000000 HC RX 637 (ALT 250 FOR IP): Performed by: FAMILY MEDICINE

## 2022-12-15 PROCEDURE — 6360000002 HC RX W HCPCS: Performed by: FAMILY MEDICINE

## 2022-12-15 PROCEDURE — 84145 PROCALCITONIN (PCT): CPT

## 2022-12-15 PROCEDURE — 82784 ASSAY IGA/IGD/IGG/IGM EACH: CPT

## 2022-12-15 PROCEDURE — 6360000004 HC RX CONTRAST MEDICATION

## 2022-12-15 PROCEDURE — 0W9G3ZZ DRAINAGE OF PERITONEAL CAVITY, PERCUTANEOUS APPROACH: ICD-10-PCS | Performed by: RADIOLOGY

## 2022-12-15 PROCEDURE — C9113 INJ PANTOPRAZOLE SODIUM, VIA: HCPCS | Performed by: FAMILY MEDICINE

## 2022-12-15 PROCEDURE — 88305 TISSUE EXAM BY PATHOLOGIST: CPT

## 2022-12-15 PROCEDURE — 88112 CYTOPATH CELL ENHANCE TECH: CPT

## 2022-12-15 PROCEDURE — 86235 NUCLEAR ANTIGEN ANTIBODY: CPT

## 2022-12-15 PROCEDURE — 87040 BLOOD CULTURE FOR BACTERIA: CPT

## 2022-12-15 PROCEDURE — 80074 ACUTE HEPATITIS PANEL: CPT

## 2022-12-15 PROCEDURE — 97162 PT EVAL MOD COMPLEX 30 MIN: CPT

## 2022-12-15 PROCEDURE — 1100000003 HC PRIVATE W/ TELEMETRY

## 2022-12-15 PROCEDURE — 82390 ASSAY OF CERULOPLASMIN: CPT

## 2022-12-15 RX ORDER — METRONIDAZOLE 500 MG/1
500 TABLET ORAL EVERY 8 HOURS SCHEDULED
Status: DISCONTINUED | OUTPATIENT
Start: 2022-12-15 | End: 2022-12-16

## 2022-12-15 RX ORDER — ALBUMIN (HUMAN) 12.5 G/50ML
SOLUTION INTRAVENOUS CONTINUOUS PRN
Status: COMPLETED | OUTPATIENT
Start: 2022-12-15 | End: 2022-12-15

## 2022-12-15 RX ORDER — AMLODIPINE BESYLATE 10 MG/1
10 TABLET ORAL DAILY
Status: DISCONTINUED | OUTPATIENT
Start: 2022-12-15 | End: 2022-12-22

## 2022-12-15 RX ADMIN — AMLODIPINE BESYLATE 10 MG: 10 TABLET ORAL at 20:01

## 2022-12-15 RX ADMIN — METRONIDAZOLE 500 MG: 500 TABLET ORAL at 18:25

## 2022-12-15 RX ADMIN — CEFTRIAXONE 1000 MG: 1 INJECTION, POWDER, FOR SOLUTION INTRAMUSCULAR; INTRAVENOUS at 17:53

## 2022-12-15 RX ADMIN — ALBUMIN (HUMAN) 12.5 G: 0.25 INJECTION, SOLUTION INTRAVENOUS at 16:05

## 2022-12-15 RX ADMIN — PIPERACILLIN AND TAZOBACTAM 3375 MG: 3; .375 INJECTION, POWDER, FOR SOLUTION INTRAVENOUS at 12:08

## 2022-12-15 RX ADMIN — SODIUM CHLORIDE, PRESERVATIVE FREE 5 ML: 5 INJECTION INTRAVENOUS at 20:02

## 2022-12-15 RX ADMIN — TUBERCULIN PURIFIED PROTEIN DERIVATIVE 5 UNITS: 5 INJECTION, SOLUTION INTRADERMAL at 20:24

## 2022-12-15 RX ADMIN — DIATRIZOATE MEGLUMINE AND DIATRIZOATE SODIUM 15 ML: 660; 100 LIQUID ORAL; RECTAL at 07:32

## 2022-12-15 RX ADMIN — SODIUM BICARBONATE 650 MG TABLET 1300 MG: at 20:02

## 2022-12-15 RX ADMIN — METRONIDAZOLE 500 MG: 500 TABLET ORAL at 20:02

## 2022-12-15 RX ADMIN — SODIUM CHLORIDE, PRESERVATIVE FREE 10 ML: 5 INJECTION INTRAVENOUS at 09:49

## 2022-12-15 RX ADMIN — CHOLESTYRAMINE 4 G: 4 POWDER, FOR SUSPENSION ORAL at 20:02

## 2022-12-15 RX ADMIN — VANCOMYCIN HYDROCHLORIDE 1250 MG: 100 INJECTION, POWDER, LYOPHILIZED, FOR SOLUTION INTRAVENOUS at 04:20

## 2022-12-15 RX ADMIN — PIPERACILLIN AND TAZOBACTAM 3375 MG: 3; .375 INJECTION, POWDER, FOR SOLUTION INTRAVENOUS at 06:24

## 2022-12-15 RX ADMIN — PANTOPRAZOLE SODIUM 40 MG: 40 INJECTION, POWDER, LYOPHILIZED, FOR SOLUTION INTRAVENOUS at 09:49

## 2022-12-15 RX ADMIN — DEXTROSE MONOHYDRATE AND SODIUM CHLORIDE: 5; .9 INJECTION, SOLUTION INTRAVENOUS at 11:08

## 2022-12-15 ASSESSMENT — PAIN SCALES - GENERAL: PAINLEVEL_OUTOF10: 0

## 2022-12-15 ASSESSMENT — PAIN - FUNCTIONAL ASSESSMENT: PAIN_FUNCTIONAL_ASSESSMENT: NONE - DENIES PAIN

## 2022-12-15 NOTE — CARE COORDINATION
Attempted to complete assessment with pt this day, pt off floor for procedure. Chart screened by  for discharge planning. No needs identified at this time. Please consult  if any new issues arise.        12/15/22 0049   Service Assessment   Support Systems Spouse/Significant Other   Discharge Planning   Type of Residence House   Living Arrangements Spouse/Significant Other   Current Services Prior To Admission None   Potential Assistance Needed N/A   Type of Home Care Services None   Patient expects to be discharged to: House   Condition of Participation: Discharge Planning   The Plan for Transition of Care is related to the following treatment goals: pending clinical progress

## 2022-12-15 NOTE — PROGRESS NOTES
OT evaluation attempted; pt off floor for paracentesis. Will f/u as schedule allows.     Christelle Sierra, OT

## 2022-12-15 NOTE — PROGRESS NOTES
Attempted to call pt's wife Karlos Murray to give update at [phone] but it's a non-working number. Called [phone] home number and left voicemail that we will try back later if time allows.     Lyn Heart, DO

## 2022-12-15 NOTE — PROGRESS NOTES
Admit Date: 2022    Subjective: The patient is lying comfortably in bed. He states he is not having any abdominal pain. He states that he has been passing flatus. He just feels bloated. Objective:       Vitals:    22 2136 22 2357 12/15/22 0424 12/15/22 0701   BP: (!) 139/94 124/82 (!) 126/96 (!) 130/95   Pulse: (!) 105 99 94 100   Resp:    Temp: 97.5 °F (36.4 °C) 97.8 °F (36.6 °C) 97.8 °F (36.6 °C) 97.5 °F (36.4 °C)   TempSrc: Oral Axillary Axillary Oral   SpO2: 98% 98% 98% 96%   Weight:       Height:           Temp (24hrs), Av.7 °F (36.5 °C), Min:97.5 °F (36.4 °C), Max:97.8 °F (36.6 °C)  . I&O reviewed as documented. Constitutional: Alert, oriented, cooperative patient in no acute distress; appears stated age    Eyes:Sclera are clear. EOMs intact  ENMT: no external lesions gross hearing normal; no obvious neck masses, no ear or lip lesions, nares normal  CV: RRR. Normal perfusion  Resp: No JVD. Breathing is  non-labored; no audible wheezing. GI: soft and large amount of ascites, nontender, no rebounding tenderness or guarding. No signs of peritonitis. Musculoskeletal: unremarkable with normal function. No embolic signs or cyanosis.    Neuro:  Oriented; moves all 4; no focal deficits  Psychiatric: normal affect and mood, no memory impairment    Labs:   Recent Results (from the past 24 hour(s))   POCT Urinalysis no Micro    Collection Time: 22  2:00 PM   Result Value Ref Range    Specific Gravity, Urine, POC 1.010 1.001 - 1.023      pH, Urine, POC 5.0 5.0 - 9.0      Protein, Urine, POC Negative NEG mg/dL    Glucose, UA POC Negative NEG mg/dL    Ketones, Urine, POC Negative NEG mg/dL    Bilirubin, Urine, POC Negative NEG      Blood, UA POC Negative NEG      URINE UROBILINOGEN POC 0.2 0.2 - 1.0 EU/dL    Nitrate, Urine, POC Negative NEG      Leukocyte Est, UA POC Negative NEG      Performed by: Juan Pablo Levy    POCT Glucose    Collection Time: 22 9:49 PM   Result Value Ref Range    POC Glucose 83 65 - 100 mg/dL    Performed by: Roger (PCT)    Comprehensive Metabolic Panel    Collection Time: 12/15/22  5:18 AM   Result Value Ref Range    Sodium 136 133 - 143 mmol/L    Potassium 3.4 (L) 3.5 - 5.1 mmol/L    Chloride 103 101 - 110 mmol/L    CO2 25 21 - 32 mmol/L    Anion Gap 8 2 - 11 mmol/L    Glucose 108 (H) 65 - 100 mg/dL    BUN 22 6 - 23 MG/DL    Creatinine 1.70 (H) 0.8 - 1.5 MG/DL    Est, Glom Filt Rate 47 (L) >60 ml/min/1.73m2    Calcium 7.6 (L) 8.3 - 10.4 MG/DL    Total Bilirubin 1.0 0.2 - 1.1 MG/DL    ALT 6 (L) 12 - 65 U/L    AST 17 15 - 37 U/L    Alk Phosphatase 84 50 - 136 U/L    Total Protein 6.3 6.3 - 8.2 g/dL    Albumin 1.4 (L) 3.5 - 5.0 g/dL    Globulin 4.9 (H) 2.8 - 4.5 g/dL    Albumin/Globulin Ratio 0.3 (L) 0.4 - 1.6     CBC with Auto Differential    Collection Time: 12/15/22  5:18 AM   Result Value Ref Range    WBC 12.5 (H) 4.3 - 11.1 K/uL    RBC 3.11 (L) 4.23 - 5.6 M/uL    Hemoglobin 8.5 (L) 13.6 - 17.2 g/dL    Hematocrit 25.8 (L) 41.1 - 50.3 %    MCV 83.0 82 - 102 FL    MCH 27.3 26.1 - 32.9 PG    MCHC 32.9 31.4 - 35.0 g/dL    RDW 17.8 (H) 11.9 - 14.6 %    Platelets 923 485 - 461 K/uL    MPV 10.0 9.4 - 12.3 FL    nRBC 0.00 0.0 - 0.2 K/uL    Differential Type AUTOMATED      Seg Neutrophils 68 43 - 78 %    Lymphocytes 18 13 - 44 %    Monocytes 12 4.0 - 12.0 %    Eosinophils % 1 0.5 - 7.8 %    Basophils 0 0.0 - 2.0 %    Immature Granulocytes 1 0.0 - 5.0 %    Segs Absolute 8.5 (H) 1.7 - 8.2 K/UL    Absolute Lymph # 2.2 0.5 - 4.6 K/UL    Absolute Mono # 1.5 (H) 0.1 - 1.3 K/UL    Absolute Eos # 0.2 0.0 - 0.8 K/UL    Basophils Absolute 0.1 0.0 - 0.2 K/UL    Absolute Immature Granulocyte 0.1 0.0 - 0.5 K/UL           Assessment:     Principal Problem:    SIRS (systemic inflammatory response syndrome) (HCC)  Active Problems:    Alcohol dependence (HCC)    Diabetic peripheral neuropathy (HCC)    Essential hypertension    Hyperlipidemia    Type 2 diabetes mellitus ([facility] [address]    PAD (peripheral artery disease) (HCC)    Abdominal pain  Resolved Problems:    * No resolved hospital problems.  *        Plan/Recommendations/Medical Decision Making:     The patient does not clinically have symptoms of a bowel obstruction, he is passing flatus, symptoms likely secondary to his ascites  Recommend IR paracentesis for drainage of ascites  Would recommend removing NG tube and starting on a diet  No surgical intervention  General surgery will sign off at this time    Sergio Zamora MD

## 2022-12-15 NOTE — ED NOTES
Verbal report received from [facility]RN for continuation of patient care upon shift change. Patient care transferred at this time.      Jovanna Dougherty RN  12/14/22 1920

## 2022-12-15 NOTE — PROGRESS NOTES
Interventional Radiology Post Paracentesis/Thoracentesis Note    12/15/2022    Procedure(s): Ultrasound guided Diagnostic Paracentesis Performed with 8 [place] catheter total volume 7500 ml. Samples sent to lab. Preliminary Findings: large yellow. Complications: None    Plan:  Observation, check labs if drawn.           Chest X-Ray:  no

## 2022-12-15 NOTE — PROGRESS NOTES
TRANSFER - OUT REPORT:           Verbal report given to Johny Rowe RN(name) on Yana Hilmar  being transferred to IR Recovery 6(unit) for routine post-op              Report consisted of patients Situation, Background, Assessment and      Recommendations(SBAR). Information from the following report(s) SBAR and Procedure Summary was reviewed with the receiving nurse. Opportunity for questions and clarification was provided. Pt tolerated procedure well.

## 2022-12-15 NOTE — PROGRESS NOTES
ACUTE PHYSICAL THERAPY GOALS:   (Developed with and agreed upon by patient and/or caregiver.)  Pt will perform supine to/from sit with mod I in 7 treatment days. Pt will perform sit to/from stand with mod I and LRAD in 7 treatment days. Pt will ambulate 150 ft with mod I and LRAD in 7 treatment days. Pt will negotiate 2 stairs with mod I and rail in 7 treatment days. Pt will be independent with HEP in 7 days. PHYSICAL THERAPY Initial Assessment, Daily Note, and PM  (Link to Caseload Tracking: PT Visit Days : 1  Acknowledge Orders  Time In/Out  PT Charge Capture  Rehab Caseload Tracker    Juan Pearce is a 65 y.o. male   PRIMARY DIAGNOSIS: SIRS (systemic inflammatory response syndrome) (HCC)  Septicemia (HCC) [A41.9]  SIRS (systemic inflammatory response syndrome) (HCC) [R65.10]  Abdominal pain, unspecified abdominal location [R10.9]  Swelling of lower extremity [M79.89]       Reason for Referral: Generalized Muscle Weakness (M62.81)  Other lack of cordination (R27.8)  Difficulty in walking, Not elsewhere classified (R26.2)  Inpatient: Payor: Anmol Cano / Plan: Esau Banuelos / Product Type: *No Product type* /     ASSESSMENT:     REHAB RECOMMENDATIONS:   Recommendation to date pending progress:  Setting:  Short-term Rehab    Equipment:    To Be Determined     ASSESSMENT:  Mr. Coleen Monsivais is a 65 y.o. male with hx of PAD and DM2 admitted with possible SBO. Upon PT evaluation, pt exhibits gross weakness, poor activity tolerance, and impaired balance resulting in limited independence with functional mobility. At baseline, pt was mod I for household mobility but was unable to ambulate longer distances due to fatigue. Pt is now requiring mod A for bed mobility and transfers with RW. Pt will require STR at discharge. Pt will continue to benefit from skilled PT to address above impairments and maximize functional independence prior to discharge.   Lydia Klein AM-PAC 0 Clicks Basic Mobility Inpatient Short Form  AM-PAC Mobility Inpatient   How much difficulty turning over in bed?: A Lot  How much difficulty sitting down on / standing up from a chair with arms?: A Lot  How much difficulty moving from lying on back to sitting on side of bed?: A Lot  How much help from another person moving to and from a bed to a chair?: A Lot  How much help from another person needed to walk in hospital room?: A Little  How much help from another person for climbing 3-5 steps with a railing?: A Lot  AM-PAC Inpatient Mobility Raw Score : 13  AM-PAC Inpatient T-Scale Score : 36.74  Mobility Inpatient CMS 0-100% Score: 64.91  Mobility Inpatient CMS G-Code Modifier : CL    SUBJECTIVE:   Mr. Marsha Arechiga states, \"I've been having a hard time getting up and moving. \"     Social/Functional Lives With: Spouse  Type of Home: House  Home Layout: One level  Home Access: Stairs to enter with rails  Entrance Stairs - Number of Steps: 2  Home Equipment: "[employer] [address] Help From: Family  ADL Assistance: Independent  Homemaking Assistance: Needs assistance  Homemaking Responsibilities: Yes  Ambulation Assistance: Independent  Transfer Assistance: Independent    OBJECTIVE:     PAIN: Kris Males / O2: Africayajaira Wagners / John Vázquez / Minoo Lowing:   Pre Treatment:   Pain Assessment: None - Denies Pain      Post Treatment: 0 Vitals        Oxygen  O2 Therapy: Room air   IV and Nasogastric Tube    RESTRICTIONS/PRECAUTIONS:  Restrictions/Precautions: Fall Risk                 GROSS EVALUATION: Intact Impaired (Comments):   AROM []  Ankle ROM limited due to edema   PROM []    Strength []  B knees 3/5, B hips 2/5. Balance []  Min A with RW for standing balance   Posture [] Rounded Shoulders  Thoracic Kyphosis  Trunk Flexion   Sensation [x]     Coordination [x]      Tone [x]     Edema [] Significant pitting edema to BLEs.     Activity Tolerance []  Limited due to fatigue and weakness.    []      COGNITION/  PERCEPTION: Intact Impaired (Comments):   Orientation [x]     Vision [x]     Hearing [x]     Cognition  [x]       MOBILITY: I Mod I S SBA CGA Min Mod Max Total  NT x2 Comments:   Bed Mobility    Rolling [] [] [] [] [] [] [x] [] [] [] []    Supine to Sit [] [] [] [] [] [] [x] [] [] [] []    Scooting [] [] [] [] [] [x] [] [] [] [] []    Sit to Supine [] [] [] [] [] [] [] [] [] [] []    Transfers    Sit to Stand [] [] [] [] [] [] [x] [] [] [] []    Bed to Chair [] [] [] [] [] [x] [] [] [] [] [] With RW   Stand to Sit [] [] [] [] [] [] [x] [] [] [] []     [] [] [] [] [] [] [] [] [] [] []    I=Independent, Mod I=Modified Independent, S=Supervision, SBA=Standby Assistance, CGA=Contact Guard Assistance,   Min=Minimal Assistance, Mod=Moderate Assistance, Max=Maximal Assistance, Total=Total Assistance, NT=Not Tested    GAIT: I Mod I S SBA CGA Min Mod Max Total  NT x2 Comments:   Level of Assistance [] [] [] [] [] [x] [] [] [] [] []    Distance 8 feet    DME Rolling Walker    Gait Quality Decreased jason , Decreased step clearance, Decreased step length, Trunk flexion, and Wide base of support    Weightbearing Status Restrictions/Precautions  Restrictions/Precautions: Fall Risk    Stairs      I=Independent, Mod I=Modified Independent, S=Supervision, SBA=Standby Assistance, CGA=Contact Guard Assistance,   Min=Minimal Assistance, Mod=Moderate Assistance, Max=Maximal Assistance, Total=Total Assistance, NT=Not Tested    PLAN:   FREQUENCY AND DURATION: 3 times/week for duration of hospital stay or until stated goals are met, whichever comes first.    THERAPY PROGNOSIS: Good    PROBLEM LIST:   (Skilled intervention is medically necessary to address:)  Decreased ADL/Functional Activities  Decreased Activity Tolerance  Decreased AROM/PROM  Decreased Balance  Decreased Gait Ability  Decreased Strength  Decreased Transfer Abilities INTERVENTIONS PLANNED:   (Benefits and precautions of physical therapy have been discussed with the patient.)  Self Care Training  Therapeutic Activity  Therapeutic Exercise/HEP  Neuromuscular Re-education  Gait Training  Education       TREATMENT:   EVALUATION: LOW COMPLEXITY: (Untimed Charge)    TREATMENT:   Therapeutic Activity (10 Minutes): Therapeutic activity included Rolling, Supine to Sit, Scooting, Transfer Training, Ambulation on level ground, Sitting balance , Standing balance, and education to improve functional Activity tolerance, Balance, Mobility, Strength, ROM, and safe discharge. TREATMENT GRID:  N/A    AFTER TREATMENT PRECAUTIONS: Bed, Needs within reach, and in care of transport tech    INTERDISCIPLINARY COLLABORATION:  RN/ PCT and PT/ PTA    EDUCATION: Education Given To: Patient  Education Provided: Role of Therapy;Plan of Care;Home Exercise Program;ADL Adaptive Strategies;Precautions; Fall Prevention Strategies; Family Education; Energy Conservation  Education Method: Demonstration;Verbal  Barriers to Learning: None  Education Outcome: Verbalized understanding;Demonstrated understanding    TIME IN/OUT:  Time In: 1330  Time Out: 1400  Minutes: [address], PT

## 2022-12-15 NOTE — PROGRESS NOTES
VANCO DAILY FOLLOW UP NOTE  [address] [facility] Pharmacokinetic Monitoring Service - Vancomycin    Consulting Provider: Dr. Indra Bautista   Indication: SBO  Target Concentration: Goal AUC/HERI 400-600 mg*hr/L  Day of Therapy: 2  Additional Antimicrobials: Pip/Tazo    Patient eligible for piperacillin-tazobactam to cefepime auto-substitution per P&T approved protocol? NO    Pertinent Laboratory Values: Wt Readings from Last 1 Encounters:   12/13/22 200 lb (90.7 kg)     Temp Readings from Last 1 Encounters:   12/15/22 97.5 °F (36.4 °C) (Oral)     Recent Labs     12/13/22  1540 12/14/22  0755 12/15/22  0518   BUN 22 22 22   CREATININE 1.80* 1.60* 1.70*   WBC 13.9* 12.7* 12.5*     Estimated Creatinine Clearance: 56 mL/min (A) (based on SCr of 1.7 mg/dL (H)). Lab Results   Component Value Date/Time    VANCORANDOM 18.2 09/04/2022 08:11 AM       MRSA Nasal Swab: N/A.  Non-respiratory infection      Assessment:  Date/Time Dose Concentration AUC         Note: Serum concentrations collected for AUC dosing may appear elevated if collected in close proximity to the dose administered, this is not necessarily an indication of toxicity    Plan:  Dosing recommendations based on Bayesian software  Continue vancomycin 1250 mg every 24 hours  Anticipated AUC of 480 and trough concentration of 14.6 at steady state  Renal labs as indicated   Vancomycin concentrations will be ordered as clinically appropriate   Pharmacy will continue to monitor patient and adjust therapy as indicated    Thank you for the consult,  FLORENCIO Mclean [facility]

## 2022-12-15 NOTE — CONSULTS
[facility] Consult Note       Primary GI Physician:  Dr. Brady Bell [facility])    Referring Provider:  Ford Shultz PA-C    Consult Date:  12/15/2022    Admit Date:  12/13/2022    Chief Complaint:  Cirrhosis and ascites    Subjective:     History of Present Illness:  Patient is a 65 y.o. male with PMH including but not limited to HTN, HLD, reflux, PUD, colon polyp, DM2, neuropathy, CKD, who is seen in consultation at the request of Ford Shultz PA-C for cirrhosis and ascites. He was admitted after presenting to the ER with increased bloating and abd distention, decreased appetite, nausea, vomiting with clear non-bloody or black emesis, for a couple of days prior to admission. He also had change in bowel habits with small watery stools, about 2 a day, over this time, non-bloody or black in color. He had noticed mild substernal burning with swallowing after the vomiting, but denies any heartburn, difficulty swallowing, or chest pain. He was noted on KUB to have possible small bowel obstruction. NG tube placed. He was noted on CT chest to have large volume of ascites and suggestion of cirrhotic. He was seen by surgery and symptoms were felt to be more related to ascites than true SOB. He has noticed some improvement since NG tube placed, having about 500 mls output 14 Dec 2022. He has had increased bloating since CT contrast.  He denies any hx of cirrhosis, but describes Dr. Jennifer Smith had noticed a spot on his liver in the past.  Liver was described as unremarkable on CT scan in Sept 2022. He has hx of ETOH use, drinking 2-3 liquor drinks and 2-3 beers a day, but weaned off and has since stopped use after admission in Sept 2022. He denies any jaundice or confusion. He denies any hx of liver disease, except his brother has liver disease associated with ETOH use. He just returned from CT scan abd/pelvis.   He has been noted on labs on admission to have elevated Cr - 1.8, Na 132, lactic acid 2.2, Ca 8, glucose 64, BNP 1280, alb 1.7, WBC 13.9, hgb 10.1, hct 30.5. Blood cx positive for moderate staph. Paracentesis and fluid studies pending. PMH:  Past Medical History:   Diagnosis Date    Anemia     Gastroesophageal reflux disease with esophagitis and hemorrhage     HLD (hyperlipidemia)     Hyperplastic colon polyp     Hypertension     Obesity     PUD (peptic ulcer disease)     Type 2 diabetes mellitus with diabetic polyneuropathy, with long-term current use of insulin (HCC)     Ulcer of the duodenum caused by bacteria (H. pylori)     Upper GI bleed 03/28/2016    Last Assessment & Plan:  Formatting of this note might be different from the original. Status post EGD yesterday which revealed esophagitis, gastric and duodenal ulcers Continue PPI, GI follow-up. Monitor hemoglobin Avoid and states Biopsy results- pending     EGD on 2 March 2021 for anemia with chronic acute gastritis, duodenopathy and positive H. Pylori. Pt doesn't recall being treated for H. Pylori. Colonoscopy on 2 March 2021 for anemia with prolapsing, reducible hemorrhoids and hyperplastic polyp. Suboptimal prep. Repeat in 6 mos. Per prior notes Dr. Miguel Bella discussed capsule endoscopy, but has not this. EGD 31 Aug 2022 with Dr. Jonathan Waggoner for CHUY, melena, hx of PUD  POSTOPERATIVE DIAGNOSIS: Normal  IMPRESSION:   Normal EGD  No etiology for anemia found  PLAN:  - Start diet  - F/u with [facility] for capsule endoscopy  - We will sign off. Please call with questions    PSH:  Past Surgical History:   Procedure Laterality Date    COLONOSCOPY      ESOPHAGOGASTRODUODENOSCOPY      UPPER GASTROINTESTINAL ENDOSCOPY N/A 9/6/2022    EGD ESOPHAGOGASTRODUODENOSCOPY/ Rm 215 performed by Ruth Mei MD at [facility] ENDOSCOPY       Allergies:  No Known Allergies    Home Medications:  Prior to Admission medications    Medication Sig Start Date End Date Taking?  Authorizing Provider   acetaminophen (TYLENOL) 325 MG tablet Take 650 mg by mouth every 6 hours as needed for Pain.     Historical Provider, MD   naloxone 4 MG/0.1ML LIQD nasal spray 1 SPRAY INTO A NOSTRIL AS NEEDED FOR OPIOID OVERDOSE. 9/8/22   Historical Provider, MD   metoprolol tartrate (LOPRESSOR) 50 MG tablet metoprolol tartrate 50 mg tablet 3/30/16 9/26/22  Historical Provider, MD   sodium bicarbonate 650 MG tablet Take 2 tablets by mouth 2 times daily 9/7/22   Janki Garcia MD   cholestyramine light 4 g packet Take 1 packet by mouth 2 times daily 9/7/22   Janki Garcia MD   amLODIPine (NORVASC) 10 MG tablet Take 1 tablet by mouth daily 9/8/22   Janki Garcia MD   folic acid (FOLVITE) 1 MG tablet Take 1 tablet by mouth daily 9/8/22   Janki Garcia MD   vitamin B-12 1000 MCG tablet Take 1 tablet by mouth daily 9/8/22   Janki Garcia MD   calcium carb-cholecalciferol 250-125 MG-UNIT TABS Take 1 tablet by mouth daily 9/8/22   Janki Garcia MD   pantoprazole (PROTONIX) 40 MG tablet Take 1 tablet by mouth 2 times daily (before meals) 9/7/22   Janki Garcia MD   insulin glargine (LANTUS SOLOSTAR) 100 UNIT/ML injection pen Inject 10 Units into the skin daily 9/7/22   Janki Garcia MD   glucose monitoring (FREESTYLE FREEDOM) kit 1 kit by Does not apply route daily Check glucose twice daily 9/7/22   Janki Garcia MD       Hospital Medications:  Current Facility-Administered Medications   Medication Dose Route Frequency    diatrizoate meglumine-sodium (GASTROGRAFIN) 66-10 % solution 15 mL  15 mL Oral ONCE PRN    dextrose 5 % and 0.9 % sodium chloride infusion   IntraVENous Continuous    [Held by provider] amLODIPine (NORVASC) tablet 10 mg  10 mg Oral Daily    [Held by provider] cholestyramine light packet 4 g  4 g Oral BID    [Held by provider] folic acid (FOLVITE) tablet 1 mg  1 mg Oral Daily    [Held by provider] sodium bicarbonate tablet 1,300 mg  1,300 mg Oral BID    [Held by provider] vitamin B-12 (CYANOCOBALAMIN) tablet 1,000 mcg  1,000 mcg Oral Daily    insulin lispro (HUMALOG) injection vial 0-4 Units  0-4 Units SubCUTAneous TID WC    insulin lispro (HUMALOG) injection vial 0-4 Units  0-4 Units SubCUTAneous Nightly    glucose chewable tablet 16 g  4 tablet Oral PRN    dextrose bolus 10% 125 mL  125 mL IntraVENous PRN    Or    dextrose bolus 10% 250 mL  250 mL IntraVENous PRN    glucagon (rDNA) injection 1 mg  1 mg SubCUTAneous PRN    dextrose 10 % infusion   IntraVENous Continuous PRN    pantoprazole (PROTONIX) 40 mg in sodium chloride (PF) 0.9 % 10 mL injection  40 mg IntraVENous Daily    sodium chloride flush 0.9 % injection 5-40 mL  5-40 mL IntraVENous 2 times per day    sodium chloride flush 0.9 % injection 5-40 mL  5-40 mL IntraVENous PRN    0.9 % sodium chloride infusion   IntraVENous PRN    ondansetron (ZOFRAN-ODT) disintegrating tablet 4 mg  4 mg Oral Q8H PRN    Or    ondansetron (ZOFRAN) injection 4 mg  4 mg IntraVENous Q6H PRN    polyethylene glycol (GLYCOLAX) packet 17 g  17 g Oral Daily PRN    acetaminophen (TYLENOL) tablet 650 mg  650 mg Oral Q6H PRN    Or    acetaminophen (TYLENOL) suppository 650 mg  650 mg Rectal Q6H PRN    hydrALAZINE (APRESOLINE) injection 10 mg  10 mg IntraVENous Q6H PRN    morphine injection 1 mg  1 mg IntraVENous Q4H PRN    piperacillin-tazobactam (ZOSYN) 3,375 mg in sodium chloride 0.9 % 50 mL IVPB (mini-bag)  3,375 mg IntraVENous Q8H    vancomycin (VANCOCIN) 1250 mg in sodium chloride 0.9% 250 mL IVPB  1,250 mg IntraVENous Q24H       Social History:  Social History     Tobacco Use    Smoking status: Never    Smokeless tobacco: Never   Substance Use Topics    Alcohol use: Yes       Pt denies any history of drug use. He describes hx of 2-3 liquor drinks and 2-3 beers a day, but weaned off and stopped use after Sept 2022 admission.     Family History:  Family History   Problem Relation Age of Onset    Hypertension Sister     Diabetes Neg Hx      Brother has liver disease related to ETOH use.    Review of Systems:  A detailed 10 system ROS is obtained, with pertinent positives as listed above. All others are negative. Diet:  NPO    Objective:     Physical Exam:  Vitals:  BP (!) 130/95   Pulse 100   Temp 97.5 °F (36.4 °C) (Oral)   Resp 18   Ht 5' 11\" (1.803 m)   Wt 200 lb (90.7 kg)   SpO2 96%   BMI 27.89 kg/m²   Gen:  Pt is alert, cooperative, no acute distress, lying in bed, NG tube in place  Skin:  Extremities and face reveal no rashes. Chronic venous stasis changes of LE bilaterally. HEENT: Sclerae anicteric. Extra-occular muscles are intact. No oral ulcers. No abnormal pigmentation of the lips. The neck is supple. Cardiovascular: Tachycardic. No murmurs, gallops, or rubs. Respiratory:  Comfortable breathing with no accessory muscle use. Clear breath sounds anteriorly with no wheezes, rales, or rhonchi. GI:  Abdomen distended, soft, and nontender. Tympanic bowel sounds off and on. No enlargement of the liver or spleen. No masses palpable. Rectal:  Deferred  Musculoskeletal:  Edema of the lower legs. Neurological:  Gross memory appears intact. Patient is alert and oriented. Psychiatric:  Mood appears appropriate with judgement intact. Lymphatic:  No cervical or supraclavicular adenopathy. Laboratory:    Recent Labs     12/13/22  1540 12/14/22  0755 12/15/22  0518   WBC 13.9* 12.7* 12.5*   HGB 10.1* 9.0* 8.5*   HCT 30.5* 27.5* 25.8*    260 231   MCV 84.0 84.9 83.0   * 132* 136   K 3.5 5.2* 3.4*   CL 98* 101 103   CO2 26 24 25   BUN 22 22 22   CREATININE 1.80* 1.60* 1.70*   CALCIUM 8.0* 7.4* 7.6*   GLUCOSE 64* 83 108*   ALKPHOS 131  --  84   AST 27  --  17   ALT 8*  --  6*   BILITOT 1.1  --  1.0   LABALBU 1.7*  --  1.4*   PROT 7.9  --  6.3     CT OF THE ABDOMEN AND PELVIS WITH CONTRAST: 14 Sept 2022    CLINICAL HISTORY:   Persistent diarrhea since last night.   Now with mild  leukocytosis and history of normocytic, normochromic anemia requiring  transfusions,   TECHNIQUE:  Oral and nonionic intravenous contrast was administered, and the abdomen and pelvis were scanned with spiral technique. Radiation dose reduction was achieved using one or all of the following techniques: automated exposure control, weight-based dosing, iterative reconstruction. COMPARISON:  August 31, 2022. CT ABDOMEN FINDINGS: There is mild atelectasis at both lung bases. There is a small amount of ascites in the abdomen and pelvis, with extension of a small amount of fluid into a small hiatal hernia. However, there is marked thickening and decreased attenuation of the stomach suggestive of acute gastritis. No discrete ulcer is identified, and there is no free intraperitoneal air. Calcified stones are again noted in a contracted gallbladder without focal  pericholecystic inflammatory changes. The liver, spleen, pancreas, adrenals,  and kidneys appear unremarkable. There are extensive atherosclerotic  calcifications without definite aneurysm. CT PELVIS FINDINGS: The appendix is not distended. No small bowel or colonic wall thickening is evident. No pathologically enlarged lymph nodes. Bone windows demonstrate no definite aggressive process, accounting for degenerative changes. Impression   1. Small amount of ascites associated with marked thickening and decreased  attenuation of the stomach which is new since August 31 and suspicious for acute gastritis. Gastroenterology consultation should be considered. 2.  No definite acute small bowel or colonic abnormality identified. 3.  Cholelithiasis without changes to suggest acute cholecystitis or  appendicitis. This case was reviewed in consultation with colleagues. CT Chest with contrast 13 Dec 2022   Clinical Indication:  Subacute pleuritic chest pain, vomiting, upper abdominal  discomfort, coughing. Leukocytosis, anemia, hyponatremia, elevated d-dimer and BNP.    Comparison:  CT abdomen 9/14/2022 and chest radiograph today   Technique:  Dose reduction technique used: Automated exposure Control and/or adjustment of mA and kV according to patient size. Contiguous axial images were obtained from the neck base through the upper abdomen following the uneventful administration of 100 cc Isovue 370 intravenous contrast. Pulmonary embolus protocol was used. Coronal reconstructions were done for further evaluation of vessels. Findings:  Pulmonary arteries are well-opacified and demonstrate no filling  defect. There are scattered calcifications of coronary arteries and aorta. Heart is enlarged. Limited upper abdominal views demonstrate increased large volume ascites,  suggestion of cirrhotic liver, benign calcified granulomata in the liver and the  spleen, small hiatal hernia, gallstones. Bones demonstrate no acute osseous  lesions. Lung windows demonstrate subsegmental atelectasis scattered in the periphery of both bases. No evidence of a pneumothorax, suspicious lung mass, or  consolidative pneumonia. No obvious thoracic lymphadenopathy. Patent central airways. Impression  1. No evidence of PE.  2.  Scattered bibasilar atelectasis. 3.  Coronary artery and aortic calcifications. 4.  Ascites. 5.  Gallstones. Abdomen x-rays. 14 Dec 2022   INDICATION: NG tube placement and abdominal pain. COMPARISON: Yesterday's abdomen x-rays. TECHNIQUE: 2 portable supine views of the abdomen were obtained. FINDINGS: The enteric tube tip is in the proximal to mid stomach. No significant  change in a few mildly distended small bowel loops in the central abdomen. No  gross free air is seen. There is residual contrast in the bladder from  yesterday's CT chest.   Impression  1. The enteric tube tip is in the proximal to mid stomach. 2. Unchanged mildly distended small bowel loops.     Assessment:     Principal Problem:    SIRS (systemic inflammatory response syndrome) (HCC)  Active Problems:    Alcohol dependence [facility])    Diabetic peripheral neuropathy ([facility] [address]    Essential hypertension    Hyperlipidemia    Type 2 diabetes mellitus ([facility] [address]    PAD (peripheral artery disease) (HCC)    Abdominal pain  Resolved Problems:    * No resolved hospital problems. *    63 yo male, pt of [facility] - Dr. Dana Conley, with PMH including but not limited to HTN, HLD, reflux, PUD, colon polyp, DM2, neuropathy, CKD, anemia, who is seen in consultation 15 Dec 2022 at the request of Kian Sanders PA-C for cirrhosis and ascites, after presenting with increased abd distention, nausea, vomiting, diarrhea, and noted on CT chest to have large volume of ascites and suggestion of cirrhotic. He was seen by surgery and symptoms were felt to be more related to ascites than true SOB. He has noticed some improvement since NG tube placed, having about 500 mls output 14 Dec 2022. Liver was described as unremarkable on CT scan in Sept 2022. He has hx of ETOH use, drinking 2-3 liquor drinks and 2-3 beers a day, but weaned off and has since stopped use after admission in Sept 2022. Paracentesis and fluid studies pending, as well as CT scan abd/pelvis today. Underlying etiology of cirrhosis is likely multifactorial - risks factors for fatty liver, ETOH. He has been noted to have decrease in hgb over admission, but may be related to dehydration and IVF hydration, exacerbating underlying chronic anemia. He has not had active GI bleeding. Plan:     - Supportive care. - Paracentesis with fluid studies pending - fluid for alb, amylase, cell count, cx, cytology, glucose, LDH, and total protein. He has been on ATBXs for sepsis. - CT scan abd/pelvis pending.  - Consider diuretics if and when renal function improves. - 2 gm Na + diet once diet resumed. - Obtain liver work up labs as cirrhosis appears to be new dx. EGD recently without signs of esophageal varices or PHG. - Avoid ETOH use.   - Follow CBC, CMP, PT, INR daily.  - Obtain PT, INR today to assess MELD. - Follow. Patient is seen and examined in collaboration with Dr. Nik Wilson. Assessment and plan as per Dr. Bruce Hernandez. Malka Gaxiola Cap, PA-C  [facility]    Addendum:  CT ABDOMEN AND PELVIS WITHOUT CONTRAST 12/15/2022 9:15 AM   History:  Emesis and abdominal pain with distention   Technique: The patient received oral contrast and axial images were obtained  through the abdomen and pelvis. Multi planar reformatted images were generated. Dose reduction techniques were used such as automated exposure control,  adjustment of the MA or KV according to patient size, and iterative  reconstruction. Comparison:  9/14/2022   Findings: Included portions of the lung bases demonstrate small pleural  effusions with bibasilar atelectasis. .   ABDOMEN:    Multiple dependent stones are present within the gallbladder. Some of the contours liver appear nodular, as may be present with cirrhosis. .  Multiple hepatic parenchymal calcifications are present, suggestive of  granuloma. Abundant ascites is present. A feeding tube extends below the diaphragm and the stomach. Enteric contrast is present within nondilated small bowel loops and is present within the ascending colon. Evaluation of the abdominal viscera is limited without IV contrast.   The unenhanced appearance of the pancreas, spleen and adrenal glands is normal.  There is no hydronephrosis. There is minimal excreted contrast within the renal  collecting systems and persistent nephrograms, likely from the recent chest CT  12/13/2022. The appendix is not dilated. PELVIS: The bladder is filled with contrast and there are no intraluminal  filling defects. The bladder protrudes over the symphysis pubis. Pelvic ascites  is present. There are no aggressive osseous lesions. Impression   1. Ascites. 2. Cholelithiasis.

## 2022-12-15 NOTE — PROGRESS NOTES
TRANSFER - OUT REPORT:           Verbal report given to Nanci Guardado RN(name) on Glorious Duty  being transferred to [facility](unit) for routine progression of care. Report consisted of patients Situation, Background, Assessment and      Recommendations(SBAR). Information from the following report(s) SBAR and Procedure Summary was reviewed with the receiving nurse. Opportunity for questions and clarification was provided. Pt tolerated procedure well. Peripheral Intravenous Line:   Peripheral IV 12/13/22 Left Antecubital (Active)   Site Assessment Clean, dry & intact 12/15/22 0040   Line Status Flushed; Infusing; No blood return;Normal saline locked; Tubing changed 12/15/22 0040   Line Care Connections checked and tightened;Tubing changed;Ports disinfected 12/15/22 0040   Phlebitis Assessment No symptoms 12/15/22 0040   Infiltration Assessment 0 12/15/22 0040   Alcohol Cap Used Yes 12/15/22 0040   Dressing Status Clean, dry & intact 12/15/22 0040   Dressing Type Transparent 12/15/22 0040       Peripheral IV 12/14/22 Right Antecubital (Active)   Site Assessment Clean, dry & intact 12/15/22 0040   Line Status Flushed; Infusing; No blood return;Normal saline locked; Tubing changed 12/15/22 0040   Line Care Connections checked and tightened;Ports disinfected; Tubing changed 12/15/22 0040   Phlebitis Assessment No symptoms 12/15/22 0040   Infiltration Assessment 0 12/15/22 0040   Alcohol Cap Used Yes 12/15/22 0040   Dressing Status Clean, dry & intact 12/15/22 0040   Dressing Type Transparent 12/15/22 0040       VITALS:  /88   Pulse 92   Temp 98 °F (36.7 °C) (Temporal)   Resp 16   Ht 5' 11\" (1.803 m)   Wt 200 lb (90.7 kg)   SpO2 99%   BMI 27.89 kg/m²

## 2022-12-15 NOTE — ADT AUTH CERT
Patient Demographics    Name Patient ID SSN Gender Identity Birth Date   Veena Ochoa [id number]  Male [birth year] (57 yrs)     Address Phone Email Employer    [address] [place] [address] [address] [place] [phone] (P)   [phone] (M) -- [address] Race Occupation Emp Status    Katty Andra / More Esvin -- Disabled      Reg Status PCP Date Last Verified Next Review Date    Verified Md Diana Mosqueda 22      Admission Date Discharge Date Admitting Provider     22 -- Elio Reina, DO       Marital Status Rastafari       None        Emergency Contact 1   Katt Mai 21  Ranjit Rice)     [address] [place] [address] [de-identified]  [address] [facility] Name/Sex/Relation Subscriber  Subscriber Address/Phone Subscriber Emp/Emp Phone   1. Bharati Gillis   [id number] Josh Schafer - Male   (Self) 1965 [address] [facility]  [facility]   [phone](I) DISABLED    2.  Hossein Desai   [id number] Christinia Cousins - Female   (Spouse) 1964 [address] [facility]  [facility]   [phone](O) OTHER      Utilization Reviews       Systemic or Infectious Condition GRG - Care Day 3 (12/15/2022) by Maciej Wright RN       Review Entered Review Status   12/15/2022 1059 Completed      Criteria Review      Care Day: 3 Care Date: 12/15/2022 Level of Care: Inpatient Floor    Guideline Day 2    Level Of Care    (X) Floor    12/15/2022 10:59 AM EST by Toby Stewart      surgical    Clinical Status    (X) * No ICU or intermediate care needs    12/15/2022 10:59 AM EST by Toby Stewart      no    Interventions    (X) Inpatient interventions continue    12/15/2022 10:59 AM EST by [email or web address]  accucheck qac,hs  SSI per protocol  protonix 40mg ivqd  zosyn 3.375g iv q 8h  vancomycin 1250mg ivqd  NPO  ngt to LIS  scds  strict I&O  IS q 4h  contact isolation    12/15/2022 10:59 AM EST by Toby Stewart    Subject: Additional Clinical Information    Clinical 12-: PER SURGERY:      Subjective: The patient is lying comfortably in bed. He states he is not having any abdominal pain. He states that he has been passing flatus. He just feels bloated. Plan/Recommendations/Medical Decision Making:     The patient does not clinically have symptoms of a bowel obstruction, he is passing flatus, symptoms likely secondary to his ascites    Recommend IR paracentesis for drainage of ascites    Would recommend removing NG tube and starting on a diet    No surgical intervention           VITALS:T-97.5      B/p-130/95      HR-100      RR-19      SATS-96RA              LABS:      WBC-12.5      h/h-8.5/25.8      Na-136      K+-3.4      CO2-25      BUN-22  creat- 1.70      glucose-108      calcium-7.6      albumin-1.4      CT abd/pelvis- ascites; cholelithiasis       * Milestone        Systemic or Infectious Condition GRG - Care Day 2 (12/14/2022) by Adarsh Jimenez RN       Review Entered Review Status   12/15/2022 1046 Completed      Criteria Review      Care Day: 2 Care Date: 12/14/2022 Level of Care: Inpatient Floor    Guideline Day 1    Level Of Care    ( ) ICU or intermediate care    12/14/2022 4:10 PM EST by Air2[employer]kahlil Mercedes      medical    Clinical Status    ( ) * Clinical Indications met    12/14/2022 4:10 PM EST by uiuost      136/97 97%RA    Interventions    (X) Inpatient interventions as needed    12/14/2022 4:10 PM EST by Air2Webkahlil Mercedes      meds: zosyn 3375mg ivq8 vanco 2000mg ivx1 [email or web address]    12/15/2022 10:46 AM EST by Kiko Loya    Subject: Additional Clinical Information    Clinical 12-: PER ATTENDING:           Subjective & 24hr Events (12/14/22): Patient seen Sheeba Brewster in bed. Abd distended and firm. Note nothing coming up in his NGT. Have requested RN to eval, repositioned and now is functioning per her report.     No BM, ?flatus              Assessment & Plan:         # SIRS (leukocytosis, tachycardia) - likely secondary to SBO    - empiric vanco/zosyn    - CXR w/o infection    - UA w/o infection    - blood cultures pending    - likely DC atbx in AM pending culture reports              # partial SBO    - NGT to LIWS, repositioned and now working    - no output recorded - have requested RN to do so    - gen sx has seen, thinks ascites responsible for symptoms    - consider NGT clamp and clears in AM if has had BM/passing flatus    - order CT AP for further eval of ?obstruction and ascites       * Milestone   Additional Notes    LOC  Medical      GI   ASSESSMENT/PLAN:   Principal Problem:     SIRS (systemic inflammatory response syndrome) ([address]   Active Problems:     Essential hypertension     Hyperlipidemia     Type 2 diabetes mellitus ([address]     PAD (peripheral artery disease) ([address]   The patient's symptoms are likely secondary to his large volume of ascites, unlikely due to true small bowel obstruction, his bowel is functioning. no surgical intervention at this time   Would recommend paracentesis for drainage of the ascites   Would also recommend GI consultation for cirrhosis and ascites   Case management per hospitalist        PA Recommendation by Jennifer Wooten RN       Review Entered Review Status   2022 1641 In Primary      Criteria Review   We recommend that the following pt's current hospitalization under INPATIENT status is APPROPRIATE      Name: David Baez   : 1965   CSN: [id number]   INSURANCE: [employer]     Clinical summary ASSESSMENT/PLAN:  [unfilled]  Principal Problem:  SIRS (systemic inflammatory response syndrome) ([address]  Active Problems:  Essential hypertension  Hyperlipidemia  Type 2 diabetes mellitus ([address]  PAD (peripheral artery disease) ([address]     The patient's symptoms are likely secondary to his large volume of ascites, unlikely due to true small bowel obstruction, his bowel is functioning.    no surgical intervention at this time  Would recommend paracentesis for drainage of the ascites  Would also recommend GI consultation for cirrhosis and ascites  Case management per hospitalist     Vitals vss  Labs and Imaging reviewed  MCG criteria applies yes,   Comments Inpt kimberly     This chart was reviewed at 4:30 PM 12/14/2022     [address] [place] [address]   CELL : [phone]

## 2022-12-15 NOTE — WOUND CARE
Patient has left leg wound, currently patient is off unit to IR. Will attempt wound consult again tomorrow.

## 2022-12-15 NOTE — BRIEF OP NOTE
Department of Interventional Radiology  [phone]        Interventional Radiology Brief Procedure Note    Patient: Glorious Duty MRN: [id number]  SSN: [id number]    YOB: 1965  Age: 65 y.o. Sex: male      Date of Procedure: 12/15/2022    Pre-Procedure Diagnosis: Alcoholic cirrhosis with ascites    Post-Procedure Diagnosis: SAME    Procedure(s): Paracentesis    Brief Description of Procedure: Ultrasound guided paracentesis performed on the left abdomen. A total of 7500 cc of yellow ascitic fluid was removed.   Patient given albumin    Performed By: LAN Fenton     Assistants: None    Anesthesia:Lidocaine    Estimated Blood Loss: Less than 10ml    Specimens:  Microbiology    Implants:  None    Findings: Large volume ascites    Complications: None    Recommendations: Routine wound care    Follow Up: As needed    Signed By: LAN Fenton     December 15, 2022

## 2022-12-15 NOTE — PROGRESS NOTES
Hospitalist Progress Note   Admit Date:  2022  3:24 PM   Name:  Jeannie Lundberg   Age:  65 y.o. Sex:  male  :  1965   MRN:  [id number]   Room:  725/01    Presenting Complaint: Emesis     Reason(s) for Admission: Septicemia ([facility] [address] [A41.9]  SIRS (systemic inflammatory response syndrome) (HCC) [R65.10]  Abdominal pain, unspecified abdominal location [R10.9]  Swelling of lower extremity [M79.89]     Hospital Course:   65 y.o. male w/ Hx of GI bleed who presented to the ED for cc inability to tolerate PO, emesis, abdominal pain with distention, no flatus or BM since of 1 day duration. In ER, patient was found tachycardic with D-dimer 9.3. WBC 13.9 proBNP 1280. CT chest was obtained and showed no acute PE; scattered bibasilar atelectasis, CAD and aortic calcifications; ascites; gallstones. KUB obtained and shows nonspecific bowel gas pattern; partial early SBO not excluded. Patient was admitted for SIRS with leukocytosis and tachycardia; no source of infection. CTAP 12/15 shows multiple dependent stones present in the gallbladder; some contours liver appearing nodular may represent cirrhosis; also findings suggestive of granuloma. Abundant ascites is present. UA not c/w infection. CT chest shows no PE and no consolidation. Rapid Covid/Flu NEG. Blood cultures on admission 1/2 sets growing GPC and GPR, biofire ID as Coag negative staph. Suspicious for contamination. He was started empirically on broad-spectrum antibiotics. ? SBP contributing with ascites and cirrhosis. Repeat blood cultures pending. There was also a concern for partial SBO on admission. NGT placed in ED for decompression. General surgery was consulted. General surgery has seen and was suspicious pt's symptoms are more d/t large volume ascites than SBO. No surgical intervention at this time. Recommended to remove NGT and start diet. GI was consulted for large volume ascites and new cirrhosis.  IR was consulted and pt underwent paracentesis on 12/15. Subjective & 24hr Events (12/15/22): Patient this morning after he came back from CT abdomen pelvis. He felt bloated but no pain this AM.  He denies any more nausea and vomiting. NG tube is still in place this AM.  He reported abdominal bloating. Has had flatus but no bowel movements. Denies fever, chills, SOB, chest pain, nausea, vomiting      Assessment & Plan:     SIRS   Positive blood cultures  SBP  ? SBP with large ascites, cirrhosis, abdominal pain. CTAP shows no obvious source of infection but shows large volume ascites. UA not c/w infection. CT chest shows no PE and no consolidation. Rapid Covid/Flu NEG  BCX on admission 1/2 sets growing GPC and GPR, biofire ID as Coag negative staph. Suspicious for contamination   Abx: Cont Vanc (12/13-. ..); change Zosyn to Rocephin/Flagyl empirically for SBP to avoid nephrotoxicity (12/15-. ..)  Repeat [address] 12/15, procal  If repeat BCX and ascitic cultures negative, consider d/c abx    Abdominal pain  Cirrhosis with ascites  KUB obtained on admission 12/13 and shows nonspecific bowel gas pattern; partial early SBO not excluded. General surgery has seen and signed off 12/15. Suspected more d/t large volume ascites than SBO. No surgical intervention at this time  CTAP 12/15 shows multiple dependent stones present in the gallbladder; some contours liver appearing nodular may represent cirrhosis; also findings suggestive of granuloma. Abundant ascites is present  Acute hepatitis panel negative  NGT placed in ED. Surgery recommended remove NGT 12/15 and start diet  Analgesics prn  Antiemetics prn  IR consulted for paracentesis. Ascitic studies ordered  GI consulted, appreciate recs  Liver workup obtained by GI as new dx of cirrhosis    Cholelithiasis  LFTs within normal limits.  CTAP not suggestive of acute cholecystitis  F/u with general surgery outpatient    Normocytic anemia  Hgb 8.5 on 12/15 hemoglobin 10.1 on admission (baseline 7-8). Elevated could be due to hemoconcentration with GI loss and also component of MARS  Iron studies c/w chronic disease  Monitor CBC, transfuse if hgb <7 or if symptomatic    MARS on CKD stage 3  Creatinine 1.8 on 12/13 (baseline 1.2-1 0.4). Most likely due to nausea and vomiting  CTAP 12/15 shows no hydronephrosis  Avoid nephrotoxic meds  D/c IVF due to large amount of ascites  Encourage p.o. intake  Cont home bicarb  Monitor BMP    Hypokalemia  Replace and recheck  Check Mag    Alcohol dependence  Patient denies tobacco or alcohol abuse and stated that he stopped after last discharge. Could be contributing factor to his cirrhosis  Cont folic acid  Monitor for signs/symptoms of withdrawals    Essential hypertension  Resume home Norvasc    Type 2 diabetes mellitus  Diabetic peripheral neuropathy  Cont SSI  Check hA1C  Diabetes management to assist    GERD  Cont PPI    Hypoalbuminemia  Mod protein-calorie malnutrition  Consult nutrition      Anticipated discharge needs:      2-3 days pending Gi recs, cultures. PT/OT    I spent 39 minutes of time caring for this patient on the unit nearby or at bedside, and more than 50 percent was spent on coordination of care activities, and/or patient/family counseling regarding status and plan of care. Diet:  Diet NPO  DVT PPx: SCD's  Code status: Full Code    Hospital Problems:  Principal Problem:    SIRS (systemic inflammatory response syndrome) (HCC)  Active Problems:    Alcohol dependence ([address]    Diabetic peripheral neuropathy (HCC)    Essential hypertension    Hyperlipidemia    Type 2 diabetes mellitus (HCC)    PAD (peripheral artery disease) (HCC)    Abdominal pain  Resolved Problems:    * No resolved hospital problems.  *      Objective:   Patient Vitals for the past 24 hrs:   Temp Pulse Resp BP SpO2   12/15/22 1404 98 °F (36.7 °C) 83 16 (!) 147/96 97 %   12/15/22 1153 97.3 °F (36.3 °C) 96 18 (!) 123/91 98 %   12/15/22 0701 97.5 °F (36.4 °C) 100 18 (!) 130/95 96 %   12/15/22 0424 97.8 °F (36.6 °C) 94 19 (!) 126/96 98 %   12/14/22 2357 97.8 °F (36.6 °C) 99 19 124/82 98 %   12/14/22 2136 97.5 °F (36.4 °C) (!) 105 19 (!) 139/94 98 %   12/14/22 1930 -- -- -- 130/81 --   12/14/22 1900 -- -- -- (!) 121/90 --   12/14/22 1830 -- -- -- 117/84 --   12/14/22 1815 -- -- -- 112/81 --   12/14/22 1800 -- -- -- (!) 141/102 --   12/14/22 1745 -- -- -- (!) 132/100 --   12/14/22 1730 -- -- -- (!) 131/100 --   12/14/22 1715 -- -- -- (!) 144/104 --   12/14/22 1700 -- -- -- (!) 151/104 --   12/14/22 1645 -- -- -- (!) 137/106 --   12/14/22 1630 -- -- -- (!) 148/106 --       Oxygen Therapy  SpO2: 97 %  Pulse via Oximetry: 103 beats per minute  Pulse Oximeter Device Mode: Continuous  O2 Device: None (Room air)    Estimated body mass index is 27.89 kg/m² as calculated from the following:    Height as of this encounter: 5' 11\" (1.803 m). Weight as of this encounter: 200 lb (90.7 kg). Intake/Output Summary (Last 24 hours) at 12/15/2022 1450  Last data filed at 12/15/2022 0533  Gross per 24 hour   Intake --   Output 500 ml   Net -500 ml         Physical Exam:     Blood pressure (!) 147/96, pulse 83, temperature 98 °F (36.7 °C), temperature source Temporal, resp. rate 16, height 5' 11\" (1.803 m), weight 200 lb (90.7 kg), SpO2 97 %. General:    Well nourished. Head:  Normocephalic, atraumatic  Eyes:  Sclerae appear normal.  Pupils equally round. ENT:  Nares appear normal, no drainage. Moist oral mucosa  Neck:  No restricted ROM. Trachea midline   CV:   RRR. No m/r/g. No jugular venous distension. Lungs:   CTAB. No wheezing, rhonchi, or rales. Symmetric expansion. Abdomen:   Hypoactive bowel sounds. Soft, nontender. Distended abdomen. Extremities: No cyanosis or clubbing. Venous stasis dermatitis on b/l LE's  Skin:     Warm and dry. Neuro:  CN II-XII grossly intact. Sensation intact. A&Ox3  Psych:  Normal mood and affect.       I have personally reviewed labs and tests showing:  Recent Labs:  Recent Results (from the past 48 hour(s))   Troponin    Collection Time: 12/13/22  3:40 PM   Result Value Ref Range    Troponin, High Sensitivity 12.1 0 - 14 pg/mL   CBC with Auto Differential    Collection Time: 12/13/22  3:40 PM   Result Value Ref Range    WBC 13.9 (H) 4.3 - 11.1 K/uL    RBC 3.63 (L) 4.23 - 5.6 M/uL    Hemoglobin 10.1 (L) 13.6 - 17.2 g/dL    Hematocrit 30.5 (L) 41.1 - 50.3 %    MCV 84.0 82 - 102 FL    MCH 27.8 26.1 - 32.9 PG    MCHC 33.1 31.4 - 35.0 g/dL    RDW 17.4 (H) 11.9 - 14.6 %    Platelets 814 586 - 881 K/uL    MPV 10.2 9.4 - 12.3 FL    nRBC 0.02 0.0 - 0.2 K/uL    Differential Type AUTOMATED      Seg Neutrophils 67 43 - 78 %    Lymphocytes 20 13 - 44 %    Monocytes 11 4.0 - 12.0 %    Eosinophils % 1 0.5 - 7.8 %    Basophils 0 0.0 - 2.0 %    Immature Granulocytes 1 0.0 - 5.0 %    Segs Absolute 9.2 (H) 1.7 - 8.2 K/UL    Absolute Lymph # 2.8 0.5 - 4.6 K/UL    Absolute Mono # 1.6 (H) 0.1 - 1.3 K/UL    Absolute Eos # 0.1 0.0 - 0.8 K/UL    Basophils Absolute 0.1 0.0 - 0.2 K/UL    Absolute Immature Granulocyte 0.1 0.0 - 0.5 K/UL   CMP    Collection Time: 12/13/22  3:40 PM   Result Value Ref Range    Sodium 132 (L) 133 - 143 mmol/L    Potassium 3.5 3.5 - 5.1 mmol/L    Chloride 98 (L) 101 - 110 mmol/L    CO2 26 21 - 32 mmol/L    Anion Gap 8 2 - 11 mmol/L    Glucose 64 (L) 65 - 100 mg/dL    BUN 22 6 - 23 MG/DL    Creatinine 1.80 (H) 0.8 - 1.5 MG/DL    Est, Glom Filt Rate 44 (L) >60 ml/min/1.73m2    Calcium 8.0 (L) 8.3 - 10.4 MG/DL    Total Bilirubin 1.1 0.2 - 1.1 MG/DL    ALT 8 (L) 12 - 65 U/L    AST 27 15 - 37 U/L    Alk Phosphatase 131 50 - 136 U/L    Total Protein 7.9 6.3 - 8.2 g/dL    Albumin 1.7 (L) 3.5 - 5.0 g/dL    Globulin 6.2 (H) 2.8 - 4.5 g/dL    Albumin/Globulin Ratio 0.3 (L) 0.4 - 1.6     Lactate, Sepsis    Collection Time: 12/13/22  3:40 PM   Result Value Ref Range    Lactic Acid, Sepsis 2.2 (H) 0.4 - 2.0 MMOL/L   Blood Culture 1    Collection Time: 12/13/22  3:40 PM    Specimen: Blood   Result Value Ref Range    Special Requests LEFT  Antecubital        Gram stain GRAM POS COCCI IN CLUSTERS      Gram stain ANAEROBIC BOTTLE POSITIVE      Gram stain        RESULTS VERIFIED, PHONED TO AND READ BACK BY NIKIA HURT RN ON 12/14/22 AT 1710 CJ    Gram stain GRAM POSITIVE RODS      Gram stain AEROBIC BOTTLE POSITIVE      Gram stain        RESULTS VERIFIED, PHONED TO AND READ BACK BY Mily Wilson RN @6549 ON 12/15/2022 BY     Culture (A)       STAPHYLOCOCCUS SPECIES, COAGULASE NEGATIVE This organism may be indicative of culture contamination. Clinical correlation needs to be evaluated as each case is unique.     Culture        Refer to Blood Culture ID Panel Accession [id number]   D-Dimer, Quantitative    Collection Time: 12/13/22  3:40 PM   Result Value Ref Range    D-Dimer, Quant 9.31 (H) <0.56 ug/ml(FEU)   Brain Natriuretic Peptide    Collection Time: 12/13/22  3:40 PM   Result Value Ref Range    NT Pro-BNP 1,280 (H) 5 - 125 PG/ML   Culture, Blood, PCR ID Panel    Collection Time: 12/13/22  3:40 PM    Specimen: Blood   Result Value Ref Range    Accession Number [id number]     Enterococcus faecalis by PCR NOT DETECTED NOTDET      Enterococcus faecium by PCR NOT DETECTED NOTDET      Listeria monocytogenes by PCR NOT DETECTED NOTDET      STAPHYLOCOCCUS Detected (A) NOTDET      Staphylococcus Aureus NOT DETECTED NOTDET      Staphylococcus epidermidis by PCR Detected (A) NOTDET      Staphylococcus lugdunensis by PCR NOT DETECTED NOTDET      STREPTOCOCCUS NOT DETECTED NOTDET      Streptococcus agalactiae (Group B) NOT DETECTED NOTDET      Strep pneumoniae NOT DETECTED NOTDET      Strep pyogenes,(Grp. A) NOT DETECTED NOTDET      Acinetobacter calcoac baumannii complex by PCR NOT DETECTED NOTDET      Bacteroides fragilis by PCR NOT DETECTED NOTDET      Enterobacteriaceae by PCR NOT DETECTED NOTDET      Enterobacter cloacae complex by PCR NOT DETECTED NOTDET      Escherichia Coli NOT DETECTED NOTDET      Klebsiella aerogenes by PCR NOT DETECTED NOTDET      Klebsiella oxytoca by PCR NOT DETECTED NOTDET      Klebsiella pneumoniae group by PCR NOT DETECTED NOTDET      Proteus by PCR NOT DETECTED NOTDET      Salmonella species by PCR NOT DETECTED NOTDET      Serratia marcescens by PCR NOT DETECTED NOTDET      Haemophilus Influenzae by PCR NOT DETECTED NOTDET      Neisseria meningitidis by PCR NOT DETECTED NOTDET      Pseudomonas aeruginosa NOT DETECTED NOTDET      Stenotrophomonas maltophilia by PCR NOT DETECTED NOTDET      Candida albicans by PCR NOT DETECTED NOTDET      Candida auris by PCR NOT DETECTED NOTDET      Candida glabrata NOT DETECTED NOTDET      Candida krusei by PCR NOT DETECTED NOTDET      Candida parapsilosis by PCR NOT DETECTED NOTDET      Candida tropicalis by PCR NOT DETECTED NOTDET      Cryptococcus neoformans/gattii by PCR NOT DETECTED NOTDET      Resistant gene targets          Methicillin Resistance mecA/C  by PCR NOT DETECTED NOTDET      Biofire test comment        False positive results may rarely occur. Correlate with clinical,epidemiologic, and other laboratory findings   POCT Glucose    Collection Time: 12/13/22  4:14 PM   Result Value Ref Range    POC Glucose 61 (L) 65 - 100 mg/dL    Performed by: Raman Ching    POCT Glucose    Collection Time: 12/13/22  4:31 PM   Result Value Ref Range    POC Glucose 59 (L) 65 - 100 mg/dL    Performed by: Raman Ching    Rapid influenza A/B antigens    Collection Time: 12/13/22  4:40 PM    Specimen: Nasal Washing   Result Value Ref Range    Influenza A Ag Negative NEG      Influenza B Ag Negative NEG      Source Nasopharyngeal     COVID-19, Rapid    Collection Time: 12/13/22  4:40 PM    Specimen: Nasopharyngeal   Result Value Ref Range    Source NASAL      SARS-CoV-2, Rapid Not detected NOTD     POCT Glucose    Collection Time: 12/13/22  5:08 PM   Result Value Ref Range    POC Glucose 75 65 - 100 mg/dL    Performed by:  Blair Wheeler POCT Glucose    Collection Time: 12/13/22  5:49 PM   Result Value Ref Range    POC Glucose 89 65 - 100 mg/dL    Performed by: Bridger Stark    POCT Glucose    Collection Time: 12/13/22 11:19 PM   Result Value Ref Range    POC Glucose 75 65 - 100 mg/dL    Performed by: Madiha    Basic Metabolic Panel w/ Reflex to MG    Collection Time: 12/14/22  7:55 AM   Result Value Ref Range    Sodium 132 (L) 133 - 143 mmol/L    Potassium 5.2 (H) 3.5 - 5.1 mmol/L    Chloride 101 101 - 110 mmol/L    CO2 24 21 - 32 mmol/L    Anion Gap 7 2 - 11 mmol/L    Glucose 83 65 - 100 mg/dL    BUN 22 6 - 23 MG/DL    Creatinine 1.60 (H) 0.8 - 1.5 MG/DL    Est, Glom Filt Rate 50 (L) >60 ml/min/1.73m2    Calcium 7.4 (L) 8.3 - 10.4 MG/DL   CBC with Auto Differential    Collection Time: 12/14/22  7:55 AM   Result Value Ref Range    WBC 12.7 (H) 4.3 - 11.1 K/uL    RBC 3.24 (L) 4.23 - 5.6 M/uL    Hemoglobin 9.0 (L) 13.6 - 17.2 g/dL    Hematocrit 27.5 (L) 41.1 - 50.3 %    MCV 84.9 82 - 102 FL    MCH 27.8 26.1 - 32.9 PG    MCHC 32.7 31.4 - 35.0 g/dL    RDW 17.9 (H) 11.9 - 14.6 %    Platelets 925 502 - 234 K/uL    MPV 11.0 9.4 - 12.3 FL    nRBC 0.02 0.0 - 0.2 K/uL    Differential Type AUTOMATED      Seg Neutrophils 70 43 - 78 %    Lymphocytes 17 13 - 44 %    Monocytes 11 4.0 - 12.0 %    Eosinophils % 1 0.5 - 7.8 %    Basophils 0 0.0 - 2.0 %    Immature Granulocytes 1 0.0 - 5.0 %    Segs Absolute 8.8 (H) 1.7 - 8.2 K/UL    Absolute Lymph # 2.2 0.5 - 4.6 K/UL    Absolute Mono # 1.5 (H) 0.1 - 1.3 K/UL    Absolute Eos # 0.2 0.0 - 0.8 K/UL    Basophils Absolute 0.0 0.0 - 0.2 K/UL    Absolute Immature Granulocyte 0.1 0.0 - 0.5 K/UL   POCT Glucose    Collection Time: 12/14/22  9:10 AM   Result Value Ref Range    POC Glucose 168 (H) 65 - 100 mg/dL    Performed by: Brice    POCT Urinalysis no Micro    Collection Time: 12/14/22  2:00 PM   Result Value Ref Range    Specific Gravity, Urine, POC 1.010 1.001 - 1.023      pH, Urine, POC 5.0 5.0 - 9.0      Protein, Urine, POC Negative NEG mg/dL    Glucose, UA POC Negative NEG mg/dL    Ketones, Urine, POC Negative NEG mg/dL    Bilirubin, Urine, POC Negative NEG      Blood, UA POC Negative NEG      URINE UROBILINOGEN POC 0.2 0.2 - 1.0 EU/dL    Nitrate, Urine, POC Negative NEG      Leukocyte Est, UA POC Negative NEG      Performed by: Judi Lake    POCT Glucose    Collection Time: 12/14/22  9:49 PM   Result Value Ref Range    POC Glucose 83 65 - 100 mg/dL    Performed by: Scarlet(PCT)SNBRENDA    Comprehensive Metabolic Panel    Collection Time: 12/15/22  5:18 AM   Result Value Ref Range    Sodium 136 133 - 143 mmol/L    Potassium 3.4 (L) 3.5 - 5.1 mmol/L    Chloride 103 101 - 110 mmol/L    CO2 25 21 - 32 mmol/L    Anion Gap 8 2 - 11 mmol/L    Glucose 108 (H) 65 - 100 mg/dL    BUN 22 6 - 23 MG/DL    Creatinine 1.70 (H) 0.8 - 1.5 MG/DL    Est, Glom Filt Rate 47 (L) >60 ml/min/1.73m2    Calcium 7.6 (L) 8.3 - 10.4 MG/DL    Total Bilirubin 1.0 0.2 - 1.1 MG/DL    ALT 6 (L) 12 - 65 U/L    AST 17 15 - 37 U/L    Alk Phosphatase 84 50 - 136 U/L    Total Protein 6.3 6.3 - 8.2 g/dL    Albumin 1.4 (L) 3.5 - 5.0 g/dL    Globulin 4.9 (H) 2.8 - 4.5 g/dL    Albumin/Globulin Ratio 0.3 (L) 0.4 - 1.6     CBC with Auto Differential    Collection Time: 12/15/22  5:18 AM   Result Value Ref Range    WBC 12.5 (H) 4.3 - 11.1 K/uL    RBC 3.11 (L) 4.23 - 5.6 M/uL    Hemoglobin 8.5 (L) 13.6 - 17.2 g/dL    Hematocrit 25.8 (L) 41.1 - 50.3 %    MCV 83.0 82 - 102 FL    MCH 27.3 26.1 - 32.9 PG    MCHC 32.9 31.4 - 35.0 g/dL    RDW 17.8 (H) 11.9 - 14.6 %    Platelets 052 011 - 563 K/uL    MPV 10.0 9.4 - 12.3 FL    nRBC 0.00 0.0 - 0.2 K/uL    Differential Type AUTOMATED      Seg Neutrophils 68 43 - 78 %    Lymphocytes 18 13 - 44 %    Monocytes 12 4.0 - 12.0 %    Eosinophils % 1 0.5 - 7.8 %    Basophils 0 0.0 - 2.0 %    Immature Granulocytes 1 0.0 - 5.0 %    Segs Absolute 8.5 (H) 1.7 - 8.2 K/UL    Absolute Lymph # 2.2 0.5 - 4.6 K/UL Absolute Mono # 1.5 (H) 0.1 - 1.3 K/UL    Absolute Eos # 0.2 0.0 - 0.8 K/UL    Basophils Absolute 0.1 0.0 - 0.2 K/UL    Absolute Immature Granulocyte 0.1 0.0 - 0.5 K/UL   Transferrin Saturation    Collection Time: 12/15/22 11:44 AM   Result Value Ref Range    Iron 44 35 - 150 ug/dL    TIBC 92 (L) 250 - 450 ug/dL    TRANSFERRIN SATURATION 48 >20 %   Protime-INR    Collection Time: 12/15/22 11:44 AM   Result Value Ref Range    Protime 16.6 (H) 12.6 - 14.3 sec    INR 1.3     Ferritin    Collection Time: 12/15/22 11:44 AM   Result Value Ref Range    Ferritin 554 (H) 8 - 388 NG/ML   Hepatitis Panel, Acute    Collection Time: 12/15/22 11:44 AM   Result Value Ref Range    Hep A IgM NONREACTIVE NR      Hep B Core Ab, IgM NONREACTIVE NR      Hepatitis B Surface Ag NONREACTIVE NR      Hepatitis C Ab NONREACTIVE NR     POCT Glucose    Collection Time: 12/15/22 11:55 AM   Result Value Ref Range    POC Glucose 133 (H) 65 - 100 mg/dL    Performed by: Sanjuana        I have personally reviewed imaging studies showing: Other Studies:  CT ABDOMEN PELVIS WO CONTRAST Additional Contrast? Oral   Final Result      1. Ascites. 2. Cholelithiasis. XR ABDOMEN (KUB) (SINGLE AP VIEW)   Final Result   1. The enteric tube tip is in the proximal to mid stomach. 2. Unchanged mildly distended small bowel loops. XR ABDOMEN (KUB) (SINGLE AP VIEW)   Final Result   1. Nonspecific bowel gas pattern. Partial or early small bowel obstruction not   excluded. 2.  Ascites. CT CHEST PULMONARY EMBOLISM W CONTRAST   Final Result   1. No evidence of PE.   2.  Scattered bibasilar atelectasis. 3.  Coronary artery and aortic calcifications. 4.  Ascites. 5.  Gallstones. XR CHEST (2 VW)   Final Result   1. Diminished lung lines with associated opacification bibasilar atelectasis   without focal infiltrative opacity.          IR US GUIDED PARACENTESIS    (Results Pending)       Current Meds:  Current Facility-Administered Medications   Medication Dose Route Frequency    diatrizoate meglumine-sodium (GASTROGRAFIN) 66-10 % solution 15 mL  15 mL Oral ONCE PRN    dextrose 5 % and 0.9 % sodium chloride infusion   IntraVENous Continuous    [Held by provider] amLODIPine (NORVASC) tablet 10 mg  10 mg Oral Daily    [Held by provider] cholestyramine light packet 4 g  4 g Oral BID    [Held by provider] folic acid (FOLVITE) tablet 1 mg  1 mg Oral Daily    [Held by provider] sodium bicarbonate tablet 1,300 mg  1,300 mg Oral BID    [Held by provider] vitamin B-12 (CYANOCOBALAMIN) tablet 1,000 mcg  1,000 mcg Oral Daily    insulin lispro (HUMALOG) injection vial 0-4 Units  0-4 Units SubCUTAneous TID WC    insulin lispro (HUMALOG) injection vial 0-4 Units  0-4 Units SubCUTAneous Nightly    glucose chewable tablet 16 g  4 tablet Oral PRN    dextrose bolus 10% 125 mL  125 mL IntraVENous PRN    Or    dextrose bolus 10% 250 mL  250 mL IntraVENous PRN    glucagon (rDNA) injection 1 mg  1 mg SubCUTAneous PRN    dextrose 10 % infusion   IntraVENous Continuous PRN    pantoprazole (PROTONIX) 40 mg in sodium chloride (PF) 0.9 % 10 mL injection  40 mg IntraVENous Daily    sodium chloride flush 0.9 % injection 5-40 mL  5-40 mL IntraVENous 2 times per day    sodium chloride flush 0.9 % injection 5-40 mL  5-40 mL IntraVENous PRN    0.9 % sodium chloride infusion   IntraVENous PRN    ondansetron (ZOFRAN-ODT) disintegrating tablet 4 mg  4 mg Oral Q8H PRN    Or    ondansetron (ZOFRAN) injection 4 mg  4 mg IntraVENous Q6H PRN    polyethylene glycol (GLYCOLAX) packet 17 g  17 g Oral Daily PRN    acetaminophen (TYLENOL) tablet 650 mg  650 mg Oral Q6H PRN    Or    acetaminophen (TYLENOL) suppository 650 mg  650 mg Rectal Q6H PRN    hydrALAZINE (APRESOLINE) injection 10 mg  10 mg IntraVENous Q6H PRN    morphine injection 1 mg  1 mg IntraVENous Q4H PRN    piperacillin-tazobactam (ZOSYN) 3,375 mg in sodium chloride 0.9 % 50 mL IVPB (mini-bag)  3,375 mg IntraVENous Q8H    vancomycin (VANCOCIN) 1250 mg in sodium chloride 0.9% 250 mL IVPB  1,250 mg IntraVENous Q24H       Signed: Jovi Brewster DO    Part of this note may have been written by using a voice dictation software. The note has been proof read but may still contain some grammatical/other typographical errors.

## 2022-12-16 PROBLEM — K65.2 SBP (SPONTANEOUS BACTERIAL PERITONITIS) (HCC): Status: RESOLVED | Noted: 2022-12-15 | Resolved: 2022-12-16

## 2022-12-16 PROBLEM — A41.9 SEPSIS (HCC): Status: ACTIVE | Noted: 2022-12-13

## 2022-12-16 LAB
A1AT SERPL-MCNC: 207 MG/DL (ref 101–187)
AFP-TM SERPL-MCNC: 1.6 NG/ML
ALBUMIN SERPL-MCNC: 1.4 G/DL (ref 3.5–5)
ALBUMIN/GLOB SERPL: 0.3 (ref 0.4–1.6)
ALP SERPL-CCNC: 71 U/L (ref 50–136)
ALT SERPL-CCNC: <6 U/L (ref 12–65)
ANION GAP SERPL CALC-SCNC: 9 MMOL/L (ref 2–11)
APPEARANCE FLD: CLEAR
AST SERPL-CCNC: 17 U/L (ref 15–37)
BACTERIA SPEC CULT: ABNORMAL
BASOPHILS # BLD: 0.1 K/UL (ref 0–0.2)
BASOPHILS NFR BLD: 1 % (ref 0–2)
BILIRUB SERPL-MCNC: 0.8 MG/DL (ref 0.2–1.1)
BUN SERPL-MCNC: 21 MG/DL (ref 6–23)
CALCIUM SERPL-MCNC: 7.3 MG/DL (ref 8.3–10.4)
CENTROMERE B AB SER-ACNC: <0.2 AI (ref 0–0.9)
CERULOPLASMIN SERPL-MCNC: 19.6 MG/DL (ref 16–31)
CHLORIDE SERPL-SCNC: 105 MMOL/L (ref 101–110)
CHROMATIN AB SERPL-ACNC: <0.2 AI (ref 0–0.9)
CO2 SERPL-SCNC: 23 MMOL/L (ref 21–32)
COLOR FLD: YELLOW
CREAT SERPL-MCNC: 1.7 MG/DL (ref 0.8–1.5)
CYTOLOGY-NON GYN: NORMAL
DIFFERENTIAL METHOD BLD: ABNORMAL
DSDNA AB SER-ACNC: 2 IU/ML (ref 0–9)
ENA JO1 AB SER-ACNC: <0.2 AI (ref 0–0.9)
ENA RNP AB SER-ACNC: 0.4 AI (ref 0–0.9)
ENA SCL70 AB SER-ACNC: <0.2 AI (ref 0–0.9)
ENA SM AB SER-ACNC: <0.2 AI (ref 0–0.9)
ENA SS-A AB SER-ACNC: <0.2 AI (ref 0–0.9)
ENA SS-B AB SER-ACNC: <0.2 AI (ref 0–0.9)
EOSINOPHIL # BLD: 0.2 K/UL (ref 0–0.8)
EOSINOPHIL NFR BLD: 1 % (ref 0.5–7.8)
ERYTHROCYTE [DISTWIDTH] IN BLOOD BY AUTOMATED COUNT: 18.2 % (ref 11.9–14.6)
EST. AVERAGE GLUCOSE BLD GHB EST-MCNC: 97 MG/DL
GLOBULIN SER CALC-MCNC: 4.7 G/DL (ref 2.8–4.5)
GLUCOSE BLD STRIP.AUTO-MCNC: 141 MG/DL (ref 65–100)
GLUCOSE BLD STRIP.AUTO-MCNC: 157 MG/DL (ref 65–100)
GLUCOSE BLD STRIP.AUTO-MCNC: 163 MG/DL (ref 65–100)
GLUCOSE SERPL-MCNC: 120 MG/DL (ref 65–100)
GRAM STN SPEC: ABNORMAL
HBA1C MFR BLD: 5 % (ref 4.8–5.6)
HCT VFR BLD AUTO: 26.3 % (ref 41.1–50.3)
HGB BLD-MCNC: 8.5 G/DL (ref 13.6–17.2)
IGA SERPL-MCNC: 1070 MG/DL (ref 90–386)
IMM GRANULOCYTES # BLD AUTO: 0.1 K/UL (ref 0–0.5)
IMM GRANULOCYTES NFR BLD AUTO: 1 % (ref 0–5)
INR PPP: 1.4
LYMPHOCYTES # BLD: 2.4 K/UL (ref 0.5–4.6)
LYMPHOCYTES NFR BLD: 19 % (ref 13–44)
LYMPHOCYTES NFR BRONCH MANUAL: 86 %
Lab: NORMAL
MAGNESIUM SERPL-MCNC: 1.3 MG/DL (ref 1.8–2.4)
MCH RBC QN AUTO: 28.1 PG (ref 26.1–32.9)
MCHC RBC AUTO-ENTMCNC: 32.3 G/DL (ref 31.4–35)
MCV RBC AUTO: 86.8 FL (ref 82–102)
MM INDURATION, POC: 0 MM (ref 0–5)
MONOCYTES # BLD: 1.6 K/UL (ref 0.1–1.3)
MONOCYTES NFR BLD: 13 % (ref 4–12)
NEUTROPHILS NFR BRONCH MANUAL: 14 %
NEUTS SEG # BLD: 8.5 K/UL (ref 1.7–8.2)
NEUTS SEG NFR BLD: 66 % (ref 43–78)
NRBC # BLD: 0 K/UL (ref 0–0.2)
NUC CELL # FLD: 160 /CU MM
PLATELET # BLD AUTO: 229 K/UL (ref 150–450)
PMV BLD AUTO: 10.2 FL (ref 9.4–12.3)
POTASSIUM SERPL-SCNC: 3.3 MMOL/L (ref 3.5–5.1)
PPD, POC: NEGATIVE
PROT SERPL-MCNC: 6.1 G/DL (ref 6.3–8.2)
PROTHROMBIN TIME: 17.5 SEC (ref 12.6–14.3)
RBC # BLD AUTO: 3.03 M/UL (ref 4.23–5.6)
RBC # FLD: <1000 /CU MM
SARS-COV-2 RDRP RESP QL NAA+PROBE: NOT DETECTED
SERVICE CMNT-IMP: ABNORMAL
SODIUM SERPL-SCNC: 137 MMOL/L (ref 133–143)
SOURCE: NORMAL
SPECIMEN SOURCE FLD: NORMAL
SPECIMEN SOURCE: NORMAL
VANCOMYCIN SERPL-MCNC: 19.3 UG/ML
WBC # BLD AUTO: 12.9 K/UL (ref 4.3–11.1)

## 2022-12-16 PROCEDURE — 87635 SARS-COV-2 COVID-19 AMP PRB: CPT

## 2022-12-16 PROCEDURE — 80202 ASSAY OF VANCOMYCIN: CPT

## 2022-12-16 PROCEDURE — 97165 OT EVAL LOW COMPLEX 30 MIN: CPT

## 2022-12-16 PROCEDURE — 1100000003 HC PRIVATE W/ TELEMETRY

## 2022-12-16 PROCEDURE — 6370000000 HC RX 637 (ALT 250 FOR IP): Performed by: FAMILY MEDICINE

## 2022-12-16 PROCEDURE — 6360000002 HC RX W HCPCS: Performed by: FAMILY MEDICINE

## 2022-12-16 PROCEDURE — 83036 HEMOGLOBIN GLYCOSYLATED A1C: CPT

## 2022-12-16 PROCEDURE — 80053 COMPREHEN METABOLIC PANEL: CPT

## 2022-12-16 PROCEDURE — 6360000002 HC RX W HCPCS: Performed by: INTERNAL MEDICINE

## 2022-12-16 PROCEDURE — 97530 THERAPEUTIC ACTIVITIES: CPT

## 2022-12-16 PROCEDURE — 82962 GLUCOSE BLOOD TEST: CPT

## 2022-12-16 PROCEDURE — P9047 ALBUMIN (HUMAN), 25%, 50ML: HCPCS | Performed by: INTERNAL MEDICINE

## 2022-12-16 PROCEDURE — C9113 INJ PANTOPRAZOLE SODIUM, VIA: HCPCS | Performed by: FAMILY MEDICINE

## 2022-12-16 PROCEDURE — 36415 COLL VENOUS BLD VENIPUNCTURE: CPT

## 2022-12-16 PROCEDURE — 85025 COMPLETE CBC W/AUTO DIFF WBC: CPT

## 2022-12-16 PROCEDURE — A4216 STERILE WATER/SALINE, 10 ML: HCPCS | Performed by: FAMILY MEDICINE

## 2022-12-16 PROCEDURE — 2580000003 HC RX 258: Performed by: FAMILY MEDICINE

## 2022-12-16 PROCEDURE — 85610 PROTHROMBIN TIME: CPT

## 2022-12-16 PROCEDURE — 83735 ASSAY OF MAGNESIUM: CPT

## 2022-12-16 RX ORDER — ALBUMIN (HUMAN) 12.5 G/50ML
25 SOLUTION INTRAVENOUS 2 TIMES DAILY
Status: COMPLETED | OUTPATIENT
Start: 2022-12-16 | End: 2022-12-17

## 2022-12-16 RX ORDER — PANTOPRAZOLE SODIUM 40 MG/1
40 TABLET, DELAYED RELEASE ORAL
Status: DISCONTINUED | OUTPATIENT
Start: 2022-12-17 | End: 2023-01-03 | Stop reason: HOSPADM

## 2022-12-16 RX ADMIN — VANCOMYCIN HYDROCHLORIDE 1250 MG: 100 INJECTION, POWDER, LYOPHILIZED, FOR SOLUTION INTRAVENOUS at 03:18

## 2022-12-16 RX ADMIN — SODIUM BICARBONATE 650 MG TABLET 1300 MG: at 20:18

## 2022-12-16 RX ADMIN — ALBUMIN (HUMAN) 25 G: 0.25 INJECTION, SOLUTION INTRAVENOUS at 20:19

## 2022-12-16 RX ADMIN — POTASSIUM BICARBONATE 40 MEQ: 782 TABLET, EFFERVESCENT ORAL at 10:04

## 2022-12-16 RX ADMIN — METRONIDAZOLE 500 MG: 500 TABLET ORAL at 04:54

## 2022-12-16 RX ADMIN — CHOLESTYRAMINE 4 G: 4 POWDER, FOR SUSPENSION ORAL at 10:06

## 2022-12-16 RX ADMIN — FOLIC ACID 1 MG: 1 TABLET ORAL at 10:04

## 2022-12-16 RX ADMIN — AMLODIPINE BESYLATE 10 MG: 10 TABLET ORAL at 10:06

## 2022-12-16 RX ADMIN — SODIUM CHLORIDE, PRESERVATIVE FREE 10 ML: 5 INJECTION INTRAVENOUS at 20:40

## 2022-12-16 RX ADMIN — CYANOCOBALAMIN TAB 1000 MCG 1000 MCG: 1000 TAB at 10:05

## 2022-12-16 RX ADMIN — SODIUM BICARBONATE 650 MG TABLET 1300 MG: at 10:05

## 2022-12-16 RX ADMIN — CHOLESTYRAMINE 4 G: 4 POWDER, FOR SUSPENSION ORAL at 20:39

## 2022-12-16 RX ADMIN — PANTOPRAZOLE SODIUM 40 MG: 40 INJECTION, POWDER, LYOPHILIZED, FOR SOLUTION INTRAVENOUS at 10:05

## 2022-12-16 RX ADMIN — SODIUM CHLORIDE, PRESERVATIVE FREE 5 ML: 5 INJECTION INTRAVENOUS at 10:08

## 2022-12-16 RX ADMIN — ALBUMIN (HUMAN) 25 G: 0.25 INJECTION, SOLUTION INTRAVENOUS at 17:09

## 2022-12-16 ASSESSMENT — PAIN SCALES - GENERAL
PAINLEVEL_OUTOF10: 0
PAINLEVEL_OUTOF10: 0

## 2022-12-16 NOTE — CONSULTS
Nutrition Note:     Consult received for education. Complete assessment done earlier today. Education completed on low sodium diet, note updated with information covered.     Ju Cleary RD, LD  Contact: [phone]

## 2022-12-16 NOTE — PROGRESS NOTES
Hospitalist Progress Note   Admit Date:  2022  3:24 PM   Name:  Juan Basilio   Age:  65 y.o. Sex:  male  :  1965   MRN:  [id number]   Room:  [facility]/    Presenting Complaint: Emesis     Reason(s) for Admission: Septicemia ([facility] [address] [A41.9]  SIRS (systemic inflammatory response syndrome) (HCC) [R65.10]  Abdominal pain, unspecified abdominal location [R10.9]  Swelling of lower extremity [M79.89]     Hospital Course:   65 y.o. male w/ Hx of GI bleed who presented to the ED for cc inability to tolerate PO, emesis, abdominal pain with distention, no flatus or BM since of 1 day duration. In ER, patient was found tachycardic with D-dimer 9.3. WBC 13.9 proBNP 1280. CT chest was obtained and showed no acute PE; scattered bibasilar atelectasis, CAD and aortic calcifications; ascites; gallstones. KUB obtained and shows nonspecific bowel gas pattern; partial early SBO not excluded. Patient was admitted for SIRS with leukocytosis and tachycardia; no source of infection. CTAP 12/15 shows multiple dependent stones present in the gallbladder; some contours liver appearing nodular may represent cirrhosis; also findings suggestive of granuloma. Abundant ascites is present. UA not c/w infection. CT chest shows no PE and no consolidation. Rapid Covid/Flu NEG. Blood cultures on admission grew Coag negative staph and Diphtheroids. Suspicious for contamination. Suspicious for contamination. He was started empirically on broad-spectrum antibiotics. ? SBP contributing with ascites and cirrhosis. Repeat blood cultures NGTD. ID was consulted  and recommended no signs of infectious peritonitis and blood cultures are contaminant and does not require treatment. Leukocytosis most likely due to partial SBO/ileus which has resolved. Recommended wound care on right leg which wounds appear to be chronic/venous stasis without signs of active infection.   ID recommended to stop all antibiotics and signed off 12/6.    There was also a concern for partial SBO on admission. NGT placed in ED for decompression. General surgery was consulted. General surgery has seen and was suspicious pt's symptoms are more d/t large volume ascites than SBO. No surgical intervention at this time. Recommended to remove NGT and start diet. GI was consulted for large volume ascites and new cirrhosis. IR was consulted and pt underwent paracentesis on 12/15. Subjective & 24hr Events (12/16/22): Alert and oriented x3. Stated that he feels better today. Tolerating p.o. No nausea/vomiting and no abdominal pain. Has had a bowel movement this morning that was loose. Denies fever, chills, SOB, chest pain, nausea, vomiting      Assessment & Plan:     SIRS   Positive blood cultures  ? SBP with large ascites, cirrhosis, abdominal pain. CTAP shows no obvious source of infection but shows large volume ascites. UA not c/w infection. CT chest shows no PE and no consolidation. Rapid Covid/Flu NEG  BCX on admission grew Coag negative staph and Diphtheroids. Suspicious for contamination. Repeat [address] 12/15: NGTD  Abx: Cont Vanc (12/13-12/16); s/p Zosyn; s/p Rocephin/Flagyl empirically for SBP to avoid nephrotoxicity (12/15-12/16)  Ascitic studies  - not consistent with infection  -Cx pending  -Cytology pending  Consulted ID 12/16, appreciate recs--no signs of infectious peritonitis and blood cultures are contaminant and does not require treatment. Leukocytosis most likely due to partial SBO/ileus which has resolved. Recommended wound care on right leg which wounds appear to be chronic/venous stasis without signs of active infection. ID recommended to stop all antibiotics and signed off 12/6. Abdominal pain, resolved  Cirrhosis with ascites  KUB obtained on admission 12/13 and shows nonspecific bowel gas pattern; partial early SBO not excluded. General surgery has seen and signed off 12/15.  Suspected more d/t large volume ascites than SBO. No surgical intervention at this time  CTAP 12/15 shows multiple dependent stones present in the gallbladder; some contours liver appearing nodular may represent cirrhosis; also findings suggestive of granuloma. Abundant ascites is present  Acute hepatitis panel negative  NGT placed in ED. Surgery recommended remove NGT 12/15 and start diet  Analgesics prn  Antiemetics prn  IR consulted for paracentesis. Ascitic studies ordered  GI consulted, appreciate recs  Liver workup obtained by GI as new dx of cirrhosis    Cholelithiasis  LFTs within normal limits. CTAP not suggestive of acute cholecystitis  F/u with general surgery outpatient    Normocytic anemia  Hgb 8.5 on 12/15 hemoglobin 10.1 on admission (baseline 7-8). Elevated could be due to hemoconcentration with GI loss and also component of MARS  Iron studies c/w chronic disease  Monitor CBC, transfuse if hgb <7 or if symptomatic    MARS on CKD stage 3  Creatinine 1.8 on 12/13 (baseline 1.2-1.4). Most likely due to nausea and vomiting  CTAP 12/15 shows no hydronephrosis  Avoid nephrotoxic meds  D/c IVF due to large amount of ascites  Encourage p.o. intake  Cont home bicarb  Monitor BMP    Hypokalemia  Replace and recheck  Check Mag    Alcohol dependence  Patient denies tobacco or alcohol abuse and stated that he stopped after last discharge. Could be contributing factor to his cirrhosis  Cont folic acid  Monitor for signs/symptoms of withdrawals    Essential hypertension  Cont home Norvasc    Type 2 diabetes mellitus  Diabetic peripheral neuropathy  Cont SSI  pW2L--8.0  Diabetes management to assist    Chronic L leg diabetic ulcer  ID has seen, no signs of active infection  Wound team to assist--Needs referral to wound clinic at discharge  Recommend xeroform, foam and tiffanie 3 times per week. AT home would use hydrafera blue/ tiffanie 3 times per week.     GERD  Cont PPI, change to po    Hypoalbuminemia  Mod protein-calorie malnutrition  Consulted nutrition      Anticipated discharge needs:      2-3 days pending Gi recs, cultures. PT/OT--STR    I spent 39 minutes of time caring for this patient on the unit nearby or at bedside, and more than 50 percent was spent on coordination of care activities, and/or patient/family counseling regarding status and plan of care. Diet:  ADULT DIET; Regular; Low Sodium (2 gm)  ADULT ORAL NUTRITION SUPPLEMENT; Dinner, Breakfast; Low Calorie/High Protein Oral Supplement  DVT PPx: SCD's  Code status: Full Code    Hospital Problems:  Principal Problem:    Sepsis ([address]  Active Problems:    Alcohol dependence ([facility] [address]    Diabetic peripheral neuropathy ([facility] [address]    Essential hypertension    Hyperlipidemia    Type 2 diabetes mellitus ([facility] [address]    Acute kidney injury superimposed on CKD (HCC)    PAD (peripheral artery disease) (HCC)    Hypoalbuminemia    Normocytic anemia    Abdominal pain    Cirrhosis of liver with ascites (HCC)    SBP (spontaneous bacterial peritonitis) (HCC)    Moderate protein malnutrition (HCC)    Cholelithiases    Positive blood culture    Stage 3a chronic kidney disease ([facility] [address]  Resolved Problems:    * No resolved hospital problems.  *      Objective:   Patient Vitals for the past 24 hrs:   Temp Pulse Resp BP SpO2   12/16/22 1242 97.4 °F (36.3 °C) 92 19 (!) 131/94 100 %   12/16/22 0802 97.3 °F (36.3 °C) 91 18 (!) 128/94 --   12/16/22 0323 97.4 °F (36.3 °C) 88 16 111/89 99 %   12/15/22 2246 97.2 °F (36.2 °C) 89 18 (!) 126/90 98 %   12/15/22 1944 97.3 °F (36.3 °C) 86 18 138/88 97 %   12/15/22 1726 -- 88 19 (!) 146/90 99 %   12/15/22 1624 -- 92 16 135/88 99 %   12/15/22 1611 -- 94 18 (!) 140/91 99 %   12/15/22 1610 -- -- -- -- 100 %   12/15/22 1600 -- -- -- -- 94 %   12/15/22 1551 -- 97 18 (!) 136/95 97 %   12/15/22 1540 -- -- -- -- 96 %   12/15/22 1404 98 °F (36.7 °C) 83 16 (!) 147/96 97 %       Oxygen Therapy  SpO2: 100 %  Pulse via Oximetry: 87 beats per minute  Pulse Oximeter Device Mode: Continuous  O2 Device: None (Room air)    Estimated body mass index is 30.87 kg/m² as calculated from the following:    Height as of this encounter: 5' 11\" (1.803 m). Weight as of this encounter: 221 lb 4.8 oz (100.4 kg). Intake/Output Summary (Last 24 hours) at 12/16/2022 1253  Last data filed at 12/15/2022 1936  Gross per 24 hour   Intake --   Output 260 ml   Net -260 ml         Physical Exam:     Blood pressure (!) 131/94, pulse 92, temperature 97.4 °F (36.3 °C), temperature source Oral, resp. rate 19, height 5' 11\" (1.803 m), weight 221 lb 4.8 oz (100.4 kg), SpO2 100 %. General:    Well nourished. Head:  Normocephalic, atraumatic  Eyes:  Sclerae appear normal.  Pupils equally round. ENT:  Nares appear normal, no drainage. Moist oral mucosa  Neck:  No restricted ROM. Trachea midline   CV:   RRR. No m/r/g. No jugular venous distension. Lungs:   CTAB. No wheezing, rhonchi, or rales. Symmetric expansion. Abdomen:   Hypoactive bowel sounds. Soft, nontender. Nondistended. Extremities: No cyanosis or clubbing. Venous stasis dermatitis on b/l LE's  Skin:     Warm and dry. Neuro:  CN II-XII grossly intact. Sensation intact. A&Ox3  Psych:  Normal mood and affect.       I have personally reviewed labs and tests showing:  Recent Labs:  Recent Results (from the past 48 hour(s))   POCT Urinalysis no Micro    Collection Time: 12/14/22  2:00 PM   Result Value Ref Range    Specific Gravity, Urine, POC 1.010 1.001 - 1.023      pH, Urine, POC 5.0 5.0 - 9.0      Protein, Urine, POC Negative NEG mg/dL    Glucose, UA POC Negative NEG mg/dL    Ketones, Urine, POC Negative NEG mg/dL    Bilirubin, Urine, POC Negative NEG      Blood, UA POC Negative NEG      URINE UROBILINOGEN POC 0.2 0.2 - 1.0 EU/dL    Nitrate, Urine, POC Negative NEG      Leukocyte Est, UA POC Negative NEG      Performed by: Juan Gaspar    Body fluid cell count    Collection Time: 12/14/22  3:45 PM   Result Value Ref Range    Specimen ASCITIC FLUID      Color, Fluid YELLOW      Appearance, Fluid CLEAR      RBC, Fluid <1,000 /cu mm    WBC, Fluid 160 /cu mm    Segmented Neutrophils, BAL 14 %    Lymphocytes, BAL 86 %   Protein, Body Fluid    Collection Time: 12/14/22  3:45 PM   Result Value Ref Range    Fluid Type ASCITIC FLUID      Total Protein, Body Fluid 3.4 g/dL   Albumin, Body Fluid    Collection Time: 12/14/22  3:45 PM   Result Value Ref Range    Fluid Type ASCITIC FLUID      Albumin, Fluid 0.9 g/dL   Lactate Dehydrogenase, Body Fluid    Collection Time: 12/14/22  3:45 PM   Result Value Ref Range    Fluid Type ASCITIC FLUID      LD, Fluid 105 U/L   Glucose, Body Fluid    Collection Time: 12/14/22  3:45 PM   Result Value Ref Range    Fluid Type ASCITIC FLUID      Glucose, Body Fluid 116 MG/DL   Amylase, Body Fluid    Collection Time: 12/14/22  3:45 PM   Result Value Ref Range    Fluid Type ASCITIC FLUID      Amylase, Fluid 7 U/L   Cytology, Non-Gyn    Collection Time: 12/14/22  4:45 PM   Result Value Ref Range    Cytology-Non Gyn        Specimen received in cytology. Pathology report generally follows within three business days, unless special studies (i.e. immunohistochemistry, molecular studies, etc) are required.     Specimen source PERITONEAL FLUID     POCT Glucose    Collection Time: 12/14/22  9:49 PM   Result Value Ref Range    POC Glucose 83 65 - 100 mg/dL    Performed by: Roger (PCT)    Comprehensive Metabolic Panel    Collection Time: 12/15/22  5:18 AM   Result Value Ref Range    Sodium 136 133 - 143 mmol/L    Potassium 3.4 (L) 3.5 - 5.1 mmol/L    Chloride 103 101 - 110 mmol/L    CO2 25 21 - 32 mmol/L    Anion Gap 8 2 - 11 mmol/L    Glucose 108 (H) 65 - 100 mg/dL    BUN 22 6 - 23 MG/DL    Creatinine 1.70 (H) 0.8 - 1.5 MG/DL    Est, Glom Filt Rate 47 (L) >60 ml/min/1.73m2    Calcium 7.6 (L) 8.3 - 10.4 MG/DL    Total Bilirubin 1.0 0.2 - 1.1 MG/DL    ALT 6 (L) 12 - 65 U/L    AST 17 15 - 37 U/L    Alk Phosphatase 84 50 - 136 U/L    Total Protein 6.3 6.3 - 8.2 g/dL    Albumin 1.4 (L) 3.5 - 5.0 g/dL    Globulin 4.9 (H) 2.8 - 4.5 g/dL    Albumin/Globulin Ratio 0.3 (L) 0.4 - 1.6     CBC with Auto Differential    Collection Time: 12/15/22  5:18 AM   Result Value Ref Range    WBC 12.5 (H) 4.3 - 11.1 K/uL    RBC 3.11 (L) 4.23 - 5.6 M/uL    Hemoglobin 8.5 (L) 13.6 - 17.2 g/dL    Hematocrit 25.8 (L) 41.1 - 50.3 %    MCV 83.0 82 - 102 FL    MCH 27.3 26.1 - 32.9 PG    MCHC 32.9 31.4 - 35.0 g/dL    RDW 17.8 (H) 11.9 - 14.6 %    Platelets 149 880 - 482 K/uL    MPV 10.0 9.4 - 12.3 FL    nRBC 0.00 0.0 - 0.2 K/uL    Differential Type AUTOMATED      Seg Neutrophils 68 43 - 78 %    Lymphocytes 18 13 - 44 %    Monocytes 12 4.0 - 12.0 %    Eosinophils % 1 0.5 - 7.8 %    Basophils 0 0.0 - 2.0 %    Immature Granulocytes 1 0.0 - 5.0 %    Segs Absolute 8.5 (H) 1.7 - 8.2 K/UL    Absolute Lymph # 2.2 0.5 - 4.6 K/UL    Absolute Mono # 1.5 (H) 0.1 - 1.3 K/UL    Absolute Eos # 0.2 0.0 - 0.8 K/UL    Basophils Absolute 0.1 0.0 - 0.2 K/UL    Absolute Immature Granulocyte 0.1 0.0 - 0.5 K/UL   Culture, Blood 1    Collection Time: 12/15/22  7:59 AM    Specimen: Blood   Result Value Ref Range    Special Requests RIGHT  FOREARM        Culture NO GROWTH AFTER 22 HOURS     Culture, Blood 1    Collection Time: 12/15/22  7:59 AM    Specimen: Blood   Result Value Ref Range    Special Requests LEFT  HAND        Culture NO GROWTH AFTER 22 HOURS     AFP Tumor Marker    Collection Time: 12/15/22 11:44 AM   Result Value Ref Range    AFP-Tumor Marker 1.60 <8.0 ng/mL   Transferrin Saturation    Collection Time: 12/15/22 11:44 AM   Result Value Ref Range    Iron 44 35 - 150 ug/dL    TIBC 92 (L) 250 - 450 ug/dL    TRANSFERRIN SATURATION 48 >20 %   Protime-INR    Collection Time: 12/15/22 11:44 AM   Result Value Ref Range    Protime 16.6 (H) 12.6 - 14.3 sec    INR 1.3     Ferritin    Collection Time: 12/15/22 11:44 AM   Result Value Ref Range    Ferritin 554 (H) 8 - 388 NG/ML   Hepatitis Panel, Acute    Collection Time: 12/15/22 11:44 AM   Result Value Ref Range    Hep A IgM NONREACTIVE NR      Hep B Core Ab, IgM NONREACTIVE NR      Hepatitis B Surface Ag NONREACTIVE NR      Hepatitis C Ab NONREACTIVE NR     Procalcitonin    Collection Time: 12/15/22 11:44 AM   Result Value Ref Range    Procalcitonin 3.47 (H) 0.00 - 0.49 ng/mL   POCT Glucose    Collection Time: 12/15/22 11:55 AM   Result Value Ref Range    POC Glucose 133 (H) 65 - 100 mg/dL    Performed by: Sanjuana    Culture, Body Fluid    Collection Time: 12/15/22  3:45 PM    Specimen: Ascitic Fluid; Body Fluid   Result Value Ref Range    Special Requests NO SPECIAL REQUESTS      Gram stain NO WBCS SEEN      Gram stain NO DEFINITE ORGANISM SEEN      Culture        No growth after short period of incubation. Further results to follow after overnight incubation.    POCT Glucose    Collection Time: 12/15/22  5:28 PM   Result Value Ref Range    POC Glucose 123 (H) 65 - 100 mg/dL    Performed by: RuiCT    POCT Glucose    Collection Time: 12/15/22  9:47 PM   Result Value Ref Range    POC Glucose 186 (H) 65 - 100 mg/dL    Performed by: Keron    Vancomycin Level, Random    Collection Time: 12/16/22 12:06 AM   Result Value Ref Range    Vancomycin Rm 19.3 UG/ML   CBC with Auto Differential    Collection Time: 12/16/22  5:32 AM   Result Value Ref Range    WBC 12.9 (H) 4.3 - 11.1 K/uL    RBC 3.03 (L) 4.23 - 5.6 M/uL    Hemoglobin 8.5 (L) 13.6 - 17.2 g/dL    Hematocrit 26.3 (L) 41.1 - 50.3 %    MCV 86.8 82 - 102 FL    MCH 28.1 26.1 - 32.9 PG    MCHC 32.3 31.4 - 35.0 g/dL    RDW 18.2 (H) 11.9 - 14.6 %    Platelets 331 420 - 443 K/uL    MPV 10.2 9.4 - 12.3 FL    nRBC 0.00 0.0 - 0.2 K/uL    Differential Type AUTOMATED      Seg Neutrophils 66 43 - 78 %    Lymphocytes 19 13 - 44 %    Monocytes 13 (H) 4.0 - 12.0 %    Eosinophils % 1 0.5 - 7.8 %    Basophils 1 0.0 - 2.0 %    Immature Granulocytes 1 0.0 - 5.0 %    Segs Absolute 8.5 (H) 1.7 - 8.2 K/UL    Absolute Lymph # 2.4 0.5 - 4.6 K/UL    Absolute Mono # 1.6 (H) 0.1 - 1.3 K/UL    Absolute Eos # 0.2 0.0 - 0.8 K/UL    Basophils Absolute 0.1 0.0 - 0.2 K/UL    Absolute Immature Granulocyte 0.1 0.0 - 0.5 K/UL   Comprehensive Metabolic Panel    Collection Time: 12/16/22  5:32 AM   Result Value Ref Range    Sodium 137 133 - 143 mmol/L    Potassium 3.3 (L) 3.5 - 5.1 mmol/L    Chloride 105 101 - 110 mmol/L    CO2 23 21 - 32 mmol/L    Anion Gap 9 2 - 11 mmol/L    Glucose 120 (H) 65 - 100 mg/dL    BUN 21 6 - 23 MG/DL    Creatinine 1.70 (H) 0.8 - 1.5 MG/DL    Est, Glom Filt Rate 47 (L) >60 ml/min/1.73m2    Calcium 7.3 (L) 8.3 - 10.4 MG/DL    Total Bilirubin 0.8 0.2 - 1.1 MG/DL    ALT <6 (L) 12 - 65 U/L    AST 17 15 - 37 U/L    Alk Phosphatase 71 50 - 136 U/L    Total Protein 6.1 (L) 6.3 - 8.2 g/dL    Albumin 1.4 (L) 3.5 - 5.0 g/dL    Globulin 4.7 (H) 2.8 - 4.5 g/dL    Albumin/Globulin Ratio 0.3 (L) 0.4 - 1.6     Protime-INR    Collection Time: 12/16/22  5:32 AM   Result Value Ref Range    Protime 17.5 (H) 12.6 - 14.3 sec    INR 1.4     Hemoglobin A1C    Collection Time: 12/16/22  5:32 AM   Result Value Ref Range    Hemoglobin A1C 5.0 4.8 - 5.6 %    eAG 97 mg/dL       I have personally reviewed imaging studies showing: Other Studies:  CT ABDOMEN PELVIS WO CONTRAST Additional Contrast? Oral   Final Result      1. Ascites. 2. Cholelithiasis. XR ABDOMEN (KUB) (SINGLE AP VIEW)   Final Result   1. The enteric tube tip is in the proximal to mid stomach. 2. Unchanged mildly distended small bowel loops. XR ABDOMEN (KUB) (SINGLE AP VIEW)   Final Result   1. Nonspecific bowel gas pattern. Partial or early small bowel obstruction not   excluded. 2.  Ascites. CT CHEST PULMONARY EMBOLISM W CONTRAST   Final Result   1. No evidence of PE.   2.  Scattered bibasilar atelectasis. 3.  Coronary artery and aortic calcifications. 4.  Ascites. 5.  Gallstones. XR CHEST (2 VW)   Final Result   1.   Diminished lung lines with associated opacification bibasilar atelectasis   without focal infiltrative opacity.          IR US GUIDED PARACENTESIS    (Results Pending)       Current Meds:  Current Facility-Administered Medications   Medication Dose Route Frequency    potassium bicarb-citric acid (EFFER-K) effervescent tablet 40 mEq  40 mEq Oral Daily    [START ON 12/17/2022] pantoprazole (PROTONIX) tablet 40 mg  40 mg Oral QAM AC    diatrizoate meglumine-sodium (GASTROGRAFIN) 66-10 % solution 15 mL  15 mL Oral ONCE PRN    tuberculin injection 5 Units  5 Units IntraDERmal Once    amLODIPine (NORVASC) tablet 10 mg  10 mg Oral Daily    cholestyramine light packet 4 g  4 g Oral BID    folic acid (FOLVITE) tablet 1 mg  1 mg Oral Daily    sodium bicarbonate tablet 1,300 mg  1,300 mg Oral BID    vitamin B-12 (CYANOCOBALAMIN) tablet 1,000 mcg  1,000 mcg Oral Daily    insulin lispro (HUMALOG) injection vial 0-4 Units  0-4 Units SubCUTAneous TID WC    insulin lispro (HUMALOG) injection vial 0-4 Units  0-4 Units SubCUTAneous Nightly    glucose chewable tablet 16 g  4 tablet Oral PRN    dextrose bolus 10% 125 mL  125 mL IntraVENous PRN    Or    dextrose bolus 10% 250 mL  250 mL IntraVENous PRN    glucagon (rDNA) injection 1 mg  1 mg SubCUTAneous PRN    dextrose 10 % infusion   IntraVENous Continuous PRN    sodium chloride flush 0.9 % injection 5-40 mL  5-40 mL IntraVENous 2 times per day    sodium chloride flush 0.9 % injection 5-40 mL  5-40 mL IntraVENous PRN    0.9 % sodium chloride infusion   IntraVENous PRN    ondansetron (ZOFRAN-ODT) disintegrating tablet 4 mg  4 mg Oral Q8H PRN    Or    ondansetron (ZOFRAN) injection 4 mg  4 mg IntraVENous Q6H PRN    polyethylene glycol (GLYCOLAX) packet 17 g  17 g Oral Daily PRN    acetaminophen (TYLENOL) tablet 650 mg  650 mg Oral Q6H PRN    Or    acetaminophen (TYLENOL) suppository 650 mg  650 mg Rectal Q6H PRN    hydrALAZINE (APRESOLINE) injection 10 mg  10 mg IntraVENous Q6H PRN    morphine injection 1 mg 1 mg IntraVENous Q4H PRN       Signed: Janet Beck DO    Part of this note may have been written by using a voice dictation software. The note has been proof read but may still contain some grammatical/other typographical errors.

## 2022-12-16 NOTE — PROGRESS NOTES
ACUTE OCCUPATIONAL THERAPY GOALS:   (Developed with and agreed upon by patient and/or caregiver.)  1. Shannon Torres will be modified independent with functional mobility for ADL in room within 4 - 7 visits. 2. Shannon Torres will be modified independent with total body bathing and dressing within 4 - 7 visits. 3. Shannon Torres will state and demonstrate at least 5 energy conservation techniques during ADL/therapeutic activities within 4 - 7 visits. 4. Shannon Torres will voice a plan for appropriate home modifications for home safety and fall prevention within 7 visits. 5. Shannon Torres will participate at least 30 minutes of ADL with 3 or less rest breaks within 7 visits. 6. Shannon Torres will complete a tub transfer with modified independence within 7 visits. OCCUPATIONAL THERAPY Initial Assessment and Daily Note       OT Visit Days: 1  Acknowledge Orders  Time  OT Charge Capture  Rehab Caseload Tracker      Shannon Torres is a 65 y.o. male   PRIMARY DIAGNOSIS: Sepsis ([address]  Septicemia ([address] [A41.9]  SIRS (systemic inflammatory response syndrome) (HCC) [R65.10]  Abdominal pain, unspecified abdominal location [R10.9]  Swelling of lower extremity [M79.89]       Reason for Referral: Generalized Muscle Weakness (M62.81)  Inpatient: Payor: Miguel Del Castillo / Plan: Ricky Crump / Product Type: *No Product type* /     ASSESSMENT:     REHAB RECOMMENDATIONS:   Recommendation to date pending progress:  Setting:  Short-term Rehab    Equipment:    To Be Determined     ASSESSMENT:  Mr. Connie Ley was admitted for the above and has LE ulcers/history of diabetes. Underwent paracentesis recently. Mr. Connie Ley presented supine in bed and asking to get up into the chair. He lives with his wife and nephew. He completed supine to sit with standby assistance. Once sitting edge of bed donned his slippers with minimal assistance.   Stood with minimal assistance from edge of bed then completed functional mobility to the bedside chair with contact guard assistance. His B UE strength was grossly 4+/5 with the exception of his intrinsic hand strength which was 3-/5 more ulnarly than radially (4/5). He completed a short set of finger adduction/abduction, opposition and hook fisting and encouraged to continue especially for the L hand. He was left up in bedside chair with all needs in reach - RN aware. Jamil Mcnair presents with the following problems and would benefit from occupational therapy to maximize independence with self-care,instrumental activities of daily living, and functional transfers/mobility for activities of daily living/instrumental activities of daily living via the stated goals. [address] [facility] [facility] Clicks Daily Activity Inpatient Short Form:    AM-PAC Daily Activity Inpatient   How much help for putting on and taking off regular lower body clothing?: A Little  How much help for Bathing?: A Little  How much help for Toileting?: A Little  How much help for putting on and taking off regular upper body clothing?: A Little  How much help for taking care of personal grooming?: None  How much help for eating meals?: None  [facility] Inpatient Daily Activity Raw Score: 20  AM-PAC Inpatient ADL T-Scale Score : 42.03  ADL Inpatient CMS 0-100% Score: 38.32  ADL Inpatient CMS G-Code Modifier : CJ           SUBJECTIVE:     Mr. Jelena De Luna states, \"5 children. \"     Social/Functional Lives With: Spouse  Type of Home: House  Home Layout: One level  Home Access: Stairs to enter with rails  Entrance Stairs - Number of Steps: 2  Home Equipment: [place], [address] Help From: Family  ADL Assistance: Independent  Homemaking Assistance: Needs assistance  Homemaking Responsibilities: Yes  Ambulation Assistance: Independent  Transfer Assistance: Independent    OBJECTIVE:     Luke Simple / Yonyvin Knox / AIRWAY: IV    RESTRICTIONS/PRECAUTIONS:  Restrictions/Precautions: Fall Risk    PAIN: VITALS / O2:   Pre Treatment:   Pain Assessment: 0-10  Pain Level:  (feet - no specific rating given)      Post Treatment: same       Vitals          Oxygen            GROSS EVALUATION: INTACT IMPAIRED   (See Comments)   UE AROM [] [x]WFL except L middle/ring/pinky adduction/abduction   UE PROM [x] []   Strength []    4+/5 except L hand ulnar intrinsics 3-/5   Posture / Balance [] Sitting - Static: Good  Sitting - Dynamic: Good  Standing - Static: Fair  Standing - Dynamic: Fair   Sensation [] Overall Sensation Status: WFL   Coordination []  (L thumb to pinky opposition)     Tone [] Trunk tone: WFL     Edema []    Activity Tolerance [] Patient Tolerated treatment well     Hand Dominance R [x] L []      COGNITION/  PERCEPTION: INTACT IMPAIRED   (See Comments)   Orientation [x]     Vision []  Not specifically tested   Hearing [x]     Cognition  [x]     Perception [x]       MOBILITY: I Mod I S SBA CGA Min Mod Max Total  NT x2 Comments:   Bed Mobility    Rolling [] [] [] [] [] [] [] [] [] [x] []    Supine to Sit [] [] [] [] [] [x] [] [] [] [] []    Scooting [] [] [] [x] [] [] [] [] [] [] []    Sit to Supine [] [] [] [] [] [] [] [] [] [x] []    Transfers    Sit to Stand [] [] [] [] [] [x] [] [] [] [] []    Bed to Chair [] [] [] [] [] [] [] [] [] [x] []    Stand to Sit [] [] [] [] [] [x] [] [] [] [] []    Tub/Shower [] [] [] [] [] [] [] [] [] [x] []     Toilet [] [] [] [] [] [] [] [] [] [x] []      [] [] [] [] [] [] [] [] [] [] []    I=Independent, Mod I=Modified Independent, S=Supervision/Setup, SBA=Standby Assistance, CGA=Contact Guard Assistance, Min=Minimal Assistance, Mod=Moderate Assistance, Max=Maximal Assistance, Total=Total Assistance, NT=Not Tested    ACTIVITIES OF DAILY LIVING: I Mod I S SBA CGA Min Mod Max Total NT Comments   BASIC ADLs:              Upper Body Bathing  [] [] [] [] [] [] [] [] [] [x]    Lower Body Bathing [] [] [] [] [] [] [] [] [] [x]    Toileting [] [] [] [] [] [] [] [] [] [x]    Upper Body Dressing [] [] [] [] [] [] [] [] [] [x]    Lower Body Dressing [] [] [] [] [] [x] [] [] [] [] slippers   Feeding [] [] [] [] [] [] [] [] [] [x]    Grooming [] [] [] [] [] [] [] [] [] [x]    Personal Device Care [] [] [] [] [] [] [] [] [] [x]    Functional Mobility [] [] [] [] [] [x] [] [] [] []    I=Independent, Mod I=Modified Independent, S=Supervision/Setup, SBA=Standby Assistance, CGA=Contact Guard Assistance, Min=Minimal Assistance, Mod=Moderate Assistance, Max=Maximal Assistance, Total=Total Assistance, NT=Not Tested    PLAN:   FREQUENCY/DURATION   OT Plan of Care: 3 times/week for duration of hospital stay or until stated goals are met, whichever comes first.    PROBLEM LIST:   (Skilled intervention is medically necessary to address:)  Decreased ADL/Functional Activities  Decreased Activity Tolerance  Decreased AROM/PROM  Decreased Balance  Decreased Coordination  Decreased Strength  Decreased Transfer Abilities  Increased Pain   INTERVENTIONS PLANNED:  (Benefits and precautions of occupational therapy have been discussed with the patient.)  Self Care Training  Therapeutic Activity  Therapeutic Exercise/HEP  Neuromuscular Re-education  Manual Therapy  Education         TREATMENT:     EVALUATION: LOW COMPLEXITY: (Untimed Charge)    TREATMENT:   Therapeutic Activity (9 Minutes): Patient participated in therapeutic activities including bed mobility training, functional transfer training, sitting tolerance activity, standing tolerance activity, and fine motor skill activity with minimal assistance, verbal cues, and tactile cues in order to increase independence and increase activity tolerance.      TREATMENT GRID:  N/A    AFTER TREATMENT PRECAUTIONS: Bed/Chair Locked, Call light within reach, Chair, Needs within reach, and RN notified    INTERDISCIPLINARY COLLABORATION:  RN/ PCT and OT/ HAYDEN    EDUCATION:  Education Given To: Patient  Education Provided: Role of Therapy;Home Exercise Program;Plan of Care  Education Method: Demonstration;Verbal;Teach Back  Barriers to Learning: None  Education Outcome: Verbalized understanding;Demonstrated understanding    TOTAL TREATMENT DURATION AND TIME:  Time In: 1140  Time Out: [address]  Minutes: [address]

## 2022-12-16 NOTE — PROGRESS NOTES
[facility] Progress Note         Admit Date:  12/13/2022    Today's Date:  12/16/2022    CC:  Cirrhosis and ascites    Subjective:     Patient reports feeling better. AND soft. Denies abd pain, N/V.   Ate all his breakfast.      Medications:   Current Facility-Administered Medications   Medication Dose Route Frequency    potassium bicarb-citric acid (EFFER-K) effervescent tablet 40 mEq  40 mEq Oral Daily    diatrizoate meglumine-sodium (GASTROGRAFIN) 66-10 % solution 15 mL  15 mL Oral ONCE PRN    cefTRIAXone (ROCEPHIN) 1,000 mg in sodium chloride 0.9 % 50 mL IVPB mini-bag  1,000 mg IntraVENous Q24H    metroNIDAZOLE (FLAGYL) tablet 500 mg  500 mg Oral 3 times per day    tuberculin injection 5 Units  5 Units IntraDERmal Once    amLODIPine (NORVASC) tablet 10 mg  10 mg Oral Daily    cholestyramine light packet 4 g  4 g Oral BID    folic acid (FOLVITE) tablet 1 mg  1 mg Oral Daily    sodium bicarbonate tablet 1,300 mg  1,300 mg Oral BID    vitamin B-12 (CYANOCOBALAMIN) tablet 1,000 mcg  1,000 mcg Oral Daily    insulin lispro (HUMALOG) injection vial 0-4 Units  0-4 Units SubCUTAneous TID WC    insulin lispro (HUMALOG) injection vial 0-4 Units  0-4 Units SubCUTAneous Nightly    glucose chewable tablet 16 g  4 tablet Oral PRN    dextrose bolus 10% 125 mL  125 mL IntraVENous PRN    Or    dextrose bolus 10% 250 mL  250 mL IntraVENous PRN    glucagon (rDNA) injection 1 mg  1 mg SubCUTAneous PRN    dextrose 10 % infusion   IntraVENous Continuous PRN    pantoprazole (PROTONIX) 40 mg in sodium chloride (PF) 0.9 % 10 mL injection  40 mg IntraVENous Daily    sodium chloride flush 0.9 % injection 5-40 mL  5-40 mL IntraVENous 2 times per day    sodium chloride flush 0.9 % injection 5-40 mL  5-40 mL IntraVENous PRN    0.9 % sodium chloride infusion   IntraVENous PRN    ondansetron (ZOFRAN-ODT) disintegrating tablet 4 mg  4 mg Oral Q8H PRN    Or    ondansetron (ZOFRAN) injection 4 mg  4 mg IntraVENous Q6H PRN polyethylene glycol (GLYCOLAX) packet 17 g  17 g Oral Daily PRN    acetaminophen (TYLENOL) tablet 650 mg  650 mg Oral Q6H PRN    Or    acetaminophen (TYLENOL) suppository 650 mg  650 mg Rectal Q6H PRN    hydrALAZINE (APRESOLINE) injection 10 mg  10 mg IntraVENous Q6H PRN    morphine injection 1 mg  1 mg IntraVENous Q4H PRN    vancomycin (VANCOCIN) 1250 mg in sodium chloride 0.9% 250 mL IVPB  1,250 mg IntraVENous Q24H       Review of Systems:  ROS was obtained, with pertinent positives as listed above. No chest pain or SOB. Diet:  Regular, 2 gram Na    Objective:   Vitals:  BP (!) 128/94   Pulse 91   Temp 97.3 °F (36.3 °C) (Oral)   Resp 18   Ht 5' 11\" (1.803 m)   Wt 200 lb (90.7 kg)   SpO2 99%   BMI 27.89 kg/m²   Intake/Output:  No intake/output data recorded. 12/14 1901 - 12/16 0700  In: -   Out: 460   Exam:  General appearance: alert, cooperative, no distress  Lungs: clear to auscultation bilaterally anteriorly  Heart: regular rate and rhythm  Abdomen: soft, non-tender.  Bowel sounds normal. No masses, no organomegaly  Extremities: extremities normal, atraumatic, no cyanosis or edema  Neuro:  alert and oriented    Data Review (Labs):    Recent Labs     12/13/22  1540 12/14/22  0755 12/15/22  0518 12/15/22  1144 12/16/22  0532   WBC 13.9* 12.7* 12.5*  --  12.9*   HGB 10.1* 9.0* 8.5*  --  8.5*   HCT 30.5* 27.5* 25.8*  --  26.3*    260 231  --  229   MCV 84.0 84.9 83.0  --  86.8   * 132* 136  --  137   K 3.5 5.2* 3.4*  --  3.3*   CL 98* 101 103  --  105   CO2 26 24 25  --  23   BUN 22 22 22  --  21   CREATININE 1.80* 1.60* 1.70*  --  1.70*   CALCIUM 8.0* 7.4* 7.6*  --  7.3*   GLUCOSE 64* 83 108*  --  120*   ALKPHOS 131  --  84  --  71   AST 27  --  17  --  17   ALT 8*  --  6*  --  <6*   BILITOT 1.1  --  1.0  --  0.8   LABALBU 1.7*  --  1.4*  --  1.4*   PROT 7.9  --  6.3  --  6.1*   INR  --   --   --  1.3 1.4      CT OF THE ABDOMEN AND PELVIS WITH CONTRAST: 14 Sept 2022    CLINICAL HISTORY: Persistent diarrhea since last night. Now with mild  leukocytosis and history of normocytic, normochromic anemia requiring  transfusions,   TECHNIQUE:  Oral and nonionic intravenous contrast was administered, and the abdomen and pelvis were scanned with spiral technique. Radiation dose reduction was achieved using one or all of the following techniques: automated exposure control, weight-based dosing, iterative reconstruction. COMPARISON:  August 31, 2022. CT ABDOMEN FINDINGS: There is mild atelectasis at both lung bases. There is a small amount of ascites in the abdomen and pelvis, with extension of a small amount of fluid into a small hiatal hernia. However, there is marked thickening and decreased attenuation of the stomach suggestive of acute gastritis. No discrete ulcer is identified, and there is no free intraperitoneal air. Calcified stones are again noted in a contracted gallbladder without focal  pericholecystic inflammatory changes. The liver, spleen, pancreas, adrenals,  and kidneys appear unremarkable. There are extensive atherosclerotic  calcifications without definite aneurysm. CT PELVIS FINDINGS: The appendix is not distended. No small bowel or colonic wall thickening is evident. No pathologically enlarged lymph nodes. Bone windows demonstrate no definite aggressive process, accounting for degenerative changes. Impression   1. Small amount of ascites associated with marked thickening and decreased  attenuation of the stomach which is new since August 31 and suspicious for acute gastritis. Gastroenterology consultation should be considered. 2.  No definite acute small bowel or colonic abnormality identified. 3.  Cholelithiasis without changes to suggest acute cholecystitis or  appendicitis. This case was reviewed in consultation with colleagues.      CT Chest with contrast 13 Dec 2022   Clinical Indication:  Subacute pleuritic chest pain, vomiting, upper abdominal  discomfort, coughing. Leukocytosis, anemia, hyponatremia, elevated d-dimer and BNP. Comparison:  CT abdomen 9/14/2022 and chest radiograph today   Technique:  Dose reduction technique used: Automated exposure Control and/or adjustment of mA and kV according to patient size. Contiguous axial images were obtained from the neck base through the upper abdomen following the uneventful administration of 100 cc Isovue 370 intravenous contrast. Pulmonary embolus protocol was used. Coronal reconstructions were done for further evaluation of vessels. Findings:  Pulmonary arteries are well-opacified and demonstrate no filling  defect. There are scattered calcifications of coronary arteries and aorta. Heart is enlarged. Limited upper abdominal views demonstrate increased large volume ascites,  suggestion of cirrhotic liver, benign calcified granulomata in the liver and the  spleen, small hiatal hernia, gallstones. Bones demonstrate no acute osseous  lesions. Lung windows demonstrate subsegmental atelectasis scattered in the periphery of both bases. No evidence of a pneumothorax, suspicious lung mass, or  consolidative pneumonia. No obvious thoracic lymphadenopathy. Patent central airways. Impression  1. No evidence of PE.  2.  Scattered bibasilar atelectasis. 3.  Coronary artery and aortic calcifications. 4.  Ascites. 5.  Gallstones. Abdomen x-rays. 14 Dec 2022   INDICATION: NG tube placement and abdominal pain. COMPARISON: Yesterday's abdomen x-rays. TECHNIQUE: 2 portable supine views of the abdomen were obtained. FINDINGS: The enteric tube tip is in the proximal to mid stomach. No significant  change in a few mildly distended small bowel loops in the central abdomen. No  gross free air is seen. There is residual contrast in the bladder from  yesterday's CT chest.   Impression  1. The enteric tube tip is in the proximal to mid stomach.   2. Unchanged mildly distended small bowel loops.    CT ABDOMEN AND PELVIS WITHOUT CONTRAST 12/15/2022 9:15 AM   History:  Emesis and abdominal pain with distention   Technique: The patient received oral contrast and axial images were obtained  through the abdomen and pelvis. Multi planar reformatted images were generated. Dose reduction techniques were used such as automated exposure control,  adjustment of the MA or KV according to patient size, and iterative  reconstruction. Comparison:  9/14/2022   Findings: Included portions of the lung bases demonstrate small pleural  effusions with bibasilar atelectasis. .   ABDOMEN:    Multiple dependent stones are present within the gallbladder. Some of the contours liver appear nodular, as may be present with cirrhosis. .  Multiple hepatic parenchymal calcifications are present, suggestive of  granuloma. Abundant ascites is present. A feeding tube extends below the diaphragm and the stomach. Enteric contrast is present within nondilated small bowel loops and is present within the ascending colon. Evaluation of the abdominal viscera is limited without IV contrast.   The unenhanced appearance of the pancreas, spleen and adrenal glands is normal.  There is no hydronephrosis. There is minimal excreted contrast within the renal  collecting systems and persistent nephrograms, likely from the recent chest CT  12/13/2022. The appendix is not dilated. PELVIS: The bladder is filled with contrast and there are no intraluminal  filling defects. The bladder protrudes over the symphysis pubis. Pelvic ascites  is present. There are no aggressive osseous lesions. Impression   1. Ascites. 2. Cholelithiasis. EGD on 2 March 2021 for anemia with chronic acute gastritis, duodenopathy and positive H. Pylori. Pt doesn't recall being treated for H. Pylori. Colonoscopy on 2 March 2021 for anemia with prolapsing, reducible hemorrhoids and hyperplastic polyp. Suboptimal prep. Repeat in 6 mos.       Per prior notes  Krishna discussed capsule endoscopy, but has not this. EGD 31 Aug 2022 with Dr. Walsh for CHUY, melena, hx of PUD  POSTOPERATIVE DIAGNOSIS: Normal  IMPRESSION:   Normal EGD  No etiology for anemia found  PLAN:  - Start diet  - F/u with [facility] for capsule endoscopy  - We will sign off. Please call with questions    Assessment:     Principal Problem:    Sepsis ([address]  Active Problems:    Alcohol dependence ([address]    Diabetic peripheral neuropathy ([address]    Essential hypertension    Hyperlipidemia    Type 2 diabetes mellitus ([address]    Acute kidney injury superimposed on CKD (HCC)    PAD (peripheral artery disease) (HCC)    Hypoalbuminemia    Normocytic anemia    Abdominal pain    Cirrhosis of liver with ascites (HCC)    SBP (spontaneous bacterial peritonitis) (HCC)    Moderate protein malnutrition (HCC)    Cholelithiases    Positive blood culture    Stage 3a chronic kidney disease ([address]  Resolved Problems:    * No resolved hospital problems. *    65 yo male, pt of Caitie Kelley - Dr. Rafa Roberts, with PMH including but not limited to HTN, HLD, reflux, PUD, colon polyp, DM2, neuropathy, CKD, anemia, who is seen in consultation 15 Dec 2022 at the request of Rohit Campbell PA-C for cirrhosis and ascites, after presenting with increased abd distention, nausea, vomiting, diarrhea, and noted on CT chest to have large volume of ascites and suggestion of cirrhotic. He was seen by surgery and symptoms were felt to be more related to ascites than true SOB. He has noticed some improvement since NG tube placed, having about 500 mls output 14 Dec 2022. Liver was described as unremarkable on CT scan in Sept 2022. He has hx of ETOH use, drinking 2-3 liquor drinks and 2-3 beers a day, but weaned off and has since stopped use after admission in Sept 2022. Paracentesis and fluid studies pending, as well as CT scan abd/pelvis today.   Underlying etiology of cirrhosis is likely multifactorial - risks factors for fatty liver, ETOH. He has been noted to have decrease in hgb over admission, but may be related to dehydration and IVF hydration, exacerbating underlying chronic anemia. He has not had active GI bleeding. Para 12/15 fluid studies pending     MELD Na 15 (6.0% est 3 month mortality)    Viral hep panel negative, liver THOMPSON pending (ALEKSANDR, Alpha-1, MM2 Ab, ASMA, AFP, ceruloplasmin)     Plan:      - Supportive care. - Paracentesis 12/15 with fluid studies pending - fluid for alb, amylase, cell count, cx, cytology, glucose, LDH, and total protein. He has been on ATBXs for sepsis. - CT scan abd/pelvis 12/15 w Ascites and Cholelithiasis. - Consider diuretics if and when renal function improves. Cr 1.7 today  - 2 gm Na + diet once diet resumed. - Obtain liver work up labs as cirrhosis appears to be new dx. EGD recently without signs of esophageal varices or PHG. - Avoid ETOH use. - Follow CBC, CMP, PT, INR daily. DESIRE Amos - CNP  Patient is seen and examined in collaboration with Dr. Ca Payton. Assessment and plan as per Dr. Timothy Finnegan.

## 2022-12-16 NOTE — CONSULTS
Infectious Disease Consult    Today's Date: 12/16/2022   Admit Date: 12/13/2022    Impression:   SIRs, elevated WBC and slightly tachycardic on admission  Blood cx 12/13 1/2 bottle with staph epi - contamination likely  Repeat blood cx 12/15 NGTD  Partial SBO, no surgical intervention  Alcoholic cirrhosis w ascites s/p paracentesis 12/15/22, WBC from ascitic fluid not indicative of infection  Left lower extremity wound  MARS, improving   Diabetes type 2    Plan:   12/13/22 blood cx 1/2 bottles staph epi - contaminant. Repeat blood cx 12/15 NGTD  Underwent paracentesis 12/15/22, cx results in process. Ascitic fluid with  not indicative of infection  Recommend screening for HIV  Stop antibiotics today, no evidence of acute infection and need for antibiotics  Recommend wound care for left lower extremity wound - does not appear actively infected  ID will sign off at this time, please re-consult if new concerns arise. Anti-infectives:   Azithromycin x 1 dose 12/13/22  Ceftriaxone 12/13 - 12/16  Flagyl 12/15 - 12/16  Zosyn 12/13- 12/15  Vancomycin 12/14 - 12/16    Subjective:   Date of Consultation:  December 16, 2022  Referring Physician: Dr Stuart Osler, Hospitalist     Patient is a 65 y.o. male with PMH of HLD, HTN, alcohol abuse, DM type 2 and GI bleed. He was admitted on 12/13/2022 with complaints of inability to tolerate PO intake, nausea/vomiting, and abdominal pain w/ distention. He was hypoglycemic with blood sugar of 64 originally, WBC 13.9, D-dimer 9.3 and proBNP 1,280. Blood cx obtained. Vanc/zosyn started. CT chest negative for PE, did show large volume ascites suggestive of cirrhotic liver and gallstones. KUB with nonspecific bowel gas pattern, partial or early small bowel obstruction not excluded. NG tube placed to intermittent suction. General surgery consulted d/t concern for SBO.  Per general surgery notes, patients symptoms likely secondary to large volume ascites and unlikely true SBO given he is passing gas. They recommend no surgical intervention at this time. GI consulted to evaluate for cirrhosis and ascites. Blood cx on admission 1/2 sets with GPC and GPR, biofire ID as coag negative staph suspicious for contamination. Repeat blood cx drawn 12/15. CT abd/pelvis on 12/15 shows multiple dependent stones present in gallbladder, possible cirrhosis of liver and abundant ascites. Acute hepatitis panel negative. Parent underwent paracentesis with fluid studies on 12/15/22 - he had total of 7500 cc of yellow ascitic fluid removed. 12/16/2022: WBC 12.9. LFT's WNL. Afebrile. Patient reports he is feeling better. He denies abdominal pain. He just had a bowel movement. He denies N/V/D. He endorses wound to left lower leg that has been present for past few months. Wound care has been consulted.      Patient Active Problem List   Diagnosis    Alcohol dependence ([address]    Diabetic peripheral neuropathy ([address]    Essential hypertension    Hyperlipidemia    Type 2 diabetes mellitus ([address]    Diabetic ulcer of both feet associated with type 2 diabetes mellitus ([address]    Hypokalemia    Acute kidney injury superimposed on CKD (HCC)    PAD (peripheral artery disease) (HCC)    Hypoalbuminemia    Stage 3a chronic kidney disease (HCC)    Iron deficiency anemia    Obesity with body mass index 30 or greater    Acute blood loss anemia    Dark stools    Dry skin dermatitis    Hematochezia    High serum ferritin    Normocytic anemia    Sepsis (HCC)    Abdominal pain    Cirrhosis of liver with ascites (HCC)    SBP (spontaneous bacterial peritonitis) (HCC)    Moderate protein malnutrition (HCC)    Cholelithiases    Positive blood culture     Past Medical History:   Diagnosis Date    Anemia     Gastroesophageal reflux disease with esophagitis and hemorrhage     HLD (hyperlipidemia)     Hyperplastic colon polyp     Hypertension     Obesity     PUD (peptic ulcer disease)     Type 2 diabetes mellitus with diabetic polyneuropathy, with long-term current use of insulin (HCC)     Ulcer of the duodenum caused by bacteria (H. pylori)     Upper GI bleed 03/28/2016    Last Assessment & Plan:  Formatting of this note might be different from the original. Status post EGD yesterday which revealed esophagitis, gastric and duodenal ulcers Continue PPI, GI follow-up. Monitor hemoglobin Avoid and states Biopsy results- pending      Family History   Problem Relation Age of Onset    Hypertension Sister     Diabetes Neg Hx       Social History     Tobacco Use    Smoking status: Never    Smokeless tobacco: Never   Substance Use Topics    Alcohol use: Yes     Past Surgical History:   Procedure Laterality Date    COLONOSCOPY      ESOPHAGOGASTRODUODENOSCOPY      UPPER GASTROINTESTINAL ENDOSCOPY N/A 9/6/2022    EGD ESOPHAGOGASTRODUODENOSCOPY/ Rm 215 performed by Charo Salinas MD at [facility] ENDOSCOPY      Prior to Admission medications    Medication Sig Start Date End Date Taking? Authorizing Provider   acetaminophen (TYLENOL) 325 MG tablet Take 650 mg by mouth every 6 hours as needed for Pain.     Historical Provider, MD   naloxone 4 MG/0.1ML LIQD nasal spray 1 SPRAY INTO A NOSTRIL AS NEEDED FOR OPIOID OVERDOSE. 9/8/22   Historical Provider, MD   metoprolol tartrate (LOPRESSOR) 50 MG tablet metoprolol tartrate 50 mg tablet 3/30/16 9/26/22  Historical Provider, MD   sodium bicarbonate 650 MG tablet Take 2 tablets by mouth 2 times daily 9/7/22   Cinthya Cornelius MD   cholestyramine light 4 g packet Take 1 packet by mouth 2 times daily 9/7/22   Cinthya Cornelius MD   amLODIPine (NORVASC) 10 MG tablet Take 1 tablet by mouth daily 9/8/22   Cinthya Cornelius MD   folic acid (FOLVITE) 1 MG tablet Take 1 tablet by mouth daily 9/8/22   Cinthya Cornelius MD   vitamin B-12 1000 MCG tablet Take 1 tablet by mouth daily 9/8/22   Cinthya Cornelius MD   calcium carb-cholecalciferol 250-125 MG-UNIT TABS Take 1 tablet by mouth daily 9/8/22   Cinthya Cornelius MD pantoprazole (PROTONIX) 40 MG tablet Take 1 tablet by mouth 2 times daily (before meals) 22   Gilberto Arnold MD   insulin glargine (LANTUS SOLOSTAR) 100 UNIT/ML injection pen Inject 10 Units into the skin daily 22   Gilberto Arnold MD   glucose monitoring (FREESTYLE FREEDOM) kit 1 kit by Does not apply route daily Check glucose twice daily 22   Gilberto Arnold MD       No Known Allergies     Review of Systems:  A comprehensive review of systems is negative except as stated in the HPI. Objective:     Visit Vitals  BP (!) 128/94   Pulse 91   Temp 97.3 °F (36.3 °C) (Oral)   Resp 18   Ht 5' 11\" (1.803 m)   Wt 200 lb (90.7 kg)   SpO2 99%   BMI 27.89 kg/m²     Temp (24hrs), Av.4 °F (36.3 °C), Min:97.2 °F (36.2 °C), Max:98 °F (36.7 °C)       Lines:  peripheral IV    Physical Exam:    General:  Alert, cooperative, well noursished, well developed, appears stated age   Eyes:  Sclera anicteric. Pupils equally round and reactive to light. Mouth/Throat: Mucous membranes normal, oral pharynx clear   Neck: Supple   Lungs:   Clear to auscultation bilaterally, good effort   CV:  Regular rate and rhythm,no murmur, click, rub or gallop   Abdomen:   Soft, non-tender. bowel sounds normal. Slight distention   Extremities: No cyanosis or edema   Skin:  Open wound to left lower extremity, no active drainage.  Please see attached picture below from 2022   Lymph nodes: Cervical and supraclavicular normal   Musculoskeletal: No swelling or deformity   Lines/Devices:  Intact, no erythema, drainage or tenderness   Psych: Alert and oriented, normal mood affect given the setting         Data Review:     CBC:  Recent Labs     12/14/22  0755 12/15/22  0518 22  0532   WBC 12.7* 12.5* 12.9*   HGB 9.0* 8.5* 8.5*   HCT 27.5* 25.8* 26.3*    231 229       BMP:  Recent Labs     12/14/22  0755 12/15/22  0518 22  0532   BUN 22 22 21   * 136 137   K 5.2* 3.4* 3.3*    103 105   CO2 24 25 23       LFTS:  Recent Labs     12/13/22  1540 12/15/22  0518 12/16/22  0532   ALT 8* 6* <6*       Microbiology:   Reviewed    Imaging:   Reviewed    Signed By: JOSS Barton     December 16, 2022

## 2022-12-16 NOTE — CONSULTS
Comprehensive Nutrition Assessment    Type and Reason for Visit: Initial, Consult  Poor Intake/Appetite 5 or More Days (Hospitalists)  Diet Education (GI)    Nutrition Recommendations/Plan:   Meals and Snacks:  Diet: Continue current order  Nutrition Supplement Therapy:  Medical food supplement therapy:  Initiate Ensure High Protein twice per day (this provides 160 kcal and 16 grams protein per bottle)  Nutrition Education  Educated on Low Sodium Diet   Learners: Patient  Method: Handout     Malnutrition Assessment:  Malnutrition Status: Insufficient data (hx of volume fluctuations and volume removal therefore masking any true weight loss PTA)  Pt with slight wasting to clavicles but no other sven signs of fat or muscle wasting noted    Nutrition Assessment:  Nutrition History: Pt reports early satiety and lack of appetite present PTA. Pt reports struggling with PO intake for ~1 month PTA, reports occasionally consuming ONS. Pt estimates UBW to be ~200lb. Do You Have Any Cultural, Sabianist, or Ethnic Food Preferences?: No   Nutrition Background:    Pt with PMH significant for GERD, HLD, HTN, PUD, T2DM, ulcers of duodenum, GI bleed who presented to the [facility] 12[facility]13 for cc inability to tolerate PO, emesis, abdominal pain with distention, no flatus or BM since of 1 day duration. Patient was admitted for SIRS with leukocytosis and tachycardia; no source of infection. There was also a concern for partial SBO on admission. NGT placed in 12/13 for decompression. General surgery was suspicious pt's symptoms are more d/t large volume ascites than SBO. No surgical intervention. NGT removed 12/14 and diet started. Nutrition Interval:  Pt seen at bedside, no visitors present. Pt reports early satiety and lack of appetite has significantly improved during admission. Pt reports tolerating 50% of dinner 12/15 and 50% of breakfast this AM. Pt agreeable to starting ensure high protein, vanilla preferred.    Pt s/p paracentesis 12/15 w/ 7L fluid removed. ADDENDUM 1524  Consult received for diet education. Pt seen in recliner, sleeping soundly and does not awaken to name calling. Handouts provided on low sodium diet left on table. Current Nutrition Therapies:  ADULT DIET; Regular; Low Sodium (2 gm)    Current Intake:   Average Meal Intake: 51-75% Average Supplements Intake: None Ordered      Anthropometric Measures:  Height: 5' 11\" (180.3 cm)  Current Body Wt: 221 lb 5.5 oz (100.4 kg) (12/16), Weight source: Bed Scale  BMI: 30.9, Obese Class 1 (BMI 30.0-34. 9)  Admission Body Weight: 200 lb (90.7 kg) (12/13 ; stated weight)  Ideal Body Weight (Kg) (Calculated): 78 kg (172 lbs), 128.7 %  Usual Body Wt: 201 lb (91.2 kg) (5/2021 MD office weight ; limited emr weight hx), Percent weight change: 10.1       Edema:    BMI Category Obese Class 1 (BMI 30.0-34. 9)  Estimated Daily Nutrient Needs:  Energy (kcal/day): 7171-9529 (15-18kcal/kg) (Kcal/kg Weight used: 100.4 kg Current  Protein (g/day):  (0.8-1g/kg) Weight Used: (Current) 100.4 kg  Fluid (ml/day):   (1 ml/kcal)    Nutrition Diagnosis:   Inadequate oral intake related to early satiety (variable appetite) as evidenced by  (barriers to intake as above)    Nutrition Interventions:   Food and/or Nutrient Delivery: Continue Current Diet, Start Oral Nutrition Supplement     Coordination of Nutrition Care: Continue to monitor while inpatient, Interdisciplinary Rounds       Goals:       Active Goal: Meet at least 75% of estimated needs, by next RD assessment       Nutrition Monitoring and Evaluation:      Food/Nutrient Intake Outcomes: Food and Nutrient Intake, Supplement Intake  Physical Signs/Symptoms Outcomes: Meal Time Behavior, Weight    Discharge Planning:    Continue Oral Nutrition Supplement    Severiano Emperor) [place], RD, LD  Contact: [phone]

## 2022-12-16 NOTE — WOUND CARE
Left leg chronic ulcers, known PAD. Needs referral to wound clinic at discharge. Upper ulcer is 4x3x0. 3cm with slough pink mix. Lower ulcer is 5x4cm eschar with slough/ pink edges. Recommend xeroform, foam and tiffanie 3 times per week. AT home would use hydrafera blue/ tiffanie 3 times per week. Will monitor.

## 2022-12-16 NOTE — CARE COORDINATION
CM continuing to follow for ongoing discharge planning. CM spoke with [facility] liaison, Yesenia Reilly, who has reviewed patient's chart and believes patient meets criteria for [facility]; referral placed. Last Covid testing was 12/13. Rapid Covid testing ordered to ensure negative result within 72 hours of admission to [facility]. Referrals sent to [facility], [place] and [facility], to obtain bed offers as a secondary plan for if patient not accepted to [facility]. PPD was placed on 12/15/2022 and 48 hours should be ready tomorrow, 12/17/2022. Patient will need insurance authorization for both STR at [facility] and STR at [facility]. CM anticipates insurance determination by Monday, 12/19/2022. CM will continue to follow for ongoing discharge planning.

## 2022-12-16 NOTE — PROGRESS NOTES
VANCO DAILY FOLLOW UP NOTE  [address] [facility] Pharmacokinetic Monitoring Service - Vancomycin    Consulting Provider: Dr. Trena Logan   Indication: SBO  Target Concentration: Goal AUC/HERI 400-600 mg*hr/L  Day of Therapy: 3  Additional Antimicrobials: ceftriaxone, metronidazole    Patient eligible for piperacillin-tazobactam to cefepime auto-substitution per P&T approved protocol? NO    Pertinent Laboratory Values: Wt Readings from Last 1 Encounters:   12/15/22 200 lb (90.7 kg)     Temp Readings from Last 1 Encounters:   12/16/22 97.3 °F (36.3 °C) (Oral)     Recent Labs     12/14/22  0755 12/15/22  0518 12/15/22  1144 12/16/22  0532   BUN 22 22  --  21   CREATININE 1.60* 1.70*  --  1.70*   WBC 12.7* 12.5*  --  12.9*   PROCAL  --   --  3.47*  --      Estimated Creatinine Clearance: 56 mL/min (A) (based on SCr of 1.7 mg/dL (H)). Lab Results   Component Value Date/Time    VANCORANDOM 19.3 12/16/2022 12:06 AM       MRSA Nasal Swab: N/A. Non-respiratory infection      Assessment:  Date/Time Dose Concentration AUC   12/16 0006 1250 mg q24h 19.3 594   Note: Serum concentrations collected for AUC dosing may appear elevated if collected in close proximity to the dose administered, this is not necessarily an indication of toxicity    Plan:  Current dosing regimen is therapeutic  Continue current dose  Repeat vancomycin concentrations will be ordered as clinically appropriate   Pharmacy will continue to monitor patient and adjust therapy as indicated    Thank you for the consult,  Lilliam Ordonez, [facility]

## 2022-12-16 NOTE — CONSULTS
IRC Consult Acknowledged    EHR reviewed. HPI \"65 y.o. male w/ Hx of GI bleed who presented to the ED for cc inability to tolerate PO, emesis, abdominal pain with distention, no flatus or BM since of 1 day duration. In ER, patient was found tachycardic with D-dimer 9.3. WBC 13.9 proBNP 1280. CT chest was obtained and showed no acute PE; scattered bibasilar atelectasis, CAD and aortic calcifications; ascites; gallstones. KUB obtained and shows nonspecific bowel gas pattern; partial early SBO not excluded. Patient was admitted for SIRS with leukocytosis and tachycardia; no source of infection. CTAP 12/15 shows multiple dependent stones present in the gallbladder; some contours liver appearing nodular may represent cirrhosis; also findings suggestive of granuloma. Abundant ascites is present. UA not c/w infection. CT chest shows no PE and no consolidation. Rapid Covid/Flu NEG. Blood cultures on admission grew Coag negative staph and Diphtheroids. Suspicious for contamination. Suspicious for contamination. He was started empirically on broad-spectrum antibiotics. ? SBP contributing with ascites and cirrhosis. Repeat blood cultures NGTD. ID was consulted 12/16 and recommended no signs of infectious peritonitis and blood cultures are contaminant and does not require treatment. Leukocytosis most likely due to partial SBO/ileus which has resolved. Recommended wound care on right leg which wounds appear to be chronic/venous stasis without signs of active infection. ID recommended to stop all antibiotics and signed off 12/6. There was also a concern for partial SBO on admission. NGT placed in ED for decompression. General surgery was consulted. General surgery has seen and was suspicious pt's symptoms are more d/t large volume ascites than SBO. No surgical intervention at this time. Recommended to remove NGT and start diet. GI was consulted for large volume ascites and new cirrhosis.  IR was consulted and pt underwent paracentesis on 12/15. \"    This is pts  HD 3; pts level of debilitation is not necessarily new. He could only walk household distances but would get quite fatigued. Pt with a 7500ml paracentesis yield 12/15. Based on current documentation, I believe that pt has the medical necessity for [facility] but not certain he could tolerate 3hr of therapy at least 5d/week. PT/OT recommending STR, assuming, in a SNF. Plan; will f/u Monday and see if mobility has improved and make further recommendations. Thank you for the referral  Sherly Jha MD, Medical Director  [address] [place] [address]

## 2022-12-16 NOTE — PROGRESS NOTES
RRT Clinical Rounding Chart Review    Patient's chart was reviewed secondary to elevated Deterioration Index Score of 29. Septic Score of 9.76 (now 9.44). Primary factors contributing to score are vitals. Discussed with primary RN. Vitals:    12/15/22 1624 12/15/22 1726 12/15/22 1944 12/15/22 2246   BP: 135/88 (!) 146/90 138/88 (!) 126/90   Pulse: 92 88 86 89   Resp: 16 19 18 18   Temp:   97.3 °F (36.3 °C) 97.2 °F (36.2 °C)   TempSrc:  Oral Oral Oral   SpO2: 99% 99% 97% 98%   Weight:       Height:            PLAN:  Will discharge from RRT Clinical Rounding Program per protocol. Please call if needed.     Radha Mackenzie, [address]: [facility]: [phone]

## 2022-12-17 PROBLEM — K56.600 PARTIAL SMALL BOWEL OBSTRUCTION (HCC): Status: ACTIVE | Noted: 2022-12-17

## 2022-12-17 LAB
ALBUMIN SERPL-MCNC: 2.2 G/DL (ref 3.5–5)
ALBUMIN/GLOB SERPL: 0.4 (ref 0.4–1.6)
ALP SERPL-CCNC: 92 U/L (ref 50–136)
ALT SERPL-CCNC: 8 U/L (ref 12–65)
ANION GAP SERPL CALC-SCNC: 10 MMOL/L (ref 2–11)
AST SERPL-CCNC: 21 U/L (ref 15–37)
BILIRUB SERPL-MCNC: 1 MG/DL (ref 0.2–1.1)
BUN SERPL-MCNC: 17 MG/DL (ref 6–23)
CALCIUM SERPL-MCNC: 7.8 MG/DL (ref 8.3–10.4)
CHLORIDE SERPL-SCNC: 103 MMOL/L (ref 101–110)
CO2 SERPL-SCNC: 24 MMOL/L (ref 21–32)
CREAT SERPL-MCNC: 1.5 MG/DL (ref 0.8–1.5)
ERYTHROCYTE [DISTWIDTH] IN BLOOD BY AUTOMATED COUNT: 18.1 % (ref 11.9–14.6)
GLOBULIN SER CALC-MCNC: 5 G/DL (ref 2.8–4.5)
GLUCOSE BLD STRIP.AUTO-MCNC: 181 MG/DL (ref 65–100)
GLUCOSE SERPL-MCNC: 115 MG/DL (ref 65–100)
HCT VFR BLD AUTO: 29 % (ref 41.1–50.3)
HGB BLD-MCNC: 9.5 G/DL (ref 13.6–17.2)
HIV 1+2 AB+HIV1 P24 AG SERPL QL IA: NONREACTIVE
HIV 1/2 RESULT COMMENT: NORMAL
MAGNESIUM SERPL-MCNC: 1.3 MG/DL (ref 1.8–2.4)
MCH RBC QN AUTO: 28.2 PG (ref 26.1–32.9)
MCHC RBC AUTO-ENTMCNC: 32.8 G/DL (ref 31.4–35)
MCV RBC AUTO: 86.1 FL (ref 82–102)
MM INDURATION, POC: 0 MM (ref 0–5)
NRBC # BLD: 0 K/UL (ref 0–0.2)
PLATELET # BLD AUTO: 371 K/UL (ref 150–450)
PMV BLD AUTO: 11.3 FL (ref 9.4–12.3)
POTASSIUM SERPL-SCNC: 3.7 MMOL/L (ref 3.5–5.1)
PPD, POC: NEGATIVE
PROT SERPL-MCNC: 7.2 G/DL (ref 6.3–8.2)
RBC # BLD AUTO: 3.37 M/UL (ref 4.23–5.6)
SERVICE CMNT-IMP: ABNORMAL
SODIUM SERPL-SCNC: 137 MMOL/L (ref 133–143)
WBC # BLD AUTO: 14 K/UL (ref 4.3–11.1)

## 2022-12-17 PROCEDURE — 6370000000 HC RX 637 (ALT 250 FOR IP): Performed by: FAMILY MEDICINE

## 2022-12-17 PROCEDURE — 2580000003 HC RX 258: Performed by: FAMILY MEDICINE

## 2022-12-17 PROCEDURE — P9047 ALBUMIN (HUMAN), 25%, 50ML: HCPCS | Performed by: INTERNAL MEDICINE

## 2022-12-17 PROCEDURE — 85027 COMPLETE CBC AUTOMATED: CPT

## 2022-12-17 PROCEDURE — 6370000000 HC RX 637 (ALT 250 FOR IP): Performed by: HOSPITALIST

## 2022-12-17 PROCEDURE — 80053 COMPREHEN METABOLIC PANEL: CPT

## 2022-12-17 PROCEDURE — 1100000003 HC PRIVATE W/ TELEMETRY

## 2022-12-17 PROCEDURE — 6360000002 HC RX W HCPCS: Performed by: INTERNAL MEDICINE

## 2022-12-17 PROCEDURE — 87389 HIV-1 AG W/HIV-1&-2 AB AG IA: CPT

## 2022-12-17 PROCEDURE — 82962 GLUCOSE BLOOD TEST: CPT

## 2022-12-17 PROCEDURE — 83735 ASSAY OF MAGNESIUM: CPT

## 2022-12-17 PROCEDURE — 36415 COLL VENOUS BLD VENIPUNCTURE: CPT

## 2022-12-17 RX ORDER — ZOLPIDEM TARTRATE 5 MG/1
5 TABLET ORAL NIGHTLY PRN
Status: DISCONTINUED | OUTPATIENT
Start: 2022-12-18 | End: 2022-12-17

## 2022-12-17 RX ORDER — ZOLPIDEM TARTRATE 5 MG/1
5 TABLET ORAL NIGHTLY PRN
Status: DISCONTINUED | OUTPATIENT
Start: 2022-12-17 | End: 2023-01-03 | Stop reason: HOSPADM

## 2022-12-17 RX ADMIN — POTASSIUM BICARBONATE 40 MEQ: 782 TABLET, EFFERVESCENT ORAL at 09:42

## 2022-12-17 RX ADMIN — ALBUMIN (HUMAN) 25 G: 0.25 INJECTION, SOLUTION INTRAVENOUS at 09:41

## 2022-12-17 RX ADMIN — FOLIC ACID 1 MG: 1 TABLET ORAL at 09:42

## 2022-12-17 RX ADMIN — SODIUM CHLORIDE, PRESERVATIVE FREE 10 ML: 5 INJECTION INTRAVENOUS at 21:52

## 2022-12-17 RX ADMIN — AMLODIPINE BESYLATE 10 MG: 10 TABLET ORAL at 09:42

## 2022-12-17 RX ADMIN — CHOLESTYRAMINE 4 G: 4 POWDER, FOR SUSPENSION ORAL at 21:48

## 2022-12-17 RX ADMIN — ACETAMINOPHEN 650 MG: 325 TABLET, FILM COATED ORAL at 09:42

## 2022-12-17 RX ADMIN — ZOLPIDEM TARTRATE 5 MG: 5 TABLET ORAL at 23:51

## 2022-12-17 RX ADMIN — PANTOPRAZOLE SODIUM 40 MG: 40 TABLET, DELAYED RELEASE ORAL at 06:49

## 2022-12-17 RX ADMIN — CHOLESTYRAMINE 4 G: 4 POWDER, FOR SUSPENSION ORAL at 09:42

## 2022-12-17 RX ADMIN — SODIUM CHLORIDE, PRESERVATIVE FREE 10 ML: 5 INJECTION INTRAVENOUS at 09:44

## 2022-12-17 RX ADMIN — CYANOCOBALAMIN TAB 1000 MCG 1000 MCG: 1000 TAB at 09:42

## 2022-12-17 ASSESSMENT — PAIN DESCRIPTION - ORIENTATION: ORIENTATION: MID

## 2022-12-17 ASSESSMENT — PAIN - FUNCTIONAL ASSESSMENT: PAIN_FUNCTIONAL_ASSESSMENT: PREVENTS OR INTERFERES SOME ACTIVE ACTIVITIES AND ADLS

## 2022-12-17 ASSESSMENT — PAIN DESCRIPTION - ONSET: ONSET: GRADUAL

## 2022-12-17 ASSESSMENT — PAIN SCALES - GENERAL
PAINLEVEL_OUTOF10: 0
PAINLEVEL_OUTOF10: 0
PAINLEVEL_OUTOF10: 3

## 2022-12-17 ASSESSMENT — PAIN DESCRIPTION - LOCATION: LOCATION: GENERALIZED

## 2022-12-17 ASSESSMENT — PAIN DESCRIPTION - PAIN TYPE: TYPE: CHRONIC PAIN

## 2022-12-17 ASSESSMENT — PAIN DESCRIPTION - DESCRIPTORS: DESCRIPTORS: ACHING

## 2022-12-17 ASSESSMENT — PAIN DESCRIPTION - FREQUENCY: FREQUENCY: INTERMITTENT

## 2022-12-17 NOTE — PROGRESS NOTES
Hospitalist Progress Note   Admit Date:  2022  3:24 PM   Name:  Ken Gaston   Age:  65 y.o. Sex:  male  :  1965   MRN:  [id number]   Room:  725/01    Presenting Complaint: Emesis     Reason(s) for Admission: Septicemia ([facility] [A41.9]  SIRS (systemic inflammatory response syndrome) (HCC) [R65.10]  Abdominal pain, unspecified abdominal location [R10.9]  Swelling of lower extremity [M79.89]     Hospital Course:   65 y.o. male w/ Hx of GI bleed who presented to the ED for cc inability to tolerate PO, emesis, abdominal pain with distention, no flatus or BM since of 1 day duration. In ER, patient was found tachycardic with D-dimer 9.3. WBC 13.9 proBNP 1280. CT chest was obtained and showed no acute PE; scattered bibasilar atelectasis, CAD and aortic calcifications; ascites; gallstones. KUB obtained and shows nonspecific bowel gas pattern; partial early SBO not excluded. Patient was admitted for SIRS with leukocytosis and tachycardia; no source of infection. CTAP 12/15 shows multiple dependent stones present in the gallbladder; some contours liver appearing nodular may represent cirrhosis; also findings suggestive of granuloma. Abundant ascites is present. UA not c/w infection. CT chest shows no PE and no consolidation. Rapid Covid/Flu NEG. Blood cultures on admission grew Coag negative staph and Diphtheroids. Suspicious for contamination. Suspicious for contamination. He was started empirically on broad-spectrum antibiotics. ? SBP contributing with ascites and cirrhosis. Repeat blood cultures NGTD. ID was consulted  and recommended no signs of infectious peritonitis and blood cultures are contaminant and does not require treatment. Leukocytosis most likely due to partial SBO/ileus which has resolved. Recommended wound care on right leg which wounds appear to be chronic/venous stasis without signs of active infection.   ID recommended to stop all antibiotics and signed off 12/6.    There was also a concern for partial SBO on admission. NGT placed in ED for decompression. General surgery was consulted. General surgery has seen and was suspicious pt's symptoms are more d/t large volume ascites than SBO. No surgical intervention at this time and recommended to remove NGT and start diet. NGT removed 12/15 and pt tolerated po well and has had a BM. Surgery signed off 12/15. GI was consulted for large volume ascites and new cirrhosis. IR was consulted and pt underwent paracentesis on 12/15 with 7.5L of thin yellow fluid removed. GI recommended that patient has cholelithiasis on imaging but recommended against surgical consideration as a surgical mortality would be high. If cholecystitis develops, consider cholecystostomy tube. GI recommended low-sodium diet discontinue sodium bicarb if possible. GI signed off 12/16 and patient should follow-up with his primary GI at [facility]. Subjective & 24hr Events (12/17/22): Alert and oriented x3. Stated that he feels well. Has had a bowel movement and tolerating p.o. well. Abraham Brar to go to rehab. Denies fever, chills, SOB, chest pain, nausea, vomiting      Assessment & Plan:     SIRS   Positive blood cultures  ? SBP with large ascites, cirrhosis, abdominal pain. CTAP shows no obvious source of infection but shows large volume ascites. UA not c/w infection. CT chest shows no PE and no consolidation. Rapid Covid/Flu NEG  BCX on admission grew Coag negative staph and Diphtheroids. Suspicious for contamination. Repeat [address] 12/15: NGTD  Abx: Cont Vanc (12/13-12/16); s/p Zosyn; s/p Rocephin/Flagyl empirically for SBP to avoid nephrotoxicity (12/15-12/16)  Ascitic studies  - not consistent with infection  -Cx: NGTD  -Cytology pending  Consulted ID 12/16--no signs of infectious peritonitis and blood cultures are contaminant and does not require treatment. Leukocytosis most likely due to partial SBO/ileus which has resolved. Recommended wound care on right leg which wounds appear to be chronic/venous stasis without signs of active infection. ID recommended to stop all antibiotics and signed off 12/6. Abdominal pain, resolved  Partial SBO, resolved  Cirrhosis with ascites  KUB obtained on admission 12/13 and shows nonspecific bowel gas pattern; partial early SBO not excluded. NGT placed in ED. General surgery has seen and signed off 12/15. Suspected more d/t large volume ascites than SBO. No surgical intervention at this time. Surgery recommended remove NGT 12/15 and start diet. NGT removed and pt tolerating po well. CTAP 12/15 shows multiple dependent stones present in the gallbladder; some contours liver appearing nodular may represent cirrhosis; also findings suggestive of granuloma. Abundant ascites is present  IR was consulted and pt underwent paracentesis on 12/15 with 7.5L of thin yellow fluid removed. S/p IV albumin x3 doses  Acute hepatitis panel negative  Analgesics prn  Antiemetics prn  Liver workup obtained by GI as new dx of cirrhosis  Consider addition of diuretics when MARS resolves  GI signed off 12/16 and patient should follow-up with his primary GI at [facility]. Cholelithiasis  LFTs within normal limits. CTAP not suggestive of acute cholecystitis  Supportive care for now. Pt high risk for surgery, GI recommended against this. If develop cholecystitis, consider cholecystotomy tube    Normocytic anemia  Hgb 8.5 on 12/15 hemoglobin 10.1 on admission (baseline 7-8). Elevated could be due to hemoconcentration with GI loss and also component of MARS  Iron studies c/w chronic disease  Monitor CBC, transfuse if hgb <7 or if symptomatic    MARS on CKD stage 3  Creatinine 1.8 on 12/13 (baseline 1.2-1.4).   Most likely due to nausea and vomiting  CTAP 12/15 shows no hydronephrosis  Avoid nephrotoxic meds  D/c IVF due to large amount of ascites  Encourage p.o. intake  D/c po sodium bicarb to limit sodium intake  Monitor BMP    Hypokalemia, resolved  Replace and recheck    Alcohol dependence  Patient denies tobacco or alcohol abuse and stated that he stopped after last discharge. Could be contributing factor to his cirrhosis  Cont folic acid  Monitor for signs/symptoms of withdrawals    Essential hypertension  Cont home Norvasc    Type 2 diabetes mellitus  Diabetic peripheral neuropathy  Cont SSI  yH6M--8.0  Diabetes management to assist    Chronic L leg diabetic ulcer  ID has seen, no signs of active infection  Wound team to assist--Needs referral to wound clinic at discharge  Recommend xeroform, foam and tiffanie 3 times per week. AT home would use hydrafera blue/ tiffanie 3 times per week. GERD  Cont PPI, change to po    Hypoalbuminemia  Mod protein-calorie malnutrition  Consulted nutrition      Anticipated discharge needs:      STR pending at this point    I spent 39 minutes of time caring for this patient on the unit nearby or at bedside, and more than 50 percent was spent on coordination of care activities, and/or patient/family counseling regarding status and plan of care. Diet:  ADULT DIET; Regular;  Low Sodium (2 gm)  ADULT ORAL NUTRITION SUPPLEMENT; Dinner, Breakfast; Low Calorie/High Protein Oral Supplement  DVT PPx: SCD's  Code status: Full Code    Hospital Problems:  Principal Problem:    Cirrhosis of liver with ascites ([address]  Active Problems:    Alcohol dependence ([address]    Diabetic peripheral neuropathy ([address]    Essential hypertension    Hyperlipidemia    Type 2 diabetes mellitus ([address]    Diabetic ulcer of both feet associated with type 2 diabetes mellitus ([address]    Acute kidney injury superimposed on CKD (HCC)    PAD (peripheral artery disease) (HCC)    Hypoalbuminemia    Normocytic anemia    SIRS (systemic inflammatory response syndrome) (HCC)    Abdominal pain    Moderate protein malnutrition (HCC)    Cholelithiases    Positive blood culture    Stage 3a chronic kidney disease ([address]  Resolved Problems:    SBP [facility]spontaneous bacterial peritonitis) [facility]      Objective:   Patient Vitals for the past 24 hrs:   Temp Pulse Resp BP SpO2   12/17/22 1140 97.5 °F (36.4 °C) 86 16 (!) 141/97 100 %   12/17/22 0715 97.2 °F (36.2 °C) 88 17 (!) 139/92 100 %   12/17/22 0418 97.5 °F (36.4 °C) 84 16 (!) 140/87 98 %   12/16/22 2352 97.7 °F (36.5 °C) 86 18 (!) 131/90 100 %   12/16/22 2131 97.7 °F (36.5 °C) 88 16 (!) 135/97 100 %   12/16/22 1506 97.3 °F (36.3 °C) 80 18 (!) 123/90 100 %   12/16/22 1242 97.4 °F (36.3 °C) 92 19 (!) 131/94 100 %       Oxygen Therapy  SpO2: 100 %  Pulse via Oximetry: 87 beats per minute  Pulse Oximeter Device Mode: Continuous  O2 Device: None (Room air)    Estimated body mass index is 30.87 kg/m² as calculated from the following:    Height as of this encounter: 5' 11\" (1.803 m). Weight as of this encounter: 221 lb 4.8 oz (100.4 kg). Intake/Output Summary (Last 24 hours) at 12/17/2022 1210  Last data filed at 12/16/2022 2119  Gross per 24 hour   Intake 280 ml   Output 300 ml   Net -20 ml         Physical Exam:     Blood pressure (!) 141/97, pulse 86, temperature 97.5 °F (36.4 °C), temperature source Oral, resp. rate 16, height 5' 11\" (1.803 m), weight 221 lb 4.8 oz (100.4 kg), SpO2 100 %. General:    Well nourished. Head:  Normocephalic, atraumatic  Eyes:  Sclerae appear normal.  Pupils equally round. ENT:  Nares appear normal, no drainage. Moist oral mucosa  Neck:  No restricted ROM. Trachea midline   CV:   RRR. No m/r/g. No jugular venous distension. Lungs:   CTAB. No wheezing, rhonchi, or rales. Symmetric expansion. Abdomen:   Hypoactive bowel sounds. Soft, nontender. Nondistended. Extremities: No cyanosis or clubbing. Venous stasis dermatitis on b/l LE's  Skin:     Warm and dry. Neuro:  CN II-XII grossly intact. Sensation intact. A&Ox3  Psych:  Normal mood and affect.       I have personally reviewed labs and tests showing:  Recent Labs:  Recent Results (from the past 48 hour(s)) Culture, Body Fluid    Collection Time: 12/15/22  3:45 PM    Specimen: Ascitic Fluid;  Body Fluid   Result Value Ref Range    Special Requests NO SPECIAL REQUESTS      Gram stain NO WBCS SEEN      Gram stain NO DEFINITE ORGANISM SEEN      Culture NO GROWTH 1 DAY     POCT Glucose    Collection Time: 12/15/22  5:28 PM   Result Value Ref Range    POC Glucose 123 (H) 65 - 100 mg/dL    Performed by: Sanjuana    POCT Glucose    Collection Time: 12/15/22  9:47 PM   Result Value Ref Range    POC Glucose 186 (H) 65 - 100 mg/dL    Performed by: Keron    Vancomycin Level, Random    Collection Time: 12/16/22 12:06 AM   Result Value Ref Range    Vancomycin Rm 19.3 UG/ML   Magnesium    Collection Time: 12/16/22  3:32 AM   Result Value Ref Range    Magnesium 1.3 (L) 1.8 - 2.4 mg/dL   CBC with Auto Differential    Collection Time: 12/16/22  5:32 AM   Result Value Ref Range    WBC 12.9 (H) 4.3 - 11.1 K/uL    RBC 3.03 (L) 4.23 - 5.6 M/uL    Hemoglobin 8.5 (L) 13.6 - 17.2 g/dL    Hematocrit 26.3 (L) 41.1 - 50.3 %    MCV 86.8 82 - 102 FL    MCH 28.1 26.1 - 32.9 PG    MCHC 32.3 31.4 - 35.0 g/dL    RDW 18.2 (H) 11.9 - 14.6 %    Platelets 752 269 - 237 K/uL    MPV 10.2 9.4 - 12.3 FL    nRBC 0.00 0.0 - 0.2 K/uL    Differential Type AUTOMATED      Seg Neutrophils 66 43 - 78 %    Lymphocytes 19 13 - 44 %    Monocytes 13 (H) 4.0 - 12.0 %    Eosinophils % 1 0.5 - 7.8 %    Basophils 1 0.0 - 2.0 %    Immature Granulocytes 1 0.0 - 5.0 %    Segs Absolute 8.5 (H) 1.7 - 8.2 K/UL    Absolute Lymph # 2.4 0.5 - 4.6 K/UL    Absolute Mono # 1.6 (H) 0.1 - 1.3 K/UL    Absolute Eos # 0.2 0.0 - 0.8 K/UL    Basophils Absolute 0.1 0.0 - 0.2 K/UL    Absolute Immature Granulocyte 0.1 0.0 - 0.5 K/UL   Comprehensive Metabolic Panel    Collection Time: 12/16/22  5:32 AM   Result Value Ref Range    Sodium 137 133 - 143 mmol/L    Potassium 3.3 (L) 3.5 - 5.1 mmol/L    Chloride 105 101 - 110 mmol/L    CO2 23 21 - 32 mmol/L    Anion Gap 9 2 - 11 mmol/L Glucose 120 (H) 65 - 100 mg/dL    BUN 21 6 - 23 MG/DL    Creatinine 1.70 (H) 0.8 - 1.5 MG/DL    Est, Glom Filt Rate 47 (L) >60 ml/min/1.73m2    Calcium 7.3 (L) 8.3 - 10.4 MG/DL    Total Bilirubin 0.8 0.2 - 1.1 MG/DL    ALT <6 (L) 12 - 65 U/L    AST 17 15 - 37 U/L    Alk Phosphatase 71 50 - 136 U/L    Total Protein 6.1 (L) 6.3 - 8.2 g/dL    Albumin 1.4 (L) 3.5 - 5.0 g/dL    Globulin 4.7 (H) 2.8 - 4.5 g/dL    Albumin/Globulin Ratio 0.3 (L) 0.4 - 1.6     Protime-INR    Collection Time: 12/16/22  5:32 AM   Result Value Ref Range    Protime 17.5 (H) 12.6 - 14.3 sec    INR 1.4     Hemoglobin A1C    Collection Time: 12/16/22  5:32 AM   Result Value Ref Range    Hemoglobin A1C 5.0 4.8 - 5.6 %    eAG 97 mg/dL   POCT Glucose    Collection Time: 12/16/22 12:44 PM   Result Value Ref Range    POC Glucose 141 (H) 65 - 100 mg/dL    Performed by: Sanjuana    POCT Glucose    Collection Time: 12/16/22  3:08 PM   Result Value Ref Range    POC Glucose 157 (H) 65 - 100 mg/dL    Performed by: Sanjuana    COVID-19, Rapid    Collection Time: 12/16/22  3:42 PM    Specimen: Nasopharyngeal   Result Value Ref Range    Source Nasopharyngeal      SARS-CoV-2, Rapid Not detected NOTD     PLEASE READ & DOCUMENT PPD TEST IN 24 HRS    Collection Time: 12/16/22  8:40 PM   Result Value Ref Range    PPD, (POC) Negative Negative    mm Induration 0 0 - 5 mm   POCT Glucose    Collection Time: 12/16/22 10:15 PM   Result Value Ref Range    POC Glucose 163 (H) 65 - 100 mg/dL    Performed by: Baldo    HIV 1/2 Ag/Ab, 4TH Generation,W Rflx Confirm    Collection Time: 12/17/22  4:04 AM   Result Value Ref Range    HIV 1/2 Interp NONREACTIVE NR      HIV 1/2 Result Comment SEE NOTE     Magnesium    Collection Time: 12/17/22  4:04 AM   Result Value Ref Range    Magnesium 1.3 (L) 1.8 - 2.4 mg/dL   CBC    Collection Time: 12/17/22  8:22 AM   Result Value Ref Range    WBC 14.0 (H) 4.3 - 11.1 K/uL    RBC 3.37 (L) 4.23 - 5.6 M/uL    Hemoglobin 9.5 (L) 13.6 - 17.2 g/dL    Hematocrit 29.0 (L) 41.1 - 50.3 %    MCV 86.1 82 - 102 FL    MCH 28.2 26.1 - 32.9 PG    MCHC 32.8 31.4 - 35.0 g/dL    RDW 18.1 (H) 11.9 - 14.6 %    Platelets 783 340 - 367 K/uL    MPV 11.3 9.4 - 12.3 FL    nRBC 0.00 0.0 - 0.2 K/uL   Comprehensive Metabolic Panel    Collection Time: 12/17/22  8:22 AM   Result Value Ref Range    Sodium 137 133 - 143 mmol/L    Potassium 3.7 3.5 - 5.1 mmol/L    Chloride 103 101 - 110 mmol/L    CO2 24 21 - 32 mmol/L    Anion Gap 10 2 - 11 mmol/L    Glucose 115 (H) 65 - 100 mg/dL    BUN 17 6 - 23 MG/DL    Creatinine 1.50 0.8 - 1.5 MG/DL    Est, Glom Filt Rate 54 (L) >60 ml/min/1.73m2    Calcium 7.8 (L) 8.3 - 10.4 MG/DL    Total Bilirubin 1.0 0.2 - 1.1 MG/DL    ALT 8 (L) 12 - 65 U/L    AST 21 15 - 37 U/L    Alk Phosphatase 92 50 - 136 U/L    Total Protein 7.2 6.3 - 8.2 g/dL    Albumin 2.2 (L) 3.5 - 5.0 g/dL    Globulin 5.0 (H) 2.8 - 4.5 g/dL    Albumin/Globulin Ratio 0.4 0.4 - 1.6         I have personally reviewed imaging studies showing: Other Studies:  IR US GUIDED PARACENTESIS   Preliminary Result   Uncomplicated ultrasound guided paracentesis. CT ABDOMEN PELVIS WO CONTRAST Additional Contrast? Oral   Final Result      1. Ascites. 2. Cholelithiasis. XR ABDOMEN (KUB) (SINGLE AP VIEW)   Final Result   1. The enteric tube tip is in the proximal to mid stomach. 2. Unchanged mildly distended small bowel loops. XR ABDOMEN (KUB) (SINGLE AP VIEW)   Final Result   1. Nonspecific bowel gas pattern. Partial or early small bowel obstruction not   excluded. 2.  Ascites. CT CHEST PULMONARY EMBOLISM W CONTRAST   Final Result   1. No evidence of PE.   2.  Scattered bibasilar atelectasis. 3.  Coronary artery and aortic calcifications. 4.  Ascites. 5.  Gallstones. XR CHEST (2 VW)   Final Result   1. Diminished lung lines with associated opacification bibasilar atelectasis   without focal infiltrative opacity. Current Meds:  Current Facility-Administered Medications   Medication Dose Route Frequency    pantoprazole (PROTONIX) tablet 40 mg  40 mg Oral QAM AC    diatrizoate meglumine-sodium (GASTROGRAFIN) 66-10 % solution 15 mL  15 mL Oral ONCE PRN    amLODIPine (NORVASC) tablet 10 mg  10 mg Oral Daily    cholestyramine light packet 4 g  4 g Oral BID    folic acid (FOLVITE) tablet 1 mg  1 mg Oral Daily    vitamin B-12 (CYANOCOBALAMIN) tablet 1,000 mcg  1,000 mcg Oral Daily    insulin lispro (HUMALOG) injection vial 0-4 Units  0-4 Units SubCUTAneous TID WC    insulin lispro (HUMALOG) injection vial 0-4 Units  0-4 Units SubCUTAneous Nightly    glucose chewable tablet 16 g  4 tablet Oral PRN    dextrose bolus 10% 125 mL  125 mL IntraVENous PRN    Or    dextrose bolus 10% 250 mL  250 mL IntraVENous PRN    glucagon (rDNA) injection 1 mg  1 mg SubCUTAneous PRN    dextrose 10 % infusion   IntraVENous Continuous PRN    sodium chloride flush 0.9 % injection 5-40 mL  5-40 mL IntraVENous 2 times per day    sodium chloride flush 0.9 % injection 5-40 mL  5-40 mL IntraVENous PRN    0.9 % sodium chloride infusion   IntraVENous PRN    ondansetron (ZOFRAN-ODT) disintegrating tablet 4 mg  4 mg Oral Q8H PRN    Or    ondansetron (ZOFRAN) injection 4 mg  4 mg IntraVENous Q6H PRN    polyethylene glycol (GLYCOLAX) packet 17 g  17 g Oral Daily PRN    acetaminophen (TYLENOL) tablet 650 mg  650 mg Oral Q6H PRN    Or    acetaminophen (TYLENOL) suppository 650 mg  650 mg Rectal Q6H PRN    hydrALAZINE (APRESOLINE) injection 10 mg  10 mg IntraVENous Q6H PRN    morphine injection 1 mg  1 mg IntraVENous Q4H PRN       Signed: Abbie Alexandre DO    Part of this note may have been written by using a voice dictation software. The note has been proof read but may still contain some grammatical/other typographical errors.

## 2022-12-17 NOTE — PROGRESS NOTES
Reviewed notes for new spiritual concerns. CHRIS UNKNOWN    LOCAL    SPOUSE - BRE    FULL CODE    NO DIRECTIVES ON FILE    HIGH RISK FALLS    MY CHART - PENDING              Will follow as needed.

## 2022-12-18 LAB
ALBUMIN SERPL-MCNC: 1.9 G/DL (ref 3.5–5)
ALBUMIN/GLOB SERPL: 0.5 (ref 0.4–1.6)
ALP SERPL-CCNC: 75 U/L (ref 50–136)
ALT SERPL-CCNC: 7 U/L (ref 12–65)
ANION GAP SERPL CALC-SCNC: 8 MMOL/L (ref 2–11)
AST SERPL-CCNC: 13 U/L (ref 15–37)
BACTERIA SPEC CULT: NORMAL
BILIRUB SERPL-MCNC: 0.6 MG/DL (ref 0.2–1.1)
BUN SERPL-MCNC: 15 MG/DL (ref 6–23)
CALCIUM SERPL-MCNC: 7.3 MG/DL (ref 8.3–10.4)
CHLORIDE SERPL-SCNC: 105 MMOL/L (ref 101–110)
CO2 SERPL-SCNC: 22 MMOL/L (ref 21–32)
CREAT SERPL-MCNC: 1.4 MG/DL (ref 0.8–1.5)
ERYTHROCYTE [DISTWIDTH] IN BLOOD BY AUTOMATED COUNT: 17.6 % (ref 11.9–14.6)
GLOBULIN SER CALC-MCNC: 3.9 G/DL (ref 2.8–4.5)
GLUCOSE BLD STRIP.AUTO-MCNC: 149 MG/DL (ref 65–100)
GLUCOSE BLD STRIP.AUTO-MCNC: 382 MG/DL (ref 65–100)
GLUCOSE SERPL-MCNC: 141 MG/DL (ref 65–100)
GRAM STN SPEC: NORMAL
GRAM STN SPEC: NORMAL
HCT VFR BLD AUTO: 23.3 % (ref 41.1–50.3)
HGB BLD-MCNC: 7.7 G/DL (ref 13.6–17.2)
MAGNESIUM SERPL-MCNC: 1.5 MG/DL (ref 1.8–2.4)
MCH RBC QN AUTO: 27.9 PG (ref 26.1–32.9)
MCHC RBC AUTO-ENTMCNC: 33 G/DL (ref 31.4–35)
MCV RBC AUTO: 84.4 FL (ref 82–102)
MITOCHONDRIA M2 IGG SER-ACNC: <20 UNITS (ref 0–20)
MM INDURATION, POC: 0 MM (ref 0–5)
NRBC # BLD: 0.02 K/UL (ref 0–0.2)
PLATELET # BLD AUTO: 197 K/UL (ref 150–450)
PMV BLD AUTO: 11 FL (ref 9.4–12.3)
POTASSIUM SERPL-SCNC: 4.1 MMOL/L (ref 3.5–5.1)
PPD, POC: NEGATIVE
PROT SERPL-MCNC: 5.8 G/DL (ref 6.3–8.2)
RBC # BLD AUTO: 2.76 M/UL (ref 4.23–5.6)
SERVICE CMNT-IMP: ABNORMAL
SERVICE CMNT-IMP: ABNORMAL
SERVICE CMNT-IMP: NORMAL
SMA IGG SER-ACNC: 16 UNITS (ref 0–19)
SODIUM SERPL-SCNC: 135 MMOL/L (ref 133–143)
WBC # BLD AUTO: 12 K/UL (ref 4.3–11.1)

## 2022-12-18 PROCEDURE — 6370000000 HC RX 637 (ALT 250 FOR IP): Performed by: FAMILY MEDICINE

## 2022-12-18 PROCEDURE — 36415 COLL VENOUS BLD VENIPUNCTURE: CPT

## 2022-12-18 PROCEDURE — 2580000003 HC RX 258: Performed by: FAMILY MEDICINE

## 2022-12-18 PROCEDURE — 83735 ASSAY OF MAGNESIUM: CPT

## 2022-12-18 PROCEDURE — 6370000000 HC RX 637 (ALT 250 FOR IP): Performed by: HOSPITALIST

## 2022-12-18 PROCEDURE — 80053 COMPREHEN METABOLIC PANEL: CPT

## 2022-12-18 PROCEDURE — 82962 GLUCOSE BLOOD TEST: CPT

## 2022-12-18 PROCEDURE — 1100000003 HC PRIVATE W/ TELEMETRY

## 2022-12-18 PROCEDURE — 85027 COMPLETE CBC AUTOMATED: CPT

## 2022-12-18 RX ADMIN — FOLIC ACID 1 MG: 1 TABLET ORAL at 09:21

## 2022-12-18 RX ADMIN — SODIUM CHLORIDE, PRESERVATIVE FREE 10 ML: 5 INJECTION INTRAVENOUS at 09:22

## 2022-12-18 RX ADMIN — SODIUM CHLORIDE, PRESERVATIVE FREE 5 ML: 5 INJECTION INTRAVENOUS at 20:45

## 2022-12-18 RX ADMIN — CHOLESTYRAMINE 4 G: 4 POWDER, FOR SUSPENSION ORAL at 20:45

## 2022-12-18 RX ADMIN — INSULIN LISPRO 4 UNITS: 100 INJECTION, SOLUTION INTRAVENOUS; SUBCUTANEOUS at 16:23

## 2022-12-18 RX ADMIN — CHOLESTYRAMINE 4 G: 4 POWDER, FOR SUSPENSION ORAL at 09:21

## 2022-12-18 RX ADMIN — ZOLPIDEM TARTRATE 5 MG: 5 TABLET ORAL at 22:26

## 2022-12-18 RX ADMIN — PANTOPRAZOLE SODIUM 40 MG: 40 TABLET, DELAYED RELEASE ORAL at 06:12

## 2022-12-18 RX ADMIN — ACETAMINOPHEN 650 MG: 325 TABLET, FILM COATED ORAL at 09:21

## 2022-12-18 RX ADMIN — CYANOCOBALAMIN TAB 1000 MCG 1000 MCG: 1000 TAB at 09:21

## 2022-12-18 RX ADMIN — AMLODIPINE BESYLATE 10 MG: 10 TABLET ORAL at 09:21

## 2022-12-18 RX ADMIN — ACETAMINOPHEN 650 MG: 325 TABLET, FILM COATED ORAL at 16:22

## 2022-12-18 ASSESSMENT — PAIN SCALES - GENERAL
PAINLEVEL_OUTOF10: 0
PAINLEVEL_OUTOF10: 3
PAINLEVEL_OUTOF10: 3
PAINLEVEL_OUTOF10: 0

## 2022-12-18 ASSESSMENT — PAIN DESCRIPTION - DESCRIPTORS
DESCRIPTORS: ACHING
DESCRIPTORS: OTHER (COMMENT)

## 2022-12-18 ASSESSMENT — PAIN DESCRIPTION - ORIENTATION
ORIENTATION: OTHER (COMMENT)
ORIENTATION: MID
ORIENTATION: MID

## 2022-12-18 ASSESSMENT — PAIN DESCRIPTION - PAIN TYPE
TYPE: ACUTE PAIN

## 2022-12-18 ASSESSMENT — PAIN DESCRIPTION - FREQUENCY
FREQUENCY: INTERMITTENT

## 2022-12-18 ASSESSMENT — PAIN - FUNCTIONAL ASSESSMENT
PAIN_FUNCTIONAL_ASSESSMENT: PREVENTS OR INTERFERES SOME ACTIVE ACTIVITIES AND ADLS
PAIN_FUNCTIONAL_ASSESSMENT: PREVENTS OR INTERFERES SOME ACTIVE ACTIVITIES AND ADLS
PAIN_FUNCTIONAL_ASSESSMENT: ACTIVITIES ARE NOT PREVENTED

## 2022-12-18 ASSESSMENT — PAIN DESCRIPTION - LOCATION
LOCATION: OTHER (COMMENT)
LOCATION: TEETH
LOCATION: GENERALIZED

## 2022-12-18 ASSESSMENT — PAIN DESCRIPTION - ONSET
ONSET: GRADUAL

## 2022-12-18 NOTE — PROGRESS NOTES
Hospitalist Progress Note   Admit Date:  2022  3:24 PM   Name:  Homero Muñoz   Age:  65 y.o. Sex:  male  :  1965   MRN:  [id number]   Room:  725/    Presenting Complaint: Emesis     Reason(s) for Admission: Septicemia ([facility] [address] [A41.9]  SIRS (systemic inflammatory response syndrome) (HCC) [R65.10]  Abdominal pain, unspecified abdominal location [R10.9]  Swelling of lower extremity [M79.89]     Hospital Course:   65 y.o. male w/ Hx of GI bleed who presented to the ED for cc inability to tolerate PO, emesis, abdominal pain with distention, no flatus or BM since of 1 day duration. In ER, patient was found tachycardic with D-dimer 9.3. WBC 13.9 proBNP 1280. CT chest was obtained and showed no acute PE; scattered bibasilar atelectasis, CAD and aortic calcifications; ascites; gallstones. KUB obtained and shows nonspecific bowel gas pattern; partial early SBO not excluded. Patient was admitted for SIRS with leukocytosis and tachycardia; no source of infection. CTAP 12/15 shows multiple dependent stones present in the gallbladder; some contours liver appearing nodular may represent cirrhosis; also findings suggestive of granuloma. Abundant ascites is present. UA not c/w infection. CT chest shows no PE and no consolidation. Rapid Covid/Flu NEG. Blood cultures on admission grew Coag negative staph and Diphtheroids. Suspicious for contamination. Suspicious for contamination. He was started empirically on broad-spectrum antibiotics. ? SBP contributing with ascites and cirrhosis. Repeat blood cultures NGTD. ID was consulted  and recommended no signs of infectious peritonitis and blood cultures are contaminant and does not require treatment. Leukocytosis most likely due to partial SBO/ileus which has resolved. Recommended wound care on right leg which wounds appear to be chronic/venous stasis without signs of active infection.   ID recommended to stop all antibiotics and signed off 12/6.    There was also a concern for partial SBO on admission. NGT placed in ED for decompression. General surgery was consulted. General surgery has seen and was suspicious pt's symptoms are more d/t large volume ascites than SBO. No surgical intervention at this time and recommended to remove NGT and start diet. NGT removed 12/15 and pt tolerated po well and has had a BM. Surgery signed off 12/15. GI was consulted for large volume ascites and new cirrhosis. IR was consulted and pt underwent paracentesis on 12/15 with 7.5L of thin yellow fluid removed. GI recommended that patient has cholelithiasis on imaging but recommended against surgical consideration as a surgical mortality would be high. If cholecystitis develops, consider cholecystostomy tube. GI recommended low-sodium diet discontinue sodium bicarb if possible. GI signed off 12/16 and patient should follow-up with his primary GI at [facility]. Subjective & 24hr Events (12/18/22): Alert and oriented x3. Stated that he feels well. Sleeping in bed. No acute concerns. Awaiting rehab. Denies fever, chills, SOB, chest pain, nausea, vomiting      Assessment & Plan:     SIRS   Positive blood cultures  ? SBP with large ascites, cirrhosis, abdominal pain. CTAP shows no obvious source of infection but shows large volume ascites. UA not c/w infection. CT chest shows no PE and no consolidation. Rapid Covid/Flu NEG  BCX on admission grew Coag negative staph and Diphtheroids. Suspicious for contamination. Repeat [address] 12/15: NGTD  Abx: Cont Vanc (12/13-12/16); s/p Zosyn; s/p Rocephin/Flagyl empirically for SBP to avoid nephrotoxicity (12/15-12/16)  Ascitic studies  - not consistent with infection  -Cx: NGTD  -Cytology pending  Consulted ID 12/16--no signs of infectious peritonitis and blood cultures are contaminant and does not require treatment. Leukocytosis most likely due to partial SBO/ileus which has resolved.   Recommended wound care on right leg which wounds appear to be chronic/venous stasis without signs of active infection. ID recommended to stop all antibiotics and signed off 12/6. Abdominal pain, resolved  Partial SBO, resolved  Cirrhosis with ascites  KUB obtained on admission 12/13 and shows nonspecific bowel gas pattern; partial early SBO not excluded. NGT placed in ED. General surgery has seen and signed off 12/15. Suspected more d/t large volume ascites than SBO. No surgical intervention at this time. Surgery recommended remove NGT 12/15 and start diet. NGT removed and pt tolerating po well. CTAP 12/15 shows multiple dependent stones present in the gallbladder; some contours liver appearing nodular may represent cirrhosis; also findings suggestive of granuloma. Abundant ascites is present  IR was consulted and pt underwent paracentesis on 12/15 with 7.5L of thin yellow fluid removed. S/p IV albumin x3 doses  Acute hepatitis panel negative  Analgesics prn  Antiemetics prn  Liver workup obtained by GI as new dx of cirrhosis  Consider addition of diuretics when MARS resolves  GI signed off 12/16 and patient should follow-up with his primary GI at [facility]. Cholelithiasis  LFTs within normal limits. CTAP not suggestive of acute cholecystitis  Supportive care for now. Pt high risk for surgery, GI recommended against this. If develop cholecystitis, consider cholecystotomy tube    Normocytic anemia  Hgb 8.5 on 12/15 hemoglobin 10.1 on admission (baseline 7-8). Elevated could be due to hemoconcentration with decrease po intake and also component of MARS  Iron studies c/w chronic disease  Monitor CBC, transfuse if hgb <7 or if symptomatic    MARS on CKD stage 3  Creatinine 1.8 on 12/13 (baseline 1.2-1.4).   Most likely due to nausea and vomiting  CTAP 12/15 shows no hydronephrosis  Avoid nephrotoxic meds  D/c IVF due to large amount of ascites  Encourage p.o. intake  D/c po sodium bicarb to limit sodium intake  Monitor BMP    Hypokalemia, resolved  Replace and recheck    Alcohol dependence  Patient denies tobacco or alcohol abuse and stated that he stopped after last discharge. Could be contributing factor to his cirrhosis  Cont folic acid  Monitor for signs/symptoms of withdrawals    Essential hypertension  Cont home Norvasc    Type 2 diabetes mellitus  Diabetic peripheral neuropathy  Cont SSI  [id number]  Diabetes management to assist    Chronic L leg diabetic ulcer  ID has seen, no signs of active infection  Wound team to assist--Needs referral to wound clinic at discharge  Recommend xeroform, foam and tiffanie 3 times per week. AT home would use hydrafera blue/ tiffanie 3 times per week. GERD  Cont PPI, change to po    Hypoalbuminemia  Mod protein-calorie malnutrition  Consulted nutrition      Anticipated discharge needs:      STR pending at this point        Diet:  ADULT DIET; Regular;  Low Sodium (2 gm)  ADULT ORAL NUTRITION SUPPLEMENT; Dinner, Breakfast; Low Calorie/High Protein Oral Supplement  DVT PPx: SCD's  Code status: Full Code    Hospital Problems:  Principal Problem:    Cirrhosis of liver with ascites ([address]  Active Problems:    Alcohol dependence ([address]    Diabetic peripheral neuropathy ([address]    Essential hypertension    Hyperlipidemia    Type 2 diabetes mellitus ([address]    Diabetic ulcer of both feet associated with type 2 diabetes mellitus ([address]    Acute kidney injury superimposed on CKD (HCC)    PAD (peripheral artery disease) (HCC)    Hypoalbuminemia    Normocytic anemia    SIRS (systemic inflammatory response syndrome) (HCC)    Abdominal pain    Moderate protein malnutrition (HCC)    Cholelithiases    Positive blood culture    Partial small bowel obstruction (HCC)    Stage 3a chronic kidney disease ([address]  Resolved Problems:    SBP (spontaneous bacterial peritonitis) ([address]      Objective:   Patient Vitals for the past 24 hrs:   Temp Pulse Resp BP SpO2   12/18/22 0705 97.3 °F (36.3 °C) 83 16 122/77 100 %   12/18/22 0351 97.3 °F (36.3 °C) 81 16 (!) 140/94 100 %   12/17/22 2303 97.3 °F (36.3 °C) 92 18 125/88 100 %   12/17/22 1900 97.7 °F (36.5 °C) 86 16 129/88 100 %   12/17/22 1627 97.3 °F (36.3 °C) 84 16 125/85 100 %       Oxygen Therapy  SpO2: 100 %  Pulse via Oximetry: 87 beats per minute  Pulse Oximeter Device Mode: Continuous  O2 Device: None (Room air)    Estimated body mass index is 30.87 kg/m² as calculated from the following:    Height as of this encounter: 5' 11\" (1.803 m). Weight as of this encounter: 221 lb 4.8 oz (100.4 kg). Intake/Output Summary (Last 24 hours) at 12/18/2022 1150  Last data filed at 12/18/2022 0759  Gross per 24 hour   Intake 240 ml   Output --   Net 240 ml         Physical Exam:     Blood pressure 122/77, pulse 83, temperature 97.3 °F (36.3 °C), temperature source Oral, resp. rate 16, height 5' 11\" (1.803 m), weight 221 lb 4.8 oz (100.4 kg), SpO2 100 %. General:    Well nourished. Head:  Normocephalic, atraumatic  Eyes:  Sclerae appear normal.  Pupils equally round. ENT:  Nares appear normal, no drainage. Moist oral mucosa  Neck:  No restricted ROM. Trachea midline   CV:   RRR. No m/r/g. No jugular venous distension. Lungs:   CTAB. No wheezing, rhonchi, or rales. Symmetric expansion. Abdomen:   Hypoactive bowel sounds. Soft, nontender. Nondistended. Extremities: No cyanosis or clubbing. Venous stasis dermatitis on b/l LE's  Skin:     Warm and dry. Neuro:  CN II-XII grossly intact. Sensation intact. A&Ox3  Psych:  Normal mood and affect.       I have personally reviewed labs and tests showing:  Recent Labs:  Recent Results (from the past 48 hour(s))   POCT Glucose    Collection Time: 12/16/22 12:44 PM   Result Value Ref Range    POC Glucose 141 (H) 65 - 100 mg/dL    Performed by: Sanjuana    POCT Glucose    Collection Time: 12/16/22  3:08 PM   Result Value Ref Range    POC Glucose 157 (H) 65 - 100 mg/dL    Performed by: Sanjuana MARTINID-[email or web address], Rapid    Collection Time: 12/16/22  3:42 PM Specimen: Nasopharyngeal   Result Value Ref Range    Source Nasopharyngeal      SARS-CoV-2, Rapid Not detected NOTD     PLEASE READ & DOCUMENT PPD TEST IN 24 HRS    Collection Time: 12/16/22  8:40 PM   Result Value Ref Range    PPD, (POC) Negative Negative    mm Induration 0 0 - 5 mm   POCT Glucose    Collection Time: 12/16/22 10:15 PM   Result Value Ref Range    POC Glucose 163 (H) 65 - 100 mg/dL    Performed by: Baldo    HIV 1/2 Ag/Ab, 4TH Generation,W Rflx Confirm    Collection Time: 12/17/22  4:04 AM   Result Value Ref Range    HIV 1/2 Interp NONREACTIVE NR      HIV 1/2 Result Comment SEE NOTE     Magnesium    Collection Time: 12/17/22  4:04 AM   Result Value Ref Range    Magnesium 1.3 (L) 1.8 - 2.4 mg/dL   CBC    Collection Time: 12/17/22  8:22 AM   Result Value Ref Range    WBC 14.0 (H) 4.3 - 11.1 K/uL    RBC 3.37 (L) 4.23 - 5.6 M/uL    Hemoglobin 9.5 (L) 13.6 - 17.2 g/dL    Hematocrit 29.0 (L) 41.1 - 50.3 %    MCV 86.1 82 - 102 FL    MCH 28.2 26.1 - 32.9 PG    MCHC 32.8 31.4 - 35.0 g/dL    RDW 18.1 (H) 11.9 - 14.6 %    Platelets 867 338 - 757 K/uL    MPV 11.3 9.4 - 12.3 FL    nRBC 0.00 0.0 - 0.2 K/uL   Comprehensive Metabolic Panel    Collection Time: 12/17/22  8:22 AM   Result Value Ref Range    Sodium 137 133 - 143 mmol/L    Potassium 3.7 3.5 - 5.1 mmol/L    Chloride 103 101 - 110 mmol/L    CO2 24 21 - 32 mmol/L    Anion Gap 10 2 - 11 mmol/L    Glucose 115 (H) 65 - 100 mg/dL    BUN 17 6 - 23 MG/DL    Creatinine 1.50 0.8 - 1.5 MG/DL    Est, Glom Filt Rate 54 (L) >60 ml/min/1.73m2    Calcium 7.8 (L) 8.3 - 10.4 MG/DL    Total Bilirubin 1.0 0.2 - 1.1 MG/DL    ALT 8 (L) 12 - 65 U/L    AST 21 15 - 37 U/L    Alk Phosphatase 92 50 - 136 U/L    Total Protein 7.2 6.3 - 8.2 g/dL    Albumin 2.2 (L) 3.5 - 5.0 g/dL    Globulin 5.0 (H) 2.8 - 4.5 g/dL    Albumin/Globulin Ratio 0.4 0.4 - 1.6     PLEASE READ & DOCUMENT PPD TEST IN 48 HRS    Collection Time: 12/17/22  5:00 PM   Result Value Ref Range    PPD, (POC) Negative Negative    mm Induration 0 0 - 5 mm   POCT Glucose    Collection Time: 12/17/22  9:46 PM   Result Value Ref Range    POC Glucose 181 (H) 65 - 100 mg/dL    Performed by: Baldo    Magnesium    Collection Time: 12/18/22  5:29 AM   Result Value Ref Range    Magnesium 1.5 (L) 1.8 - 2.4 mg/dL   CBC    Collection Time: 12/18/22  5:29 AM   Result Value Ref Range    WBC 12.0 (H) 4.3 - 11.1 K/uL    RBC 2.76 (L) 4.23 - 5.6 M/uL    Hemoglobin 7.7 (L) 13.6 - 17.2 g/dL    Hematocrit 23.3 (L) 41.1 - 50.3 %    MCV 84.4 82 - 102 FL    MCH 27.9 26.1 - 32.9 PG    MCHC 33.0 31.4 - 35.0 g/dL    RDW 17.6 (H) 11.9 - 14.6 %    Platelets 596 139 - 103 K/uL    MPV 11.0 9.4 - 12.3 FL    nRBC 0.02 0.0 - 0.2 K/uL   Comprehensive Metabolic Panel    Collection Time: 12/18/22  5:29 AM   Result Value Ref Range    Sodium 135 133 - 143 mmol/L    Potassium 4.1 3.5 - 5.1 mmol/L    Chloride 105 101 - 110 mmol/L    CO2 22 21 - 32 mmol/L    Anion Gap 8 2 - 11 mmol/L    Glucose 141 (H) 65 - 100 mg/dL    BUN 15 6 - 23 MG/DL    Creatinine 1.40 0.8 - 1.5 MG/DL    Est, Glom Filt Rate 59 (L) >60 ml/min/1.73m2    Calcium 7.3 (L) 8.3 - 10.4 MG/DL    Total Bilirubin 0.6 0.2 - 1.1 MG/DL    ALT 7 (L) 12 - 65 U/L    AST 13 (L) 15 - 37 U/L    Alk Phosphatase 75 50 - 136 U/L    Total Protein 5.8 (L) 6.3 - 8.2 g/dL    Albumin 1.9 (L) 3.5 - 5.0 g/dL    Globulin 3.9 2.8 - 4.5 g/dL    Albumin/Globulin Ratio 0.5 0.4 - 1.6         I have personally reviewed imaging studies showing: Other Studies:  IR US GUIDED PARACENTESIS   Preliminary Result   Uncomplicated ultrasound guided paracentesis. CT ABDOMEN PELVIS WO CONTRAST Additional Contrast? Oral   Final Result      1. Ascites. 2. Cholelithiasis. XR ABDOMEN (KUB) (SINGLE AP VIEW)   Final Result   1. The enteric tube tip is in the proximal to mid stomach. 2. Unchanged mildly distended small bowel loops. XR ABDOMEN (KUB) (SINGLE AP VIEW)   Final Result   1. Nonspecific bowel gas pattern. Partial or early small bowel obstruction not   excluded. 2.  Ascites. CT CHEST PULMONARY EMBOLISM W CONTRAST   Final Result   1. No evidence of PE.   2.  Scattered bibasilar atelectasis. 3.  Coronary artery and aortic calcifications. 4.  Ascites. 5.  Gallstones. XR CHEST (2 VW)   Final Result   1. Diminished lung lines with associated opacification bibasilar atelectasis   without focal infiltrative opacity.              Current Meds:  Current Facility-Administered Medications   Medication Dose Route Frequency    zolpidem (AMBIEN) tablet 5 mg  5 mg Oral Nightly PRN    pantoprazole (PROTONIX) tablet 40 mg  40 mg Oral QAM AC    diatrizoate meglumine-sodium (GASTROGRAFIN) 66-10 % solution 15 mL  15 mL Oral ONCE PRN    amLODIPine (NORVASC) tablet 10 mg  10 mg Oral Daily    cholestyramine light packet 4 g  4 g Oral BID    folic acid (FOLVITE) tablet 1 mg  1 mg Oral Daily    vitamin B-12 (CYANOCOBALAMIN) tablet 1,000 mcg  1,000 mcg Oral Daily    insulin lispro (HUMALOG) injection vial 0-4 Units  0-4 Units SubCUTAneous TID WC    insulin lispro (HUMALOG) injection vial 0-4 Units  0-4 Units SubCUTAneous Nightly    glucose chewable tablet 16 g  4 tablet Oral PRN    dextrose bolus 10% 125 mL  125 mL IntraVENous PRN    Or    dextrose bolus 10% 250 mL  250 mL IntraVENous PRN    glucagon (rDNA) injection 1 mg  1 mg SubCUTAneous PRN    dextrose 10 % infusion   IntraVENous Continuous PRN    sodium chloride flush 0.9 % injection 5-40 mL  5-40 mL IntraVENous 2 times per day    sodium chloride flush 0.9 % injection 5-40 mL  5-40 mL IntraVENous PRN    0.9 % sodium chloride infusion   IntraVENous PRN    ondansetron (ZOFRAN-ODT) disintegrating tablet 4 mg  4 mg Oral Q8H PRN    Or    ondansetron (ZOFRAN) injection 4 mg  4 mg IntraVENous Q6H PRN    polyethylene glycol (GLYCOLAX) packet 17 g  17 g Oral Daily PRN    acetaminophen (TYLENOL) tablet 650 mg  650 mg Oral Q6H PRN    Or acetaminophen (TYLENOL) suppository 650 mg  650 mg Rectal Q6H PRN    hydrALAZINE (APRESOLINE) injection 10 mg  10 mg IntraVENous Q6H PRN    morphine injection 1 mg  1 mg IntraVENous Q4H PRN       Signed: Gypsy De La Vega DO    Part of this note may have been written by using a voice dictation software. The note has been proof read but may still contain some grammatical/other typographical errors.

## 2022-12-19 LAB
ALBUMIN SERPL-MCNC: 1.8 G/DL (ref 3.5–5)
ALBUMIN/GLOB SERPL: 0.4 (ref 0.4–1.6)
ALP SERPL-CCNC: 81 U/L (ref 50–136)
ALT SERPL-CCNC: 7 U/L (ref 12–65)
ANION GAP SERPL CALC-SCNC: 7 MMOL/L (ref 2–11)
AST SERPL-CCNC: 12 U/L (ref 15–37)
BACTERIA SPEC CULT: NORMAL
BILIRUB SERPL-MCNC: 0.6 MG/DL (ref 0.2–1.1)
BUN SERPL-MCNC: 18 MG/DL (ref 6–23)
CALCIUM SERPL-MCNC: 7.5 MG/DL (ref 8.3–10.4)
CHLORIDE SERPL-SCNC: 104 MMOL/L (ref 101–110)
CO2 SERPL-SCNC: 24 MMOL/L (ref 21–32)
CREAT SERPL-MCNC: 1.6 MG/DL (ref 0.8–1.5)
ERYTHROCYTE [DISTWIDTH] IN BLOOD BY AUTOMATED COUNT: 17.5 % (ref 11.9–14.6)
GLOBULIN SER CALC-MCNC: 4.3 G/DL (ref 2.8–4.5)
GLUCOSE BLD STRIP.AUTO-MCNC: 135 MG/DL (ref 65–100)
GLUCOSE BLD STRIP.AUTO-MCNC: 137 MG/DL (ref 65–100)
GLUCOSE BLD STRIP.AUTO-MCNC: 206 MG/DL (ref 65–100)
GLUCOSE BLD STRIP.AUTO-MCNC: 310 MG/DL (ref 65–100)
GLUCOSE SERPL-MCNC: 117 MG/DL (ref 65–100)
HCT VFR BLD AUTO: 25 % (ref 41.1–50.3)
HGB BLD-MCNC: 8.2 G/DL (ref 13.6–17.2)
MCH RBC QN AUTO: 28 PG (ref 26.1–32.9)
MCHC RBC AUTO-ENTMCNC: 32.8 G/DL (ref 31.4–35)
MCV RBC AUTO: 85.3 FL (ref 82–102)
NRBC # BLD: 0.02 K/UL (ref 0–0.2)
PLATELET # BLD AUTO: 232 K/UL (ref 150–450)
PMV BLD AUTO: 10.5 FL (ref 9.4–12.3)
POTASSIUM SERPL-SCNC: 4.4 MMOL/L (ref 3.5–5.1)
PROT SERPL-MCNC: 6.1 G/DL (ref 6.3–8.2)
RBC # BLD AUTO: 2.93 M/UL (ref 4.23–5.6)
SERVICE CMNT-IMP: ABNORMAL
SERVICE CMNT-IMP: NORMAL
SODIUM SERPL-SCNC: 135 MMOL/L (ref 133–143)
WBC # BLD AUTO: 11.6 K/UL (ref 4.3–11.1)

## 2022-12-19 PROCEDURE — 1100000003 HC PRIVATE W/ TELEMETRY

## 2022-12-19 PROCEDURE — 6370000000 HC RX 637 (ALT 250 FOR IP): Performed by: FAMILY MEDICINE

## 2022-12-19 PROCEDURE — 80053 COMPREHEN METABOLIC PANEL: CPT

## 2022-12-19 PROCEDURE — 36415 COLL VENOUS BLD VENIPUNCTURE: CPT

## 2022-12-19 PROCEDURE — 82962 GLUCOSE BLOOD TEST: CPT

## 2022-12-19 PROCEDURE — 85027 COMPLETE CBC AUTOMATED: CPT

## 2022-12-19 PROCEDURE — 97530 THERAPEUTIC ACTIVITIES: CPT

## 2022-12-19 PROCEDURE — 2580000003 HC RX 258: Performed by: FAMILY MEDICINE

## 2022-12-19 RX ADMIN — CYANOCOBALAMIN TAB 1000 MCG 1000 MCG: 1000 TAB at 10:04

## 2022-12-19 RX ADMIN — SODIUM CHLORIDE, PRESERVATIVE FREE 5 ML: 5 INJECTION INTRAVENOUS at 10:04

## 2022-12-19 RX ADMIN — FOLIC ACID 1 MG: 1 TABLET ORAL at 10:04

## 2022-12-19 RX ADMIN — SODIUM CHLORIDE, PRESERVATIVE FREE 5 ML: 5 INJECTION INTRAVENOUS at 21:09

## 2022-12-19 RX ADMIN — INSULIN LISPRO 1 UNITS: 100 INJECTION, SOLUTION INTRAVENOUS; SUBCUTANEOUS at 17:17

## 2022-12-19 RX ADMIN — INSULIN LISPRO 4 UNITS: 100 INJECTION, SOLUTION INTRAVENOUS; SUBCUTANEOUS at 21:07

## 2022-12-19 RX ADMIN — CHOLESTYRAMINE 4 G: 4 POWDER, FOR SUSPENSION ORAL at 21:07

## 2022-12-19 RX ADMIN — PANTOPRAZOLE SODIUM 40 MG: 40 TABLET, DELAYED RELEASE ORAL at 06:01

## 2022-12-19 RX ADMIN — CHOLESTYRAMINE 4 G: 4 POWDER, FOR SUSPENSION ORAL at 10:04

## 2022-12-19 RX ADMIN — AMLODIPINE BESYLATE 10 MG: 10 TABLET ORAL at 10:04

## 2022-12-19 ASSESSMENT — PAIN SCALES - GENERAL
PAINLEVEL_OUTOF10: 0
PAINLEVEL_OUTOF10: 0

## 2022-12-19 NOTE — PROGRESS NOTES
Physician Progress Note      PATIENT:               Marisol Click  CSN #:                  [id number]  :                       1965  ADMIT DATE:       2022 3:24 PM  DISCH DATE:  RESPONDING  PROVIDER #:        Darvin Schwab DO          QUERY TEXT:    Patient admitted with abdominal pain. Noted documentation of pSBO. As per   general surgery consult,\" The patient's symptoms are likely secondary to his   large volume of ascites, unlikely due to true small bowel obstruction, his   bowel is functioning. no surgical intervention at this time\". After study, is   the patient being treated for any of the following: The medical record reflects the following:  Risk Factors: abdominal pain, distention, n/v  Clinical Indicators: As per documentation on history and physical, KUB with   possible SBO. As per general surgery consult, The patients symptoms are likely   secondary to his large volume of ascites, unlikely due to true small bowel   obstruction, his bowel is functioning. no surgical intervention at this time  Treatment: NGT. IR consult. Paracentesis      [email or web address]  Options provided:  -- Abdominal pain due to pSBO  -- Abdominal pain due to ascites  -- Abdominal pain due to, please specify. -- Abdominal pain due to both pSBO and ascites  -- Other - I will add my own diagnosis  -- Disagree - Not applicable / Not valid  -- Disagree - Clinically unable to determine / Unknown  -- Refer to Clinical Documentation Reviewer    PROVIDER RESPONSE TEXT:    After study, abdominal pain due to pSBO. Query created by: Hemal Torres on 2022 10:57 AM      Electronically signed by:   Dang Camilo DO 2022 11:03 AM

## 2022-12-19 NOTE — PROGRESS NOTES
Hospitalist Progress Note   Admit Date:  2022  3:24 PM   Name:  Preethi Meyer   Age:  65 y.o. Sex:  male  :  1965   MRN:  [id number]   Room:  725/    Presenting Complaint: Emesis     Reason(s) for Admission: Septicemia ([facility] [address] [A41.9]  SIRS (systemic inflammatory response syndrome) (HCC) [R65.10]  Abdominal pain, unspecified abdominal location [R10.9]  Swelling of lower extremity [M79.89]     Hospital Course:   65 y.o. male w/ Hx of GI bleed who presented to the ED for cc inability to tolerate PO, emesis, abdominal pain with distention, no flatus or BM since of 1 day duration. In ER, patient was found tachycardic with D-dimer 9.3. WBC 13.9 proBNP 1280. CT chest was obtained and showed no acute PE; scattered bibasilar atelectasis, CAD and aortic calcifications; ascites; gallstones. KUB obtained and shows nonspecific bowel gas pattern; partial early SBO not excluded. Patient was admitted for SIRS with leukocytosis and tachycardia; no source of infection. CTAP 12/15 shows multiple dependent stones present in the gallbladder; some contours liver appearing nodular may represent cirrhosis; also findings suggestive of granuloma. Abundant ascites is present. UA not c/w infection. CT chest shows no PE and no consolidation. Rapid Covid/Flu NEG. Blood cultures on admission grew Coag negative staph and Diphtheroids. Suspicious for contamination. Suspicious for contamination. He was started empirically on broad-spectrum antibiotics. ? SBP contributing with ascites and cirrhosis. Repeat blood cultures NGTD. ID was consulted  and recommended no signs of infectious peritonitis and blood cultures are contaminant and does not require treatment. Leukocytosis most likely due to partial SBO/ileus which has resolved. Recommended wound care on right leg which wounds appear to be chronic/venous stasis without signs of active infection.   ID recommended to stop all antibiotics and signed off 12/6.    There was also a concern for partial SBO on admission. NGT placed in ED for decompression. General surgery was consulted. General surgery has seen and was suspicious pt's symptoms are more d/t large volume ascites than SBO. No surgical intervention at this time and recommended to remove NGT and start diet. NGT removed 12/15 and pt tolerated po well and has had a BM. Surgery signed off 12/15. GI was consulted for large volume ascites and new cirrhosis. IR was consulted and pt underwent paracentesis on 12/15 with 7.5L of thin yellow fluid removed. GI recommended that patient has cholelithiasis on imaging but recommended against surgical consideration as a surgical mortality would be high. If cholecystitis develops, consider cholecystostomy tube. GI recommended low-sodium diet discontinue sodium bicarb if possible. GI signed off 12/16 and patient should follow-up with his primary GI at [facility]. Subjective & 24hr Events (12/19/22): Alert and oriented x3. Stated that he feels well overall; has had regular bowel movements and tolerating po. No acute concerns. Awaiting rehab. Denies fever, chills, SOB, chest pain, nausea, vomiting      Assessment & Plan:     SIRS   Positive blood cultures  ? SBP with large ascites, cirrhosis, abdominal pain. CTAP shows no obvious source of infection but shows large volume ascites. UA not c/w infection. CT chest shows no PE and no consolidation. Rapid Covid/Flu NEG  BCX on admission grew Coag negative staph and Diphtheroids. Suspicious for contamination. Repeat [address] 12/15: NGTD  Abx: Cont Vanc (12/13-12/16); s/p Zosyn; s/p Rocephin/Flagyl empirically for SBP to avoid nephrotoxicity (12/15-12/16)  Ascitic studies  - not consistent with infection  -Cx: NGTD  -Cytology pending  Consulted ID 12/16--no signs of infectious peritonitis and blood cultures are contaminant and does not require treatment.  Leukocytosis most likely due to partial SBO/ileus which has resolved. Recommended wound care on right leg which wounds appear to be chronic/venous stasis without signs of active infection. ID recommended to stop all antibiotics and signed off 12/6. Abdominal pain, resolved  Partial SBO, resolved  Cirrhosis with ascites  KUB obtained on admission 12/13 and shows nonspecific bowel gas pattern; partial early SBO not excluded. NGT placed in ED. General surgery has seen and signed off 12/15. Suspected more d/t large volume ascites than SBO. No surgical intervention at this time. Surgery recommended remove NGT 12/15 and start diet. NGT removed and pt tolerating po well. CTAP 12/15 shows multiple dependent stones present in the gallbladder; some contours liver appearing nodular may represent cirrhosis; also findings suggestive of granuloma. Abundant ascites is present  IR was consulted and pt underwent paracentesis on 12/15 with 7.5L of thin yellow fluid removed. S/p IV albumin x3 doses  Acute hepatitis panel negative  Analgesics prn  Antiemetics prn  Liver workup obtained by GI as new dx of cirrhosis  Consider addition of diuretics when MARS resolves  GI signed off 12/16 and patient should follow-up with his primary GI at [facility]. Cholelithiasis  LFTs within normal limits. CTAP not suggestive of acute cholecystitis  Supportive care for now. Pt high risk for surgery, GI recommended against this. If develop cholecystitis, consider cholecystotomy tube    Normocytic anemia  Hgb 8.5 on 12/15,hemoglobin 10.1 on admission (baseline 7-8). Elevated on admission could be due to hemoconcentration with decrease po intake and also component of MARS  Iron studies c/w chronic disease  Monitor CBC, transfuse if hgb <7 or if symptomatic    MARS on CKD stage 3  Creatinine 1.8 on 12/13 (baseline 1.2-1.4).   Most likely due to nausea and vomiting  CTAP 12/15 shows no hydronephrosis  Avoid nephrotoxic meds  D/c IVF due to large amount of ascites  Encourage p.o. intake  D/c po sodium bicarb to limit sodium intake  Monitor BMP    Hypokalemia, resolved  Replace and recheck    Alcohol dependence  Patient denies tobacco or alcohol abuse and stated that he stopped after last discharge. Could be contributing factor to his cirrhosis  Cont folic acid  Monitor for signs/symptoms of withdrawals    Essential hypertension  Cont home Norvasc    Type 2 diabetes mellitus  Diabetic peripheral neuropathy  Cont SSI  [id number]  Diabetes management to assist    Chronic L leg diabetic ulcer  ID has seen, no signs of active infection  Wound team to assist--Needs referral to wound clinic at discharge  Recommend xeroform, foam and tiffanie 3 times per week. AT home would use hydrafera blue/ tiffanie 3 times per week. GERD  Cont PPI, change to po    Hypoalbuminemia  Mod protein-calorie malnutrition  Consulted nutrition      Anticipated discharge needs:      STR pending at this point        Diet:  ADULT DIET; Regular;  Low Sodium (2 gm)  ADULT ORAL NUTRITION SUPPLEMENT; Dinner, Breakfast; Low Calorie/High Protein Oral Supplement  DVT PPx: SCD's  Code status: Full Code    Hospital Problems:  Principal Problem:    Cirrhosis of liver with ascites ([address]  Active Problems:    Alcohol dependence ([address]    Diabetic peripheral neuropathy ([address]    Essential hypertension    Hyperlipidemia    Type 2 diabetes mellitus ([address]    Diabetic ulcer of both feet associated with type 2 diabetes mellitus ([address]    Acute kidney injury superimposed on CKD (HCC)    PAD (peripheral artery disease) (HCC)    Hypoalbuminemia    Normocytic anemia    SIRS (systemic inflammatory response syndrome) (HCC)    Abdominal pain    Moderate protein malnutrition (HCC)    Cholelithiases    Positive blood culture    Partial small bowel obstruction (HCC)    Stage 3a chronic kidney disease ([address]  Resolved Problems:    SBP (spontaneous bacterial peritonitis) ([address]      Objective:   Patient Vitals for the past 24 hrs:   Temp Pulse Resp BP SpO2   12/19/22 0820 98.1 °F (36.7 °C) 97 16 118/76 98 %   12/19/22 0354 98.1 °F (36.7 °C) 88 16 121/87 100 %   12/18/22 2357 98.2 °F (36.8 °C) 96 -- (!) 139/97 --   12/18/22 1952 97.7 °F (36.5 °C) 92 16 (!) 134/92 100 %   12/18/22 1600 97.7 °F (36.5 °C) 91 16 (!) 118/94 100 %   12/18/22 1253 97.2 °F (36.2 °C) (!) 104 16 (!) 118/92 100 %       Oxygen Therapy  SpO2: 98 %  Pulse via Oximetry: 87 beats per minute  Pulse Oximeter Device Mode: Continuous  O2 Device: None (Room air)    Estimated body mass index is 30.87 kg/m² as calculated from the following:    Height as of this encounter: 5' 11\" (1.803 m). Weight as of this encounter: 221 lb 4.8 oz (100.4 kg). Intake/Output Summary (Last 24 hours) at 12/19/2022 1000  Last data filed at 12/18/2022 2300  Gross per 24 hour   Intake 960 ml   Output --   Net 960 ml         Physical Exam:     Blood pressure 118/76, pulse 97, temperature 98.1 °F (36.7 °C), temperature source Oral, resp. rate 16, height 5' 11\" (1.803 m), weight 221 lb 4.8 oz (100.4 kg), SpO2 98 %. General:    Well nourished. Head:  Normocephalic, atraumatic  Eyes:  Sclerae appear normal.  Pupils equally round. ENT:  Nares appear normal, no drainage. Moist oral mucosa  Neck:  No restricted ROM. Trachea midline   CV:   RRR. No m/r/g. No jugular venous distension. Lungs:   CTAB. No wheezing, rhonchi, or rales. Symmetric expansion. Abdomen:   Hypoactive bowel sounds. Soft, nontender. Nondistended. Extremities: No cyanosis or clubbing. Venous stasis dermatitis on b/l LE's  Skin:     Warm and dry. Neuro:  CN II-XII grossly intact. Sensation intact. A&Ox3  Psych:  Normal mood and affect.       I have personally reviewed labs and tests showing:  Recent Labs:  Recent Results (from the past 48 hour(s))   PLEASE READ & DOCUMENT PPD TEST IN 48 HRS    Collection Time: 12/17/22  5:00 PM   Result Value Ref Range    PPD, (POC) Negative Negative    mm Induration 0 0 - 5 mm   POCT Glucose    Collection Time: 12/17/22  9:46 PM   Result Value Ref Range    POC Glucose 181 (H) 65 - 100 mg/dL    Performed by: Baldo    Magnesium    Collection Time: 12/18/22  5:29 AM   Result Value Ref Range    Magnesium 1.5 (L) 1.8 - 2.4 mg/dL   CBC    Collection Time: 12/18/22  5:29 AM   Result Value Ref Range    WBC 12.0 (H) 4.3 - 11.1 K/uL    RBC 2.76 (L) 4.23 - 5.6 M/uL    Hemoglobin 7.7 (L) 13.6 - 17.2 g/dL    Hematocrit 23.3 (L) 41.1 - 50.3 %    MCV 84.4 82 - 102 FL    MCH 27.9 26.1 - 32.9 PG    MCHC 33.0 31.4 - 35.0 g/dL    RDW 17.6 (H) 11.9 - 14.6 %    Platelets 163 111 - 717 K/uL    MPV 11.0 9.4 - 12.3 FL    nRBC 0.02 0.0 - 0.2 K/uL   Comprehensive Metabolic Panel    Collection Time: 12/18/22  5:29 AM   Result Value Ref Range    Sodium 135 133 - 143 mmol/L    Potassium 4.1 3.5 - 5.1 mmol/L    Chloride 105 101 - 110 mmol/L    CO2 22 21 - 32 mmol/L    Anion Gap 8 2 - 11 mmol/L    Glucose 141 (H) 65 - 100 mg/dL    BUN 15 6 - 23 MG/DL    Creatinine 1.40 0.8 - 1.5 MG/DL    Est, Glom Filt Rate 59 (L) >60 ml/min/1.73m2    Calcium 7.3 (L) 8.3 - 10.4 MG/DL    Total Bilirubin 0.6 0.2 - 1.1 MG/DL    ALT 7 (L) 12 - 65 U/L    AST 13 (L) 15 - 37 U/L    Alk Phosphatase 75 50 - 136 U/L    Total Protein 5.8 (L) 6.3 - 8.2 g/dL    Albumin 1.9 (L) 3.5 - 5.0 g/dL    Globulin 3.9 2.8 - 4.5 g/dL    Albumin/Globulin Ratio 0.5 0.4 - 1.6     PLEASE READ & DOCUMENT PPD TEST IN 72 HRS    Collection Time: 12/18/22  4:00 PM   Result Value Ref Range    PPD, (POC) Negative Negative    mm Induration 0 0 - 5 mm   POCT Glucose    Collection Time: 12/18/22  4:49 PM   Result Value Ref Range    POC Glucose 382 (H) 65 - 100 mg/dL    Performed by: Kavya    POCT Glucose    Collection Time: 12/18/22  8:42 PM   Result Value Ref Range    POC Glucose 149 (H) 65 - 100 mg/dL    Performed by: Juani    CBC    Collection Time: 12/19/22  5:33 AM   Result Value Ref Range    WBC 11.6 (H) 4.3 - 11.1 K/uL    RBC 2.93 (L) 4.23 - 5.6 M/uL    Hemoglobin 8.2 (L) 13.6 - 17.2 g/dL    Hematocrit 25.0 (L) 41.1 - 50.3 %    MCV 85.3 82 - 102 FL    MCH 28.0 26.1 - 32.9 PG    MCHC 32.8 31.4 - 35.0 g/dL    RDW 17.5 (H) 11.9 - 14.6 %    Platelets 927 729 - 110 K/uL    MPV 10.5 9.4 - 12.3 FL    nRBC 0.02 0.0 - 0.2 K/uL   Comprehensive Metabolic Panel    Collection Time: 12/19/22  5:33 AM   Result Value Ref Range    Sodium 135 133 - 143 mmol/L    Potassium 4.4 3.5 - 5.1 mmol/L    Chloride 104 101 - 110 mmol/L    CO2 24 21 - 32 mmol/L    Anion Gap 7 2 - 11 mmol/L    Glucose 117 (H) 65 - 100 mg/dL    BUN 18 6 - 23 MG/DL    Creatinine 1.60 (H) 0.8 - 1.5 MG/DL    Est, Glom Filt Rate 50 (L) >60 ml/min/1.73m2    Calcium 7.5 (L) 8.3 - 10.4 MG/DL    Total Bilirubin 0.6 0.2 - 1.1 MG/DL    ALT 7 (L) 12 - 65 U/L    AST 12 (L) 15 - 37 U/L    Alk Phosphatase 81 50 - 136 U/L    Total Protein 6.1 (L) 6.3 - 8.2 g/dL    Albumin 1.8 (L) 3.5 - 5.0 g/dL    Globulin 4.3 2.8 - 4.5 g/dL    Albumin/Globulin Ratio 0.4 0.4 - 1.6     POCT Glucose    Collection Time: 12/19/22  8:21 AM   Result Value Ref Range    POC Glucose 135 (H) 65 - 100 mg/dL    Performed by: [facility]        I have personally reviewed imaging studies showing: Other Studies:  IR US GUIDED PARACENTESIS   Final Result   Uncomplicated ultrasound guided paracentesis. CT ABDOMEN PELVIS WO CONTRAST Additional Contrast? Oral   Final Result      1. Ascites. 2. Cholelithiasis. XR ABDOMEN (KUB) (SINGLE AP VIEW)   Final Result   1. The enteric tube tip is in the proximal to mid stomach. 2. Unchanged mildly distended small bowel loops. XR ABDOMEN (KUB) (SINGLE AP VIEW)   Final Result   1. Nonspecific bowel gas pattern. Partial or early small bowel obstruction not   excluded. 2.  Ascites. CT CHEST PULMONARY EMBOLISM W CONTRAST   Final Result   1. No evidence of PE.   2.  Scattered bibasilar atelectasis. 3.  Coronary artery and aortic calcifications. 4.  Ascites. 5.  Gallstones.             XR CHEST (2 VW)   Final Result   1. Diminished lung lines with associated opacification bibasilar atelectasis   without focal infiltrative opacity. Current Meds:  Current Facility-Administered Medications   Medication Dose Route Frequency    zolpidem (AMBIEN) tablet 5 mg  5 mg Oral Nightly PRN    pantoprazole (PROTONIX) tablet 40 mg  40 mg Oral QAM AC    diatrizoate meglumine-sodium (GASTROGRAFIN) 66-10 % solution 15 mL  15 mL Oral ONCE PRN    amLODIPine (NORVASC) tablet 10 mg  10 mg Oral Daily    cholestyramine light packet 4 g  4 g Oral BID    folic acid (FOLVITE) tablet 1 mg  1 mg Oral Daily    vitamin B-12 (CYANOCOBALAMIN) tablet 1,000 mcg  1,000 mcg Oral Daily    insulin lispro (HUMALOG) injection vial 0-4 Units  0-4 Units SubCUTAneous TID WC    insulin lispro (HUMALOG) injection vial 0-4 Units  0-4 Units SubCUTAneous Nightly    glucose chewable tablet 16 g  4 tablet Oral PRN    dextrose bolus 10% 125 mL  125 mL IntraVENous PRN    Or    dextrose bolus 10% 250 mL  250 mL IntraVENous PRN    glucagon (rDNA) injection 1 mg  1 mg SubCUTAneous PRN    dextrose 10 % infusion   IntraVENous Continuous PRN    sodium chloride flush 0.9 % injection 5-40 mL  5-40 mL IntraVENous 2 times per day    sodium chloride flush 0.9 % injection 5-40 mL  5-40 mL IntraVENous PRN    0.9 % sodium chloride infusion   IntraVENous PRN    ondansetron (ZOFRAN-ODT) disintegrating tablet 4 mg  4 mg Oral Q8H PRN    Or    ondansetron (ZOFRAN) injection 4 mg  4 mg IntraVENous Q6H PRN    polyethylene glycol (GLYCOLAX) packet 17 g  17 g Oral Daily PRN    acetaminophen (TYLENOL) tablet 650 mg  650 mg Oral Q6H PRN    Or    acetaminophen (TYLENOL) suppository 650 mg  650 mg Rectal Q6H PRN    hydrALAZINE (APRESOLINE) injection 10 mg  10 mg IntraVENous Q6H PRN    morphine injection 1 mg  1 mg IntraVENous Q4H PRN       Signed: Ney Monte DO    Part of this note may have been written by using a voice dictation software.   The note has been proof read but may still contain some grammatical/other typographical errors.

## 2022-12-19 NOTE — PROGRESS NOTES
ACUTE PHYSICAL THERAPY GOALS:   (Developed with and agreed upon by patient and/or caregiver.)  Pt will perform supine to/from sit with mod I in 7 treatment days. Pt will perform sit to/from stand with mod I and LRAD in 7 treatment days. Pt will ambulate 150 ft with mod I and LRAD in 7 treatment days. Pt will negotiate 2 stairs with mod I and rail in 7 treatment days. Pt will be independent with HEP in 7 days. PHYSICAL THERAPY: Daily Note PM   (Link to Caseload Tracking: PT Visit Days : 2  Time In/Out PT Charge Capture  Rehab Caseload Tracker  Orders    Robyn Saxena is a 65 y.o. male   PRIMARY DIAGNOSIS: Cirrhosis of liver with ascites ([address]  Septicemia ([address] [A41.9]  SIRS (systemic inflammatory response syndrome) (HCC) [R65.10]  Abdominal pain, unspecified abdominal location [R10.9]  Swelling of lower extremity [M79.89]       Inpatient: Payor: Katharina Ballesteros / Plan: Amaya Stain / Product Type: *No Product type* /     ASSESSMENT:     REHAB RECOMMENDATIONS:   Recommendation to date pending progress:  Setting:  Inpatient Rehab Facility    Equipment:    To Be Determined     ASSESSMENT:  Mr. Dion Abel presents in supine, agreeable to session. Extra time is spent on sit to stand training, with emphasis on not using knee hyperextension against furniture to stand. This habit indicates continued bilateral LE weakness. Upon entering, Pnt is agreeable to PT treatment. he reports no pain  at rest. Pnt performed supine > sit with min A, sitting EOB with good sitting balance control. Sit > stand with min Ax2 using RW. Gait 5 x 30 ft with min A, RW, and chair follow, cues for step length. Gait is noted to be slow and shuffled. Stand > sit with min A, followed by positioning for comfort. At end of session pt up in bedside chair with all needs within reach, alarm activated for safety, RN notified. Overall, good progress today as pnt improved in activity tolerance and gait distance.  Pnt continues to present as functioning below his baseline, with deficits in mobility including transfers, gait, balance, and activity tolerance. Pt will continue to benefit from skilled therapy services to address stated deficits to promote return to highest level of function, independence, and safety. Will continue to follow.        SUBJECTIVE:   Mr. Cooper Chahal states, \"I can walk more\"     Social/Functional Lives With: Spouse  Type of Home: House  Home Layout: One level  Home Access: Stairs to enter with rails  Entrance Stairs - Number of Steps: 2  Home Equipment: Rony Regalado, [address] Help From: Family  ADL Assistance: Independent  Homemaking Assistance: Needs assistance  Homemaking Responsibilities: Yes  Ambulation Assistance: Independent  Transfer Assistance: Independent  OBJECTIVE:     PAIN: Destiney Evi / O2: Ora Carver / Severa Broom / Darwin Nogueira:   Pre Treatment: 0         Post Treatment: 0 Vitals        Oxygen    IV    RESTRICTIONS/PRECAUTIONS:  Restrictions/Precautions  Restrictions/Precautions: Fall Risk  Restrictions/Precautions: Fall Risk     MOBILITY: I Mod I S SBA CGA Min Mod Max Total  NT x2 Comments:   Bed Mobility    Rolling [] [] [] [] [] [x] [] [] [] [] []    Supine to Sit [] [] [] [] [] [x] [] [] [] [] []    Scooting [] [] [] [] [] [x] [] [] [] [] []    Sit to Supine [] [] [] [] [] [] [] [] [] [] []    Transfers    Sit to Stand [] [] [] [] [] [x] [] [] [] [] [x]    Bed to Chair [] [] [] [] [] [] [] [] [] [x] []    Stand to Sit [] [] [] [] [] [x] [] [] [] [] [x]     [] [] [] [] [] [] [] [] [] [] []    I=Independent, Mod I=Modified Independent, S=Supervision, SBA=Standby Assistance, CGA=Contact Guard Assistance,   Min=Minimal Assistance, Mod=Moderate Assistance, Max=Maximal Assistance, Total=Total Assistance, NT=Not Tested    BALANCE: Good Fair+ Fair Fair- Poor NT Comments   Sitting Static [x] [] [] [] [] []    Sitting Dynamic [] [x] [] [] [] []              Standing Static [] [] [x] [] [] []    Standing Dynamic [] [] [] [x] [] []      GAIT: I Mod I S SBA CGA Min Mod Max Total  NT x2 Comments:   Level of Assistance [] [] [] [] [] [x] [] [] [] [] [x] Chair follow   Distance 5 x 30 feet    DME Gait Belt and Rollator    Gait Quality Decreased jason , Decreased step clearance, Decreased step length, and Decreased stance    Weightbearing Status      Stairs      I=Independent, Mod I=Modified Independent, S=Supervision, SBA=Standby Assistance, CGA=Contact Guard Assistance,   Min=Minimal Assistance, Mod=Moderate Assistance, Max=Maximal Assistance, Total=Total Assistance, NT=Not Tested    PLAN:   FREQUENCY AND DURATION: 3 times/week for duration of hospital stay or until stated goals are met, whichever comes first.    TREATMENT:   TREATMENT:   Therapeutic Activity (28 Minutes): Therapeutic activity included Rolling, Supine to Sit, Ambulation on level ground, Sitting balance , and Standing balance to improve functional Activity tolerance, Balance, Coordination, Mobility, Strength, and ROM.     TREATMENT GRID:  N/A    AFTER TREATMENT PRECAUTIONS: Chair, Needs within reach, and RN notified    INTERDISCIPLINARY COLLABORATION:  RN/ PCT, PT/ PTA, and Rehab Attendant    EDUCATION: Education Given To: Patient    TIME IN/OUT:  Time In: 16 091 732  Time Out: 364 [facility]  Minutes: [address] Fabiana Mims PT

## 2022-12-20 LAB
BACTERIA SPEC CULT: NORMAL
BACTERIA SPEC CULT: NORMAL
GLUCOSE BLD STRIP.AUTO-MCNC: 174 MG/DL (ref 65–100)
GLUCOSE BLD STRIP.AUTO-MCNC: 225 MG/DL (ref 65–100)
GLUCOSE BLD STRIP.AUTO-MCNC: 246 MG/DL (ref 65–100)
GLUCOSE BLD STRIP.AUTO-MCNC: 60 MG/DL (ref 65–100)
GLUCOSE BLD STRIP.AUTO-MCNC: 81 MG/DL (ref 65–100)
SARS-COV-2: NORMAL
SERVICE CMNT-IMP: ABNORMAL
SERVICE CMNT-IMP: NORMAL

## 2022-12-20 PROCEDURE — 6370000000 HC RX 637 (ALT 250 FOR IP): Performed by: HOSPITALIST

## 2022-12-20 PROCEDURE — 2580000003 HC RX 258: Performed by: FAMILY MEDICINE

## 2022-12-20 PROCEDURE — 82962 GLUCOSE BLOOD TEST: CPT

## 2022-12-20 PROCEDURE — 6370000000 HC RX 637 (ALT 250 FOR IP): Performed by: FAMILY MEDICINE

## 2022-12-20 PROCEDURE — U0003 INFECTIOUS AGENT DETECTION BY NUCLEIC ACID (DNA OR RNA); SEVERE ACUTE RESPIRATORY SYNDROME CORONAVIRUS 2 (SARS-COV-2) (CORONAVIRUS DISEASE [COVID-19]), AMPLIFIED PROBE TECHNIQUE, MAKING USE OF HIGH THROUGHPUT TECHNOLOGIES AS DESCRIBED BY CMS-2020-01-R: HCPCS

## 2022-12-20 PROCEDURE — 1100000003 HC PRIVATE W/ TELEMETRY

## 2022-12-20 RX ADMIN — CYANOCOBALAMIN TAB 1000 MCG 1000 MCG: 1000 TAB at 09:10

## 2022-12-20 RX ADMIN — AMLODIPINE BESYLATE 10 MG: 10 TABLET ORAL at 09:10

## 2022-12-20 RX ADMIN — CHOLESTYRAMINE 4 G: 4 POWDER, FOR SUSPENSION ORAL at 20:46

## 2022-12-20 RX ADMIN — PANTOPRAZOLE SODIUM 40 MG: 40 TABLET, DELAYED RELEASE ORAL at 05:46

## 2022-12-20 RX ADMIN — CHOLESTYRAMINE 4 G: 4 POWDER, FOR SUSPENSION ORAL at 09:10

## 2022-12-20 RX ADMIN — ZOLPIDEM TARTRATE 5 MG: 5 TABLET ORAL at 23:28

## 2022-12-20 RX ADMIN — SODIUM CHLORIDE, PRESERVATIVE FREE 5 ML: 5 INJECTION INTRAVENOUS at 20:46

## 2022-12-20 RX ADMIN — FOLIC ACID 1 MG: 1 TABLET ORAL at 09:10

## 2022-12-20 RX ADMIN — SODIUM CHLORIDE, PRESERVATIVE FREE 5 ML: 5 INJECTION INTRAVENOUS at 09:10

## 2022-12-20 NOTE — ADT AUTH CERT
Patient Demographics    Name Patient ID SSN Gender Identity Birth Date   Kelsey Ramires [id number]  Male [birth year] (57 yrs)     Address Phone Email Employer    [employer].   [address] 11[facility] [phone] (S)   [phone] (M) -- [address] Race Occupation Emp Status    Karon Marcy / [address] -- Disabled      Reg Status PCP Date Last Verified Next Review Date    Verified -- 22      Admission Date Discharge Date Admitting Provider     22 -- Jody Ground, DO       Marital Status Adventist       None        Emergency Contact 1   [facility]  [facility])     [address] [place] [address] [de-identified]  [address] [facility] Name/Sex/Relation Subscriber  Subscriber Address/Phone Subscriber Emp/Emp Phone   1. [address] [place] [address]   [id number] Ulsariah Oz - Male   (Self) 1965 [address] [place] [address]  [facility]   [phone](J) DISABLED    2.  Lacey Ray   [id number] Al Sprinkle - Female   (Spouse) 1963 [address] [place] [address]  [facility]   [phone](B) OTHER      Utilization Reviews       Systemic or Infectious Condition GRG - Care Day 7 (2022) by Merlin Arnold, RN       Review Entered Review Status   2022 1110 Completed      Criteria Review      Care Day: 7 Care Date: 2022 Level of Care: Inpatient Floor    Guideline Day 3    Level Of Care    (X) * Activity level acceptable    Clinical Status    (X) * Hemodynamic stability    2022 11:10 AM EST by Marylou Calle      /76, P 97, R 16, 02 SAT 98% RA    (X) * Respiratory status acceptable    (X) * Neurologic status acceptable    (X) * Temperature status acceptable    2022 11:10 AM EST by Marylou Calle      98.1    (X) * No infection, or status acceptable    (X) * No neutropenia, or status acceptable    ( ) * Isolation not indicated, or is performable at next level of care    2022 11:09 AM EST by Marylou Calle CONTACT    ( ) * Electrolyte status acceptable    12/20/2022 11:09 AM EST by Suzan Rossvidal      CA 7.5    ( ) * General Discharge Criteria met    Interventions    (X) * Intake acceptable    ( ) * No inpatient interventions needed    * Milestone   Additional Notes   Hospitalist Progress Note       Alert and oriented x3. Stated that he feels well overall; has had regular bowel movements and tolerating po. No acute concerns. Awaiting rehab. EXAM: General:          Well nourished. Head:               Normocephalic, atraumatic   Eyes:               Sclerae appear normal.  Pupils equally round. ENT:                Nares appear normal, no drainage. Moist oral mucosa   Neck:               No restricted ROM. Trachea midline    CV:                  RRR. No m/r/g. No jugular venous distension. Lungs:             CTAB. No wheezing, rhonchi, or rales. Symmetric expansion. Abdomen:        Hypoactive bowel sounds. Soft, nontender. Nondistended. Extremities:     No cyanosis or clubbing. Venous stasis dermatitis on b/l LE's   Skin:                Warm and dry. Neuro:             CN II-XII grossly intact. Sensation intact. A&Ox3   Psych:             Normal mood and affect. CREAT 1.60, EGFR 50, GLUC 117, TP 6.1,  ALB 1.8, ALT 7, AST 12, WBC 11.6, RB 2.93, HGB 8.2, HCT 25.0,         Assessment & Plan:       SIRS    Positive blood cultures   ? SBP with large ascites, cirrhosis, abdominal pain. CTAP shows no obvious source of infection but shows large volume ascites. UA not c/w infection. CT chest shows no PE and no consolidation. Rapid Covid/Flu NEG   BCX on admission grew Coag negative staph and Diphtheroids. Suspicious for contamination.     Repeat [address] 12/15: NGTD   Abx: Cont Vanc (12/13-12/16); s/p Zosyn; s/p Rocephin/Flagyl empirically for SBP to avoid nephrotoxicity (12/15-12/16)   Ascitic studies   - not consistent with infection   -Cx: NGTD   -Cytology pending   Consulted ID 12/16--no signs of infectious peritonitis and blood cultures are contaminant and does not require treatment. Leukocytosis most likely due to partial SBO/ileus which has resolved. Recommended wound care on right leg which wounds appear to be chronic/venous stasis without signs of active infection. ID recommended to stop all antibiotics and signed off 12/6. Abdominal pain, resolved   Partial SBO, resolved   Cirrhosis with ascites   KUB obtained on admission 12/13 and shows nonspecific bowel gas pattern; partial early SBO not excluded. NGT placed in ED. General surgery has seen and signed off 12/15. Suspected more d/t large volume ascites than SBO. No surgical intervention at this time. Surgery recommended remove NGT 12/15 and start diet. NGT removed and pt tolerating po well. CTAP 12/15 shows multiple dependent stones present in the gallbladder; some contours liver appearing nodular may represent cirrhosis; also findings suggestive of granuloma. Abundant ascites is present   IR was consulted and pt underwent paracentesis on 12/15 with 7.5L of thin yellow fluid removed. S/p IV albumin x3 doses   Acute hepatitis panel negative   Analgesics prn   Antiemetics prn   Liver workup obtained by GI as new dx of cirrhosis   Consider addition of diuretics when MARS resolves   GI signed off 12/16 and patient should follow-up with his primary GI at [facility]. Cholelithiasis   LFTs within normal limits. CTAP not suggestive of acute cholecystitis   Supportive care for now. Pt high risk for surgery, GI recommended against this. If develop cholecystitis, consider cholecystotomy tube       Normocytic anemia   Hgb 8.5 on 12/15,hemoglobin 10.1 on admission (baseline 7-8).  Elevated on admission could be due to hemoconcentration with decrease po intake and also component of MARS   Iron studies c/w chronic disease   Monitor CBC, transfuse if hgb <7 or if symptomatic       MARS on CKD stage 3   Creatinine 1.8 on 12/13 (baseline 1.2-1.4). Most likely due to nausea and vomiting   CTAP 12/15 shows no hydronephrosis   Avoid nephrotoxic meds   D/c IVF due to large amount of ascites   Encourage p.o. intake   D/c po sodium bicarb to limit sodium intake   Monitor BMP       Hypokalemia, resolved   Replace and recheck       Alcohol dependence   Patient denies tobacco or alcohol abuse and stated that he stopped after last discharge. Could be contributing factor to his cirrhosis   Cont folic acid   Monitor for signs/symptoms of withdrawals       Essential hypertension   Cont home Norvasc       Type 2 diabetes mellitus   Diabetic peripheral neuropathy   Cont SSI   xE2A--1.0   Diabetes management to assist       Chronic L leg diabetic ulcer   ID has seen, no signs of active infection   Wound team to assist--Needs referral to wound clinic at discharge   Recommend xeroform, foam and tiffanie 3 times per week. AT home would use hydrafera blue/ tiffanie 3 times per week. GERD   Cont PPI, change to po       Hypoalbuminemia   Mod protein-calorie malnutrition   Consulted nutrition         Anticipated discharge needs:       STR pending at this point           PT NOTE:  supine > sit with min A, sitting EOB with good sitting balance control. Sit > stand with min Ax2 using RW. Gait 5 x 30 ft with min A, RW, and chair follow, cues for step length. Gait is noted to be slow and shuffled. Stand > sit with min A, followed by positioning for comfort. At end of session pt up in bedside chair with all needs within reach, alarm activated for safety, RN notified. Overall, good progress today as pnt improved in activity tolerance and gait distance            CM NOTE: Call placed to admission liaison, Jonah Pichardo, with 9th floor [facility]. IRC continuing to follow and they are awaiting updated therapy documentation to confirm patient remain appropriate for [facility] level of care. SNF bed offers have been received as a secondary plan.  PPD was completed on 12/18 and PCR Covid testing ordered today to be obtained tomorrow AM, 12/20. Patient will need insurance authorization with [facility] for either STR at [facility] or STR at [facility]. CM will follow up with 9th floor [facility] tomorrow AM, 12/20 to confirm if they have accepted patient to their program and plan to initiate insurance authorization. SSI SC Q HS (4U X1),  SAME SCHED MEDS            Systemic or Infectious Condition GRG - Care Day 6 (12/18/2022) by Conrad Colby RN       Review Entered Review Status   12/20/2022 1101 Completed      Criteria Review      Care Day: 6 Care Date: 12/18/2022 Level of Care: Inpatient Floor    Guideline Day 3    Level Of Care    (X) * Activity level acceptable    Clinical Status    (X) * Hemodynamic stability    12/20/2022 11:01 AM EST by Maude Lung      /77, P 83, R 16, 02 SAT  100% RA    (X) * Respiratory status acceptable    (X) * Neurologic status acceptable    (X) * Temperature status acceptable    12/20/2022 11:01 AM EST by Maude Lung      97.3    (X) * No infection, or status acceptable    (X) * No neutropenia, or status acceptable    ( ) * Isolation not indicated, or is performable at next level of care    12/20/2022 11:01 AM EST by Maude Lung      CONTACT ISOLATION    ( ) * Electrolyte status acceptable    12/20/2022 11:01 AM EST by Maude Lung      MAG 1.5, CA 7.3    ( ) * General Discharge Criteria met    Interventions    (X) * Intake acceptable    ( ) * No inpatient interventions needed    * Milestone   Additional Notes   Hospitalist Progress Note       Alert and oriented x3. Stated that he feels well. Sleeping in bed. No acute concerns. Awaiting rehab. EXAM:  General:          Well nourished. Head:               Normocephalic, atraumatic   Eyes:               Sclerae appear normal.  Pupils equally round. ENT:                Nares appear normal, no drainage. Moist oral mucosa   Neck:               No restricted ROM.   Trachea midline    CV: RRR.  No m/r/g. No jugular venous distension. Lungs:             CTAB. No wheezing, rhonchi, or rales. Symmetric expansion. Abdomen:        Hypoactive bowel sounds. Soft, nontender. Nondistended. Extremities:     No cyanosis or clubbing. Venous stasis dermatitis on b/l LE's   Skin:                Warm and dry. Neuro:             CN II-XII grossly intact. Sensation intact. A&Ox3   Psych:             Normal mood and affect. EGFR 59, GLUC 141, TP 5.8, ALB 1.9, ALT 7, AST 13, WBC 12.0, RBC 2.76, HGB 7.7, HCT 23.3,          STR pending at this point            TYLENOL 650MG PO Q 6 HR PRN X2, SAME SCHED MEDS            Systemic or Infectious Condition GRG - Care Day 5 (12/17/2022) by Craig Saini RN       Review Entered Review Status   12/20/2022 1054 Completed      Criteria Review      Care Day: 5 Care Date: 12/17/2022 Level of Care: Inpatient Floor    Guideline Day 3    Level Of Care    (X) * Activity level acceptable    Clinical Status    (X) * Hemodynamic stability    12/20/2022 10:54 AM EST by [employer]      /97, P 86, R 16, 02 % RA    (X) * Respiratory status acceptable    (X) * Neurologic status acceptable    (X) * Temperature status acceptable    12/20/2022 10:54 AM EST by Kar Kolb      97.2    (X) * No infection, or status acceptable    (X) * No neutropenia, or status acceptable    ( ) * Isolation not indicated, or is performable at next level of care    12/20/2022 10:54 AM EST by [employer]      MRSA CONTACT    ( ) * Electrolyte status acceptable    12/20/2022 10:54 AM EST by [employer]      CA  7.8, MAG 1.3    ( ) * General Discharge Criteria met    Interventions    (X) * Intake acceptable    ( ) * No inpatient interventions needed    * Milestone   Additional Notes   Hospitalist Progress Note       Alert and oriented x3. Stated that he feels well. Has had a bowel movement and tolerating p.o. well. Marylee Fendt to go to rehab.        EXAM: General:          Well nourished. Head:               Normocephalic, atraumatic   Eyes:               Sclerae appear normal.  Pupils equally round. ENT:                Nares appear normal, no drainage. Moist oral mucosa   Neck:               No restricted ROM. Trachea midline    CV:                  RRR. No m/r/g. No jugular venous distension. Lungs:             CTAB. No wheezing, rhonchi, or rales. Symmetric expansion. Abdomen:        Hypoactive bowel sounds. Soft, nontender. Nondistended. Extremities:     No cyanosis or clubbing. Venous stasis dermatitis on b/l LE's   Skin:                Warm and dry. Neuro:             CN II-XII grossly intact. Sensation intact. A&Ox3   Psych:             Normal mood and affect. EGFR 54, GLUC 115,  ALB 2.2, GLOB 5.0, ALT 8, WBC 14.0M RBC 3.37, HGB 9.5, HCT 29.0,        Assessment & Plan:       SIRS    Positive blood cultures-Consulted ID 12/16--no signs of infectious peritonitis and blood cultures are contaminant and does not require treatment. Leukocytosis most likely due to partial SBO/ileus which has resolved. Recommended wound care on right leg which wounds appear to be chronic/venous stasis without signs of active infection. ID recommended to stop all antibiotics and signed off 12/6. Abdominal pain, resolved, Partial SBO, resolved, Cirrhosis with ascites-GI signed off 12/16 and patient should follow-up with his primary GI at [facility]. Cholelithiasis-LFTs within normal limits. CTAP not suggestive of acute cholecystitis   Supportive care for now. Pt high risk for surgery, GI recommended against this. If develop cholecystitis, consider cholecystotomy tube. Normocytic anemia-Hgb 8.5 on 12/15 hemoglobin 10.1 on admission (baseline 7-8).  Elevated could be due to hemoconcentration with GI loss and also component of MARS   Iron studies c/w chronic disease   Monitor CBC, transfuse if hgb <7 or if symptomatic       MARS on CKD stage 3- Creatinine 1.8 on 12/13 (baseline 1.2-1.4). Most likely due to nausea and vomiting   CTAP 12/15 shows no hydronephrosis   Avoid nephrotoxic meds   D/c IVF due to large amount of ascites   Encourage p.o. intake   D/c po sodium bicarb to limit sodium intake   Monitor BMP       Hypokalemia, resolved   Replace and recheck       Alcohol dependence   Patient denies tobacco or alcohol abuse and stated that he stopped after last discharge. Could be contributing factor to his cirrhosis   Cont folic acid   Monitor for signs/symptoms of withdrawals       Essential hypertension   Cont home Norvasc       Type 2 diabetes mellitus   Diabetic peripheral neuropathy   Cont SSI   pL8U--6.0   Diabetes management to assist       Chronic L leg diabetic ulcer   ID has seen, no signs of active infection   Wound team to assist--Needs referral to wound clinic at discharge   Recommend xeroform, foam and tiffanie 3 times per week. AT home would use hydrafera blue/ tiffanie 3 times per week. GERD   Cont PPI, change to po       Hypoalbuminemia   Mod protein-calorie malnutrition   Consulted nutrition         Anticipated discharge needs:       STR pending at this point       ALBUMIN HUMAN IV DC'D,  PROTONIX 40MG PO QD,  EFFER K PO DC'D, NA BICARB DC'D,  TYLENOL 650MG PO Q 6 HR PRN X 2, OTHER SCHED MEDS ARE SAME            Systemic or Infectious Condition GRG - Care Day 4 (12/16/2022) by Michell Rodriguez RN       Review Entered Review Status   12/20/2022 1051 Completed      Criteria Review      Care Day: 4 Care Date: 12/16/2022 Level of Care: Inpatient Floor    Guideline Day 3    Level Of Care    (X) * Activity level acceptable    12/20/2022 10:43 AM EST by Alexa Daly      OIT NOTE:  completed supine to sit with standby assistance. Algis Broaden Algis Broaden Algis Broaden Stood with minimal assistance from edge of bed then completed functional mobility to the bedside chair with contact guard assistance    Clinical Status    (X) * Hemodynamic stability    12/20/2022 10:43 AM EST by Napolean Shames      /94, P 91, R 18, 02 SAT 99% RA    (X) * Respiratory status acceptable    12/20/2022 10:43 AM EST by Napolean Shames      Lungs:             CTAB. No wheezing, rhonchi, or rales. Symmetric expansion    (X) * Neurologic status acceptable    12/20/2022 10:43 AM EST by Napolean Shames      A&O X3    (X) * Temperature status acceptable    12/20/2022 10:43 AM EST by Napolean Shames      97.4    (X) * No infection, or status acceptable    (X) * No neutropenia, or status acceptable    12/20/2022 10:43 AM EST by Napolean Shames      SEG NEUTS 66    ( ) * Isolation not indicated, or is performable at next level of care    12/20/2022 10:43 AM EST by Napolean Shames      MRSA, CONTACT    ( ) * Electrolyte status acceptable    12/20/2022 10:43 AM EST by Napolean Shames      K 3.3, CA 7.3    ( ) * General Discharge Criteria met    Interventions    (X) * Intake acceptable    12/20/2022 10:43 AM EST by Napolean Shames      Tolerating p.o    ( ) * No inpatient interventions needed    * Milestone   Additional Notes   Hospitalist Progress Note       Alert and oriented x3. Stated that he feels better today. Tolerating p.o. No nausea/vomiting and no abdominal pain. Has had a bowel movement this morning that was loose. EXAM:  General:          Well nourished. Head:               Normocephalic, atraumatic   Eyes:               Sclerae appear normal.  Pupils equally round. ENT:                Nares appear normal, no drainage. Moist oral mucosa   Neck:               No restricted ROM. Trachea midline    CV:                  RRR. No m/r/g. No jugular venous distension. Lungs:             CTAB. No wheezing, rhonchi, or rales. Symmetric expansion. Abdomen:        Hypoactive bowel sounds. Soft, nontender. Nondistended. Extremities:     No cyanosis or clubbing. Venous stasis dermatitis on b/l LE's   Skin:                Warm and dry. Neuro:             CN II-XII grossly intact. Sensation intact. A&Ox3   Psych:             Normal mood and affect. CREAT 1.70, EGFR 47, MAG 1.3, GLUC 120, TP 6.1, ALB 1.4, GLOB 4.7  ALT <6, WBC 12.9, RBC 3.03, HB 8.5, HCT 26.3, PT  17.5,         Assessment & Plan:       SIRS    Positive blood cultures   ? SBP with large ascites, cirrhosis, abdominal pain. CTAP shows no obvious source of infection but shows large volume ascites. UA not c/w infection. CT chest shows no PE and no consolidation. Rapid Covid/Flu NEG   BCX on admission grew Coag negative staph and Diphtheroids. Suspicious for contamination. Repeat [address] 12/15: NGTD   Abx: Cont Vanc (12/13-12/16); s/p Zosyn; s/p Rocephin/Flagyl empirically for SBP to avoid nephrotoxicity (12/15-12/16)   Ascitic studies   - not consistent with infection   -Cx pending   -Cytology pending   Consulted ID 12/16, appreciate recs--no signs of infectious peritonitis and blood cultures are contaminant and does not require treatment. Leukocytosis most likely due to partial SBO/ileus which has resolved. Recommended wound care on right leg which wounds appear to be chronic/venous stasis without signs of active infection. ID recommended to stop all antibiotics and signed off 12/6. Abdominal pain, resolved   Cirrhosis with ascites   KUB obtained on admission 12/13 and shows nonspecific bowel gas pattern; partial early SBO not excluded. General surgery has seen and signed off 12/15. Suspected more d/t large volume ascites than SBO. No surgical intervention at this time   CTAP 12/15 shows multiple dependent stones present in the gallbladder; some contours liver appearing nodular may represent cirrhosis; also findings suggestive of granuloma. Abundant ascites is present   Acute hepatitis panel negative   NGT placed in ED. Surgery recommended remove NGT 12/15 and start diet   Analgesics prn   Antiemetics prn   IR consulted for paracentesis.  Ascitic studies ordered   GI consulted, appreciate recs   Liver workup obtained by GI as new dx of cirrhosis       Cholelithiasis   LFTs within normal limits. CTAP not suggestive of acute cholecystitis   F/u with general surgery outpatient       Normocytic anemia   Hgb 8.5 on 12/15 hemoglobin 10.1 on admission (baseline 7-8). Elevated could be due to hemoconcentration with GI loss and also component of MARS   Iron studies c/w chronic disease   Monitor CBC, transfuse if hgb <7 or if symptomatic       MARS on CKD stage 3   Creatinine 1.8 on 12/13 (baseline 1.2-1.4). Most likely due to nausea and vomiting   CTAP 12/15 shows no hydronephrosis   Avoid nephrotoxic meds   D/c IVF due to large amount of ascites   Encourage p.o. intake   Cont home bicarb   Monitor BMP       Hypokalemia   Replace and recheck   Check Mag       Alcohol dependence   Patient denies tobacco or alcohol abuse and stated that he stopped after last discharge. Could be contributing factor to his cirrhosis   Cont folic acid   Monitor for signs/symptoms of withdrawals       Essential hypertension   Cont home Norvasc       Type 2 diabetes mellitus   Diabetic peripheral neuropathy   Cont SSI   mN3A--2.0   Diabetes management to assist       Chronic L leg diabetic ulcer   ID has seen, no signs of active infection   Wound team to assist--Needs referral to wound clinic at discharge   Recommend xeroform, foam and tiffanie 3 times per week. AT home would use hydrafera blue/ tiffanie 3 times per week. GERD   Cont PPI, change to po       Hypoalbuminemia   Mod protein-calorie malnutrition   Consulted nutrition         Anticipated discharge needs:       2-3 days pending Gi recs, cultures. PT/OT--[facility]           [facility] Progress Note       Plan:       - Supportive care. - Paracentesis 12/15 with fluid studies pending - fluid for alb, amylase, cell count, cx, cytology, glucose, LDH, and total protein. He has been on ATBXs for sepsis. - CT scan abd/pelvis 12/15 w Ascites and Cholelithiasis.    - Consider diuretics if and when renal function improves. Cr 1.7 today   - 2 gm Na + diet once diet resumed. - Obtain liver work up labs as cirrhosis appears to be new dx. EGD recently without signs of esophageal varices or PHG. - Avoid ETOH use. - Follow CBC, CMP, PT, INR daily. Infectious Disease Consult       Impression:   SIRs, elevated WBC and slightly tachycardic on admission   Blood cx 12/13 1/2 bottle with staph epi - contamination likely   Repeat blood cx 12/15 NGTD   Partial SBO, no surgical intervention   Alcoholic cirrhosis w ascites s/p paracentesis 12/15/22, WBC from ascitic fluid not indicative of infection   Left lower extremity wound   MARS, improving    Diabetes type 2       Plan:   12/13/22 blood cx 1/2 bottles staph epi - contaminant. Repeat blood cx 12/15 NGTD   Underwent paracentesis 12/15/22, cx results in process. Ascitic fluid with  not indicative of infection   Recommend screening for HIV   Stop antibiotics today, no evidence of acute infection and need for antibiotics   Recommend wound care for left lower extremity wound - does not appear actively infected   ID will sign off at this time, please re-consult if new concerns arise. INPT REHAB CONSULT:    HD 3; pts level of debilitation is not necessarily new. He could only walk household distances but would get quite fatigued. Pt with a 7500ml paracentesis yield 12/15. Based on current documentation, I believe that pt has the medical necessity for [facility] but not certain he could tolerate 3hr of therapy at least 5d/week. PT/OT recommending STR, assuming, in a SNF. Plan; will f/u Monday and see if mobility has improved and make further recommendations.            ALBUMIN HUMAN 25G IV VID, NORVADC 10MG PO QD, FOLVITE 1MG PO QD, FLSGYL 500MG PO DC'D, PROTONIX 40MG IV QD-DC'D AFTER AM DOSE., EFFER K 40MEQ PO QD, NA BICARB 1300MG PO BID, VANCOCIN IV DC'D AFTER AM DOSE, CYANOCOBALAMIN 1000 MCG PO QD,

## 2022-12-20 NOTE — PROGRESS NOTES
Hospitalist Progress Note   Admit Date:  2022  3:24 PM   Name:  Sonda Hammans   Age:  65 y.o. Sex:  male  :  1965   MRN:  [id number]   Room:  725/    Presenting Complaint: Emesis     Reason(s) for Admission: Septicemia ([facility] [address] [A41.9]  SIRS (systemic inflammatory response syndrome) (HCC) [R65.10]  Abdominal pain, unspecified abdominal location [R10.9]  Swelling of lower extremity [M79.89]     Hospital Course:   65 y.o. male w/ Hx of GI bleed who presented to the ED for cc inability to tolerate PO, emesis, abdominal pain with distention, no flatus or BM since of 1 day duration. In ER, patient was found tachycardic with D-dimer 9.3. WBC 13.9 proBNP 1280. CT chest was obtained and showed no acute PE; scattered bibasilar atelectasis, CAD and aortic calcifications; ascites; gallstones. KUB obtained and shows nonspecific bowel gas pattern; partial early SBO not excluded. Patient was admitted for SIRS with leukocytosis and tachycardia; no source of infection. CTAP 12/15 shows multiple dependent stones present in the gallbladder; some contours liver appearing nodular may represent cirrhosis; also findings suggestive of granuloma. Abundant ascites is present. UA not c/w infection. CT chest shows no PE and no consolidation. Rapid Covid/Flu NEG. Blood cultures on admission grew Coag negative staph and Diphtheroids. Suspicious for contamination. Suspicious for contamination. He was started empirically on broad-spectrum antibiotics. ? SBP contributing with ascites and cirrhosis. Repeat blood cultures NGTD. ID was consulted  and recommended no signs of infectious peritonitis and blood cultures are contaminant and does not require treatment. Leukocytosis most likely due to partial SBO/ileus which has resolved. Recommended wound care on right leg which wounds appear to be chronic/venous stasis without signs of active infection.   ID recommended to stop all antibiotics and signed off 12/6.    There was also a concern for partial SBO on admission. NGT placed in ED for decompression. General surgery was consulted. General surgery has seen and was suspicious pt's symptoms are more d/t large volume ascites than SBO. No surgical intervention at this time and recommended to remove NGT and start diet. NGT removed 12/15 and pt tolerated po well and has had a BM. Surgery signed off 12/15. GI was consulted for large volume ascites and new cirrhosis. IR was consulted and pt underwent paracentesis on 12/15 with 7.5L of thin yellow fluid removed. GI recommended that patient has cholelithiasis on imaging but recommended against surgical consideration as a surgical mortality would be high. If cholecystitis develops, consider cholecystostomy tube. GI recommended low-sodium diet discontinue sodium bicarb if possible. GI signed off 12/16 and patient should follow-up with his primary GI at [facility]. PT/OT recommended STR. Subjective & 24hr Events (12/20/22): Alert and oriented x3. Stated that he feels well overall. Got his sleeping med late last night so did not sleep much. BG 60 this Am but was 81 on recheck. Denies fever, chills, SOB, chest pain, nausea, vomiting      Assessment & Plan:     SIRS   Positive blood cultures  ? SBP with large ascites, cirrhosis, abdominal pain. CTAP shows no obvious source of infection but shows large volume ascites. UA not c/w infection. CT chest shows no PE and no consolidation. Rapid Covid/Flu NEG  BCX on admission grew Coag negative staph and Diphtheroids. Suspicious for contamination. Repeat BCX 12/15: NGTD  Abx: S/p Vanc (12/13-12/16); s/p Zosyn; s/p Rocephin/Flagyl (12/15-12/16)  Ascitic studies  - not consistent with infection  -Cx: NGTD  -Cytology: mesothelial and inflammatory cells  Consulted ID 12/16--no signs of infectious peritonitis and blood cultures are contaminant and does not require treatment.  Leukocytosis most likely due to partial SBO/ileus which has resolved. Recommended wound care on right leg which wounds appear to be chronic/venous stasis without signs of active infection. ID recommended to stop all antibiotics and signed off 12/6. Abdominal pain, resolved  Partial SBO, resolved  Cirrhosis with ascites  KUB obtained on admission 12/13 and shows nonspecific bowel gas pattern; partial early SBO not excluded. NGT placed in ED. General surgery has seen and signed off 12/15. Suspected more d/t large volume ascites than SBO. No surgical intervention at this time. Surgery recommended remove NGT 12/15 and start diet. NGT removed and pt tolerating po well. CTAP 12/15 shows multiple dependent stones present in the gallbladder; some contours liver appearing nodular may represent cirrhosis; also findings suggestive of granuloma. Abundant ascites is present  IR was consulted and pt underwent paracentesis on 12/15 with 7.5L of thin yellow fluid removed. S/p IV albumin x3 doses  Acute hepatitis panel negative  Analgesics prn  Antiemetics prn  Liver workup obtained by GI as new dx of cirrhosis  Consider addition of diuretics when MARS resolves  GI signed off 12/16 and patient should follow-up with his primary GI at [facility]. Cholelithiasis  LFTs within normal limits. CTAP not suggestive of acute cholecystitis  Supportive care for now. Pt high risk for surgery, GI recommended against this. If develop cholecystitis, consider cholecystotomy tube    Normocytic anemia  Hgb 8.5 on 12/15,hemoglobin 10.1 on admission (baseline 7-8). Elevated on admission could be due to hemoconcentration with decrease po intake and also component of MARS  Iron studies c/w chronic disease  Monitor CBC, transfuse if hgb <7 or if symptomatic    MARS on CKD stage 3  Creatinine 1.8 on 12/13 (baseline 1.2-1.4). Most likely due to nausea and vomiting  CTAP 12/15 shows no hydronephrosis  Avoid nephrotoxic meds  D/c IVF due to large amount of ascites  Encourage p.o. intake  D/c po sodium bicarb to limit sodium intake  Monitor BMP    Hypokalemia, resolved  Replace and recheck    Alcohol dependence  Patient denies tobacco or alcohol abuse and stated that he stopped after last discharge. Could be contributing factor to his cirrhosis  Cont folic acid  Monitor for signs/symptoms of withdrawals    Essential hypertension  Cont home Norvasc    Type 2 diabetes mellitus  Diabetic peripheral neuropathy  Cont SSI  eN2L--4.0  Hypoglycemic protocol in place  Diabetes management to assist    Chronic L leg diabetic ulcer  ID has seen, no signs of active infection  Wound team to assist--Needs referral to wound clinic at discharge  Recommend xeroform, foam and tiffanie 3 times per week. AT home would use hydrafera blue/ tiffanie 3 times per week. GERD  Cont PPI, change to po    Hypoalbuminemia  Mod protein-calorie malnutrition  Consulted nutrition      Anticipated discharge needs:      STR pending at this point    Diet:  ADULT DIET; Regular;  Low Sodium (2 gm)  ADULT ORAL NUTRITION SUPPLEMENT; Dinner, Breakfast; Low Calorie/High Protein Oral Supplement  DVT PPx: SCD's  Code status: Full Code    Hospital Problems:  Principal Problem:    Cirrhosis of liver with ascites ([address]  Active Problems:    Alcohol dependence ([address]    Diabetic peripheral neuropathy ([address]    Essential hypertension    Hyperlipidemia    Type 2 diabetes mellitus ([address]    Diabetic ulcer of both feet associated with type 2 diabetes mellitus ([address]    Acute kidney injury superimposed on CKD (HCC)    PAD (peripheral artery disease) (HCC)    Hypoalbuminemia    Normocytic anemia    SIRS (systemic inflammatory response syndrome) (HCC)    Abdominal pain    Moderate protein malnutrition (HCC)    Cholelithiases    Positive blood culture    Partial small bowel obstruction (HCC)    Stage 3a chronic kidney disease ([address]  Resolved Problems:    SBP (spontaneous bacterial peritonitis) ([address]      Objective:   Patient Vitals for the past 24 hrs:   Temp Pulse Resp BP SpO2   12/20/22 1111 97.9 °F (36.6 °C) 94 18 122/87 100 %   12/20/22 0713 97.5 °F (36.4 °C) 90 17 (!) 131/91 100 %   12/20/22 0235 97.7 °F (36.5 °C) 91 18 117/89 100 %   12/19/22 2313 98.2 °F (36.8 °C) (!) 102 18 (!) 115/95 100 %   12/19/22 1923 97.6 °F (36.4 °C) (!) 103 18 107/83 100 %   12/19/22 1538 97.5 °F (36.4 °C) 99 18 107/68 100 %   12/19/22 1259 98.2 °F (36.8 °C) 87 16 126/84 99 %       Oxygen Therapy  SpO2: 100 %  Pulse via Oximetry: 87 beats per minute  Pulse Oximeter Device Mode: Continuous  O2 Device: None (Room air)    Estimated body mass index is 30.87 kg/m² as calculated from the following:    Height as of this encounter: 5' 11\" (1.803 m). Weight as of this encounter: 221 lb 4.8 oz (100.4 kg). No intake or output data in the 24 hours ending 12/20/22 1235        Physical Exam:     Blood pressure 122/87, pulse 94, temperature 97.9 °F (36.6 °C), temperature source Oral, resp. rate 18, height 5' 11\" (1.803 m), weight 221 lb 4.8 oz (100.4 kg), SpO2 100 %. General:    Well nourished. Head:  Normocephalic, atraumatic  Eyes:  Sclerae appear normal.  Pupils equally round. ENT:  Nares appear normal, no drainage. Moist oral mucosa  Neck:  No restricted ROM. Trachea midline   CV:   RRR. No m/r/g. No jugular venous distension. Lungs:   CTAB. No wheezing, rhonchi, or rales. Symmetric expansion. Abdomen:   bowel sounds present. Soft, nontender. Nondistended. Extremities: No cyanosis or clubbing. Venous stasis dermatitis on b/l LE's. Dressing c/d/I. Skin:     Warm and dry. Neuro:  CN II-XII grossly intact. Sensation intact. A&Ox3  Psych:  Normal mood and affect.       I have personally reviewed labs and tests showing:  Recent Labs:  Recent Results (from the past 48 hour(s))   PLEASE READ & DOCUMENT PPD TEST IN 72 HRS    Collection Time: 12/18/22  4:00 PM   Result Value Ref Range    PPD, (POC) Negative Negative    mm Induration 0 0 - 5 mm   POCT Glucose    Collection Time: 12/18/22  4:49 PM   Result Value Ref Range    POC Glucose 382 (H) 65 - 100 mg/dL    Performed by: Kavya    POCT Glucose    Collection Time: 12/18/22  8:42 PM   Result Value Ref Range    POC Glucose 149 (H) 65 - 100 mg/dL    Performed by: Juani    CBC    Collection Time: 12/19/22  5:33 AM   Result Value Ref Range    WBC 11.6 (H) 4.3 - 11.1 K/uL    RBC 2.93 (L) 4.23 - 5.6 M/uL    Hemoglobin 8.2 (L) 13.6 - 17.2 g/dL    Hematocrit 25.0 (L) 41.1 - 50.3 %    MCV 85.3 82 - 102 FL    MCH 28.0 26.1 - 32.9 PG    MCHC 32.8 31.4 - 35.0 g/dL    RDW 17.5 (H) 11.9 - 14.6 %    Platelets 402 286 - 621 K/uL    MPV 10.5 9.4 - 12.3 FL    nRBC 0.02 0.0 - 0.2 K/uL   Comprehensive Metabolic Panel    Collection Time: 12/19/22  5:33 AM   Result Value Ref Range    Sodium 135 133 - 143 mmol/L    Potassium 4.4 3.5 - 5.1 mmol/L    Chloride 104 101 - 110 mmol/L    CO2 24 21 - 32 mmol/L    Anion Gap 7 2 - 11 mmol/L    Glucose 117 (H) 65 - 100 mg/dL    BUN 18 6 - 23 MG/DL    Creatinine 1.60 (H) 0.8 - 1.5 MG/DL    Est, Glom Filt Rate 50 (L) >60 ml/min/1.73m2    Calcium 7.5 (L) 8.3 - 10.4 MG/DL    Total Bilirubin 0.6 0.2 - 1.1 MG/DL    ALT 7 (L) 12 - 65 U/L    AST 12 (L) 15 - 37 U/L    Alk Phosphatase 81 50 - 136 U/L    Total Protein 6.1 (L) 6.3 - 8.2 g/dL    Albumin 1.8 (L) 3.5 - 5.0 g/dL    Globulin 4.3 2.8 - 4.5 g/dL    Albumin/Globulin Ratio 0.4 0.4 - 1.6     POCT Glucose    Collection Time: 12/19/22  8:21 AM   Result Value Ref Range    POC Glucose 135 (H) 65 - 100 mg/dL    Performed by: [employer]    POCT Glucose    Collection Time: 12/19/22 12:40 PM   Result Value Ref Range    POC Glucose 137 (H) 65 - 100 mg/dL    Performed by: [employer]    POCT Glucose    Collection Time: 12/19/22  3:39 PM   Result Value Ref Range    POC Glucose 206 (H) 65 - 100 mg/dL    Performed by: [employer]    POCT Glucose    Collection Time: 12/19/22  8:23 PM   Result Value Ref Range    POC Glucose 310 (H) 65 - 100 mg/dL    Performed by: Juancarlos    POCT Glucose    Collection Time: 12/20/22  7:08 AM   Result Value Ref Range    POC Glucose 60 (L) 65 - 100 mg/dL    Performed by: Sommer    POCT Glucose    Collection Time: 12/20/22  7:44 AM   Result Value Ref Range    POC Glucose 81 65 - 100 mg/dL    Performed by: Rebecca        I have personally reviewed imaging studies showing: Other Studies:  IR US GUIDED PARACENTESIS   Final Result   Uncomplicated ultrasound guided paracentesis. CT ABDOMEN PELVIS WO CONTRAST Additional Contrast? Oral   Final Result      1. Ascites. 2. Cholelithiasis. XR ABDOMEN (KUB) (SINGLE AP VIEW)   Final Result   1. The enteric tube tip is in the proximal to mid stomach. 2. Unchanged mildly distended small bowel loops. XR ABDOMEN (KUB) (SINGLE AP VIEW)   Final Result   1. Nonspecific bowel gas pattern. Partial or early small bowel obstruction not   excluded. 2.  Ascites. CT CHEST PULMONARY EMBOLISM W CONTRAST   Final Result   1. No evidence of PE.   2.  Scattered bibasilar atelectasis. 3.  Coronary artery and aortic calcifications. 4.  Ascites. 5.  Gallstones. XR CHEST (2 VW)   Final Result   1. Diminished lung lines with associated opacification bibasilar atelectasis   without focal infiltrative opacity.              Current Meds:  Current Facility-Administered Medications   Medication Dose Route Frequency    zolpidem (AMBIEN) tablet 5 mg  5 mg Oral Nightly PRN    pantoprazole (PROTONIX) tablet 40 mg  40 mg Oral QAM AC    diatrizoate meglumine-sodium (GASTROGRAFIN) 66-10 % solution 15 mL  15 mL Oral ONCE PRN    amLODIPine (NORVASC) tablet 10 mg  10 mg Oral Daily    cholestyramine light packet 4 g  4 g Oral BID    folic acid (FOLVITE) tablet 1 mg  1 mg Oral Daily    vitamin B-12 (CYANOCOBALAMIN) tablet 1,000 mcg  1,000 mcg Oral Daily    insulin lispro (HUMALOG) injection vial 0-4 Units  0-4 Units SubCUTAneous TID WC    insulin lispro (HUMALOG) injection vial 0-4 Units  0-4 Units SubCUTAneous Nightly    glucose chewable tablet 16 g  4 tablet Oral PRN    dextrose bolus 10% 125 mL  125 mL IntraVENous PRN    Or    dextrose bolus 10% 250 mL  250 mL IntraVENous PRN    glucagon (rDNA) injection 1 mg  1 mg SubCUTAneous PRN    dextrose 10 % infusion   IntraVENous Continuous PRN    sodium chloride flush 0.9 % injection 5-40 mL  5-40 mL IntraVENous 2 times per day    sodium chloride flush 0.9 % injection 5-40 mL  5-40 mL IntraVENous PRN    0.9 % sodium chloride infusion   IntraVENous PRN    ondansetron (ZOFRAN-ODT) disintegrating tablet 4 mg  4 mg Oral Q8H PRN    Or    ondansetron (ZOFRAN) injection 4 mg  4 mg IntraVENous Q6H PRN    polyethylene glycol (GLYCOLAX) packet 17 g  17 g Oral Daily PRN    acetaminophen (TYLENOL) tablet 650 mg  650 mg Oral Q6H PRN    Or    acetaminophen (TYLENOL) suppository 650 mg  650 mg Rectal Q6H PRN    hydrALAZINE (APRESOLINE) injection 10 mg  10 mg IntraVENous Q6H PRN    morphine injection 1 mg  1 mg IntraVENous Q4H PRN       Signed: Lucille Justice DO    Part of this note may have been written by using a voice dictation software. The note has been proof read but may still contain some grammatical/other typographical errors.

## 2022-12-20 NOTE — CARE COORDINATION
CM continuing to follow for ongoing discharge planning. Call placed to admission liaison, Lizy Amador, with 9th floor [facility]. [facility] continuing to follow and they are awaiting updated therapy documentation to confirm patient remain appropriate for [facility] level of care. SNF bed offers have been received as a secondary plan. PPD was completed on 12/18 and PCR Covid testing ordered today to be obtained tomorrow AM, 12/20. Patient will need insurance authorization with [facility] for either STR at [facility] or STR at [facility]. CM will follow up with 9th floor [facility] tomorrow AM, 12/20 to confirm if they have accepted patient to their program and plan to initiate insurance authorization.

## 2022-12-21 LAB
GLUCOSE BLD STRIP.AUTO-MCNC: 150 MG/DL (ref 65–100)
GLUCOSE BLD STRIP.AUTO-MCNC: 161 MG/DL (ref 65–100)
GLUCOSE BLD STRIP.AUTO-MCNC: 197 MG/DL (ref 65–100)
GLUCOSE BLD STRIP.AUTO-MCNC: 219 MG/DL (ref 65–100)
SARS-COV-2 RNA RESP QL NAA+PROBE: NOT DETECTED
SERVICE CMNT-IMP: ABNORMAL
SOURCE: NORMAL

## 2022-12-21 PROCEDURE — 97530 THERAPEUTIC ACTIVITIES: CPT

## 2022-12-21 PROCEDURE — 99232 SBSQ HOSP IP/OBS MODERATE 35: CPT | Performed by: PHYSICIAN ASSISTANT

## 2022-12-21 PROCEDURE — 97535 SELF CARE MNGMENT TRAINING: CPT

## 2022-12-21 PROCEDURE — 6370000000 HC RX 637 (ALT 250 FOR IP): Performed by: HOSPITALIST

## 2022-12-21 PROCEDURE — 82962 GLUCOSE BLOOD TEST: CPT

## 2022-12-21 PROCEDURE — 2580000003 HC RX 258: Performed by: FAMILY MEDICINE

## 2022-12-21 PROCEDURE — 6370000000 HC RX 637 (ALT 250 FOR IP): Performed by: FAMILY MEDICINE

## 2022-12-21 PROCEDURE — 97112 NEUROMUSCULAR REEDUCATION: CPT

## 2022-12-21 PROCEDURE — 1100000003 HC PRIVATE W/ TELEMETRY

## 2022-12-21 RX ADMIN — PANTOPRAZOLE SODIUM 40 MG: 40 TABLET, DELAYED RELEASE ORAL at 06:21

## 2022-12-21 RX ADMIN — CHOLESTYRAMINE 4 G: 4 POWDER, FOR SUSPENSION ORAL at 08:31

## 2022-12-21 RX ADMIN — SODIUM CHLORIDE, PRESERVATIVE FREE 5 ML: 5 INJECTION INTRAVENOUS at 21:03

## 2022-12-21 RX ADMIN — ZOLPIDEM TARTRATE 5 MG: 5 TABLET ORAL at 21:07

## 2022-12-21 RX ADMIN — CHOLESTYRAMINE 4 G: 4 POWDER, FOR SUSPENSION ORAL at 21:04

## 2022-12-21 RX ADMIN — AMLODIPINE BESYLATE 10 MG: 10 TABLET ORAL at 08:31

## 2022-12-21 RX ADMIN — SODIUM CHLORIDE, PRESERVATIVE FREE 5 ML: 5 INJECTION INTRAVENOUS at 08:31

## 2022-12-21 RX ADMIN — FOLIC ACID 1 MG: 1 TABLET ORAL at 08:31

## 2022-12-21 RX ADMIN — CYANOCOBALAMIN TAB 1000 MCG 1000 MCG: 1000 TAB at 08:31

## 2022-12-21 NOTE — PROGRESS NOTES
ACUTE PHYSICAL THERAPY GOALS:   (Developed with and agreed upon by patient and/or caregiver.)  Pt will perform supine to/from sit with mod I in 7 treatment days. Pt will perform sit to/from stand with mod I and LRAD in 7 treatment days. Pt will ambulate 150 ft with mod I and LRAD in 7 treatment days. Pt will negotiate 2 stairs with mod I and rail in 7 treatment days. Pt will be independent with HEP in 7 days. PHYSICAL THERAPY: Daily Note PM   (Link to Caseload Tracking: PT Visit Days : 3  Time In/Out PT Charge Capture  Rehab Caseload Tracker  Orders    Jamil Mcnair is a 65 y.o. male   PRIMARY DIAGNOSIS: Cirrhosis of liver with ascites ([facility] [address]  Septicemia ([facility] [address] [A41.9]  SIRS (systemic inflammatory response syndrome) (HCC) [R65.10]  Abdominal pain, unspecified abdominal location [R10.9]  Swelling of lower extremity [M79.89]       Inpatient: Payor: Verito Garcia / Plan: Yahaira Mckinney / Product Type: *No Product type* /     ASSESSMENT:     REHAB RECOMMENDATIONS:   Recommendation to date pending progress:  Setting:  Inpatient Rehab Facility    Equipment:    To Be Determined     ASSESSMENT:  Mr. Jelena De Luna presents in supine, agreeable to session. Extra time is spent on sit to stand training, with emphasis on not using knee hyperextension against furniture to stand. This habit indicates continued bilateral LE weakness. Upon entering, Pnt is agreeable to PT treatment. he reports no pain  at rest. Pnt performed supine > sit with min A, sitting EOB with good sitting balance control. Sit > stand at first for pericare, then  with min Ax2 using RW. Gait 3 x 30', 45' with min A, RW, and chair follow, cues for step length. Gait is noted to be slow and shuffled, but sit to stands are improved with less knee hyperextension. Stand > sit with min A, followed by positioning for comfort. At end of session pt up in bedside chair with all needs within reach, alarm activated for safety, RN notified. Overall, good progress today as pnt improved in activity tolerance and gait distance. Pnt continues to present as functioning below his baseline, with deficits in mobility including transfers, gait, balance, and activity tolerance. Pt will continue to benefit from skilled therapy services to address stated deficits to promote return to highest level of function, independence, and safety. Will continue to follow.        SUBJECTIVE:   Mr. Jamil Terrazas states, \"Hey there\"     Social/Functional Lives With: Spouse  Type of Home: House  Home Layout: One level  Home Access: Stairs to enter with rails  Entrance Stairs - Number of Steps: 2  Home Equipment: [employer], [address] Help From: Family  ADL Assistance: Independent  Homemaking Assistance: Needs assistance  Homemaking Responsibilities: Yes  Ambulation Assistance: Independent  Transfer Assistance: Independent  OBJECTIVE:     PAIN: Tilmon Norman / O2: Peter Bunn / Debbie Shipman / Yaya Dawley:   Pre Treatment: 0         Post Treatment: 0 Vitals        Oxygen    IV    RESTRICTIONS/PRECAUTIONS:  Restrictions/Precautions  Restrictions/Precautions: Fall Risk  Restrictions/Precautions: Fall Risk     MOBILITY: I Mod I S SBA CGA Min Mod Max Total  NT x2 Comments:   Bed Mobility    Rolling [] [] [] [] [] [x] [] [] [] [] []    Supine to Sit [] [] [] [] [] [x] [] [] [] [] []    Scooting [] [] [] [] [] [x] [] [] [] [] []    Sit to Supine [] [] [] [] [] [] [] [] [] [] []    Transfers    Sit to Stand [] [] [] [] [] [x] [] [] [] [] [x]    Bed to Chair [] [] [] [] [] [] [] [] [] [x] []    Stand to Sit [] [] [] [] [] [x] [] [] [] [] [x]     [] [] [] [] [] [] [] [] [] [] []    I=Independent, Mod I=Modified Independent, S=Supervision, SBA=Standby Assistance, CGA=Contact Guard Assistance,   Min=Minimal Assistance, Mod=Moderate Assistance, Max=Maximal Assistance, Total=Total Assistance, NT=Not Tested    BALANCE: Good Fair+ Fair Fair- Poor NT Comments   Sitting Static [x] [] [] [] [] []    Sitting Dynamic [] [x] [] [] [] []              Standing Static [] [] [x] [] [] []    Standing Dynamic [] [] [] [x] [] []      GAIT: I Mod I S SBA CGA Min Mod Max Total  NT x2 Comments:   Level of Assistance [] [] [] [] [] [x] [] [] [] [] [x] Chair follow   Distance 3 x 30, 45 feet    DME Gait Belt and Rollator    Gait Quality Decreased jason , Decreased step clearance, Decreased step length, and Decreased stance    Weightbearing Status      Stairs      I=Independent, Mod I=Modified Independent, S=Supervision, SBA=Standby Assistance, CGA=Contact Guard Assistance,   Min=Minimal Assistance, Mod=Moderate Assistance, Max=Maximal Assistance, Total=Total Assistance, NT=Not Tested    PLAN:   FREQUENCY AND DURATION: 3 times/week for duration of hospital stay or until stated goals are met, whichever comes first.    TREATMENT:   TREATMENT:   Therapeutic Activity (23 Minutes): Therapeutic activity included Rolling, Supine to Sit, Ambulation on level ground, Sitting balance , and Standing balance to improve functional Activity tolerance, Balance, Coordination, Mobility, Strength, and ROM.     TREATMENT GRID:  N/A    AFTER TREATMENT PRECAUTIONS: Chair, Needs within reach, and RN notified    INTERDISCIPLINARY COLLABORATION:  RN/ PCT, PT/ PTA, and Rehab Attendant    EDUCATION:      TIME IN/OUT:  Time In: [address]  Time Out: [address]  Minutes: Ritu Noonan, PT

## 2022-12-21 NOTE — PROGRESS NOTES
ACUTE OCCUPATIONAL THERAPY GOALS:   (Developed with and agreed upon by patient and/or caregiver.)  1. Ken Ou will be modified independent with functional mobility for ADL in room within 4 - 7 visits. 2. Ken Ou will be modified independent with total body bathing and dressing within 4 - 7 visits. 3. Ken Ou will state and demonstrate at least 5 energy conservation techniques during ADL/therapeutic activities within 4 - 7 visits. 4. Ken Ou will voice a plan for appropriate home modifications for home safety and fall prevention within 7 visits. 5. Ken Ou will participate at least 30 minutes of ADL with 3 or less rest breaks within 7 visits. 6. Ken Ou will complete a tub transfer with modified independence within 7 visits. OCCUPATIONAL THERAPY: Daily Note AM   OT Visit Days: 2   Time In/Out  OT Charge Capture  Rehab Caseload Tracker  OT Orders    Ken Gaston is a 65 y.o. male   PRIMARY DIAGNOSIS: Cirrhosis of liver with ascites ([facility]  Septicemia ([facility] [A41.9]  SIRS (systemic inflammatory response syndrome) ([facility]) [R65.10]  Abdominal pain, unspecified abdominal location [R10.9]  Swelling of lower extremity [M79.89]       Inpatient: Payor: Esperanza Obregon / Plan: Haleigh Valero / Product Type: *No Product type* /     ASSESSMENT:     REHAB RECOMMENDATIONS: CURRENT LEVEL OF FUNCTION:  (Most Recently Demonstrated)   Recommendation to date pending progress:  Setting:  Inpatient Rehab Facility    Equipment:    To Be Determined Bathing:  Not Tested  Dressing:  Minimal Assist  Feeding/Grooming:  Contact Guard Assist  Toileting:  Not Tested  Functional Mobility:  Minimal Assist x1-2 with RW and chair follow     ASSESSMENT:  Mr. Marsha Arechiga present supine on entry on RA. Pt completed bed mobility CGA-Min A. Pt completed LB dressing Min A seated EOB and UB dressing Min A seated in recliner.  Pt completed household mobility for ADLs in room and on unit with RW and chair follow with significantly increased seated rest break. Pt tolerated session well and presents highly motivated. Pt making progress [facility]. Pt is a strong [facility] candidate. Pt exhibits high motivation and strong potential to reurn to PLOF. Pt would benefit from intensive therapy 3hrs/day 5x/wk.        SUBJECTIVE:     Mr. Levi Casper states, \"Let's walk\"     Social/Functional Lives With: Spouse  Type of Home: House  Home Layout: One level  Home Access: Stairs to enter with rails  Entrance Stairs - Number of Steps: 2  Home Equipment: [employer], [email or web address] Help From: Family  ADL Assistance: Independent  Homemaking Assistance: Needs assistance  Homemaking Responsibilities: Yes  Ambulation Assistance: Independent  Transfer Assistance: Independent    OBJECTIVE:     Dc Joel / Haseeb Stapler / AIRWAY: Telemetry     RESTRICTIONS/PRECAUTIONS:  Restrictions/Precautions  Restrictions/Precautions: Fall Risk        PAIN: Vickie Chimera / O2:   Pre Treatment:   Pain Assessment: None - Denies Pain        Post Treatment: None Vitals          Oxygen        MOBILITY: I Mod I S SBA CGA Min Mod Max Total  NT x2 Comments:   Bed Mobility    Rolling [] [] [] [] [] [x] [] [] [] [] []    Supine to Sit [] [] [] [] [] [x] [] [] [] [] []    Scooting [] [] [] [] [] [x] [] [] [] [] []    Sit to Supine [] [] [] [] [] [] [] [] [] [x] []    Transfers    Sit to Stand [] [] [] [] [] [x] [] [] [] [] []    Bed to Chair [] [] [] [] [] [x] [] [] [] [] [] RW, chair follow    Stand to Sit [] [] [] [] [] [x] [] [] [] [] []    Tub/Shower [] [] [] [] [] [] [] [] [] [x] []     Toilet [] [] [] [] [] [] [] [] [] [x] []      [] [] [] [] [] [] [] [] [] [] []    I=Independent, Mod I=Modified Independent, S=Supervision/Setup, SBA=Standby Assistance, CGA=Contact Guard Assistance, Min=Minimal Assistance, Mod=Moderate Assistance, Max=Maximal Assistance, Total=Total Assistance, NT=Not Tested    ACTIVITIES OF DAILY LIVING: I Mod I S SBA CGA Min Mod Max Total NT Comments   BASIC ADLs:              Upper Body   Bathing [] [] [] [] [] [] [] [] [] [x]    Lower Body Bathing [] [] [] [] [] [] [] [] [] [x]    Toileting [] [] [] [] [] [] [] [] [] [x]    Upper Body Dressing [] [] [] [] [] [x] [] [] [] [] Doff/don gown seated and standing at recliner   Lower Body Dressing [] [] [] [] [] [x] [] [] [] [] Doff/don brief seated and standing at EOB   Feeding [] [] [] [] [] [] [] [] [] [x]    Grooming [] [] [] [] [] [] [] [] [] [x]    Personal Device Care [] [] [] [] [] [] [] [] [] [x]    Functional Mobility [] [] [] [] [] [x] [] [] [] [] Household mobility for ADLs in room and on unit with RW and close chair follow, sig increased seated rest breaks   I=Independent, Mod I=Modified Independent, S=Supervision/Setup, SBA=Standby Assistance, CGA=Contact Guard Assistance, Min=Minimal Assistance, Mod=Moderate Assistance, Max=Maximal Assistance, Total=Total Assistance, NT=Not Tested    BALANCE: Good Fair+ Fair Fair- Poor NT Comments   Sitting Static [x] [] [] [] [] []    Sitting Dynamic [x] [] [] [] [] []              Standing Static [] [x] [] [] [] []    Standing Dynamic [] [] [x] [] [] []        PLAN:     FREQUENCY/DURATION   OT Plan of Care: 3 times/week for duration of hospital stay or until stated goals are met, whichever comes first.    TREATMENT:     TREATMENT:   Co-Treatment PT/OT necessary due to patient's decreased overall endurance/tolerance levels, as well as need for high level skilled assistance to complete functional transfers/mobility and functional tasks  Neuromuscular Re-education (18 Minutes): Neuromuscular Re-education included Balance Training, Coordination training, Functional mobility with facilitation, Postural training, Sitting balance training, and Standing balance training to improve Balance, Coordination, Functional Mobility, and Postural Control.   Self Care (10 minutes): Patient participated in upper body dressing and lower body dressing ADLs in unsupported sitting and standing with minimal verbal cueing to increase independence, decrease assistance required, and increase activity tolerance. Patient also participated in functional mobility, functional transfer, energy conservation, and adaptive equipment training to increase independence, decrease assistance required, and increase activity tolerance.      TREATMENT GRID:  N/A    AFTER TREATMENT PRECAUTIONS: Bed/Chair Locked, Call light within reach, Chair, Needs within reach, and RN notified    INTERDISCIPLINARY COLLABORATION:  RN/ PCT, PT/ PTA, and OT/ HAYDEN    EDUCATION:       TOTAL TREATMENT DURATION AND TIME:  Time In: 1103  Time Out: [address]  Minutes: [place], OT

## 2022-12-21 NOTE — PROGRESS NOTES
Attempted to call pt's spouse to give update but number in chart in not a working number.     Carmen Reddy, DO

## 2022-12-21 NOTE — PROGRESS NOTES
Comprehensive Nutrition Assessment    Type and Reason for Visit: Reassess  Poor Intake/Appetite 5 or More Days (Hospitalists)  Diet Education (GI)    Nutrition Recommendations/Plan:   Meals and Snacks:  Diet: Continue current order  Nutrition Supplement Therapy:  Medical food supplement therapy:  Change Ensure High Protein once per day (this provides 160 kcal and 16 grams protein per bottle)     Malnutrition Assessment:  Malnutrition Status: Insufficient data (hx of volume fluctuations and volume removal therefore masking any true weight loss PTA)  Pt with slight wasting to clavicles but no other sven signs of fat or muscle wasting noted    Nutrition Assessment:  Nutrition History: Pt reports early satiety and lack of appetite present PTA. Pt reports struggling with PO intake for ~1 month PTA, reports occasionally consuming ONS. Pt estimates UBW to be ~200lb. Do You Have Any Cultural, Adventism, or Ethnic Food Preferences?: No   Nutrition Background:    Pt with PMH significant for GERD, HLD, HTN, PUD, T2DM, ulcers of duodenum, GI bleed who presented to the [facility] 12/13 for cc inability to tolerate PO, emesis, abdominal pain with distention, no flatus or BM since of 1 day duration. Patient was admitted for SIRS with leukocytosis and tachycardia; no source of infection. There was also a concern for partial SBO on admission. NGT placed in 12/13 for decompression. General surgery was suspicious pt's symptoms are more d/t large volume ascites than SBO. No surgical intervention. NGT removed 12/14 and diet started. Nutrition Interval:  Pt seen at bedside with no visitors present. He reports good appetite, eating % of meals. Lunch tray seen in front of patient with ~75% consumed. Pt he has not been able to drink all the ensure HP drinks that are being sent to him because they \"send him straight to the bathroom\". Pt requesting to cut back on how many ensure's he gets.  RD changed order to one ensure HP daily and added vanilla only preference. Will continue to follow for any further needs throughout admission. Current Nutrition Therapies:  ADULT DIET; Regular; Low Sodium (2 gm)  ADULT ORAL NUTRITION SUPPLEMENT; Dinner; Low Calorie/High Protein Oral Supplement    Current Intake:   Average Meal Intake: % (per pt report, lunch tray seen with ~75% consumed) Average Supplements Intake: 26-50%      Anthropometric Measures:  Height: 5' 11\" (180.3 cm)  Current Body Wt: 221 lb 5.5 oz (100.4 kg) (12/16/2022), Weight source: Bed Scale  BMI: 30.9, Obese Class 1 (BMI 30.0-34. 9)  Admission Body Weight: 200 lb (90.7 kg) (12/13 ; stated weight)  Ideal Body Weight (Kg) (Calculated): 78 kg (172 lbs), 128.7 %  Usual Body Wt: 201 lb (91.2 kg) (5/2021 MD office weight ; limited emr weight hx), Percent weight change: 10.1       Edema:Peripheral Vascular  Peripheral Vascular (WDL): Within Defined Limits   BMI Category Obese Class 1 (BMI 30.0-34. 9)    Estimated Daily Nutrient Needs:  Energy (kcal/day): 7991-8432 (15-18kcal/kg) (Kcal/kg Weight used: 100.4 kg Current  Protein (g/day):  (0.8-1g/kg) Weight Used: (Current) 100.4 kg  Fluid (ml/day):   (1 ml/kcal)    Nutrition Diagnosis:   No nutrition diagnosis at this time     Nutrition Interventions:   Food and/or Nutrient Delivery: Continue Current Diet, Modify Oral Nutrition Supplement     Coordination of Nutrition Care: Continue to monitor while inpatient       Goals:   Previous Goal Met: Goal(s) Achieved  Active Goal: Meet at least 75% of estimated needs, by next RD assessment (continue)       Nutrition Monitoring and Evaluation:      Food/Nutrient Intake Outcomes: Food and Nutrient Intake, Supplement Intake  Physical Signs/Symptoms Outcomes: Weight, Meal Time Behavior    Discharge Planning:    Continue current diet, Continue Oral Nutrition Supplement    Aaron Murcia RD

## 2022-12-21 NOTE — CARE COORDINATION
CM spoke with [facility] admissions liaison, Helen Schafer, regarding physiatry consult and possible bed offer. [facility] MD would like a more definitive long term discharge plan prior to offering a bed. If a safe discharge plan can be verified,  [facility] Garg will consider offering a bed. If bed offered, patient will require insurance authorization. Patient is insured with [facility] which will be an automatic offer of peer to peer. CM and/or other disciplines will need to speak with patient and his family to confirm what the long-term discharge plan is, document in progress note and then provide plan to [facility] admissions team for review.

## 2022-12-21 NOTE — PROGRESS NOTES
Hospitalist Progress Note   Admit Date:  2022  3:24 PM   Name:  Jared Lui   Age:  65 y.o. Sex:  male  :  1965   MRN:  [id number]   Room:  725/    Presenting Complaint: Emesis     Reason(s) for Admission: Septicemia ([facility] [address] [A41.9]  SIRS (systemic inflammatory response syndrome) (HCC) [R65.10]  Abdominal pain, unspecified abdominal location [R10.9]  Swelling of lower extremity [M79.89]     Hospital Course:   65 y.o. male w/ Hx of GI bleed who presented to the ED for cc inability to tolerate PO, emesis, abdominal pain with distention, no flatus or BM since of 1 day duration. In ER, patient was found tachycardic with D-dimer 9.3. WBC 13.9 proBNP 1280. CT chest was obtained and showed no acute PE; scattered bibasilar atelectasis, CAD and aortic calcifications; ascites; gallstones. KUB obtained and shows nonspecific bowel gas pattern; partial early SBO not excluded. Patient was admitted for SIRS with leukocytosis and tachycardia; no source of infection. CTAP 12/15 shows multiple dependent stones present in the gallbladder; some contours liver appearing nodular may represent cirrhosis; also findings suggestive of granuloma. Abundant ascites is present. UA not c/w infection. CT chest shows no PE and no consolidation. Rapid Covid/Flu NEG. Blood cultures on admission grew Coag negative staph and Diphtheroids. Suspicious for contamination. Suspicious for contamination. He was started empirically on broad-spectrum antibiotics. ? SBP contributing with ascites and cirrhosis. Repeat blood cultures NGTD. ID was consulted  and recommended no signs of infectious peritonitis and blood cultures are contaminant and does not require treatment. Leukocytosis most likely due to partial SBO/ileus which has resolved. Recommended wound care on right leg which wounds appear to be chronic/venous stasis without signs of active infection.   ID recommended to stop all antibiotics and signed off 12/6.    There was also a concern for partial SBO on admission. NGT placed in ED for decompression. General surgery was consulted. General surgery has seen and was suspicious pt's symptoms are more d/t large volume ascites than SBO. No surgical intervention at this time and recommended to remove NGT and start diet. NGT removed 12/15 and pt tolerated po well and has had a BM. Surgery signed off 12/15. GI was consulted for large volume ascites and new cirrhosis. IR was consulted and pt underwent paracentesis on 12/15 with 7.5L of thin yellow fluid removed. GI recommended that patient has cholelithiasis on imaging but recommended against surgical consideration as a surgical mortality would be high. If cholecystitis develops, consider cholecystostomy tube. GI recommended low-sodium diet discontinue sodium bicarb if possible. GI signed off 12/16 and patient should follow-up with his primary GI at [facility]. PT/OT recommended STR. Subjective & 24hr Events (12/21/22): Alert and oriented x3. Doing well this Am. No acute concerns. Awaiting rehab. Denies fever, chills, SOB, chest pain, nausea, vomiting      Assessment & Plan:     SIRS   Positive blood cultures  ? SBP with large ascites, cirrhosis, abdominal pain. CTAP shows no obvious source of infection but shows large volume ascites. UA not c/w infection. CT chest shows no PE and no consolidation. Rapid Covid/Flu NEG  BCX on admission grew Coag negative staph and Diphtheroids. Suspicious for contamination. Repeat BCX 12/15: NGTD  Abx: S/p Vanc (12/13-12/16); s/p Zosyn; s/p Rocephin/Flagyl (12/15-12/16)  Ascitic studies  - not consistent with infection  -Cx: NGTD  -Cytology: mesothelial and inflammatory cells  Consulted ID 12/16--no signs of infectious peritonitis and blood cultures are contaminant and does not require treatment. Leukocytosis most likely due to partial SBO/ileus which has resolved.   Recommended wound care on right leg which wounds appear to be chronic/venous stasis without signs of active infection.  ID recommended to stop all antibiotics and signed off 12/6.    Abdominal pain, resolved  Partial SBO, resolved  Cirrhosis with ascites  KUB obtained on admission 12/13 and shows nonspecific bowel gas pattern; partial early SBO not excluded. NGT placed in ED.  General surgery has seen and signed off 12/15. Suspected more d/t large volume ascites than SBO.  No surgical intervention at this time. Surgery recommended remove NGT 12/15 and start diet.   NGT removed and pt tolerating po well.   CTAP 12/15 shows multiple dependent stones present in the gallbladder; some contours liver appearing nodular may represent cirrhosis; also findings suggestive of granuloma.  Abundant ascites is present  IR was consulted and pt underwent paracentesis on 12/15 with 7.5L of thin yellow fluid removed.  S/p IV albumin x3 doses  Acute hepatitis panel negative  Analgesics prn  Antiemetics prn  Liver workup obtained by GI as new dx of cirrhosis  Consider addition of diuretics when MARS resolves  GI signed off 12/16 and patient should follow-up with his primary GI at [facility].    Cholelithiasis  LFTs within normal limits. CTAP not suggestive of acute cholecystitis  Supportive care for now. Pt high risk for surgery, GI recommended against this. If develop cholecystitis, consider cholecystotomy tube    Normocytic anemia  Hgb 8.5 on 12/15,hemoglobin 10.1 on admission (baseline 7-8). Elevated on admission could be due to hemoconcentration with decrease po intake and also component of MARS  Iron studies c/w chronic disease  Monitor CBC, transfuse if hgb <7 or if symptomatic    MARS on CKD stage 3  Creatinine 1.8 on 12/13 (baseline 1.2-1.4).  Most likely due to nausea and vomiting  CTAP 12/15 shows no hydronephrosis  Avoid nephrotoxic meds  D/c IVF due to large amount of ascites  Encourage p.o. intake  D/c po sodium bicarb to limit sodium intake  Monitor BMP    Hypokalemia,  resolved  Replace and recheck    Alcohol dependence  Patient denies tobacco or alcohol abuse and stated that he stopped after last discharge. Could be contributing factor to his cirrhosis  Cont folic acid  Monitor for signs/symptoms of withdrawals    Essential hypertension  Cont home Norvasc    Type 2 diabetes mellitus  Diabetic peripheral neuropathy  Cont SSI  sP9X--9.0  Hypoglycemic protocol in place  Diabetes management to assist    Chronic L leg diabetic ulcer  ID has seen, no signs of active infection  Wound team to assist--Needs referral to wound clinic at discharge  Recommend xeroform, foam and tiffanie 3 times per week. AT home would use hydrafera blue/ tiffanie 3 times per week. GERD  Cont PPI, change to po    Hypoalbuminemia  Mod protein-calorie malnutrition  Consulted nutrition      Anticipated discharge needs:      STR vs IRC pending at this point    Diet:  ADULT DIET; Regular;  Low Sodium (2 gm)  ADULT ORAL NUTRITION SUPPLEMENT; Dinner, Breakfast; Low Calorie/High Protein Oral Supplement  DVT PPx: SCD's  Code status: Full Code    Hospital Problems:  Principal Problem:    Cirrhosis of liver with ascites ([address]  Active Problems:    Alcohol dependence ([address]    Diabetic peripheral neuropathy ([address]    Essential hypertension    Hyperlipidemia    Type 2 diabetes mellitus ([address]    Diabetic ulcer of both feet associated with type 2 diabetes mellitus ([address]    Acute kidney injury superimposed on CKD (HCC)    PAD (peripheral artery disease) (HCC)    Hypoalbuminemia    Normocytic anemia    SIRS (systemic inflammatory response syndrome) (HCC)    Abdominal pain    Moderate protein malnutrition (HCC)    Cholelithiases    Positive blood culture    Partial small bowel obstruction (HCC)    Stage 3a chronic kidney disease ([address]  Resolved Problems:    SBP (spontaneous bacterial peritonitis) ([address]      Objective:   Patient Vitals for the past 24 hrs:   Temp Pulse Resp BP SpO2   12/21/22 1104 97.7 °F (36.5 °C) 84 17 113/81 100 % 12/21/22 0710 97.5 °F (36.4 °C) 92 18 131/89 100 %   12/21/22 0354 97.3 °F (36.3 °C) 84 16 137/77 100 %   12/20/22 2321 98.1 °F (36.7 °C) 95 17 119/87 100 %   12/20/22 2025 97.3 °F (36.3 °C) 85 18 (!) 123/95 100 %   12/20/22 1535 97.9 °F (36.6 °C) 92 18 (!) 122/93 100 %       Oxygen Therapy  SpO2: 100 %  Pulse via Oximetry: 87 beats per minute  Pulse Oximeter Device Mode: Continuous  O2 Device: None (Room air)    Estimated body mass index is 30.87 kg/m² as calculated from the following:    Height as of this encounter: 5' 11\" (1.803 m). Weight as of this encounter: 221 lb 4.8 oz (100.4 kg). No intake or output data in the 24 hours ending 12/21/22 1200        Physical Exam:     Blood pressure 113/81, pulse 84, temperature 97.7 °F (36.5 °C), temperature source Oral, resp. rate 17, height 5' 11\" (1.803 m), weight 221 lb 4.8 oz (100.4 kg), SpO2 100 %. General:    Well nourished. Head:  Normocephalic, atraumatic  Eyes:  Sclerae appear normal.  Pupils equally round. ENT:  Nares appear normal, no drainage. Moist oral mucosa  Neck:  No restricted ROM. Trachea midline   CV:   RRR. No m/r/g. No jugular venous distension. Lungs:   CTAB. No wheezing, rhonchi, or rales. Symmetric expansion. Abdomen:   bowel sounds present. Soft, nontender. Nondistended. Extremities: No cyanosis or clubbing. Venous stasis dermatitis on b/l LE's. Dressing c/d/I. Skin:     Warm and dry. Neuro:  CN II-XII grossly intact. Sensation intact. A&Ox3  Psych:  Normal mood and affect.       I have personally reviewed labs and tests showing:  Recent Labs:  Recent Results (from the past 48 hour(s))   POCT Glucose    Collection Time: 12/19/22 12:40 PM   Result Value Ref Range    POC Glucose 137 (H) 65 - 100 mg/dL    Performed by: [employer]    POCT Glucose    Collection Time: 12/19/22  3:39 PM   Result Value Ref Range    POC Glucose 206 (H) 65 - 100 mg/dL    Performed by: Smile    POCT Glucose Collection Time: 12/19/22  8:23 PM   Result Value Ref Range    POC Glucose 310 (H) 65 - 100 mg/dL    Performed by: Juancarlos    POCT Glucose    Collection Time: 12/20/22  7:08 AM   Result Value Ref Range    POC Glucose 60 (L) 65 - 100 mg/dL    Performed by: Sommer    POCT Glucose    Collection Time: 12/20/22  7:44 AM   Result Value Ref Range    POC Glucose 81 65 - 100 mg/dL    Performed by: Rebecca    POCT Glucose    Collection Time: 12/20/22 12:47 PM   Result Value Ref Range    POC Glucose 225 (H) 65 - 100 mg/dL    Performed by: Diomedes    POCT Glucose    Collection Time: 12/20/22  3:24 PM   Result Value Ref Range    POC Glucose 174 (H) 65 - 100 mg/dL    Performed by: Joel Barry    COVID-19    Collection Time: 12/20/22  3:30 PM    Specimen: Nasopharyngeal Swab   Result Value Ref Range    SARS-CoV-2 Please find results under separate order     COVID-19    Collection Time: 12/20/22  3:30 PM    Specimen: Nasopharyngeal   Result Value Ref Range    Source Nasopharyngeal      SARS-CoV-2, PCR Not detected NOTD     POCT Glucose    Collection Time: 12/20/22  8:40 PM   Result Value Ref Range    POC Glucose 246 (H) 65 - 100 mg/dL    Performed by: Scarlet(PCT)SNE    POCT Glucose    Collection Time: 12/21/22  6:08 AM   Result Value Ref Range    POC Glucose 150 (H) 65 - 100 mg/dL    Performed by: Scarlet(PCT)SNE    POCT Glucose    Collection Time: 12/21/22 11:05 AM   Result Value Ref Range    POC Glucose 161 (H) 65 - 100 mg/dL    Performed by: Joel Barry        I have personally reviewed imaging studies showing: Other Studies:  IR US GUIDED PARACENTESIS   Final Result   Uncomplicated ultrasound guided paracentesis. CT ABDOMEN PELVIS WO CONTRAST Additional Contrast? Oral   Final Result      1. Ascites. 2. Cholelithiasis. XR ABDOMEN (KUB) (SINGLE AP VIEW)   Final Result   1. The enteric tube tip is in the proximal to mid stomach.    2. Unchanged mildly distended small bowel loops. XR ABDOMEN (KUB) (SINGLE AP VIEW)   Final Result   1. Nonspecific bowel gas pattern. Partial or early small bowel obstruction not   excluded. 2.  Ascites. CT CHEST PULMONARY EMBOLISM W CONTRAST   Final Result   1. No evidence of PE.   2.  Scattered bibasilar atelectasis. 3.  Coronary artery and aortic calcifications. 4.  Ascites. 5.  Gallstones. XR CHEST (2 VW)   Final Result   1. Diminished lung lines with associated opacification bibasilar atelectasis   without focal infiltrative opacity.              Current Meds:  Current Facility-Administered Medications   Medication Dose Route Frequency    zolpidem (AMBIEN) tablet 5 mg  5 mg Oral Nightly PRN    pantoprazole (PROTONIX) tablet 40 mg  40 mg Oral QAM AC    diatrizoate meglumine-sodium (GASTROGRAFIN) 66-10 % solution 15 mL  15 mL Oral ONCE PRN    amLODIPine (NORVASC) tablet 10 mg  10 mg Oral Daily    cholestyramine light packet 4 g  4 g Oral BID    folic acid (FOLVITE) tablet 1 mg  1 mg Oral Daily    vitamin B-12 (CYANOCOBALAMIN) tablet 1,000 mcg  1,000 mcg Oral Daily    insulin lispro (HUMALOG) injection vial 0-4 Units  0-4 Units SubCUTAneous TID WC    insulin lispro (HUMALOG) injection vial 0-4 Units  0-4 Units SubCUTAneous Nightly    glucose chewable tablet 16 g  4 tablet Oral PRN    dextrose bolus 10% 125 mL  125 mL IntraVENous PRN    Or    dextrose bolus 10% 250 mL  250 mL IntraVENous PRN    glucagon (rDNA) injection 1 mg  1 mg SubCUTAneous PRN    dextrose 10 % infusion   IntraVENous Continuous PRN    sodium chloride flush 0.9 % injection 5-40 mL  5-40 mL IntraVENous 2 times per day    sodium chloride flush 0.9 % injection 5-40 mL  5-40 mL IntraVENous PRN    0.9 % sodium chloride infusion   IntraVENous PRN    ondansetron (ZOFRAN-ODT) disintegrating tablet 4 mg  4 mg Oral Q8H PRN    Or    ondansetron (ZOFRAN) injection 4 mg  4 mg IntraVENous Q6H PRN    polyethylene glycol (GLYCOLAX) packet 17 g  17 g Oral Daily PRN    acetaminophen (TYLENOL) tablet 650 mg  650 mg Oral Q6H PRN    Or    acetaminophen (TYLENOL) suppository 650 mg  650 mg Rectal Q6H PRN    hydrALAZINE (APRESOLINE) injection 10 mg  10 mg IntraVENous Q6H PRN    morphine injection 1 mg  1 mg IntraVENous Q4H PRN       Signed: Ortiz Higgins DO    Part of this note may have been written by using a voice dictation software. The note has been proof read but may still contain some grammatical/other typographical errors.

## 2022-12-21 NOTE — PROGRESS NOTES
Physical Medicine & Rehab Consult Progress Note      Patient: Shannon Torres  Admit Date: 12/13/2022  Admit Diagnosis: Septicemia ([facility] [address] [A41.9]  SIRS (systemic inflammatory response syndrome) (HCC) [R65.10]  Abdominal pain, unspecified abdominal location [R10.9]  Swelling of lower extremity [M79.89]    Recommendations:   Continue acute rehabilitation program  Coordination of rehab / medical care  Counseling of PM&R care issues management    [facility] medical system encounter in as many months for general debility from acute exacerbations of chronic co-morbidities with 15% readmission risk  Patient debilitated from acute and chronic medical issues and would benefit from intensive post-acute skilled therapy  Patient will require ongoing daily monitoring of volume status, electrolytes, leukocytosis, anemia, blood pressure, blood sugar and L LE wounds  Will discuss patient with [facility] Physiatrist and admissions coordinators      History / Subjective / Complaint:     Patient seen and examined, interim EMR reviewed. OOB in recliner with JACKIE PAIGE elevated eating supper. Patient actively participating in and benefiting from acute therapies. States he is limited in ADLs and mobility in part by extensive anasarca and ascites, plus limitations of reach due to abdominal distention. Diuresis precluded currently by MARS, Cr 1.6. Wishes to continue therapy post-acute discharge. Has 24/7 supervision at d/c from wife and 37yo nephew.     No Known Allergies    Current Facility-Administered Medications   Medication Dose Route Frequency    zolpidem (AMBIEN) tablet 5 mg  5 mg Oral Nightly PRN    pantoprazole (PROTONIX) tablet 40 mg  40 mg Oral QAM AC    diatrizoate meglumine-sodium (GASTROGRAFIN) 66-10 % solution 15 mL  15 mL Oral ONCE PRN    amLODIPine (NORVASC) tablet 10 mg  10 mg Oral Daily    cholestyramine light packet 4 g  4 g Oral BID    folic acid (FOLVITE) tablet 1 mg  1 mg Oral Daily    vitamin B-12 (CYANOCOBALAMIN) tablet 1,000 mcg  1,000 mcg Oral Daily    insulin lispro (HUMALOG) injection vial 0-4 Units  0-4 Units SubCUTAneous TID WC    insulin lispro (HUMALOG) injection vial 0-4 Units  0-4 Units SubCUTAneous Nightly    glucose chewable tablet 16 g  4 tablet Oral PRN    dextrose bolus 10% 125 mL  125 mL IntraVENous PRN    Or    dextrose bolus 10% 250 mL  250 mL IntraVENous PRN    glucagon (rDNA) injection 1 mg  1 mg SubCUTAneous PRN    dextrose 10 % infusion   IntraVENous Continuous PRN    sodium chloride flush 0.9 % injection 5-40 mL  5-40 mL IntraVENous 2 times per day    sodium chloride flush 0.9 % injection 5-40 mL  5-40 mL IntraVENous PRN    0.9 % sodium chloride infusion   IntraVENous PRN    ondansetron (ZOFRAN-ODT) disintegrating tablet 4 mg  4 mg Oral Q8H PRN    Or    ondansetron (ZOFRAN) injection 4 mg  4 mg IntraVENous Q6H PRN    polyethylene glycol (GLYCOLAX) packet 17 g  17 g Oral Daily PRN    acetaminophen (TYLENOL) tablet 650 mg  650 mg Oral Q6H PRN    Or    acetaminophen (TYLENOL) suppository 650 mg  650 mg Rectal Q6H PRN    hydrALAZINE (APRESOLINE) injection 10 mg  10 mg IntraVENous Q6H PRN    morphine injection 1 mg  1 mg IntraVENous Q4H PRN       Objective:     Vitals:  Patient Vitals for the past 8 hrs:   BP Temp Temp src Pulse Resp SpO2   12/21/22 1537 (!) 132/90 97.2 °F (36.2 °C) Oral 86 17 100 %   12/21/22 1104 113/81 97.7 °F (36.5 °C) Oral 84 17 100 %        Physical Exam:  General:  AVSS, alert, well-developed and in no acute distress  HEENT:  EOMI, sclera clear, oral mucosa moist  Lungs:  normal respiratory effort, no wheezes  Cardiovascular:  RRR by palpation  Abd/GI:  firm, non-tender, obese, protuberant and distended; no tympany to percussion  Ext:  warm, UE with full, grossly symmetric strength and ROM with trace edema, B LE with pitting edema to proximal thighs, able to lift each LE off footrest against gravity, generalized weakness proximal>>distal; dressing intact at left leg wounds   Neuro: oriented x4, delayed processing, fair comprehension      Functional Assessment:  Per PT:   performed supine > sit with min A, sitting EOB with good sitting balance control. Sit > stand at first for pericare, then with min A x 2 using RW. Gait 3 x 30', 45' with min A, RW, and chair follow, cues for step length. Gait is slow and shuffled but sit-to-stands are improved with less knee hyperextension. Stand > sit with min A, followed by positioning for comfort. Per OT:   completed LB dressing Min A seated EOB and UB dressing Min A seated in recliner.        Labs/Studies:  Recent Results (from the past 72 hour(s))   POCT Glucose    Collection Time: 12/18/22  8:42 PM   Result Value Ref Range    POC Glucose 149 (H) 65 - 100 mg/dL    Performed by: Juani    CBC    Collection Time: 12/19/22  5:33 AM   Result Value Ref Range    WBC 11.6 (H) 4.3 - 11.1 K/uL    RBC 2.93 (L) 4.23 - 5.6 M/uL    Hemoglobin 8.2 (L) 13.6 - 17.2 g/dL    Hematocrit 25.0 (L) 41.1 - 50.3 %    MCV 85.3 82 - 102 FL    MCH 28.0 26.1 - 32.9 PG    MCHC 32.8 31.4 - 35.0 g/dL    RDW 17.5 (H) 11.9 - 14.6 %    Platelets 912 902 - 344 K/uL    MPV 10.5 9.4 - 12.3 FL    nRBC 0.02 0.0 - 0.2 K/uL   Comprehensive Metabolic Panel    Collection Time: 12/19/22  5:33 AM   Result Value Ref Range    Sodium 135 133 - 143 mmol/L    Potassium 4.4 3.5 - 5.1 mmol/L    Chloride 104 101 - 110 mmol/L    CO2 24 21 - 32 mmol/L    Anion Gap 7 2 - 11 mmol/L    Glucose 117 (H) 65 - 100 mg/dL    BUN 18 6 - 23 MG/DL    Creatinine 1.60 (H) 0.8 - 1.5 MG/DL    Est, Glom Filt Rate 50 (L) >60 ml/min/1.73m2    Calcium 7.5 (L) 8.3 - 10.4 MG/DL    Total Bilirubin 0.6 0.2 - 1.1 MG/DL    ALT 7 (L) 12 - 65 U/L    AST 12 (L) 15 - 37 U/L    Alk Phosphatase 81 50 - 136 U/L    Total Protein 6.1 (L) 6.3 - 8.2 g/dL    Albumin 1.8 (L) 3.5 - 5.0 g/dL    Globulin 4.3 2.8 - 4.5 g/dL    Albumin/Globulin Ratio 0.4 0.4 - 1.6     POCT Glucose    Collection Time: 12/19/22  8:21 AM   Result Value Ref Range    POC Glucose 135 (H) 65 - 100 mg/dL    Performed by: Oscarcompanion    POCT Glucose    Collection Time: 12/19/22 12:40 PM   Result Value Ref Range    POC Glucose 137 (H) 65 - 100 mg/dL    Performed by: Samiaarecompanion    POCT Glucose    Collection Time: 12/19/22  3:39 PM   Result Value Ref Range    POC Glucose 206 (H) 65 - 100 mg/dL    Performed by: Hongpanion    POCT Glucose    Collection Time: 12/19/22  8:23 PM   Result Value Ref Range    POC Glucose 310 (H) 65 - 100 mg/dL    Performed by: Juancarlos    POCT Glucose    Collection Time: 12/20/22  7:08 AM   Result Value Ref Range    POC Glucose 60 (L) 65 - 100 mg/dL    Performed by: Sommer    POCT Glucose    Collection Time: 12/20/22  7:44 AM   Result Value Ref Range    POC Glucose 81 65 - 100 mg/dL    Performed by:  Rebecca    POCT Glucose    Collection Time: 12/20/22 12:47 PM   Result Value Ref Range    POC Glucose 225 (H) 65 - 100 mg/dL    Performed by: Diomedes    POCT Glucose    Collection Time: 12/20/22  3:24 PM   Result Value Ref Range    POC Glucose 174 (H) 65 - 100 mg/dL    Performed by: Lj Back    COVID-19    Collection Time: 12/20/22  3:30 PM    Specimen: Nasopharyngeal Swab   Result Value Ref Range    SARS-CoV-2 Please find results under separate order     COVID-19    Collection Time: 12/20/22  3:30 PM    Specimen: Nasopharyngeal   Result Value Ref Range    Source Nasopharyngeal      SARS-CoV-2, PCR Not detected NOTD     POCT Glucose    Collection Time: 12/20/22  8:40 PM   Result Value Ref Range    POC Glucose 246 (H) 65 - 100 mg/dL    Performed by: Scarlet(YAW)SNE    POCT Glucose    Collection Time: 12/21/22  6:08 AM   Result Value Ref Range    POC Glucose 150 (H) 65 - 100 mg/dL    Performed by: Martha)SNE    POCT Glucose    Collection Time: 12/21/22 11:05 AM   Result Value Ref Range    POC Glucose 161 (H) 65 - 100 mg/dL    Performed by: Lj Back    POCT Glucose    Collection Time: 12/21/22  3:39 PM   Result Value Ref Range    POC Glucose 197 (H) 65 - 100 mg/dL    Performed by: Diomedes         Assessment:     Principal Problem:    Cirrhosis of liver with ascites ([facility] [address]  Active Problems:    [facility]    Diabetic peripheral neuropathy (HCC)    Essential hypertension    Hyperlipidemia    Type 2 diabetes mellitus ([facility] [address]    Diabetic ulcer of both feet associated with type 2 diabetes mellitus ([facility] [address]    Acute kidney injury superimposed on CKD (HCC)    PAD (peripheral artery disease) (HCC)    Hypoalbuminemia    Normocytic anemia    SIRS (systemic inflammatory response syndrome) (HCC)    Abdominal pain    Moderate protein malnutrition (HCC)    Cholelithiases    Positive blood culture    Partial small bowel obstruction (HCC)    Stage 3a chronic kidney disease ([facility]  Resolved Problems:    SBP (spontaneous bacterial peritonitis) ([facility]      Plan / Recommendations / Medical Decision Making:     Recommendations:   Continue acute rehabilitation program  Coordination of rehab / medical care  Counseling of PM&R care issues management  Monitoring and management of medical conditions per plan of care / orders    Discussion with Family / Caregiver / Staff    Reviewed Therapies / Koreen Will / Medications / Monica Cutler PA-C  Physician Assistant with [facility]  Sherly Jang MD, Medical Director

## 2022-12-21 NOTE — ADT AUTH CERT
Patient Demographics    Name Patient ID SSN Gender Identity Birth Date   Ignacio Nicolas [id number]  Male [birth year] (57 yrs)     Address Phone Email Employer    [employer]   [address] [facility] [phone] (I)   [phone] (M) -- [address] Race Occupation Emp Status    Joss Girt / [address] -- Disabled      Reg Status PCP Date Last Verified Next Review Date    Verified -- 22      Admission Date Discharge Date Admitting Provider     22 -- Meldon Levels, DO       Marital Status Mandaen       None        Emergency Contact 1   Dayton Shadow 21  Radha Tompkins[address] [de-identified]  [address] [facility] Name/Sex/Relation Subscriber  Subscriber Address/Phone Subscriber Emp/Emp Phone   1. Lawyer Johnson   [id number] Azra Bone - Male   (Self) 1965 [address] [place] [address]  [facility]   [phone](D) DISABLED    2.  Altamese Mis   [id number] Azucena Awe - Female   (Spouse) 1963 [address] [place] [address]  [facility]   [phone](B) OTHER      Utilization Reviews       Systemic or Infectious Condition GRG - Care Day 9 (2022) by Rachid Covington RN       Review Entered Review Status   2022 1453 Completed      Criteria Review      Care Day: 9 Care Date: 2022 Level of Care: Inpatient Floor    Guideline Day 3    Level Of Care    (X) * Activity level acceptable    2022 2:53 PM EST by Jane Ferrer      SEE PT NOTE    Clinical Status    (X) * Hemodynamic stability    2022 2:53 PM EST by Jane Ferrer      /89, P 84, R 17, 02 % RA    (X) * Respiratory status acceptable    (X) * Neurologic status acceptable    (X) * Temperature status acceptable    2022 2:53 PM EST by Jane Ferrer      97.5    (X) * No infection, or status acceptable    (X) * No neutropenia, or status acceptable    ( ) * Isolation not indicated, or is performable at next level of care    12/21/2022 2:53 PM EST by Jorge Hurtado      CONTACT ISOLATION    ( ) * Electrolyte status acceptable    12/21/2022 2:53 PM EST by Jorge Hurtado      NOT CHECKED  TODAY    ( ) * General Discharge Criteria met    Interventions    (X) * Intake acceptable    ( ) * No inpatient interventions needed    * Milestone   Additional Notes   Hospitalist Progress Note       Alert and oriented x3. Doing well this Am. No acute concerns. Awaiting rehab. EXAM:  General:          Well nourished. Head:               Normocephalic, atraumatic   Eyes:               Sclerae appear normal.  Pupils equally round. ENT:                Nares appear normal, no drainage. Moist oral mucosa   Neck:               No restricted ROM. Trachea midline    CV:                  RRR. No m/r/g. No jugular venous distension. Lungs:             CTAB. No wheezing, rhonchi, or rales. Symmetric expansion. Abdomen:        bowel sounds present. Soft, nontender. Nondistended. Extremities:     No cyanosis or clubbing. Venous stasis dermatitis on b/l LE's. Dressing c/d/I. Skin:                Warm and dry. Neuro:             CN II-XII grossly intact. Sensation intact. A&Ox3   Psych:             Normal mood and affect. POC GLUC 150 & 161        Assessment & Plan:       SIRS    Positive blood cultures   ? SBP with large ascites, cirrhosis, abdominal pain. CTAP shows no obvious source of infection but shows large volume ascites. UA not c/w infection. CT chest shows no PE and no consolidation. Rapid Covid/Flu NEG   BCX on admission grew Coag negative staph and Diphtheroids. Suspicious for contamination.     Repeat BCX 12/15: NGTD   Abx: S/p Vanc (12/13-12/16); s/p Zosyn; s/p Rocephin/Flagyl (12/15-12/16)   Ascitic studies   - not consistent with infection   -Cx: NGTD   -Cytology: mesothelial and inflammatory cells   Consulted ID 12/16--no signs of infectious peritonitis and blood cultures are contaminant and does not require treatment. Leukocytosis most likely due to partial SBO/ileus which has resolved. Recommended wound care on right leg which wounds appear to be chronic/venous stasis without signs of active infection. ID recommended to stop all antibiotics and signed off 12/6. Abdominal pain, resolved   Partial SBO, resolved   Cirrhosis with ascites   KUB obtained on admission 12/13 and shows nonspecific bowel gas pattern; partial early SBO not excluded. NGT placed in ED. General surgery has seen and signed off 12/15. Suspected more d/t large volume ascites than SBO. No surgical intervention at this time. Surgery recommended remove NGT 12/15 and start diet. NGT removed and pt tolerating po well. CTAP 12/15 shows multiple dependent stones present in the gallbladder; some contours liver appearing nodular may represent cirrhosis; also findings suggestive of granuloma. Abundant ascites is present   IR was consulted and pt underwent paracentesis on 12/15 with 7.5L of thin yellow fluid removed. S/p IV albumin x3 doses   Acute hepatitis panel negative   Analgesics prn   Antiemetics prn   Liver workup obtained by GI as new dx of cirrhosis   Consider addition of diuretics when MARS resolves   GI signed off 12/16 and patient should follow-up with his primary GI at [facility]. Cholelithiasis   LFTs within normal limits. CTAP not suggestive of acute cholecystitis   Supportive care for now. Pt high risk for surgery, GI recommended against this. If develop cholecystitis, consider cholecystotomy tube       Normocytic anemia   Hgb 8.5 on 12/15,hemoglobin 10.1 on admission (baseline 7-8). Elevated on admission could be due to hemoconcentration with decrease po intake and also component of MARS   Iron studies c/w chronic disease   Monitor CBC, transfuse if hgb <7 or if symptomatic       MARS on CKD stage 3   Creatinine 1.8 on 12/13 (baseline 1.2-1.4).   Most likely due to nausea and vomiting   CTAP 12/15 shows no hydronephrosis   Avoid nephrotoxic meds   D/c IVF due to large amount of ascites   Encourage p.o. intake   D/c po sodium bicarb to limit sodium intake   Monitor BMP       Hypokalemia, resolved   Replace and recheck       Alcohol dependence   Patient denies tobacco or alcohol abuse and stated that he stopped after last discharge. Could be contributing factor to his cirrhosis   Cont folic acid   Monitor for signs/symptoms of withdrawals       Essential hypertension   Cont home Norvasc       Type 2 diabetes mellitus   Diabetic peripheral neuropathy   Cont SSI   xO9F--8.0   Hypoglycemic protocol in place   Diabetes management to assist       Chronic L leg diabetic ulcer   ID has seen, no signs of active infection   Wound team to assist--Needs referral to wound clinic at discharge   Recommend xeroform, foam and tiffanie 3 times per week. AT home would use hydrafera blue/ tiffanie 3 times per week. GERD   Cont PPI, change to po       Hypoalbuminemia   Mod protein-calorie malnutrition   Consulted nutrition         Anticipated discharge needs:       STR vs IRC pending at this point       CM NOTE 12/20/22:   CM spoke with [facility] admissions liaison, Gabby Covington, regarding physiatry consult and possible bed offer. IRC MD would like a more definitive long term discharge plan prior to offering a bed. If a safe discharge plan can be verified, [address] will consider offering a bed. If bed offered, patient will require insurance authorization. Patient is insured with [employer] which will be an automatic offer of peer to peer. CM and/or other disciplines will need to speak with patient and his family to confirm what the long-term discharge plan is, document in progress note and then provide plan to [facility] admissions team for review.                Systemic or Infectious Condition GRG - Care Day 8 (12/20/2022) by Krish Colvin RN       Review Entered Review Status   12/20/2022 1339 Completed      Criteria Review      Care Day: 8 Care Date: 12/20/2022 Level of Care: Inpatient Floor    Guideline Day 3    Level Of Care    (X) * Activity level acceptable    Clinical Status    (X) * Hemodynamic stability    12/20/2022 1:39 PM EST by Pieter Rapp      /91, P 90, R 17, 02 % RA    (X) * Respiratory status acceptable    (X) * Neurologic status acceptable    (X) * Temperature status acceptable    12/20/2022 1:39 PM EST by Pieter Rapp      97.5    (X) * No infection, or status acceptable    (X) * No neutropenia, or status acceptable    ( ) * Isolation not indicated, or is performable at next level of care    12/20/2022 1:39 PM EST by Pieter Rapp      CONTACT ISOLATION    ( ) * Electrolyte status acceptable    ( ) * General Discharge Criteria met    Interventions    (X) * Intake acceptable    ( ) * No inpatient interventions needed    * Milestone   Additional Notes   Hospitalist Progress Note       Alert and oriented x3. Stated that he feels well overall. Got his sleeping med late last night so did not sleep much. BG 60 this Am but was 81 on recheck. EXAM:  General:          Well nourished. Head:               Normocephalic, atraumatic   Eyes:               Sclerae appear normal.  Pupils equally round. ENT:                Nares appear normal, no drainage. Moist oral mucosa   Neck:               No restricted ROM. Trachea midline    CV:                  RRR. No m/r/g. No jugular venous distension. Lungs:             CTAB. No wheezing, rhonchi, or rales. Symmetric expansion. Abdomen:        bowel sounds present. Soft, nontender. Nondistended. Extremities:     No cyanosis or clubbing. Venous stasis dermatitis on b/l LE's. Dressing c/d/I. Skin:                Warm and dry. Neuro:             CN II-XII grossly intact. Sensation intact. A&Ox3   Psych:             Normal mood and affect. POC GLUC         Assessment & Plan:       SIRS    Positive blood cultures   ? SBP with large ascites, cirrhosis, abdominal pain. CTAP shows no obvious source of infection but shows large volume ascites. UA not c/w infection. CT chest shows no PE and no consolidation. Rapid Covid/Flu NEG   BCX on admission grew Coag negative staph and Diphtheroids. Suspicious for contamination. Repeat BCX 12/15: NGTD   Abx: S/p Vanc (12/13-12/16); s/p Zosyn; s/p Rocephin/Flagyl (12/15-12/16)   Ascitic studies   - not consistent with infection   -Cx: NGTD   -Cytology: mesothelial and inflammatory cells   Consulted ID 12/16--no signs of infectious peritonitis and blood cultures are contaminant and does not require treatment. Leukocytosis most likely due to partial SBO/ileus which has resolved. Recommended wound care on right leg which wounds appear to be chronic/venous stasis without signs of active infection. ID recommended to stop all antibiotics and signed off 12/6. Abdominal pain, resolved   Partial SBO, resolved   Cirrhosis with ascites   KUB obtained on admission 12/13 and shows nonspecific bowel gas pattern; partial early SBO not excluded. NGT placed in ED. General surgery has seen and signed off 12/15. Suspected more d/t large volume ascites than SBO. No surgical intervention at this time. Surgery recommended remove NGT 12/15 and start diet. NGT removed and pt tolerating po well. CTAP 12/15 shows multiple dependent stones present in the gallbladder; some contours liver appearing nodular may represent cirrhosis; also findings suggestive of granuloma. Abundant ascites is present   IR was consulted and pt underwent paracentesis on 12/15 with 7.5L of thin yellow fluid removed. S/p IV albumin x3 doses   Acute hepatitis panel negative   Analgesics prn   Antiemetics prn   Liver workup obtained by GI as new dx of cirrhosis   Consider addition of diuretics when MARS resolves   GI signed off 12/16 and patient should follow-up with his primary GI at [facility].        Cholelithiasis   LFTs within normal limits. CTAP not suggestive of acute cholecystitis   Supportive care for now. Pt high risk for surgery, GI recommended against this. If develop cholecystitis, consider cholecystotomy tube       Normocytic anemia   Hgb 8.5 on 12/15,hemoglobin 10.1 on admission (baseline 7-8). Elevated on admission could be due to hemoconcentration with decrease po intake and also component of MARS   Iron studies c/w chronic disease   Monitor CBC, transfuse if hgb <7 or if symptomatic       MARS on CKD stage 3   Creatinine 1.8 on 12/13 (baseline 1.2-1.4). Most likely due to nausea and vomiting   CTAP 12/15 shows no hydronephrosis   Avoid nephrotoxic meds   D/c IVF due to large amount of ascites   Encourage p.o. intake   D/c po sodium bicarb to limit sodium intake   Monitor BMP       Hypokalemia, resolved   Replace and recheck       Alcohol dependence   Patient denies tobacco or alcohol abuse and stated that he stopped after last discharge. Could be contributing factor to his cirrhosis   Cont folic acid   Monitor for signs/symptoms of withdrawals       Essential hypertension   Cont home Norvasc       Type 2 diabetes mellitus   Diabetic peripheral neuropathy   Cont SSI   qQ0J--8.0   Hypoglycemic protocol in place   Diabetes management to assist       Chronic L leg diabetic ulcer   ID has seen, no signs of active infection   Wound team to assist--Needs referral to wound clinic at discharge   Recommend xeroform, foam and tiffanie 3 times per week. AT home would use hydrafera blue/ tiffanie 3 times per week.        GERD   Cont PPI, change to po       Hypoalbuminemia   Mod protein-calorie malnutrition   Consulted nutrition         Anticipated discharge needs:       STR pending at this point

## 2022-12-22 ENCOUNTER — APPOINTMENT (OUTPATIENT)
Dept: NON INVASIVE DIAGNOSTICS | Age: 57
DRG: 433 | End: 2022-12-22
Payer: MEDICARE

## 2022-12-22 LAB
ECHO AO ASC DIAM: 3.1 CM
ECHO AO ASCENDING AORTA INDEX: 1.41 CM/M2
ECHO AO ROOT DIAM: 3.5 CM
ECHO AO ROOT INDEX: 1.59 CM/M2
ECHO AV AREA PEAK VELOCITY: 2.2 CM2
ECHO AV AREA VTI: 2.6 CM2
ECHO AV AREA/BSA PEAK VELOCITY: 1 CM2/M2
ECHO AV AREA/BSA VTI: 1.2 CM2/M2
ECHO AV MEAN GRADIENT: 6 MMHG
ECHO AV MEAN VELOCITY: 1.1 M/S
ECHO AV PEAK GRADIENT: 10 MMHG
ECHO AV PEAK VELOCITY: 1.6 M/S
ECHO AV VELOCITY RATIO: 0.69
ECHO AV VTI: 28.1 CM
ECHO BSA: 2.13 M2
ECHO EST RA PRESSURE: 3 MMHG
ECHO IVC PROX: 1.5 CM
ECHO LA AREA 2C: 17.7 CM2
ECHO LA AREA 4C: 18.4 CM2
ECHO LA DIAMETER INDEX: 1.59 CM/M2
ECHO LA DIAMETER: 3.5 CM
ECHO LA MAJOR AXIS: 6.4 CM
ECHO LA MINOR AXIS: 5.4 CM
ECHO LA TO AORTIC ROOT RATIO: 1
ECHO LA VOL 2C: 47 ML (ref 18–58)
ECHO LA VOL 4C: 42 ML (ref 18–58)
ECHO LA VOL BP: 48 ML (ref 18–58)
ECHO LA VOL/BSA BIPLANE: 22 ML/M2 (ref 16–34)
ECHO LA VOLUME INDEX A2C: 21 ML/M2 (ref 16–34)
ECHO LA VOLUME INDEX A4C: 19 ML/M2 (ref 16–34)
ECHO LV E' LATERAL VELOCITY: 13 CM/S
ECHO LV E' SEPTAL VELOCITY: 7 CM/S
ECHO LV EDV A2C: 83 ML
ECHO LV EDV A4C: 102 ML
ECHO LV EDV INDEX A4C: 46 ML/M2
ECHO LV EDV NDEX A2C: 38 ML/M2
ECHO LV EJECTION FRACTION A2C: 60 %
ECHO LV EJECTION FRACTION A4C: 58 %
ECHO LV EJECTION FRACTION BIPLANE: 59 % (ref 55–100)
ECHO LV ESV A2C: 33 ML
ECHO LV ESV A4C: 43 ML
ECHO LV ESV INDEX A2C: 15 ML/M2
ECHO LV ESV INDEX A4C: 20 ML/M2
ECHO LV FRACTIONAL SHORTENING: 31 % (ref 28–44)
ECHO LV INTERNAL DIMENSION DIASTOLE INDEX: 1.91 CM/M2
ECHO LV INTERNAL DIMENSION DIASTOLIC: 4.2 CM (ref 4.2–5.9)
ECHO LV INTERNAL DIMENSION SYSTOLIC INDEX: 1.32 CM/M2
ECHO LV INTERNAL DIMENSION SYSTOLIC: 2.9 CM
ECHO LV IVSD: 1.4 CM (ref 0.6–1)
ECHO LV MASS 2D: 189.2 G (ref 88–224)
ECHO LV MASS INDEX 2D: 86 G/M2 (ref 49–115)
ECHO LV POSTERIOR WALL DIASTOLIC: 1.1 CM (ref 0.6–1)
ECHO LV RELATIVE WALL THICKNESS RATIO: 0.52
ECHO LVOT AREA: 3.5 CM2
ECHO LVOT AV VTI INDEX: 0.74
ECHO LVOT DIAM: 2.1 CM
ECHO LVOT MEAN GRADIENT: 3 MMHG
ECHO LVOT PEAK GRADIENT: 4 MMHG
ECHO LVOT PEAK VELOCITY: 1.1 M/S
ECHO LVOT STROKE VOLUME INDEX: 32.7 ML/M2
ECHO LVOT SV: 72 ML
ECHO LVOT VTI: 20.8 CM
ECHO MV A VELOCITY: 0.91 M/S
ECHO MV AREA VTI: 3.2 CM2
ECHO MV E DECELERATION TIME (DT): 166 MS
ECHO MV E VELOCITY: 0.9 M/S
ECHO MV E/A RATIO: 0.99
ECHO MV E/E' LATERAL: 6.92
ECHO MV E/E' RATIO (AVERAGED): 9.89
ECHO MV E/E' SEPTAL: 12.86
ECHO MV LVOT VTI INDEX: 1.09
ECHO MV MAX VELOCITY: 1 M/S
ECHO MV MEAN GRADIENT: 3 MMHG
ECHO MV MEAN VELOCITY: 0.8 M/S
ECHO MV PEAK GRADIENT: 4 MMHG
ECHO MV VTI: 22.6 CM
ECHO PV ACCELERATION TIME (AT): 74 MS
ECHO PV MAX VELOCITY: 1 M/S
ECHO PV PEAK GRADIENT: 4 MMHG
ECHO RV FREE WALL PEAK S': 15 CM/S
ECHO RV TAPSE: 2.2 CM (ref 1.7–?)
GLUCOSE BLD STRIP.AUTO-MCNC: 164 MG/DL (ref 65–100)
GLUCOSE BLD STRIP.AUTO-MCNC: 171 MG/DL (ref 65–100)
GLUCOSE BLD STRIP.AUTO-MCNC: 233 MG/DL (ref 65–100)
GLUCOSE BLD STRIP.AUTO-MCNC: 260 MG/DL (ref 65–100)
LV EF: 68 %
LVEF MODALITY: ABNORMAL
SERVICE CMNT-IMP: ABNORMAL

## 2022-12-22 PROCEDURE — 93306 TTE W/DOPPLER COMPLETE: CPT | Performed by: INTERNAL MEDICINE

## 2022-12-22 PROCEDURE — 6370000000 HC RX 637 (ALT 250 FOR IP): Performed by: FAMILY MEDICINE

## 2022-12-22 PROCEDURE — 1100000003 HC PRIVATE W/ TELEMETRY

## 2022-12-22 PROCEDURE — 93306 TTE W/DOPPLER COMPLETE: CPT

## 2022-12-22 PROCEDURE — 2580000003 HC RX 258: Performed by: FAMILY MEDICINE

## 2022-12-22 PROCEDURE — 82962 GLUCOSE BLOOD TEST: CPT

## 2022-12-22 PROCEDURE — 6370000000 HC RX 637 (ALT 250 FOR IP): Performed by: HOSPITALIST

## 2022-12-22 PROCEDURE — 6360000002 HC RX W HCPCS: Performed by: HOSPITALIST

## 2022-12-22 RX ORDER — CARVEDILOL 6.25 MG/1
6.25 TABLET ORAL 2 TIMES DAILY WITH MEALS
Status: DISCONTINUED | OUTPATIENT
Start: 2022-12-22 | End: 2022-12-27

## 2022-12-22 RX ORDER — HYDRALAZINE HYDROCHLORIDE 25 MG/1
25 TABLET, FILM COATED ORAL EVERY 8 HOURS SCHEDULED
Status: DISCONTINUED | OUTPATIENT
Start: 2022-12-23 | End: 2022-12-22

## 2022-12-22 RX ORDER — SPIRONOLACTONE 25 MG/1
50 TABLET ORAL DAILY
Status: DISCONTINUED | OUTPATIENT
Start: 2022-12-22 | End: 2023-01-03 | Stop reason: HOSPADM

## 2022-12-22 RX ORDER — FUROSEMIDE 10 MG/ML
40 INJECTION INTRAMUSCULAR; INTRAVENOUS 2 TIMES DAILY
Status: DISCONTINUED | OUTPATIENT
Start: 2022-12-22 | End: 2023-01-03 | Stop reason: HOSPADM

## 2022-12-22 RX ADMIN — CHOLESTYRAMINE 4 G: 4 POWDER, FOR SUSPENSION ORAL at 20:52

## 2022-12-22 RX ADMIN — SODIUM CHLORIDE, PRESERVATIVE FREE 10 ML: 5 INJECTION INTRAVENOUS at 08:13

## 2022-12-22 RX ADMIN — ZOLPIDEM TARTRATE 5 MG: 5 TABLET ORAL at 20:52

## 2022-12-22 RX ADMIN — FUROSEMIDE 40 MG: 10 INJECTION, SOLUTION INTRAMUSCULAR; INTRAVENOUS at 16:42

## 2022-12-22 RX ADMIN — INSULIN LISPRO 1 UNITS: 100 INJECTION, SOLUTION INTRAVENOUS; SUBCUTANEOUS at 16:43

## 2022-12-22 RX ADMIN — CHOLESTYRAMINE 4 G: 4 POWDER, FOR SUSPENSION ORAL at 08:29

## 2022-12-22 RX ADMIN — AMLODIPINE BESYLATE 10 MG: 10 TABLET ORAL at 08:13

## 2022-12-22 RX ADMIN — CYANOCOBALAMIN TAB 1000 MCG 1000 MCG: 1000 TAB at 08:13

## 2022-12-22 RX ADMIN — CARVEDILOL 6.25 MG: 6.25 TABLET, FILM COATED ORAL at 16:42

## 2022-12-22 RX ADMIN — PANTOPRAZOLE SODIUM 40 MG: 40 TABLET, DELAYED RELEASE ORAL at 05:46

## 2022-12-22 RX ADMIN — FOLIC ACID 1 MG: 1 TABLET ORAL at 08:13

## 2022-12-22 RX ADMIN — SPIRONOLACTONE 50 MG: 25 TABLET ORAL at 16:44

## 2022-12-22 ASSESSMENT — PAIN SCALES - GENERAL
PAINLEVEL_OUTOF10: 0
PAINLEVEL_OUTOF10: 0

## 2022-12-22 NOTE — CARE COORDINATION
Patient were reviewed in Interdisciplinary rounds. Insurance authorizations pending. CM will continue to monitor.

## 2022-12-22 NOTE — PROGRESS NOTES
Hospitalist Progress Note   Admit Date:  2022  3:24 PM   Name:  Chris Oliva   Age:  65 y.o. Sex:  male  :  1965   MRN:  [id number]   Room:  725/01    Presenting Complaint: Emesis     Reason(s) for Admission: Septicemia ([facility] [A41.9]  SIRS (systemic inflammatory response syndrome) (HCC) [R65.10]  Abdominal pain, unspecified abdominal location [R10.9]  Swelling of lower extremity [M79.89]     Hospital Course:   65 y.o. male w/ Hx of GI bleed who presented to the ED for cc inability to tolerate PO, emesis, abdominal pain with distention, no flatus or BM since of 1 day duration. In ER, patient was found tachycardic with D-dimer 9.3. WBC 13.9 proBNP 1280. CT chest was obtained and showed no acute PE; scattered bibasilar atelectasis, CAD and aortic calcifications; ascites; gallstones. KUB obtained and shows nonspecific bowel gas pattern; partial early SBO not excluded. Patient was admitted for SIRS with leukocytosis and tachycardia; no source of infection. CTAP 12/15 shows multiple dependent stones present in the gallbladder; some contours liver appearing nodular may represent cirrhosis; also findings suggestive of granuloma. Abundant ascites is present. UA not c/w infection. CT chest shows no PE and no consolidation. Rapid Covid/Flu NEG. Blood cultures on admission grew Coag negative staph and Diphtheroids. Suspicious for contamination. Suspicious for contamination. He was started empirically on broad-spectrum antibiotics. ? SBP contributing with ascites and cirrhosis. Repeat blood cultures NGTD. ID was consulted  and recommended no signs of infectious peritonitis and blood cultures are contaminant and does not require treatment. Leukocytosis most likely due to partial SBO/ileus which has resolved. Recommended wound care on right leg which wounds appear to be chronic/venous stasis without signs of active infection.   ID recommended to stop all antibiotics and signed off 12/16.    There was also a concern for partial SBO on admission. NGT placed in ED for decompression. General surgery was consulted. General surgery has seen and was suspicious pt's symptoms are more d/t large volume ascites than SBO. No surgical intervention at this time and recommended to remove NGT and start diet. NGT removed 12/15 and pt tolerated po well and has had a BM. Surgery signed off 12/15. GI was consulted for large volume ascites and new cirrhosis. IR was consulted and pt underwent paracentesis on 12/15 with 7.5L of thin yellow fluid removed. GI recommended that patient has cholelithiasis on imaging but recommended against surgical consideration as a surgical mortality would be high. If cholecystitis develops, consider cholecystostomy tube. GI recommended low-sodium diet discontinue sodium bicarb if possible. GI signed off 12/16 and patient should follow-up with his primary GI at [facility]. PT/OT recommended STR. Subjective & 24hr Events (12/22/22): Pt seen at bedside, resting in bedside recliner. Pt reports feeling lethargic and weak. At baseline, uses a walker to ambulate. Denies chest pain, nausea/vomiting, fever, chills, SOB, headache. Does complain of bilateral LE edema. ROS:  10 point ROS is negative except what mentioned above. Assessment & Plan:     SIRS   Positive blood cultures  ? SBP with large ascites, cirrhosis, abdominal pain. CTAP shows no obvious source of infection but shows large volume ascites. UA not c/w infection. CT chest shows no PE and no consolidation. Rapid Covid/Flu NEG  BCX on admission grew Coag negative staph and Diphtheroids. Suspicious for contamination.    Repeat BCX 12/15: NGTD  Abx: S/p Vanc (12/13-12/16); s/p Zosyn; s/p Rocephin/Flagyl (12/15-12/16)  Ascitic studies  - not consistent with infection  -Cx: NGTD  -Cytology: mesothelial and inflammatory cells  Consulted ID 12/16--no signs of infectious peritonitis and blood cultures are contaminant and does not require treatment. Leukocytosis most likely due to partial SBO/ileus which has resolved. Recommended wound care on right leg which wounds appear to be chronic/venous stasis without signs of active infection. ID recommended to stop all antibiotics and signed off 12/16. Abdominal pain, resolved  Partial SBO, resolved  Cirrhosis with ascites  KUB obtained on admission 12/13 and shows nonspecific bowel gas pattern; partial early SBO not excluded. NGT placed in ED. General surgery has seen and signed off 12/15. Suspected more d/t large volume ascites than SBO. No surgical intervention at this time. Surgery recommended remove NGT 12/15 and start diet. NGT removed and pt tolerating po well. CTAP 12/15 shows multiple dependent stones present in the gallbladder; some contours liver appearing nodular may represent cirrhosis; also findings suggestive of granuloma. Abundant ascites is present  IR was consulted and pt underwent paracentesis on 12/15 with 7.5L of thin yellow fluid removed. S/p IV albumin x3 doses  Acute hepatitis panel negative  Analgesics prn  Antiemetics prn  Liver workup obtained by GI as new dx of cirrhosis  Consider addition of diuretics when MARS resolves  GI signed off 12/16 and patient should follow-up with his primary GI at [facility]. 12/22-  Check ECHO to rule out CHF. Cholelithiasis  LFTs within normal limits. CTAP not suggestive of acute cholecystitis  Supportive care for now. Pt high risk for surgery, GI recommended against this. If develop cholecystitis, consider cholecystotomy tube    Normocytic anemia  Hgb 8.5 on 12/15,hemoglobin 10.1 on admission (baseline 7-8). Elevated on admission could be due to hemoconcentration with decrease po intake and also component of MARS  Iron studies c/w chronic disease  Monitor CBC, transfuse if hgb <7 or if symptomatic    MARS on CKD stage 3  Creatinine 1.8 on 12/13 (baseline 1.2-1.4).   Most likely due to nausea and vomiting  CTAP 12/15 shows no hydronephrosis  Avoid nephrotoxic meds  D/c IVF due to large amount of ascites  Encourage p.o. intake  D/c po sodium bicarb to limit sodium intake  Monitor BMP    Hypokalemia, resolved  Replace and recheck    Alcohol dependence  Patient denies tobacco or alcohol abuse and stated that he stopped after last discharge. Could be contributing factor to his cirrhosis  Cont folic acid  Monitor for signs/symptoms of withdrawals    Essential hypertension  Cont home Norvasc    Type 2 diabetes mellitus  Diabetic peripheral neuropathy  Cont SSI  [id number]  Hypoglycemic protocol in place  Diabetes management to assist    Chronic L leg diabetic ulcer  ID has seen, no signs of active infection  Wound team to assist--Needs referral to wound clinic at discharge  Recommend xeroform, foam and tiffanie 3 times per week. AT home would use hydrafera blue/ tiffanie 3 times per week. GERD  Cont PPI, change to po    Hypoalbuminemia  Mod protein-calorie malnutrition  Consulted nutrition      Anticipated discharge needs:      STR vs IRC pending at this point    Diet:  ADULT DIET; Regular; Low Sodium (2 gm)  ADULT ORAL NUTRITION SUPPLEMENT; Dinner;  Low Calorie/High Protein Oral Supplement  DVT PPx: SCD's  Code status: Full Code    Hospital Problems:  Principal Problem:    Cirrhosis of liver with ascites ([address]  Active Problems:    Alcohol dependence ([address]    Diabetic peripheral neuropathy ([address]    Essential hypertension    Hyperlipidemia    Type 2 diabetes mellitus ([address]    Diabetic ulcer of both feet associated with type 2 diabetes mellitus ([address]    Acute kidney injury superimposed on CKD (HCC)    PAD (peripheral artery disease) (HCC)    Hypoalbuminemia    Normocytic anemia    SIRS (systemic inflammatory response syndrome) (HCC)    Abdominal pain    Moderate protein malnutrition (HCC)    Cholelithiases    Positive blood culture    Partial small bowel obstruction (HCC)    Stage 3a chronic kidney disease [facility])  Resolved Problems:    SBP (spontaneous bacterial peritonitis) ([address]      Objective:   Patient Vitals for the past 24 hrs:   Temp Pulse Resp BP SpO2   12/22/22 0718 98.2 °F (36.8 °C) (!) 101 18 120/85 100 %   12/22/22 0436 97.3 °F (36.3 °C) (!) 102 21 (!) 142/96 100 %   12/21/22 2350 98.1 °F (36.7 °C) (!) 102 20 (!) 145/93 100 %   12/21/22 2027 97.5 °F (36.4 °C) 92 21 (!) 123/100 100 %   12/21/22 1537 97.2 °F (36.2 °C) 86 17 (!) 132/90 100 %   12/21/22 1104 97.7 °F (36.5 °C) 84 17 113/81 100 %         Oxygen Therapy  SpO2: 100 %  Pulse via Oximetry: 87 beats per minute  Pulse Oximeter Device Mode: Continuous  O2 Device: None (Room air)    Estimated body mass index is 30.87 kg/m² as calculated from the following:    Height as of this encounter: 5' 11\" (1.803 m). Weight as of this encounter: 221 lb 4.8 oz (100.4 kg). No intake or output data in the 24 hours ending 12/22/22 0817        Physical Exam:     Blood pressure 120/85, pulse (!) 101, temperature 98.2 °F (36.8 °C), temperature source Oral, resp. rate 18, height 5' 11\" (1.803 m), weight 221 lb 4.8 oz (100.4 kg), SpO2 100 %. General:    Chronically sick appearing, on RA, NAD    Head:  Normocephalic, atraumatic  Eyes:  Sclerae appear normal.  Pupils equally round. ENT:  Nares appear normal, no drainage. Moist oral mucosa  Neck:  No restricted ROM. Trachea midline   CV:   RRR. No m/r/g. No jugular venous distension. Lungs:   CTAB. No wheezing, rhonchi, or rales. Symmetric expansion. Abdomen:   bowel sounds present. Soft, nontender. Nondistended. Extremities: No cyanosis or clubbing. Venous stasis dermatitis on b/l LE's wit 2+ pitting edema. . Dressing c/d/I. Skin:     Warm and dry. Neuro:  GCS 15, CN intact, no motor deficits, generalized weakness noticed   Psych:  Normal mood and affect.       I have personally reviewed labs and tests showing:  Recent Labs:  Recent Results (from the past 48 hour(s))   POCT Glucose    Collection Time: 12/20/22 12:47 PM   Result Value Ref Range    POC Glucose 225 (H) 65 - 100 mg/dL    Performed by: [employer]    POCT Glucose    Collection Time: 12/20/22  3:24 PM   Result Value Ref Range    POC Glucose 174 (H) 65 - 100 mg/dL    Performed by: Laurie John    COVID-19    Collection Time: 12/20/22  3:30 PM    Specimen: Nasopharyngeal Swab   Result Value Ref Range    SARS-CoV-2 Please find results under separate order     COVID-19    Collection Time: 12/20/22  3:30 PM    Specimen: Nasopharyngeal   Result Value Ref Range    Source Nasopharyngeal      SARS-CoV-2, PCR Not detected NOTD     POCT Glucose    Collection Time: 12/20/22  8:40 PM   Result Value Ref Range    POC Glucose 246 (H) 65 - 100 mg/dL    Performed by: Scarlet(PCT)SNE    POCT Glucose    Collection Time: 12/21/22  6:08 AM   Result Value Ref Range    POC Glucose 150 (H) 65 - 100 mg/dL    Performed by: Scarlet(PCT[employer]SNE    POCT Glucose    Collection Time: 12/21/22 11:05 AM   Result Value Ref Range    POC Glucose 161 (H) 65 - 100 mg/dL    Performed by: [employer]    POCT Glucose    Collection Time: 12/21/22  3:39 PM   Result Value Ref Range    POC Glucose 197 (H) 65 - 100 mg/dL    Performed by: [employer]    POCT Glucose    Collection Time: 12/21/22  8:28 PM   Result Value Ref Range    POC Glucose 219 (H) 65 - 100 mg/dL    Performed by: Catracho    POCT Glucose    Collection Time: 12/22/22  6:59 AM   Result Value Ref Range    POC Glucose 164 (H) 65 - 100 mg/dL    Performed by: Maine Mcneal        I have personally reviewed imaging studies showing: Other Studies:  IR US GUIDED PARACENTESIS   Final Result   Uncomplicated ultrasound guided paracentesis. CT ABDOMEN PELVIS WO CONTRAST Additional Contrast? Oral   Final Result      1. Ascites. 2. Cholelithiasis. XR ABDOMEN (KUB) (SINGLE AP VIEW)   Final Result   1. The enteric tube tip is in the proximal to mid stomach.    2. Unchanged mildly distended small bowel loops. XR ABDOMEN (KUB) (SINGLE AP VIEW)   Final Result   1. Nonspecific bowel gas pattern. Partial or early small bowel obstruction not   excluded. 2.  Ascites. CT CHEST PULMONARY EMBOLISM W CONTRAST   Final Result   1. No evidence of PE.   2.  Scattered bibasilar atelectasis. 3.  Coronary artery and aortic calcifications. 4.  Ascites. 5.  Gallstones. XR CHEST (2 VW)   Final Result   1. Diminished lung lines with associated opacification bibasilar atelectasis   without focal infiltrative opacity.              Current Meds:  Current Facility-Administered Medications   Medication Dose Route Frequency    zolpidem (AMBIEN) tablet 5 mg  5 mg Oral Nightly PRN    pantoprazole (PROTONIX) tablet 40 mg  40 mg Oral QAM AC    diatrizoate meglumine-sodium (GASTROGRAFIN) 66-10 % solution 15 mL  15 mL Oral ONCE PRN    amLODIPine (NORVASC) tablet 10 mg  10 mg Oral Daily    cholestyramine light packet 4 g  4 g Oral BID    folic acid (FOLVITE) tablet 1 mg  1 mg Oral Daily    vitamin B-12 (CYANOCOBALAMIN) tablet 1,000 mcg  1,000 mcg Oral Daily    insulin lispro (HUMALOG) injection vial 0-4 Units  0-4 Units SubCUTAneous TID WC    insulin lispro (HUMALOG) injection vial 0-4 Units  0-4 Units SubCUTAneous Nightly    glucose chewable tablet 16 g  4 tablet Oral PRN    dextrose bolus 10% 125 mL  125 mL IntraVENous PRN    Or    dextrose bolus 10% 250 mL  250 mL IntraVENous PRN    glucagon (rDNA) injection 1 mg  1 mg SubCUTAneous PRN    dextrose 10 % infusion   IntraVENous Continuous PRN    sodium chloride flush 0.9 % injection 5-40 mL  5-40 mL IntraVENous 2 times per day    sodium chloride flush 0.9 % injection 5-40 mL  5-40 mL IntraVENous PRN    0.9 % sodium chloride infusion   IntraVENous PRN    ondansetron (ZOFRAN-ODT) disintegrating tablet 4 mg  4 mg Oral Q8H PRN    Or    ondansetron (ZOFRAN) injection 4 mg  4 mg IntraVENous Q6H PRN    polyethylene glycol (GLYCOLAX) packet 17 g  17 g Oral Daily PRN    acetaminophen (TYLENOL) tablet 650 mg  650 mg Oral Q6H PRN    Or    acetaminophen (TYLENOL) suppository 650 mg  650 mg Rectal Q6H PRN    hydrALAZINE (APRESOLINE) injection 10 mg  10 mg IntraVENous Q6H PRN    morphine injection 1 mg  1 mg IntraVENous Q4H PRN       Signed:  Matias Jean MD    Part of this note may have been written by using a voice dictation software. The note has been proof read but may still contain some grammatical/other typographical errors.

## 2022-12-23 LAB
ANION GAP SERPL CALC-SCNC: 9 MMOL/L (ref 2–11)
BUN SERPL-MCNC: 33 MG/DL (ref 6–23)
CALCIUM SERPL-MCNC: 7.7 MG/DL (ref 8.3–10.4)
CHLORIDE SERPL-SCNC: 106 MMOL/L (ref 101–110)
CO2 SERPL-SCNC: 19 MMOL/L (ref 21–32)
CREAT SERPL-MCNC: 2.2 MG/DL (ref 0.8–1.5)
GLUCOSE BLD STRIP.AUTO-MCNC: 146 MG/DL (ref 65–100)
GLUCOSE BLD STRIP.AUTO-MCNC: 172 MG/DL (ref 65–100)
GLUCOSE BLD STRIP.AUTO-MCNC: 189 MG/DL (ref 65–100)
GLUCOSE BLD STRIP.AUTO-MCNC: 194 MG/DL (ref 65–100)
GLUCOSE SERPL-MCNC: 139 MG/DL (ref 65–100)
POTASSIUM SERPL-SCNC: 5 MMOL/L (ref 3.5–5.1)
SERVICE CMNT-IMP: ABNORMAL
SODIUM SERPL-SCNC: 134 MMOL/L (ref 133–143)

## 2022-12-23 PROCEDURE — 6370000000 HC RX 637 (ALT 250 FOR IP): Performed by: HOSPITALIST

## 2022-12-23 PROCEDURE — 6370000000 HC RX 637 (ALT 250 FOR IP): Performed by: FAMILY MEDICINE

## 2022-12-23 PROCEDURE — 36415 COLL VENOUS BLD VENIPUNCTURE: CPT

## 2022-12-23 PROCEDURE — 97530 THERAPEUTIC ACTIVITIES: CPT

## 2022-12-23 PROCEDURE — 6360000002 HC RX W HCPCS: Performed by: HOSPITALIST

## 2022-12-23 PROCEDURE — 80048 BASIC METABOLIC PNL TOTAL CA: CPT

## 2022-12-23 PROCEDURE — 2580000003 HC RX 258: Performed by: FAMILY MEDICINE

## 2022-12-23 PROCEDURE — 1100000003 HC PRIVATE W/ TELEMETRY

## 2022-12-23 PROCEDURE — 82962 GLUCOSE BLOOD TEST: CPT

## 2022-12-23 RX ADMIN — ZOLPIDEM TARTRATE 5 MG: 5 TABLET ORAL at 23:20

## 2022-12-23 RX ADMIN — CARVEDILOL 6.25 MG: 6.25 TABLET, FILM COATED ORAL at 16:23

## 2022-12-23 RX ADMIN — SODIUM CHLORIDE, PRESERVATIVE FREE 5 ML: 5 INJECTION INTRAVENOUS at 00:40

## 2022-12-23 RX ADMIN — FOLIC ACID 1 MG: 1 TABLET ORAL at 09:07

## 2022-12-23 RX ADMIN — SODIUM CHLORIDE, PRESERVATIVE FREE 5 ML: 5 INJECTION INTRAVENOUS at 20:35

## 2022-12-23 RX ADMIN — SPIRONOLACTONE 50 MG: 25 TABLET ORAL at 09:06

## 2022-12-23 RX ADMIN — CHOLESTYRAMINE 4 G: 4 POWDER, FOR SUSPENSION ORAL at 09:08

## 2022-12-23 RX ADMIN — ACETAMINOPHEN 650 MG: 325 TABLET, FILM COATED ORAL at 09:07

## 2022-12-23 RX ADMIN — CHOLESTYRAMINE 4 G: 4 POWDER, FOR SUSPENSION ORAL at 20:35

## 2022-12-23 RX ADMIN — CYANOCOBALAMIN TAB 1000 MCG 1000 MCG: 1000 TAB at 09:07

## 2022-12-23 RX ADMIN — PANTOPRAZOLE SODIUM 40 MG: 40 TABLET, DELAYED RELEASE ORAL at 06:14

## 2022-12-23 RX ADMIN — SODIUM CHLORIDE, PRESERVATIVE FREE 5 ML: 5 INJECTION INTRAVENOUS at 09:08

## 2022-12-23 RX ADMIN — CARVEDILOL 6.25 MG: 6.25 TABLET, FILM COATED ORAL at 09:07

## 2022-12-23 RX ADMIN — FUROSEMIDE 40 MG: 10 INJECTION, SOLUTION INTRAMUSCULAR; INTRAVENOUS at 09:06

## 2022-12-23 ASSESSMENT — PAIN - FUNCTIONAL ASSESSMENT: PAIN_FUNCTIONAL_ASSESSMENT: PREVENTS OR INTERFERES SOME ACTIVE ACTIVITIES AND ADLS

## 2022-12-23 ASSESSMENT — PAIN SCALES - GENERAL
PAINLEVEL_OUTOF10: 0
PAINLEVEL_OUTOF10: 3
PAINLEVEL_OUTOF10: 0
PAINLEVEL_OUTOF10: 0

## 2022-12-23 ASSESSMENT — PAIN DESCRIPTION - DESCRIPTORS: DESCRIPTORS: ACHING

## 2022-12-23 ASSESSMENT — PAIN DESCRIPTION - PAIN TYPE: TYPE: CHRONIC PAIN

## 2022-12-23 ASSESSMENT — PAIN DESCRIPTION - FREQUENCY: FREQUENCY: INTERMITTENT

## 2022-12-23 ASSESSMENT — PAIN DESCRIPTION - ONSET: ONSET: GRADUAL

## 2022-12-23 ASSESSMENT — PAIN DESCRIPTION - LOCATION: LOCATION: GENERALIZED

## 2022-12-23 ASSESSMENT — PAIN DESCRIPTION - ORIENTATION: ORIENTATION: MID

## 2022-12-23 NOTE — CARE COORDINATION
CM continuing to follow for ongoing discharge planning. IRC is following for potential appropriateness for IRC level of care and assessing long term discharge plan prior to offering bed and initiating insurance authorization. PPD placement and read was completed 12/18/22. PCR Covid testing was negative 12/20 and will need to reflect negative reading within 72 hours of admission to [facility].

## 2022-12-23 NOTE — CARE COORDINATION
CM continuing to follow for ongoing discharge planning. Noted that [facility] 9th floor [facility] medical attending has reviewed chart and per documentation states,   \"Patient debilitated from acute and chronic medical issues and would benefit from intensive post-acute skilled therapy  Patient will require ongoing daily monitoring of volume status, electrolytes, leukocytosis, anemia, blood pressure, blood sugar and L LE wounds  9th Floor [facility] has initiated insurance authorization with [facility]. [employer] will require an automatic peer to peer for [facility] level of care. CM will continue to follow for ongoing discharge planning.

## 2022-12-23 NOTE — PROGRESS NOTES
ACUTE PHYSICAL THERAPY GOALS:   (Developed with and agreed upon by patient and/or caregiver.)  Pt will perform supine to/from sit with mod I in 7 treatment days. Pt will perform sit to/from stand with mod I and LRAD in 7 treatment days. Pt will ambulate 150 ft with mod I and LRAD in 7 treatment days. Pt will negotiate 2 stairs with mod I and rail in 7 treatment days. Pt will be independent with HEP in 7 days. PHYSICAL THERAPY: Daily Note PM   (Link to Caseload Tracking: PT Visit Days : 4  Time In/Out PT Charge Capture  Rehab Caseload Tracker  Orders    Preethi Meyer is a 65 y.o. male   PRIMARY DIAGNOSIS: Cirrhosis of liver with ascites ([facility] [address]  Septicemia ([facility] [address] [A41.9]  SIRS (systemic inflammatory response syndrome) (HCC) [R65.10]  Abdominal pain, unspecified abdominal location [R10.9]  Swelling of lower extremity [M79.89]       Inpatient: Payor: Conrad Klein / Plan: Gualberto Balling / Product Type: *No Product type* /     ASSESSMENT:     REHAB RECOMMENDATIONS:   Recommendation to date pending progress:  Setting:  Inpatient Rehab Facility    Equipment:    To Be Determined     ASSESSMENT:  Mr. Tim Banks was supine upon contact and agreeable to PT; requesting to use the [facility]. Patient performed supine to sit with min assist and cues for technique. Sit to stand min assist with focus on correct technique and hand placement. Once standing patient transferred ot [facility] with min assist. Patient sat on BSC for BM then transferred to standing with min assist, initially losing his balance posteriorly with sit to stand requiring min assist to regain LOB. Patient demonstrated fair standing balance and tolerance during prolonged standing ADL activity. Patient then ambulated in hallway with rolling walker, min assist, chair follow for safety and cues for posture/gait sequencing. Patient took several seated rest breaks throughout ambulation due to BLE weakness and fatigue.  Patient returned to recliner chair where he was positioned for comfort in recliner chair. Steady progress. Pt continues to present as functioning below his baseline, with deficits in mobility including transfers, gait, balance, and activity tolerance. Pt will continue to benefit from skilled therapy services to address stated deficits to promote return to highest level of function, independence, and safety. Will continue PT efforts.        SUBJECTIVE:   Mr. Michael Bueno states, \"This food runs right through me\"     Social/Functional Lives With: Spouse  Type of Home: House  Home Layout: One level  Home Access: Stairs to enter with rails  Entrance Stairs - Number of Steps: 2  Home Equipment: Joome, Golden Reviews Help From: Family  ADL Assistance: Independent  Homemaking Assistance: Needs assistance  Homemaking Responsibilities: Yes  Ambulation Assistance: Independent  Transfer Assistance: Independent  OBJECTIVE:     PAIN: [place] Cousin / O2: Gallito Quijano / Sathish Moseleyten / David [place]win:   Pre Treatment: 0  Pain Assessment: None - Denies Pain      Post Treatment: 0 Vitals        Oxygen    None    RESTRICTIONS/PRECAUTIONS:  Restrictions/Precautions  Restrictions/Precautions: Fall Risk  Restrictions/Precautions: Fall Risk     MOBILITY: I Mod I S SBA CGA Min Mod Max Total  NT x2 Comments:   Bed Mobility    Rolling [] [] [] [] [] [x] [] [] [] [] []    Supine to Sit [] [] [] [] [] [x] [] [] [] [] []    Scooting [] [] [] [] [] [x] [] [] [] [] []    Sit to Supine [] [] [] [] [] [] [] [] [] [] []    Transfers    Sit to Stand [] [] [] [] [] [x] [] [] [] [] []    Bed to Chair [] [] [] [] [] [] [] [] [] [] []    Stand to Sit [] [] [] [] [] [x] [] [] [] [] []     [] [] [] [] [] [] [] [] [] [] []    I=Independent, Mod I=Modified Independent, S=Supervision, SBA=Standby Assistance, CGA=Contact Guard Assistance,   Min=Minimal Assistance, Mod=Moderate Assistance, Max=Maximal Assistance, Total=Total Assistance, NT=Not Tested    BALANCE: Good Fair+ Fair Fair- Poor NT Comments   Sitting Static [x] [] [] [] [] []    Sitting Dynamic [] [x] [] [] [] []              Standing Static [] [] [x] [] [] []    Standing Dynamic [] [] [] [x] [] []      GAIT: I Mod I S SBA CGA Min Mod Max Total  NT x2 Comments:   Level of Assistance [] [] [] [] [] [x] [] [] [] [] [] Chair follow   Distance   feet    DME Gait Belt and Rolling Walker    Gait Quality Decreased jason , Decreased step clearance, Decreased step length, and Decreased stance    Weightbearing Status      Stairs      I=Independent, Mod I=Modified Independent, S=Supervision, SBA=Standby Assistance, CGA=Contact Guard Assistance,   Min=Minimal Assistance, Mod=Moderate Assistance, Max=Maximal Assistance, Total=Total Assistance, NT=Not Tested    PLAN:   FREQUENCY AND DURATION: 3 times/week for duration of hospital stay or until stated goals are met, whichever comes first.    TREATMENT:   TREATMENT:   Therapeutic Activity (40 Minutes): Therapeutic activity included Rolling, Supine to Sit, Scooting, Transfer Training, Ambulation on level ground, Sitting balance , and Standing balance to improve functional Activity tolerance, Balance, Coordination, Mobility, Strength, and ROM. TREATMENT GRID:  N/A    AFTER TREATMENT PRECAUTIONS: Bed/Chair Locked, Call light within reach, Chair, Needs within reach, and RN notified    INTERDISCIPLINARY COLLABORATION:  RN/ PCT, PT/ PTA, and Rehab Attendant    EDUCATION:      TIME IN/OUT:  Time In: 0940  Time Out: [id number] Shad Phan  Minutes: [address] [facility].  DICK León

## 2022-12-23 NOTE — PROGRESS NOTES
Hospitalist Progress Note   Admit Date:  2022  3:24 PM   Name:  Alphia Burkitt   Age:  65 y.o. Sex:  male  :  1965   MRN:  [id number]   Room:  725/    Reason(s) for Admission: Septicemia ([address] [A41.9]  SIRS (systemic inflammatory response syndrome) (HCC) [R65.10]  Abdominal pain, unspecified abdominal location [R10.9]  [facility] Course & Interval History:   Mr. Yesenia Anguiano is a 66 y/o AAM with a h/o GI bleed who was admitted to our service on  with SIRS and poor oral intake. CT chest done for elevated d-dimer neg for PE. KUB showed non-specific bowel gas pattern. Flu and COVID negative. He was started on empiric broad spectrum antibiotics. CTAP 12/15 showed multiple dependent stones in GB with prob cirrhotic liver morphology and ascites. Blood cultures on admission grew CoNS and diphtheroids likely contamination. ID was consulted  and recommended given no signs of infectious peritonitis and blood cultures are likely contaminated that no abx indicated. Leukocytosis most likely due to partial SBO/ileus which has resolved. Recommended wound care on right leg which wounds appear to be chronic/venous stasis without signs of active infection. ID recommended to stop all antibiotics and signed off . There was also a concern for partial SBO on admission. NGT placed in ED for decompression. General surgery was consulted and felt his symptoms were likely due to large volume ascites. No surgical intervention recommended. NGT removed and diet advanced. GI consulted for ascites and new cirrhosis. He underwent paracentesis with IR on 12/15 with removal of 7.5L fluid. GI recommended that patient has cholelithiasis on imaging but recommended against surgical consideration as a surgical mortality would be high. If cholecystitis develops, consider cholecystostomy tube.  GI signed off  and patient should follow-up with his primary GI at [facility]. PT/OT recommended STR. Subjective/24hr Events (12/23/22):  Up to chair, doing well, eager to discharge. Cr noted to increase to 2.2 today. Making urine and oral intake seems adequate with several cups of water on his tray in the room. No chest pain or SOB. Ambulated down the hallway with therapy earlier today and thinks he did ok. Assessment & Plan:   # MARS on CKD3a   - Baseline sCr mid 1s, increased to 2.2 on 12/23. Hold diuretics, encourage PO intake, repeat BMP tomorrow. Further work up/eval pending trend. - Previously had MARS earlier in his stay that resolved. # Cirrhosis // large volume ascites   - Para 12/15 with 7.5L removed. - Diuretics held as above. GI s/o. # Partial SBO   - Resolved. Previously seen by Surgery. NGT out several days ago. # Cholelithiasis   - No s/s of cholecystitis. Prior recs from GI were to consider cholecystostomy tube if that were to develop. # Alcohol abuse   - Cessation advised. Folic acid. # DM2 with neuropathy   - SSI. # HTN   - Coreg. # GERD   - PPI    # Chronc LLE diabetic ulcer   - Con't wound care efforts. Discharge Planning: Medically stable, 9th floor pending insurance approval.  Diet:  ADULT DIET; Regular; Low Sodium (2 gm)  ADULT ORAL NUTRITION SUPPLEMENT; Dinner; Low Calorie/High Protein Oral Supplement  DVT PPx: SCD, ambulation  Code status: Full Code    Hospital Problems             Last Modified POA    * (Principal) Cirrhosis of liver with ascites ([facility] [facility] 12/16/2022 Yes    Alcohol dependence ([facility] [address] 12/14/2022 Yes    Diabetic peripheral neuropathy ([facility] [address] 12/14/2022 Yes    Essential hypertension (Chronic) 12/13/2022 Yes    Overview Signed 8/31/2022  8:40 PM by Cecilia Paz DO     Last Assessment & Plan:   Formatting of this note might be different from the original.  Still poor controlled. Continue avoid Cozaar due to acute kidney injury. Started on Norvasc, hydralazine when necessary, clonidine when necessary Hyperlipidemia 12/13/2022 Yes    Type 2 diabetes mellitus ([address] 12/13/2022 Yes    Overview Signed 9/21/2022  1:48 PM by Rigoberto Hui ATC     Last Assessment & Plan:   Formatting of this note might be different from the original.  Continue Lantus, ISS         Diabetic ulcer of both feet associated with type 2 diabetes mellitus ([address] (Chronic) 12/16/2022 Yes    Acute kidney injury superimposed on CKD ([address] 12/15/2022 Yes    PAD (peripheral artery disease) ([address] 12/13/2022 Yes    Hypoalbuminemia (Chronic) 12/15/2022 Yes    Normocytic anemia 12/15/2022 Yes    Overview Signed 9/21/2022  1:48 PM by Rigoberto Hui, ATC     Formatting of this note might be different from the original.  Added automatically from request for surgery [id number]  Formatting of this note might be different from the original.  Added automatically from request for surgery [id number]         SIRS (systemic inflammatory response syndrome) ([address] 12/16/2022 Yes    Abdominal pain 12/14/2022 Yes    Moderate protein malnutrition ([address] 12/15/2022 Yes    Cholelithiases 12/15/2022 Yes    Positive blood culture 12/15/2022 Yes    Partial small bowel obstruction (HCC) 12/17/2022 Yes    Stage 3a chronic kidney disease ([address] (Chronic) 12/15/2022 Yes    Overview Signed 9/14/2022  9:02 PM by Drew Patterson MD     With HTN and DM2            Objective:   Patient Vitals for the past 24 hrs:   Temp Pulse Resp BP SpO2   12/23/22 1037 97.9 °F (36.6 °C) 81 18 (!) 130/96 100 %   12/23/22 0702 97.3 °F (36.3 °C) 87 18 126/87 99 %   12/23/22 0341 98.2 °F (36.8 °C) 92 20 (!) 145/97 100 %   12/23/22 0029 97.9 °F (36.6 °C) 95 20 (!) 144/99 100 %   12/22/22 1920 97.3 °F (36.3 °C) 89 20 120/87 100 %   12/22/22 1536 97.5 °F (36.4 °C) 87 18 116/80 100 %       Estimated body mass index is 30.87 kg/m² as calculated from the following:    Height as of this encounter: 5' 11\" (1.803 m). Weight as of this encounter: 221 lb 4.8 oz (100.4 kg).   No intake or output data in the 24 hours ending 12/23/22 1228      Physical Exam:   Blood pressure (!) 130/96, pulse 81, temperature 97.9 °F (36.6 °C), temperature source Oral, resp. rate 18, height 5' 11\" (1.803 m), weight 221 lb 4.8 oz (100.4 kg), SpO2 100 %. General:    Well nourished. No overt distress. Appears older than stated age. Pleasant and conversant. Head:  Normocephalic, atraumatic  Eyes:  Sclerae appear normal. Pupils equally round. ENT:  Nares appear normal, no drainage. Moist oral mucosa  Neck:  No restricted ROM. Trachea midline. CV:   RRR. No m/r/g. No jugular venous distension. Lungs:   CTAB. No wheezing, rhonchi, or rales. Respirations even, unlabored. Abdomen: Bowel sounds present. Soft, nontender, mildly distended. 2+ b/l LE pitting edema. Skin:     No rashes and normal coloration. Warm and dry. Neuro:  CN II-XII grossly intact. Sensation intact. A&Ox3  Psych:  Normal mood and affect. I have reviewed ordered lab tests and independently visualized imaging below:    Recent Labs:  Recent Results (from the past 48 hour(s))   POCT Glucose    Collection Time: 12/21/22  3:39 PM   Result Value Ref Range    POC Glucose 197 (H) 65 - 100 mg/dL    Performed by: Diomedes    POCT Glucose    Collection Time: 12/21/22  8:28 PM   Result Value Ref Range    POC Glucose 219 (H) 65 - 100 mg/dL    Performed by:  Catracho    Transthoracic echocardiogram (TTE) complete with contrast, bubble, strain, and 3D PRN    Collection Time: 12/22/22  3:07 AM   Result Value Ref Range    LV EDV A2C 83 mL    LV EDV A4C 102 mL    LV ESV A2C 33 mL    LV ESV A4C 43 mL    IVSd 1.4 (A) 0.6 - 1.0 cm    LVIDd 4.2 4.2 - 5.9 cm    LVIDs 2.9 cm    LVOT Diameter 2.1 cm    LVOT Mean Gradient 3 mmHg    LVOT VTI 20.8 cm    LVOT Peak Velocity 1.1 m/s    LVOT Peak Gradient 4 mmHg    LVPWd 1.1 (A) 0.6 - 1.0 cm    LV E' Lateral Velocity 13 cm/s    LV E' Septal Velocity 7 cm/s    LV Ejection Fraction A2C 60 %    LV Ejection Fraction A4C 58 %    EF BP 59 55 - 100 %    LVOT Area 3.5 cm2    LVOT SV 72.0 ml    LA Minor Axis 5.4 cm    LA Major Axis 6.4 cm    LA Area 2C 17.7 cm2    LA Area 4C 18.4 cm2    LA Volume 2C 47 18 - 58 mL    LA Volume 4C 42 18 - 58 mL    LA Volume BP 48 18 - 58 mL    LA Diameter 3.5 cm    AV Mean Velocity 1.1 m/s    AV Mean Gradient 6 mmHg    AV VTI 28.1 cm    AV Peak Velocity 1.6 m/s    AV Peak Gradient 10 mmHg    AV Area by VTI 2.6 cm2    AV Area by Peak Velocity 2.2 cm2    Aortic Root 3.5 cm    Ascending Aorta 3.1 cm    IVC Proxmal 1.5 cm    MV E Wave Deceleration Time 166.0 ms    MV A Velocity 0.91 m/s    MV E Velocity 0.90 m/s    MV Mean Velocity 0.8 m/s    MV Mean Gradient 3 mmHg    MV VTI 22.6 cm    MV Max Velocity 1.0 m/s    MV Peak Gradient 4 mmHg    MV Area by VTI 3.2 cm2    PV AT 74.0 ms    PV Max Velocity 1.0 m/s    PV Peak Gradient 4 mmHg    RV Free Wall Peak S' 15 cm/s    TAPSE 2.2 1.7 cm    Body Surface Area 2.13 m2    Fractional Shortening 2D 31 28 - 44 %    LV ESV Index A4C 20 mL/m2    LV EDV Index A4C 46 mL/m2    LV ESV Index A2C 15 mL/m2    LV EDV Index A2C 38 mL/m2    LVIDd Index 1.91 cm/m2    LVIDs Index 1.32 cm/m2    LV RWT Ratio 0.52     LV Mass 2D 189.2 88 - 224 g    LV Mass 2D Index 86.0 49 - 115 g/m2    MV E/A 0.99     E/E' Ratio (Averaged) 9.89     E/E' Lateral 6.92     E/E' Septal 12.86     LA Volume Index BP 22 16 - 34 ml/m2    LVOT Stroke Volume Index 32.7 mL/m2    LA Volume Index 2C 21 16 - 34 mL/m2    LA Volume Index 4C 19 16 - 34 mL/m2    LA Size Index 1.59 cm/m2    LA/AO Root Ratio 1.00     Ao Root Index 1.59 cm/m2    Ascending Aorta Index 1.41 cm/m2    AV Velocity Ratio 0.69     LVOT:AV VTI Index 0.74     MESERET/BSA VTI 1.2 cm2/m2    MESERET/BSA Peak Velocity 1.0 cm2/m2    MV:LVOT VTI Index 1.09     Est. RA Pressure 3 mmHg   POCT Glucose    Collection Time: 12/22/22  6:59 AM   Result Value Ref Range    POC Glucose 164 (H) 65 - 100 mg/dL    Performed by:  Catracho    POCT Glucose    Collection Time: 12/22/22 11:41 AM   Result Value Ref Range    POC Glucose 171 (H) 65 - 100 mg/dL    Performed by: Diomedes    POCT Glucose    Collection Time: 12/22/22  3:34 PM   Result Value Ref Range    POC Glucose 233 (H) 65 - 100 mg/dL    Performed by: Diomedes    POCT Glucose    Collection Time: 12/22/22  8:21 PM   Result Value Ref Range    POC Glucose 260 (H) 65 - 100 mg/dL    Performed by: Catracho    Basic Metabolic Panel    Collection Time: 12/23/22  5:46 AM   Result Value Ref Range    Sodium 134 133 - 143 mmol/L    Potassium 5.0 3.5 - 5.1 mmol/L    Chloride 106 101 - 110 mmol/L    CO2 19 (L) 21 - 32 mmol/L    Anion Gap 9 2 - 11 mmol/L    Glucose 139 (H) 65 - 100 mg/dL    BUN 33 (H) 6 - 23 MG/DL    Creatinine 2.20 (H) 0.8 - 1.5 MG/DL    Est, Glom Filt Rate 34 (L) >60 ml/min/1.73m2    Calcium 7.7 (L) 8.3 - 10.4 MG/DL   POCT Glucose    Collection Time: 12/23/22  6:32 AM   Result Value Ref Range    POC Glucose 146 (H) 65 - 100 mg/dL    Performed by: Catracho    POCT Glucose    Collection Time: 12/23/22 10:38 AM   Result Value Ref Range    POC Glucose 172 (H) 65 - 100 mg/dL    Performed by: Salomon          Other Studies:  Transthoracic echocardiogram (TTE) complete with contrast, bubble, strain, and 3D PRN    Result Date: 12/22/2022    Left Ventricle: Normal left ventricular systolic function with a visually estimated EF of 65 - 70%. Left ventricle size is normal. Mildly increased wall thickness. Findings consistent with mild concentric hypertrophy. Normal wall motion. Normal diastolic function for age. Average E/e' ratio is 9.89. Aortic Valve: Moderate sclerosis of the aortic valve, right cusp-with no significant stenosis. Left Atrium: Left atrium is mildly dilated. Extracardiac: Left pleural effusion.        Current Meds:  Current Facility-Administered Medications   Medication Dose Route Frequency    [Held by provider] furosemide (LASIX) injection 40 mg  40 mg IntraVENous BID carvedilol (COREG) tablet 6.25 mg  6.25 mg Oral BID WC    [Held by provider] spironolactone (ALDACTONE) tablet 50 mg  50 mg Oral Daily    zolpidem (AMBIEN) tablet 5 mg  5 mg Oral Nightly PRN    pantoprazole (PROTONIX) tablet 40 mg  40 mg Oral QAM AC    diatrizoate meglumine-sodium (GASTROGRAFIN) 66-10 % solution 15 mL  15 mL Oral ONCE PRN    cholestyramine light packet 4 g  4 g Oral BID    folic acid (FOLVITE) tablet 1 mg  1 mg Oral Daily    vitamin B-12 (CYANOCOBALAMIN) tablet 1,000 mcg  1,000 mcg Oral Daily    insulin lispro (HUMALOG) injection vial 0-4 Units  0-4 Units SubCUTAneous TID WC    insulin lispro (HUMALOG) injection vial 0-4 Units  0-4 Units SubCUTAneous Nightly    glucose chewable tablet 16 g  4 tablet Oral PRN    dextrose bolus 10% 125 mL  125 mL IntraVENous PRN    Or    dextrose bolus 10% 250 mL  250 mL IntraVENous PRN    glucagon (rDNA) injection 1 mg  1 mg SubCUTAneous PRN    dextrose 10 % infusion   IntraVENous Continuous PRN    sodium chloride flush 0.9 % injection 5-40 mL  5-40 mL IntraVENous 2 times per day    sodium chloride flush 0.9 % injection 5-40 mL  5-40 mL IntraVENous PRN    0.9 % sodium chloride infusion   IntraVENous PRN    ondansetron (ZOFRAN-ODT) disintegrating tablet 4 mg  4 mg Oral Q8H PRN    Or    ondansetron (ZOFRAN) injection 4 mg  4 mg IntraVENous Q6H PRN    polyethylene glycol (GLYCOLAX) packet 17 g  17 g Oral Daily PRN    acetaminophen (TYLENOL) tablet 650 mg  650 mg Oral Q6H PRN    Or    acetaminophen (TYLENOL) suppository 650 mg  650 mg Rectal Q6H PRN    hydrALAZINE (APRESOLINE) injection 10 mg  10 mg IntraVENous Q6H PRN    morphine injection 1 mg  1 mg IntraVENous Q4H PRN       Signed:  Axel Virgen MD    Part of this note may have been written by using a voice dictation software. The note has been proof read but may still contain some grammatical/other typographical errors.

## 2022-12-23 NOTE — CARE COORDINATION
[employer] required a peer to peer which was completed today by Dr. Mary Izaguirre (physiatrist). [facility] denied the request for [facility]. The pt does have the right to do a pt/family appeal.  CM met with pt and explained his options, I.e. family appeal or pursue STR in a SNF. Pt plans to initiate the family appeal today. PAULA provided him with the contact # [phone], and instructed him to call them asap today. He verbalized understanding and that he will call them today.

## 2022-12-24 ENCOUNTER — APPOINTMENT (OUTPATIENT)
Dept: ULTRASOUND IMAGING | Age: 57
DRG: 433 | End: 2022-12-24
Payer: MEDICARE

## 2022-12-24 LAB
ANION GAP SERPL CALC-SCNC: 8 MMOL/L (ref 2–11)
APPEARANCE UR: CLEAR
BILIRUB UR QL: NEGATIVE
BUN SERPL-MCNC: 37 MG/DL (ref 6–23)
CALCIUM SERPL-MCNC: 7.7 MG/DL (ref 8.3–10.4)
CHLORIDE SERPL-SCNC: 106 MMOL/L (ref 101–110)
CHLORIDE UR-SCNC: 37 MMOL/L
CK SERPL-CCNC: 69 U/L (ref 21–215)
CO2 SERPL-SCNC: 19 MMOL/L (ref 21–32)
COLOR UR: NORMAL
CREAT SERPL-MCNC: 2.2 MG/DL (ref 0.8–1.5)
CREAT UR-MCNC: 127 MG/DL
GLUCOSE BLD STRIP.AUTO-MCNC: 139 MG/DL (ref 65–100)
GLUCOSE BLD STRIP.AUTO-MCNC: 164 MG/DL (ref 65–100)
GLUCOSE BLD STRIP.AUTO-MCNC: 176 MG/DL (ref 65–100)
GLUCOSE SERPL-MCNC: 136 MG/DL (ref 65–100)
GLUCOSE UR STRIP.AUTO-MCNC: NEGATIVE MG/DL
HGB UR QL STRIP: NEGATIVE
KETONES UR QL STRIP.AUTO: NEGATIVE MG/DL
LEUKOCYTE ESTERASE UR QL STRIP.AUTO: NEGATIVE
NITRITE UR QL STRIP.AUTO: NEGATIVE
OSMOLALITY UR: 411 MOSM/KG H2O (ref 50–1400)
PH UR STRIP: 5.5 (ref 5–9)
POTASSIUM SERPL-SCNC: 4.9 MMOL/L (ref 3.5–5.1)
PROT UR STRIP-MCNC: NEGATIVE MG/DL
SERVICE CMNT-IMP: ABNORMAL
SODIUM SERPL-SCNC: 133 MMOL/L (ref 133–143)
SODIUM UR-SCNC: 31 MMOL/L
SP GR UR REFRACTOMETRY: 1.02 (ref 1–1.02)
UROBILINOGEN UR QL STRIP.AUTO: 0.2 EU/DL (ref 0.2–1)

## 2022-12-24 PROCEDURE — 6370000000 HC RX 637 (ALT 250 FOR IP): Performed by: FAMILY MEDICINE

## 2022-12-24 PROCEDURE — 82570 ASSAY OF URINE CREATININE: CPT

## 2022-12-24 PROCEDURE — 76770 US EXAM ABDO BACK WALL COMP: CPT

## 2022-12-24 PROCEDURE — 6370000000 HC RX 637 (ALT 250 FOR IP): Performed by: HOSPITALIST

## 2022-12-24 PROCEDURE — 1100000003 HC PRIVATE W/ TELEMETRY

## 2022-12-24 PROCEDURE — 82436 ASSAY OF URINE CHLORIDE: CPT

## 2022-12-24 PROCEDURE — 80048 BASIC METABOLIC PNL TOTAL CA: CPT

## 2022-12-24 PROCEDURE — 1100000000 HC RM PRIVATE

## 2022-12-24 PROCEDURE — 81003 URINALYSIS AUTO W/O SCOPE: CPT

## 2022-12-24 PROCEDURE — 2580000003 HC RX 258: Performed by: FAMILY MEDICINE

## 2022-12-24 PROCEDURE — 82962 GLUCOSE BLOOD TEST: CPT

## 2022-12-24 PROCEDURE — 84300 ASSAY OF URINE SODIUM: CPT

## 2022-12-24 PROCEDURE — 36415 COLL VENOUS BLD VENIPUNCTURE: CPT

## 2022-12-24 PROCEDURE — 87086 URINE CULTURE/COLONY COUNT: CPT

## 2022-12-24 PROCEDURE — 82550 ASSAY OF CK (CPK): CPT

## 2022-12-24 PROCEDURE — 83935 ASSAY OF URINE OSMOLALITY: CPT

## 2022-12-24 RX ADMIN — CHOLESTYRAMINE 4 G: 4 POWDER, FOR SUSPENSION ORAL at 20:43

## 2022-12-24 RX ADMIN — PANTOPRAZOLE SODIUM 40 MG: 40 TABLET, DELAYED RELEASE ORAL at 06:12

## 2022-12-24 RX ADMIN — ZOLPIDEM TARTRATE 5 MG: 5 TABLET ORAL at 20:49

## 2022-12-24 RX ADMIN — CHOLESTYRAMINE 4 G: 4 POWDER, FOR SUSPENSION ORAL at 08:31

## 2022-12-24 RX ADMIN — CARVEDILOL 6.25 MG: 6.25 TABLET, FILM COATED ORAL at 16:42

## 2022-12-24 RX ADMIN — FOLIC ACID 1 MG: 1 TABLET ORAL at 08:30

## 2022-12-24 RX ADMIN — SODIUM CHLORIDE, PRESERVATIVE FREE 5 ML: 5 INJECTION INTRAVENOUS at 08:31

## 2022-12-24 RX ADMIN — ACETAMINOPHEN 650 MG: 325 TABLET, FILM COATED ORAL at 08:30

## 2022-12-24 RX ADMIN — SODIUM CHLORIDE, PRESERVATIVE FREE 5 ML: 5 INJECTION INTRAVENOUS at 20:43

## 2022-12-24 RX ADMIN — CARVEDILOL 6.25 MG: 6.25 TABLET, FILM COATED ORAL at 08:30

## 2022-12-24 RX ADMIN — CYANOCOBALAMIN TAB 1000 MCG 1000 MCG: 1000 TAB at 08:30

## 2022-12-24 ASSESSMENT — PAIN - FUNCTIONAL ASSESSMENT: PAIN_FUNCTIONAL_ASSESSMENT: PREVENTS OR INTERFERES SOME ACTIVE ACTIVITIES AND ADLS

## 2022-12-24 ASSESSMENT — PAIN SCALES - GENERAL
PAINLEVEL_OUTOF10: 0
PAINLEVEL_OUTOF10: 3

## 2022-12-24 ASSESSMENT — PAIN DESCRIPTION - ONSET: ONSET: GRADUAL

## 2022-12-24 ASSESSMENT — PAIN DESCRIPTION - ORIENTATION: ORIENTATION: MID

## 2022-12-24 ASSESSMENT — PAIN DESCRIPTION - DESCRIPTORS: DESCRIPTORS: ACHING

## 2022-12-24 ASSESSMENT — PAIN DESCRIPTION - PAIN TYPE: TYPE: CHRONIC PAIN

## 2022-12-24 ASSESSMENT — PAIN DESCRIPTION - FREQUENCY: FREQUENCY: INTERMITTENT

## 2022-12-24 ASSESSMENT — PAIN DESCRIPTION - LOCATION: LOCATION: GENERALIZED

## 2022-12-24 NOTE — CONSULTS
Nephrology consult    Admission Date:  12/13/2022    Admission Diagnosis  Septicemia ([facility] [address] [A41.9]  SIRS (systemic inflammatory response syndrome) (HCC) [R65.10]  Abdominal pain, unspecified abdominal location [R10.9]  Swelling of lower extremity [M79.89]        History of Present Illness:    30-year-old -American male with past medical history of GIB, hypertension, HLD, GERD with esophagitis and hemorrhage, anemia, PUD, obesity, DM is admitted for emesis, poor oral intake. CT abdomen and pelvis showed multiple dependent stones in GB and a cirrhotic liver and ascites. He also had small bowel obstruction/ileus which has currently resolved. GI was consulted for the new onset cirrhosis and had paracentesis done with 7.5 L fluid removal.    Baseline creatinine 1.1 in September 2022. Admitted with a creatinine of 1.8 which has improved to 1.4 with IV fluids. Again rising creatinine seen to 2.2 today    Seen in room sitting up and talking well. Past Medical History:   Diagnosis Date    Anemia     Gastroesophageal reflux disease with esophagitis and hemorrhage     HLD (hyperlipidemia)     Hyperplastic colon polyp     Hypertension     Obesity     PUD (peptic ulcer disease)     Type 2 diabetes mellitus with diabetic polyneuropathy, with long-term current use of insulin (HCC)     Ulcer of the duodenum caused by bacteria (H. pylori)     Upper GI bleed 03/28/2016    Last Assessment & Plan:  Formatting of this note might be different from the original. Status post EGD yesterday which revealed esophagitis, gastric and duodenal ulcers Continue PPI, GI follow-up.  Monitor hemoglobin Avoid and states Biopsy results- pending      Past Surgical History:   Procedure Laterality Date    COLONOSCOPY      ESOPHAGOGASTRODUODENOSCOPY      UPPER GASTROINTESTINAL ENDOSCOPY N/A 9/6/2022    EGD ESOPHAGOGASTRODUODENOSCOPY/ Rm 215 performed by Alexi Archer MD at [facility] ENDOSCOPY      Current Facility-Administered Medications Medication Dose Route Frequency    [Held by provider] furosemide (LASIX) injection 40 mg  40 mg IntraVENous BID    carvedilol (COREG) tablet 6.25 mg  6.25 mg Oral BID WC    [Held by provider] spironolactone (ALDACTONE) tablet 50 mg  50 mg Oral Daily    zolpidem (AMBIEN) tablet 5 mg  5 mg Oral Nightly PRN    pantoprazole (PROTONIX) tablet 40 mg  40 mg Oral QAM AC    diatrizoate meglumine-sodium (GASTROGRAFIN) 66-10 % solution 15 mL  15 mL Oral ONCE PRN    cholestyramine light packet 4 g  4 g Oral BID    folic acid (FOLVITE) tablet 1 mg  1 mg Oral Daily    vitamin B-12 (CYANOCOBALAMIN) tablet 1,000 mcg  1,000 mcg Oral Daily    insulin lispro (HUMALOG) injection vial 0-4 Units  0-4 Units SubCUTAneous TID WC    insulin lispro (HUMALOG) injection vial 0-4 Units  0-4 Units SubCUTAneous Nightly    glucose chewable tablet 16 g  4 tablet Oral PRN    dextrose bolus 10% 125 mL  125 mL IntraVENous PRN    Or    dextrose bolus 10% 250 mL  250 mL IntraVENous PRN    glucagon (rDNA) injection 1 mg  1 mg SubCUTAneous PRN    dextrose 10 % infusion   IntraVENous Continuous PRN    sodium chloride flush 0.9 % injection 5-40 mL  5-40 mL IntraVENous 2 times per day    sodium chloride flush 0.9 % injection 5-40 mL  5-40 mL IntraVENous PRN    0.9 % sodium chloride infusion   IntraVENous PRN    ondansetron (ZOFRAN-ODT) disintegrating tablet 4 mg  4 mg Oral Q8H PRN    Or    ondansetron (ZOFRAN) injection 4 mg  4 mg IntraVENous Q6H PRN    polyethylene glycol (GLYCOLAX) packet 17 g  17 g Oral Daily PRN    acetaminophen (TYLENOL) tablet 650 mg  650 mg Oral Q6H PRN    Or    acetaminophen (TYLENOL) suppository 650 mg  650 mg Rectal Q6H PRN    hydrALAZINE (APRESOLINE) injection 10 mg  10 mg IntraVENous Q6H PRN    morphine injection 1 mg  1 mg IntraVENous Q4H PRN     No Known Allergies   Social History     Tobacco Use    Smoking status: Never    Smokeless tobacco: Never   Substance Use Topics    Alcohol use: Yes      Family History   Problem Relation Age of Onset    Hypertension Sister     Diabetes Neg Hx         Review of Systems  Gen - no fever, no chills, appetite okay  HEENT - no sore throat, no decreased vision, no hearing loss  Neck - no neck mass  CV - no chest pain, no palpitation, no orthopnea  Lung - no shortness of breath, no cough, no hemoptysis  Abd - no tenderness, no nausea/vomiting, no bloody stool  Ext - no edema, no clubbing, no cyanosis  Musculoskeletal - no joint pain, no back pain  Neurologic - no headaches, no dizziness, no seizures  Psychiatric - no anxiety, no depression  Skin - no rashes, no pupura  Genitourinary - no decreased urine output, no hematuria, no foamy urine    Objective:     Vitals:    12/23/22 1954 12/23/22 2340 12/24/22 0412 12/24/22 0657   BP: (!) 143/92 (!) 146/102 (!) 145/98 128/79   Pulse: 84 87 90 86   Resp: 18 18 18 18   Temp: 97.3 °F (36.3 °C) 97.4 °F (36.3 °C) 97.7 °F (36.5 °C) 97.7 °F (36.5 °C)   TempSrc: Oral Oral Oral Oral   SpO2: 100% 99% 100% 99%   Weight:       Height:           Intake/Output Summary (Last 24 hours) at 12/24/2022 1025  Last data filed at 12/23/2022 1827  Gross per 24 hour   Intake --   Output 400 ml   Net -400 ml       Physical Exam  GEN :in no distress, alert and oriented  HEENT: anicteric sclerae, Mucous membranes are moist.  Neck - supple without JVD  CV - regular rate and rhythm, no murmur, no rub  Lung - clear bilaterally, lungs expand symmetrically  Abd - soft, nontender, bowel sounds present  Ext - no clubbing, no cyanosis, no edema  Neurologic - nonfocal  Genitourinary - bladder nonpalpable  Skin - no rashes, no purpura  Psychiatric: Normal mood and affect.     Patient Active Problem List    Diagnosis Date Noted    Obesity with body mass index 30 or greater 10/16/2017    Partial small bowel obstruction ([address] 12/17/2022    Cirrhosis of liver with ascites ([address] 12/15/2022    Moderate protein malnutrition ([address] 12/15/2022    Cholelithiases 12/15/2022    Positive blood culture 12/15/2022    Abdominal pain 12/14/2022    SIRS (systemic inflammatory response syndrome) ([address] 12/13/2022    Hypoalbuminemia 09/14/2022    Iron deficiency anemia 09/14/2022    PAD (peripheral artery disease) ([address] 09/02/2022    Diabetic ulcer of both feet associated with type 2 diabetes mellitus ([address] 08/31/2022    Acute kidney injury superimposed on CKD ([address] 08/31/2022    Dry skin dermatitis 05/13/2022    Hypokalemia 04/15/2021    Hematochezia 02/04/2021    Dark stools 12/21/2020    Normocytic anemia 12/21/2020    High serum ferritin 11/27/2019    Alcohol dependence ([address] 10/16/2017    Diabetic peripheral neuropathy ([address] 10/16/2017    Hyperlipidemia 10/16/2017    Essential hypertension 03/28/2016    Type 2 diabetes mellitus ([address] 03/28/2016    Acute blood loss anemia 03/28/2016    Stage 3a chronic kidney disease ([address] 09/14/2022     IR US GUIDED PARACENTESIS   Final Result   Uncomplicated ultrasound guided paracentesis. CT ABDOMEN PELVIS WO CONTRAST Additional Contrast? Oral   Final Result      1. Ascites. 2. Cholelithiasis. XR ABDOMEN (KUB) (SINGLE AP VIEW)   Final Result   1. The enteric tube tip is in the proximal to mid stomach. 2. Unchanged mildly distended small bowel loops. XR ABDOMEN (KUB) (SINGLE AP VIEW)   Final Result   1. Nonspecific bowel gas pattern. Partial or early small bowel obstruction not   excluded. 2.  Ascites. CT CHEST PULMONARY EMBOLISM W CONTRAST   Final Result   1. No evidence of PE.   2.  Scattered bibasilar atelectasis. 3.  Coronary artery and aortic calcifications. 4.  Ascites. 5.  Gallstones. XR CHEST (2 VW)   Final Result   1. Diminished lung lines with associated opacification bibasilar atelectasis   without focal infiltrative opacity.          US RETROPERITONEAL COMPLETE    (Results Pending)     [unfilled]  12/13/22    TRANSTHORACIC ECHOCARDIOGRAM (TTE) COMPLETE (CONTRAST/BUBBLE/3D PRN) 12/22/2022  3:43 PM (Final)    Interpretation Summary    Left Ventricle: Normal left ventricular systolic function with a visually estimated EF of 65 - 70%. Left ventricle size is normal. Mildly increased wall thickness. Findings consistent with mild concentric hypertrophy. Normal wall motion. Normal diastolic function for age. Average E/e' ratio is 9.89. Aortic Valve: Moderate sclerosis of the aortic valve, right cusp-with no significant stenosis. Left Atrium: Left atrium is mildly dilated. Extracardiac: Left pleural effusion. Signed by: Curt Bishop MD on 12/22/2022  3:43 PM      Data Review:   No results for input(s): WBC, HGB, HCT, PLT in the last 72 hours. Recent Labs     12/23/22  0546 12/24/22  0603    133   K 5.0 4.9    106   CO2 19* 19*   BUN 33* 37*   CREATININE 2.20* 2.20*     No results for input(s): PH, PCO2, PO2, PCO2 in the last 72 hours. Plan:     1. MARS  Baseline creatinine 1.1 in September 2022. Admitted with a creatinine of 1.8 which has improved to 1.4 with IV fluids. Again rising creatinine seen to 2.2 today  Lasix and spironolactone was held. Rising creatinine correlates with the initiation of spironolactone and Lasix. Advised to continue to hold diuretics  Initial MARS resolved with fluids. Current MARS probably secondary to diuretics. Will do UA, urine chemistries, UPCR, CK, ultrasound of the kidney    2. Metabolic acidosis  3. Cirrhosis status post large-volume paracentesis on 12/15 with 7.5 L removed  4. Partial small bowel obstruction resolved  5. Cholelithiasis  6.   Alcohol abuse

## 2022-12-24 NOTE — PROGRESS NOTES
Hospitalist Progress Note   Admit Date:  2022  3:24 PM   Name:  Evelia Lopez   Age:  65 y.o. Sex:  male  :  1965   MRN:  [id number]   Room:  [facility]/    Reason(s) for Admission: Septicemia ([address] [A41.9]  SIRS (systemic inflammatory response syndrome) (HCC) [R65.10]  Abdominal pain, unspecified abdominal location [R10.9]  Swelling of lower extremity [facility] Course & Interval History:   Mr. Zac Camacho is a 66 y/o AAM with a h/o GI bleed who was admitted to our service on  with SIRS and poor oral intake. CT chest done for elevated d-dimer neg for PE. KUB showed non-specific bowel gas pattern. Flu and COVID negative. He was started on empiric broad spectrum antibiotics. CTAP 12/15 showed multiple dependent stones in GB with prob cirrhotic liver morphology and ascites. Blood cultures on admission grew CoNS and diphtheroids likely contamination. ID was consulted  and recommended given no signs of infectious peritonitis and blood cultures are likely contaminated that no abx indicated. Leukocytosis most likely due to partial SBO/ileus which has resolved. Recommended wound care on right leg which wounds appear to be chronic/venous stasis without signs of active infection. ID recommended to stop all antibiotics and signed off . There was also a concern for partial SBO on admission. NGT placed in ED for decompression. General surgery was consulted and felt his symptoms were likely due to large volume ascites. No surgical intervention recommended. NGT removed and diet advanced. GI consulted for ascites and new cirrhosis. He underwent paracentesis with IR on 12/15 with removal of 7.5L fluid. GI recommended that patient has cholelithiasis on imaging but recommended against surgical consideration as a surgical mortality would be high. If cholecystitis develops, consider cholecystostomy tube.  GI signed off  and patient should follow-up with his primary GI at [facility]. PT/OT recommended STR. Subjective/24hr Events (12/24/22): Patient stated he was having diarrhea 6 times yesterday. He was eating well and drinking well. No chest pain or SOB. Assessment & Plan:   # MARS on CKD3a   - Baseline sCr mid 1s, increased to 2.2 on 12/24. Follow-up with UA, urine studies  Hold diuretics, encourage PO intake,   repeat BMP tomorrow. Follow-up with ultrasound of the kidney  Nephrology consulted    # Cirrhosis // large volume ascites   - Para 12/15 with 7.5L removed. - Diuretics held as above. GI s/o. # Partial SBO   - Resolved. Previously seen by Surgery. NGT out several days ago. # Cholelithiasis   - No s/s of cholecystitis. Prior recs from GI were to consider cholecystostomy tube if that were to develop. # Alcohol abuse   - Cessation advised. Folic acid. # DM2 with neuropathy   - SSI. # HTN   - Coreg. # GERD   - PPI    # Chronc LLE diabetic ulcer   - Con't wound care efforts. Discharge Planning: Medically stable,  [employer] denied the request for [facility]. The pt does have the right to do a pt/family appeal.    Diet:  ADULT DIET; Regular; Low Sodium (2 gm)  ADULT ORAL NUTRITION SUPPLEMENT; Dinner; Low Calorie/High Protein Oral Supplement  DVT PPx: SCD, ambulation  Code status: Full Code    Hospital Problems             Last Modified POA    * (Principal) Cirrhosis of liver with ascites ([address] 12/16/2022 Yes    Alcohol dependence ([address] 12/14/2022 Yes    Diabetic peripheral neuropathy ([address] 12/14/2022 Yes    Essential hypertension (Chronic) 12/13/2022 Yes    Overview Signed 8/31/2022  8:40 PM by Jimbo Stone DO     Last Assessment & Plan:   Formatting of this note might be different from the original.  Still poor controlled. Continue avoid Cozaar due to acute kidney injury. Started on Norvasc, hydralazine when necessary, clonidine when necessary         Hyperlipidemia 12/13/2022 Yes    Type 2 diabetes mellitus ([address] 12/13/2022 Yes    Overview Signed 9/21/2022  1:48 PM by Ramo Lopez ATC     Last Assessment & Plan:   Formatting of this note might be different from the original.  Continue Lantus, ISS         Diabetic ulcer of both feet associated with type 2 diabetes mellitus ([address] (Chronic) 12/16/2022 Yes    Acute kidney injury superimposed on CKD ([address] 12/15/2022 Yes    PAD (peripheral artery disease) ([address] 12/13/2022 Yes    Hypoalbuminemia (Chronic) 12/15/2022 Yes    Normocytic anemia 12/15/2022 Yes    Overview Signed 9/21/2022  1:48 PM by Ramo Lopez ATC     Formatting of this note might be different from the original.  Added automatically from request for surgery [id number]  Formatting of this note might be different from the original.  Added automatically from request for surgery [id number]         SIRS (systemic inflammatory response syndrome) ([address] 12/16/2022 Yes    Abdominal pain 12/14/2022 Yes    Moderate protein malnutrition ([address] 12/15/2022 Yes    Cholelithiases 12/15/2022 Yes    Positive blood culture 12/15/2022 Yes    Partial small bowel obstruction ([address] 12/17/2022 Yes    Stage 3a chronic kidney disease ([address] (Chronic) 12/15/2022 Yes    Overview Signed 9/14/2022  9:02 PM by Harpal Gross MD     With HTN and DM2            Objective:   Patient Vitals for the past 24 hrs:   Temp Pulse Resp BP SpO2   12/24/22 1046 98.2 °F (36.8 °C) 88 18 (!) 126/90 100 %   12/24/22 0657 97.7 °F (36.5 °C) 86 18 128/79 99 %   12/24/22 0412 97.7 °F (36.5 °C) 90 18 (!) 145/98 100 %   12/23/22 2340 97.4 °F (36.3 °C) 87 18 (!) 146/102 99 %   12/23/22 1954 97.3 °F (36.3 °C) 84 18 (!) 143/92 100 %   12/23/22 1559 -- 88 16 (!) 142/104 99 %       Estimated body mass index is 30.87 kg/m² as calculated from the following:    Height as of this encounter: 5' 11\" (1.803 m). Weight as of this encounter: 221 lb 4.8 oz (100.4 kg).     Intake/Output Summary (Last 24 hours) at 12/24/2022 1509  Last data filed at 12/23/2022 1827  Gross per 24 hour   Intake --   Output 400 ml Net -400 ml         Physical Exam:   Blood pressure (!) 126/90, pulse 88, temperature 98.2 °F (36.8 °C), temperature source Oral, resp. rate 18, height 5' 11\" (1.803 m), weight 221 lb 4.8 oz (100.4 kg), SpO2 100 %. General:    Well nourished. No overt distress. Appears older than stated age. Pleasant and conversant. Head:  Normocephalic, atraumatic  Eyes:  Sclerae appear normal. Pupils equally round. ENT:  Nares appear normal, no drainage. Moist oral mucosa  Neck:  No restricted ROM. Trachea midline. CV:   RRR. No m/r/g. No jugular venous distension. Lungs:   CTAB. No wheezing, rhonchi, or rales. Respirations even, unlabored. Abdomen: Bowel sounds present. Soft, nontender, mildly distended. 2+ b/l LE pitting edema. Skin:     No rashes and normal coloration. Warm and dry. Extremities Rt leg wound is present  Neuro:  CN II-XII grossly intact. Sensation intact. A&Ox3  Psych:  Normal mood and affect. I have reviewed ordered lab tests and independently visualized imaging below:    Recent Labs:  Recent Results (from the past 48 hour(s))   POCT Glucose    Collection Time: 12/22/22  3:34 PM   Result Value Ref Range    POC Glucose 233 (H) 65 - 100 mg/dL    Performed by: Diomedes    POCT Glucose    Collection Time: 12/22/22  8:21 PM   Result Value Ref Range    POC Glucose 260 (H) 65 - 100 mg/dL    Performed by:  Catracho    Basic Metabolic Panel    Collection Time: 12/23/22  5:46 AM   Result Value Ref Range    Sodium 134 133 - 143 mmol/L    Potassium 5.0 3.5 - 5.1 mmol/L    Chloride 106 101 - 110 mmol/L    CO2 19 (L) 21 - 32 mmol/L    Anion Gap 9 2 - 11 mmol/L    Glucose 139 (H) 65 - 100 mg/dL    BUN 33 (H) 6 - 23 MG/DL    Creatinine 2.20 (H) 0.8 - 1.5 MG/DL    Est, Glom Filt Rate 34 (L) >60 ml/min/1.73m2    Calcium 7.7 (L) 8.3 - 10.4 MG/DL   POCT Glucose    Collection Time: 12/23/22  6:32 AM   Result Value Ref Range    POC Glucose 146 (H) 65 - 100 mg/dL    Performed by: ManiShakiyaPCT    POCT Glucose    Collection Time: 12/23/22 10:38 AM   Result Value Ref Range    POC Glucose 172 (H) 65 - 100 mg/dL    Performed by: Salomon    POCT Glucose    Collection Time: 12/23/22  4:04 PM   Result Value Ref Range    POC Glucose 194 (H) 65 - 100 mg/dL    Performed by: Meaghan    POCT Glucose    Collection Time: 12/23/22  8:31 PM   Result Value Ref Range    POC Glucose 189 (H) 65 - 100 mg/dL    Performed by: Scarlet([facility])SNE    Basic Metabolic Panel    Collection Time: 12/24/22  6:03 AM   Result Value Ref Range    Sodium 133 133 - 143 mmol/L    Potassium 4.9 3.5 - 5.1 mmol/L    Chloride 106 101 - 110 mmol/L    CO2 19 (L) 21 - 32 mmol/L    Anion Gap 8 2 - 11 mmol/L    Glucose 136 (H) 65 - 100 mg/dL    BUN 37 (H) 6 - 23 MG/DL    Creatinine 2.20 (H) 0.8 - 1.5 MG/DL    Est, Glom Filt Rate 34 (L) >60 ml/min/1.73m2    Calcium 7.7 (L) 8.3 - 10.4 MG/DL   POCT Glucose    Collection Time: 12/24/22  6:18 AM   Result Value Ref Range    POC Glucose 139 (H) 65 - 100 mg/dL    Performed by: Scarlet([facility])SNE    POCT Glucose    Collection Time: 12/24/22 10:47 AM   Result Value Ref Range    POC Glucose 164 (H) 65 - 100 mg/dL    Performed by: Salomon          Other Studies:  Transthoracic echocardiogram (TTE) complete with contrast, bubble, strain, and 3D PRN    Result Date: 12/22/2022    Left Ventricle: Normal left ventricular systolic function with a visually estimated EF of 65 - 70%. Left ventricle size is normal. Mildly increased wall thickness. Findings consistent with mild concentric hypertrophy. Normal wall motion. Normal diastolic function for age. Average E/e' ratio is 9.89. Aortic Valve: Moderate sclerosis of the aortic valve, right cusp-with no significant stenosis. Left Atrium: Left atrium is mildly dilated. Extracardiac: Left pleural effusion.        Current Meds:  Current Facility-Administered Medications   Medication Dose Route Frequency    [Held by provider] furosemide (LASIX) injection 40 mg  40 mg IntraVENous BID    carvedilol (COREG) tablet 6.25 mg  6.25 mg Oral BID WC    [Held by provider] spironolactone (ALDACTONE) tablet 50 mg  50 mg Oral Daily    zolpidem (AMBIEN) tablet 5 mg  5 mg Oral Nightly PRN    pantoprazole (PROTONIX) tablet 40 mg  40 mg Oral QAM AC    diatrizoate meglumine-sodium (GASTROGRAFIN) 66-10 % solution 15 mL  15 mL Oral ONCE PRN    cholestyramine light packet 4 g  4 g Oral BID    folic acid (FOLVITE) tablet 1 mg  1 mg Oral Daily    vitamin B-12 (CYANOCOBALAMIN) tablet 1,000 mcg  1,000 mcg Oral Daily    insulin lispro (HUMALOG) injection vial 0-4 Units  0-4 Units SubCUTAneous TID WC    insulin lispro (HUMALOG) injection vial 0-4 Units  0-4 Units SubCUTAneous Nightly    glucose chewable tablet 16 g  4 tablet Oral PRN    dextrose bolus 10% 125 mL  125 mL IntraVENous PRN    Or    dextrose bolus 10% 250 mL  250 mL IntraVENous PRN    glucagon (rDNA) injection 1 mg  1 mg SubCUTAneous PRN    dextrose 10 % infusion   IntraVENous Continuous PRN    sodium chloride flush 0.9 % injection 5-40 mL  5-40 mL IntraVENous 2 times per day    sodium chloride flush 0.9 % injection 5-40 mL  5-40 mL IntraVENous PRN    0.9 % sodium chloride infusion   IntraVENous PRN    ondansetron (ZOFRAN-ODT) disintegrating tablet 4 mg  4 mg Oral Q8H PRN    Or    ondansetron (ZOFRAN) injection 4 mg  4 mg IntraVENous Q6H PRN    polyethylene glycol (GLYCOLAX) packet 17 g  17 g Oral Daily PRN    acetaminophen (TYLENOL) tablet 650 mg  650 mg Oral Q6H PRN    Or    acetaminophen (TYLENOL) suppository 650 mg  650 mg Rectal Q6H PRN    hydrALAZINE (APRESOLINE) injection 10 mg  10 mg IntraVENous Q6H PRN    morphine injection 1 mg  1 mg IntraVENous Q4H PRN       Signed:  Sylvia Palma MD    Part of this note may have been written by using a voice dictation software. The note has been proof read but may still contain some grammatical/other typographical errors.

## 2022-12-24 NOTE — PLAN OF CARE
Problem: Discharge Planning  Goal: Discharge to home or other facility with appropriate resources  Outcome: Progressing  Flowsheets (Taken 12/23/2022 1908)  Discharge to home or other facility with appropriate resources: Identify barriers to discharge with patient and caregiver     Problem: Pain  Goal: Verbalizes/displays adequate comfort level or baseline comfort level  Outcome: Progressing     Problem: Safety - Adult  Goal: Free from fall injury  Outcome: Progressing     Problem: Chronic Conditions and Co-morbidities  Goal: Patient's chronic conditions and co-morbidity symptoms are monitored and maintained or improved  Outcome: Progressing  Flowsheets (Taken 12/23/2022 1908)  Care Plan - Patient's Chronic Conditions and Co-Morbidity Symptoms are Monitored and Maintained or Improved: Monitor and assess patient's chronic conditions and comorbid symptoms for stability, deterioration, or improvement     Problem: Skin/Tissue Integrity  Goal: Absence of new skin breakdown  Description: 1. Monitor for areas of redness and/or skin breakdown  2. Assess vascular access sites hourly  3. Every 4-6 hours minimum:  Change oxygen saturation probe site  4. Every 4-6 hours:  If on nasal continuous positive airway pressure, respiratory therapy assess nares and determine need for appliance change or resting period.   Outcome: Progressing

## 2022-12-25 LAB
ANION GAP SERPL CALC-SCNC: 7 MMOL/L (ref 2–11)
BUN SERPL-MCNC: 37 MG/DL (ref 6–23)
CALCIUM SERPL-MCNC: 7.4 MG/DL (ref 8.3–10.4)
CHLORIDE SERPL-SCNC: 107 MMOL/L (ref 101–110)
CO2 SERPL-SCNC: 18 MMOL/L (ref 21–32)
CREAT SERPL-MCNC: 2 MG/DL (ref 0.8–1.5)
GLUCOSE BLD STRIP.AUTO-MCNC: 142 MG/DL (ref 65–100)
GLUCOSE BLD STRIP.AUTO-MCNC: 163 MG/DL (ref 65–100)
GLUCOSE BLD STRIP.AUTO-MCNC: 203 MG/DL (ref 65–100)
GLUCOSE BLD STRIP.AUTO-MCNC: 367 MG/DL (ref 65–100)
GLUCOSE SERPL-MCNC: 138 MG/DL (ref 65–100)
POTASSIUM SERPL-SCNC: 5 MMOL/L (ref 3.5–5.1)
SERVICE CMNT-IMP: ABNORMAL
SODIUM SERPL-SCNC: 132 MMOL/L (ref 133–143)

## 2022-12-25 PROCEDURE — 6370000000 HC RX 637 (ALT 250 FOR IP): Performed by: FAMILY MEDICINE

## 2022-12-25 PROCEDURE — 6370000000 HC RX 637 (ALT 250 FOR IP): Performed by: HOSPITALIST

## 2022-12-25 PROCEDURE — 80048 BASIC METABOLIC PNL TOTAL CA: CPT

## 2022-12-25 PROCEDURE — 2580000003 HC RX 258: Performed by: FAMILY MEDICINE

## 2022-12-25 PROCEDURE — 82962 GLUCOSE BLOOD TEST: CPT

## 2022-12-25 PROCEDURE — 6370000000 HC RX 637 (ALT 250 FOR IP): Performed by: STUDENT IN AN ORGANIZED HEALTH CARE EDUCATION/TRAINING PROGRAM

## 2022-12-25 PROCEDURE — 1100000000 HC RM PRIVATE

## 2022-12-25 PROCEDURE — 2580000003 HC RX 258: Performed by: STUDENT IN AN ORGANIZED HEALTH CARE EDUCATION/TRAINING PROGRAM

## 2022-12-25 PROCEDURE — 36415 COLL VENOUS BLD VENIPUNCTURE: CPT

## 2022-12-25 RX ORDER — SODIUM BICARBONATE 650 MG/1
650 TABLET ORAL 4 TIMES DAILY
Status: DISCONTINUED | OUTPATIENT
Start: 2022-12-25 | End: 2023-01-03 | Stop reason: HOSPADM

## 2022-12-25 RX ORDER — LOPERAMIDE HYDROCHLORIDE 2 MG/1
2 CAPSULE ORAL 4 TIMES DAILY PRN
Status: DISCONTINUED | OUTPATIENT
Start: 2022-12-25 | End: 2023-01-03 | Stop reason: HOSPADM

## 2022-12-25 RX ORDER — SODIUM CHLORIDE 9 MG/ML
INJECTION, SOLUTION INTRAVENOUS CONTINUOUS
Status: DISCONTINUED | OUTPATIENT
Start: 2022-12-25 | End: 2022-12-26

## 2022-12-25 RX ADMIN — SODIUM CHLORIDE, PRESERVATIVE FREE 10 ML: 5 INJECTION INTRAVENOUS at 20:38

## 2022-12-25 RX ADMIN — CHOLESTYRAMINE 4 G: 4 POWDER, FOR SUSPENSION ORAL at 20:38

## 2022-12-25 RX ADMIN — SODIUM CHLORIDE: 9 INJECTION, SOLUTION INTRAVENOUS at 15:17

## 2022-12-25 RX ADMIN — FOLIC ACID 1 MG: 1 TABLET ORAL at 08:25

## 2022-12-25 RX ADMIN — CARVEDILOL 6.25 MG: 6.25 TABLET, FILM COATED ORAL at 17:02

## 2022-12-25 RX ADMIN — CARVEDILOL 6.25 MG: 6.25 TABLET, FILM COATED ORAL at 08:25

## 2022-12-25 RX ADMIN — SODIUM BICARBONATE 650 MG TABLET 650 MG: at 17:02

## 2022-12-25 RX ADMIN — LOPERAMIDE HYDROCHLORIDE 2 MG: 2 CAPSULE ORAL at 12:03

## 2022-12-25 RX ADMIN — CYANOCOBALAMIN TAB 1000 MCG 1000 MCG: 1000 TAB at 08:25

## 2022-12-25 RX ADMIN — ZOLPIDEM TARTRATE 5 MG: 5 TABLET ORAL at 23:24

## 2022-12-25 RX ADMIN — CHOLESTYRAMINE 4 G: 4 POWDER, FOR SUSPENSION ORAL at 08:25

## 2022-12-25 RX ADMIN — ACETAMINOPHEN 650 MG: 325 TABLET, FILM COATED ORAL at 12:03

## 2022-12-25 RX ADMIN — PANTOPRAZOLE SODIUM 40 MG: 40 TABLET, DELAYED RELEASE ORAL at 05:05

## 2022-12-25 RX ADMIN — SODIUM CHLORIDE, PRESERVATIVE FREE 5 ML: 5 INJECTION INTRAVENOUS at 11:50

## 2022-12-25 RX ADMIN — SODIUM BICARBONATE 650 MG TABLET 650 MG: at 20:38

## 2022-12-25 RX ADMIN — INSULIN LISPRO 4 UNITS: 100 INJECTION, SOLUTION INTRAVENOUS; SUBCUTANEOUS at 18:05

## 2022-12-25 ASSESSMENT — PAIN - FUNCTIONAL ASSESSMENT: PAIN_FUNCTIONAL_ASSESSMENT: PREVENTS OR INTERFERES SOME ACTIVE ACTIVITIES AND ADLS

## 2022-12-25 ASSESSMENT — PAIN SCALES - GENERAL
PAINLEVEL_OUTOF10: 2
PAINLEVEL_OUTOF10: 0
PAINLEVEL_OUTOF10: 0

## 2022-12-25 ASSESSMENT — PAIN DESCRIPTION - DESCRIPTORS: DESCRIPTORS: DISCOMFORT

## 2022-12-25 NOTE — PROGRESS NOTES
Electa Maple  Admission Date: 12/13/2022         [address] Nephrology Progress Note: 12/25/2022    Follow-up for: MARS    The patient's chart is reviewed and the patient is discussed with the staff.     Subjective:     Complains of diarrhea    ROS:  Gen - no fever, no chills, appetite unchanged  CV - no chest pain, no palpitation  Lung - no shortness of breath, no cough  Abd - no tenderness, no nausea/vomiting, no diarrhea  Ext - no edema    Current Facility-Administered Medications   Medication Dose Route Frequency    [Held by provider] furosemide (LASIX) injection 40 mg  40 mg IntraVENous BID    carvedilol (COREG) tablet 6.25 mg  6.25 mg Oral BID WC    [Held by provider] spironolactone (ALDACTONE) tablet 50 mg  50 mg Oral Daily    zolpidem (AMBIEN) tablet 5 mg  5 mg Oral Nightly PRN    pantoprazole (PROTONIX) tablet 40 mg  40 mg Oral QAM AC    diatrizoate meglumine-sodium (GASTROGRAFIN) 66-10 % solution 15 mL  15 mL Oral ONCE PRN    cholestyramine light packet 4 g  4 g Oral BID    folic acid (FOLVITE) tablet 1 mg  1 mg Oral Daily    vitamin B-12 (CYANOCOBALAMIN) tablet 1,000 mcg  1,000 mcg Oral Daily    insulin lispro (HUMALOG) injection vial 0-4 Units  0-4 Units SubCUTAneous TID WC    insulin lispro (HUMALOG) injection vial 0-4 Units  0-4 Units SubCUTAneous Nightly    glucose chewable tablet 16 g  4 tablet Oral PRN    dextrose bolus 10% 125 mL  125 mL IntraVENous PRN    Or    dextrose bolus 10% 250 mL  250 mL IntraVENous PRN    glucagon (rDNA) injection 1 mg  1 mg SubCUTAneous PRN    dextrose 10 % infusion   IntraVENous Continuous PRN    sodium chloride flush 0.9 % injection 5-40 mL  5-40 mL IntraVENous 2 times per day    sodium chloride flush 0.9 % injection 5-40 mL  5-40 mL IntraVENous PRN    0.9 % sodium chloride infusion   IntraVENous PRN    ondansetron (ZOFRAN-ODT) disintegrating tablet 4 mg  4 mg Oral Q8H PRN    Or    ondansetron (ZOFRAN) injection 4 mg  4 mg IntraVENous Q6H PRN polyethylene glycol (GLYCOLAX) packet 17 g  17 g Oral Daily PRN    acetaminophen (TYLENOL) tablet 650 mg  650 mg Oral Q6H PRN    Or    acetaminophen (TYLENOL) suppository 650 mg  650 mg Rectal Q6H PRN    hydrALAZINE (APRESOLINE) injection 10 mg  10 mg IntraVENous Q6H PRN    morphine injection 1 mg  1 mg IntraVENous Q4H PRN         Objective:     Vitals:    12/24/22 1940 12/25/22 0000 12/25/22 0325 12/25/22 0803   BP: (!) 120/90 (!) 143/87 (!) 127/90 (!) 146/87   Pulse: 84 86 93 92   Resp: 18 18 18    Temp: 97.9 °F (36.6 °C) 98.1 °F (36.7 °C) 97.5 °F (36.4 °C) 97.7 °F (36.5 °C)   TempSrc: Oral Oral Oral Oral   SpO2: 100% 99% 99% 100%   Weight:       Height:         Intake and Output:   12/23 1901 - 12/25 0700  In: -   Out: 275 [Urine:275]  12/25 0701 - 12/25 1900  In: 380 [P.O.:380]  Out: -     Physical Exam:   Constitutional:  the patient is well developed and in no acute distress  HEENT:  Sclera clear, pupils equal, oral mucosa moist  Lungs: Clear  Cardiovascular:  RRR without M,G,R  Abd/GI: soft and non-tender; with positive bowel sounds.  Ext: warm without cyanosis. There is no lower leg edema.      US RETROPERITONEAL COMPLETE   Final Result         1. Large volume ascites.   2. Increased renal echogenicity. Findings compatible with chronic medical renal   disease.      IR US GUIDED PARACENTESIS   Final Result   Uncomplicated ultrasound guided paracentesis.                  CT ABDOMEN PELVIS WO CONTRAST Additional Contrast? Oral   Final Result      1. Ascites.      2. Cholelithiasis.      XR ABDOMEN (KUB) (SINGLE AP VIEW)   Final Result   1. The enteric tube tip is in the proximal to mid stomach.   2. Unchanged mildly distended small bowel loops.      XR ABDOMEN (KUB) (SINGLE AP VIEW)   Final Result   1.  Nonspecific bowel gas pattern.  Partial or early small bowel obstruction not   excluded.   2.  Ascites.         CT CHEST PULMONARY EMBOLISM W CONTRAST   Final Result   1.  No evidence of PE.   2.  Scattered  bibasilar atelectasis. 3.  Coronary artery and aortic calcifications. 4.  Ascites. 5.  Gallstones. XR CHEST (2 VW)   Final Result   1. Diminished lung lines with associated opacification bibasilar atelectasis   without focal infiltrative opacity. US RETROPERITONEAL LIMITED    (Results Pending)     [unfilled]    LAB  No results for input(s): WBC, HGB, HCT, PLT, INR in the last 72 hours. Recent Labs     12/23/22  0546 12/24/22  0603 12/25/22  0455    133 132*   K 5.0 4.9 5.0    106 107   CO2 19* 19* 18*   BUN 33* 37* 37*   CREATININE 2.20* 2.20* 2.00*       No results for input(s): PH, PCO2, PO2, HCO3 in the last 72 hours. Plan:  (Medical Decision Making)     1. MARS  Baseline creatinine 1.1 in September 2022. Admitted with a creatinine of 1.8 which has improved to 1.4 with IV fluids. Again rising creatinine seen to 2.2 today  Lasix and spironolactone was held. Rising creatinine correlates with the initiation of spironolactone and Lasix. Advised to continue to hold diuretics  Initial MARS resolved with fluids. Current MARS probably secondary to diuretics. UA-normal limits urine chemistries reviewed, UPCR is pending, CK within normal limits,   ultrasound of the kidney-increased renal echogenicity compatible with chronic medical renal disease and large volume ascites seen     2. Metabolic acidosis  3. Cirrhosis status post large-volume paracentesis on 12/15 with 7.5 L removed  4. Partial small bowel obstruction resolved  5. Cholelithiasis  6. Alcohol abuse  7. Mild hyponatremia secondary to liver physiology . 8.   Diarrhea-as per primary team    Juanito Sim MD, MPH   [facility]

## 2022-12-25 NOTE — PLAN OF CARE
Problem: Discharge Planning  Goal: Discharge to home or other facility with appropriate resources  Outcome: Progressing  Flowsheets (Taken 12/24/2022 2039)  Discharge to home or other facility with appropriate resources: Identify barriers to discharge with patient and caregiver     Problem: Pain  Goal: Verbalizes/displays adequate comfort level or baseline comfort level  Outcome: Progressing     Problem: Safety - Adult  Goal: Free from fall injury  Outcome: Progressing  Flowsheets (Taken 12/24/2022 2039)  Free From Fall Injury: Instruct family/caregiver on patient safety     Problem: Chronic Conditions and Co-morbidities  Goal: Patient's chronic conditions and co-morbidity symptoms are monitored and maintained or improved  Outcome: Progressing     Problem: Skin/Tissue Integrity  Goal: Absence of new skin breakdown  Description: 1. Monitor for areas of redness and/or skin breakdown  2. Assess vascular access sites hourly  3. Every 4-6 hours minimum:  Change oxygen saturation probe site  4. Every 4-6 hours:  If on nasal continuous positive airway pressure, respiratory therapy assess nares and determine need for appliance change or resting period.   Outcome: Progressing

## 2022-12-25 NOTE — PROGRESS NOTES
Hospitalist Progress Note   Admit Date:  2022  3:24 PM   Name:  Libby Gonzales   Age:  65 y.o. Sex:  male  :  1965   MRN:  [id number]   Room:  [facility]/    Reason(s) for Admission: Septicemia ([address] [A41.9]  SIRS (systemic inflammatory response syndrome) (HCC) [R65.10]  Abdominal pain, unspecified abdominal location [R10.9]  Swelling of lower extremity VEE TEE [facility] Hospital Course & Interval History:   Mr. Kolby James is a 64 y/o AAM with a h/o GI bleed who was admitted to our service on  with SIRS and poor oral intake. CT chest done for elevated d-dimer neg for PE. KUB showed non-specific bowel gas pattern. Flu and COVID negative. He was started on empiric broad spectrum antibiotics. CTAP 12/15 showed multiple dependent stones in GB with prob cirrhotic liver morphology and ascites. Blood cultures on admission grew CoNS and diphtheroids likely contamination. ID was consulted  and recommended given no signs of infectious peritonitis and blood cultures are likely contaminated that no abx indicated. Leukocytosis most likely due to partial SBO/ileus which has resolved. Recommended wound care on right leg which wounds appear to be chronic/venous stasis without signs of active infection. ID recommended to stop all antibiotics and signed off . There was also a concern for partial SBO on admission. NGT placed in ED for decompression. General surgery was consulted and felt his symptoms were likely due to large volume ascites. No surgical intervention recommended. NGT removed and diet advanced. GI consulted for ascites and new cirrhosis. He underwent paracentesis with IR on 12/15 with removal of 7.5L fluid. GI recommended that patient has cholelithiasis on imaging but recommended against surgical consideration as a surgical mortality would be high. If cholecystitis develops, consider cholecystostomy tube.  GI signed off  and patient should follow-up with his primary GI at [facility]. PT/OT recommended STR. Subjective/24hr Events (12/25/22): Patient stated he was having diarrhea 6 times yesterday. He was eating well and drinking well. No chest pain or SOB. Assessment & Plan:   # MARS on CKD3a  - Baseline sCr mid 1s, increased to 2. on 12/25. Hold diuretics, encourage PO intake,   UA-and CK within normal limits   ultrasound of the kidney-increased renal echogenicity compatible with chronic medical renal disease   Nephrology following    Chronic diarrhea   Gentle hydration  Started on Imodium    # Cirrhosis // large volume ascites   - Para 12/15 with 7.5L removed. - Diuretics held as above. GI s/o. Consulted IR for repeat paracentesis    Mild hyponatremia due to cirrhosis  Na of 473    Metabolic acidosis   Started on Bicarb tabs po    # Partial SBO   - Resolved. Previously seen by Surgery. NGT out several days ago. # Cholelithiasis   - No s/s of cholecystitis. Prior recs from GI were to consider cholecystostomy tube if that were to develop. # Alcohol abuse   - Cessation advised. Folic acid. # DM2 with neuropathy   - SSI. # HTN   - Coreg. # GERD   - PPI    # Chronc LLE diabetic ulcer   - Con't wound care efforts. Discharge Planning: Medically stable,  [employer] denied the request for [facility]. The pt does have the right to do a pt/family appeal.    Diet:  ADULT DIET; Regular; Low Sodium (2 gm)  ADULT ORAL NUTRITION SUPPLEMENT; Dinner; Low Calorie/High Protein Oral Supplement  DVT PPx: SCD, ambulation  Code status: Full Code    Hospital Problems             Last Modified POA    * (Principal) Cirrhosis of liver with ascites [address] [address] 12/16/2022 Yes    Alcohol dependence ([address] 12/14/2022 Yes    Diabetic peripheral neuropathy ([facility] [address] 12/14/2022 Yes    Essential hypertension (Chronic) 12/13/2022 Yes    Overview Signed 8/31/2022  8:40 PM by Parul Russell DO     Last Assessment & Plan:   Formatting of this note might be different from the original.  Still poor controlled. Continue avoid Cozaar due to acute kidney injury. Started on Norvasc, hydralazine when necessary, clonidine when necessary         Hyperlipidemia 12/13/2022 Yes    Type 2 diabetes mellitus ([address] 12/13/2022 Yes    Overview Signed 9/21/2022  1:48 PM by Hien Collins ATC     Last Assessment & Plan:   Formatting of this note might be different from the original.  Continue Lantus, ISS         Diabetic ulcer of both feet associated with type 2 diabetes mellitus ([address] (Chronic) 12/16/2022 Yes    Acute kidney injury superimposed on CKD ([address] 12/15/2022 Yes    PAD (peripheral artery disease) ([address] 12/13/2022 Yes    Hypoalbuminemia (Chronic) 12/15/2022 Yes    Normocytic anemia 12/15/2022 Yes    Overview Signed 9/21/2022  1:48 PM by Hien Collins, ATC     Formatting of this note might be different from the original.  Added automatically from request for surgery [id number]  Formatting of this note might be different from the original.  Added automatically from request for surgery [id number]         SIRS (systemic inflammatory response syndrome) ([address] 12/16/2022 Yes    Abdominal pain 12/14/2022 Yes    Moderate protein malnutrition ([address] 12/15/2022 Yes    Cholelithiases 12/15/2022 Yes    Positive blood culture 12/15/2022 Yes    Partial small bowel obstruction (HCC) 12/17/2022 Yes    Stage 3a chronic kidney disease ([address] (Chronic) 12/15/2022 Yes    Overview Signed 9/14/2022  9:02 PM by Ankita Juares MD     With HTN and DM2            Objective:   Patient Vitals for the past 24 hrs:   Temp Pulse Resp BP SpO2   12/25/22 1104 97.7 °F (36.5 °C) 79 -- (!) 139/96 100 %   12/25/22 0803 97.7 °F (36.5 °C) 92 -- (!) 146/87 100 %   12/25/22 0325 97.5 °F (36.4 °C) 93 18 (!) 127/90 99 %   12/25/22 0000 98.1 °F (36.7 °C) 86 18 (!) 143/87 99 %   12/24/22 1940 97.9 °F (36.6 °C) 84 18 (!) 120/90 100 %   12/24/22 1712 97.3 °F (36.3 °C) 91 19 125/89 100 %       Estimated body mass index is 30.87 kg/m² as calculated from the following:    Height as of this encounter: 5' 11\" (1.803 m). Weight as of this encounter: 221 lb 4.8 oz (100.4 kg). Intake/Output Summary (Last 24 hours) at 12/25/2022 1323  Last data filed at 12/25/2022 0930  Gross per 24 hour   Intake 380 ml   Output 275 ml   Net 105 ml         Physical Exam:   Blood pressure (!) 139/96, pulse 79, temperature 97.7 °F (36.5 °C), temperature source Oral, resp. rate 18, height 5' 11\" (1.803 m), weight 221 lb 4.8 oz (100.4 kg), SpO2 100 %. General:    Well nourished. No overt distress. Appears older than stated age. Pleasant and conversant. Head:  Normocephalic, atraumatic  Eyes:  Sclerae appear normal. Pupils equally round. ENT:  Nares appear normal, no drainage. Moist oral mucosa  Neck:  No restricted ROM. Trachea midline. CV:   RRR. No m/r/g. No jugular venous distension. Lungs:   CTAB. No wheezing, rhonchi, or rales. Respirations even, unlabored. Abdomen: Bowel sounds present. Soft, nontender, mildly distended. 2+ b/l LE pitting edema. Skin:     No rashes and normal coloration. Warm and dry. Extremities Rt leg wound is present  Neuro:  CN II-XII grossly intact. Sensation intact. A&Ox3  Psych:  Normal mood and affect.       I have reviewed ordered lab tests and independently visualized imaging below:    Recent Labs:  Recent Results (from the past 48 hour(s))   POCT Glucose    Collection Time: 12/23/22  4:04 PM   Result Value Ref Range    POC Glucose 194 (H) 65 - 100 mg/dL    Performed by: Meaghan    POCT Glucose    Collection Time: 12/23/22  8:31 PM   Result Value Ref Range    POC Glucose 189 (H) 65 - 100 mg/dL    Performed by: Scarlet(YAW)DARINEL    Basic Metabolic Panel    Collection Time: 12/24/22  6:03 AM   Result Value Ref Range    Sodium 133 133 - 143 mmol/L    Potassium 4.9 3.5 - 5.1 mmol/L    Chloride 106 101 - 110 mmol/L    CO2 19 (L) 21 - 32 mmol/L    Anion Gap 8 2 - 11 mmol/L    Glucose 136 (H) 65 - 100 mg/dL    BUN 37 (H) 6 - 23 MG/DL    Creatinine 2.20 (H) 0.8 - 1.5 MG/DL    Est, Glom Filt Rate 34 (L) >60 ml/min/1.73m2    Calcium 7.7 (L) 8.3 - 10.4 MG/DL   POCT Glucose    Collection Time: 12/24/22  6:18 AM   Result Value Ref Range    POC Glucose 139 (H) 65 - 100 mg/dL    Performed by: Roger (PCT)    POCT Glucose    Collection Time: 12/24/22 10:47 AM   Result Value Ref Range    POC Glucose 164 (H) 65 - 100 mg/dL    Performed by: Salomon    Sodium, urine, random    Collection Time: 12/24/22 11:21 AM   Result Value Ref Range    SODIUM, RANDOM URINE 31 MMOL/L   Creatinine, Random Urine    Collection Time: 12/24/22 11:21 AM   Result Value Ref Range    Creatinine, Ur 127.00 mg/dL   Chloride, Random Urine    Collection Time: 12/24/22 11:21 AM   Result Value Ref Range    Chloride 37 MMOL/L   Osmolality, Urine    Collection Time: 12/24/22 11:21 AM   Result Value Ref Range    Osmolality, Ur 411 50 - 1400 MOSM/kg H2O   Culture, Urine    Collection Time: 12/24/22 11:21 AM    Specimen: Urine, clean catch   Result Value Ref Range    Special Requests NO SPECIAL REQUESTS      Culture        No growth after short period of incubation. Further results to follow after overnight incubation.    Urinalysis    Collection Time: 12/24/22 11:21 AM   Result Value Ref Range    Color, UA YELLOW/STRAW      Appearance CLEAR      Specific Gravity, UA 1.017 1.001 - 1.023      pH, Urine 5.5 5.0 - 9.0      Protein, UA Negative NEG mg/dL    Glucose, UA Negative mg/dL    Ketones, Urine Negative NEG mg/dL    Bilirubin Urine Negative NEG      Blood, Urine Negative NEG      Urobilinogen, Urine 0.2 0.2 - 1.0 EU/dL    Nitrite, Urine Negative NEG      Leukocyte Esterase, Urine Negative NEG     CK    Collection Time: 12/24/22  3:45 PM   Result Value Ref Range    Total CK 69 21 - 215 U/L   POCT Glucose    Collection Time: 12/24/22  8:41 PM   Result Value Ref Range    POC Glucose 176 (H) 65 - 100 mg/dL    Performed by: Eula)    Basic Metabolic Panel    Collection Time: 12/25/22 4:55 AM   Result Value Ref Range    Sodium 132 (L) 133 - 143 mmol/L    Potassium 5.0 3.5 - 5.1 mmol/L    Chloride 107 101 - 110 mmol/L    CO2 18 (L) 21 - 32 mmol/L    Anion Gap 7 2 - 11 mmol/L    Glucose 138 (H) 65 - 100 mg/dL    BUN 37 (H) 6 - 23 MG/DL    Creatinine 2.00 (H) 0.8 - 1.5 MG/DL    Est, Glom Filt Rate 38 (L) >60 ml/min/1.73m2    Calcium 7.4 (L) 8.3 - 10.4 MG/DL   POCT Glucose    Collection Time: 12/25/22  6:35 AM   Result Value Ref Range    POC Glucose 142 (H) 65 - 100 mg/dL    Performed by: Carolyn(AS)    POCT Glucose    Collection Time: 12/25/22 10:48 AM   Result Value Ref Range    POC Glucose 163 (H) 65 - 100 mg/dL    Performed by: Kasandra Zuluaga          Other Studies:  Transthoracic echocardiogram (TTE) complete with contrast, bubble, strain, and 3D PRN    Result Date: 12/22/2022    Left Ventricle: Normal left ventricular systolic function with a visually estimated EF of 65 - 70%. Left ventricle size is normal. Mildly increased wall thickness. Findings consistent with mild concentric hypertrophy. Normal wall motion. Normal diastolic function for age. Average E/e' ratio is 9.89. Aortic Valve: Moderate sclerosis of the aortic valve, right cusp-with no significant stenosis. Left Atrium: Left atrium is mildly dilated. Extracardiac: Left pleural effusion.        Current Meds:  Current Facility-Administered Medications   Medication Dose Route Frequency    loperamide (IMODIUM) capsule 2 mg  2 mg Oral 4x Daily PRN    [Held by provider] furosemide (LASIX) injection 40 mg  40 mg IntraVENous BID    carvedilol (COREG) tablet 6.25 mg  6.25 mg Oral BID WC    [Held by provider] spironolactone (ALDACTONE) tablet 50 mg  50 mg Oral Daily    zolpidem (AMBIEN) tablet 5 mg  5 mg Oral Nightly PRN    pantoprazole (PROTONIX) tablet 40 mg  40 mg Oral QAM AC    diatrizoate meglumine-sodium (GASTROGRAFIN) 66-10 % solution 15 mL  15 mL Oral ONCE PRN    cholestyramine light packet 4 g  4 g Oral BID    folic acid (FOLVITE) tablet 1 mg  1 mg Oral Daily    vitamin B-12 (CYANOCOBALAMIN) tablet 1,000 mcg  1,000 mcg Oral Daily    insulin lispro (HUMALOG) injection vial 0-4 Units  0-4 Units SubCUTAneous TID WC    insulin lispro (HUMALOG) injection vial 0-4 Units  0-4 Units SubCUTAneous Nightly    glucose chewable tablet 16 g  4 tablet Oral PRN    dextrose bolus 10% 125 mL  125 mL IntraVENous PRN    Or    dextrose bolus 10% 250 mL  250 mL IntraVENous PRN    glucagon (rDNA) injection 1 mg  1 mg SubCUTAneous PRN    dextrose 10 % infusion   IntraVENous Continuous PRN    sodium chloride flush 0.9 % injection 5-40 mL  5-40 mL IntraVENous 2 times per day    sodium chloride flush 0.9 % injection 5-40 mL  5-40 mL IntraVENous PRN    0.9 % sodium chloride infusion   IntraVENous PRN    ondansetron (ZOFRAN-ODT) disintegrating tablet 4 mg  4 mg Oral Q8H PRN    Or    ondansetron (ZOFRAN) injection 4 mg  4 mg IntraVENous Q6H PRN    acetaminophen (TYLENOL) tablet 650 mg  650 mg Oral Q6H PRN    Or    acetaminophen (TYLENOL) suppository 650 mg  650 mg Rectal Q6H PRN    hydrALAZINE (APRESOLINE) injection 10 mg  10 mg IntraVENous Q6H PRN    morphine injection 1 mg  1 mg IntraVENous Q4H PRN       Signed:  Nayla Mcknight MD    Part of this note may have been written by using a voice dictation software. The note has been proof read but may still contain some grammatical/other typographical errors.

## 2022-12-26 LAB
ANION GAP SERPL CALC-SCNC: 6 MMOL/L (ref 2–11)
ANION GAP SERPL CALC-SCNC: 7 MMOL/L (ref 2–11)
ANION GAP SERPL CALC-SCNC: 8 MMOL/L (ref 2–11)
BASOPHILS # BLD: 0.1 K/UL (ref 0–0.2)
BASOPHILS # BLD: 0.1 K/UL (ref 0–0.2)
BASOPHILS NFR BLD: 1 % (ref 0–2)
BASOPHILS NFR BLD: 1 % (ref 0–2)
BUN SERPL-MCNC: 33 MG/DL (ref 6–23)
BUN SERPL-MCNC: 33 MG/DL (ref 6–23)
BUN SERPL-MCNC: 35 MG/DL (ref 6–23)
CALCIUM SERPL-MCNC: 7.5 MG/DL (ref 8.3–10.4)
CALCIUM SERPL-MCNC: 7.6 MG/DL (ref 8.3–10.4)
CALCIUM SERPL-MCNC: 7.7 MG/DL (ref 8.3–10.4)
CHLORIDE SERPL-SCNC: 108 MMOL/L (ref 101–110)
CHLORIDE SERPL-SCNC: 109 MMOL/L (ref 101–110)
CHLORIDE SERPL-SCNC: 109 MMOL/L (ref 101–110)
CO2 SERPL-SCNC: 18 MMOL/L (ref 21–32)
CO2 SERPL-SCNC: 18 MMOL/L (ref 21–32)
CO2 SERPL-SCNC: 19 MMOL/L (ref 21–32)
CREAT SERPL-MCNC: 2 MG/DL (ref 0.8–1.5)
CREAT SERPL-MCNC: 2 MG/DL (ref 0.8–1.5)
CREAT SERPL-MCNC: 2.1 MG/DL (ref 0.8–1.5)
DIFFERENTIAL METHOD BLD: ABNORMAL
DIFFERENTIAL METHOD BLD: ABNORMAL
EOSINOPHIL # BLD: 0.2 K/UL (ref 0–0.8)
EOSINOPHIL # BLD: 0.3 K/UL (ref 0–0.8)
EOSINOPHIL NFR BLD: 2 % (ref 0.5–7.8)
EOSINOPHIL NFR BLD: 2 % (ref 0.5–7.8)
ERYTHROCYTE [DISTWIDTH] IN BLOOD BY AUTOMATED COUNT: 15.4 % (ref 11.9–14.6)
ERYTHROCYTE [DISTWIDTH] IN BLOOD BY AUTOMATED COUNT: 15.7 % (ref 11.9–14.6)
GLUCOSE BLD STRIP.AUTO-MCNC: 102 MG/DL (ref 65–100)
GLUCOSE BLD STRIP.AUTO-MCNC: 129 MG/DL (ref 65–100)
GLUCOSE BLD STRIP.AUTO-MCNC: 223 MG/DL (ref 65–100)
GLUCOSE BLD STRIP.AUTO-MCNC: 267 MG/DL (ref 65–100)
GLUCOSE SERPL-MCNC: 101 MG/DL (ref 65–100)
GLUCOSE SERPL-MCNC: 134 MG/DL (ref 65–100)
GLUCOSE SERPL-MCNC: 206 MG/DL (ref 65–100)
HCT VFR BLD AUTO: 22 % (ref 41.1–50.3)
HCT VFR BLD AUTO: 22.2 % (ref 41.1–50.3)
HGB BLD-MCNC: 7.1 G/DL (ref 13.6–17.2)
HGB BLD-MCNC: 7.3 G/DL (ref 13.6–17.2)
IMM GRANULOCYTES # BLD AUTO: 0.1 K/UL (ref 0–0.5)
IMM GRANULOCYTES # BLD AUTO: 0.1 K/UL (ref 0–0.5)
IMM GRANULOCYTES NFR BLD AUTO: 1 % (ref 0–5)
IMM GRANULOCYTES NFR BLD AUTO: 1 % (ref 0–5)
INR PPP: 1.5
LYMPHOCYTES # BLD: 2.3 K/UL (ref 0.5–4.6)
LYMPHOCYTES # BLD: 2.4 K/UL (ref 0.5–4.6)
LYMPHOCYTES NFR BLD: 22 % (ref 13–44)
LYMPHOCYTES NFR BLD: 24 % (ref 13–44)
MAGNESIUM SERPL-MCNC: 1.3 MG/DL (ref 1.8–2.4)
MAGNESIUM SERPL-MCNC: 1.6 MG/DL (ref 1.8–2.4)
MCH RBC QN AUTO: 27.7 PG (ref 26.1–32.9)
MCH RBC QN AUTO: 27.7 PG (ref 26.1–32.9)
MCHC RBC AUTO-ENTMCNC: 32.3 G/DL (ref 31.4–35)
MCHC RBC AUTO-ENTMCNC: 32.9 G/DL (ref 31.4–35)
MCV RBC AUTO: 84.1 FL (ref 82–102)
MCV RBC AUTO: 85.9 FL (ref 82–102)
MONOCYTES # BLD: 1.6 K/UL (ref 0.1–1.3)
MONOCYTES # BLD: 1.7 K/UL (ref 0.1–1.3)
MONOCYTES NFR BLD: 15 % (ref 4–12)
MONOCYTES NFR BLD: 17 % (ref 4–12)
NEUTS SEG # BLD: 5.4 K/UL (ref 1.7–8.2)
NEUTS SEG # BLD: 6.5 K/UL (ref 1.7–8.2)
NEUTS SEG NFR BLD: 56 % (ref 43–78)
NEUTS SEG NFR BLD: 60 % (ref 43–78)
NRBC # BLD: 0 K/UL (ref 0–0.2)
NRBC # BLD: 0 K/UL (ref 0–0.2)
PHOSPHATE SERPL-MCNC: 3.6 MG/DL (ref 2.5–4.5)
PHOSPHATE SERPL-MCNC: 3.7 MG/DL (ref 2.5–4.5)
PLATELET # BLD AUTO: 234 K/UL (ref 150–450)
PLATELET # BLD AUTO: 277 K/UL (ref 150–450)
PMV BLD AUTO: 10.3 FL (ref 9.4–12.3)
PMV BLD AUTO: 10.9 FL (ref 9.4–12.3)
POTASSIUM SERPL-SCNC: 5.2 MMOL/L (ref 3.5–5.1)
POTASSIUM SERPL-SCNC: 5.3 MMOL/L (ref 3.5–5.1)
POTASSIUM SERPL-SCNC: 5.4 MMOL/L (ref 3.5–5.1)
PROTHROMBIN TIME: 18.1 SEC (ref 12.6–14.3)
RBC # BLD AUTO: 2.56 M/UL (ref 4.23–5.6)
RBC # BLD AUTO: 2.64 M/UL (ref 4.23–5.6)
SERVICE CMNT-IMP: ABNORMAL
SODIUM SERPL-SCNC: 134 MMOL/L (ref 133–143)
WBC # BLD AUTO: 10.9 K/UL (ref 4.3–11.1)
WBC # BLD AUTO: 9.6 K/UL (ref 4.3–11.1)

## 2022-12-26 PROCEDURE — 94760 N-INVAS EAR/PLS OXIMETRY 1: CPT

## 2022-12-26 PROCEDURE — 86900 BLOOD TYPING SEROLOGIC ABO: CPT

## 2022-12-26 PROCEDURE — 84999 UNLISTED CHEMISTRY PROCEDURE: CPT

## 2022-12-26 PROCEDURE — 84100 ASSAY OF PHOSPHORUS: CPT

## 2022-12-26 PROCEDURE — 6370000000 HC RX 637 (ALT 250 FOR IP): Performed by: STUDENT IN AN ORGANIZED HEALTH CARE EDUCATION/TRAINING PROGRAM

## 2022-12-26 PROCEDURE — 85610 PROTHROMBIN TIME: CPT

## 2022-12-26 PROCEDURE — 6360000002 HC RX W HCPCS: Performed by: STUDENT IN AN ORGANIZED HEALTH CARE EDUCATION/TRAINING PROGRAM

## 2022-12-26 PROCEDURE — 6370000000 HC RX 637 (ALT 250 FOR IP): Performed by: HOSPITALIST

## 2022-12-26 PROCEDURE — 6370000000 HC RX 637 (ALT 250 FOR IP): Performed by: FAMILY MEDICINE

## 2022-12-26 PROCEDURE — 2580000003 HC RX 258: Performed by: FAMILY MEDICINE

## 2022-12-26 PROCEDURE — 1100000000 HC RM PRIVATE

## 2022-12-26 PROCEDURE — 87449 NOS EACH ORGANISM AG IA: CPT

## 2022-12-26 PROCEDURE — 85025 COMPLETE CBC W/AUTO DIFF WBC: CPT

## 2022-12-26 PROCEDURE — 80048 BASIC METABOLIC PNL TOTAL CA: CPT

## 2022-12-26 PROCEDURE — 83631 LACTOFERRIN FECAL (QUANT): CPT

## 2022-12-26 PROCEDURE — 36415 COLL VENOUS BLD VENIPUNCTURE: CPT

## 2022-12-26 PROCEDURE — 83735 ASSAY OF MAGNESIUM: CPT

## 2022-12-26 PROCEDURE — 86923 COMPATIBILITY TEST ELECTRIC: CPT

## 2022-12-26 PROCEDURE — 94640 AIRWAY INHALATION TREATMENT: CPT

## 2022-12-26 PROCEDURE — 82962 GLUCOSE BLOOD TEST: CPT

## 2022-12-26 PROCEDURE — 2580000003 HC RX 258: Performed by: STUDENT IN AN ORGANIZED HEALTH CARE EDUCATION/TRAINING PROGRAM

## 2022-12-26 PROCEDURE — 83986 ASSAY PH BODY FLUID NOS: CPT

## 2022-12-26 RX ORDER — ALBUTEROL SULFATE 2.5 MG/3ML
10 SOLUTION RESPIRATORY (INHALATION) ONCE
Status: COMPLETED | OUTPATIENT
Start: 2022-12-26 | End: 2022-12-26

## 2022-12-26 RX ORDER — MAGNESIUM SULFATE IN WATER 40 MG/ML
2000 INJECTION, SOLUTION INTRAVENOUS ONCE
Status: COMPLETED | OUTPATIENT
Start: 2022-12-26 | End: 2022-12-26

## 2022-12-26 RX ORDER — IPRATROPIUM BROMIDE AND ALBUTEROL SULFATE 2.5; .5 MG/3ML; MG/3ML
1 SOLUTION RESPIRATORY (INHALATION) 4 TIMES DAILY
Status: DISCONTINUED | OUTPATIENT
Start: 2022-12-26 | End: 2022-12-26

## 2022-12-26 RX ORDER — DEXTROSE MONOHYDRATE 50 MG/ML
INJECTION, SOLUTION INTRAVENOUS CONTINUOUS
Status: DISCONTINUED | OUTPATIENT
Start: 2022-12-26 | End: 2022-12-26

## 2022-12-26 RX ADMIN — PANTOPRAZOLE SODIUM 40 MG: 40 TABLET, DELAYED RELEASE ORAL at 06:14

## 2022-12-26 RX ADMIN — ZOLPIDEM TARTRATE 5 MG: 5 TABLET ORAL at 20:08

## 2022-12-26 RX ADMIN — ALBUTEROL SULFATE 10 MG: 2.5 SOLUTION RESPIRATORY (INHALATION) at 16:17

## 2022-12-26 RX ADMIN — SODIUM BICARBONATE 650 MG TABLET 650 MG: at 16:11

## 2022-12-26 RX ADMIN — SODIUM CHLORIDE, PRESERVATIVE FREE 5 ML: 5 INJECTION INTRAVENOUS at 20:13

## 2022-12-26 RX ADMIN — CARVEDILOL 6.25 MG: 6.25 TABLET, FILM COATED ORAL at 16:11

## 2022-12-26 RX ADMIN — MAGNESIUM SULFATE HEPTAHYDRATE 2000 MG: 40 INJECTION, SOLUTION INTRAVENOUS at 15:13

## 2022-12-26 RX ADMIN — LOPERAMIDE HYDROCHLORIDE 2 MG: 2 CAPSULE ORAL at 01:41

## 2022-12-26 RX ADMIN — INSULIN HUMAN 5 UNITS: 100 INJECTION, SOLUTION PARENTERAL at 16:08

## 2022-12-26 RX ADMIN — DEXTROSE MONOHYDRATE: 50 INJECTION, SOLUTION INTRAVENOUS at 15:13

## 2022-12-26 RX ADMIN — SODIUM CHLORIDE, PRESERVATIVE FREE 10 ML: 5 INJECTION INTRAVENOUS at 09:04

## 2022-12-26 RX ADMIN — CYANOCOBALAMIN TAB 1000 MCG 1000 MCG: 1000 TAB at 09:03

## 2022-12-26 RX ADMIN — CHOLESTYRAMINE 4 G: 4 POWDER, FOR SUSPENSION ORAL at 09:03

## 2022-12-26 RX ADMIN — IPRATROPIUM BROMIDE AND ALBUTEROL SULFATE 1 AMPULE: .5; 3 SOLUTION RESPIRATORY (INHALATION) at 15:24

## 2022-12-26 RX ADMIN — SODIUM BICARBONATE 650 MG TABLET 650 MG: at 20:08

## 2022-12-26 RX ADMIN — LOPERAMIDE HYDROCHLORIDE 2 MG: 2 CAPSULE ORAL at 09:03

## 2022-12-26 RX ADMIN — SODIUM BICARBONATE 650 MG TABLET 650 MG: at 09:03

## 2022-12-26 RX ADMIN — FOLIC ACID 1 MG: 1 TABLET ORAL at 09:03

## 2022-12-26 RX ADMIN — SODIUM BICARBONATE 650 MG TABLET 650 MG: at 13:20

## 2022-12-26 RX ADMIN — CHOLESTYRAMINE 4 G: 4 POWDER, FOR SUSPENSION ORAL at 20:08

## 2022-12-26 RX ADMIN — CARVEDILOL 6.25 MG: 6.25 TABLET, FILM COATED ORAL at 09:03

## 2022-12-26 ASSESSMENT — PAIN SCALES - GENERAL
PAINLEVEL_OUTOF10: 0
PAINLEVEL_OUTOF10: 0

## 2022-12-26 ASSESSMENT — PAIN - FUNCTIONAL ASSESSMENT: PAIN_FUNCTIONAL_ASSESSMENT: PREVENTS OR INTERFERES SOME ACTIVE ACTIVITIES AND ADLS

## 2022-12-26 ASSESSMENT — PAIN DESCRIPTION - PAIN TYPE: TYPE: CHRONIC PAIN

## 2022-12-26 ASSESSMENT — PAIN DESCRIPTION - ORIENTATION: ORIENTATION: MID

## 2022-12-26 ASSESSMENT — PAIN DESCRIPTION - LOCATION: LOCATION: GENERALIZED

## 2022-12-26 NOTE — PROGRESS NOTES
Hospitalist Progress Note   Admit Date:  2022  3:24 PM   Name:  Providence Favre   Age:  65 y.o. Sex:  male  :  1965   MRN:  [id number]   Room:  725/    Reason(s) for Admission: Septicemia ([address] [A41.9]  SIRS (systemic inflammatory response syndrome) (HCC) [R65.10]  Abdominal pain, unspecified abdominal location [R10.9]  [facility] Interval History:   Mr. Norma Williamson is a 64 y/o AAM with a h/o GI bleed who was admitted to our service on  with SIRS and poor oral intake. CT chest done for elevated d-dimer neg for PE. KUB showed non-specific bowel gas pattern. Flu and COVID negative. He was started on empiric broad spectrum antibiotics. CTAP 12/15 showed multiple dependent stones in GB with prob cirrhotic liver morphology and ascites. Blood cultures on admission grew CoNS and diphtheroids likely contamination. ID was consulted  and recommended given no signs of infectious peritonitis and blood cultures are likely contaminated that no abx indicated. Leukocytosis most likely due to partial SBO/ileus which has resolved. Recommended wound care on right leg which wounds appear to be chronic/venous stasis without signs of active infection. ID recommended to stop all antibiotics and signed off . There was also a concern for partial SBO on admission. NGT placed in ED for decompression. General surgery was consulted and felt his symptoms were likely due to large volume ascites. No surgical intervention recommended. NGT removed and diet advanced. GI consulted for ascites and new cirrhosis. He underwent paracentesis with IR on 12/15 with removal of 7.5L fluid. GI recommended that patient has cholelithiasis on imaging but recommended against surgical consideration as a surgical mortality would be high. If cholecystitis develops, consider cholecystostomy tube.  GI signed off  and patient should follow-up with his primary GI at [facility]. UA-and CK within normal limits   ultrasound of the kidney-increased renal echogenicity compatible with chronic medical renal disease      PT/OT recommended STR. Subjective/24hr Events (12/26/22): Patient stated he was having diarrhea 6 times yesterday. Imodium is not helping. It was foul smelling. He was eating well and drinking well. No chest pain or SOB. Assessment & Plan:   # MARS on CKD3a  - Baseline sCr mid 1s, stable around 2  Hold diuretics, encourage PO intake,compatible with chronic medical renal disease   Nephrology signed off    Chronic diarrhea   Continue Imodium  F/u stool studies and C diff     Mild hyperkalemia  Potassium was 5.3  Ordered albuterol, insulin and dextrose  Follow-up with BMP in p.m. Holding Aldactone     Hypomagnesemia  Magnesium 1.3  Ordered magnesium 2 g IV  Follow-up with magnesium in p.m. # Cirrhosis // large volume ascites   - Para 12/15 with 7.5L removed. - Diuretics held as above. GI s/o. Consulted IR for repeat paracentesis    Mild hyponatremia due to cirrhosis  Resolved    Metabolic acidosis   Continue bicarb tabs po    # Partial SBO   - Resolved. Previously seen by Surgery. NGT out several days ago. # Cholelithiasis   - No s/s of cholecystitis. Prior recs from GI were to consider cholecystostomy tube if that were to develop. # Alcohol abuse   - Cessation advised. Folic acid. # DM2 with neuropathy   - SSI. # HTN   - Coreg. # GERD   - PPI    # Chronc LLE diabetic ulcer   - Con't wound care efforts. Discharge Planning: Medically stable,  [employer] denied the request for [facility]. The pt does have the right to do a pt/family appeal.    Diet:  ADULT ORAL NUTRITION SUPPLEMENT; Dinner; Low Calorie/High Protein Oral Supplement  ADULT DIET; Regular; Low Sodium (2 gm);  Low Potassium (Less than 3000 mg/day)  DVT PPx: SCD, ambulation  Code status: Full Code    Hospital Problems             Last Modified POA    * (Principal) Cirrhosis of liver with ascites ([address] 12/16/2022 Yes    Alcohol dependence ([address] 12/14/2022 Yes    Diabetic peripheral neuropathy ([address] 12/14/2022 Yes    Essential hypertension (Chronic) 12/13/2022 Yes    Overview Signed 8/31/2022  8:40 PM by Taylor Santamaria DO     Last Assessment & Plan:   Formatting of this note might be different from the original.  Still poor controlled. Continue avoid Cozaar due to acute kidney injury. Started on Norvasc, hydralazine when necessary, clonidine when necessary         Hyperlipidemia 12/13/2022 Yes    Type 2 diabetes mellitus ([address] 12/13/2022 Yes    Overview Signed 9/21/2022  1:48 PM by Josias Lyman, FRITZ     Last Assessment & Plan:   Formatting of this note might be different from the original.  Continue Lantus, ISS         Diabetic ulcer of both feet associated with type 2 diabetes mellitus ([address] (Chronic) 12/16/2022 Yes    Acute kidney injury superimposed on CKD ([address] 12/15/2022 Yes    PAD (peripheral artery disease) ([address] 12/13/2022 Yes    Hypoalbuminemia (Chronic) 12/15/2022 Yes    Normocytic anemia 12/15/2022 Yes    Overview Signed 9/21/2022  1:48 PM by Josias Lyman, ATC     Formatting of this note might be different from the original.  Added automatically from request for surgery [id number]  Formatting of this note might be different from the original.  Added automatically from request for surgery [id number]         SIRS (systemic inflammatory response syndrome) ([address] 12/16/2022 Yes    Abdominal pain 12/14/2022 Yes    Moderate protein malnutrition ([address] 12/15/2022 Yes    Cholelithiases 12/15/2022 Yes    Positive blood culture 12/15/2022 Yes    Partial small bowel obstruction ([address] 12/17/2022 Yes    Stage 3a chronic kidney disease ([address] (Chronic) 12/15/2022 Yes    Overview Signed 9/14/2022  9:02 PM by Guerrero Shields MD     With HTN and DM2            Objective:   Patient Vitals for the past 24 hrs:   Temp Pulse Resp BP SpO2   12/26/22 1110 98.8 °F (37.1 °C) 82 18 (!) 141/81 100 %   12/26/22 0732 98.2 °F (36.8 °C) 95 19 105/75 100 %   12/26/22 0317 98.9 °F (37.2 °C) 93 19 116/78 100 %   12/25/22 2321 99 °F (37.2 °C) 91 17 (!) 142/90 100 %   12/25/22 1931 98.1 °F (36.7 °C) 98 18 (!) 131/91 100 %   12/25/22 1800 97.6 °F (36.4 °C) -- -- -- --   12/25/22 1532 97.5 °F (36.4 °C) 85 18 (!) 136/93 100 %       Estimated body mass index is 30.87 kg/m² as calculated from the following:    Height as of this encounter: 5' 11\" (1.803 m). Weight as of this encounter: 221 lb 4.8 oz (100.4 kg). Intake/Output Summary (Last 24 hours) at 12/26/2022 1450  Last data filed at 12/25/2022 1800  Gross per 24 hour   Intake --   Output 300 ml   Net -300 ml         Physical Exam:   Blood pressure (!) 141/81, pulse 82, temperature 98.8 °F (37.1 °C), temperature source Oral, resp. rate 18, height 5' 11\" (1.803 m), weight 221 lb 4.8 oz (100.4 kg), SpO2 100 %. General:    Well nourished. No overt distress. Appears older than stated age. Pleasant and conversant. Head:  Normocephalic, atraumatic  Eyes:  Sclerae appear normal. Pupils equally round. ENT:  Nares appear normal, no drainage. Moist oral mucosa  Neck:  No restricted ROM. Trachea midline. CV:   RRR. No m/r/g. No jugular venous distension. Lungs:   CTAB. No wheezing, rhonchi, or rales. Respirations even, unlabored. Abdomen: Bowel sounds present. Soft, nontender, mildly distended. 2+ b/l LE pitting edema. Skin:     No rashes and normal coloration. Warm and dry. Extremities Rt leg wound is present  Neuro:  CN II-XII grossly intact. Sensation intact. A&Ox3  Psych:  Normal mood and affect.       I have reviewed ordered lab tests and independently visualized imaging below:    Recent Labs:  Recent Results (from the past 48 hour(s))   CK    Collection Time: 12/24/22  3:45 PM   Result Value Ref Range    Total CK 69 21 - 215 U/L   POCT Glucose    Collection Time: 12/24/22  8:41 PM   Result Value Ref Range    POC Glucose 176 (H) 65 - 100 mg/dL    Performed by: Carolyn(AS)    Basic Metabolic Panel    Collection Time: 12/25/22  4:55 AM   Result Value Ref Range    Sodium 132 (L) 133 - 143 mmol/L    Potassium 5.0 3.5 - 5.1 mmol/L    Chloride 107 101 - 110 mmol/L    CO2 18 (L) 21 - 32 mmol/L    Anion Gap 7 2 - 11 mmol/L    Glucose 138 (H) 65 - 100 mg/dL    BUN 37 (H) 6 - 23 MG/DL    Creatinine 2.00 (H) 0.8 - 1.5 MG/DL    Est, Glom Filt Rate 38 (L) >60 ml/min/1.73m2    Calcium 7.4 (L) 8.3 - 10.4 MG/DL   POCT Glucose    Collection Time: 12/25/22  6:35 AM   Result Value Ref Range    POC Glucose 142 (H) 65 - 100 mg/dL    Performed by: Carolyn(AS)    POCT Glucose    Collection Time: 12/25/22 10:48 AM   Result Value Ref Range    POC Glucose 163 (H) 65 - 100 mg/dL    Performed by: Shan Lea    POCT Glucose    Collection Time: 12/25/22  5:07 PM   Result Value Ref Range    POC Glucose 367 (H) 65 - 100 mg/dL    Performed by:  Rebecca    POCT Glucose    Collection Time: 12/25/22  9:31 PM   Result Value Ref Range    POC Glucose 203 (H) 65 - 100 mg/dL    Performed by: Citlalli Valera    Collection Time: 12/26/22  4:44 AM   Result Value Ref Range    Protime 18.1 (H) 12.6 - 14.3 sec    INR 1.5     CBC with Auto Differential    Collection Time: 12/26/22  4:44 AM   Result Value Ref Range    WBC 9.6 4.3 - 11.1 K/uL    RBC 2.56 (L) 4.23 - 5.6 M/uL    Hemoglobin 7.1 (L) 13.6 - 17.2 g/dL    Hematocrit 22.0 (L) 41.1 - 50.3 %    MCV 85.9 82 - 102 FL    MCH 27.7 26.1 - 32.9 PG    MCHC 32.3 31.4 - 35.0 g/dL    RDW 15.7 (H) 11.9 - 14.6 %    Platelets 248 223 - 009 K/uL    MPV 10.3 9.4 - 12.3 FL    nRBC 0.00 0.0 - 0.2 K/uL    Differential Type AUTOMATED      Seg Neutrophils 56 43 - 78 %    Lymphocytes 24 13 - 44 %    Monocytes 17 (H) 4.0 - 12.0 %    Eosinophils % 2 0.5 - 7.8 %    Basophils 1 0.0 - 2.0 %    Immature Granulocytes 1 0.0 - 5.0 %    Segs Absolute 5.4 1.7 - 8.2 K/UL    Absolute Lymph # 2.3 0.5 - 4.6 K/UL    Absolute Mono # 1.6 (H) 0.1 - 1.3 K/UL    Absolute Eos # 0.2 0.0 - 0.8 K/UL    Basophils Absolute 0.1 0.0 - 0.2 K/UL    Absolute Immature Granulocyte 0.1 0.0 - 0.5 K/UL   Basic Metabolic Panel w/ Reflex to MG    Collection Time: 12/26/22  4:44 AM   Result Value Ref Range    Sodium 134 133 - 143 mmol/L    Potassium 5.2 (H) 3.5 - 5.1 mmol/L    Chloride 109 101 - 110 mmol/L    CO2 18 (L) 21 - 32 mmol/L    Anion Gap 7 2 - 11 mmol/L    Glucose 101 (H) 65 - 100 mg/dL    BUN 35 (H) 6 - 23 MG/DL    Creatinine 2.00 (H) 0.8 - 1.5 MG/DL    Est, Glom Filt Rate 38 (L) >60 ml/min/1.73m2    Calcium 7.6 (L) 8.3 - 10.4 MG/DL   Phosphorus    Collection Time: 12/26/22  4:44 AM   Result Value Ref Range    Phosphorus 3.7 2.5 - 4.5 MG/DL   POCT Glucose    Collection Time: 12/26/22  6:22 AM   Result Value Ref Range    POC Glucose 102 (H) 65 - 100 mg/dL    Performed by: Juancarlos    TYPE AND SCREEN    Collection Time: 12/26/22  8:42 AM   Result Value Ref Range    Crossmatch expiration date 12/29/2022,2359     ABO/Rh O POSITIVE     Antibody Screen NEG    CBC with Auto Differential    Collection Time: 12/26/22  8:44 AM   Result Value Ref Range    WBC 10.9 4.3 - 11.1 K/uL    RBC 2.64 (L) 4.23 - 5.6 M/uL    Hemoglobin 7.3 (L) 13.6 - 17.2 g/dL    Hematocrit 22.2 (L) 41.1 - 50.3 %    MCV 84.1 82 - 102 FL    MCH 27.7 26.1 - 32.9 PG    MCHC 32.9 31.4 - 35.0 g/dL    RDW 15.4 (H) 11.9 - 14.6 %    Platelets 970 746 - 588 K/uL    MPV 10.9 9.4 - 12.3 FL    nRBC 0.00 0.0 - 0.2 K/uL    Differential Type AUTOMATED      Seg Neutrophils 60 43 - 78 %    Lymphocytes 22 13 - 44 %    Monocytes 15 (H) 4.0 - 12.0 %    Eosinophils % 2 0.5 - 7.8 %    Basophils 1 0.0 - 2.0 %    Immature Granulocytes 1 0.0 - 5.0 %    Segs Absolute 6.5 1.7 - 8.2 K/UL    Absolute Lymph # 2.4 0.5 - 4.6 K/UL    Absolute Mono # 1.7 (H) 0.1 - 1.3 K/UL    Absolute Eos # 0.3 0.0 - 0.8 K/UL    Basophils Absolute 0.1 0.0 - 0.2 K/UL    Absolute Immature Granulocyte 0.1 0.0 - 0.5 K/UL   Basic Metabolic Panel Collection Time: 12/26/22  8:44 AM   Result Value Ref Range    Sodium 134 133 - 143 mmol/L    Potassium 5.3 (H) 3.5 - 5.1 mmol/L    Chloride 109 101 - 110 mmol/L    CO2 19 (L) 21 - 32 mmol/L    Anion Gap 6 2 - 11 mmol/L    Glucose 134 (H) 65 - 100 mg/dL    BUN 33 (H) 6 - 23 MG/DL    Creatinine 2.10 (H) 0.8 - 1.5 MG/DL    Est, Glom Filt Rate 36 (L) >60 ml/min/1.73m2    Calcium 7.7 (L) 8.3 - 10.4 MG/DL   Magnesium    Collection Time: 12/26/22  8:44 AM   Result Value Ref Range    Magnesium 1.3 (L) 1.8 - 2.4 mg/dL   Phosphorus    Collection Time: 12/26/22  8:44 AM   Result Value Ref Range    Phosphorus 3.6 2.5 - 4.5 MG/DL   POCT Glucose    Collection Time: 12/26/22 11:11 AM   Result Value Ref Range    POC Glucose 129 (H) 65 - 100 mg/dL    Performed by: Salomon          Other Studies:  Transthoracic echocardiogram (TTE) complete with contrast, bubble, strain, and 3D PRN    Result Date: 12/22/2022    Left Ventricle: Normal left ventricular systolic function with a visually estimated EF of 65 - 70%. Left ventricle size is normal. Mildly increased wall thickness. Findings consistent with mild concentric hypertrophy. Normal wall motion. Normal diastolic function for age. Average E/e' ratio is 9.89. Aortic Valve: Moderate sclerosis of the aortic valve, right cusp-with no significant stenosis. Left Atrium: Left atrium is mildly dilated. Extracardiac: Left pleural effusion.        Current Meds:  Current Facility-Administered Medications   Medication Dose Route Frequency    loperamide (IMODIUM) capsule 2 mg  2 mg Oral 4x Daily PRN    sodium bicarbonate tablet 650 mg  650 mg Oral 4x Daily    0.9 % sodium chloride infusion   IntraVENous Continuous    [Held by provider] furosemide (LASIX) injection 40 mg  40 mg IntraVENous BID    carvedilol (COREG) tablet 6.25 mg  6.25 mg Oral BID WC    [Held by provider] spironolactone (ALDACTONE) tablet 50 mg  50 mg Oral Daily    zolpidem (AMBIEN) tablet 5 mg  5 mg Oral Nightly PRN pantoprazole (PROTONIX) tablet 40 mg  40 mg Oral QAM AC    diatrizoate meglumine-sodium (GASTROGRAFIN) 66-10 % solution 15 mL  15 mL Oral ONCE PRN    cholestyramine light packet 4 g  4 g Oral BID    folic acid (FOLVITE) tablet 1 mg  1 mg Oral Daily    vitamin B-12 (CYANOCOBALAMIN) tablet 1,000 mcg  1,000 mcg Oral Daily    insulin lispro (HUMALOG) injection vial 0-4 Units  0-4 Units SubCUTAneous TID WC    insulin lispro (HUMALOG) injection vial 0-4 Units  0-4 Units SubCUTAneous Nightly    glucose chewable tablet 16 g  4 tablet Oral PRN    dextrose bolus 10% 125 mL  125 mL IntraVENous PRN    Or    dextrose bolus 10% 250 mL  250 mL IntraVENous PRN    glucagon (rDNA) injection 1 mg  1 mg SubCUTAneous PRN    dextrose 10 % infusion   IntraVENous Continuous PRN    sodium chloride flush 0.9 % injection 5-40 mL  5-40 mL IntraVENous 2 times per day    sodium chloride flush 0.9 % injection 5-40 mL  5-40 mL IntraVENous PRN    0.9 % sodium chloride infusion   IntraVENous PRN    ondansetron (ZOFRAN-ODT) disintegrating tablet 4 mg  4 mg Oral Q8H PRN    Or    ondansetron (ZOFRAN) injection 4 mg  4 mg IntraVENous Q6H PRN    acetaminophen (TYLENOL) tablet 650 mg  650 mg Oral Q6H PRN    Or    acetaminophen (TYLENOL) suppository 650 mg  650 mg Rectal Q6H PRN    hydrALAZINE (APRESOLINE) injection 10 mg  10 mg IntraVENous Q6H PRN    morphine injection 1 mg  1 mg IntraVENous Q4H PRN       Signed:  Marisol Lamar MD    Part of this note may have been written by using a voice dictation software. The note has been proof read but may still contain some grammatical/other typographical errors.

## 2022-12-26 NOTE — PROGRESS NOTES
Ammon Medeiros  Admission Date: 12/13/2022         [address] Nephrology Progress Note: 12/26/2022    Follow-up for: MARS    The patient's chart is reviewed and the patient is discussed with the staff.     Subjective:     Complains of diarrhea    ROS:  Gen - no fever, no chills, appetite unchanged  CV - no chest pain, no palpitation  Lung - no shortness of breath, no cough  Abd - no tenderness, no nausea/vomiting, no diarrhea  Ext - no edema    Current Facility-Administered Medications   Medication Dose Route Frequency    loperamide (IMODIUM) capsule 2 mg  2 mg Oral 4x Daily PRN    sodium bicarbonate tablet 650 mg  650 mg Oral 4x Daily    0.9 % sodium chloride infusion   IntraVENous Continuous    [Held by provider] furosemide (LASIX) injection 40 mg  40 mg IntraVENous BID    carvedilol (COREG) tablet 6.25 mg  6.25 mg Oral BID WC    [Held by provider] spironolactone (ALDACTONE) tablet 50 mg  50 mg Oral Daily    zolpidem (AMBIEN) tablet 5 mg  5 mg Oral Nightly PRN    pantoprazole (PROTONIX) tablet 40 mg  40 mg Oral QAM AC    diatrizoate meglumine-sodium (GASTROGRAFIN) 66-10 % solution 15 mL  15 mL Oral ONCE PRN    cholestyramine light packet 4 g  4 g Oral BID    folic acid (FOLVITE) tablet 1 mg  1 mg Oral Daily    vitamin B-12 (CYANOCOBALAMIN) tablet 1,000 mcg  1,000 mcg Oral Daily    insulin lispro (HUMALOG) injection vial 0-4 Units  0-4 Units SubCUTAneous TID WC    insulin lispro (HUMALOG) injection vial 0-4 Units  0-4 Units SubCUTAneous Nightly    glucose chewable tablet 16 g  4 tablet Oral PRN    dextrose bolus 10% 125 mL  125 mL IntraVENous PRN    Or    dextrose bolus 10% 250 mL  250 mL IntraVENous PRN    glucagon (rDNA) injection 1 mg  1 mg SubCUTAneous PRN    dextrose 10 % infusion   IntraVENous Continuous PRN    sodium chloride flush 0.9 % injection 5-40 mL  5-40 mL IntraVENous 2 times per day    sodium chloride flush 0.9 % injection 5-40 mL  5-40 mL IntraVENous PRN    0.9 % sodium chloride infusion   IntraVENous PRN    ondansetron (ZOFRAN-ODT) disintegrating tablet 4 mg  4 mg Oral Q8H PRN    Or    ondansetron (ZOFRAN) injection 4 mg  4 mg IntraVENous Q6H PRN    acetaminophen (TYLENOL) tablet 650 mg  650 mg Oral Q6H PRN    Or    acetaminophen (TYLENOL) suppository 650 mg  650 mg Rectal Q6H PRN    hydrALAZINE (APRESOLINE) injection 10 mg  10 mg IntraVENous Q6H PRN    morphine injection 1 mg  1 mg IntraVENous Q4H PRN         Objective:     Vitals:    12/25/22 1931 12/25/22 2321 12/26/22 0317 12/26/22 0732   BP: (!) 131/91 (!) 142/90 116/78 105/75   Pulse: 98 91 93 95   Resp: 18 17 19 19   Temp: 98.1 °F (36.7 °C) 99 °F (37.2 °C) 98.9 °F (37.2 °C) 98.2 °F (36.8 °C)   TempSrc: Oral Oral Oral Oral   SpO2: 100% 100% 100% 100%   Weight:       Height:         Intake and Output:   12/24 1901 - 12/26 0700  In: 380 [P.O.:380]  Out: 575 [Urine:575]  No intake/output data recorded. Physical Exam:   Constitutional:  the patient is well developed and in no acute distress  HEENT:  Sclera clear, pupils equal, oral mucosa moist  Lungs: Clear  Cardiovascular:  RRR without M,G,R  Abd/GI: soft and non-tender; with positive bowel sounds. Ext: warm without cyanosis. There is no lower leg edema. US RETROPERITONEAL COMPLETE   Final Result         1. Large volume ascites. 2. Increased renal echogenicity. Findings compatible with chronic medical renal   disease. IR US GUIDED PARACENTESIS   Final Result   Uncomplicated ultrasound guided paracentesis. CT ABDOMEN PELVIS WO CONTRAST Additional Contrast? Oral   Final Result      1. Ascites. 2. Cholelithiasis. XR ABDOMEN (KUB) (SINGLE AP VIEW)   Final Result   1. The enteric tube tip is in the proximal to mid stomach. 2. Unchanged mildly distended small bowel loops. XR ABDOMEN (KUB) (SINGLE AP VIEW)   Final Result   1. Nonspecific bowel gas pattern. Partial or early small bowel obstruction not   excluded. 2.  Ascites.          CT CHEST PULMONARY EMBOLISM W CONTRAST   Final Result   1. No evidence of PE.   2.  Scattered bibasilar atelectasis. 3.  Coronary artery and aortic calcifications. 4.  Ascites. 5.  Gallstones. XR CHEST (2 VW)   Final Result   1. Diminished lung lines with associated opacification bibasilar atelectasis   without focal infiltrative opacity. [unfilled]    LAB  Recent Labs     12/26/22  0444 12/26/22  0844   WBC 9.6 10.9   HGB 7.1* 7.3*   HCT 22.0* 22.2*    277   INR 1.5  --      Recent Labs     12/25/22  0455 12/26/22  0444 12/26/22  0844   * 134 134   K 5.0 5.2* 5.3*    109 109   CO2 18* 18* 19*   BUN 37* 35* 33*   CREATININE 2.00* 2.00* 2.10*   MG  --   --  1.3*   PHOS  --  3.7 3.6         No results for input(s): PH, PCO2, PO2, HCO3 in the last 72 hours. Plan:  (Medical Decision Making)     1. MARS  Baseline creatinine 1.1 in September 2022. Admitted with a creatinine of 1.8 which has improved to 1.4 with IV fluids. Again rising creatinine seen to 2.2 today  Lasix and spironolactone was held. Rising creatinine correlates with the initiation of spironolactone and Lasix. Advised to continue to hold diuretics  Initial MARS resolved with fluids. Current MARS probably secondary to diuretics. UA-normal limits urine chemistries reviewed, UPCR is pending, CK within normal limits,   ultrasound of the kidney-increased renal echogenicity compatible with chronic medical renal disease and large volume ascites seen     2. Metabolic acidosis  3. Cirrhosis status post large-volume paracentesis on 12/15 with 7.5 L removed  4. Partial small bowel obstruction resolved  5. Cholelithiasis  6. Alcohol abuse  7. Mild hyponatremia secondary to liver physiology . 8. Diarrhea-as per primary team  9. Mild hypo kalemia-Placed on low potassium diet if persistent can use Lokelma    Renal function stable the past few days. Will sign off.   We will follow-up in office closely    Yeyo Marrero MD, MPH   [address] [place]

## 2022-12-27 ENCOUNTER — APPOINTMENT (OUTPATIENT)
Dept: INTERVENTIONAL RADIOLOGY/VASCULAR | Age: 57
DRG: 433 | End: 2022-12-27
Payer: MEDICARE

## 2022-12-27 LAB
ANION GAP SERPL CALC-SCNC: 7 MMOL/L (ref 2–11)
BACTERIA SPEC CULT: NORMAL
BASOPHILS # BLD: 0.1 K/UL (ref 0–0.2)
BASOPHILS NFR BLD: 1 % (ref 0–2)
BUN SERPL-MCNC: 33 MG/DL (ref 6–23)
C DIFF GDH STL QL: NORMAL
C DIFF TOX A+B STL QL IA: NORMAL
C DIFF TOXIN INTERPRETATION: NORMAL
CALCIUM SERPL-MCNC: 8 MG/DL (ref 8.3–10.4)
CHLORIDE SERPL-SCNC: 108 MMOL/L (ref 101–110)
CLINICAL CONSIDERATION: NORMAL
CO2 SERPL-SCNC: 17 MMOL/L (ref 21–32)
CREAT SERPL-MCNC: 1.9 MG/DL (ref 0.8–1.5)
DIFFERENTIAL METHOD BLD: ABNORMAL
EOSINOPHIL # BLD: 0.2 K/UL (ref 0–0.8)
EOSINOPHIL NFR BLD: 2 % (ref 0.5–7.8)
ERYTHROCYTE [DISTWIDTH] IN BLOOD BY AUTOMATED COUNT: 15.4 % (ref 11.9–14.6)
GLUCOSE BLD STRIP.AUTO-MCNC: 150 MG/DL (ref 65–100)
GLUCOSE BLD STRIP.AUTO-MCNC: 191 MG/DL (ref 65–100)
GLUCOSE BLD STRIP.AUTO-MCNC: 205 MG/DL (ref 65–100)
GLUCOSE BLD STRIP.AUTO-MCNC: 240 MG/DL (ref 65–100)
GLUCOSE SERPL-MCNC: 146 MG/DL (ref 65–100)
HCT VFR BLD AUTO: 22.6 % (ref 41.1–50.3)
HGB BLD-MCNC: 7.3 G/DL (ref 13.6–17.2)
IMM GRANULOCYTES # BLD AUTO: 0 K/UL (ref 0–0.5)
IMM GRANULOCYTES NFR BLD AUTO: 0 % (ref 0–5)
LYMPHOCYTES # BLD: 2.9 K/UL (ref 0.5–4.6)
LYMPHOCYTES NFR BLD: 27 % (ref 13–44)
MCH RBC QN AUTO: 27.5 PG (ref 26.1–32.9)
MCHC RBC AUTO-ENTMCNC: 32.3 G/DL (ref 31.4–35)
MCV RBC AUTO: 85.3 FL (ref 82–102)
MONOCYTES # BLD: 1.5 K/UL (ref 0.1–1.3)
MONOCYTES NFR BLD: 14 % (ref 4–12)
NEUTS SEG # BLD: 6.1 K/UL (ref 1.7–8.2)
NEUTS SEG NFR BLD: 56 % (ref 43–78)
NRBC # BLD: 0 K/UL (ref 0–0.2)
PHOSPHATE SERPL-MCNC: 3.7 MG/DL (ref 2.5–4.5)
PLATELET # BLD AUTO: 248 K/UL (ref 150–450)
PMV BLD AUTO: 10.7 FL (ref 9.4–12.3)
POTASSIUM SERPL-SCNC: 5 MMOL/L (ref 3.5–5.1)
RBC # BLD AUTO: 2.65 M/UL (ref 4.23–5.6)
REFLEX: NORMAL
SERVICE CMNT-IMP: ABNORMAL
SERVICE CMNT-IMP: NORMAL
SODIUM SERPL-SCNC: 132 MMOL/L (ref 133–143)
WBC # BLD AUTO: 10.9 K/UL (ref 4.3–11.1)

## 2022-12-27 PROCEDURE — 84100 ASSAY OF PHOSPHORUS: CPT

## 2022-12-27 PROCEDURE — 82962 GLUCOSE BLOOD TEST: CPT

## 2022-12-27 PROCEDURE — 6360000002 HC RX W HCPCS: Performed by: STUDENT IN AN ORGANIZED HEALTH CARE EDUCATION/TRAINING PROGRAM

## 2022-12-27 PROCEDURE — 87899 AGENT NOS ASSAY W/OPTIC: CPT

## 2022-12-27 PROCEDURE — P9047 ALBUMIN (HUMAN), 25%, 50ML: HCPCS | Performed by: PHYSICIAN ASSISTANT

## 2022-12-27 PROCEDURE — 36415 COLL VENOUS BLD VENIPUNCTURE: CPT

## 2022-12-27 PROCEDURE — 0W9G3ZZ DRAINAGE OF PERITONEAL CAVITY, PERCUTANEOUS APPROACH: ICD-10-PCS | Performed by: RADIOLOGY

## 2022-12-27 PROCEDURE — 85025 COMPLETE CBC W/AUTO DIFF WBC: CPT

## 2022-12-27 PROCEDURE — 97530 THERAPEUTIC ACTIVITIES: CPT

## 2022-12-27 PROCEDURE — 6370000000 HC RX 637 (ALT 250 FOR IP): Performed by: STUDENT IN AN ORGANIZED HEALTH CARE EDUCATION/TRAINING PROGRAM

## 2022-12-27 PROCEDURE — 6370000000 HC RX 637 (ALT 250 FOR IP): Performed by: FAMILY MEDICINE

## 2022-12-27 PROCEDURE — 2500000003 HC RX 250 WO HCPCS: Performed by: PHYSICIAN ASSISTANT

## 2022-12-27 PROCEDURE — 2580000003 HC RX 258: Performed by: FAMILY MEDICINE

## 2022-12-27 PROCEDURE — 6360000002 HC RX W HCPCS: Performed by: PHYSICIAN ASSISTANT

## 2022-12-27 PROCEDURE — C1729 CATH, DRAINAGE: HCPCS

## 2022-12-27 PROCEDURE — 1100000000 HC RM PRIVATE

## 2022-12-27 PROCEDURE — 97535 SELF CARE MNGMENT TRAINING: CPT

## 2022-12-27 PROCEDURE — 6370000000 HC RX 637 (ALT 250 FOR IP): Performed by: HOSPITALIST

## 2022-12-27 PROCEDURE — 80048 BASIC METABOLIC PNL TOTAL CA: CPT

## 2022-12-27 RX ORDER — MAGNESIUM SULFATE IN WATER 40 MG/ML
2000 INJECTION, SOLUTION INTRAVENOUS ONCE
Status: COMPLETED | OUTPATIENT
Start: 2022-12-27 | End: 2022-12-27

## 2022-12-27 RX ORDER — CARVEDILOL 12.5 MG/1
12.5 TABLET ORAL 2 TIMES DAILY WITH MEALS
Status: DISCONTINUED | OUTPATIENT
Start: 2022-12-27 | End: 2022-12-27

## 2022-12-27 RX ORDER — CARVEDILOL 12.5 MG/1
12.5 TABLET ORAL 2 TIMES DAILY WITH MEALS
Status: DISCONTINUED | OUTPATIENT
Start: 2022-12-27 | End: 2022-12-29

## 2022-12-27 RX ORDER — ALBUMIN (HUMAN) 12.5 G/50ML
SOLUTION INTRAVENOUS CONTINUOUS PRN
Status: DISCONTINUED | OUTPATIENT
Start: 2022-12-27 | End: 2022-12-27

## 2022-12-27 RX ORDER — LIDOCAINE HYDROCHLORIDE 20 MG/ML
INJECTION, SOLUTION INFILTRATION; PERINEURAL
Status: COMPLETED | OUTPATIENT
Start: 2022-12-27 | End: 2022-12-27

## 2022-12-27 RX ADMIN — MAGNESIUM SULFATE HEPTAHYDRATE 2000 MG: 40 INJECTION, SOLUTION INTRAVENOUS at 19:59

## 2022-12-27 RX ADMIN — SODIUM BICARBONATE 650 MG TABLET 650 MG: at 10:28

## 2022-12-27 RX ADMIN — CHOLESTYRAMINE 4 G: 4 POWDER, FOR SUSPENSION ORAL at 10:28

## 2022-12-27 RX ADMIN — CARVEDILOL 12.5 MG: 12.5 TABLET, FILM COATED ORAL at 10:50

## 2022-12-27 RX ADMIN — CHOLESTYRAMINE 4 G: 4 POWDER, FOR SUSPENSION ORAL at 21:29

## 2022-12-27 RX ADMIN — SODIUM BICARBONATE 650 MG TABLET 650 MG: at 21:29

## 2022-12-27 RX ADMIN — CARVEDILOL 12.5 MG: 12.5 TABLET, FILM COATED ORAL at 16:55

## 2022-12-27 RX ADMIN — LIDOCAINE HYDROCHLORIDE 200 MG: 20 INJECTION, SOLUTION INFILTRATION; PERINEURAL at 08:14

## 2022-12-27 RX ADMIN — FOLIC ACID 1 MG: 1 TABLET ORAL at 10:19

## 2022-12-27 RX ADMIN — INSULIN LISPRO 1 UNITS: 100 INJECTION, SOLUTION INTRAVENOUS; SUBCUTANEOUS at 16:55

## 2022-12-27 RX ADMIN — LOPERAMIDE HYDROCHLORIDE 2 MG: 2 CAPSULE ORAL at 21:29

## 2022-12-27 RX ADMIN — ZOLPIDEM TARTRATE 5 MG: 5 TABLET ORAL at 21:29

## 2022-12-27 RX ADMIN — SODIUM CHLORIDE, PRESERVATIVE FREE 10 ML: 5 INJECTION INTRAVENOUS at 21:29

## 2022-12-27 RX ADMIN — ALBUMIN (HUMAN) 12.5 G: 0.25 INJECTION, SOLUTION INTRAVENOUS at 08:49

## 2022-12-27 RX ADMIN — PANTOPRAZOLE SODIUM 40 MG: 40 TABLET, DELAYED RELEASE ORAL at 06:10

## 2022-12-27 RX ADMIN — SODIUM CHLORIDE, PRESERVATIVE FREE 10 ML: 5 INJECTION INTRAVENOUS at 10:30

## 2022-12-27 RX ADMIN — CYANOCOBALAMIN TAB 1000 MCG 1000 MCG: 1000 TAB at 10:19

## 2022-12-27 RX ADMIN — SODIUM BICARBONATE 650 MG TABLET 650 MG: at 14:35

## 2022-12-27 ASSESSMENT — PAIN SCALES - GENERAL
PAINLEVEL_OUTOF10: 0
PAINLEVEL_OUTOF10: 0

## 2022-12-27 NOTE — OR NURSING
Interventional Radiology Post Paracentesis/Thoracentesis Note    12/27/2022    Procedure(s): Ultrasound guided Therapeutic Paracentesis Performed with 8 [place] catheter total volume 6400 ml. Preliminary Findings: moderate clear and yellow. Complications: None    Plan:  Observation, check labs if drawn.           Chest X-Ray:  no    Full dictated report to follow

## 2022-12-27 NOTE — PROGRESS NOTES
PT Daily Note:  Attempted to see patient for physical therapy this morning but patient was off the floor for a paracentesis. Will check back on patient at a later date/time if schedule permits. Thank you,  Horacio Shannon.  DICK León

## 2022-12-27 NOTE — PROGRESS NOTES
Comprehensive Nutrition Assessment    Type and Reason for Visit: Reassess  Poor Intake/Appetite 5 or More Days (Hospitalists)  Diet Education (GI)    Nutrition Recommendations/Plan:   Meals and Snacks:  Diet: Continue current order  Nutrition Supplement Therapy:  Medical food supplement therapy:  Discontinue Ensure High Protein once per day (this provides 160 kcal and 16 grams protein per bottle)     Malnutrition Assessment:  Malnutrition Status: Insufficient data (hx of volume fluctuations and volume removal therefore masking any true weight loss PTA)  Pt with slight wasting to clavicles but no other sven signs of fat or muscle wasting noted    Nutrition Assessment:  Nutrition History: Pt reports early satiety and lack of appetite present PTA. Pt reports struggling with PO intake for ~1 month PTA, reports occasionally consuming ONS. Pt estimates UBW to be ~200lb. Do You Have Any Cultural, Alevism, or Ethnic Food Preferences?: No   Nutrition Background:    Pt with PMH significant for GERD, HLD, HTN, PUD, T2DM, ulcers of duodenum, GI bleed who presented to the [facility] 12/13 for cc inability to tolerate PO, emesis, abdominal pain with distention, no flatus or BM since of 1 day duration. Patient was admitted for SIRS with leukocytosis and tachycardia; no source of infection. There was also a concern for partial SBO on admission. NGT placed in 12/13 for decompression. General surgery was suspicious pt's symptoms are more d/t large volume ascites than SBO. No surgical intervention. NGT removed 12/14 and diet started. 12/24- started having diarrhea (still on going as of 12/27) c. Diff pending 12/27: paracentesis 6400 ml removed  Nutrition Interval:  Pt seen at bedside with no visitors present. He reports good appetite, eating % of meals. Lunch tray seen in front of patient with ~75% consumed.  Pt he has not been able to drink all the ensure HP drinks that are being sent to him because they \"send him straight to the bathroom\". Patient reports now he is having diarrhea several times a day and most things send him to the bathroom. Although he says this wont stop him from eating but he is refusing the ensure at this time. Current Nutrition Therapies:  ADULT ORAL NUTRITION SUPPLEMENT; Dinner; Low Calorie/High Protein Oral Supplement  ADULT DIET; Regular; Low Sodium (2 gm); Low Potassium (Less than 3000 mg/day)    Current Intake:   Average Meal Intake: % Average Supplements Intake: Refusing to take      Anthropometric Measures:  Height: 5' 11\" (180.3 cm)  Current Body Wt: 221 lb 5.5 oz (100.4 kg) (12/16/2022), Weight source: Bed Scale  BMI: 30.9, Obese Class 1 (BMI 30.0-34. 9)  Admission Body Weight: 200 lb (90.7 kg) (12/13 ; stated weight)  Ideal Body Weight (Kg) (Calculated): 78 kg (172 lbs), 128.7 %  Usual Body Wt: 201 lb (91.2 kg) (5/2021 MD office weight ; limited emr weight hx), Percent weight change: 10.1       Edema:    BMI Category Obese Class 1 (BMI 30.0-34. 9)    Estimated Daily Nutrient Needs:  Energy (kcal/day): 7124-1984 (15-18kcal/kg) (Kcal/kg Weight used: 100.4 kg Current  Protein (g/day):  (0.8-1g/kg) Weight Used: (Current) 100.4 kg  Fluid (ml/day):   (1 ml/kcal)    Nutrition Diagnosis:   No nutrition diagnosis at this time     Nutrition Interventions:   Food and/or Nutrient Delivery: Continue Current Diet, Discontinue Oral Nutrition Supplement     Coordination of Nutrition Care: Continue to monitor while inpatient, Interdisciplinary Rounds       Goals:   Previous Goal Met: Goal(s) Achieved  Active Goal: Meet at least 75% of estimated needs, by next RD assessment       Nutrition Monitoring and Evaluation:      Food/Nutrient Intake Outcomes: Food and Nutrient Intake  Physical Signs/Symptoms Outcomes: Diarrhea, Meal Time Behavior    Discharge Planning:    Continue current diet    Ike Jerry, SIMI

## 2022-12-27 NOTE — PROGRESS NOTES
ACUTE PHYSICAL THERAPY GOALS:   (Developed with and agreed upon by patient and/or caregiver.)  Pt will perform supine to/from sit with mod I in 7 treatment days. Pt will perform sit to/from stand with mod I and LRAD in 7 treatment days. Pt will ambulate 150 ft with mod I and LRAD in 7 treatment days. Pt will negotiate 2 stairs with mod I and rail in 7 treatment days. Pt will be independent with HEP in 7 days. PHYSICAL THERAPY: Daily Note PM   (Link to Caseload Tracking: PT Visit Days : 5  Time In/Out PT Charge Capture  Rehab Caseload Tracker  Orders    Evelia Lopez is a 65 y.o. male   PRIMARY DIAGNOSIS: Cirrhosis of liver with ascites ([facility] [address]  Septicemia ([facility] [address] [A41.9]  SIRS (systemic inflammatory response syndrome) (HCC) [R65.10]  Abdominal pain, unspecified abdominal location [R10.9]  Swelling of lower extremity [M79.89]       Inpatient: Payor: Keke Liu / Plan: Baldomero Starkey / Product Type: *No Product type* /     ASSESSMENT:     REHAB RECOMMENDATIONS:   Recommendation to date pending progress:  Setting:  Short-term Rehab    Equipment:    To Be Determined     ASSESSMENT:  Mr. Zac Camacho was sitting up in recliner chair upon contact and agreeable to PT; requesting to use the [facility]. Patient performed sit to stand min assist with focus on correct technique and hand placement. Once standing patient transferred to [facility] with min assist where he had a BM. Patient transferred to standing with min assist where he then participated in prolonged static standing balance while therapist cleaned him up. Patient then ambulated in hallway with rolling walker, CGA-min assist, chair follow for safety and cues for posture/gait sequencing. Patient took several rest breaks throughout ambulation due to BLE weakness and fatigue. Patient returned to recliner chair where he was positioned for comfort. Reviewed LE exercises and encouraged patient to perform throughout the day. Steady progress.  Pt continues to present as functioning below his baseline, with deficits in mobility including transfers, gait, balance, and activity tolerance. Pt will continue to benefit from skilled therapy services to address stated deficits to promote return to highest level of function, independence, and safety. Will continue PT efforts.        SUBJECTIVE:   Mr. Connie Ley states, \"I wish I would stop using the bathroom\"     Social/Functional Lives With: Spouse  Type of Home: House  Home Layout: One level  Home Access: Stairs to enter with rails  Entrance Stairs - Number of Steps: 2  Home Equipment: Faby Ramos, [address] Help From: Family  ADL Assistance: [address]: Needs assistance  Homemaking Responsibilities: Yes  Ambulation Assistance: Independent  Transfer Assistance: Independent  OBJECTIVE:     PAIN: Jonathan Feng / O2: Olena Sharp / Darlene Trevino / April Saul:   Pre Treatment: 0  Pain Assessment: None - Denies Pain      Post Treatment: 0 Vitals        Oxygen    None    RESTRICTIONS/PRECAUTIONS:  Restrictions/Precautions  Restrictions/Precautions: Fall Risk  Restrictions/Precautions: Fall Risk     MOBILITY: I Mod I S SBA CGA Min Mod Max Total  NT x2 Comments:   Bed Mobility    Rolling [] [] [] [] [] [] [] [] [] [] []    Supine to Sit [] [] [] [] [] [] [] [] [] [] []    Scooting [] [] [] [] [] [] [] [] [] [] []    Sit to Supine [] [] [] [] [] [] [] [] [] [] []    Transfers    Sit to Stand [] [] [] [] [] [x] [] [] [] [] []    Bed to Chair [] [] [] [] [] [] [] [] [] [] []    Stand to Sit [] [] [] [] [] [x] [] [] [] [] []     [] [] [] [] [] [] [] [] [] [] []    I=Independent, Mod I=Modified Independent, S=Supervision, SBA=Standby Assistance, CGA=Contact Guard Assistance,   Min=Minimal Assistance, Mod=Moderate Assistance, Max=Maximal Assistance, Total=Total Assistance, NT=Not Tested    BALANCE: Good Fair+ Fair Fair- Poor NT Comments   Sitting Static [x] [] [] [] [] []    Sitting Dynamic [] [x] [] [] [] []              Standing Static [] [] [x] [] [] []    Standing Dynamic [] [] [] [x] [] []      GAIT: I Mod I S SBA CGA Min Mod Max Total  NT x2 Comments:   Level of Assistance [] [] [] [] [] [x] [] [] [] [] [] Chair follow   Distance   Ambulation in hallway    DME Gait Belt and Rolling Walker    Gait Quality Decreased jason , Decreased step clearance, Decreased step length, and Decreased stance    Weightbearing Status      Stairs      I=Independent, Mod I=Modified Independent, S=Supervision, SBA=Standby Assistance, CGA=Contact Guard Assistance,   Min=Minimal Assistance, Mod=Moderate Assistance, Max=Maximal Assistance, Total=Total Assistance, NT=Not Tested    PLAN:   FREQUENCY AND DURATION: 3 times/week for duration of hospital stay or until stated goals are met, whichever comes first.    TREATMENT:   TREATMENT:   Therapeutic Activity (25 Minutes): Therapeutic activity included Scooting, Transfer Training, Ambulation on level ground, Sitting balance , and Standing balance to improve functional Activity tolerance, Balance, Coordination, Mobility, Strength, and ROM. TREATMENT GRID:  N/A    AFTER TREATMENT PRECAUTIONS: Bed/Chair Locked, Call light within reach, Chair, Needs within reach, and RN notified    INTERDISCIPLINARY COLLABORATION:  RN/ PCT, PT/ PTA, and Rehab Attendant    EDUCATION:      TIME IN/OUT:  Time In: 1345  Time Out: 99 Paty Sher  Minutes: 100 Juan León PTA

## 2022-12-27 NOTE — PROGRESS NOTES
ACUTE OCCUPATIONAL THERAPY GOALS:   (Developed with and agreed upon by patient and/or caregiver.)  1. Holland Spurling will be modified independent with functional mobility for ADL in room within 4 - 7 visits. 2. Holland Spurling will be modified independent with total body bathing and dressing within 4 - 7 visits. 3. Holland Spurling will state and demonstrate at least 5 energy conservation techniques during ADL/therapeutic activities within 4 - 7 visits. 4. Holland Spurling will voice a plan for appropriate home modifications for home safety and fall prevention within 7 visits. 5. Holland Spurling will participate at least 30 minutes of ADL with 3 or less rest breaks within 7 visits. 6. Holland Spurling will complete a tub transfer with modified independence within 7 visits. OCCUPATIONAL THERAPY: Daily Note AM   OT Visit Days: 3   Time In/Out  OT Charge Capture  Rehab Caseload Tracker  OT Orders    Holland Spurling is a 65 y.o. male   PRIMARY DIAGNOSIS: Cirrhosis of liver with ascites ([address]  Septicemia ([address] [A41.9]  SIRS (systemic inflammatory response syndrome) (HCC) [R65.10]  Abdominal pain, unspecified abdominal location [R10.9]  Swelling of lower extremity [M79.89]       Inpatient: Payor: Ricci Lorenz / Plan: Carlitos Woo / Product Type: *No Product type* /     ASSESSMENT:     REHAB RECOMMENDATIONS: CURRENT LEVEL OF FUNCTION:  (Most Recently Demonstrated)   Recommendation to date pending progress:  Setting:  Short-term Rehab    Equipment:    To Be Determined Bathing:  Supervision/Setup  Dressing: Moderate Assist  Feeding/Grooming:  Supervision/Setup  Toileting: Total Assist  Functional Mobility:  Contact Guard Assist RW     ASSESSMENT:  Mr. Michael Bueno present sitting in chair upon arrival. Pt completed functional mobility with CGA using RW. Pt stood at sink and completed grooming with set up. Pt completed bowel hygiene on bed side toilet with total A.  Pt completed LB dressing and bathing with the assistance listed below. Pt is progressing towards goals. Continue POC.         SUBJECTIVE:     Mr. Jelena De Luna states, \"Hey\"     Social/Functional Lives With: Spouse  Type of Home: House  Home Layout: One level  Home Access: Stairs to enter with rails  Entrance Stairs - Number of Steps: 2  Home Equipment: [place], [address] Help From: Family  ADL Assistance: Independent  Homemaking Assistance: Needs assistance  Homemaking Responsibilities: Yes  Ambulation Assistance: Independent  Transfer Assistance: Independent    OBJECTIVE:     Favian Simple / Stan Knox / AIRWAY: Telemetry     RESTRICTIONS/PRECAUTIONS:  Restrictions/Precautions  Restrictions/Precautions: Fall Risk        PAIN: VITALS / O2:   Pre Treatment: 0           Post Treatment: None Vitals          Oxygen        MOBILITY: I Mod I S SBA CGA Min Mod Max Total  NT x2 Comments:   Bed Mobility    Rolling [] [] [] [] [] [] [] [] [] [x] []    Supine to Sit [] [] [] [] [] [] [] [] [] [x] []    Scooting [] [] [] [] [] [] [] [] [] [x] []    Sit to Supine [] [] [] [] [] [] [] [] [] [x] []    Transfers    Sit to Stand [] [] [] [] [x] [] [] [] [] [] []    Bed to Chair [] [] [] [] [] [] [] [] [] [] []    Stand to Sit [] [] [] [] [x] [] [] [] [] [] []    Tub/Shower [] [] [] [] [] [] [] [] [] [x] []     Toilet [] [] [] [] [] [] [] [] [] [x] []      [] [] [] [] [] [] [] [] [] [] []    I=Independent, Mod I=Modified Independent, S=Supervision/Setup, SBA=Standby Assistance, CGA=Contact Guard Assistance, Min=Minimal Assistance, Mod=Moderate Assistance, Max=Maximal Assistance, Total=Total Assistance, NT=Not Tested    ACTIVITIES OF DAILY LIVING: I Mod I S SBA CGA Min Mod Max Total NT Comments   BASIC ADLs:              Upper Body   Bathing [] [] [] [] [] [] [] [] [] [x]    Lower Body Bathing [] [] [x] [] [] [] [] [] [] []    Toileting [] [] [] [] [] [] [] [] [x] []    Upper Body Dressing [] [] [] [] [] [] [] [] [] []    Lower Body Dressing [] [] [] [] [] [] [x] [] [] [] socks   Feeding [] [] [] [] [] [] [] [] [] [x]    Grooming [] [] [x] [] [] [] [] [] [] [] Brushing teeth, washing face    Personal Device Care [] [] [] [] [] [] [] [] [] [x]    Functional Mobility [] [] [] [] [x] [] [] [] [] []    I=Independent, Mod I=Modified Independent, S=Supervision/Setup, SBA=Standby Assistance, CGA=Contact Guard Assistance, Min=Minimal Assistance, Mod=Moderate Assistance, Max=Maximal Assistance, Total=Total Assistance, NT=Not Tested    BALANCE: Good Fair+ Fair Fair- Poor NT Comments   Sitting Static [x] [] [] [] [] []    Sitting Dynamic [x] [] [] [] [] []              Standing Static [] [x] [] [] [] []    Standing Dynamic [] [] [x] [] [] []        PLAN:     FREQUENCY/DURATION   OT Plan of Care: 3 times/week for duration of hospital stay or until stated goals are met, whichever comes first.    TREATMENT:     TREATMENT:   Self Care (24 minutes): Patient participated in lower body bathing, toileting, lower body dressing, and grooming ADLs in unsupported sitting and standing with minimal verbal, manual, and tactile cueing to increase independence, decrease assistance required, and increase activity tolerance. Patient also participated in functional mobility, functional transfer, energy conservation, and adaptive equipment training to increase independence, decrease assistance required, and increase activity tolerance.      TREATMENT GRID:  N/A    AFTER TREATMENT PRECAUTIONS: Bed/Chair Locked, Call light within reach, Chair, Needs within reach, and RN notified    INTERDISCIPLINARY COLLABORATION:  RN/ PCT, PT/ PTA, and OT/ HAYDEN    EDUCATION:       TOTAL TREATMENT DURATION AND TIME:  Time In: 1124  Time Out: [address]  Minutes: SHAHANA Jacob

## 2022-12-27 NOTE — PROGRESS NOTES
Hospitalist Progress Note   Admit Date:  2022  3:24 PM   Name:  Chris Oliva   Age:  65 y.o. Sex:  male  :  1965   MRN:  [id number]   Room:  5/    Reason(s) for Admission: Septicemia ([address] [A41.9]  SIRS (systemic inflammatory response syndrome) (HCC) [R65.10]  Abdominal pain, unspecified abdominal location [R10.9]  [facility]:   Mr. Brittney Conroy is a 64 y/o AAM with a h/o GI bleed who was admitted to our service on  with SIRS and poor oral intake. CT chest done for elevated d-dimer neg for PE. KUB showed non-specific bowel gas pattern. Flu and COVID negative. He was started on empiric broad spectrum antibiotics. CTAP 12/15 showed multiple dependent stones in GB with prob cirrhotic liver morphology and ascites. Blood cultures on admission grew CoNS and diphtheroids likely contamination. ID was consulted  and recommended given no signs of infectious peritonitis and blood cultures are likely contaminated that no abx indicated. Leukocytosis most likely due to partial SBO/ileus which has resolved. Recommended wound care on right leg which wounds appear to be chronic/venous stasis without signs of active infection. ID recommended to stop all antibiotics and signed off . There was also a concern for partial SBO on admission. NGT placed in ED for decompression. General surgery was consulted and felt his symptoms were likely due to large volume ascites. No surgical intervention recommended. NGT removed and diet advanced. GI consulted for ascites and new cirrhosis. He underwent paracentesis with IR on 12/15 with removal of 7.5L fluid. GI recommended that patient has cholelithiasis on imaging but recommended against surgical consideration as a surgical mortality would be high. If cholecystitis develops, consider cholecystostomy tube.  GI signed off  and patient should follow-up with his primary GI at [facility]. UA-and CK within normal limits   ultrasound of the kidney-increased renal echogenicity compatible with chronic medical renal disease      PT/OT recommended STR. Subjective/24hr Events (12/27/22): Patient stated he was having diarrhea 6 times yesterday. Imodium is not helping. It was foul smelling. He was eating well and drinking well. No chest pain or SOB. Assessment & Plan:   # MARS on CKD3a  - Baseline sCr mid 1s, stable around 2  Hold diuretics, encourage PO intake,compatible with chronic medical renal disease   Nephrology signed off    Chronic diarrhea   Continue Imodium  F/u stool studies and C diff     Mild hyperkalemia  Resolved. Holding Aldactone     Hypomagnesemia  Magnesium 1.6  Ordered magnesium 2 g IV  Follow-up with magnesium in p.m. # Cirrhosis // large volume ascites   - Para 12/15 with 7.5L removed  S/p paracentesis of 6.2L on 12/27    - Diuretics held as above. GI s/o. Consulted IR for repeat paracentesis    Mild hyponatremia due to cirrhosis  Resolved    Metabolic acidosis   Continue bicarb tabs po    # Partial SBO   - Resolved. Previously seen by Surgery. NGT out several days ago. # Cholelithiasis   - No s/s of cholecystitis. Prior recs from GI were to consider cholecystostomy tube if that were to develop. # Alcohol abuse   - Cessation advised. Folic acid. # DM2 with neuropathy   - SSI. # HTN   - Increased Coreg. on 12/27    # GERD   - PPI    # Chronc LLE diabetic ulcer   - Con't wound care efforts. PT/OT recommended STR. Discharge Planning: Medically stable,  [employer] denied the request for [facility]. The pt does have the right to do a pt/family appeal.    Diet:  ADULT DIET; Regular; Low Sodium (2 gm);  Low Potassium (Less than 3000 mg/day)  DVT PPx: SCD, ambulation  Code status: Full Code    Hospital Problems             Last Modified POA    * (Principal) Cirrhosis of liver with ascites ([facility] 12/16/2022 Yes    Alcohol dependence ([address] 12/14/2022 Yes    Diabetic peripheral neuropathy ([address] 12/14/2022 Yes    Essential hypertension (Chronic) 12/13/2022 Yes    Overview Signed 8/31/2022  8:40 PM by Jimbo Stone DO     Last Assessment & Plan:   Formatting of this note might be different from the original.  Still poor controlled. Continue avoid Cozaar due to acute kidney injury. Started on Norvasc, hydralazine when necessary, clonidine when necessary         Hyperlipidemia 12/13/2022 Yes    Type 2 diabetes mellitus ([address] 12/13/2022 Yes    Overview Signed 9/21/2022  1:48 PM by Bree Hester, ATC     Last Assessment & Plan:   Formatting of this note might be different from the original.  Continue Lantus, ISS         Diabetic ulcer of both feet associated with type 2 diabetes mellitus ([address] (Chronic) 12/16/2022 Yes    Acute kidney injury superimposed on CKD ([address] 12/15/2022 Yes    PAD (peripheral artery disease) ([address] 12/13/2022 Yes    Hypoalbuminemia (Chronic) 12/15/2022 Yes    Normocytic anemia 12/15/2022 Yes    Overview Signed 9/21/2022  1:48 PM by Breeemile Joys, ATC     Formatting of this note might be different from the original.  Added automatically from request for surgery [id number]  Formatting of this note might be different from the original.  Added automatically from request for surgery [id number]         SIRS (systemic inflammatory response syndrome) ([address] 12/16/2022 Yes    Abdominal pain 12/14/2022 Yes    Moderate protein malnutrition ([address] 12/15/2022 Yes    Cholelithiases 12/15/2022 Yes    Positive blood culture 12/15/2022 Yes    Partial small bowel obstruction ([address] 12/17/2022 Yes    Stage 3a chronic kidney disease ([address] (Chronic) 12/15/2022 Yes    Overview Signed 9/14/2022  9:02 PM by Stefanie Cerda MD     With HTN and DM2            Objective:   Patient Vitals for the past 24 hrs:   Temp Pulse Resp BP SpO2   12/27/22 1645 -- 84 -- -- --   12/27/22 1527 97.3 °F (36.3 °C) 80 19 137/84 100 %   12/27/22 1117 97.9 °F (36.6 °C) 99 18 (!) 131/93 100 %   12/27/22 1050 -- 88 -- -- --   12/27/22 0903 -- 86 18 137/82 100 %   12/27/22 0846 -- 87 18 (!) 150/91 100 %   12/27/22 0823 -- 88 18 (!) 144/93 100 %   12/27/22 0802 -- 88 18 (!) 151/98 100 %   12/27/22 0742 98.2 °F (36.8 °C) 89 20 (!) 147/94 100 %   12/27/22 0656 97.9 °F (36.6 °C) 74 18 116/83 99 %   12/27/22 0343 97.7 °F (36.5 °C) 92 18 (!) 147/96 100 %   12/26/22 2334 97.7 °F (36.5 °C) 98 18 (!) 145/94 100 %   12/26/22 2059 98 °F (36.7 °C) 93 18 118/75 100 %       Estimated body mass index is 30.87 kg/m² as calculated from the following:    Height as of this encounter: 5' 11\" (1.803 m). Weight as of this encounter: 221 lb 4.8 oz (100.4 kg). No intake or output data in the 24 hours ending 12/27/22 1852        Physical Exam:   Blood pressure 137/84, pulse 84, temperature 97.3 °F (36.3 °C), temperature source Oral, resp. rate 19, height 5' 11\" (1.803 m), weight 221 lb 4.8 oz (100.4 kg), SpO2 100 %. General:    Well nourished. No overt distress. Appears older than stated age. Pleasant and conversant. Head:  Normocephalic, atraumatic  Eyes:  Sclerae appear normal. Pupils equally round. ENT:  Nares appear normal, no drainage. Moist oral mucosa  Neck:  No restricted ROM. Trachea midline. CV:   RRR. No m/r/g. No jugular venous distension. Lungs:   CTAB. No wheezing, rhonchi, or rales. Respirations even, unlabored. Abdomen: Bowel sounds present. Soft, nontender, mildly distended. 2+ b/l LE pitting edema. Skin:     No rashes and normal coloration. Warm and dry. Extremities Rt leg wound is present  Neuro:  CN II-XII grossly intact. Sensation intact. A&Ox3  Psych:  Normal mood and affect.       I have reviewed ordered lab tests and independently visualized imaging below:    Recent Labs:  Recent Results (from the past 48 hour(s))   POCT Glucose    Collection Time: 12/25/22  9:31 PM   Result Value Ref Range    POC Glucose 203 (H) 65 - 100 mg/dL    Performed by: Dash Davis    Collection Time: 12/26/22 4:44 AM   Result Value Ref Range    Protime 18.1 (H) 12.6 - 14.3 sec    INR 1.5     CBC with Auto Differential    Collection Time: 12/26/22  4:44 AM   Result Value Ref Range    WBC 9.6 4.3 - 11.1 K/uL    RBC 2.56 (L) 4.23 - 5.6 M/uL    Hemoglobin 7.1 (L) 13.6 - 17.2 g/dL    Hematocrit 22.0 (L) 41.1 - 50.3 %    MCV 85.9 82 - 102 FL    MCH 27.7 26.1 - 32.9 PG    MCHC 32.3 31.4 - 35.0 g/dL    RDW 15.7 (H) 11.9 - 14.6 %    Platelets 882 448 - 799 K/uL    MPV 10.3 9.4 - 12.3 FL    nRBC 0.00 0.0 - 0.2 K/uL    Differential Type AUTOMATED      Seg Neutrophils 56 43 - 78 %    Lymphocytes 24 13 - 44 %    Monocytes 17 (H) 4.0 - 12.0 %    Eosinophils % 2 0.5 - 7.8 %    Basophils 1 0.0 - 2.0 %    Immature Granulocytes 1 0.0 - 5.0 %    Segs Absolute 5.4 1.7 - 8.2 K/UL    Absolute Lymph # 2.3 0.5 - 4.6 K/UL    Absolute Mono # 1.6 (H) 0.1 - 1.3 K/UL    Absolute Eos # 0.2 0.0 - 0.8 K/UL    Basophils Absolute 0.1 0.0 - 0.2 K/UL    Absolute Immature Granulocyte 0.1 0.0 - 0.5 K/UL   Basic Metabolic Panel w/ Reflex to MG    Collection Time: 12/26/22  4:44 AM   Result Value Ref Range    Sodium 134 133 - 143 mmol/L    Potassium 5.2 (H) 3.5 - 5.1 mmol/L    Chloride 109 101 - 110 mmol/L    CO2 18 (L) 21 - 32 mmol/L    Anion Gap 7 2 - 11 mmol/L    Glucose 101 (H) 65 - 100 mg/dL    BUN 35 (H) 6 - 23 MG/DL    Creatinine 2.00 (H) 0.8 - 1.5 MG/DL    Est, Glom Filt Rate 38 (L) >60 ml/min/1.73m2    Calcium 7.6 (L) 8.3 - 10.4 MG/DL   Phosphorus    Collection Time: 12/26/22  4:44 AM   Result Value Ref Range    Phosphorus 3.7 2.5 - 4.5 MG/DL   POCT Glucose    Collection Time: 12/26/22  6:22 AM   Result Value Ref Range    POC Glucose 102 (H) 65 - 100 mg/dL    Performed by: Juancarlos    TYPE AND SCREEN    Collection Time: 12/26/22  8:42 AM   Result Value Ref Range    Crossmatch expiration date 12/29/2022,2359     ABO/Rh O POSITIVE     Antibody Screen NEG    CBC with Auto Differential    Collection Time: 12/26/22  8:44 AM   Result Value Ref Range    WBC 10.9 4.3 - 11.1 K/uL    RBC 2.64 (L) 4.23 - 5.6 M/uL    Hemoglobin 7.3 (L) 13.6 - 17.2 g/dL    Hematocrit 22.2 (L) 41.1 - 50.3 %    MCV 84.1 82 - 102 FL    MCH 27.7 26.1 - 32.9 PG    MCHC 32.9 31.4 - 35.0 g/dL    RDW 15.4 (H) 11.9 - 14.6 %    Platelets 622 961 - 209 K/uL    MPV 10.9 9.4 - 12.3 FL    nRBC 0.00 0.0 - 0.2 K/uL    Differential Type AUTOMATED      Seg Neutrophils 60 43 - 78 %    Lymphocytes 22 13 - 44 %    Monocytes 15 (H) 4.0 - 12.0 %    Eosinophils % 2 0.5 - 7.8 %    Basophils 1 0.0 - 2.0 %    Immature Granulocytes 1 0.0 - 5.0 %    Segs Absolute 6.5 1.7 - 8.2 K/UL    Absolute Lymph # 2.4 0.5 - 4.6 K/UL    Absolute Mono # 1.7 (H) 0.1 - 1.3 K/UL    Absolute Eos # 0.3 0.0 - 0.8 K/UL    Basophils Absolute 0.1 0.0 - 0.2 K/UL    Absolute Immature Granulocyte 0.1 0.0 - 0.5 K/UL   Basic Metabolic Panel    Collection Time: 12/26/22  8:44 AM   Result Value Ref Range    Sodium 134 133 - 143 mmol/L    Potassium 5.3 (H) 3.5 - 5.1 mmol/L    Chloride 109 101 - 110 mmol/L    CO2 19 (L) 21 - 32 mmol/L    Anion Gap 6 2 - 11 mmol/L    Glucose 134 (H) 65 - 100 mg/dL    BUN 33 (H) 6 - 23 MG/DL    Creatinine 2.10 (H) 0.8 - 1.5 MG/DL    Est, Glom Filt Rate 36 (L) >60 ml/min/1.73m2    Calcium 7.7 (L) 8.3 - 10.4 MG/DL   Magnesium    Collection Time: 12/26/22  8:44 AM   Result Value Ref Range    Magnesium 1.3 (L) 1.8 - 2.4 mg/dL   Phosphorus    Collection Time: 12/26/22  8:44 AM   Result Value Ref Range    Phosphorus 3.6 2.5 - 4.5 MG/DL   POCT Glucose    Collection Time: 12/26/22 11:11 AM   Result Value Ref Range    POC Glucose 129 (H) 65 - 100 mg/dL    Performed by: Salomon    POCT Glucose    Collection Time: 12/26/22  3:52 PM   Result Value Ref Range    POC Glucose 267 (H) 65 - 100 mg/dL    Performed by: Salomon    POCT Glucose    Collection Time: 12/26/22  8:13 PM   Result Value Ref Range    POC Glucose 223 (H) 65 - 100 mg/dL    Performed by: Jaclyn Bruno    Collection Time: 12/26/22  8:46 PM   Result Value Ref Range    Magnesium 1.6 (L) 1.8 - 2.4 mg/dL   Basic Metabolic Panel    Collection Time: 12/26/22  8:46 PM   Result Value Ref Range    Sodium 134 133 - 143 mmol/L    Potassium 5.4 (H) 3.5 - 5.1 mmol/L    Chloride 108 101 - 110 mmol/L    CO2 18 (L) 21 - 32 mmol/L    Anion Gap 8 2 - 11 mmol/L    Glucose 206 (H) 65 - 100 mg/dL    BUN 33 (H) 6 - 23 MG/DL    Creatinine 2.00 (H) 0.8 - 1.5 MG/DL    Est, Glom Filt Rate 38 (L) >60 ml/min/1.73m2    Calcium 7.5 (L) 8.3 - 10.4 MG/DL   Clostridium Difficile Toxin/Antigen    Collection Time: 12/26/22 11:42 PM    Specimen: Stool   Result Value Ref Range    GDH Antigen C. DIFFICILE GDH ANTIGEN-NEGATIVE      C difficile Toxin, EIA C. DIFFICILE TOXIN-NEGATIVE      Reflex NOT APPLICABLE      C Diff Toxin Interpretation NEGATIVE FOR TOXIGENIC C. DIFFICILE NTXCD      CLINICAL CONSIDERATION NEGATIVE FOR TOXIGENIC C. DIFFICILE     Culture, Stool    Collection Time: 12/27/22 12:00 AM    Specimen: Stool   Result Value Ref Range    Special Requests NO SPECIAL REQUESTS      Culture        No growth after short period of incubation. Further results to follow after overnight incubation.    CBC with Auto Differential    Collection Time: 12/27/22  4:37 AM   Result Value Ref Range    WBC 10.9 4.3 - 11.1 K/uL    RBC 2.65 (L) 4.23 - 5.6 M/uL    Hemoglobin 7.3 (L) 13.6 - 17.2 g/dL    Hematocrit 22.6 (L) 41.1 - 50.3 %    MCV 85.3 82 - 102 FL    MCH 27.5 26.1 - 32.9 PG    MCHC 32.3 31.4 - 35.0 g/dL    RDW 15.4 (H) 11.9 - 14.6 %    Platelets 916 890 - 158 K/uL    MPV 10.7 9.4 - 12.3 FL    nRBC 0.00 0.0 - 0.2 K/uL    Differential Type AUTOMATED      Seg Neutrophils 56 43 - 78 %    Lymphocytes 27 13 - 44 %    Monocytes 14 (H) 4.0 - 12.0 %    Eosinophils % 2 0.5 - 7.8 %    Basophils 1 0.0 - 2.0 %    Immature Granulocytes 0 0.0 - 5.0 %    Segs Absolute 6.1 1.7 - 8.2 K/UL    Absolute Lymph # 2.9 0.5 - 4.6 K/UL    Absolute Mono # 1.5 (H) 0.1 - 1.3 K/UL    Absolute Eos # 0.2 0.0 - 0.8 K/UL    Basophils Absolute 0.1 0.0 - 0.2 K/UL    Absolute Immature Granulocyte 0.0 0.0 - 0.5 K/UL   Basic Metabolic Panel w/ Reflex to MG    Collection Time: 12/27/22  4:37 AM   Result Value Ref Range    Sodium 132 (L) 133 - 143 mmol/L    Potassium 5.0 3.5 - 5.1 mmol/L    Chloride 108 101 - 110 mmol/L    CO2 17 (L) 21 - 32 mmol/L    Anion Gap 7 2 - 11 mmol/L    Glucose 146 (H) 65 - 100 mg/dL    BUN 33 (H) 6 - 23 MG/DL    Creatinine 1.90 (H) 0.8 - 1.5 MG/DL    Est, Glom Filt Rate 41 (L) >60 ml/min/1.73m2    Calcium 8.0 (L) 8.3 - 10.4 MG/DL   Phosphorus    Collection Time: 12/27/22  4:37 AM   Result Value Ref Range    Phosphorus 3.7 2.5 - 4.5 MG/DL   POCT Glucose    Collection Time: 12/27/22  6:43 AM   Result Value Ref Range    POC Glucose 150 (H) 65 - 100 mg/dL    Performed by: Ramona Middleton    POCT Glucose    Collection Time: 12/27/22 11:18 AM   Result Value Ref Range    POC Glucose 191 (H) 65 - 100 mg/dL    Performed by: Salomon    POCT Glucose    Collection Time: 12/27/22  3:29 PM   Result Value Ref Range    POC Glucose 240 (H) 65 - 100 mg/dL    Performed by: Salomon          Other Studies:  Transthoracic echocardiogram (TTE) complete with contrast, bubble, strain, and 3D PRN    Result Date: 12/22/2022    Left Ventricle: Normal left ventricular systolic function with a visually estimated EF of 65 - 70%. Left ventricle size is normal. Mildly increased wall thickness. Findings consistent with mild concentric hypertrophy. Normal wall motion. Normal diastolic function for age. Average E/e' ratio is 9.89. Aortic Valve: Moderate sclerosis of the aortic valve, right cusp-with no significant stenosis. Left Atrium: Left atrium is mildly dilated. Extracardiac: Left pleural effusion.        Current Meds:  Current Facility-Administered Medications   Medication Dose Route Frequency    carvedilol (COREG) tablet 12.5 mg  12.5 mg Oral BID WC    loperamide (IMODIUM) capsule 2 mg  2 mg Oral 4x Daily PRN sodium bicarbonate tablet 650 mg  650 mg Oral 4x Daily    [Held by provider] furosemide (LASIX) injection 40 mg  40 mg IntraVENous BID    [Held by provider] spironolactone (ALDACTONE) tablet 50 mg  50 mg Oral Daily    zolpidem (AMBIEN) tablet 5 mg  5 mg Oral Nightly PRN    pantoprazole (PROTONIX) tablet 40 mg  40 mg Oral QAM AC    diatrizoate meglumine-sodium (GASTROGRAFIN) 66-10 % solution 15 mL  15 mL Oral ONCE PRN    cholestyramine light packet 4 g  4 g Oral BID    folic acid (FOLVITE) tablet 1 mg  1 mg Oral Daily    vitamin B-12 (CYANOCOBALAMIN) tablet 1,000 mcg  1,000 mcg Oral Daily    insulin lispro (HUMALOG) injection vial 0-4 Units  0-4 Units SubCUTAneous TID WC    insulin lispro (HUMALOG) injection vial 0-4 Units  0-4 Units SubCUTAneous Nightly    glucose chewable tablet 16 g  4 tablet Oral PRN    dextrose bolus 10% 125 mL  125 mL IntraVENous PRN    Or    dextrose bolus 10% 250 mL  250 mL IntraVENous PRN    glucagon (rDNA) injection 1 mg  1 mg SubCUTAneous PRN    dextrose 10 % infusion   IntraVENous Continuous PRN    sodium chloride flush 0.9 % injection 5-40 mL  5-40 mL IntraVENous 2 times per day    sodium chloride flush 0.9 % injection 5-40 mL  5-40 mL IntraVENous PRN    0.9 % sodium chloride infusion   IntraVENous PRN    ondansetron (ZOFRAN-ODT) disintegrating tablet 4 mg  4 mg Oral Q8H PRN    Or    ondansetron (ZOFRAN) injection 4 mg  4 mg IntraVENous Q6H PRN    acetaminophen (TYLENOL) tablet 650 mg  650 mg Oral Q6H PRN    Or    acetaminophen (TYLENOL) suppository 650 mg  650 mg Rectal Q6H PRN    hydrALAZINE (APRESOLINE) injection 10 mg  10 mg IntraVENous Q6H PRN    morphine injection 1 mg  1 mg IntraVENous Q4H PRN       Signed:  Jessica Rangel MD    Part of this note may have been written by using a voice dictation software. The note has been proof read but may still contain some grammatical/other typographical errors.

## 2022-12-27 NOTE — OR NURSING
TRANSFER - OUT REPORT:           Verbal report given to Sunshine Bullock RN on Kimberley Lazcano  being transferred to room 725 for routine progression of patient care              Report consisted of patients Situation, Background, Assessment and      Recommendations(SBAR). Information from the following report(s) SBAR, Procedure Summary and MAR was reviewed with the receiving nurse. Opportunity for questions and clarification was provided. Pt tolerated well.          VITALS:  /82   Pulse 86   Temp 98.2 °F (36.8 °C) (Oral)   Resp 18   Ht 5' 11\" (1.803 m)   Wt 221 lb 4.8 oz (100.4 kg)   SpO2 100%   BMI 30.87 kg/m²

## 2022-12-27 NOTE — BRIEF OP NOTE
Department of Interventional Radiology  [phone]        Interventional Radiology Brief Procedure Note    Patient: Syd Rodriguez MRN: [id number]  SSN: [id number]    YOB: 1965  Age: 65 y.o. Sex: male      Date of Procedure: 12/27/2022    Pre-Procedure Diagnosis: Alcoholic cirrhosis with ascites    Post-Procedure Diagnosis: SAME    Procedure(s): Paracentesis    Brief Description of Procedure: Ultrasound-guided paracentesis performed. A total of 6400 cc of thin yellow fluid was removed. Patient tolerated procedure well    Performed By: LAN Licea     Assistants: None    Anesthesia:Lidocaine    Estimated Blood Loss: Less than 10ml    Specimens:  None    Implants:  None    Findings: Large volume ascites.     Complications: None    Recommendations: Routine wound care    Follow Up: As needed    Signed By: LAN Licea     December 27, 2022

## 2022-12-28 LAB
ANION GAP SERPL CALC-SCNC: 7 MMOL/L (ref 2–11)
BASOPHILS # BLD: 0.1 K/UL (ref 0–0.2)
BASOPHILS # BLD: 0.1 K/UL (ref 0–0.2)
BASOPHILS NFR BLD: 1 % (ref 0–2)
BASOPHILS NFR BLD: 1 % (ref 0–2)
BUN SERPL-MCNC: 27 MG/DL (ref 6–23)
CALCIUM SERPL-MCNC: 7.7 MG/DL (ref 8.3–10.4)
CHLORIDE SERPL-SCNC: 110 MMOL/L (ref 101–110)
CO2 SERPL-SCNC: 17 MMOL/L (ref 21–32)
CREAT SERPL-MCNC: 1.7 MG/DL (ref 0.8–1.5)
DIFFERENTIAL METHOD BLD: ABNORMAL
DIFFERENTIAL METHOD BLD: ABNORMAL
EOSINOPHIL # BLD: 0.2 K/UL (ref 0–0.8)
EOSINOPHIL # BLD: 0.3 K/UL (ref 0–0.8)
EOSINOPHIL NFR BLD: 2 % (ref 0.5–7.8)
EOSINOPHIL NFR BLD: 3 % (ref 0.5–7.8)
ERYTHROCYTE [DISTWIDTH] IN BLOOD BY AUTOMATED COUNT: 14.9 % (ref 11.9–14.6)
ERYTHROCYTE [DISTWIDTH] IN BLOOD BY AUTOMATED COUNT: 15.2 % (ref 11.9–14.6)
GLUCOSE BLD STRIP.AUTO-MCNC: 177 MG/DL (ref 65–100)
GLUCOSE BLD STRIP.AUTO-MCNC: 183 MG/DL (ref 65–100)
GLUCOSE BLD STRIP.AUTO-MCNC: 226 MG/DL (ref 65–100)
GLUCOSE BLD STRIP.AUTO-MCNC: 240 MG/DL (ref 65–100)
GLUCOSE SERPL-MCNC: 145 MG/DL (ref 65–100)
HCT VFR BLD AUTO: 21 % (ref 41.1–50.3)
HCT VFR BLD AUTO: 25.3 % (ref 41.1–50.3)
HGB BLD-MCNC: 6.8 G/DL (ref 13.6–17.2)
HGB BLD-MCNC: 8.3 G/DL (ref 13.6–17.2)
HISTORY CHECK: NORMAL
IMM GRANULOCYTES # BLD AUTO: 0 K/UL (ref 0–0.5)
IMM GRANULOCYTES # BLD AUTO: 0.1 K/UL (ref 0–0.5)
IMM GRANULOCYTES NFR BLD AUTO: 0 % (ref 0–5)
IMM GRANULOCYTES NFR BLD AUTO: 1 % (ref 0–5)
LYMPHOCYTES # BLD: 2 K/UL (ref 0.5–4.6)
LYMPHOCYTES # BLD: 2.1 K/UL (ref 0.5–4.6)
LYMPHOCYTES NFR BLD: 21 % (ref 13–44)
LYMPHOCYTES NFR BLD: 22 % (ref 13–44)
MAGNESIUM SERPL-MCNC: 1.6 MG/DL (ref 1.8–2.4)
MCH RBC QN AUTO: 27.9 PG (ref 26.1–32.9)
MCH RBC QN AUTO: 28 PG (ref 26.1–32.9)
MCHC RBC AUTO-ENTMCNC: 32.4 G/DL (ref 31.4–35)
MCHC RBC AUTO-ENTMCNC: 32.8 G/DL (ref 31.4–35)
MCV RBC AUTO: 84.9 FL (ref 82–102)
MCV RBC AUTO: 86.4 FL (ref 82–102)
MONOCYTES # BLD: 1.4 K/UL (ref 0.1–1.3)
MONOCYTES # BLD: 1.5 K/UL (ref 0.1–1.3)
MONOCYTES NFR BLD: 15 % (ref 4–12)
MONOCYTES NFR BLD: 16 % (ref 4–12)
NEUTS SEG # BLD: 5.6 K/UL (ref 1.7–8.2)
NEUTS SEG # BLD: 6 K/UL (ref 1.7–8.2)
NEUTS SEG NFR BLD: 59 % (ref 43–78)
NEUTS SEG NFR BLD: 60 % (ref 43–78)
NRBC # BLD: 0 K/UL (ref 0–0.2)
NRBC # BLD: 0 K/UL (ref 0–0.2)
PHOSPHATE SERPL-MCNC: 3.2 MG/DL (ref 2.5–4.5)
PLATELET # BLD AUTO: 204 K/UL (ref 150–450)
PLATELET # BLD AUTO: 207 K/UL (ref 150–450)
PMV BLD AUTO: 10.3 FL (ref 9.4–12.3)
PMV BLD AUTO: 10.6 FL (ref 9.4–12.3)
POTASSIUM SERPL-SCNC: 4.8 MMOL/L (ref 3.5–5.1)
RBC # BLD AUTO: 2.43 M/UL (ref 4.23–5.6)
RBC # BLD AUTO: 2.98 M/UL (ref 4.23–5.6)
SERVICE CMNT-IMP: ABNORMAL
SODIUM SERPL-SCNC: 134 MMOL/L (ref 133–143)
WBC # BLD AUTO: 9.5 K/UL (ref 4.3–11.1)
WBC # BLD AUTO: 9.8 K/UL (ref 4.3–11.1)

## 2022-12-28 PROCEDURE — 82962 GLUCOSE BLOOD TEST: CPT

## 2022-12-28 PROCEDURE — 36415 COLL VENOUS BLD VENIPUNCTURE: CPT

## 2022-12-28 PROCEDURE — 6370000000 HC RX 637 (ALT 250 FOR IP): Performed by: STUDENT IN AN ORGANIZED HEALTH CARE EDUCATION/TRAINING PROGRAM

## 2022-12-28 PROCEDURE — 84100 ASSAY OF PHOSPHORUS: CPT

## 2022-12-28 PROCEDURE — 2580000003 HC RX 258: Performed by: FAMILY MEDICINE

## 2022-12-28 PROCEDURE — 30233N1 TRANSFUSION OF NONAUTOLOGOUS RED BLOOD CELLS INTO PERIPHERAL VEIN, PERCUTANEOUS APPROACH: ICD-10-PCS | Performed by: INTERNAL MEDICINE

## 2022-12-28 PROCEDURE — 6370000000 HC RX 637 (ALT 250 FOR IP): Performed by: FAMILY MEDICINE

## 2022-12-28 PROCEDURE — 85025 COMPLETE CBC W/AUTO DIFF WBC: CPT

## 2022-12-28 PROCEDURE — 83735 ASSAY OF MAGNESIUM: CPT

## 2022-12-28 PROCEDURE — 6360000002 HC RX W HCPCS: Performed by: STUDENT IN AN ORGANIZED HEALTH CARE EDUCATION/TRAINING PROGRAM

## 2022-12-28 PROCEDURE — 6370000000 HC RX 637 (ALT 250 FOR IP): Performed by: HOSPITALIST

## 2022-12-28 PROCEDURE — P9016 RBC LEUKOCYTES REDUCED: HCPCS

## 2022-12-28 PROCEDURE — 80048 BASIC METABOLIC PNL TOTAL CA: CPT

## 2022-12-28 PROCEDURE — 36430 TRANSFUSION BLD/BLD COMPNT: CPT

## 2022-12-28 PROCEDURE — 1100000000 HC RM PRIVATE

## 2022-12-28 RX ORDER — SODIUM CHLORIDE 9 MG/ML
INJECTION, SOLUTION INTRAVENOUS PRN
Status: DISCONTINUED | OUTPATIENT
Start: 2022-12-28 | End: 2023-01-03 | Stop reason: HOSPADM

## 2022-12-28 RX ORDER — MAGNESIUM SULFATE 1 G/100ML
1000 INJECTION INTRAVENOUS ONCE
Status: COMPLETED | OUTPATIENT
Start: 2022-12-28 | End: 2022-12-28

## 2022-12-28 RX ADMIN — PANTOPRAZOLE SODIUM 40 MG: 40 TABLET, DELAYED RELEASE ORAL at 05:56

## 2022-12-28 RX ADMIN — FOLIC ACID 1 MG: 1 TABLET ORAL at 08:43

## 2022-12-28 RX ADMIN — SODIUM BICARBONATE 650 MG TABLET 650 MG: at 21:54

## 2022-12-28 RX ADMIN — CARVEDILOL 12.5 MG: 12.5 TABLET, FILM COATED ORAL at 16:28

## 2022-12-28 RX ADMIN — ACETAMINOPHEN 650 MG: 325 TABLET, FILM COATED ORAL at 16:27

## 2022-12-28 RX ADMIN — SODIUM BICARBONATE 650 MG TABLET 650 MG: at 16:37

## 2022-12-28 RX ADMIN — LOPERAMIDE HYDROCHLORIDE 2 MG: 2 CAPSULE ORAL at 16:28

## 2022-12-28 RX ADMIN — SODIUM BICARBONATE 650 MG TABLET 650 MG: at 12:49

## 2022-12-28 RX ADMIN — SODIUM CHLORIDE, PRESERVATIVE FREE 5 ML: 5 INJECTION INTRAVENOUS at 21:51

## 2022-12-28 RX ADMIN — LOPERAMIDE HYDROCHLORIDE 2 MG: 2 CAPSULE ORAL at 05:56

## 2022-12-28 RX ADMIN — INSULIN LISPRO 1 UNITS: 100 INJECTION, SOLUTION INTRAVENOUS; SUBCUTANEOUS at 16:36

## 2022-12-28 RX ADMIN — CHOLESTYRAMINE 4 G: 4 POWDER, FOR SUSPENSION ORAL at 08:43

## 2022-12-28 RX ADMIN — CYANOCOBALAMIN TAB 1000 MCG 1000 MCG: 1000 TAB at 08:43

## 2022-12-28 RX ADMIN — CHOLESTYRAMINE 4 G: 4 POWDER, FOR SUSPENSION ORAL at 21:54

## 2022-12-28 RX ADMIN — ZOLPIDEM TARTRATE 5 MG: 5 TABLET ORAL at 21:54

## 2022-12-28 RX ADMIN — SODIUM CHLORIDE, PRESERVATIVE FREE 10 ML: 5 INJECTION INTRAVENOUS at 07:26

## 2022-12-28 RX ADMIN — CARVEDILOL 12.5 MG: 12.5 TABLET, FILM COATED ORAL at 08:43

## 2022-12-28 RX ADMIN — SODIUM BICARBONATE 650 MG TABLET 650 MG: at 08:43

## 2022-12-28 RX ADMIN — MAGNESIUM SULFATE HEPTAHYDRATE 1000 MG: 1 INJECTION, SOLUTION INTRAVENOUS at 22:00

## 2022-12-28 ASSESSMENT — PAIN DESCRIPTION - LOCATION: LOCATION: GENERALIZED;BACK

## 2022-12-28 ASSESSMENT — PAIN DESCRIPTION - DESCRIPTORS: DESCRIPTORS: ACHING

## 2022-12-28 ASSESSMENT — PAIN DESCRIPTION - ORIENTATION: ORIENTATION: MID

## 2022-12-28 ASSESSMENT — PAIN - FUNCTIONAL ASSESSMENT: PAIN_FUNCTIONAL_ASSESSMENT: ACTIVITIES ARE NOT PREVENTED

## 2022-12-28 ASSESSMENT — PAIN SCALES - GENERAL
PAINLEVEL_OUTOF10: 3
PAINLEVEL_OUTOF10: 0
PAINLEVEL_OUTOF10: 0

## 2022-12-28 NOTE — PROGRESS NOTES
I have discussed with the patient. The rationale for blood component transfusion; its benefits in treating or preventing fatigue, organ damage, or death; and its risk which includes mild transfusion reactions, rare risk of blood borne infection, or more serious but rare reactions. I have discussed the alternatives to transfusion, including the risk and consequences of not receiving transfusion. The patient had an opportunity to ask questions and had agreed to proceed with transfusion of blood components.

## 2022-12-28 NOTE — PROGRESS NOTES
Hospitalist Progress Note   Admit Date:  2022  3:24 PM   Name:  Kevyn Etienne   Age:  65 y.o. Sex:  male  :  1965   MRN:  [id number]   Room:  725/01    Reason(s) for Admission: Septicemia ([address] [A41.9]  SIRS (systemic inflammatory response syndrome) (HCC) [R65.10]  Abdominal pain, unspecified abdominal location [R10.9]  [facility]:   Mr. Gurmeet Woods is a 64 y/o AAM with a h/o GI bleed who was admitted to our service on  with SIRS and poor oral intake. CT chest done for elevated d-dimer neg for PE. KUB showed non-specific bowel gas pattern. Flu and COVID negative. He was started on empiric broad spectrum antibiotics. CTAP 12/15 showed multiple dependent stones in GB with prob cirrhotic liver morphology and ascites. Blood cultures on admission grew CoNS and diphtheroids likely contamination. ID was consulted  and recommended given no signs of infectious peritonitis and blood cultures are likely contaminated that no abx indicated. Leukocytosis most likely due to partial SBO/ileus which has resolved. Recommended wound care on right leg which wounds appear to be chronic/venous stasis without signs of active infection. ID recommended to stop all antibiotics and signed off . There was also a concern for partial SBO on admission. NGT placed in ED for decompression. General surgery was consulted and felt his symptoms were likely due to large volume ascites. No surgical intervention recommended. NGT removed and diet advanced. GI consulted for ascites and new cirrhosis. He underwent paracentesis with IR on 12/15 with removal of 7.5L fluid. GI recommended that patient has cholelithiasis on imaging but recommended against surgical consideration as a surgical mortality would be high. If cholecystitis develops, consider cholecystostomy tube.  GI signed off  and patient should follow-up with his primary GI at [facility]. UA-and CK within normal limits   ultrasound of the kidney-increased renal echogenicity compatible with chronic medical renal disease      PT/OT recommended STR. Subjective/24hr Events (12/28/22): Patient stated he was having diarrhea 1 time yesterday. Imodium is helping. Elliott López He was eating well and drinking well. No chest pain or SOB. Assessment & Plan:   # MARS on CKD3a  - Baseline sCr mid 1s, Cr improved 1.7  Hold diuretics, encourage PO intake,compatible with chronic medical renal disease   Nephrology signed off    ABLA  No signs of active bleeding  Hb is 6.8   Giving one unit of PRBC  F/U Post transfusion PRBC   Monitor Hb q 12 hrs  Continue Protonix 40 mg IV twice daily  Monitor H&H daily    Chronic diarrhea   Continue Imodium  stool studies normal  and C diff negative    Mild hyperkalemia  Resolved. Holding Aldactone     Hypomagnesemia  Giving Mg 1 gr iv   Follow-up with magnesium     # Cirrhosis // large volume ascites   - Para 12/15 with 7.5L removed  S/p paracentesis of 6.2L on 12/27    - Diuretics held as above. GI s/o. Consulted IR for repeat paracentesis    Mild hyponatremia due to cirrhosis  Resolved    Metabolic acidosis   Continue bicarb tabs po    # Partial SBO   - Resolved. Previously seen by Surgery. NGT out several days ago. # Cholelithiasis   - No s/s of cholecystitis. Prior recs from GI were to consider cholecystostomy tube if that were to develop. # Alcohol abuse   - Cessation advised. Folic acid. # DM2 with neuropathy   - SSI. # HTN   - Increased Coreg. on 12/27    # GERD   - PPI    # Chronc LLE diabetic ulcer   - Con't wound care efforts. PT/OT recommended STR. Discharge Planning: Medically stable,  [employer] denied the request for [facility]. The pt does have the right to do a pt/family appeal.    Diet:  ADULT DIET; Regular; Low Sodium (2 gm);  Low Potassium (Less than 3000 mg/day)  DVT PPx: SCD, ambulation  Code status: Full Code    Hospital Problems             Last Modified POA    * (Principal) Cirrhosis of liver with ascites ([address] 12/16/2022 Yes    Alcohol dependence ([address] 12/14/2022 Yes    Diabetic peripheral neuropathy ([address] 12/14/2022 Yes    Essential hypertension (Chronic) 12/13/2022 Yes    Overview Signed 8/31/2022  8:40 PM by Glynn Hermosillo DO     Last Assessment & Plan:   Formatting of this note might be different from the original.  Still poor controlled. Continue avoid Cozaar due to acute kidney injury. Started on Norvasc, hydralazine when necessary, clonidine when necessary         Hyperlipidemia 12/13/2022 Yes    Type 2 diabetes mellitus ([address] 12/13/2022 Yes    Overview Signed 9/21/2022  1:48 PM by Laly Torres, ATC     Last Assessment & Plan:   Formatting of this note might be different from the original.  Continue Lantus, ISS         Diabetic ulcer of both feet associated with type 2 diabetes mellitus ([address] (Chronic) 12/16/2022 Yes    Acute kidney injury superimposed on CKD ([address] 12/15/2022 Yes    PAD (peripheral artery disease) ([address] 12/13/2022 Yes    Hypoalbuminemia (Chronic) 12/15/2022 Yes    Normocytic anemia 12/15/2022 Yes    Overview Signed 9/21/2022  1:48 PM by Laly Torres, ATC     Formatting of this note might be different from the original.  Added automatically from request for surgery [id number]  Formatting of this note might be different from the original.  Added automatically from request for surgery [id number]         SIRS (systemic inflammatory response syndrome) ([address] 12/16/2022 Yes    Abdominal pain 12/14/2022 Yes    Moderate protein malnutrition ([address] 12/15/2022 Yes    Cholelithiases 12/15/2022 Yes    Positive blood culture 12/15/2022 Yes    Partial small bowel obstruction ([address] 12/17/2022 Yes    Stage 3a chronic kidney disease ([address] (Chronic) 12/15/2022 Yes    Overview Signed 9/14/2022  9:02 PM by Chaya Wagner MD     With HTN and DM2            Objective:   Patient Vitals for the past 24 hrs:   Temp Pulse Resp BP SpO2   12/28/22 1531 98.4 °F (36.9 °C) 87 18 (!) 120/90 100 %   12/28/22 1452 98 °F (36.7 °C) 83 18 (!) 134/93 98 %   12/28/22 0719 97.7 °F (36.5 °C) 87 18 129/77 100 %   12/28/22 0344 98.1 °F (36.7 °C) 82 18 137/85 100 %   12/27/22 2314 97.9 °F (36.6 °C) 84 19 (!) 143/91 100 %   12/27/22 1927 -- 80 18 (!) 129/96 100 %       Estimated body mass index is 30.87 kg/m² as calculated from the following:    Height as of this encounter: 5' 11\" (1.803 m). Weight as of this encounter: 221 lb 4.8 oz (100.4 kg). Intake/Output Summary (Last 24 hours) at 12/28/2022 1712  Last data filed at 12/28/2022 1400  Gross per 24 hour   Intake 210 ml   Output --   Net 210 ml           Physical Exam:   Blood pressure (!) 120/90, pulse 87, temperature 98.4 °F (36.9 °C), temperature source Oral, resp. rate 18, height 5' 11\" (1.803 m), weight 221 lb 4.8 oz (100.4 kg), SpO2 100 %. General:    Well nourished. No overt distress. Appears older than stated age. Pleasant and conversant. Head:  Normocephalic, atraumatic  Eyes:  Sclerae appear normal. Pupils equally round. ENT:  Nares appear normal, no drainage. Moist oral mucosa  Neck:  No restricted ROM. Trachea midline. CV:   RRR. No m/r/g. No jugular venous distension. Lungs:   CTAB. No wheezing, rhonchi, or rales. Respirations even, unlabored. Abdomen: Bowel sounds present. Soft, nontender, mildly distended. 2+ b/l LE pitting edema. Skin:     No rashes and normal coloration. Warm and dry. Extremities Rt leg wound is present  Neuro:  CN II-XII grossly intact. Sensation intact. A&Ox3  Psych:  Normal mood and affect.       I have reviewed ordered lab tests and independently visualized imaging below:    Recent Labs:  Recent Results (from the past 48 hour(s))   POCT Glucose    Collection Time: 12/26/22  8:13 PM   Result Value Ref Range    POC Glucose 223 (H) 65 - 100 mg/dL    Performed by: Kinjal De Paz    Magnesium    Collection Time: 12/26/22  8:46 PM   Result Value Ref Range    Magnesium 1.6 (L) 1.8 - 2.4 mg/dL   Basic Metabolic Panel    Collection Time: 12/26/22  8:46 PM   Result Value Ref Range    Sodium 134 133 - 143 mmol/L    Potassium 5.4 (H) 3.5 - 5.1 mmol/L    Chloride 108 101 - 110 mmol/L    CO2 18 (L) 21 - 32 mmol/L    Anion Gap 8 2 - 11 mmol/L    Glucose 206 (H) 65 - 100 mg/dL    BUN 33 (H) 6 - 23 MG/DL    Creatinine 2.00 (H) 0.8 - 1.5 MG/DL    Est, Glom Filt Rate 38 (L) >60 ml/min/1.73m2    Calcium 7.5 (L) 8.3 - 10.4 MG/DL   Clostridium Difficile Toxin/Antigen    Collection Time: 12/26/22 11:42 PM    Specimen: Stool   Result Value Ref Range    GDH Antigen C. DIFFICILE GDH ANTIGEN-NEGATIVE      C difficile Toxin, EIA C. DIFFICILE TOXIN-NEGATIVE      Reflex NOT APPLICABLE      C Diff Toxin Interpretation NEGATIVE FOR TOXIGENIC C. DIFFICILE NTXCD      CLINICAL CONSIDERATION NEGATIVE FOR TOXIGENIC C. DIFFICILE     Culture, Stool    Collection Time: 12/27/22 12:00 AM    Specimen: Stool   Result Value Ref Range    Special Requests NO SPECIAL REQUESTS      Culture        NO GROWTH OF SALMONELLA OR SHIGELLA IS EVIDENT ON FIRST READING, FINAL REPORT TO FOLLOW AFTER FURTHER INCUBATION OF CULTURE   CBC with Auto Differential    Collection Time: 12/27/22  4:37 AM   Result Value Ref Range    WBC 10.9 4.3 - 11.1 K/uL    RBC 2.65 (L) 4.23 - 5.6 M/uL    Hemoglobin 7.3 (L) 13.6 - 17.2 g/dL    Hematocrit 22.6 (L) 41.1 - 50.3 %    MCV 85.3 82 - 102 FL    MCH 27.5 26.1 - 32.9 PG    MCHC 32.3 31.4 - 35.0 g/dL    RDW 15.4 (H) 11.9 - 14.6 %    Platelets 364 142 - 824 K/uL    MPV 10.7 9.4 - 12.3 FL    nRBC 0.00 0.0 - 0.2 K/uL    Differential Type AUTOMATED      Seg Neutrophils 56 43 - 78 %    Lymphocytes 27 13 - 44 %    Monocytes 14 (H) 4.0 - 12.0 %    Eosinophils % 2 0.5 - 7.8 %    Basophils 1 0.0 - 2.0 %    Immature Granulocytes 0 0.0 - 5.0 %    Segs Absolute 6.1 1.7 - 8.2 K/UL    Absolute Lymph # 2.9 0.5 - 4.6 K/UL    Absolute Mono # 1.5 (H) 0.1 - 1.3 K/UL    Absolute Eos # 0.2 0.0 - 0.8 K/UL    Basophils Absolute 0.1 0.0 - 0.2 K/UL    Absolute Immature Granulocyte 0.0 0.0 - 0.5 K/UL   Basic Metabolic Panel w/ Reflex to MG    Collection Time: 12/27/22  4:37 AM   Result Value Ref Range    Sodium 132 (L) 133 - 143 mmol/L    Potassium 5.0 3.5 - 5.1 mmol/L    Chloride 108 101 - 110 mmol/L    CO2 17 (L) 21 - 32 mmol/L    Anion Gap 7 2 - 11 mmol/L    Glucose 146 (H) 65 - 100 mg/dL    BUN 33 (H) 6 - 23 MG/DL    Creatinine 1.90 (H) 0.8 - 1.5 MG/DL    Est, Glom Filt Rate 41 (L) >60 ml/min/1.73m2    Calcium 8.0 (L) 8.3 - 10.4 MG/DL   Phosphorus    Collection Time: 12/27/22  4:37 AM   Result Value Ref Range    Phosphorus 3.7 2.5 - 4.5 MG/DL   POCT Glucose    Collection Time: 12/27/22  6:43 AM   Result Value Ref Range    POC Glucose 150 (H) 65 - 100 mg/dL    Performed by: Yelena Caicedo    POCT Glucose    Collection Time: 12/27/22 11:18 AM   Result Value Ref Range    POC Glucose 191 (H) 65 - 100 mg/dL    Performed by: Salomon    POCT Glucose    Collection Time: 12/27/22  3:29 PM   Result Value Ref Range    POC Glucose 240 (H) 65 - 100 mg/dL    Performed by: SheldonSonopiaylaPCT    POCT Glucose    Collection Time: 12/27/22  9:23 PM   Result Value Ref Range    POC Glucose 205 (H) 65 - 100 mg/dL    Performed by:  Melecio    PREPARE RBC (CROSSMATCH), 1 Units    Collection Time: 12/28/22  5:00 AM   Result Value Ref Range    History Check Historical check performed    CBC with Auto Differential    Collection Time: 12/28/22  5:04 AM   Result Value Ref Range    WBC 9.5 4.3 - 11.1 K/uL    RBC 2.43 (L) 4.23 - 5.6 M/uL    Hemoglobin 6.8 (LL) 13.6 - 17.2 g/dL    Hematocrit 21.0 (L) 41.1 - 50.3 %    MCV 86.4 82 - 102 FL    MCH 28.0 26.1 - 32.9 PG    MCHC 32.4 31.4 - 35.0 g/dL    RDW 15.2 (H) 11.9 - 14.6 %    Platelets 095 692 - 600 K/uL    MPV 10.6 9.4 - 12.3 FL    nRBC 0.00 0.0 - 0.2 K/uL    Differential Type AUTOMATED      Seg Neutrophils 59 43 - 78 %    Lymphocytes 22 13 - 44 %    Monocytes 16 (H) 4.0 - 12.0 %    Eosinophils % 3 0.5 - 7.8 % Basophils 1 0.0 - 2.0 %    Immature Granulocytes 0 0.0 - 5.0 %    Segs Absolute 5.6 1.7 - 8.2 K/UL    Absolute Lymph # 2.1 0.5 - 4.6 K/UL    Absolute Mono # 1.5 (H) 0.1 - 1.3 K/UL    Absolute Eos # 0.3 0.0 - 0.8 K/UL    Basophils Absolute 0.1 0.0 - 0.2 K/UL    Absolute Immature Granulocyte 0.0 0.0 - 0.5 K/UL   Basic Metabolic Panel w/ Reflex to MG    Collection Time: 12/28/22  5:04 AM   Result Value Ref Range    Sodium 134 133 - 143 mmol/L    Potassium 4.8 3.5 - 5.1 mmol/L    Chloride 110 101 - 110 mmol/L    CO2 17 (L) 21 - 32 mmol/L    Anion Gap 7 2 - 11 mmol/L    Glucose 145 (H) 65 - 100 mg/dL    BUN 27 (H) 6 - 23 MG/DL    Creatinine 1.70 (H) 0.8 - 1.5 MG/DL    Est, Glom Filt Rate 47 (L) >60 ml/min/1.73m2    Calcium 7.7 (L) 8.3 - 10.4 MG/DL   Phosphorus    Collection Time: 12/28/22  5:04 AM   Result Value Ref Range    Phosphorus 3.2 2.5 - 4.5 MG/DL   POCT Glucose    Collection Time: 12/28/22  6:27 AM   Result Value Ref Range    POC Glucose 177 (H) 65 - 100 mg/dL    Performed by: [facility]    POCT Glucose    Collection Time: 12/28/22 11:05 AM   Result Value Ref Range    POC Glucose 183 (H) 65 - 100 mg/dL    Performed by: [employer]    POCT Glucose    Collection Time: 12/28/22  3:53 PM   Result Value Ref Range    POC Glucose 240 (H) 65 - 100 mg/dL    Performed by: [facility]          Other Studies:  Transthoracic echocardiogram (TTE) complete with contrast, bubble, strain, and 3D PRN    Result Date: 12/22/2022    Left Ventricle: Normal left ventricular systolic function with a visually estimated EF of 65 - 70%. Left ventricle size is normal. Mildly increased wall thickness. Findings consistent with mild concentric hypertrophy. Normal wall motion. Normal diastolic function for age. Average E/e' ratio is 9.89. Aortic Valve: Moderate sclerosis of the aortic valve, right cusp-with no significant stenosis. Left Atrium: Left atrium is mildly dilated. Extracardiac: Left pleural effusion.        Current Meds:  Current Facility-Administered Medications   Medication Dose Route Frequency    0.9 % sodium chloride infusion   IntraVENous PRN    carvedilol (COREG) tablet 12.5 mg  12.5 mg Oral BID WC    loperamide (IMODIUM) capsule 2 mg  2 mg Oral 4x Daily PRN    sodium bicarbonate tablet 650 mg  650 mg Oral 4x Daily    [Held by provider] furosemide (LASIX) injection 40 mg  40 mg IntraVENous BID    [Held by provider] spironolactone (ALDACTONE) tablet 50 mg  50 mg Oral Daily    zolpidem (AMBIEN) tablet 5 mg  5 mg Oral Nightly PRN    pantoprazole (PROTONIX) tablet 40 mg  40 mg Oral QAM AC    diatrizoate meglumine-sodium (GASTROGRAFIN) 66-10 % solution 15 mL  15 mL Oral ONCE PRN    cholestyramine light packet 4 g  4 g Oral BID    folic acid (FOLVITE) tablet 1 mg  1 mg Oral Daily    vitamin B-12 (CYANOCOBALAMIN) tablet 1,000 mcg  1,000 mcg Oral Daily    insulin lispro (HUMALOG) injection vial 0-4 Units  0-4 Units SubCUTAneous TID WC    insulin lispro (HUMALOG) injection vial 0-4 Units  0-4 Units SubCUTAneous Nightly    glucose chewable tablet 16 g  4 tablet Oral PRN    dextrose bolus 10% 125 mL  125 mL IntraVENous PRN    Or    dextrose bolus 10% 250 mL  250 mL IntraVENous PRN    glucagon (rDNA) injection 1 mg  1 mg SubCUTAneous PRN    dextrose 10 % infusion   IntraVENous Continuous PRN    sodium chloride flush 0.9 % injection 5-40 mL  5-40 mL IntraVENous 2 times per day    sodium chloride flush 0.9 % injection 5-40 mL  5-40 mL IntraVENous PRN    0.9 % sodium chloride infusion   IntraVENous PRN    ondansetron (ZOFRAN-ODT) disintegrating tablet 4 mg  4 mg Oral Q8H PRN    Or    ondansetron (ZOFRAN) injection 4 mg  4 mg IntraVENous Q6H PRN    acetaminophen (TYLENOL) tablet 650 mg  650 mg Oral Q6H PRN    Or    acetaminophen (TYLENOL) suppository 650 mg  650 mg Rectal Q6H PRN    hydrALAZINE (APRESOLINE) injection 10 mg  10 mg IntraVENous Q6H PRN    morphine injection 1 mg  1 mg IntraVENous Q4H PRN       Signed:  Anand De Paz MD    Part of this note may have been written by using a voice dictation software. The note has been proof read but may still contain some grammatical/other typographical errors.

## 2022-12-28 NOTE — ADT AUTH CERT
Patient Demographics    Name Patient ID SSN Gender Identity Birth Date   May Cummings [id number]  Male [birth year] (57 yrs)     Address Phone Email Employer    [address] [facility] [address]  [phone] (Y)   [phone] (M) -- [address] [place] Occupation Emp Status    Desiere Presley / [address] -- Disabled      Reg Status PCP Date Last Verified Next Review Date    Verified None None 22      Admission Date Discharge Date Admitting Provider     22 -- Jaylene Render, DO       Marital Status Restorationism       None        Emergency Contact 1   [place] [address]  [address] [de-identified]  CVG Subscriber Name/Sex/Relation Subscriber  Subscriber Address/Phone Subscriber Emp/Emp Phone   1. [address] [place]   [id number] Rigoberto Hernandez - Male   (Self) 1965 [address] [facility],  [facility]   [phone](R) DISABLED    2.  Herve Shipley   [id number] Tosin Gallagher - Female   (Spouse) 1963 [address] [facility],  [facility]   [phone](U) OTHER      Utilization Reviews       Systemic or Infectious Condition GRG - Care Day 11 (2022) by Ruddy Burkett RN       Review Entered Review Status   2022 1556 Completed      Criteria Review      Care Day: 11 Care Date: 2022 Level of Care: Inpatient Floor    Guideline Day 3    Level Of Care    (X) * Activity level acceptable    Clinical Status    (X) * Hemodynamic stability    2022 3:56 PM EST by Malathi Zavala      /87, P 92    (X) * Respiratory status acceptable    2022 3:56 PM EST by Maria Elena Baird 20, 02 SAT 99% RA    (X) * Neurologic status acceptable    (X) * Temperature status acceptable    2022 3:56 PM EST by Malathi Zavala      97.3    (X) * No infection, or status acceptable    (X) * No neutropenia, or status acceptable    ( ) * Isolation not indicated, or is performable at next level of care    12/23/2022 3:56 PM EST by Laura Ch      MRSA, CONTACT    ( ) * Electrolyte status acceptable    12/23/2022 3:56 PM EST by Laura Ch      CA 7.7    ( ) * General Discharge Criteria met    Interventions    (X) * Intake acceptable    ( ) * No inpatient interventions needed    * Milestone   Additional Notes                  - No s/s of cholecystitis. Prior recs from GI were to consider cholecystostomy tube if that were to develop. # Alcohol abuse               - Cessation advised. Folic acid. # DM2 with neuropathy               - SSI. # HTN               - Coreg. # GERD               - PPI       # Chronc LLE diabetic ulcer               - Con't wound care efforts. Discharge Planning: Medically stable, 9th floor pending insurance approval.   Diet:  ADULT DIET; Regular; Low Sodium (2 gm)   ADULT ORAL NUTRITION SUPPLEMENT; Dinner; Low Calorie/High Protein Oral Supplement   DVT PPx: SCD, ambulation   Code status: Full Code       PT NOTE: agreeable to PT; requesting to use the BSC. Patient performed supine to sit with min assist and cues for technique. Sit to stand min assist with focus on correct technique and hand placement. Once standing patient transferred ot [facility] with min assist. Patient sat on BSC for BM then transferred to standing with min assist, initially losing his balance posteriorly with sit to stand requiring min assist to regain LOB. Patient demonstrated fair standing balance and tolerance during prolonged standing ADL activity. Patient then ambulated in hallway with rolling walker, min assist, chair follow for safety and cues for posture/gait sequencing. Patient took several seated rest breaks throughout ambulation due to BLE weakness and fatigue. Patient returned to recliner chair where he was positioned for comfort in recliner chair. Steady progress.  Pt continues to present as functioning below his baseline, with deficits in mobility including transfers, gait, balance, and activity tolerance. Pt will continue to benefit from skilled therapy services to address stated deficits to promote return to highest level of function, independence, and safety. Will continue PT efforts. PAULA NOTE: Renu required a peer to peer which was completed today by Dr. Tato Urrutia (physiatrist). Renu denied the request for [facility]. The pt does have the right to do a pt/family appeal.  CM met with pt and explained his options, I.e. family appeal or pursue STR in a SNF. Pt plans to initiate the family appeal today. CM provided him with the contact # [phone], and instructed him to call them asap today. He verbalized understanding and that he will call them today.           TYLENOL 650MG PO Q 6 HR PRN  X1, SAMEW SCHED  MEDS            Systemic or Infectious Condition GRG - Care Day 10 (12/22/2022) by Luisa Myers RN       Review Entered Review Status   12/23/2022 1548 Completed      Criteria Review      Care Day: 10 Care Date: 12/22/2022 Level of Care: Inpatient Floor    Guideline Day 3    Level Of Care    (X) * Activity level acceptable    Clinical Status    (X) * Hemodynamic stability    12/23/2022 3:48 PM EST by Elina Huston      /85, P 101-87,    (X) * Respiratory status acceptable    12/23/2022 3:48 PM EST by Debra Ibarra 18, 02 SAT 99% RA    (X) * Neurologic status acceptable    (X) * Temperature status acceptable    12/23/2022 3:48 PM EST by Applimationjoesph Ibarra 97.3    (X) * No infection, or status acceptable    (X) * No neutropenia, or status acceptable    ( ) * Isolation not indicated, or is performable at next level of care    12/23/2022 3:48 PM EST by Elina Huston      MRSA CONTACT    ( ) * Electrolyte status acceptable    12/23/2022 3:48 PM EST by Elina Huston      NOT CHECKED    ( ) * General Discharge Criteria met    Interventions    (X) * Intake acceptable    ( ) * No inpatient interventions needed    * Milestone   Additional Notes   Hospitalist Progress Note       Pt seen at bedside, resting in bedside recliner. Pt reports feeling lethargic and weak. At baseline, uses a walker to ambulate. Denies chest pain, nausea/vomiting, fever, chills, SOB, headache. Does complain of bilateral LE edema. EXAM:  General:          Chronically sick appearing, on RA, NAD     Head:               Normocephalic, atraumatic   Eyes:               Sclerae appear normal.  Pupils equally round. ENT:                Nares appear normal, no drainage. Moist oral mucosa   Neck:               No restricted ROM. Trachea midline    CV:                  RRR. No m/r/g. No jugular venous distension. Lungs:             CTAB. No wheezing, rhonchi, or rales. Symmetric expansion. Abdomen:        bowel sounds present. Soft, nontender. Nondistended. Extremities:     No cyanosis or clubbing. Venous stasis dermatitis on b/l LE's wit 2+ pitting edema. . Dressing c/d/I. Skin:                Warm and dry. Neuro:             GCS 15, CN intact, no motor deficits, generalized weakness noticed   Psych:             Normal mood and affect. POC GLUC 164-260        SIRS    Positive blood cultures-SBO/ileus which has resolved. Recommended wound care on right leg which wounds appear to be chronic/venous stasis without signs of active infection. ID recommended to stop all antibiotics and signed off 12/16. Abdominal pain, resolved      Partial SBO, resolved   Cirrhosis with ascites-12/22-   Check ECHO to rule out CHF. Cholelithiasis   LFTs within normal limits. CTAP not suggestive of acute cholecystitis   Supportive care for now. Pt high risk for surgery, GI recommended against this. If develop cholecystitis, consider cholecystotomy tube       Normocytic anemia   Hgb 8.5 on 12/15,hemoglobin 10.1 on admission (baseline 7-8).  Elevated on admission could be due to hemoconcentration with decrease po intake and also component of MARS   Iron studies c/w chronic disease Monitor CBC, transfuse if hgb <7 or if symptomatic       MARS on CKD stage 3   Creatinine 1.8 on 12/13 (baseline 1.2-1.4). Most likely due to nausea and vomiting   CTAP 12/15 shows no hydronephrosis   Avoid nephrotoxic meds   D/c IVF due to large amount of ascites   Encourage p.o. intake   D/c po sodium bicarb to limit sodium intake   Monitor BMP       Hypokalemia, resolved   Replace and recheck       Alcohol dependence   Patient denies tobacco or alcohol abuse and stated that he stopped after last discharge. Could be contributing factor to his cirrhosis   Cont folic acid   Monitor for signs/symptoms of withdrawals       Essential hypertension   Cont home Norvasc       Type 2 diabetes mellitus   Diabetic peripheral neuropathy   Cont SSI   mG7S--3.0   Hypoglycemic protocol in place   Diabetes management to assist       Chronic L leg diabetic ulcer   ID has seen, no signs of active infection   Wound team to assist--Needs referral to wound clinic at discharge   Recommend xeroform, foam and tiffanie 3 times per week. AT home would use hydrafera blue/ tiffanie 3 times per week. GERD   Cont PPI, change to po       Hypoalbuminemia   Mod protein-calorie malnutrition   Consulted nutrition         Anticipated discharge needs:       STR vs IRC pending at this point       Diet:  ADULT DIET; Regular; Low Sodium (2 gm)   ADULT ORAL NUTRITION SUPPLEMENT; Dinner; Low Calorie/High Protein Oral Supplement   DVT PPx: SCD's   Code status: Full Code       CM NOTE:  Patient debilitated from acute and chronic medical issues and would benefit from intensive post-acute skilled therapyPatient will require ongoing daily monitoring of volume status, electrolytes, leukocytosis, anemia, blood pressure, blood sugar and L LE wounds   9th Floor IR has initiated insurance authorization with [employer]. [employer] Medicare will require an automatic peer to peer for [facility] level of care.          CM will continue to follow for ongoing discharge planning.           NORVASC PO DC'D, COREG 6.25MG PO BID, FOLVITE 1MG PO QD, LASIX 40MG IV BID, SSI SC TID-1U X1, PROTONIX 40MG PO QD, ALDACTONE 50MG PO QD, VIT B 12 1000 MCG PO QD,

## 2022-12-28 NOTE — PROGRESS NOTES
PT Daily Note:  HOLD PT due to hemoglobin of 6.8. Patient is awaiting blood transfusion. Will check back on patient at a later date/time if schedule permits. Thank you,  Bere León, PTA

## 2022-12-28 NOTE — CARE COORDINATION
CM has continued to follow to assist with discharge planning. [facility] admissions liaison, Kellie Vences, is following up with [facility] [place] the family/patient appeal.  Noted that patient's hgb. Is 6.8 today and therapy is on hold at this time.       CM will continue to follow for ongoing discharge planning

## 2022-12-28 NOTE — FLOWSHEET NOTE
12/28/22 0548   Treatment Team Notification   Reason for Communication Critical results   Type of Critical Result Laboratory   Critical Lab Result Type Hemoglobin and hematocrit   Team Member Name Dr. Dorie Montemayor Attending Provider   Method of Communication Secure Message   Response See orders  (unit of PRBC)   Notification Time [id number]     Hgb 6.8. Dr. Aneta Jean ordered 1 unit of PRBC.

## 2022-12-29 LAB
ABO + RH BLD: NORMAL
ANION GAP SERPL CALC-SCNC: 7 MMOL/L (ref 2–11)
BACTERIA SPEC CULT: NORMAL
BASOPHILS # BLD: 0.1 K/UL (ref 0–0.2)
BASOPHILS NFR BLD: 1 % (ref 0–2)
BLD PROD TYP BPU: NORMAL
BLOOD BANK DISPENSE STATUS: NORMAL
BLOOD GROUP ANTIBODIES SERPL: NORMAL
BPU ID: NORMAL
BUN SERPL-MCNC: 23 MG/DL (ref 6–23)
CALCIUM SERPL-MCNC: 8.2 MG/DL (ref 8.3–10.4)
CHLORIDE SERPL-SCNC: 109 MMOL/L (ref 101–110)
CO2 SERPL-SCNC: 18 MMOL/L (ref 21–32)
CREAT SERPL-MCNC: 1.6 MG/DL (ref 0.8–1.5)
CROSSMATCH RESULT: NORMAL
DIFFERENTIAL METHOD BLD: ABNORMAL
EOSINOPHIL # BLD: 0.3 K/UL (ref 0–0.8)
EOSINOPHIL NFR BLD: 2 % (ref 0.5–7.8)
ERYTHROCYTE [DISTWIDTH] IN BLOOD BY AUTOMATED COUNT: 14.7 % (ref 11.9–14.6)
GLUCOSE BLD STRIP.AUTO-MCNC: 117 MG/DL (ref 65–100)
GLUCOSE BLD STRIP.AUTO-MCNC: 166 MG/DL (ref 65–100)
GLUCOSE BLD STRIP.AUTO-MCNC: 206 MG/DL (ref 65–100)
GLUCOSE BLD STRIP.AUTO-MCNC: 245 MG/DL (ref 65–100)
GLUCOSE SERPL-MCNC: 121 MG/DL (ref 65–100)
HCT VFR BLD AUTO: 27.2 % (ref 41.1–50.3)
HGB BLD-MCNC: 9.2 G/DL (ref 13.6–17.2)
HGB BLD-MCNC: 9.2 G/DL (ref 13.6–17.2)
IMM GRANULOCYTES # BLD AUTO: 0.1 K/UL (ref 0–0.5)
IMM GRANULOCYTES NFR BLD AUTO: 0 % (ref 0–5)
LYMPHOCYTES # BLD: 3.1 K/UL (ref 0.5–4.6)
LYMPHOCYTES NFR BLD: 28 % (ref 13–44)
MCH RBC QN AUTO: 28 PG (ref 26.1–32.9)
MCHC RBC AUTO-ENTMCNC: 33.8 G/DL (ref 31.4–35)
MCV RBC AUTO: 82.7 FL (ref 82–102)
MONOCYTES # BLD: 1.7 K/UL (ref 0.1–1.3)
MONOCYTES NFR BLD: 15 % (ref 4–12)
NEUTS SEG # BLD: 6.1 K/UL (ref 1.7–8.2)
NEUTS SEG NFR BLD: 54 % (ref 43–78)
NRBC # BLD: 0 K/UL (ref 0–0.2)
PHOSPHATE SERPL-MCNC: 3 MG/DL (ref 2.5–4.5)
PLATELET # BLD AUTO: 232 K/UL (ref 150–450)
PMV BLD AUTO: 10.6 FL (ref 9.4–12.3)
POTASSIUM SERPL-SCNC: 5 MMOL/L (ref 3.5–5.1)
RBC # BLD AUTO: 3.29 M/UL (ref 4.23–5.6)
SERVICE CMNT-IMP: ABNORMAL
SERVICE CMNT-IMP: NORMAL
SODIUM SERPL-SCNC: 134 MMOL/L (ref 133–143)
SPECIMEN EXP DATE BLD: NORMAL
UNIT DIVISION: 0
WBC # BLD AUTO: 11.3 K/UL (ref 4.3–11.1)

## 2022-12-29 PROCEDURE — 80048 BASIC METABOLIC PNL TOTAL CA: CPT

## 2022-12-29 PROCEDURE — 2580000003 HC RX 258: Performed by: FAMILY MEDICINE

## 2022-12-29 PROCEDURE — 97535 SELF CARE MNGMENT TRAINING: CPT

## 2022-12-29 PROCEDURE — 1100000000 HC RM PRIVATE

## 2022-12-29 PROCEDURE — 36415 COLL VENOUS BLD VENIPUNCTURE: CPT

## 2022-12-29 PROCEDURE — 84100 ASSAY OF PHOSPHORUS: CPT

## 2022-12-29 PROCEDURE — 85018 HEMOGLOBIN: CPT

## 2022-12-29 PROCEDURE — 6370000000 HC RX 637 (ALT 250 FOR IP): Performed by: HOSPITALIST

## 2022-12-29 PROCEDURE — 6370000000 HC RX 637 (ALT 250 FOR IP): Performed by: STUDENT IN AN ORGANIZED HEALTH CARE EDUCATION/TRAINING PROGRAM

## 2022-12-29 PROCEDURE — 97530 THERAPEUTIC ACTIVITIES: CPT

## 2022-12-29 PROCEDURE — 82962 GLUCOSE BLOOD TEST: CPT

## 2022-12-29 PROCEDURE — 6370000000 HC RX 637 (ALT 250 FOR IP): Performed by: FAMILY MEDICINE

## 2022-12-29 PROCEDURE — 85025 COMPLETE CBC W/AUTO DIFF WBC: CPT

## 2022-12-29 RX ORDER — CARVEDILOL 12.5 MG/1
25 TABLET ORAL 2 TIMES DAILY WITH MEALS
Status: DISCONTINUED | OUTPATIENT
Start: 2022-12-29 | End: 2022-12-29

## 2022-12-29 RX ORDER — CARVEDILOL 12.5 MG/1
25 TABLET ORAL 2 TIMES DAILY WITH MEALS
Status: DISCONTINUED | OUTPATIENT
Start: 2022-12-29 | End: 2023-01-03 | Stop reason: HOSPADM

## 2022-12-29 RX ADMIN — SODIUM CHLORIDE, PRESERVATIVE FREE 10 ML: 5 INJECTION INTRAVENOUS at 20:09

## 2022-12-29 RX ADMIN — PANTOPRAZOLE SODIUM 40 MG: 40 TABLET, DELAYED RELEASE ORAL at 06:08

## 2022-12-29 RX ADMIN — ZOLPIDEM TARTRATE 5 MG: 5 TABLET ORAL at 20:09

## 2022-12-29 RX ADMIN — CARVEDILOL 25 MG: 12.5 TABLET, FILM COATED ORAL at 17:01

## 2022-12-29 RX ADMIN — SODIUM BICARBONATE 650 MG TABLET 650 MG: at 12:58

## 2022-12-29 RX ADMIN — SODIUM BICARBONATE 650 MG TABLET 650 MG: at 20:09

## 2022-12-29 RX ADMIN — FOLIC ACID 1 MG: 1 TABLET ORAL at 09:06

## 2022-12-29 RX ADMIN — CYANOCOBALAMIN TAB 1000 MCG 1000 MCG: 1000 TAB at 09:06

## 2022-12-29 RX ADMIN — CHOLESTYRAMINE 4 G: 4 POWDER, FOR SUSPENSION ORAL at 20:08

## 2022-12-29 RX ADMIN — CARVEDILOL 25 MG: 12.5 TABLET, FILM COATED ORAL at 10:00

## 2022-12-29 RX ADMIN — INSULIN LISPRO 1 UNITS: 100 INJECTION, SOLUTION INTRAVENOUS; SUBCUTANEOUS at 11:31

## 2022-12-29 RX ADMIN — SODIUM BICARBONATE 650 MG TABLET 650 MG: at 09:06

## 2022-12-29 RX ADMIN — INSULIN LISPRO 1 UNITS: 100 INJECTION, SOLUTION INTRAVENOUS; SUBCUTANEOUS at 17:05

## 2022-12-29 RX ADMIN — CHOLESTYRAMINE 4 G: 4 POWDER, FOR SUSPENSION ORAL at 09:06

## 2022-12-29 RX ADMIN — SODIUM BICARBONATE 650 MG TABLET 650 MG: at 17:01

## 2022-12-29 RX ADMIN — SODIUM CHLORIDE, PRESERVATIVE FREE 5 ML: 5 INJECTION INTRAVENOUS at 09:08

## 2022-12-29 NOTE — PROGRESS NOTES
ACUTE OCCUPATIONAL THERAPY GOALS:   (Developed with and agreed upon by patient and/or caregiver.)  1. Glorious Duty will be modified independent with functional mobility for ADL in room within 4 - 7 visits. 2. Glorious Duty will be modified independent with total body bathing and dressing within 4 - 7 visits. 3. Glorious Duty will state and demonstrate at least 5 energy conservation techniques during ADL/therapeutic activities within 4 - 7 visits. 4. Glorious Duty will voice a plan for appropriate home modifications for home safety and fall prevention within 7 visits. 5. Glorious Duty will participate at least 30 minutes of ADL with 3 or less rest breaks within 7 visits. 6. Glorious Duty will complete a tub transfer with modified independence within 7 visits. OCCUPATIONAL THERAPY: Daily Note AM   OT Visit Days: 4   Time In/Out  OT Charge Capture  Rehab Caseload Tracker  OT Orders    Daniel Barrera is a 65 y.o. male   PRIMARY DIAGNOSIS: Cirrhosis of liver with ascites ([address]  Septicemia ([address] [A41.9]  SIRS (systemic inflammatory response syndrome) (HCC) [R65.10]  Abdominal pain, unspecified abdominal location [R10.9]  Swelling of lower extremity [M79.89]       Inpatient: Payor: Kota Painter / Plan: Kingsley Bhatt / Product Type: *No Product type* /     ASSESSMENT:     REHAB RECOMMENDATIONS: CURRENT LEVEL OF FUNCTION:  (Most Recently Demonstrated)   Recommendation to date pending progress:  Setting:  Short-term Rehab    Equipment:    To Be Determined Bathing:  Minimal Assist  Dressing: Moderate Assist  Feeding/Grooming:  Supervision/Setup  Toileting:  Not Tested  Functional Mobility:  Contact Guard Assist RW     ASSESSMENT:  Mr. Levi Casper present sitting in chair upon arrival. Pt completed functional mobility with CGA using RW. Pt stood at sink and completed grooming with set up. Pt sat in chair and completed ADL with the assistance listed below.    Pt is progressing towards goals. Continue POC.         SUBJECTIVE:     Mr. Ivory Renee states, \"We can\"     Social/Functional Lives With: Spouse  Type of Home: House  Home Layout: One level  Home Access: Stairs to enter with rails  Entrance Stairs - Number of Steps: 2  Home Equipment: Druscilla Covert, [address] Help From: Family  ADL Assistance: Independent  Homemaking Assistance: Needs assistance  Homemaking Responsibilities: Yes  Ambulation Assistance: Independent  Transfer Assistance: Independent    OBJECTIVE:     Pedro Ferreira / Lynette Bird / AIRWAY: Telemetry     RESTRICTIONS/PRECAUTIONS:  Restrictions/Precautions  Restrictions/Precautions: Fall Risk        PAIN: VITALS / O2:   Pre Treatment: 0           Post Treatment: None Vitals          Oxygen        MOBILITY: I Mod I S SBA CGA Min Mod Max Total  NT x2 Comments:   Bed Mobility    Rolling [] [] [] [] [] [] [] [] [] [x] []    Supine to Sit [] [] [] [] [] [] [] [] [] [x] []    Scooting [] [] [] [] [] [] [] [] [] [x] []    Sit to Supine [] [] [] [] [] [] [] [] [] [x] []    Transfers    Sit to Stand [] [] [] [] [x] [] [] [] [] [] []    Bed to Chair [] [] [] [] [] [] [] [] [] [] []    Stand to Sit [] [] [] [] [x] [] [] [] [] [] []    Tub/Shower [] [] [] [] [] [] [] [] [] [x] []     Toilet [] [] [] [] [] [] [] [] [] [x] []      [] [] [] [] [] [] [] [] [] [] []    I=Independent, Mod I=Modified Independent, S=Supervision/Setup, SBA=Standby Assistance, CGA=Contact Guard Assistance, Min=Minimal Assistance, Mod=Moderate Assistance, Max=Maximal Assistance, Total=Total Assistance, NT=Not Tested    ACTIVITIES OF DAILY LIVING: I Mod I S SBA CGA Min Mod Max Total NT Comments   BASIC ADLs:              Upper Body   Bathing [] [] [x] [] [] [] [] [] [] []    Lower Body Bathing [] [] [] [] [] [x] [] [] [] []    Toileting [] [] [] [] [] [] [] [] [] [x]    Upper Body Dressing [] [] [x] [] [] [] [] [] [] []    Lower Body Dressing [] [] [] [] [] [] [x] [] [] [] socks   Feeding [] [] [] [] [] [] [] [] [] [x] Grooming [] [] [x] [] [] [] [] [] [] [] Brushing teeth, washing face    Personal Device Care [] [] [] [] [] [] [] [] [] [x]    Functional Mobility [] [] [] [] [x] [] [] [] [] []    I=Independent, Mod I=Modified Independent, S=Supervision/Setup, SBA=Standby Assistance, CGA=Contact Guard Assistance, Min=Minimal Assistance, Mod=Moderate Assistance, Max=Maximal Assistance, Total=Total Assistance, NT=Not Tested    BALANCE: Good Fair+ Fair Fair- Poor NT Comments   Sitting Static [x] [] [] [] [] []    Sitting Dynamic [x] [] [] [] [] []              Standing Static [] [x] [] [] [] []    Standing Dynamic [] [] [x] [] [] []        PLAN:     FREQUENCY/DURATION   OT Plan of Care: 3 times/week for duration of hospital stay or until stated goals are met, whichever comes first.    TREATMENT:     TREATMENT:   Self Care (29 minutes): Patient participated in upper body bathing, lower body bathing, upper body dressing, lower body dressing, and grooming ADLs in unsupported sitting and standing with moderate verbal and manual cueing to increase independence and decrease assistance required. Patient also participated in functional transfer and energy conservation training to increase independence and decrease assistance required.      TREATMENT GRID:  N/A    AFTER TREATMENT PRECAUTIONS: Bed/Chair Locked, Call light within reach, Chair, Needs within reach, RN notified, and Visitors at bedside    INTERDISCIPLINARY COLLABORATION:  RN/ PCT, PT/ PTA, and OT/ HAYDEN    EDUCATION:       TOTAL TREATMENT DURATION AND TIME:  Time In: 1345  Time Out: [address]  Minutes: [address]

## 2022-12-29 NOTE — PROGRESS NOTES
ACUTE PHYSICAL THERAPY GOALS:   (Developed with and agreed upon by patient and/or caregiver.)  Pt will perform supine to/from sit with mod I in 7 treatment days. Pt will perform sit to/from stand with mod I and LRAD in 7 treatment days. Pt will ambulate 150 ft with mod I and LRAD in 7 treatment days. Pt will negotiate 2 stairs with mod I and rail in 7 treatment days. Pt will be independent with HEP in 7 days. PHYSICAL THERAPY: Daily Note AM   (Link to Caseload Tracking: PT Visit Days : 6  Time In/Out PT Charge Capture  Rehab Caseload Tracker  Orders    Ana Velásquez is a 65 y.o. male   PRIMARY DIAGNOSIS: Cirrhosis of liver with ascites [address] [address]  Septicemia ([facility] [address] [A41.9]  SIRS (systemic inflammatory response syndrome) (HCC) [R65.10]  Abdominal pain, unspecified abdominal location [R10.9]  Swelling of lower extremity [M79.89]       Inpatient: Payor: [facility] / Plan: Yanet Thacker / Product Type: *No Product type* /     ASSESSMENT:     REHAB RECOMMENDATIONS:   Recommendation to date pending progress:  Setting:  Short-term Rehab    Equipment:    To Be Determined     ASSESSMENT:  Mr. Javier Arms was sitting up in recliner chair upon contact and agreeable to PT. Patient transferred to standing with min assist and cues for hand placement. Once standing patient ambulated in hallway with rolling walker, CGA-min assist, chair follow for safety and cues for posture/gait sequencing. Patient took several rest breaks throughout ambulation due to BLE weakness and fatigue. Forward flexed posture noted especially once fatigued requiring cues to improve. Patient returned to recliner chair where he was positioned for comfort. Reviewed LE exercises and encouraged patient to perform throughout the day. Steady progress. Pt continues to present as functioning below his baseline, with deficits in mobility including transfers, gait, balance, and activity tolerance.  Pt will continue to benefit from skilled therapy services to address stated deficits to promote return to highest level of function, independence, and safety. Will continue PT efforts.        SUBJECTIVE:   Mr. Fercho Silveira states, \"I just found out my first cousin  this morning\"     Social/Functional Lives With: Spouse  Type of Home: House  Home Layout: One level  Home Access: Stairs to enter with rails  Entrance Stairs - Number of Steps: 2  Home Equipment: [employer], [address] Help From: Family  ADL Assistance: Independent  Homemaking Assistance: Needs assistance  Homemaking Responsibilities: Yes  Ambulation Assistance: Independent  Transfer Assistance: Independent  OBJECTIVE:     PAIN: [employer] / O2: [employer] / Anatolyn Cornea / Miguel Dunk:   Pre Treatment: 0  Pain Assessment: None - Denies Pain      Post Treatment: 0 Vitals        Oxygen    None    RESTRICTIONS/PRECAUTIONS:  Restrictions/Precautions  Restrictions/Precautions: Fall Risk  Restrictions/Precautions: Fall Risk     MOBILITY: I Mod I S SBA CGA Min Mod Max Total  NT x2 Comments:   Bed Mobility    Rolling [] [] [] [] [] [] [] [] [] [] []    Supine to Sit [] [] [] [] [] [] [] [] [] [] []    Scooting [] [] [] [] [] [] [] [] [] [] []    Sit to Supine [] [] [] [] [] [] [] [] [] [] []    Transfers    Sit to Stand [] [] [] [] [] [x] [] [] [] [] []    Bed to Chair [] [] [] [] [] [] [] [] [] [] []    Stand to Sit [] [] [] [] [] [x] [] [] [] [] []     [] [] [] [] [] [] [] [] [] [] []    I=Independent, Mod I=Modified Independent, S=Supervision, SBA=Standby Assistance, CGA=Contact Guard Assistance,   Min=Minimal Assistance, Mod=Moderate Assistance, Max=Maximal Assistance, Total=Total Assistance, NT=Not Tested    BALANCE: Good Fair+ Fair Fair- Poor NT Comments   Sitting Static [x] [] [] [] [] []    Sitting Dynamic [] [x] [] [] [] []              Standing Static [] [] [x] [] [] []    Standing Dynamic [] [] [] [x] [] []      GAIT: I Mod I S SBA CGA Min Mod Max Total  NT x2 Comments:   Level of Assistance [] [] [] [] [] [x] [] [] [] [] [] Chair follow   Distance   Ambulation in hallway    DME Gait Belt and Rolling Walker    Gait Quality Decreased jason , Decreased step clearance, Decreased step length, and Decreased stance    Weightbearing Status      Stairs      I=Independent, Mod I=Modified Independent, S=Supervision, SBA=Standby Assistance, CGA=Contact Guard Assistance,   Min=Minimal Assistance, Mod=Moderate Assistance, Max=Maximal Assistance, Total=Total Assistance, NT=Not Tested    PLAN:   [address] [place] [address] Road: 3 times/week for duration of hospital stay or until stated goals are met, whichever comes first.    TREATMENT:   TREATMENT:   Therapeutic Activity (23 Minutes): Therapeutic activity included Scooting, Transfer Training, Ambulation on level ground, Sitting balance , Standing balance, and BLE exercises to improve functional Activity tolerance, Balance, Coordination, Mobility, Strength, and ROM. TREATMENT GRID:  N/A    AFTER TREATMENT PRECAUTIONS: Bed/Chair Locked, Call light within reach, Chair, Needs within reach, and RN notified    INTERDISCIPLINARY COLLABORATION:  RN/ PCT and PT/ PTA    EDUCATION:      TIME IN/OUT:  Time In: [facility]  Time Out: [address]  Minutes: 8[facility].  DICK León

## 2022-12-29 NOTE — PROGRESS NOTES
Hospitalist Progress Note   Admit Date:  2022  3:24 PM   Name:  Juan Basilio   Age:  65 y.o. Sex:  male  :  1965   MRN:  [id number]   Room:  725/    Reason(s) for Admission: Septicemia ([address] [A41.9]  SIRS (systemic inflammatory response syndrome) (HCC) [R65.10]  Abdominal pain, unspecified abdominal location [R10.9]  Swelling of lower extremity [facility] Course & Interval History:   Mr. Luiz Thornton is a 64 y/o AAM with a h/o GI bleed who was admitted to our service on  with SIRS and poor oral intake. CT chest done for elevated d-dimer neg for PE. KUB showed non-specific bowel gas pattern. Flu and COVID negative. He was started on empiric broad spectrum antibiotics. CTAP 12/15 showed multiple dependent stones in GB with prob cirrhotic liver morphology and ascites. Blood cultures on admission grew CoNS and diphtheroids likely contamination. ID was consulted  and recommended given no signs of infectious peritonitis and blood cultures are likely contaminated that no abx indicated. Leukocytosis most likely due to partial SBO/ileus which has resolved. Recommended wound care on right leg which wounds appear to be chronic/venous stasis without signs of active infection. ID recommended to stop all antibiotics and signed off . There was also a concern for partial SBO on admission. NGT placed in ED for decompression. General surgery was consulted and felt his symptoms were likely due to large volume ascites. No surgical intervention recommended. NGT removed and diet advanced. GI consulted for ascites and new cirrhosis. He underwent paracentesis with IR on 12/15 with removal of 7.5L fluid. GI recommended that patient has cholelithiasis on imaging but recommended against surgical consideration as a surgical mortality would be high. If cholecystitis develops, consider cholecystostomy tube.  GI signed off  and patient should follow-up with his primary GI at [facility]. UA-and CK within normal limits   ultrasound of the kidney-increased renal echogenicity compatible with chronic medical renal disease      PT/OT recommended STR. Subjective/24hr Events (22): Patient stated he was having diarrhea 2 times yesterday. He was tearful and emotional as his cousin . He was eating well and drinking well. No chest pain or SOB. Assessment & Plan:   # MARS on CKD3a  - Baseline sCr mid 1s, Cr improved 1.6  Hold diuretics, encourage PO intake,compatible with chronic medical renal disease   Nephrology signed off    ABLA  No signs of active bleeding  Hb is 6.8 and s/p PRBC  Hb stable around 8-9  Continue Protonix 40 mg IV twice daily  Monitor H&H daily    Chronic diarrhea   Continue Imodium  stool studies normal  and C diff negative    Mild hyperkalemia  Resolved. Holding Aldactone     Hypomagnesemia  Follow-up with magnesium     # Cirrhosis // large volume ascites   - Para 12/15 with 7.5L removed  S/p paracentesis of 6.2L on     - Diuretics held as above. GI s/o. Mild hyponatremia due to cirrhosis  Resolved    Metabolic acidosis   Continue bicarb tabs po    # Partial SBO   - Resolved. Previously seen by Surgery. NGT out several days ago. # Cholelithiasis   - No s/s of cholecystitis. Prior recs from GI were to consider cholecystostomy tube if that were to develop. # Alcohol abuse   - Cessation advised. Folic acid. # DM2 with neuropathy   - SSI. # HTN   - Increased Coreg. on   # GERD   - PPI    # Chronc LLE diabetic ulcer   - Con't wound care efforts. PT/OT recommended STR. Discharge Planning: Medically stable,  [employer] denied the request for [facility]. The pt does have the right to do a pt/family appeal. Pending STR     Diet:  ADULT DIET; Regular; Low Sodium (2 gm);  Low Potassium (Less than 3000 mg/day)  DVT PPx: SCD, ambulation  Code status: Full Code    Hospital Problems             Last Modified POA    * (Principal) Cirrhosis of liver with ascites ([address] 12/16/2022 Yes    Alcohol dependence ([address] 12/14/2022 Yes    Diabetic peripheral neuropathy ([address] 12/14/2022 Yes    Essential hypertension (Chronic) 12/13/2022 Yes    Overview Signed 8/31/2022  8:40 PM by Noemi Smith DO     Last Assessment & Plan:   Formatting of this note might be different from the original.  Still poor controlled. Continue avoid Cozaar due to acute kidney injury. Started on Norvasc, hydralazine when necessary, clonidine when necessary         Hyperlipidemia 12/13/2022 Yes    Type 2 diabetes mellitus ([address] 12/13/2022 Yes    Overview Signed 9/21/2022  1:48 PM by Lisa Griffith ATC     Last Assessment & Plan:   Formatting of this note might be different from the original.  Continue Lantus, ISS         Diabetic ulcer of both feet associated with type 2 diabetes mellitus ([address] (Chronic) 12/16/2022 Yes    Acute kidney injury superimposed on CKD ([address] 12/15/2022 Yes    PAD (peripheral artery disease) ([address] 12/13/2022 Yes    Hypoalbuminemia (Chronic) 12/15/2022 Yes    Normocytic anemia 12/15/2022 Yes    Overview Signed 9/21/2022  1:48 PM by Lisa Griffith ATC     Formatting of this note might be different from the original.  Added automatically from request for surgery [id number]  Formatting of this note might be different from the original.  Added automatically from request for surgery [id number]         SIRS (systemic inflammatory response syndrome) ([address] 12/16/2022 Yes    Abdominal pain 12/14/2022 Yes    Moderate protein malnutrition ([address] 12/15/2022 Yes    Cholelithiases 12/15/2022 Yes    Positive blood culture 12/15/2022 Yes    Partial small bowel obstruction ([address] 12/17/2022 Yes    Stage 3a chronic kidney disease ([address] (Chronic) 12/15/2022 Yes    Overview Signed 9/14/2022  9:02 PM by Mario Bryant MD     With HTN and DM2            Objective:   Patient Vitals for the past 24 hrs:   Temp Pulse Resp BP SpO2   12/29/22 1628 98.1 °F (36.7 °C) 80 19 (!) 138/92 100 %   12/29/22 1051 97.5 °F (36.4 °C) 89 18 110/82 100 %   12/29/22 1000 -- 90 -- -- --   12/29/22 0815 97.7 °F (36.5 °C) 92 19 (!) 145/104 100 %   12/29/22 0326 -- 81 -- (!) 156/98 100 %   12/29/22 0324 97.3 °F (36.3 °C) 81 19 (!) 148/105 100 %   12/29/22 0011 98.2 °F (36.8 °C) 81 19 (!) 153/97 99 %   12/28/22 1907 98.2 °F (36.8 °C) 79 19 120/75 99 %       Estimated body mass index is 30.87 kg/m² as calculated from the following:    Height as of this encounter: 5' 11\" (1.803 m). Weight as of this encounter: 221 lb 4.8 oz (100.4 kg). Intake/Output Summary (Last 24 hours) at 12/29/2022 1642  Last data filed at 12/29/2022 0823  Gross per 24 hour   Intake 790 ml   Output --   Net 790 ml           Physical Exam:   Blood pressure (!) 138/92, pulse 80, temperature 98.1 °F (36.7 °C), temperature source Oral, resp. rate 19, height 5' 11\" (1.803 m), weight 221 lb 4.8 oz (100.4 kg), SpO2 100 %. General:    Well nourished. No overt distress. Appears older than stated age. Pleasant and conversant. Head:  Normocephalic, atraumatic  Eyes:  Sclerae appear normal. Pupils equally round. ENT:  Nares appear normal, no drainage. Moist oral mucosa  Neck:  No restricted ROM. Trachea midline. CV:   RRR. No m/r/g. No jugular venous distension. Lungs:   CTAB. No wheezing, rhonchi, or rales. Respirations even, unlabored. Abdomen: Bowel sounds present. Soft, nontender, mildly distended. 2+ b/l LE pitting edema. Skin:     No rashes and normal coloration. Warm and dry. Extremities Rt leg wound is present  Neuro:  CN II-XII grossly intact. Sensation intact. A&Ox3  Psych:  Normal mood and affect. I have reviewed ordered lab tests and independently visualized imaging below:    Recent Labs:  Recent Results (from the past 48 hour(s))   POCT Glucose    Collection Time: 12/27/22  9:23 PM   Result Value Ref Range    POC Glucose 205 (H) 65 - 100 mg/dL    Performed by:  Melecio    PREPARE RBC (CROSSMATCH), 1 Units    Collection Time: 12/28/22  5:00 AM Result Value Ref Range    History Check Historical check performed    CBC with Auto Differential    Collection Time: 12/28/22  5:04 AM   Result Value Ref Range    WBC 9.5 4.3 - 11.1 K/uL    RBC 2.43 (L) 4.23 - 5.6 M/uL    Hemoglobin 6.8 (LL) 13.6 - 17.2 g/dL    Hematocrit 21.0 (L) 41.1 - 50.3 %    MCV 86.4 82 - 102 FL    MCH 28.0 26.1 - 32.9 PG    MCHC 32.4 31.4 - 35.0 g/dL    RDW 15.2 (H) 11.9 - 14.6 %    Platelets 624 501 - 212 K/uL    MPV 10.6 9.4 - 12.3 FL    nRBC 0.00 0.0 - 0.2 K/uL    Differential Type AUTOMATED      Seg Neutrophils 59 43 - 78 %    Lymphocytes 22 13 - 44 %    Monocytes 16 (H) 4.0 - 12.0 %    Eosinophils % 3 0.5 - 7.8 %    Basophils 1 0.0 - 2.0 %    Immature Granulocytes 0 0.0 - 5.0 %    Segs Absolute 5.6 1.7 - 8.2 K/UL    Absolute Lymph # 2.1 0.5 - 4.6 K/UL    Absolute Mono # 1.5 (H) 0.1 - 1.3 K/UL    Absolute Eos # 0.3 0.0 - 0.8 K/UL    Basophils Absolute 0.1 0.0 - 0.2 K/UL    Absolute Immature Granulocyte 0.0 0.0 - 0.5 K/UL   Basic Metabolic Panel w/ Reflex to MG    Collection Time: 12/28/22  5:04 AM   Result Value Ref Range    Sodium 134 133 - 143 mmol/L    Potassium 4.8 3.5 - 5.1 mmol/L    Chloride 110 101 - 110 mmol/L    CO2 17 (L) 21 - 32 mmol/L    Anion Gap 7 2 - 11 mmol/L    Glucose 145 (H) 65 - 100 mg/dL    BUN 27 (H) 6 - 23 MG/DL    Creatinine 1.70 (H) 0.8 - 1.5 MG/DL    Est, Glom Filt Rate 47 (L) >60 ml/min/1.73m2    Calcium 7.7 (L) 8.3 - 10.4 MG/DL   Phosphorus    Collection Time: 12/28/22  5:04 AM   Result Value Ref Range    Phosphorus 3.2 2.5 - 4.5 MG/DL   POCT Glucose    Collection Time: 12/28/22  6:27 AM   Result Value Ref Range    POC Glucose 177 (H) 65 - 100 mg/dL    Performed by:  [facility]    POCT Glucose    Collection Time: 12/28/22 11:05 AM   Result Value Ref Range    POC Glucose 183 (H) 65 - 100 mg/dL    Performed by: Sanjuana    POCT Glucose    Collection Time: 12/28/22  3:53 PM   Result Value Ref Range    POC Glucose 240 (H) 65 - 100 mg/dL    Performed by: Sanjuana    CBC with Auto Differential    Collection Time: 12/28/22  6:40 PM   Result Value Ref Range    WBC 9.8 4.3 - 11.1 K/uL    RBC 2.98 (L) 4.23 - 5.6 M/uL    Hemoglobin 8.3 (L) 13.6 - 17.2 g/dL    Hematocrit 25.3 (L) 41.1 - 50.3 %    MCV 84.9 82 - 102 FL    MCH 27.9 26.1 - 32.9 PG    MCHC 32.8 31.4 - 35.0 g/dL    RDW 14.9 (H) 11.9 - 14.6 %    Platelets 283 391 - 036 K/uL    MPV 10.3 9.4 - 12.3 FL    nRBC 0.00 0.0 - 0.2 K/uL    Differential Type AUTOMATED      Seg Neutrophils 60 43 - 78 %    Lymphocytes 21 13 - 44 %    Monocytes 15 (H) 4.0 - 12.0 %    Eosinophils % 2 0.5 - 7.8 %    Basophils 1 0.0 - 2.0 %    Immature Granulocytes 1 0.0 - 5.0 %    Segs Absolute 6.0 1.7 - 8.2 K/UL    Absolute Lymph # 2.0 0.5 - 4.6 K/UL    Absolute Mono # 1.4 (H) 0.1 - 1.3 K/UL    Absolute Eos # 0.2 0.0 - 0.8 K/UL    Basophils Absolute 0.1 0.0 - 0.2 K/UL    Absolute Immature Granulocyte 0.1 0.0 - 0.5 K/UL   Magnesium    Collection Time: 12/28/22  6:40 PM   Result Value Ref Range    Magnesium 1.6 (L) 1.8 - 2.4 mg/dL   POCT Glucose    Collection Time: 12/28/22  9:56 PM   Result Value Ref Range    POC Glucose 226 (H) 65 - 100 mg/dL    Performed by:  Rob    CBC with Auto Differential    Collection Time: 12/29/22  5:37 AM   Result Value Ref Range    WBC 11.3 (H) 4.3 - 11.1 K/uL    RBC 3.29 (L) 4.23 - 5.6 M/uL    Hemoglobin 9.2 (L) 13.6 - 17.2 g/dL    Hematocrit 27.2 (L) 41.1 - 50.3 %    MCV 82.7 82 - 102 FL    MCH 28.0 26.1 - 32.9 PG    MCHC 33.8 31.4 - 35.0 g/dL    RDW 14.7 (H) 11.9 - 14.6 %    Platelets 395 976 - 559 K/uL    MPV 10.6 9.4 - 12.3 FL    nRBC 0.00 0.0 - 0.2 K/uL    Differential Type AUTOMATED      Seg Neutrophils 54 43 - 78 %    Lymphocytes 28 13 - 44 %    Monocytes 15 (H) 4.0 - 12.0 %    Eosinophils % 2 0.5 - 7.8 %    Basophils 1 0.0 - 2.0 %    Immature Granulocytes 0 0.0 - 5.0 %    Segs Absolute 6.1 1.7 - 8.2 K/UL    Absolute Lymph # 3.1 0.5 - 4.6 K/UL    Absolute Mono # 1.7 (H) 0.1 - 1.3 K/UL Absolute Eos # 0.3 0.0 - 0.8 K/UL    Basophils Absolute 0.1 0.0 - 0.2 K/UL    Absolute Immature Granulocyte 0.1 0.0 - 0.5 K/UL   Basic Metabolic Panel w/ Reflex to MG    Collection Time: 12/29/22  5:37 AM   Result Value Ref Range    Sodium 134 133 - 143 mmol/L    Potassium 5.0 3.5 - 5.1 mmol/L    Chloride 109 101 - 110 mmol/L    CO2 18 (L) 21 - 32 mmol/L    Anion Gap 7 2 - 11 mmol/L    Glucose 121 (H) 65 - 100 mg/dL    BUN 23 6 - 23 MG/DL    Creatinine 1.60 (H) 0.8 - 1.5 MG/DL    Est, Glom Filt Rate 50 (L) >60 ml/min/1.73m2    Calcium 8.2 (L) 8.3 - 10.4 MG/DL   Phosphorus    Collection Time: 12/29/22  5:37 AM   Result Value Ref Range    Phosphorus 3.0 2.5 - 4.5 MG/DL   POCT Glucose    Collection Time: 12/29/22  6:18 AM   Result Value Ref Range    POC Glucose 117 (H) 65 - 100 mg/dL    Performed by: Salazar    POCT Glucose    Collection Time: 12/29/22 10:52 AM   Result Value Ref Range    POC Glucose 245 (H) 65 - 100 mg/dL    Performed by: Sanjuana          Other Studies:  Transthoracic echocardiogram (TTE) complete with contrast, bubble, strain, and 3D PRN    Result Date: 12/22/2022    Left Ventricle: Normal left ventricular systolic function with a visually estimated EF of 65 - 70%. Left ventricle size is normal. Mildly increased wall thickness. Findings consistent with mild concentric hypertrophy. Normal wall motion. Normal diastolic function for age. Average E/e' ratio is 9.89. Aortic Valve: Moderate sclerosis of the aortic valve, right cusp-with no significant stenosis. Left Atrium: Left atrium is mildly dilated. Extracardiac: Left pleural effusion.        Current Meds:  Current Facility-Administered Medications   Medication Dose Route Frequency    carvedilol (COREG) tablet 25 mg  25 mg Oral BID WC    0.9 % sodium chloride infusion   IntraVENous PRN    loperamide (IMODIUM) capsule 2 mg  2 mg Oral 4x Daily PRN    sodium bicarbonate tablet 650 mg  650 mg Oral 4x Daily    [Held by provider] furosemide (LASIX) injection 40 mg  40 mg IntraVENous BID    [Held by provider] spironolactone (ALDACTONE) tablet 50 mg  50 mg Oral Daily    zolpidem (AMBIEN) tablet 5 mg  5 mg Oral Nightly PRN    pantoprazole (PROTONIX) tablet 40 mg  40 mg Oral QAM AC    diatrizoate meglumine-sodium (GASTROGRAFIN) 66-10 % solution 15 mL  15 mL Oral ONCE PRN    cholestyramine light packet 4 g  4 g Oral BID    folic acid (FOLVITE) tablet 1 mg  1 mg Oral Daily    vitamin B-12 (CYANOCOBALAMIN) tablet 1,000 mcg  1,000 mcg Oral Daily    insulin lispro (HUMALOG) injection vial 0-4 Units  0-4 Units SubCUTAneous TID WC    insulin lispro (HUMALOG) injection vial 0-4 Units  0-4 Units SubCUTAneous Nightly    glucose chewable tablet 16 g  4 tablet Oral PRN    dextrose bolus 10% 125 mL  125 mL IntraVENous PRN    Or    dextrose bolus 10% 250 mL  250 mL IntraVENous PRN    glucagon (rDNA) injection 1 mg  1 mg SubCUTAneous PRN    dextrose 10 % infusion   IntraVENous Continuous PRN    sodium chloride flush 0.9 % injection 5-40 mL  5-40 mL IntraVENous 2 times per day    sodium chloride flush 0.9 % injection 5-40 mL  5-40 mL IntraVENous PRN    0.9 % sodium chloride infusion   IntraVENous PRN    ondansetron (ZOFRAN-ODT) disintegrating tablet 4 mg  4 mg Oral Q8H PRN    Or    ondansetron (ZOFRAN) injection 4 mg  4 mg IntraVENous Q6H PRN    acetaminophen (TYLENOL) tablet 650 mg  650 mg Oral Q6H PRN    Or    acetaminophen (TYLENOL) suppository 650 mg  650 mg Rectal Q6H PRN    hydrALAZINE (APRESOLINE) injection 10 mg  10 mg IntraVENous Q6H PRN    morphine injection 1 mg  1 mg IntraVENous Q4H PRN       Signed:  Orlin Couch MD    Part of this note may have been written by using a voice dictation software. The note has been proof read but may still contain some grammatical/other typographical errors.

## 2022-12-30 LAB
ANION GAP SERPL CALC-SCNC: 8 MMOL/L (ref 2–11)
BASOPHILS # BLD: 0.1 K/UL (ref 0–0.2)
BASOPHILS NFR BLD: 1 % (ref 0–2)
BUN SERPL-MCNC: 22 MG/DL (ref 6–23)
CALCIUM SERPL-MCNC: 7.9 MG/DL (ref 8.3–10.4)
CHLORIDE SERPL-SCNC: 107 MMOL/L (ref 101–110)
CO2 SERPL-SCNC: 18 MMOL/L (ref 21–32)
CREAT SERPL-MCNC: 1.5 MG/DL (ref 0.8–1.5)
DIFFERENTIAL METHOD BLD: ABNORMAL
EOSINOPHIL # BLD: 0.3 K/UL (ref 0–0.8)
EOSINOPHIL NFR BLD: 3 % (ref 0.5–7.8)
ERYTHROCYTE [DISTWIDTH] IN BLOOD BY AUTOMATED COUNT: 14.6 % (ref 11.9–14.6)
GLUCOSE BLD STRIP.AUTO-MCNC: 100 MG/DL (ref 65–100)
GLUCOSE BLD STRIP.AUTO-MCNC: 105 MG/DL (ref 65–100)
GLUCOSE BLD STRIP.AUTO-MCNC: 171 MG/DL (ref 65–100)
GLUCOSE BLD STRIP.AUTO-MCNC: 205 MG/DL (ref 65–100)
GLUCOSE BLD STRIP.AUTO-MCNC: 219 MG/DL (ref 65–100)
GLUCOSE SERPL-MCNC: 103 MG/DL (ref 65–100)
HCT VFR BLD AUTO: 23.7 % (ref 41.1–50.3)
HGB BLD-MCNC: 8 G/DL (ref 13.6–17.2)
HGB BLD-MCNC: 8.9 G/DL (ref 13.6–17.2)
IMM GRANULOCYTES # BLD AUTO: 0.1 K/UL (ref 0–0.5)
IMM GRANULOCYTES NFR BLD AUTO: 1 % (ref 0–5)
LACTOFERRIN, FECAL: 10.57 UG/ML(G) (ref 0–7.24)
LYMPHOCYTES # BLD: 2.7 K/UL (ref 0.5–4.6)
LYMPHOCYTES NFR BLD: 27 % (ref 13–44)
MCH RBC QN AUTO: 28 PG (ref 26.1–32.9)
MCHC RBC AUTO-ENTMCNC: 33.8 G/DL (ref 31.4–35)
MCV RBC AUTO: 82.9 FL (ref 82–102)
MONOCYTES # BLD: 1.4 K/UL (ref 0.1–1.3)
MONOCYTES NFR BLD: 14 % (ref 4–12)
NEUTS SEG # BLD: 5.6 K/UL (ref 1.7–8.2)
NEUTS SEG NFR BLD: 54 % (ref 43–78)
NRBC # BLD: 0 K/UL (ref 0–0.2)
PHOSPHATE SERPL-MCNC: 3.4 MG/DL (ref 2.5–4.5)
PLATELET # BLD AUTO: 203 K/UL (ref 150–450)
PMV BLD AUTO: 10.6 FL (ref 9.4–12.3)
POTASSIUM SERPL-SCNC: 4.9 MMOL/L (ref 3.5–5.1)
RBC # BLD AUTO: 2.86 M/UL (ref 4.23–5.6)
SERVICE CMNT-IMP: ABNORMAL
SERVICE CMNT-IMP: NORMAL
SODIUM SERPL-SCNC: 133 MMOL/L (ref 133–143)
WBC # BLD AUTO: 10.2 K/UL (ref 4.3–11.1)

## 2022-12-30 PROCEDURE — 36415 COLL VENOUS BLD VENIPUNCTURE: CPT

## 2022-12-30 PROCEDURE — 97530 THERAPEUTIC ACTIVITIES: CPT

## 2022-12-30 PROCEDURE — 82962 GLUCOSE BLOOD TEST: CPT

## 2022-12-30 PROCEDURE — 85018 HEMOGLOBIN: CPT

## 2022-12-30 PROCEDURE — 80048 BASIC METABOLIC PNL TOTAL CA: CPT

## 2022-12-30 PROCEDURE — 6370000000 HC RX 637 (ALT 250 FOR IP): Performed by: HOSPITALIST

## 2022-12-30 PROCEDURE — 84100 ASSAY OF PHOSPHORUS: CPT

## 2022-12-30 PROCEDURE — 97164 PT RE-EVAL EST PLAN CARE: CPT

## 2022-12-30 PROCEDURE — 2580000003 HC RX 258: Performed by: FAMILY MEDICINE

## 2022-12-30 PROCEDURE — 6370000000 HC RX 637 (ALT 250 FOR IP): Performed by: STUDENT IN AN ORGANIZED HEALTH CARE EDUCATION/TRAINING PROGRAM

## 2022-12-30 PROCEDURE — 6370000000 HC RX 637 (ALT 250 FOR IP): Performed by: FAMILY MEDICINE

## 2022-12-30 PROCEDURE — 1100000000 HC RM PRIVATE

## 2022-12-30 PROCEDURE — 85025 COMPLETE CBC W/AUTO DIFF WBC: CPT

## 2022-12-30 RX ADMIN — SODIUM BICARBONATE 650 MG TABLET 650 MG: at 18:01

## 2022-12-30 RX ADMIN — SODIUM BICARBONATE 650 MG TABLET 650 MG: at 22:14

## 2022-12-30 RX ADMIN — FOLIC ACID 1 MG: 1 TABLET ORAL at 08:02

## 2022-12-30 RX ADMIN — PANTOPRAZOLE SODIUM 40 MG: 40 TABLET, DELAYED RELEASE ORAL at 06:07

## 2022-12-30 RX ADMIN — SODIUM BICARBONATE 650 MG TABLET 650 MG: at 08:03

## 2022-12-30 RX ADMIN — CARVEDILOL 25 MG: 12.5 TABLET, FILM COATED ORAL at 08:03

## 2022-12-30 RX ADMIN — CHOLESTYRAMINE 4 G: 4 POWDER, FOR SUSPENSION ORAL at 22:14

## 2022-12-30 RX ADMIN — CHOLESTYRAMINE 4 G: 4 POWDER, FOR SUSPENSION ORAL at 08:03

## 2022-12-30 RX ADMIN — CYANOCOBALAMIN TAB 1000 MCG 1000 MCG: 1000 TAB at 08:03

## 2022-12-30 RX ADMIN — SODIUM CHLORIDE, PRESERVATIVE FREE 10 ML: 5 INJECTION INTRAVENOUS at 08:06

## 2022-12-30 RX ADMIN — SODIUM BICARBONATE 650 MG TABLET 650 MG: at 13:13

## 2022-12-30 RX ADMIN — CARVEDILOL 25 MG: 12.5 TABLET, FILM COATED ORAL at 18:01

## 2022-12-30 RX ADMIN — ZOLPIDEM TARTRATE 5 MG: 5 TABLET ORAL at 22:17

## 2022-12-30 NOTE — PROGRESS NOTES
Hospitalist Progress Note   Admit Date:  2022  3:24 PM   Name:  Melina Angel   Age:  65 y.o. Sex:  male  :  1965   MRN:  [id number]   Room:  725/    Reason(s) for Admission: Septicemia ([address] [A41.9]  SIRS (systemic inflammatory response syndrome) (HCC) [R65.10]  Abdominal pain, unspecified abdominal location [R10.9]  Swelling of lower extremity [facility] Interval [facility]:   Mr. Reynaldo Kumar is a 66 y/o AAM with a h/o GI bleed who was admitted to our service on  with SIRS and poor oral intake. CT chest done for elevated d-dimer neg for PE. KUB showed non-specific bowel gas pattern. Flu and COVID negative. He was started on empiric broad spectrum antibiotics. CTAP 12/15 showed multiple dependent stones in GB with prob cirrhotic liver morphology and ascites. Blood cultures on admission grew CoNS and diphtheroids likely contamination. ID was consulted  and recommended given no signs of infectious peritonitis and blood cultures are likely contaminated that no abx indicated. Leukocytosis most likely due to partial SBO/ileus which has resolved. Recommended wound care on right leg which wounds appear to be chronic/venous stasis without signs of active infection. ID recommended to stop all antibiotics and signed off . There was also a concern for partial SBO on admission. NGT placed in ED for decompression. General surgery was consulted and felt his symptoms were likely due to large volume ascites. No surgical intervention recommended. NGT removed and diet advanced. GI consulted for ascites and new cirrhosis. He underwent paracentesis with IR on 12/15 with removal of 7.5L fluid. GI recommended that patient has cholelithiasis on imaging but recommended against surgical consideration as a surgical mortality would be high. If cholecystitis develops, consider cholecystostomy tube.  GI signed off  and patient should follow-up with his primary GI at [facility]. UA-and CK within normal limits   ultrasound of the kidney-increased renal echogenicity compatible with chronic medical renal disease      PT/OT recommended STR. Subjective/24hr Events (12/30/22): Patient stated he was having diarrhea but improving. He was eating well and drinking well. No chest pain or SOB. Assessment & Plan:   # MARS on CKD3a  - Baseline sCr mid 1s, Cr improved 1.5  Hold diuretics, encourage PO intake,compatible with chronic medical renal disease   Nephrology signed off    ABLA  No signs of active bleeding  Hb is 6.8 and s/p PRBC  Hb stable around 8-9  Continue Protonix 40 mg IV twice daily  Monitor H&H daily    Chronic diarrhea   Continue Imodium  stool studies normal  and C diff negative    Mild hyperkalemia  Resolved. Holding Aldactone     Hypomagnesemia  Follow-up with magnesium     # Cirrhosis // large volume ascites   - Para 12/15 with 7.5L removed  S/p paracentesis of 6.2L on 12/27    - Diuretics held as above. GI s/o. Mild hyponatremia due to cirrhosis  Resolved    Metabolic acidosis   Continue bicarb tabs po    # Partial SBO   - Resolved. Previously seen by Surgery. NGT out several days ago. # Cholelithiasis   - No s/s of cholecystitis. Prior recs from GI were to consider cholecystostomy tube if that were to develop. # Alcohol abuse   - Cessation advised. Folic acid. # DM2 with neuropathy   -Discontinuing SSI as pt BG is well controlled and Hba1c is 5. .    # HTN   - Increased Coreg. on 12/29  # GERD   - PPI    # Chronc LLE diabetic ulcer   - Con't wound care efforts. PT/OT recommended STR. Discharge Planning: Medically stable,  [employer] denied the request for [facility]. The pt does have the right to do a pt/family appeal. Pending STR     Diet:  ADULT DIET; Regular; Low Sodium (2 gm);  Low Potassium (Less than 3000 mg/day)  DVT PPx: SCD, ambulation  Code status: Full Code    Hospital Problems             Last Modified POA    * (Principal) Cirrhosis of liver with ascites ([address] 12/16/2022 Yes    Alcohol dependence ([address] 12/14/2022 Yes    Diabetic peripheral neuropathy ([address] 12/14/2022 Yes    Essential hypertension (Chronic) 12/13/2022 Yes    Overview Signed 8/31/2022  8:40 PM by Johanny Watkins DO     Last Assessment & Plan:   Formatting of this note might be different from the original.  Still poor controlled. Continue avoid Cozaar due to acute kidney injury. Started on Norvasc, hydralazine when necessary, clonidine when necessary         Hyperlipidemia 12/13/2022 Yes    Type 2 diabetes mellitus ([address] 12/13/2022 Yes    Overview Signed 9/21/2022  1:48 PM by Kathi Martin, ATC     Last Assessment & Plan:   Formatting of this note might be different from the original.  Continue Lantus, ISS         Diabetic ulcer of both feet associated with type 2 diabetes mellitus ([address] (Chronic) 12/16/2022 Yes    Acute kidney injury superimposed on CKD ([address] 12/15/2022 Yes    PAD (peripheral artery disease) ([address] 12/13/2022 Yes    Hypoalbuminemia (Chronic) 12/15/2022 Yes    Normocytic anemia 12/15/2022 Yes    Overview Signed 9/21/2022  1:48 PM by Kathi Martin, ATC     Formatting of this note might be different from the original.  Added automatically from request for surgery [id number]  Formatting of this note might be different from the original.  Added automatically from request for surgery [id number]         SIRS (systemic inflammatory response syndrome) ([address] 12/16/2022 Yes    Abdominal pain 12/14/2022 Yes    Moderate protein malnutrition ([address] 12/15/2022 Yes    Cholelithiases 12/15/2022 Yes    Positive blood culture 12/15/2022 Yes    Partial small bowel obstruction ([address] 12/17/2022 Yes    Stage 3a chronic kidney disease ([address] (Chronic) 12/15/2022 Yes    Overview Signed 9/14/2022  9:02 PM by Jeannie Meckel, MD     With HTN and DM2            Objective:   Patient Vitals for the past 24 hrs:   Temp Pulse Resp BP SpO2   12/30/22 1558 97.9 °F (36.6 °C) 84 16 133/88 100 %   12/30/22 1306 97.3 °F (36.3 °C) 82 16 (!) 104/91 100 %   12/30/22 0752 97.7 °F (36.5 °C) 81 16 (!) 146/92 100 %   12/30/22 0350 97.5 °F (36.4 °C) 78 18 116/87 98 %   12/29/22 2352 98.1 °F (36.7 °C) 75 17 (!) 151/100 100 %   12/29/22 1948 97.6 °F (36.4 °C) 73 18 125/79 100 %       Estimated body mass index is 30.87 kg/m² as calculated from the following:    Height as of this encounter: 5' 11\" (1.803 m). Weight as of this encounter: 221 lb 4.8 oz (100.4 kg). Intake/Output Summary (Last 24 hours) at 12/30/2022 1635  Last data filed at 12/30/2022 1308  Gross per 24 hour   Intake 720 ml   Output --   Net 720 ml           Physical Exam:   Blood pressure 133/88, pulse 84, temperature 97.9 °F (36.6 °C), temperature source Oral, resp. rate 16, height 5' 11\" (1.803 m), weight 221 lb 4.8 oz (100.4 kg), SpO2 100 %. General:    Well nourished. No overt distress. Appears older than stated age. Pleasant and conversant. Head:  Normocephalic, atraumatic  Eyes:  Sclerae appear normal. Pupils equally round. ENT:  Nares appear normal, no drainage. Moist oral mucosa  Neck:  No restricted ROM. Trachea midline. CV:   RRR. No m/r/g. No jugular venous distension. Lungs:   CTAB. No wheezing, rhonchi, or rales. Respirations even, unlabored. Abdomen: Bowel sounds present. Soft, nontender, mildly distended. 2+ b/l LE pitting edema. Skin:     No rashes and normal coloration. Warm and dry. Extremities Rt leg wound is present  Neuro:  CN II-XII grossly intact. Sensation intact. A&Ox3  Psych:  Normal mood and affect.       I have reviewed ordered lab tests and independently visualized imaging below:    Recent Labs:  Recent Results (from the past 48 hour(s))   CBC with Auto Differential    Collection Time: 12/28/22  6:40 PM   Result Value Ref Range    WBC 9.8 4.3 - 11.1 K/uL    RBC 2.98 (L) 4.23 - 5.6 M/uL    Hemoglobin 8.3 (L) 13.6 - 17.2 g/dL    Hematocrit 25.3 (L) 41.1 - 50.3 %    MCV 84.9 82 - 102 FL    MCH 27.9 26.1 - 32.9 PG    MCHC 32.8 31.4 - 35.0 g/dL    RDW 14.9 (H) 11.9 - 14.6 %    Platelets 092 675 - 786 K/uL    MPV 10.3 9.4 - 12.3 FL    nRBC 0.00 0.0 - 0.2 K/uL    Differential Type AUTOMATED      Seg Neutrophils 60 43 - 78 %    Lymphocytes 21 13 - 44 %    Monocytes 15 (H) 4.0 - 12.0 %    Eosinophils % 2 0.5 - 7.8 %    Basophils 1 0.0 - 2.0 %    Immature Granulocytes 1 0.0 - 5.0 %    Segs Absolute 6.0 1.7 - 8.2 K/UL    Absolute Lymph # 2.0 0.5 - 4.6 K/UL    Absolute Mono # 1.4 (H) 0.1 - 1.3 K/UL    Absolute Eos # 0.2 0.0 - 0.8 K/UL    Basophils Absolute 0.1 0.0 - 0.2 K/UL    Absolute Immature Granulocyte 0.1 0.0 - 0.5 K/UL   Magnesium    Collection Time: 12/28/22  6:40 PM   Result Value Ref Range    Magnesium 1.6 (L) 1.8 - 2.4 mg/dL   POCT Glucose    Collection Time: 12/28/22  9:56 PM   Result Value Ref Range    POC Glucose 226 (H) 65 - 100 mg/dL    Performed by:  Rob    CBC with Auto Differential    Collection Time: 12/29/22  5:37 AM   Result Value Ref Range    WBC 11.3 (H) 4.3 - 11.1 K/uL    RBC 3.29 (L) 4.23 - 5.6 M/uL    Hemoglobin 9.2 (L) 13.6 - 17.2 g/dL    Hematocrit 27.2 (L) 41.1 - 50.3 %    MCV 82.7 82 - 102 FL    MCH 28.0 26.1 - 32.9 PG    MCHC 33.8 31.4 - 35.0 g/dL    RDW 14.7 (H) 11.9 - 14.6 %    Platelets 920 904 - 076 K/uL    MPV 10.6 9.4 - 12.3 FL    nRBC 0.00 0.0 - 0.2 K/uL    Differential Type AUTOMATED      Seg Neutrophils 54 43 - 78 %    Lymphocytes 28 13 - 44 %    Monocytes 15 (H) 4.0 - 12.0 %    Eosinophils % 2 0.5 - 7.8 %    Basophils 1 0.0 - 2.0 %    Immature Granulocytes 0 0.0 - 5.0 %    Segs Absolute 6.1 1.7 - 8.2 K/UL    Absolute Lymph # 3.1 0.5 - 4.6 K/UL    Absolute Mono # 1.7 (H) 0.1 - 1.3 K/UL    Absolute Eos # 0.3 0.0 - 0.8 K/UL    Basophils Absolute 0.1 0.0 - 0.2 K/UL    Absolute Immature Granulocyte 0.1 0.0 - 0.5 K/UL   Basic Metabolic Panel w/ Reflex to MG    Collection Time: 12/29/22  5:37 AM   Result Value Ref Range    Sodium 134 133 - 143 mmol/L    Potassium 5.0 3.5 - 5.1 mmol/L    Chloride 109 101 - 110 mmol/L    CO2 18 (L) 21 - 32 mmol/L    Anion Gap 7 2 - 11 mmol/L    Glucose 121 (H) 65 - 100 mg/dL    BUN 23 6 - 23 MG/DL    Creatinine 1.60 (H) 0.8 - 1.5 MG/DL    Est, Glom Filt Rate 50 (L) >60 ml/min/1.73m2    Calcium 8.2 (L) 8.3 - 10.4 MG/DL   Phosphorus    Collection Time: 12/29/22  5:37 AM   Result Value Ref Range    Phosphorus 3.0 2.5 - 4.5 MG/DL   POCT Glucose    Collection Time: 12/29/22  6:18 AM   Result Value Ref Range    POC Glucose 117 (H) 65 - 100 mg/dL    Performed by: Salazar    POCT Glucose    Collection Time: 12/29/22 10:52 AM   Result Value Ref Range    POC Glucose 245 (H) 65 - 100 mg/dL    Performed by: Sanjuana    POCT Glucose    Collection Time: 12/29/22  4:30 PM   Result Value Ref Range    POC Glucose 206 (H) 65 - 100 mg/dL    Performed by: Sanjuana    Hemoglobin    Collection Time: 12/29/22  5:21 PM   Result Value Ref Range    Hemoglobin 9.2 (L) 13.6 - 17.2 g/dL   POCT Glucose    Collection Time: 12/29/22  8:48 PM   Result Value Ref Range    POC Glucose 166 (H) 65 - 100 mg/dL    Performed by: Scarlet([facility])SNE    CBC with Auto Differential    Collection Time: 12/30/22  5:19 AM   Result Value Ref Range    WBC 10.2 4.3 - 11.1 K/uL    RBC 2.86 (L) 4.23 - 5.6 M/uL    Hemoglobin 8.0 (L) 13.6 - 17.2 g/dL    Hematocrit 23.7 (L) 41.1 - 50.3 %    MCV 82.9 82 - 102 FL    MCH 28.0 26.1 - 32.9 PG    MCHC 33.8 31.4 - 35.0 g/dL    RDW 14.6 11.9 - 14.6 %    Platelets 609 131 - 871 K/uL    MPV 10.6 9.4 - 12.3 FL    nRBC 0.00 0.0 - 0.2 K/uL    Differential Type AUTOMATED      Seg Neutrophils 54 43 - 78 %    Lymphocytes 27 13 - 44 %    Monocytes 14 (H) 4.0 - 12.0 %    Eosinophils % 3 0.5 - 7.8 %    Basophils 1 0.0 - 2.0 %    Immature Granulocytes 1 0.0 - 5.0 %    Segs Absolute 5.6 1.7 - 8.2 K/UL    Absolute Lymph # 2.7 0.5 - 4.6 K/UL    Absolute Mono # 1.4 (H) 0.1 - 1.3 K/UL    Absolute Eos # 0.3 0.0 - 0.8 K/UL    Basophils Absolute 0.1 0.0 - 0.2 K/UL    Absolute Immature Granulocyte 0.1 0.0 - 0.5 K/UL   Basic Metabolic Panel w/ Reflex to MG    Collection Time: 12/30/22  5:19 AM   Result Value Ref Range    Sodium 133 133 - 143 mmol/L    Potassium 4.9 3.5 - 5.1 mmol/L    Chloride 107 101 - 110 mmol/L    CO2 18 (L) 21 - 32 mmol/L    Anion Gap 8 2 - 11 mmol/L    Glucose 103 (H) 65 - 100 mg/dL    BUN 22 6 - 23 MG/DL    Creatinine 1.50 0.8 - 1.5 MG/DL    Est, Glom Filt Rate 54 (L) >60 ml/min/1.73m2    Calcium 7.9 (L) 8.3 - 10.4 MG/DL   Phosphorus    Collection Time: 12/30/22  5:19 AM   Result Value Ref Range    Phosphorus 3.4 2.5 - 4.5 MG/DL   POCT Glucose    Collection Time: 12/30/22  6:27 AM   Result Value Ref Range    POC Glucose 100 65 - 100 mg/dL    Performed by: Scarlet([facility])SNE    POCT Glucose    Collection Time: 12/30/22  7:54 AM   Result Value Ref Range    POC Glucose 105 (H) 65 - 100 mg/dL    Performed by: Kavya    POCT Glucose    Collection Time: 12/30/22  1:08 PM   Result Value Ref Range    POC Glucose 171 (H) 65 - 100 mg/dL    Performed by: Kavya          Other Studies:  Transthoracic echocardiogram (TTE) complete with contrast, bubble, strain, and 3D PRN    Result Date: 12/22/2022    Left Ventricle: Normal left ventricular systolic function with a visually estimated EF of 65 - 70%. Left ventricle size is normal. Mildly increased wall thickness. Findings consistent with mild concentric hypertrophy. Normal wall motion. Normal diastolic function for age. Average E/e' ratio is 9.89. Aortic Valve: Moderate sclerosis of the aortic valve, right cusp-with no significant stenosis. Left Atrium: Left atrium is mildly dilated. Extracardiac: Left pleural effusion.        Current Meds:  Current Facility-Administered Medications   Medication Dose Route Frequency    carvedilol (COREG) tablet 25 mg  25 mg Oral BID WC    0.9 % sodium chloride infusion   IntraVENous PRN    loperamide (IMODIUM) capsule 2 mg  2 mg Oral 4x Daily PRN    sodium bicarbonate tablet 650 mg  650 mg Oral 4x Daily    [Held by provider] furosemide (LASIX) injection 40 mg  40 mg IntraVENous BID    [Held by provider] spironolactone (ALDACTONE) tablet 50 mg  50 mg Oral Daily    zolpidem (AMBIEN) tablet 5 mg  5 mg Oral Nightly PRN    pantoprazole (PROTONIX) tablet 40 mg  40 mg Oral QAM AC    diatrizoate meglumine-sodium (GASTROGRAFIN) 66-10 % solution 15 mL  15 mL Oral ONCE PRN    cholestyramine light packet 4 g  4 g Oral BID    folic acid (FOLVITE) tablet 1 mg  1 mg Oral Daily    vitamin B-12 (CYANOCOBALAMIN) tablet 1,000 mcg  1,000 mcg Oral Daily    insulin lispro (HUMALOG) injection vial 0-4 Units  0-4 Units SubCUTAneous TID WC    insulin lispro (HUMALOG) injection vial 0-4 Units  0-4 Units SubCUTAneous Nightly    glucose chewable tablet 16 g  4 tablet Oral PRN    dextrose bolus 10% 125 mL  125 mL IntraVENous PRN    Or    dextrose bolus 10% 250 mL  250 mL IntraVENous PRN    glucagon (rDNA) injection 1 mg  1 mg SubCUTAneous PRN    dextrose 10 % infusion   IntraVENous Continuous PRN    sodium chloride flush 0.9 % injection 5-40 mL  5-40 mL IntraVENous 2 times per day    sodium chloride flush 0.9 % injection 5-40 mL  5-40 mL IntraVENous PRN    0.9 % sodium chloride infusion   IntraVENous PRN    ondansetron (ZOFRAN-ODT) disintegrating tablet 4 mg  4 mg Oral Q8H PRN    Or    ondansetron (ZOFRAN) injection 4 mg  4 mg IntraVENous Q6H PRN    acetaminophen (TYLENOL) tablet 650 mg  650 mg Oral Q6H PRN    Or    acetaminophen (TYLENOL) suppository 650 mg  650 mg Rectal Q6H PRN    hydrALAZINE (APRESOLINE) injection 10 mg  10 mg IntraVENous Q6H PRN    morphine injection 1 mg  1 mg IntraVENous Q4H PRN       Signed:  Tien Emerson MD    Part of this note may have been written by using a voice dictation software. The note has been proof read but may still contain some grammatical/other typographical errors.

## 2022-12-30 NOTE — PROGRESS NOTES
ACUTE PHYSICAL THERAPY GOALS:   (Developed with and agreed upon by patient and/or caregiver.)  Goals reassessed and updated as needed on 12/30  Pt will perform supine to/from sit with mod I in 7 treatment days. Pt will perform sit to/from stand with mod I and LRAD in 7 treatment days. Pt will ambulate 350 ft with mod I and LRAD in 7 treatment days. Pt will negotiate 2 stairs with mod I and rail in 7 treatment days. Pt will be independent with HEP in 7 days. PHYSICAL THERAPY Initial Assessment, Daily Note, and PM  (Link to Caseload Tracking: PT Visit Days : 1  Acknowledge Orders  Time In/Out  PT Charge Capture  Rehab Caseload Tracker    Chris Oliva is a 65 y.o. male   PRIMARY DIAGNOSIS: Cirrhosis of liver with ascites ([facility]  Septicemia ([facility] [A41.9]  SIRS (systemic inflammatory response syndrome) (HCC) [R65.10]  Abdominal pain, unspecified abdominal location [R10.9]  Swelling of lower extremity [M79.89]       Reason for Referral: Generalized Muscle Weakness (M62.81)  Other lack of cordination (R27.8)  Difficulty in walking, Not elsewhere classified (R26.2)  Inpatient: Payor: Ezekiel Sarabia / Plan: Estiven Licona / Product Type: *No Product type* /     ASSESSMENT:     REHAB RECOMMENDATIONS:   Recommendation to date pending progress:  Setting:  Home Health Therapy    Equipment:    To Be Determined     ASSESSMENT:  Mr. Brittney Conroy is seen by therapy today for re-evaluation and demonstrates good progress within POC as he is now tolerating inc activity with less (A) than previously required at time of initial evaluation. Pt now performing all activity c SB/CG (A) c additional use of RW/ gait belt for amb of 125'x2 (standing break). Pt also able to transfer to/ from [facility] and remain standing at sink for ADLs without (A) from PT. Pt's gait does demonstrate forward-flexed posture onto RW, wide STU, inc sway and shuffling steps although he ultimately did well to avoid any LOB/ miss-steps.  Pt also demonstrating improved safety awareness this session and required only light verbal cueing for transfer mechanics/ sequencing. All activity was performed on RA with pt denying any dizziness, lightheadedness or SOB. His primary complaint while moving is ankle stiffness ABIMAEL as well as L calf pain. Overall, notable progress and performing home-health level mobility on today's re-evaluation; will update DC rec to [facility]. Pt will continue to benefit from skilled PT to address to maximize functional independence prior to discharge.     [place] Rec: [facility] 6 [facility]licks Basic Mobility Inpatient Short Form  AM-PAC Mobility Inpatient   How much difficulty turning over in bed?: A Lot  How much difficulty sitting down on / standing up from a chair with arms?: A Lot  How much difficulty moving from lying on back to sitting on side of bed?: A Lot  How much help from another person moving to and from a bed to a chair?: A Lot  How much help from another person needed to walk in hospital room?: A Little  How much help from another person for climbing 3-5 steps with a railing?: A Lot  AM-PAC Inpatient Mobility Raw Score : 13  AM-PAC Inpatient T-Scale Score : 36.74  Mobility Inpatient CMS 0-100% Score: 64.91  Mobility Inpatient CMS G-Code Modifier : CL    SUBJECTIVE:   Mr. Daniela Valle states, \"I'm ready to go home and I have people to help if I need it\"     Social/Functional Lives With: Spouse  Type of Home: House  Home Layout: One level  Home Access: Stairs to enter with rails  Entrance Stairs - Number of Steps: 2  Home Equipment: [employer], [address] Help From: Family  ADL Assistance: Independent  Homemaking Assistance: Needs assistance  Homemaking Responsibilities: Yes  Ambulation Assistance: Independent  Transfer Assistance: Independent    OBJECTIVE:     PAIN: VITALS / O2: PRECAUTION / Emily Patten / DRAINS:   Pre Treatment: 2/10 L calf         Post Treatment: 2/10 L calf Vitals        Oxygen      IV and Nasogastric Tube    RESTRICTIONS/PRECAUTIONS:  Restrictions/Precautions: Fall Risk                 GROSS EVALUATION: Intact Impaired (Comments):   AROM []  Ankle ROM limited due to edema; improving on re-eval but remains    PROM []    Strength []  B knees 3+/5, B hips 3/5. Balance []  Fair/ Fair (+) standing dynamic within RW   Posture [] Rounded Shoulders  Thoracic Kyphosis  Trunk Flexion   Sensation [x] Overall Sensation Status: WFL   Coordination [x]      Tone [x]     Edema [] Significant pitting edema to BLEs.     Activity Tolerance []  Limited due to fatigue and weakness.    []      COGNITION/  PERCEPTION: Intact Impaired (Comments):   Orientation [x]     Vision [x]     Hearing [x]     Cognition  [x]       MOBILITY: I Mod I S SBA CGA Min Mod Max Total  NT x2 Comments:   Bed Mobility    Rolling [] [] [] [] [] [] [] [] [] [x] []    Supine to Sit [] [] [] [] [] [] [] [] [] [x] []    Scooting [] [] [] [] [] [] [] [] [] [x] []    Sit to Supine [] [] [] [] [] [] [] [] [] [x] []    Transfers    Sit to Stand [] [] [] [x] [] [] [] [] [] [] []    Bed to Chair [] [] [] [] [x] [] [] [] [] [] []    Stand to Sit [] [] [] [x] [] [] [] [] [] [] []     [] [] [] [] [] [] [] [] [] [] []    I=Independent, Mod I=Modified Independent, S=Supervision, SBA=Standby Assistance, CGA=Contact Guard Assistance,   Min=Minimal Assistance, Mod=Moderate Assistance, Max=Maximal Assistance, Total=Total Assistance, NT=Not Tested    GAIT: I Mod I S SBA CGA Min Mod Max Total  NT x2 Comments:   Level of Assistance [] [] [] [] [x] [] [] [] [] [] []    Distance  125'x2 (standing break)    DME Rolling Walker    Gait Quality Decreased jason , Decreased step clearance, Decreased step length, Trunk flexion, and Wide base of support    Weightbearing Status      Stairs      I=Independent, Mod I=Modified Independent, S=Supervision, SBA=Standby Assistance, CGA=Contact Guard Assistance,   Min=Minimal Assistance, Mod=Moderate Assistance, Max=Maximal Assistance, Total=Total Assistance, NT=Not Tested    PLAN:   FREQUENCY AND DURATION: 3 times/week for duration of hospital stay or until stated goals are met, whichever comes first.    THERAPY PROGNOSIS: Good    PROBLEM LIST:   (Skilled intervention is medically necessary to address:)  Decreased ADL/Functional Activities  Decreased Activity Tolerance  Decreased AROM/PROM  Decreased Balance  Decreased Gait Ability  Decreased Strength  Decreased Transfer Abilities INTERVENTIONS PLANNED:   (Benefits and precautions of physical therapy have been discussed with the patient.)  Self Care Training  Therapeutic Activity  Therapeutic Exercise/HEP  Neuromuscular Re-education  Gait Training  Education       TREATMENT:   EVALUATION: Re-Evaluation 12/30/22    TREATMENT:   Therapeutic Activity (25 Minutes): Therapeutic activity included Scooting, Transfer Training, Ambulation on level ground, Sitting balance , Standing balance, and education to improve functional Activity tolerance, Balance, Mobility, Strength, ROM, and safe discharge.     TREATMENT GRID:  N/A    AFTER TREATMENT PRECAUTIONS: Bed/Chair Locked, Call light within reach, Chair, Needs within reach, RN at bedside, and RN notified    INTERDISCIPLINARY COLLABORATION:  RN/ PCT and PT/ PTA    EDUCATION:      TIME IN/OUT:  Time In: 0930  Time Out: [address]  Minutes: [address], PT

## 2022-12-31 LAB
GLUCOSE BLD STRIP.AUTO-MCNC: 154 MG/DL (ref 65–100)
GLUCOSE BLD STRIP.AUTO-MCNC: 175 MG/DL (ref 65–100)
GLUCOSE BLD STRIP.AUTO-MCNC: 182 MG/DL (ref 65–100)
GLUCOSE BLD STRIP.AUTO-MCNC: 193 MG/DL (ref 65–100)
HGB BLD-MCNC: 8 G/DL (ref 13.6–17.2)
HGB BLD-MCNC: 8.3 G/DL (ref 13.6–17.2)
SERVICE CMNT-IMP: ABNORMAL

## 2022-12-31 PROCEDURE — 2580000003 HC RX 258: Performed by: FAMILY MEDICINE

## 2022-12-31 PROCEDURE — 85018 HEMOGLOBIN: CPT

## 2022-12-31 PROCEDURE — 1100000000 HC RM PRIVATE

## 2022-12-31 PROCEDURE — 6370000000 HC RX 637 (ALT 250 FOR IP): Performed by: FAMILY MEDICINE

## 2022-12-31 PROCEDURE — 82962 GLUCOSE BLOOD TEST: CPT

## 2022-12-31 PROCEDURE — 6370000000 HC RX 637 (ALT 250 FOR IP): Performed by: HOSPITALIST

## 2022-12-31 PROCEDURE — 6370000000 HC RX 637 (ALT 250 FOR IP): Performed by: STUDENT IN AN ORGANIZED HEALTH CARE EDUCATION/TRAINING PROGRAM

## 2022-12-31 PROCEDURE — 36415 COLL VENOUS BLD VENIPUNCTURE: CPT

## 2022-12-31 RX ORDER — INSULIN LISPRO 100 [IU]/ML
0-4 INJECTION, SOLUTION INTRAVENOUS; SUBCUTANEOUS
Status: DISCONTINUED | OUTPATIENT
Start: 2022-12-31 | End: 2023-01-03 | Stop reason: HOSPADM

## 2022-12-31 RX ORDER — LOPERAMIDE HYDROCHLORIDE 2 MG/1
2 CAPSULE ORAL 4 TIMES DAILY PRN
Qty: 30 CAPSULE | Refills: 0 | Status: SHIPPED | OUTPATIENT
Start: 2022-12-31 | End: 2023-01-10

## 2022-12-31 RX ORDER — INSULIN LISPRO 100 [IU]/ML
0-4 INJECTION, SOLUTION INTRAVENOUS; SUBCUTANEOUS NIGHTLY
Qty: 5 ML | Refills: 0 | Status: SHIPPED | OUTPATIENT
Start: 2022-12-31

## 2022-12-31 RX ORDER — INSULIN LISPRO 100 [IU]/ML
0-4 INJECTION, SOLUTION INTRAVENOUS; SUBCUTANEOUS
Qty: 5 ML | Refills: 0 | Status: SHIPPED | OUTPATIENT
Start: 2022-12-31

## 2022-12-31 RX ORDER — INSULIN LISPRO 100 [IU]/ML
0-4 INJECTION, SOLUTION INTRAVENOUS; SUBCUTANEOUS NIGHTLY
Status: DISCONTINUED | OUTPATIENT
Start: 2022-12-31 | End: 2023-01-03 | Stop reason: HOSPADM

## 2022-12-31 RX ORDER — CARVEDILOL 25 MG/1
12.5 TABLET ORAL 2 TIMES DAILY WITH MEALS
Qty: 60 TABLET | Refills: 3 | Status: SHIPPED | OUTPATIENT
Start: 2022-12-31 | End: 2023-01-11 | Stop reason: SDUPTHER

## 2022-12-31 RX ADMIN — CHOLESTYRAMINE 4 G: 4 POWDER, FOR SUSPENSION ORAL at 10:18

## 2022-12-31 RX ADMIN — ZOLPIDEM TARTRATE 5 MG: 5 TABLET ORAL at 21:41

## 2022-12-31 RX ADMIN — SODIUM BICARBONATE 650 MG TABLET 650 MG: at 17:18

## 2022-12-31 RX ADMIN — CARVEDILOL 25 MG: 12.5 TABLET, FILM COATED ORAL at 10:18

## 2022-12-31 RX ADMIN — FOLIC ACID 1 MG: 1 TABLET ORAL at 10:18

## 2022-12-31 RX ADMIN — SODIUM BICARBONATE 650 MG TABLET 650 MG: at 21:35

## 2022-12-31 RX ADMIN — CYANOCOBALAMIN TAB 1000 MCG 1000 MCG: 1000 TAB at 10:18

## 2022-12-31 RX ADMIN — CARVEDILOL 25 MG: 12.5 TABLET, FILM COATED ORAL at 17:18

## 2022-12-31 RX ADMIN — PANTOPRAZOLE SODIUM 40 MG: 40 TABLET, DELAYED RELEASE ORAL at 06:16

## 2022-12-31 RX ADMIN — SODIUM CHLORIDE, PRESERVATIVE FREE 5 ML: 5 INJECTION INTRAVENOUS at 10:22

## 2022-12-31 RX ADMIN — CHOLESTYRAMINE 4 G: 4 POWDER, FOR SUSPENSION ORAL at 21:35

## 2022-12-31 RX ADMIN — SODIUM BICARBONATE 650 MG TABLET 650 MG: at 10:18

## 2022-12-31 RX ADMIN — SODIUM BICARBONATE 650 MG TABLET 650 MG: at 14:22

## 2022-12-31 ASSESSMENT — PAIN SCALES - GENERAL
PAINLEVEL_OUTOF10: 0
PAINLEVEL_OUTOF10: 0

## 2022-12-31 NOTE — PROGRESS NOTES
POST FALL MANAGEMENT    Cheryle Harlem  MEDICAL RECORD NUMBER:  [id number]  AGE: 65 y.o. GENDER: male  : 1965  TODAYS DATE:  2022    Details     Fall Occurred: Yes    Was the Fall Witnessed:  No      Brief Review of Event:The pt didn't call for assistance for ambulating. Got up by himself from alarmed chair and stated \" I sled  on my knees\". Who found the patient: Primary RN       Where was the patient at the time of the fall: in the room       Patient Comments: The pt stated \"I have done it before and I did not call for help than\"        Date Fall Occurred:  2022 . Time Fall Occurred: 6:00p.m.      Assessment     Post Fall Head to Toe Assessment Completed: Yes    Post Hero Feil and ABCDS Completed: Yes     Post Fall Vitals Completed: Yes    Post Fall Neuro Checks Completed: Yes    Injury Occurred(if yes, describe injury):  no           Did the Patient Experience:([place] all that apply)    [] Patient hit head  [] Loss of consciousness  [] Change in mental status following the fall  [] Patient is on an anticoagulant medication      CT Performed:  no    Follow-up     Persons Notified of Fall:  (Provide names of persons notified)   [x] Physician: Rozina Alejandro MD   [] SCARLET:  [x] Nursing Supervisior: TERESA Montoya   [x] Manager: Lily Villegas   [] Pharmacist:  [] Family: the pt  is [email or web address] *4, refused RN to notify the family   [x] Other: Teresa Rizzo      Electronically signed by Isai Lorenzana RN 2022 at 6:16 PM

## 2022-12-31 NOTE — DISCHARGE SUMMARY
Hospitalist Discharge Summary   Admit Date:  2022  3:24 PM   DC Note date: 2022  Name:  Randell Cosby   Age:  65 y.o. Sex:  male  :  1965   MRN:  [id number]   Room:  [facility]  PCP:  None None    Presenting Complaint: Emesis     Initial Admission Diagnosis: Septicemia ([address] [A41.9]  SIRS (systemic inflammatory response syndrome) (HCC) [R65.10]  Abdominal pain, unspecified abdominal location [R10.9]  Swelling of lower extremity [M79.89]     Problem List for this Hospitalization (present on admission):    Principal Problem:    Cirrhosis of liver with ascites ([address]  Active Problems:    Alcohol dependence ([address]    Diabetic peripheral neuropathy ([address]    Essential hypertension    Hyperlipidemia    Type 2 diabetes mellitus ([address]    Diabetic ulcer of both feet associated with type 2 diabetes mellitus ([address]    Acute kidney injury superimposed on CKD (HCC)    PAD (peripheral artery disease) (HCC)    Hypoalbuminemia    Normocytic anemia    SIRS (systemic inflammatory response syndrome) (HCC)    Abdominal pain    Moderate protein malnutrition (HCC)    Cholelithiases    Positive blood culture    Partial small bowel obstruction (HCC)    Stage 3a chronic kidney disease ([address]  Resolved Problems:    SBP (spontaneous bacterial peritonitis) [facility])      Hospital Course:  Mr. Keisha Palmer is a 64 y/o AAM with a h/o GI bleed who was admitted to our service on  with SIRS and poor oral intake. CT chest done for elevated d-dimer neg for PE. KUB showed non-specific bowel gas pattern. Flu and COVID negative. He was started on empiric broad spectrum antibiotics. CTAP 12/15 showed multiple dependent stones in GB with prob cirrhotic liver morphology and ascites. Blood cultures on admission grew CoNS and diphtheroids likely contamination. ID was consulted  and recommended given no signs of infectious peritonitis and blood cultures are likely contaminated that no abx indicated.   Leukocytosis most likely due to partial SBO/ileus which has resolved. Recommended wound care on right leg which wounds appear to be chronic/venous stasis without signs of active infection. ID recommended to stop all antibiotics and signed off 12/16. There was also a concern for partial SBO on admission. NGT placed in ED for decompression. General surgery was consulted and felt his symptoms were likely due to large volume ascites. No surgical intervention recommended. NGT removed and diet advanced. GI consulted for ascites and new cirrhosis. He underwent paracentesis with IR on 12/15 with removal of 7.5L fluid. GI recommended that patient has cholelithiasis on imaging but recommended against surgical consideration as a surgical mortality would be high. If cholecystitis develops, consider cholecystostomy tube. GI signed off 12/16 and patient should follow-up with his primary GI at [facility]. His diuretics were stopped. His Cr worsened and he was given IVF. His Cr improved. UA-and CK within normal limits. ultrasound of the kidney-increased renal echogenicity compatible with chronic medical renal disease. Nephrology signed off. ABLA  No signs of active bleeding  Hb was 6.8 and s/p PRBC  Hb stable around 8-9  Continue Protonix 40 mg twice daily  Monitor H&H daily     Chronic diarrhea   Continue Imodium  stool studies normal  and C diff negative     Mild hyperkalemia  Resolved. Holding Aldactone      Hypomagnesemia  Resolved     # Cirrhosis // large volume ascites              - Para 12/15 with 7.5L removed  S/p paracentesis of 6.2L on 12/27               Mild hyponatremia due to cirrhosis  Resolved     Metabolic acidosis   Continue bicarb tabs po     # Partial SBO              - Resolved. Previously seen by Surgery. NGT out several days ago. # Cholelithiasis              - No s/s of cholecystitis. Prior recs from GI were to consider cholecystostomy tube if that were to develop. # Alcohol abuse              - Cessation advised.  Folic acid.     # DM2 with neuropathy              -Discontinuing SSI as pt BG is well controlled and Hba1c is 5. .     # HTN              - Increased Coreg. on 12/29  # GERD              - PPI     # Chronc LLE diabetic ulcer              - Con't wound care efforts. Disposition:home   Diet: ADULT DIET; Regular; Low Sodium (2 gm); Low Potassium (Less than 3000 mg/day)  Code Status: Full Code    Follow Ups:      Time spent in patient discharge and coordination 30 minutes. Follow up labs/diagnostics (ultimately defer to outpatient provider): Follow-up with PCP in 3 to 5 days  Follow-up with GI in 1 month  Follow-up with nephrology in 1 month      Plan was discussed with patient. All questions answered. Patient was stable at time of discharge. Instructions given to call a physician or return if any concerns.     Current Discharge Medication List        CONTINUE these medications which have NOT CHANGED    Details   acetaminophen (TYLENOL) 325 MG tablet Take 650 mg by mouth every 6 hours as needed for Pain.      naloxone 4 MG/0.1ML LIQD nasal spray 1 SPRAY INTO A NOSTRIL AS NEEDED FOR OPIOID OVERDOSE.      sodium bicarbonate 650 MG tablet Take 2 tablets by mouth 2 times daily  Qty: 60 tablet, Refills: 0      cholestyramine light 4 g packet Take 1 packet by mouth 2 times daily  Qty: 60 packet, Refills: 0      amLODIPine (NORVASC) 10 MG tablet Take 1 tablet by mouth daily  Qty: 30 tablet, Refills: 0      folic acid (FOLVITE) 1 MG tablet Take 1 tablet by mouth daily  Qty: 30 tablet, Refills: 0      vitamin B-12 1000 MCG tablet Take 1 tablet by mouth daily  Qty: 30 tablet, Refills: 0      calcium carb-cholecalciferol 250-125 MG-UNIT TABS Take 1 tablet by mouth daily  Qty: 60 tablet, Refills: 0      pantoprazole (PROTONIX) 40 MG tablet Take 1 tablet by mouth 2 times daily (before meals)  Qty: 30 tablet, Refills: 0      insulin glargine (LANTUS SOLOSTAR) 100 UNIT/ML injection pen Inject 10 Units into the skin daily  Qty: 5 Adjustable Dose Pre-filled Pen Syringe, Refills: 0      glucose monitoring (FREESTYLE FREEDOM) kit 1 kit by Does not apply route daily Check glucose twice daily  Qty: 1 kit, Refills: 0             Procedures done this admission:  * No surgery found *    Consults this admission:  IP CONSULT TO PHARMACY  IP CONSULT TO GENERAL SURGERY  IP CONSULT TO GI  IP WOUND CARE NURSE CONSULT TO EVAL  IP CONSULT TO GI  IP CONSULT TO DIETITIAN  IP CONSULT TO INFECTIOUS DISEASES  IP CONSULT TO DIETITIAN  IP CONSULT TO PHYSICAL MEDICINE REHAB  IP CONSULT TO NEPHROLOGY  IP CONSULT TO INTERVENTIONAL RADIOLOGY  IP CONSULT TO PHYSICAL THERAPY  IP CONSULT TO OCCUPATIONAL THERAPY    Echocardiogram results:  12/13/22    TRANSTHORACIC ECHOCARDIOGRAM (TTE) COMPLETE (CONTRAST/BUBBLE/3D PRN) 12/22/2022  3:43 PM (Final)    Interpretation Summary    Left Ventricle: Normal left ventricular systolic function with a visually estimated EF of 65 - 70%. Left ventricle size is normal. Mildly increased wall thickness. Findings consistent with mild concentric hypertrophy. Normal wall motion. Normal diastolic function for age. Average E/e' ratio is 9.89. Aortic Valve: Moderate sclerosis of the aortic valve, right cusp-with no significant stenosis. Left Atrium: Left atrium is mildly dilated. Extracardiac: Left pleural effusion. Signed by: Timothy Jang MD on 12/22/2022  3:43 PM      Diagnostic Imaging/Tests:   CT ABDOMEN PELVIS WO CONTRAST Additional Contrast? Oral    Result Date: 12/15/2022  1. Ascites. 2. Cholelithiasis. XR CHEST (2 VW)    Result Date: 12/13/2022  1. Diminished lung lines with associated opacification bibasilar atelectasis without focal infiltrative opacity. XR ABDOMEN (KUB) (SINGLE AP VIEW)    Result Date: 12/14/2022  1. The enteric tube tip is in the proximal to mid stomach. 2. Unchanged mildly distended small bowel loops. XR ABDOMEN (KUB) (SINGLE AP VIEW)    Result Date: 12/13/2022  1. Nonspecific bowel gas pattern. Partial or early small bowel obstruction not excluded. 2.  Ascites. CT CHEST PULMONARY EMBOLISM W CONTRAST    Result Date: 12/13/2022  1. No evidence of PE. 2.  Scattered bibasilar atelectasis. 3.  Coronary artery and aortic calcifications. 4.  Ascites. 5.  Gallstones. IR US GUIDED PARACENTESIS    Result Date: 83/97/1864  Uncomplicated ultrasound guided paracentesis. IR US GUIDED PARACENTESIS    Result Date: 75/13/7237  Uncomplicated ultrasound guided paracentesis. US RETROPERITONEAL COMPLETE    Result Date: 12/24/2022  1. Large volume ascites. 2. Increased renal echogenicity. Findings compatible with chronic medical renal disease.        Labs: Results:       BMP, Mg, Phos Recent Labs     12/28/22  1840 12/29/22  0537 12/30/22  0519   NA  --  134 133   K  --  5.0 4.9   CL  --  109 107   CO2  --  18* 18*   ANIONGAP  --  7 8   BUN  --  23 22   CREATININE  --  1.60* 1.50   LABGLOM  --  50* 54*   CALCIUM  --  8.2* 7.9*   GLUCOSE  --  121* 103*   MG 1.6*  --   --    PHOS  --  3.0 3.4      CBC Recent Labs     12/28/22  1840 12/29/22  0537 12/29/22  1721 12/30/22  0519 12/30/22  1755 12/31/22  0524   WBC 9.8 11.3*  --  10.2  --   --    RBC 2.98* 3.29*  --  2.86*  --   --    HGB 8.3* 9.2*   < > 8.0* 8.9* 8.0*   HCT 25.3* 27.2*  --  23.7*  --   --    MCV 84.9 82.7  --  82.9  --   --    MCH 27.9 28.0  --  28.0  --   --    MCHC 32.8 33.8  --  33.8  --   --    RDW 14.9* 14.7*  --  14.6  --   --     232  --  203  --   --    MPV 10.3 10.6  --  10.6  --   --    NRBC 0.00 0.00  --  0.00  --   --    SEGS 60 54  --  54  --   --    LYMPHOPCT 21 28  --  27  --   --    EOSRELPCT 2 2  --  3  --   --    MONOPCT 15* 15*  --  14*  --   --    BASOPCT 1 1  --  1  --   --    IMMGRAN 1 0  --  1  --   --    SEGSABS 6.0 6.1  --  5.6  --   --    LYMPHSABS 2.0 3.1  --  2.7  --   --    EOSABS 0.2 0.3  --  0.3  --   --    MONOSABS 1.4* 1.7*  --  1.4*  --   --    BASOSABS 0.1 0.1  --  0.1  --   -- ABSIMMGRAN 0.1 0.1  --  0.1  --   --     < > = values in this interval not displayed. LFT No results for input(s): BILITOT, BILIDIR, ALKPHOS, AST, ALT, PROT, LABALBU, GLOB in the last 72 hours. Cardiac  Lab Results   Component Value Date/Time    NTPROBNP 1,280 12/13/2022 03:40 PM    TROPHS 12.1 12/13/2022 03:40 PM      Coags Lab Results   Component Value Date/Time    PROTIME 18.1 12/26/2022 04:44 AM    PROTIME 17.5 12/16/2022 05:32 AM    PROTIME 16.6 12/15/2022 11:44 AM    INR 1.5 12/26/2022 04:44 AM    INR 1.4 12/16/2022 05:32 AM    INR 1.3 12/15/2022 11:44 AM      A1c Lab Results   Component Value Date/Time    LABA1C 5.0 12/16/2022 05:32 AM    LABA1C 8.0 09/15/2022 04:16 AM    EAG 97 12/16/2022 05:32 AM     09/15/2022 04:16 AM      Lipids No results found for: CHOL, LDLCALC, LABVLDL, HDL, CHOLHDLRATIO, TRIG   Thyroid  Lab Results   Component Value Date/Time    TSHELE 2.64 09/05/2022 07:44 AM        Most Recent UA Lab Results   Component Value Date/Time    COLORU YELLOW/STRAW 12/24/2022 11:21 AM    APPEARANCE CLEAR 12/24/2022 11:21 AM    SPECGRAV 1.017 12/24/2022 11:21 AM    LABPH 5.5 12/24/2022 11:21 AM    PROTEINU Negative 12/24/2022 11:21 AM    GLUCOSEU Negative 12/24/2022 11:21 AM    KETUA Negative 12/24/2022 11:21 AM    BILIRUBINUR Negative 12/24/2022 11:21 AM    BLOODU Negative 12/24/2022 11:21 AM    UROBILINOGEN 0.2 12/24/2022 11:21 AM    NITRU Negative 12/24/2022 11:21 AM    LEUKOCYTESUR Negative 12/24/2022 11:21 AM    BACTERIA 0 08/31/2022 07:39 PM        Recent Labs     12/27/22  0000 12/24/22  1121 12/15/22  1545 12/15/22  0759 12/13/22  1016   CULTURE No Salmonella, Shigella, or Ecoli 0157 isolated.  50,000-100,000 COLONIES/mL MIXED SKIN XIMENA ISOLATED NO GROWTH 2 DAYS NO GROWTH 5 DAYS  NO GROWTH 5 DAYS NO GROWTH 5 DAYS       All Labs from Last 24 Hrs:  Recent Results (from the past 24 hour(s))   POCT Glucose    Collection Time: 12/30/22  4:32 PM   Result Value Ref Range    POC Glucose 219 (H) 65 - 100 mg/dL    Performed by: Destiny Quiroga    Hemoglobin    Collection Time: 12/30/22  5:55 PM   Result Value Ref Range    Hemoglobin 8.9 (L) 13.6 - 17.2 g/dL   POCT Glucose    Collection Time: 12/30/22  9:01 PM   Result Value Ref Range    POC Glucose 205 (H) 65 - 100 mg/dL    Performed by: Nadia Root    Hemoglobin    Collection Time: 12/31/22  5:24 AM   Result Value Ref Range    Hemoglobin 8.0 (L) 13.6 - 17.2 g/dL   POCT Glucose    Collection Time: 12/31/22 11:58 AM   Result Value Ref Range    POC Glucose 182 (H) 65 - 100 mg/dL    Performed by: Sanjuana        No Known Allergies  Immunization History   Administered Date(s) Administered    Influenza Virus Vaccine 10/16/2017    Influenza, FLUCELVAX, (age 10 mo+), MDCK, MDV, 0.5mL 10/10/2018    PPD Test 09/01/2022, 12/15/2022    Pneumococcal Polysaccharide ([id number]) 10/16/2017    Tdap (Boostrix, Adacel) 10/10/2018       Recent Vital Data:  Patient Vitals for the past 24 hrs:   Temp Pulse Resp BP SpO2   12/31/22 1156 98.1 °F (36.7 °C) 72 18 111/82 99 %   12/31/22 0823 98.2 °F (36.8 °C) 80 19 124/86 100 %   12/31/22 0412 98.1 °F (36.7 °C) 78 18 (!) 120/53 99 %   12/31/22 0003 98.2 °F (36.8 °C) 85 18 (!) 146/97 100 %   12/30/22 1913 97.2 °F (36.2 °C) 81 18 (!) 147/94 100 %   12/30/22 1558 97.9 °F (36.6 °C) 84 16 133/88 100 %       Oxygen Therapy  SpO2: 99 %  Pulse via Oximetry: 87 beats per minute  Pulse Oximeter Device Mode: Intermittent  O2 Device: None (Room air)    Estimated body mass index is 30.87 kg/m² as calculated from the following:    Height as of this encounter: 5' 11\" (1.803 m). Weight as of this encounter: 221 lb 4.8 oz (100.4 kg). Intake/Output Summary (Last 24 hours) at 12/31/2022 1349  Last data filed at 12/31/2022 1019  Gross per 24 hour   Intake 720 ml   Output --   Net 720 ml         Physical Exam:    General:    Well nourished.   No overt distress  Head:  Normocephalic, atraumatic  Eyes:  Sclerae appear normal.  Pupils equally round. HENT:  Nares appear normal, no drainage. Moist mucous membranes  Neck:  No restricted ROM. Trachea midline  CV:   RRR. No m/r/g. No JVD  Lungs:   CTAB. No wheezing, rhonchi, or rales. Respirations even, unlabored  Abdomen:   Soft, nontender, nondistended. Extremities: Warm and dry. No cyanosis or clubbing. No edema. Dressing wound is present on the legs. Skin:     No rashes. Normal coloration  Neuro:  CN II-XII grossly intact. Psych:  Normal mood and affect. Signed:  Nayla Mcknight MD    Part of this note may have been written by using a voice dictation software. The note has been proof read but may still contain some grammatical/other typographical errors.

## 2022-12-31 NOTE — PROGRESS NOTES
Due to lack of updated information regarding this patient d/c is being delayed. Patient waiting on appeal to [facility] and wife said [facility] rehab had contacted her Friday saying he could admit. Wife is wondering why he did not get discharged Friday.

## 2022-12-31 NOTE — PLAN OF CARE
Problem: Discharge Planning  Goal: Discharge to home or other facility with appropriate resources  Outcome: Progressing     Problem: Pain  Goal: Verbalizes/displays adequate comfort level or baseline comfort level  Outcome: Progressing     Problem: Safety - Adult  Goal: Free from fall injury  Outcome: Progressing     Problem: Chronic Conditions and Co-morbidities  Goal: Patient's chronic conditions and co-morbidity symptoms are monitored and maintained or improved  Outcome: Progressing     Problem: Skin/Tissue Integrity  Goal: Absence of new skin breakdown  Description: 1. Monitor for areas of redness and/or skin breakdown  2. Assess vascular access sites hourly  3. Every 4-6 hours minimum:  Change oxygen saturation probe site  4. Every 4-6 hours:  If on nasal continuous positive airway pressure, respiratory therapy assess nares and determine need for appliance change or resting period.   Outcome: Progressing

## 2022-12-31 NOTE — CARE COORDINATION
LMKAMRYN received update from [employer] with [facility] that they have accepted pt for rehab and insurance precert is pending. They hope for approval Monday 1/2/2023.

## 2023-01-01 LAB
GLUCOSE BLD STRIP.AUTO-MCNC: 140 MG/DL (ref 65–100)
GLUCOSE BLD STRIP.AUTO-MCNC: 184 MG/DL (ref 65–100)
GLUCOSE BLD STRIP.AUTO-MCNC: 186 MG/DL (ref 65–100)
GLUCOSE BLD STRIP.AUTO-MCNC: 277 MG/DL (ref 65–100)
SERVICE CMNT-IMP: ABNORMAL

## 2023-01-01 PROCEDURE — 6370000000 HC RX 637 (ALT 250 FOR IP): Performed by: STUDENT IN AN ORGANIZED HEALTH CARE EDUCATION/TRAINING PROGRAM

## 2023-01-01 PROCEDURE — 6370000000 HC RX 637 (ALT 250 FOR IP): Performed by: FAMILY MEDICINE

## 2023-01-01 PROCEDURE — 82962 GLUCOSE BLOOD TEST: CPT

## 2023-01-01 PROCEDURE — 1100000000 HC RM PRIVATE

## 2023-01-01 RX ADMIN — SODIUM BICARBONATE 650 MG TABLET 650 MG: at 13:18

## 2023-01-01 RX ADMIN — SODIUM BICARBONATE 650 MG TABLET 650 MG: at 22:10

## 2023-01-01 RX ADMIN — CARVEDILOL 25 MG: 12.5 TABLET, FILM COATED ORAL at 16:40

## 2023-01-01 RX ADMIN — FOLIC ACID 1 MG: 1 TABLET ORAL at 10:11

## 2023-01-01 RX ADMIN — CARVEDILOL 25 MG: 12.5 TABLET, FILM COATED ORAL at 09:50

## 2023-01-01 RX ADMIN — CHOLESTYRAMINE 4 G: 4 POWDER, FOR SUSPENSION ORAL at 10:11

## 2023-01-01 RX ADMIN — PANTOPRAZOLE SODIUM 40 MG: 40 TABLET, DELAYED RELEASE ORAL at 06:31

## 2023-01-01 RX ADMIN — SODIUM BICARBONATE 650 MG TABLET 650 MG: at 10:13

## 2023-01-01 RX ADMIN — SODIUM BICARBONATE 650 MG TABLET 650 MG: at 16:40

## 2023-01-01 RX ADMIN — CHOLESTYRAMINE 4 G: 4 POWDER, FOR SUSPENSION ORAL at 22:10

## 2023-01-01 RX ADMIN — CYANOCOBALAMIN TAB 1000 MCG 1000 MCG: 1000 TAB at 10:11

## 2023-01-01 ASSESSMENT — PAIN SCALES - GENERAL
PAINLEVEL_OUTOF10: 0
PAINLEVEL_OUTOF10: 0

## 2023-01-01 NOTE — PROGRESS NOTES
Hospitalist Progress Note   Admit Date:  2022  3:24 PM   Name:  Layne De La Vega   Age:  65 y.o. Sex:  male  :  1965   MRN:  [id number]   Room:  5/    Reason(s) for Admission: Septicemia ([address] [A41.9]  SIRS (systemic inflammatory response syndrome) (HCC) [R65.10]  Abdominal pain, unspecified abdominal location [R10.9]  [facility]:   Mr. Ramses Juarez is a 66 y/o AAM with a h/o GI bleed who was admitted to our service on  with SIRS and poor oral intake. CT chest done for elevated d-dimer neg for PE. KUB showed non-specific bowel gas pattern. Flu and COVID negative. He was started on empiric broad spectrum antibiotics. CTAP 12/15 showed multiple dependent stones in GB with prob cirrhotic liver morphology and ascites. Blood cultures on admission grew CoNS and diphtheroids likely contamination. ID was consulted  and recommended given no signs of infectious peritonitis and blood cultures are likely contaminated that no abx indicated. Leukocytosis most likely due to partial SBO/ileus which has resolved. Recommended wound care on right leg which wounds appear to be chronic/venous stasis without signs of active infection. ID recommended to stop all antibiotics and signed off . There was also a concern for partial SBO on admission. NGT placed in ED for decompression. General surgery was consulted and felt his symptoms were likely due to large volume ascites. No surgical intervention recommended. NGT removed and diet advanced. GI consulted for ascites and new cirrhosis. He underwent paracentesis with IR on 12/15 with removal of 7.5L fluid. GI recommended that patient has cholelithiasis on imaging but recommended against surgical consideration as a surgical mortality would be high. If cholecystitis develops, consider cholecystostomy tube.  GI signed off  and patient should follow-up with his primary GI at [facility]. UA-and CK within normal limits   ultrasound of the kidney-increased renal echogenicity compatible with chronic medical renal disease      PT/OT recommended STR. Subjective/24hr Events (01/01/23): Patient stated he was having diarrhea but improving. He was eating well and drinking well. No chest pain or SOB. Assessment & Plan:   # MARS on CKD3a  - Baseline sCr mid 1s, Cr improved 1.5  Hold diuretics, encourage PO intake,compatible with chronic medical renal disease   Nephrology signed off    ABLA  No signs of active bleeding  Hb is 6.8 and s/p PRBC  Hb stable around 8-9  Continue Protonix 40 mg IV twice daily  Monitor H&H daily    Chronic diarrhea   Continue Imodium  stool studies normal  and C diff negative    Mild hyperkalemia  Resolved. Holding Aldactone     Hypomagnesemia  Follow-up with magnesium     # Cirrhosis // large volume ascites   - Para 12/15 with 7.5L removed  S/p paracentesis of 6.2L on 12/27    - Diuretics held as above. GI s/o. Mild hyponatremia due to cirrhosis  Resolved    Metabolic acidosis   Continue bicarb tabs po    # Partial SBO   - Resolved. Previously seen by Surgery. NGT out several days ago. # Cholelithiasis   - No s/s of cholecystitis. Prior recs from GI were to consider cholecystostomy tube if that were to develop. # Alcohol abuse   - Cessation advised. Folic acid. # DM2 with neuropathy   -Discontinuing SSI as pt BG is well controlled and Hba1c is 5. .    # HTN   - Increased Coreg. on 12/29  # GERD   - PPI    # Chronc LLE diabetic ulcer   - Con't wound care efforts. PT/OT recommended STR. Discharge Planning: Medically stable,  [employer] denied the request for [facility]. The pt does have the right to do a pt/family appeal. Pending STR     Diet:  ADULT DIET; Regular; Low Sodium (2 gm);  Low Potassium (Less than 3000 mg/day)  DVT PPx: SCD, ambulation  Code status: Full Code    Hospital Problems             Last Modified POA    * (Principal) Cirrhosis of liver with ascites ([address] 12/16/2022 Yes    Alcohol dependence ([address] 12/14/2022 Yes    Diabetic peripheral neuropathy ([address] 12/14/2022 Yes    Essential hypertension (Chronic) 12/13/2022 Yes    Overview Signed 8/31/2022  8:40 PM by Arminda Ramon DO     Last Assessment & Plan:   Formatting of this note might be different from the original.  Still poor controlled. Continue avoid Cozaar due to acute kidney injury. Started on Norvasc, hydralazine when necessary, clonidine when necessary         Hyperlipidemia 12/13/2022 Yes    Type 2 diabetes mellitus ([address] 12/13/2022 Yes    Overview Signed 9/21/2022  1:48 PM by Ramo Lopez, ATC     Last Assessment & Plan:   Formatting of this note might be different from the original.  Continue Lantus, ISS         Diabetic ulcer of both feet associated with type 2 diabetes mellitus ([address] (Chronic) 12/16/2022 Yes    Acute kidney injury superimposed on CKD ([address] 12/15/2022 Yes    PAD (peripheral artery disease) ([address] 12/13/2022 Yes    Hypoalbuminemia (Chronic) 12/15/2022 Yes    Normocytic anemia 12/15/2022 Yes    Overview Signed 9/21/2022  1:48 PM by Ramo Lopez, ATC     Formatting of this note might be different from the original.  Added automatically from request for surgery [id number]  Formatting of this note might be different from the original.  Added automatically from request for surgery [id number]         SIRS (systemic inflammatory response syndrome) ([address] 12/16/2022 Yes    Abdominal pain 12/14/2022 Yes    Moderate protein malnutrition ([address] 12/15/2022 Yes    Cholelithiases 12/15/2022 Yes    Positive blood culture 12/15/2022 Yes    Partial small bowel obstruction ([address] 12/17/2022 Yes    Stage 3a chronic kidney disease ([address] (Chronic) 12/15/2022 Yes    Overview Signed 9/14/2022  9:02 PM by Harpal Gross MD     With HTN and DM2            Objective:   Patient Vitals for the past 24 hrs:   Temp Pulse Resp BP SpO2   01/01/23 1213 97.9 °F (36.6 °C) 74 18 (!) 141/86 99 %   01/01/23 0723 97.5 °F (36.4 °C) 80 16 103/74 99 %   01/01/23 0511 97.7 °F (36.5 °C) 77 18 109/77 99 %   01/01/23 0006 97.9 °F (36.6 °C) 80 18 122/77 100 %   12/31/22 2024 97.7 °F (36.5 °C) 79 18 120/80 100 %   12/31/22 1804 97.3 °F (36.3 °C) 84 19 (!) 149/97 100 %   12/31/22 1628 98.2 °F (36.8 °C) 76 18 111/79 100 %       Estimated body mass index is 30.87 kg/m² as calculated from the following:    Height as of this encounter: 5' 11\" (1.803 m). Weight as of this encounter: 221 lb 4.8 oz (100.4 kg). No intake or output data in the 24 hours ending 01/01/23 1604          Physical Exam:   Blood pressure (!) 141/86, pulse 74, temperature 97.9 °F (36.6 °C), resp. rate 18, height 5' 11\" (1.803 m), weight 221 lb 4.8 oz (100.4 kg), SpO2 99 %. General:    Well nourished. No overt distress. Appears older than stated age. Pleasant and conversant. Head:  Normocephalic, atraumatic  Eyes:  Sclerae appear normal. Pupils equally round. ENT:  Nares appear normal, no drainage. Moist oral mucosa  Neck:  No restricted ROM. Trachea midline. CV:   RRR. No m/r/g. No jugular venous distension. Lungs:   CTAB. No wheezing, rhonchi, or rales. Respirations even, unlabored. Abdomen: Bowel sounds present. Soft, nontender, mildly distended. 2+ b/l LE pitting edema. Skin:     No rashes and normal coloration. Warm and dry. Extremities Rt leg wound is present  Neuro:  CN II-XII grossly intact. Sensation intact. A&Ox3  Psych:  Normal mood and affect.       I have reviewed ordered lab tests and independently visualized imaging below:    Recent Labs:  Recent Results (from the past 48 hour(s))   POCT Glucose    Collection Time: 12/30/22  4:32 PM   Result Value Ref Range    POC Glucose 219 (H) 65 - 100 mg/dL    Performed by: Steven Quick    Hemoglobin    Collection Time: 12/30/22  5:55 PM   Result Value Ref Range    Hemoglobin 8.9 (L) 13.6 - 17.2 g/dL   POCT Glucose    Collection Time: 12/30/22  9:01 PM   Result Value Ref Range    POC Glucose 205 (H) 65 - 100 mg/dL    Performed by: Gayatri Herrera    Hemoglobin    Collection Time: 12/31/22  5:24 AM   Result Value Ref Range    Hemoglobin 8.0 (L) 13.6 - 17.2 g/dL   POCT Glucose    Collection Time: 12/31/22 11:58 AM   Result Value Ref Range    POC Glucose 182 (H) 65 - 100 mg/dL    Performed by: YeseniarynPCT    POCT Glucose    Collection Time: 12/31/22  4:30 PM   Result Value Ref Range    POC Glucose 175 (H) 65 - 100 mg/dL    Performed by: EdgaramrynPCT    POCT Glucose    Collection Time: 12/31/22  6:07 PM   Result Value Ref Range    POC Glucose 193 (H) 65 - 100 mg/dL    Performed by: YeseniarynPCT    Hemoglobin    Collection Time: 12/31/22  8:09 PM   Result Value Ref Range    Hemoglobin 8.3 (L) 13.6 - 17.2 g/dL   POCT Glucose    Collection Time: 12/31/22  9:49 PM   Result Value Ref Range    POC Glucose 154 (H) 65 - 100 mg/dL    Performed by: Juancarlos    POCT Glucose    Collection Time: 01/01/23  6:54 AM   Result Value Ref Range    POC Glucose 140 (H) 65 - 100 mg/dL    Performed by: Sommer    POCT Glucose    Collection Time: 01/01/23 11:16 AM   Result Value Ref Range    POC Glucose 184 (H) 65 - 100 mg/dL    Performed by: Diomedes    POCT Glucose    Collection Time: 01/01/23  2:48 PM   Result Value Ref Range    POC Glucose 186 (H) 65 - 100 mg/dL    Performed by: Mahsa Larson          Other Studies:  Transthoracic echocardiogram (TTE) complete with contrast, bubble, strain, and 3D PRN    Result Date: 12/22/2022    Left Ventricle: Normal left ventricular systolic function with a visually estimated EF of 65 - 70%. Left ventricle size is normal. Mildly increased wall thickness. Findings consistent with mild concentric hypertrophy. Normal wall motion. Normal diastolic function for age. Average E/e' ratio is 9.89. Aortic Valve: Moderate sclerosis of the aortic valve, right cusp-with no significant stenosis. Left Atrium: Left atrium is mildly dilated.    Extracardiac: Left pleural effusion.        Current Meds:  Current Facility-Administered Medications   Medication Dose Route Frequency    insulin lispro (HUMALOG) injection vial 0-4 Units  0-4 Units SubCUTAneous TID WC    insulin lispro (HUMALOG) injection vial 0-4 Units  0-4 Units SubCUTAneous Nightly    carvedilol (COREG) tablet 25 mg  25 mg Oral BID WC    0.9 % sodium chloride infusion   IntraVENous PRN    loperamide (IMODIUM) capsule 2 mg  2 mg Oral 4x Daily PRN    sodium bicarbonate tablet 650 mg  650 mg Oral 4x Daily    [Held by provider] furosemide (LASIX) injection 40 mg  40 mg IntraVENous BID    [Held by provider] spironolactone (ALDACTONE) tablet 50 mg  50 mg Oral Daily    zolpidem (AMBIEN) tablet 5 mg  5 mg Oral Nightly PRN    pantoprazole (PROTONIX) tablet 40 mg  40 mg Oral QAM AC    diatrizoate meglumine-sodium (GASTROGRAFIN) 66-10 % solution 15 mL  15 mL Oral ONCE PRN    cholestyramine light packet 4 g  4 g Oral BID    folic acid (FOLVITE) tablet 1 mg  1 mg Oral Daily    vitamin B-12 (CYANOCOBALAMIN) tablet 1,000 mcg  1,000 mcg Oral Daily    glucose chewable tablet 16 g  4 tablet Oral PRN    dextrose bolus 10% 125 mL  125 mL IntraVENous PRN    Or    dextrose bolus 10% 250 mL  250 mL IntraVENous PRN    glucagon (rDNA) injection 1 mg  1 mg SubCUTAneous PRN    dextrose 10 % infusion   IntraVENous Continuous PRN    sodium chloride flush 0.9 % injection 5-40 mL  5-40 mL IntraVENous 2 times per day    sodium chloride flush 0.9 % injection 5-40 mL  5-40 mL IntraVENous PRN    0.9 % sodium chloride infusion   IntraVENous PRN    ondansetron (ZOFRAN-ODT) disintegrating tablet 4 mg  4 mg Oral Q8H PRN    Or    ondansetron (ZOFRAN) injection 4 mg  4 mg IntraVENous Q6H PRN    acetaminophen (TYLENOL) tablet 650 mg  650 mg Oral Q6H PRN    Or    acetaminophen (TYLENOL) suppository 650 mg  650 mg Rectal Q6H PRN    hydrALAZINE (APRESOLINE) injection 10 mg  10 mg IntraVENous Q6H PRN    morphine injection 1 mg  1 mg IntraVENous Q4H PRN Signed:  January Drew MD    Part of this note may have been written by using a voice dictation software. The note has been proof read but may still contain some grammatical/other typographical errors.

## 2023-01-01 NOTE — PROGRESS NOTES
Hospitalist Progress Note   Admit Date:  2022  3:24 PM   Name:  Alphia Burkitt   Age:  65 y.o. Sex:  male  :  1965   MRN:  [id number]   Room:  725/    Reason(s) for Admission: Septicemia ([address] [A41.9]  SIRS (systemic inflammatory response syndrome) (HCC) [R65.10]  Abdominal pain, unspecified abdominal location [R10.9]  [facility]:   Mr. Yesenia Anguiano is a 64 y/o AAM with a h/o GI bleed who was admitted to our service on  with SIRS and poor oral intake. CT chest done for elevated d-dimer neg for PE. KUB showed non-specific bowel gas pattern. Flu and COVID negative. He was started on empiric broad spectrum antibiotics. CTAP 12/15 showed multiple dependent stones in GB with prob cirrhotic liver morphology and ascites. Blood cultures on admission grew CoNS and diphtheroids likely contamination. ID was consulted  and recommended given no signs of infectious peritonitis and blood cultures are likely contaminated that no abx indicated. Leukocytosis most likely due to partial SBO/ileus which has resolved. Recommended wound care on right leg which wounds appear to be chronic/venous stasis without signs of active infection. ID recommended to stop all antibiotics and signed off . There was also a concern for partial SBO on admission. NGT placed in ED for decompression. General surgery was consulted and felt his symptoms were likely due to large volume ascites. No surgical intervention recommended. NGT removed and diet advanced. GI consulted for ascites and new cirrhosis. He underwent paracentesis with IR on 12/15 with removal of 7.5L fluid. GI recommended that patient has cholelithiasis on imaging but recommended against surgical consideration as a surgical mortality would be high. If cholecystitis develops, consider cholecystostomy tube.  GI signed off  and patient should follow-up with his primary GI at [facility]. UA-and CK within normal limits   ultrasound of the kidney-increased renal echogenicity compatible with chronic medical renal disease      PT/OT recommended STR. Subjective/24hr Events (01/01/23): Patient stated he was having diarrhea but improving. He was eating well and drinking well. No chest pain or SOB. Assessment & Plan:   # MARS on CKD3a  - Baseline sCr mid 1s, Cr improved 1.5  Hold diuretics, encourage PO intake,compatible with chronic medical renal disease   Nephrology signed off    ABLA  No signs of active bleeding  Hb is 6.8 and s/p PRBC  Hb stable around 8-9  Continue Protonix 40 mg IV twice daily  Monitor H&H daily    Chronic diarrhea   Continue Imodium  stool studies normal  and C diff negative    Mild hyperkalemia  Resolved. Holding Aldactone     Hypomagnesemia  Follow-up with magnesium     # Cirrhosis // large volume ascites   - Para 12/15 with 7.5L removed  S/p paracentesis of 6.2L on 12/27    - Diuretics held as above. GI s/o. Mild hyponatremia due to cirrhosis  Resolved    Metabolic acidosis   Continue bicarb tabs po    # Partial SBO   - Resolved. Previously seen by Surgery. NGT out several days ago. # Cholelithiasis   - No s/s of cholecystitis. Prior recs from GI were to consider cholecystostomy tube if that were to develop. # Alcohol abuse   - Cessation advised. Folic acid. # DM2 with neuropathy   -Discontinuing SSI as pt BG is well controlled and Hba1c is 5. .    # HTN   - Increased Coreg. on 12/29  # GERD   - PPI    # Chronc LLE diabetic ulcer   - Con't wound care efforts. PT/OT recommended STR. Discharge Planning: Medically stable,  [employer] denied the request for [facility]. The pt does have the right to do a pt/family appeal. Pending STR     Diet:  ADULT DIET; Regular; Low Sodium (2 gm);  Low Potassium (Less than 3000 mg/day)  DVT PPx: SCD, ambulation  Code status: Full Code    Hospital Problems             Last Modified POA    * (Principal) Cirrhosis of liver with ascites ([address] 12/16/2022 Yes    Alcohol dependence ([address] 12/14/2022 Yes    Diabetic peripheral neuropathy ([address] 12/14/2022 Yes    Essential hypertension (Chronic) 12/13/2022 Yes    Overview Signed 8/31/2022  8:40 PM by Noemi Smith DO     Last Assessment & Plan:   Formatting of this note might be different from the original.  Still poor controlled. Continue avoid Cozaar due to acute kidney injury. Started on Norvasc, hydralazine when necessary, clonidine when necessary         Hyperlipidemia 12/13/2022 Yes    Type 2 diabetes mellitus ([address] 12/13/2022 Yes    Overview Signed 9/21/2022  1:48 PM by Lisa Griffith ATC     Last Assessment & Plan:   Formatting of this note might be different from the original.  Continue Lantus, ISS         Diabetic ulcer of both feet associated with type 2 diabetes mellitus ([address] (Chronic) 12/16/2022 Yes    Acute kidney injury superimposed on CKD ([address] 12/15/2022 Yes    PAD (peripheral artery disease) ([address] 12/13/2022 Yes    Hypoalbuminemia (Chronic) 12/15/2022 Yes    Normocytic anemia 12/15/2022 Yes    Overview Signed 9/21/2022  1:48 PM by Lisa Griffith ATC     Formatting of this note might be different from the original.  Added automatically from request for surgery [id number]  Formatting of this note might be different from the original.  Added automatically from request for surgery [id number]         SIRS (systemic inflammatory response syndrome) ([address] 12/16/2022 Yes    Abdominal pain 12/14/2022 Yes    Moderate protein malnutrition ([address] 12/15/2022 Yes    Cholelithiases 12/15/2022 Yes    Positive blood culture 12/15/2022 Yes    Partial small bowel obstruction ([address] 12/17/2022 Yes    Stage 3a chronic kidney disease ([address] (Chronic) 12/15/2022 Yes    Overview Signed 9/14/2022  9:02 PM by Mario Bryant MD     With HTN and DM2            Objective:   Patient Vitals for the past 24 hrs:   Temp Pulse Resp BP SpO2   01/01/23 1213 97.9 °F (36.6 °C) 74 18 (!) 141/86 99 %   01/01/23 0723 97.5 °F (36.4 °C) 80 16 103/74 99 %   01/01/23 0511 97.7 °F (36.5 °C) 77 18 109/77 99 %   01/01/23 0006 97.9 °F (36.6 °C) 80 18 122/77 100 %   12/31/22 2024 97.7 °F (36.5 °C) 79 18 120/80 100 %   12/31/22 1804 97.3 °F (36.3 °C) 84 19 (!) 149/97 100 %   12/31/22 1628 98.2 °F (36.8 °C) 76 18 111/79 100 %       Estimated body mass index is 30.87 kg/m² as calculated from the following:    Height as of this encounter: 5' 11\" (1.803 m). Weight as of this encounter: 221 lb 4.8 oz (100.4 kg). No intake or output data in the 24 hours ending 01/01/23 1604          Physical Exam:   Blood pressure (!) 141/86, pulse 74, temperature 97.9 °F (36.6 °C), resp. rate 18, height 5' 11\" (1.803 m), weight 221 lb 4.8 oz (100.4 kg), SpO2 99 %. General:    Well nourished. No overt distress. Appears older than stated age. Pleasant and conversant. Head:  Normocephalic, atraumatic  Eyes:  Sclerae appear normal. Pupils equally round. ENT:  Nares appear normal, no drainage. Moist oral mucosa  Neck:  No restricted ROM. Trachea midline. CV:   RRR. No m/r/g. No jugular venous distension. Lungs:   CTAB. No wheezing, rhonchi, or rales. Respirations even, unlabored. Abdomen: Bowel sounds present. Soft, nontender, mildly distended. 2+ b/l LE pitting edema. Skin:     No rashes and normal coloration. Warm and dry. Extremities Rt leg wound is present  Neuro:  CN II-XII grossly intact. Sensation intact. A&Ox3  Psych:  Normal mood and affect.       I have reviewed ordered lab tests and independently visualized imaging below:    Recent Labs:  Recent Results (from the past 48 hour(s))   POCT Glucose    Collection Time: 12/30/22  4:32 PM   Result Value Ref Range    POC Glucose 219 (H) 65 - 100 mg/dL    Performed by: Marta Parra    Hemoglobin    Collection Time: 12/30/22  5:55 PM   Result Value Ref Range    Hemoglobin 8.9 (L) 13.6 - 17.2 g/dL   POCT Glucose    Collection Time: 12/30/22  9:01 PM   Result Value Ref Range    POC Glucose 205 (H) 65 - 100 mg/dL    Performed by: Lj December    Hemoglobin    Collection Time: 12/31/22  5:24 AM   Result Value Ref Range    Hemoglobin 8.0 (L) 13.6 - 17.2 g/dL   POCT Glucose    Collection Time: 12/31/22 11:58 AM   Result Value Ref Range    POC Glucose 182 (H) 65 - 100 mg/dL    Performed by: YeseniarynPCT    POCT Glucose    Collection Time: 12/31/22  4:30 PM   Result Value Ref Range    POC Glucose 175 (H) 65 - 100 mg/dL    Performed by: YeseniarynPCT    POCT Glucose    Collection Time: 12/31/22  6:07 PM   Result Value Ref Range    POC Glucose 193 (H) 65 - 100 mg/dL    Performed by: YeseniarynPCT    Hemoglobin    Collection Time: 12/31/22  8:09 PM   Result Value Ref Range    Hemoglobin 8.3 (L) 13.6 - 17.2 g/dL   POCT Glucose    Collection Time: 12/31/22  9:49 PM   Result Value Ref Range    POC Glucose 154 (H) 65 - 100 mg/dL    Performed by: Juancarlos    POCT Glucose    Collection Time: 01/01/23  6:54 AM   Result Value Ref Range    POC Glucose 140 (H) 65 - 100 mg/dL    Performed by: Sommer    POCT Glucose    Collection Time: 01/01/23 11:16 AM   Result Value Ref Range    POC Glucose 184 (H) 65 - 100 mg/dL    Performed by: Diomedes    POCT Glucose    Collection Time: 01/01/23  2:48 PM   Result Value Ref Range    POC Glucose 186 (H) 65 - 100 mg/dL    Performed by: Lj Back          Other Studies:  Transthoracic echocardiogram (TTE) complete with contrast, bubble, strain, and 3D PRN    Result Date: 12/22/2022    Left Ventricle: Normal left ventricular systolic function with a visually estimated EF of 65 - 70%. Left ventricle size is normal. Mildly increased wall thickness. Findings consistent with mild concentric hypertrophy. Normal wall motion. Normal diastolic function for age. Average E/e' ratio is 9.89. Aortic Valve: Moderate sclerosis of the aortic valve, right cusp-with no significant stenosis. Left Atrium: Left atrium is mildly dilated.    Extracardiac: Left pleural effusion.        Current Meds:  Current Facility-Administered Medications   Medication Dose Route Frequency    insulin lispro (HUMALOG) injection vial 0-4 Units  0-4 Units SubCUTAneous TID WC    insulin lispro (HUMALOG) injection vial 0-4 Units  0-4 Units SubCUTAneous Nightly    carvedilol (COREG) tablet 25 mg  25 mg Oral BID WC    0.9 % sodium chloride infusion   IntraVENous PRN    loperamide (IMODIUM) capsule 2 mg  2 mg Oral 4x Daily PRN    sodium bicarbonate tablet 650 mg  650 mg Oral 4x Daily    [Held by provider] furosemide (LASIX) injection 40 mg  40 mg IntraVENous BID    [Held by provider] spironolactone (ALDACTONE) tablet 50 mg  50 mg Oral Daily    zolpidem (AMBIEN) tablet 5 mg  5 mg Oral Nightly PRN    pantoprazole (PROTONIX) tablet 40 mg  40 mg Oral QAM AC    diatrizoate meglumine-sodium (GASTROGRAFIN) 66-10 % solution 15 mL  15 mL Oral ONCE PRN    cholestyramine light packet 4 g  4 g Oral BID    folic acid (FOLVITE) tablet 1 mg  1 mg Oral Daily    vitamin B-12 (CYANOCOBALAMIN) tablet 1,000 mcg  1,000 mcg Oral Daily    glucose chewable tablet 16 g  4 tablet Oral PRN    dextrose bolus 10% 125 mL  125 mL IntraVENous PRN    Or    dextrose bolus 10% 250 mL  250 mL IntraVENous PRN    glucagon (rDNA) injection 1 mg  1 mg SubCUTAneous PRN    dextrose 10 % infusion   IntraVENous Continuous PRN    sodium chloride flush 0.9 % injection 5-40 mL  5-40 mL IntraVENous 2 times per day    sodium chloride flush 0.9 % injection 5-40 mL  5-40 mL IntraVENous PRN    0.9 % sodium chloride infusion   IntraVENous PRN    ondansetron (ZOFRAN-ODT) disintegrating tablet 4 mg  4 mg Oral Q8H PRN    Or    ondansetron (ZOFRAN) injection 4 mg  4 mg IntraVENous Q6H PRN    acetaminophen (TYLENOL) tablet 650 mg  650 mg Oral Q6H PRN    Or    acetaminophen (TYLENOL) suppository 650 mg  650 mg Rectal Q6H PRN    hydrALAZINE (APRESOLINE) injection 10 mg  10 mg IntraVENous Q6H PRN    morphine injection 1 mg  1 mg IntraVENous Q4H PRN Signed:  Jessica Rangel MD    Part of this note may have been written by using a voice dictation software. The note has been proof read but may still contain some grammatical/other typographical errors.

## 2023-01-02 LAB
GLUCOSE BLD STRIP.AUTO-MCNC: 179 MG/DL (ref 65–100)
GLUCOSE BLD STRIP.AUTO-MCNC: 181 MG/DL (ref 65–100)
GLUCOSE BLD STRIP.AUTO-MCNC: 242 MG/DL (ref 65–100)
GLUCOSE BLD STRIP.AUTO-MCNC: 252 MG/DL (ref 65–100)
SERVICE CMNT-IMP: ABNORMAL

## 2023-01-02 PROCEDURE — 1100000000 HC RM PRIVATE

## 2023-01-02 PROCEDURE — 82962 GLUCOSE BLOOD TEST: CPT

## 2023-01-02 PROCEDURE — 6370000000 HC RX 637 (ALT 250 FOR IP): Performed by: FAMILY MEDICINE

## 2023-01-02 PROCEDURE — 6370000000 HC RX 637 (ALT 250 FOR IP): Performed by: STUDENT IN AN ORGANIZED HEALTH CARE EDUCATION/TRAINING PROGRAM

## 2023-01-02 PROCEDURE — 6370000000 HC RX 637 (ALT 250 FOR IP): Performed by: HOSPITALIST

## 2023-01-02 RX ADMIN — SODIUM BICARBONATE 650 MG TABLET 650 MG: at 12:02

## 2023-01-02 RX ADMIN — INSULIN LISPRO 2 UNITS: 100 INJECTION, SOLUTION INTRAVENOUS; SUBCUTANEOUS at 12:02

## 2023-01-02 RX ADMIN — PANTOPRAZOLE SODIUM 40 MG: 40 TABLET, DELAYED RELEASE ORAL at 05:15

## 2023-01-02 RX ADMIN — CHOLESTYRAMINE 4 G: 4 POWDER, FOR SUSPENSION ORAL at 08:17

## 2023-01-02 RX ADMIN — ZOLPIDEM TARTRATE 5 MG: 5 TABLET ORAL at 22:41

## 2023-01-02 RX ADMIN — SODIUM BICARBONATE 650 MG TABLET 650 MG: at 21:26

## 2023-01-02 RX ADMIN — CYANOCOBALAMIN TAB 1000 MCG 1000 MCG: 1000 TAB at 08:17

## 2023-01-02 RX ADMIN — SODIUM BICARBONATE 650 MG TABLET 650 MG: at 08:17

## 2023-01-02 RX ADMIN — CHOLESTYRAMINE 4 G: 4 POWDER, FOR SUSPENSION ORAL at 21:26

## 2023-01-02 RX ADMIN — FOLIC ACID 1 MG: 1 TABLET ORAL at 08:17

## 2023-01-02 RX ADMIN — CARVEDILOL 25 MG: 12.5 TABLET, FILM COATED ORAL at 16:50

## 2023-01-02 RX ADMIN — CARVEDILOL 25 MG: 12.5 TABLET, FILM COATED ORAL at 08:17

## 2023-01-02 RX ADMIN — SODIUM BICARBONATE 650 MG TABLET 650 MG: at 16:50

## 2023-01-02 ASSESSMENT — PAIN SCALES - GENERAL: PAINLEVEL_OUTOF10: 0

## 2023-01-02 NOTE — PLAN OF CARE
Problem: Discharge Planning  Goal: Discharge to home or other facility with appropriate resources  Outcome: Progressing  Flowsheets (Taken 1/1/2023 1925)  Discharge to home or other facility with appropriate resources: Identify barriers to discharge with patient and caregiver     Problem: Pain  Goal: Verbalizes/displays adequate comfort level or baseline comfort level  Outcome: Progressing     Problem: Safety - Adult  Goal: Free from fall injury  Outcome: Progressing     Problem: Chronic Conditions and Co-morbidities  Goal: Patient's chronic conditions and co-morbidity symptoms are monitored and maintained or improved  Outcome: Progressing  Flowsheets (Taken 1/1/2023 1925)  Care Plan - Patient's Chronic Conditions and Co-Morbidity Symptoms are Monitored and Maintained or Improved: Monitor and assess patient's chronic conditions and comorbid symptoms for stability, deterioration, or improvement     Problem: Skin/Tissue Integrity  Goal: Absence of new skin breakdown  Description: 1. Monitor for areas of redness and/or skin breakdown  2. Assess vascular access sites hourly  3. Every 4-6 hours minimum:  Change oxygen saturation probe site  4. Every 4-6 hours:  If on nasal continuous positive airway pressure, respiratory therapy assess nares and determine need for appliance change or resting period.   Outcome: Progressing

## 2023-01-02 NOTE — CARE COORDINATION
Pt has been accepted for admission to [address] [place] [address] and Rehab pending insurance approval.  Aida Redding request submitted online today. Reference #[id number]. CM following to facilitate pt's transfer to rehab once insurance approval is received.

## 2023-01-02 NOTE — PROGRESS NOTES
Hospitalist Progress Note   Admit Date:  2022  3:24 PM   Name:  Clara Gil   Age:  65 y.o. Sex:  male  :  1965   MRN:  [id number]   Room:  725/    Reason(s) for Admission: Septicemia ([address] [A41.9]  SIRS (systemic inflammatory response syndrome) (HCC) [R65.10]  Abdominal pain, unspecified abdominal location [R10.9]  [facility]:   Mr. Sunshine Waite is a 66 y/o AAM with a h/o GI bleed who was admitted to our service on  with SIRS and poor oral intake. CT chest done for elevated d-dimer neg for PE. KUB showed non-specific bowel gas pattern. Flu and COVID negative. He was started on empiric broad spectrum antibiotics. CTAP 12/15 showed multiple dependent stones in GB with prob cirrhotic liver morphology and ascites. Blood cultures on admission grew CoNS and diphtheroids likely contamination. ID was consulted  and recommended given no signs of infectious peritonitis and blood cultures are likely contaminated that no abx indicated. Leukocytosis most likely due to partial SBO/ileus which has resolved. Recommended wound care on right leg which wounds appear to be chronic/venous stasis without signs of active infection. ID recommended to stop all antibiotics and signed off . There was also a concern for partial SBO on admission. NGT placed in ED for decompression. General surgery was consulted and felt his symptoms were likely due to large volume ascites. No surgical intervention recommended. NGT removed and diet advanced. GI consulted for ascites and new cirrhosis. He underwent paracentesis with IR on 12/15 with removal of 7.5L fluid. GI recommended that patient has cholelithiasis on imaging but recommended against surgical consideration as a surgical mortality would be high. If cholecystitis develops, consider cholecystostomy tube.  GI signed off  and patient should follow-up with his primary GI at [facility]. UA-and CK within normal limits   ultrasound of the kidney-increased renal echogenicity compatible with chronic medical renal disease      PT/OT recommended STR. Subjective/24hr Events (01/02/23): His diarrhea got resolved. He was eating well and drinking well. No chest pain or SOB. Assessment & Plan:   # MARS on CKD3a  - Baseline sCr mid 1s, Cr improved 1.5  Hold diuretics, encourage PO intake,compatible with chronic medical renal disease   Nephrology signed off    ABLA  No signs of active bleeding  Hb is 6.8 and s/p PRBC  Hb stable around 8-9  Continue Protonix 40 mg IV twice daily  Monitor H&H daily    Chronic diarrhea   Continue Imodium  stool studies normal  and C diff negative    Mild hyperkalemia  Resolved. Holding Aldactone     Hypomagnesemia  Follow-up with magnesium     # Cirrhosis // large volume ascites   - Para 12/15 with 7.5L removed  S/p paracentesis of 6.2L on 12/27    - Diuretics held as above. GI s/o. Mild hyponatremia due to cirrhosis  Resolved    Metabolic acidosis   Continue bicarb tabs po    # Partial SBO   - Resolved. Previously seen by Surgery. NGT out several days ago. # Cholelithiasis   - No s/s of cholecystitis. Prior recs from GI were to consider cholecystostomy tube if that were to develop. # Alcohol abuse   - Cessation advised. Folic acid. # DM2 with neuropathy   -Discontinuing SSI as pt BG is well controlled and Hba1c is 5. .    # HTN   - Increased Coreg. on 12/29  # GERD   - PPI    # Chronc LLE diabetic ulcer   - Con't wound care efforts. PT/OT recommended STR. Discharge Planning: Medically stable,  [employer] denied the request for [facility]. The pt does have the right to do a pt/family appeal. Pending STR     Diet:  ADULT DIET; Regular; Low Sodium (2 gm);  Low Potassium (Less than 3000 mg/day)  DVT PPx: SCD, ambulation  Code status: Full Code    Hospital Problems             Last Modified POA    * (Principal) Cirrhosis of liver with ascites ([facility] 12/16/2022 Yes Alcohol dependence ([address] 12/14/2022 Yes    Diabetic peripheral neuropathy ([address] 12/14/2022 Yes    Essential hypertension (Chronic) 12/13/2022 Yes    Overview Signed 8/31/2022  8:40 PM by Arminda Ramon DO     Last Assessment & Plan:   Formatting of this note might be different from the original.  Still poor controlled. Continue avoid Cozaar due to acute kidney injury. Started on Norvasc, hydralazine when necessary, clonidine when necessary         Hyperlipidemia 12/13/2022 Yes    Type 2 diabetes mellitus ([address] 12/13/2022 Yes    Overview Signed 9/21/2022  1:48 PM by Ramo Lopez, FRITZ     Last Assessment & Plan:   Formatting of this note might be different from the original.  Continue Lantus, ISS         Diabetic ulcer of both feet associated with type 2 diabetes mellitus ([address] (Chronic) 12/16/2022 Yes    Acute kidney injury superimposed on CKD ([address] 12/15/2022 Yes    PAD (peripheral artery disease) ([address] 12/13/2022 Yes    Hypoalbuminemia (Chronic) 12/15/2022 Yes    Normocytic anemia 12/15/2022 Yes    Overview Signed 9/21/2022  1:48 PM by Ramo Lopez ATC     Formatting of this note might be different from the original.  Added automatically from request for surgery [id number]  Formatting of this note might be different from the original.  Added automatically from request for surgery [id number]         SIRS (systemic inflammatory response syndrome) ([address] 12/16/2022 Yes    Abdominal pain 12/14/2022 Yes    Moderate protein malnutrition ([address] 12/15/2022 Yes    Cholelithiases 12/15/2022 Yes    Positive blood culture 12/15/2022 Yes    Partial small bowel obstruction ([address] 12/17/2022 Yes    Stage 3a chronic kidney disease ([address] (Chronic) 12/15/2022 Yes    Overview Signed 9/14/2022  9:02 PM by Harpal Gross MD     With HTN and DM2            Objective:   Patient Vitals for the past 24 hrs:   Temp Pulse Resp BP SpO2   01/02/23 1626 97.2 °F (36.2 °C) (!) 104 18 121/83 91 %   01/02/23 1057 98.2 °F (36.8 °C) 76 20 132/89 100 %   01/02/23 0744 97.9 °F (36.6 °C) 79 21 139/84 100 %   01/02/23 0426 98.6 °F (37 °C) 77 18 118/82 99 %   01/01/23 2324 98.2 °F (36.8 °C) 82 19 128/89 100 %   01/01/23 2021 98.4 °F (36.9 °C) 82 18 133/86 100 %       Estimated body mass index is 30.87 kg/m² as calculated from the following:    Height as of this encounter: 5' 11\" (1.803 m). Weight as of this encounter: 221 lb 4.8 oz (100.4 kg). Intake/Output Summary (Last 24 hours) at 1/2/2023 1659  Last data filed at 1/2/2023 0426  Gross per 24 hour   Intake --   Output 225 ml   Net -225 ml             Physical Exam:   Blood pressure 121/83, pulse (!) 104, temperature 97.2 °F (36.2 °C), resp. rate 18, height 5' 11\" (1.803 m), weight 221 lb 4.8 oz (100.4 kg), SpO2 91 %. General:    Well nourished. No overt distress. Appears older than stated age. Pleasant and conversant. Head:  Normocephalic, atraumatic  Eyes:  Sclerae appear normal. Pupils equally round. ENT:  Nares appear normal, no drainage. Moist oral mucosa  Neck:  No restricted ROM. Trachea midline. CV:   RRR. No m/r/g. No jugular venous distension. Lungs:   CTAB. No wheezing, rhonchi, or rales. Respirations even, unlabored. Abdomen: Bowel sounds present. Soft, nontender, mildly distended. 2+ b/l LE pitting edema. Skin:     No rashes and normal coloration. Warm and dry. Extremities Rt leg wound is present  Neuro:  CN II-XII grossly intact. Sensation intact. A&Ox3  Psych:  Normal mood and affect.       I have reviewed ordered lab tests and independently visualized imaging below:    Recent Labs:  Recent Results (from the past 48 hour(s))   POCT Glucose    Collection Time: 12/31/22  6:07 PM   Result Value Ref Range    POC Glucose 193 (H) 65 - 100 mg/dL    Performed by: Sanjuana    Hemoglobin    Collection Time: 12/31/22  8:09 PM   Result Value Ref Range    Hemoglobin 8.3 (L) 13.6 - 17.2 g/dL   POCT Glucose    Collection Time: 12/31/22  9:49 PM   Result Value Ref Range    POC Glucose 154 (H) 65 - 100 mg/dL    Performed by: Juancarlos    POCT Glucose    Collection Time: 01/01/23  6:54 AM   Result Value Ref Range    POC Glucose 140 (H) 65 - 100 mg/dL    Performed by: Sommer    POCT Glucose    Collection Time: 01/01/23 11:16 AM   Result Value Ref Range    POC Glucose 184 (H) 65 - 100 mg/dL    Performed by: WinstonyPCT    POCT Glucose    Collection Time: 01/01/23  2:48 PM   Result Value Ref Range    POC Glucose 186 (H) 65 - 100 mg/dL    Performed by: WinstonyPCT    POCT Glucose    Collection Time: 01/01/23 10:06 PM   Result Value Ref Range    POC Glucose 277 (H) 65 - 100 mg/dL    Performed by: Malachi Trujillo    POCT Glucose    Collection Time: 01/02/23  6:41 AM   Result Value Ref Range    POC Glucose 181 (H) 65 - 100 mg/dL    Performed by: [facility]    POCT Glucose    Collection Time: 01/02/23 10:58 AM   Result Value Ref Range    POC Glucose 252 (H) 65 - 100 mg/dL    Performed by: Yobany    POCT Glucose    Collection Time: 01/02/23  4:26 PM   Result Value Ref Range    POC Glucose 179 (H) 65 - 100 mg/dL    Performed by: Radha          Other Studies:  Transthoracic echocardiogram (TTE) complete with contrast, bubble, strain, and 3D PRN    Result Date: 12/22/2022    Left Ventricle: Normal left ventricular systolic function with a visually estimated EF of 65 - 70%. Left ventricle size is normal. Mildly increased wall thickness. Findings consistent with mild concentric hypertrophy. Normal wall motion. Normal diastolic function for age. Average E/e' ratio is 9.89. Aortic Valve: Moderate sclerosis of the aortic valve, right cusp-with no significant stenosis. Left Atrium: Left atrium is mildly dilated. Extracardiac: Left pleural effusion.        Current Meds:  Current Facility-Administered Medications   Medication Dose Route Frequency    insulin lispro (HUMALOG) injection vial 0-4 Units  0-4 Units SubCUTAneous TID     insulin lispro (HUMALOG) injection vial 0-4 Units  0-4 Units SubCUTAneous Nightly    carvedilol (COREG) tablet 25 mg  25 mg Oral BID WC    0.9 % sodium chloride infusion   IntraVENous PRN    loperamide (IMODIUM) capsule 2 mg  2 mg Oral 4x Daily PRN    sodium bicarbonate tablet 650 mg  650 mg Oral 4x Daily    [Held by provider] furosemide (LASIX) injection 40 mg  40 mg IntraVENous BID    [Held by provider] spironolactone (ALDACTONE) tablet 50 mg  50 mg Oral Daily    zolpidem (AMBIEN) tablet 5 mg  5 mg Oral Nightly PRN    pantoprazole (PROTONIX) tablet 40 mg  40 mg Oral QAM AC    diatrizoate meglumine-sodium (GASTROGRAFIN) 66-10 % solution 15 mL  15 mL Oral ONCE PRN    cholestyramine light packet 4 g  4 g Oral BID    folic acid (FOLVITE) tablet 1 mg  1 mg Oral Daily    vitamin B-12 (CYANOCOBALAMIN) tablet 1,000 mcg  1,000 mcg Oral Daily    glucose chewable tablet 16 g  4 tablet Oral PRN    dextrose bolus 10% 125 mL  125 mL IntraVENous PRN    Or    dextrose bolus 10% 250 mL  250 mL IntraVENous PRN    glucagon (rDNA) injection 1 mg  1 mg SubCUTAneous PRN    dextrose 10 % infusion   IntraVENous Continuous PRN    sodium chloride flush 0.9 % injection 5-40 mL  5-40 mL IntraVENous 2 times per day    sodium chloride flush 0.9 % injection 5-40 mL  5-40 mL IntraVENous PRN    0.9 % sodium chloride infusion   IntraVENous PRN    ondansetron (ZOFRAN-ODT) disintegrating tablet 4 mg  4 mg Oral Q8H PRN    Or    ondansetron (ZOFRAN) injection 4 mg  4 mg IntraVENous Q6H PRN    acetaminophen (TYLENOL) tablet 650 mg  650 mg Oral Q6H PRN    Or    acetaminophen (TYLENOL) suppository 650 mg  650 mg Rectal Q6H PRN    hydrALAZINE (APRESOLINE) injection 10 mg  10 mg IntraVENous Q6H PRN    morphine injection 1 mg  1 mg IntraVENous Q4H PRN       Signed:  Diane Weiner MD    Part of this note may have been written by using a voice dictation software. The note has been proof read but may still contain some grammatical/other typographical errors.

## 2023-01-03 VITALS
OXYGEN SATURATION: 100 % | RESPIRATION RATE: 19 BRPM | WEIGHT: 230.2 LBS | BODY MASS INDEX: 32.23 KG/M2 | HEIGHT: 71 IN | SYSTOLIC BLOOD PRESSURE: 131 MMHG | TEMPERATURE: 97.5 F | DIASTOLIC BLOOD PRESSURE: 89 MMHG | HEART RATE: 71 BPM

## 2023-01-03 LAB
ANION GAP SERPL CALC-SCNC: 5 MMOL/L (ref 2–11)
BASOPHILS # BLD: 0.1 K/UL (ref 0–0.2)
BASOPHILS NFR BLD: 1 % (ref 0–2)
BUN SERPL-MCNC: 24 MG/DL (ref 6–23)
CALCIUM SERPL-MCNC: 7.6 MG/DL (ref 8.3–10.4)
CHLORIDE SERPL-SCNC: 108 MMOL/L (ref 101–110)
CO2 SERPL-SCNC: 17 MMOL/L (ref 21–32)
CREAT SERPL-MCNC: 1.8 MG/DL (ref 0.8–1.5)
DIFFERENTIAL METHOD BLD: ABNORMAL
EOSINOPHIL # BLD: 0.2 K/UL (ref 0–0.8)
EOSINOPHIL NFR BLD: 2 % (ref 0.5–7.8)
ERYTHROCYTE [DISTWIDTH] IN BLOOD BY AUTOMATED COUNT: 14 % (ref 11.9–14.6)
GLUCOSE BLD STRIP.AUTO-MCNC: 158 MG/DL (ref 65–100)
GLUCOSE BLD STRIP.AUTO-MCNC: 177 MG/DL (ref 65–100)
GLUCOSE BLD STRIP.AUTO-MCNC: 191 MG/DL (ref 65–100)
GLUCOSE SERPL-MCNC: 173 MG/DL (ref 65–100)
HCT VFR BLD AUTO: 23.1 % (ref 41.1–50.3)
HGB BLD-MCNC: 7.7 G/DL (ref 13.6–17.2)
IMM GRANULOCYTES # BLD AUTO: 0.1 K/UL (ref 0–0.5)
IMM GRANULOCYTES NFR BLD AUTO: 1 % (ref 0–5)
LYMPHOCYTES # BLD: 2.3 K/UL (ref 0.5–4.6)
LYMPHOCYTES NFR BLD: 26 % (ref 13–44)
MCH RBC QN AUTO: 27.7 PG (ref 26.1–32.9)
MCHC RBC AUTO-ENTMCNC: 33.3 G/DL (ref 31.4–35)
MCV RBC AUTO: 83.1 FL (ref 82–102)
MONOCYTES # BLD: 1.1 K/UL (ref 0.1–1.3)
MONOCYTES NFR BLD: 13 % (ref 4–12)
NEUTS SEG # BLD: 5 K/UL (ref 1.7–8.2)
NEUTS SEG NFR BLD: 57 % (ref 43–78)
NRBC # BLD: 0 K/UL (ref 0–0.2)
PHOSPHATE SERPL-MCNC: 3.8 MG/DL (ref 2.5–4.5)
PLATELET # BLD AUTO: 205 K/UL (ref 150–450)
PMV BLD AUTO: 10.8 FL (ref 9.4–12.3)
POTASSIUM SERPL-SCNC: 4.3 MMOL/L (ref 3.5–5.1)
RBC # BLD AUTO: 2.78 M/UL (ref 4.23–5.6)
SARS-COV-2 RDRP RESP QL NAA+PROBE: NOT DETECTED
SERVICE CMNT-IMP: ABNORMAL
SODIUM SERPL-SCNC: 130 MMOL/L (ref 133–143)
SOURCE: NORMAL
WBC # BLD AUTO: 8.7 K/UL (ref 4.3–11.1)

## 2023-01-03 PROCEDURE — 87635 SARS-COV-2 COVID-19 AMP PRB: CPT

## 2023-01-03 PROCEDURE — 6370000000 HC RX 637 (ALT 250 FOR IP): Performed by: STUDENT IN AN ORGANIZED HEALTH CARE EDUCATION/TRAINING PROGRAM

## 2023-01-03 PROCEDURE — 36415 COLL VENOUS BLD VENIPUNCTURE: CPT

## 2023-01-03 PROCEDURE — 84100 ASSAY OF PHOSPHORUS: CPT

## 2023-01-03 PROCEDURE — 85025 COMPLETE CBC W/AUTO DIFF WBC: CPT

## 2023-01-03 PROCEDURE — 82962 GLUCOSE BLOOD TEST: CPT

## 2023-01-03 PROCEDURE — 6370000000 HC RX 637 (ALT 250 FOR IP): Performed by: FAMILY MEDICINE

## 2023-01-03 PROCEDURE — 97530 THERAPEUTIC ACTIVITIES: CPT

## 2023-01-03 PROCEDURE — 80048 BASIC METABOLIC PNL TOTAL CA: CPT

## 2023-01-03 RX ORDER — FUROSEMIDE 40 MG/1
40 TABLET ORAL 2 TIMES DAILY
Qty: 60 TABLET | Refills: 0 | Status: SHIPPED | OUTPATIENT
Start: 2023-01-03

## 2023-01-03 RX ORDER — FUROSEMIDE 40 MG/1
40 TABLET ORAL 2 TIMES DAILY
Status: DISCONTINUED | OUTPATIENT
Start: 2023-01-03 | End: 2023-01-03 | Stop reason: HOSPADM

## 2023-01-03 RX ORDER — FUROSEMIDE 10 MG/ML
20 INJECTION INTRAMUSCULAR; INTRAVENOUS 2 TIMES DAILY
Status: DISCONTINUED | OUTPATIENT
Start: 2023-01-03 | End: 2023-01-03

## 2023-01-03 RX ADMIN — CHOLESTYRAMINE 4 G: 4 POWDER, FOR SUSPENSION ORAL at 09:40

## 2023-01-03 RX ADMIN — PANTOPRAZOLE SODIUM 40 MG: 40 TABLET, DELAYED RELEASE ORAL at 05:22

## 2023-01-03 RX ADMIN — FOLIC ACID 1 MG: 1 TABLET ORAL at 09:40

## 2023-01-03 RX ADMIN — SODIUM BICARBONATE 650 MG TABLET 650 MG: at 13:20

## 2023-01-03 RX ADMIN — CARVEDILOL 25 MG: 12.5 TABLET, FILM COATED ORAL at 09:40

## 2023-01-03 RX ADMIN — CYANOCOBALAMIN TAB 1000 MCG 1000 MCG: 1000 TAB at 09:40

## 2023-01-03 RX ADMIN — SODIUM BICARBONATE 650 MG TABLET 650 MG: at 09:43

## 2023-01-03 NOTE — CARE COORDINATION
PT updated CM regarding patient's current evaluation/recommendation. PT recommendation remains for [facility] PT/OT and BSC at discharge. Patient ambulated 500 feet today with standby assist.  Therapist notes that patient has a rolling walker at home, but would benefit from [facility]. Attending notified via [employer].

## 2023-01-03 NOTE — PLAN OF CARE
Problem: Discharge Planning  Goal: Discharge to home or other facility with appropriate resources  Outcome: Progressing  Flowsheets (Taken 1/2/2023 1919)  Discharge to home or other facility with appropriate resources: Identify barriers to discharge with patient and caregiver     Problem: Pain  Goal: Verbalizes/displays adequate comfort level or baseline comfort level  Outcome: Progressing     Problem: Safety - Adult  Goal: Free from fall injury  Outcome: Progressing     Problem: Chronic Conditions and Co-morbidities  Goal: Patient's chronic conditions and co-morbidity symptoms are monitored and maintained or improved  Outcome: Progressing  Flowsheets (Taken 1/2/2023 1919)  Care Plan - Patient's Chronic Conditions and Co-Morbidity Symptoms are Monitored and Maintained or Improved: Monitor and assess patient's chronic conditions and comorbid symptoms for stability, deterioration, or improvement     Problem: Skin/Tissue Integrity  Goal: Absence of new skin breakdown  Description: 1. Monitor for areas of redness and/or skin breakdown  2. Assess vascular access sites hourly  3. Every 4-6 hours minimum:  Change oxygen saturation probe site  4. Every 4-6 hours:  If on nasal continuous positive airway pressure, respiratory therapy assess nares and determine need for appliance change or resting period.   Outcome: Progressing

## 2023-01-03 NOTE — PROGRESS NOTES
ACUTE PHYSICAL THERAPY GOALS:   (Developed with and agreed upon by patient and/or caregiver.)  Pt will perform supine to/from sit with mod I in 7 treatment days. Pt will perform sit to/from stand with mod I and LRAD in 7 treatment days. Pt will ambulate 350 ft with mod I and LRAD in 7 treatment days. Pt will negotiate 2 stairs with mod I and rail in 7 treatment days. Pt will be independent with HEP in 7 days. PHYSICAL THERAPY: Daily Note AM   (Link to Caseload Tracking: PT Visit Days : 2  Time In/Out PT Charge Capture  Rehab Caseload Tracker  Orders    Clara Gil is a 65 y.o. male   PRIMARY DIAGNOSIS: Cirrhosis of liver with ascites ([address]  Septicemia ([address] [A41.9]  SIRS (systemic inflammatory response syndrome) (HCC) [R65.10]  Abdominal pain, unspecified abdominal location [R10.9]  Swelling of lower extremity [M79.89]       Inpatient: Payor: Ayesha Howell / Plan: Karyn Zhang / Product Type: *No Product type* /     ASSESSMENT:     REHAB RECOMMENDATIONS:   Recommendation to date pending progress:  Setting:  [facility] 13:    3 in 1 Bedside Commode     ASSESSMENT:  Mr. Sunshine Waite was sitting up in recliner chair upon contact and agreeable to PT. Today, pt cont to demonstrate good progress toward established goals. This session, pt requiring CG (A), inc time and cueing for all mobility including amb of 500' total c 2-3 standing breaks and use of RW/ gait belt. Pt requiring cues to remain close to RW and showed inc sway/ wide STU although he ultimately had no LOB/ miss-steps. Pt's only concern with returning home was low toilet height; CM informed pt would benefit from [facility] to place over home toilet. DC HHPT Will continue PT efforts.      SUBJECTIVE:   Mr. Sunshine Waite states, [de-identified] I had a higher toilet I'd be fine to go home\"     Social/Functional Lives With: Spouse  Type of Home: House  Home Layout: One level  Home Access: Stairs to enter with rails  Entrance Stairs - Number of Steps: 2  Home Equipment: Vlad [place], [address] Help From: Family  ADL Assistance: Independent  Homemaking Assistance: Needs assistance  Homemaking Responsibilities: Yes  Ambulation Assistance: Independent  Transfer Assistance: Independent  OBJECTIVE:     PAIN: [place] / O2: Emy Shameka / Aylin Tenorio / Radha Thayeras:   Pre Treatment: 0         Post Treatment: 0 Vitals        Oxygen    None    RESTRICTIONS/PRECAUTIONS:  Restrictions/Precautions  Restrictions/Precautions: Fall Risk  Restrictions/Precautions: Fall Risk     MOBILITY: I Mod I S SBA CGA Min Mod Max Total  NT x2 Comments:   Bed Mobility    Rolling [] [] [] [] [] [] [] [] [] [] []    Supine to Sit [] [] [] [] [] [] [] [] [] [] []    Scooting [] [] [] [] [] [] [] [] [] [] []    Sit to Supine [] [] [] [] [] [] [] [] [] [] []    Transfers    Sit to Stand [] [] [] [] [x] [] [] [] [] [] []    Bed to Chair [] [] [] [] [] [] [] [] [] [x] []    Stand to Sit [] [] [] [] [x] [] [] [] [] [] []     [] [] [] [] [] [] [] [] [] [] []    I=Independent, Mod I=Modified Independent, S=Supervision, SBA=Standby Assistance, CGA=Contact Guard Assistance,   Min=Minimal Assistance, Mod=Moderate Assistance, Max=Maximal Assistance, Total=Total Assistance, NT=Not Tested    BALANCE: Good Fair+ Fair Fair- Poor NT Comments   Sitting Static [x] [] [] [] [] []    Sitting Dynamic [x] [] [] [] [] []              Standing Static [] [x] [] [] [] []    Standing Dynamic [] [] [x] [] [] []      GAIT: I Mod I S SBA CGA Min Mod Max Total  NT x2 Comments:   Level of Assistance [] [] [] [] [x] [] [] [] [] [] [] Chair follow   Distance   500' (2-3 standing breaks)    DME Gait Belt and Rolling Walker    Gait Quality Decreased jason , Decreased step clearance, Decreased step length, Decreased stance, Trunk flexion, Trunk sway increased, and Wide base of support    Weightbearing Status      Stairs      I=Independent, Mod I=Modified Independent, S=Supervision, SBA=Standby Assistance, CGA=Contact Guard Assistance,   Min=Minimal Assistance, Mod=Moderate Assistance, Max=Maximal Assistance, Total=Total Assistance, NT=Not Tested    PLAN:   FREQUENCY AND DURATION: 3 times/week for duration of hospital stay or until stated goals are met, whichever comes first.    TREATMENT:   TREATMENT:   Therapeutic Activity (24 Minutes): Therapeutic activity included Scooting, Transfer Training, Ambulation on level ground, Sitting balance , Standing balance, and BLE exercises to improve functional Activity tolerance, Balance, Coordination, Mobility, Strength, and ROM.     TREATMENT GRID:  N/A    AFTER TREATMENT PRECAUTIONS: Bed/Chair Locked, Call light within reach, Chair, Needs within reach, and RN notified    INTERDISCIPLINARY COLLABORATION:  RN/ PCT and PT/ PTA    EDUCATION:      TIME IN/OUT:  Time In: 1320  Time Out: [address]  Minutes: 24    Destiney Stroud PT

## 2023-01-03 NOTE — CARE COORDINATION
Patient discharge plan reviewed in IDT rounds. Insurance authorization was initiated yesterday by Alicia MITCHELL. CM received message today from [address] (on behalf of [facility]) requesting updated clinical notes. Last therapy notes are from 12/30/2022. Message sent to therapist requesting expedited updated therapy documentation/recommendation. Once received, these will be sent to [employer]. Rapid Covid testing ordered at this time.

## 2023-01-03 NOTE — PROGRESS NOTES
Comprehensive Nutrition Assessment    Type and Reason for Visit: Reassess  Poor Intake/Appetite 5 or More Days (Hospitalists)  Diet Education (GI)    Nutrition Recommendations/Plan:   Meals and Snacks:  Diet: Continue current order     Malnutrition Assessment:  Malnutrition Status: Insufficient data (hx of volume fluctuations and volume removal therefore masking any true weight loss PTA)  Pt with slight wasting to clavicles but no other sven signs of fat or muscle wasting noted    Nutrition Assessment:  Nutrition History: Pt reports early satiety and lack of appetite present PTA. Pt reports struggling with PO intake for ~1 month PTA, reports occasionally consuming ONS. Pt estimates UBW to be ~200lb. Do You Have Any Cultural, Jewish, or Ethnic Food Preferences?: No   Nutrition Background:    Pt with PMH significant for GERD, HLD, HTN, PUD, T2DM, ulcers of duodenum, GI bleed who presented to the [facility] 12/13 for cc inability to tolerate PO, emesis, abdominal pain with distention, no flatus or BM since of 1 day duration. Patient was admitted for SIRS with leukocytosis and tachycardia; no source of infection. There was also a concern for partial SBO on admission. NGT placed in 12/13 for decompression. General surgery was suspicious pt's symptoms are more d/t large volume ascites than SBO. No surgical intervention. NGT removed 12/14 and diet started. 12/24- started having diarrhea (still on going as of 12/27) c. Diff pending 12/27: paracentesis 6400 ml removed  Nutrition Interval:  Pt seen in recliner, reports appetite and intake continue at baseline. Pt states he has been consuming 100% of meals, unless fish is being served in which he typically eats 50% of meal. Pt meeting estimated needs via PO intake, no acute needs for RD. Pending discharge, HH vs STR    Current Nutrition Therapies:  ADULT DIET; Regular; Low Sodium (2 gm);  Low Potassium (Less than 3000 mg/day)    Current Intake:   Average Meal Intake: % Average Supplements Intake: None Ordered      Anthropometric Measures:  Height: 5' 11\" (180.3 cm)  Current Body Wt: 230 lb 2.6 oz (104.4 kg) (1/3), Weight source: Bed Scale  BMI: 32.1, Obese Class 1 (BMI 30.0-34. 9)  Admission Body Weight: 200 lb (90.7 kg) (12/13 ; stated weight)  Ideal Body Weight (Kg) (Calculated): 78 kg (172 lbs), 128.7 %  Usual Body Wt: 201 lb (91.2 kg) (5/2021 MD office weight ; limited emr weight hx), Percent weight change: 10.1       BMI Category Obese Class 1 (BMI 30.0-34. 9)    Estimated Daily Nutrient Needs:  Energy (kcal/day): 9716-9561 (15-18kcal/kg) (Kcal/kg Weight used: 100.4 kg Current  Protein (g/day):  (0.8-1g/kg) Weight Used: (Current) 100.4 kg  Fluid (ml/day):   (1 ml/kcal)    Nutrition Diagnosis:   No nutrition diagnosis at this time     Nutrition Interventions:   Food and/or Nutrient Delivery: Continue Current Diet     Coordination of Nutrition Care: Continue to monitor while inpatient, Interdisciplinary Rounds       Goals:   Previous Goal Met: Goal(s) Achieved  Active Goal: other (specify) (Continue to meet >75% of estimated needs via PO intake during admission)       Nutrition Monitoring and Evaluation:      Food/Nutrient Intake Outcomes: Food and Nutrient Intake  Physical Signs/Symptoms Outcomes: Meal Time Behavior, Weight    Discharge Planning:    Continue current diet    Kemal Haas RD

## 2023-01-03 NOTE — DISCHARGE SUMMARY
Hospitalist Discharge Summary   Admit Date:  2022  3:24 PM   DC Note date: 1/3/2023  Name:  Juan Basilio   Age:  65 y.o. Sex:  male  :  1965   MRN:  [id number]   Room:  [facility]  PCP:  None None    Presenting Complaint: Emesis     Initial Admission Diagnosis: Septicemia ([address] [A41.9]  SIRS (systemic inflammatory response syndrome) (HCC) [R65.10]  Abdominal pain, unspecified abdominal location [R10.9]  Swelling of lower extremity [M79.89]     Problem List for this Hospitalization (present on admission):    Principal Problem:    Cirrhosis of liver with ascites ([address]  Active Problems:    Alcohol dependence ([address]    Diabetic peripheral neuropathy ([address]    Essential hypertension    Hyperlipidemia    Type 2 diabetes mellitus ([address]    Diabetic ulcer of both feet associated with type 2 diabetes mellitus ([address]    Acute kidney injury superimposed on CKD (HCC)    PAD (peripheral artery disease) (HCC)    Hypoalbuminemia    Normocytic anemia    SIRS (systemic inflammatory response syndrome) (HCC)    Abdominal pain    Moderate protein malnutrition (HCC)    Cholelithiases    Positive blood culture    Partial small bowel obstruction (HCC)    Stage 3a chronic kidney disease ([address]  Resolved Problems:    SBP (spontaneous bacterial peritonitis) [facility])      Hospital Course:  Mr. Luiz Thornton is a 64 y/o AAM with a h/o GI bleed who was admitted to our service on  with SIRS and poor oral intake. CT chest done for elevated d-dimer neg for PE. KUB showed non-specific bowel gas pattern. Flu and COVID negative. He was started on empiric broad spectrum antibiotics. CTAP 12/15 showed multiple dependent stones in GB with prob cirrhotic liver morphology and ascites. Blood cultures on admission grew CoNS and diphtheroids likely contamination. ID was consulted  and recommended given no signs of infectious peritonitis and blood cultures are likely contaminated that no abx indicated.   Leukocytosis most likely due to partial SBO/ileus which has resolved. Recommended wound care on right leg which wounds appear to be chronic/venous stasis without signs of active infection. ID recommended to stop all antibiotics and signed off 12/16. There was also a concern for partial SBO on admission. NGT placed in ED for decompression. General surgery was consulted and felt his symptoms were likely due to large volume ascites. No surgical intervention recommended. NGT removed and diet advanced. GI consulted for ascites and new cirrhosis. He underwent paracentesis with IR on 12/15 with removal of 7.5L fluid. GI recommended that patient has cholelithiasis on imaging but recommended against surgical consideration as a surgical mortality would be high. If cholecystitis develops, consider cholecystostomy tube. GI signed off 12/16 and patient should follow-up with his primary GI at [facility]. His diuretics were stopped. His Cr worsened and he was given IVF. His Cr improved. UA-and CK within normal limits. ultrasound of the kidney-increased renal echogenicity compatible with chronic medical renal disease. Nephrology signed off. ABLA  No signs of active bleeding  Hb was 6.8 and s/p PRBC  Hb stable around 8-9  Continue Protonix 40 mg twice daily     Chronic diarrhea   Continue Imodium  stool studies normal  and C diff negative     Mild hyperkalemia  Resolved. Holding Aldactone      Hypomagnesemia  Resolved     # Cirrhosis // large volume ascites              - Para 12/15 with 7.5L removed  S/p paracentesis of 6.2L on 12/27               Mild hyponatremia due to cirrhosis  Resolved     Metabolic acidosis   Continue bicarb tabs po     # Partial SBO              - Resolved. Previously seen by Surgery. NGT out several days ago. # Cholelithiasis              - No s/s of cholecystitis. Prior recs from GI were to consider cholecystostomy tube if that were to develop. # Alcohol abuse              - Cessation advised. Folic acid.      # DM2 with neuropathy              -Discontinuing SSI as pt BG is well controlled and Hba1c is 5. .     # HTN              - Increased Coreg. on 12/29  # GERD              - PPI     # Chronc LLE diabetic ulcer              - Con't wound care efforts. Bilateral leg swelling   Started on lasix as his Cr is at baseline. Disposition:home   Diet: ADULT DIET; Regular; Low Sodium (2 gm); Low Potassium (Less than 3000 mg/day)  Code Status: Full Code    Follow Ups:      Time spent in patient discharge and coordination 30 minutes. Follow up labs/diagnostics (ultimately defer to outpatient provider): Follow-up with PCP in 3 to 5 days  Follow-up with GI in 1 month  Follow-up with nephrology in 1 month      Plan was discussed with patient. All questions answered. Patient was stable at time of discharge. Instructions given to call a physician or return if any concerns. Current Discharge Medication List        START taking these medications    Details   !! insulin lispro (HUMALOG) 100 UNIT/ML SOLN injection vial Inject 0-4 Units into the skin 3 times daily (with meals)  Qty: 5 mL, Refills: 0      !! insulin lispro (HUMALOG) 100 UNIT/ML SOLN injection vial Inject 0-4 Units into the skin nightly  Qty: 5 mL, Refills: 0      loperamide (IMODIUM) 2 MG capsule Take 1 capsule by mouth 4 times daily as needed for Diarrhea  Qty: 30 capsule, Refills: 0      carvedilol (COREG) 25 MG tablet Take 0.5 tablets by mouth 2 times daily (with meals)  Qty: 60 tablet, Refills: 3       !! - Potential duplicate medications found. Please discuss with provider.         CONTINUE these medications which have NOT CHANGED    Details   acetaminophen (TYLENOL) 325 MG tablet Take 650 mg by mouth every 6 hours as needed for Pain.      naloxone 4 MG/0.1ML LIQD nasal spray 1 SPRAY INTO A NOSTRIL AS NEEDED FOR OPIOID OVERDOSE.      sodium bicarbonate 650 MG tablet Take 2 tablets by mouth 2 times daily  Qty: 60 tablet, Refills: 0      cholestyramine light 4 g packet Take 1 packet by mouth 2 times daily  Qty: 60 packet, Refills: 0      folic acid (FOLVITE) 1 MG tablet Take 1 tablet by mouth daily  Qty: 30 tablet, Refills: 0      vitamin B-12 1000 MCG tablet Take 1 tablet by mouth daily  Qty: 30 tablet, Refills: 0      calcium carb-cholecalciferol 250-125 MG-UNIT TABS Take 1 tablet by mouth daily  Qty: 60 tablet, Refills: 0      pantoprazole (PROTONIX) 40 MG tablet Take 1 tablet by mouth 2 times daily (before meals)  Qty: 30 tablet, Refills: 0      insulin glargine (LANTUS SOLOSTAR) 100 UNIT/ML injection pen Inject 10 Units into the skin daily  Qty: 5 Adjustable Dose Pre-filled Pen Syringe, Refills: 0      glucose monitoring (FREESTYLE FREEDOM) kit 1 kit by Does not apply route daily Check glucose twice daily  Qty: 1 kit, Refills: 0           STOP taking these medications       amLODIPine (NORVASC) 10 MG tablet Comments:   Reason for Stopping:               Procedures done this admission:  * No surgery found *    Consults this admission:  IP CONSULT TO PHARMACY  IP CONSULT TO GENERAL SURGERY  IP CONSULT TO GI  IP WOUND CARE NURSE CONSULT TO EVAL  IP CONSULT TO GI  IP CONSULT TO DIETITIAN  IP CONSULT TO INFECTIOUS DISEASES  IP CONSULT TO DIETITIAN  IP CONSULT TO PHYSICAL MEDICINE REHAB  IP CONSULT TO NEPHROLOGY  IP CONSULT TO INTERVENTIONAL RADIOLOGY  IP CONSULT TO PHYSICAL THERAPY  IP CONSULT TO OCCUPATIONAL THERAPY    Echocardiogram results:  12/13/22    TRANSTHORACIC ECHOCARDIOGRAM (TTE) COMPLETE (CONTRAST/BUBBLE/3D PRN) 12/22/2022  3:43 PM (Final)    Interpretation Summary    Left Ventricle: Normal left ventricular systolic function with a visually estimated EF of 65 - 70%. Left ventricle size is normal. Mildly increased wall thickness. Findings consistent with mild concentric hypertrophy. Normal wall motion. Normal diastolic function for age. Average E/e' ratio is 9.89. Aortic Valve:  Moderate sclerosis of the aortic valve, right cusp-with no significant stenosis. Left Atrium: Left atrium is mildly dilated. Extracardiac: Left pleural effusion. Signed by: Lisa Linder MD on 12/22/2022  3:43 PM      Diagnostic Imaging/Tests:   CT ABDOMEN PELVIS WO CONTRAST Additional Contrast? Oral    Result Date: 12/15/2022  1. Ascites. 2. Cholelithiasis. XR CHEST (2 VW)    Result Date: 12/13/2022  1. Diminished lung lines with associated opacification bibasilar atelectasis without focal infiltrative opacity. XR ABDOMEN (KUB) (SINGLE AP VIEW)    Result Date: 12/14/2022  1. The enteric tube tip is in the proximal to mid stomach. 2. Unchanged mildly distended small bowel loops. XR ABDOMEN (KUB) (SINGLE AP VIEW)    Result Date: 12/13/2022  1. Nonspecific bowel gas pattern. Partial or early small bowel obstruction not excluded. 2.  Ascites. CT CHEST PULMONARY EMBOLISM W CONTRAST    Result Date: 12/13/2022  1. No evidence of PE. 2.  Scattered bibasilar atelectasis. 3.  Coronary artery and aortic calcifications. 4.  Ascites. 5.  Gallstones. IR US GUIDED PARACENTESIS    Result Date: 40/90/4003  Uncomplicated ultrasound guided paracentesis. IR US GUIDED PARACENTESIS    Result Date: 50/58/5054  Uncomplicated ultrasound guided paracentesis. US RETROPERITONEAL COMPLETE    Result Date: 12/24/2022  1. Large volume ascites. 2. Increased renal echogenicity. Findings compatible with chronic medical renal disease.        Labs: Results:       BMP, Mg, Phos Recent Labs     01/03/23  0507   *   K 4.3      CO2 17*   ANIONGAP 5   BUN 24*   CREATININE 1.80*   LABGLOM 44*   CALCIUM 7.6*   GLUCOSE 173*   PHOS 3.8      CBC Recent Labs     12/31/22 2009 01/03/23  0507   WBC  --  8.7   RBC  --  2.78*   HGB 8.3* 7.7*   HCT  --  23.1*   MCV  --  83.1   MCH  --  27.7   MCHC  --  33.3   RDW  --  14.0   PLT  --  205   MPV  --  10.8   NRBC  --  0.00   SEGS  --  57   LYMPHOPCT  --  26   EOSRELPCT  --  2   MONOPCT  --  13*   BASOPCT  --  1   IMMGRAN  --  1 SEGSABS  --  5.0   LYMPHSABS  --  2.3   EOSABS  --  0.2   MONOSABS  --  1.1   BASOSABS  --  0.1   ABSIMMGRAN  --  0.1      LFT No results for input(s): BILITOT, BILIDIR, ALKPHOS, AST, ALT, PROT, LABALBU, GLOB in the last 72 hours. Cardiac  Lab Results   Component Value Date/Time    NTPROBNP 1,280 12/13/2022 03:40 PM    TROPHS 12.1 12/13/2022 03:40 PM      Coags Lab Results   Component Value Date/Time    PROTIME 18.1 12/26/2022 04:44 AM    PROTIME 17.5 12/16/2022 05:32 AM    PROTIME 16.6 12/15/2022 11:44 AM    INR 1.5 12/26/2022 04:44 AM    INR 1.4 12/16/2022 05:32 AM    INR 1.3 12/15/2022 11:44 AM      A1c Lab Results   Component Value Date/Time    LABA1C 5.0 12/16/2022 05:32 AM    LABA1C 8.0 09/15/2022 04:16 AM    EAG 97 12/16/2022 05:32 AM     09/15/2022 04:16 AM      Lipids No results found for: CHOL, LDLCALC, LABVLDL, HDL, CHOLHDLRATIO, TRIG   Thyroid  Lab Results   Component Value Date/Time    TSHELE 2.64 09/05/2022 07:44 AM        Most Recent UA Lab Results   Component Value Date/Time    COLORU YELLOW/STRAW 12/24/2022 11:21 AM    APPEARANCE CLEAR 12/24/2022 11:21 AM    SPECGRAV 1.017 12/24/2022 11:21 AM    LABPH 5.5 12/24/2022 11:21 AM    PROTEINU Negative 12/24/2022 11:21 AM    GLUCOSEU Negative 12/24/2022 11:21 AM    KETUA Negative 12/24/2022 11:21 AM    BILIRUBINUR Negative 12/24/2022 11:21 AM    BLOODU Negative 12/24/2022 11:21 AM    UROBILINOGEN 0.2 12/24/2022 11:21 AM    NITRU Negative 12/24/2022 11:21 AM    LEUKOCYTESUR Negative 12/24/2022 11:21 AM    BACTERIA 0 08/31/2022 07:39 PM        Recent Labs     12/27/22  0000 12/24/22  1121 12/15/22  1545 12/15/22  0759 12/13/22  6216   CULTURE No Salmonella, Shigella, or Ecoli 0157 isolated.  50,000-100,000 COLONIES/mL MIXED SKIN XIMENA ISOLATED NO GROWTH 2 DAYS NO GROWTH 5 DAYS  NO GROWTH 5 DAYS NO GROWTH 5 DAYS       All Labs from Last 24 Hrs:  Recent Results (from the past 24 hour(s))   POCT Glucose    Collection Time: 01/02/23  4:26 PM   Result Value Ref Range    POC Glucose 179 (H) 65 - 100 mg/dL    Performed by: Radha    POCT Glucose    Collection Time: 01/02/23  9:22 PM   Result Value Ref Range    POC Glucose 242 (H) 65 - 100 mg/dL    Performed by: Hermon Ahumada    CBC with Auto Differential    Collection Time: 01/03/23  5:07 AM   Result Value Ref Range    WBC 8.7 4.3 - 11.1 K/uL    RBC 2.78 (L) 4.23 - 5.6 M/uL    Hemoglobin 7.7 (L) 13.6 - 17.2 g/dL    Hematocrit 23.1 (L) 41.1 - 50.3 %    MCV 83.1 82 - 102 FL    MCH 27.7 26.1 - 32.9 PG    MCHC 33.3 31.4 - 35.0 g/dL    RDW 14.0 11.9 - 14.6 %    Platelets 183 300 - 160 K/uL    MPV 10.8 9.4 - 12.3 FL    nRBC 0.00 0.0 - 0.2 K/uL    Differential Type AUTOMATED      Seg Neutrophils 57 43 - 78 %    Lymphocytes 26 13 - 44 %    Monocytes 13 (H) 4.0 - 12.0 %    Eosinophils % 2 0.5 - 7.8 %    Basophils 1 0.0 - 2.0 %    Immature Granulocytes 1 0.0 - 5.0 %    Segs Absolute 5.0 1.7 - 8.2 K/UL    Absolute Lymph # 2.3 0.5 - 4.6 K/UL    Absolute Mono # 1.1 0.1 - 1.3 K/UL    Absolute Eos # 0.2 0.0 - 0.8 K/UL    Basophils Absolute 0.1 0.0 - 0.2 K/UL    Absolute Immature Granulocyte 0.1 0.0 - 0.5 K/UL   Basic Metabolic Panel w/ Reflex to MG    Collection Time: 01/03/23  5:07 AM   Result Value Ref Range    Sodium 130 (L) 133 - 143 mmol/L    Potassium 4.3 3.5 - 5.1 mmol/L    Chloride 108 101 - 110 mmol/L    CO2 17 (L) 21 - 32 mmol/L    Anion Gap 5 2 - 11 mmol/L    Glucose 173 (H) 65 - 100 mg/dL    BUN 24 (H) 6 - 23 MG/DL    Creatinine 1.80 (H) 0.8 - 1.5 MG/DL    Est, Glom Filt Rate 44 (L) >60 ml/min/1.73m2    Calcium 7.6 (L) 8.3 - 10.4 MG/DL   Phosphorus    Collection Time: 01/03/23  5:07 AM   Result Value Ref Range    Phosphorus 3.8 2.5 - 4.5 MG/DL   POCT Glucose    Collection Time: 01/03/23  6:16 AM   Result Value Ref Range    POC Glucose 158 (H) 65 - 100 mg/dL    Performed by: Salazar    POCT Glucose    Collection Time: 01/03/23 11:10 AM   Result Value Ref Range    POC Glucose 177 (H) 65 - 100 mg/dL Performed by: Lj Back        No Known Allergies  Immunization History   Administered Date(s) Administered    Influenza Virus Vaccine 10/16/2017    Influenza, FLUCELVAX, (age 10 mo+), MDCK, MDV, 0.5mL 10/10/2018    PPD Test 09/01/2022, 12/15/2022    Pneumococcal Polysaccharide ([id number]) 10/16/2017    Tdap (Boostrix, Adacel) 10/10/2018       Recent Vital Data:  Patient Vitals for the past 24 hrs:   Temp Pulse Resp BP SpO2   01/03/23 1143 97.5 °F (36.4 °C) 73 18 109/74 100 %   01/03/23 0709 97.7 °F (36.5 °C) 75 17 98/76 100 %   01/03/23 0529 97.7 °F (36.5 °C) 76 20 (!) 138/93 99 %   01/02/23 2252 97.9 °F (36.6 °C) 81 20 (!) 144/88 99 %   01/02/23 2108 97.7 °F (36.5 °C) 76 18 120/88 100 %   01/02/23 1626 97.2 °F (36.2 °C) (!) 104 18 121/83 91 %       Oxygen Therapy  SpO2: 100 %  Pulse via Oximetry: 87 beats per minute  Pulse Oximeter Device Mode: Intermittent  O2 Device: None (Room air)    Estimated body mass index is 32.11 kg/m² as calculated from the following:    Height as of this encounter: 5' 11\" (1.803 m). Weight as of this encounter: 230 lb 3.2 oz (104.4 kg). Intake/Output Summary (Last 24 hours) at 1/3/2023 1449  Last data filed at 1/2/2023 1824  Gross per 24 hour   Intake 355 ml   Output --   Net 355 ml         Physical Exam:    General:    Well nourished. No overt distress  Head:  Normocephalic, atraumatic  Eyes:  Sclerae appear normal.  Pupils equally round. HENT:  Nares appear normal, no drainage. Moist mucous membranes  Neck:  No restricted ROM. Trachea midline  CV:   RRR. No m/r/g. No JVD  Lungs:   CTAB. No wheezing, rhonchi, or rales. Respirations even, unlabored  Abdomen:   Soft, nontender, nondistended. Extremities: Warm and dry. No cyanosis or clubbing. Bilateral pitting edema is present on the legs. Dressing wound is present on the left leg  Skin:     No rashes. Normal coloration  Neuro:  CN II-XII grossly intact. Psych:  Normal mood and affect.     Signed:  Amie Peterson Dustin Root MD    Part of this note may have been written by using a voice dictation software. The note has been proof read but may still contain some grammatical/other typographical errors.

## 2023-01-03 NOTE — CARE COORDINATION
Patient is medically ready for discharge per attending. CM in to speak with patient at bedside and patient's sister is on speaker phone. Discharge plan reviewed with patient and sister who are agreeable:    1) Order placed for New Michelle PT/OT/RN and referral sent to patient's choice  of [facility] who he had had previously. 2) BSC provided to patient in room, order placed and referral sent to [facility]  3) Referral sent for new PCP through [facility]) with \"urgent\" request.  4) Patient to set up transportation home via personal vehicle and will notify primary RN of  time. CM updated primary RN, [facility].

## 2023-01-04 ENCOUNTER — HOME HEALTH ADMISSION (OUTPATIENT)
Dept: HOME HEALTH SERVICES | Facility: HOME HEALTH | Age: 58
End: 2023-01-04
Payer: MEDICARE

## 2023-01-05 ENCOUNTER — HOME CARE VISIT (OUTPATIENT)
Dept: SCHEDULING | Facility: HOME HEALTH | Age: 58
End: 2023-01-05

## 2023-01-05 VITALS
SYSTOLIC BLOOD PRESSURE: 110 MMHG | HEIGHT: 71 IN | OXYGEN SATURATION: 100 % | HEART RATE: 87 BPM | BODY MASS INDEX: 32.2 KG/M2 | RESPIRATION RATE: 18 BRPM | DIASTOLIC BLOOD PRESSURE: 67 MMHG | WEIGHT: 230 LBS | TEMPERATURE: 98 F

## 2023-01-05 PROCEDURE — 0221000100 HH NO PAY CLAIM PROCEDURE

## 2023-01-05 PROCEDURE — G0299 HHS/HOSPICE OF RN EA 15 MIN: HCPCS

## 2023-01-05 ASSESSMENT — ENCOUNTER SYMPTOMS: DYSPNEA ACTIVITY LEVEL: AFTER AMBULATING 10 - 20 FT

## 2023-01-06 NOTE — ADT AUTH CERT
Patient Demographics    Name Patient ID SSN Gender Identity Birth Date   Talita De Leon [id number]  Male [birth year] (57 yrs)     Address Phone Email Employer    [address] [place] [address] [address]  [phone] (U)   [phone] (M) -- [address] Race Occupation Emp Status    Usha Guthrie / [employer] -- Disabled      Reg Status PCP Date Last Verified Next Review Date    Verified None None 22      Admission Date Discharge Date Admitting Provider     22 Rosario Charter, DO       Marital Status Mu-ism       None        Emergency Contact 1   Todd Fields 21  Pedrito Colunga)     [facility] [de-identified]  CVG Subscriber Name/Sex/Relation Subscriber  Subscriber Address/Phone Subscriber Emp/Emp Phone   1. Venkata Breen   [id number] Kristeen Aase - Male   (Self) 1965 [address] [place] [address],  [facility]   [phone](C) DISABLED    2. Cadence Rose   [id number] Edgard Sy - Female   (Spouse) 1963 [address] [place] [address],  [facility]   [phone](W) OTHER      Utilization Reviews       Clinical 2022 by Kenyatta Celaya RN       Review Entered Review Status   2023 1513 In Primary      Criteria Review    Patient stated he was having diarrhea 6 times yesterday. He was eating well and drinking well. No chest pain or SOB. 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 on . Hold diuretics, encourage PO intake,   UA-and CK within normal limits   ultrasound of the kidney-increased renal echogenicity compatible with chronic medical renal disease   Nephrology following     Chronic diarrhea   Gentle hydration  Started on Imodium     # Cirrhosis // large volume ascites              - Para 12/15 with 7.5L removed. - Diuretics held as above. GI s/o.   Consulted IR for repeat paracentesis     Mild hyponatremia due to cirrhosis  Na of 132 Metabolic acidosis   Started on Bicarb tabs po     # Partial SBO              - Resolved. Previously seen by Surgery. NGT out several days ago. # Cholelithiasis              - No s/s of cholecystitis. Prior recs from GI were to consider cholecystostomy tube if that were to develop. # Alcohol abuse              - Cessation advised. Folic acid. # DM2 with neuropathy              - SSI. # HTN              - Coreg. # GERD              - PPI     # Chronc LLE diabetic ulcer              - Con't wound care efforts. Discharge Planning: Medically stable,  [employer] denied the request for [facility]. The pt does have the right to do a pt/family appeal.     Diet:  ADULT DIET; Regular; Low Sodium (2 gm)  ADULT ORAL NUTRITION SUPPLEMENT; Dinner; Low Calorie/High Protein Oral Supplement  DVT PPx: SCD, ambulation  Code status: Full Code     Physical Exam:   Blood pressure (!) 139/96, pulse 79, temperature 97.7 °F (36.5 °C), temperature source Oral, resp. rate 18, height 5' 11\" (1.803 m), weight 221 lb 4.8 oz (100.4 kg), SpO2 100 %. General:          Well nourished. No overt distress. Appears older than stated age. Pleasant and conversant. Head:               Normocephalic, atraumatic  Eyes:               Sclerae appear normal. Pupils equally round. ENT:                Nares appear normal, no drainage. Moist oral mucosa  Neck:               No restricted ROM. Trachea midline. CV:                  RRR. No m/r/g. No jugular venous distension. Lungs:             CTAB. No wheezing, rhonchi, or rales. Respirations even, unlabored. Abdomen: Bowel sounds present. Soft, nontender, mildly distended. 2+ b/l LE pitting edema. Skin:                No rashes and normal coloration. Warm and dry. Extremities Rt leg wound is present  Neuro:             CN II-XII grossly intact. Sensation intact. A&Ox3  Psych:             Normal mood and affect.      Additional Notes            Clinical 12/26/2022 by Lavern Sicard TERESA Nagel       Review Entered Review Status   1/6/2023 1516 In Primary      Criteria Review   Patient stated he was having diarrhea 6 times yesterday. Imodium is not helping. It was foul smelling. He was eating well and drinking well. No chest pain or SOB. Assessment & Plan:  # MARS on CKD3a  - Baseline sCr mid 1s, stable around 2  Hold diuretics, encourage PO intake,compatible with chronic medical renal disease   Nephrology signed off     Chronic diarrhea   Continue Imodium  F/u stool studies and C diff      Mild hyperkalemia  Potassium was 5.3  Ordered albuterol, insulin and dextrose  Follow-up with BMP in p.m. Holding Aldactone      Hypomagnesemia  Magnesium 1.3  Ordered magnesium 2 g IV  Follow-up with magnesium in p.m. # Cirrhosis // large volume ascites              - Para 12/15 with 7.5L removed. - Diuretics held as above. GI s/o. Consulted IR for repeat paracentesis     Mild hyponatremia due to cirrhosis  Resolved     Metabolic acidosis   Continue bicarb tabs po     # Partial SBO              - Resolved. Previously seen by Surgery. NGT out several days ago. # Cholelithiasis              - No s/s of cholecystitis. Prior recs from GI were to consider cholecystostomy tube if that were to develop. # Alcohol abuse              - Cessation advised. Folic acid. # DM2 with neuropathy              - SSI. # HTN              - Coreg. # GERD              - PPI     # Chronc LLE diabetic ulcer              - Con't wound care efforts. Discharge Planning: Medically stable,  [employer] denied the request for [facility]. The pt does have the right to do a pt/family appeal.     Physical Exam:   Blood pressure (!) 141/81, pulse 82, temperature 98.8 °F (37.1 °C), temperature source Oral, resp. rate 18, height 5' 11\" (1.803 m), weight 221 lb 4.8 oz (100.4 kg), SpO2 100 %. General:          Well nourished. No overt distress. Appears older than stated age. Pleasant and conversant.   Head: Normocephalic, atraumatic  Eyes:               Sclerae appear normal. Pupils equally round. ENT:                Nares appear normal, no drainage. Moist oral mucosa  Neck:               No restricted ROM. Trachea midline. CV:                  RRR. No m/r/g. No jugular venous distension. Lungs:             CTAB. No wheezing, rhonchi, or rales. Respirations even, unlabored. Abdomen: Bowel sounds present. Soft, nontender, mildly distended. 2+ b/l LE pitting edema. Skin:                No rashes and normal coloration. Warm and dry. Extremities Rt leg wound is present  Neuro:             CN II-XII grossly intact. Sensation intact. A&Ox3  Psych:             Normal mood and affect. Systemic or Infectious Condition GRG - Care Day 12 (12/24/2022) by Kenyatta Celaya RN       Review Entered Review Status   1/6/2023 1510 Completed      Criteria Review      Care Day: 12 Care Date: 12/24/2022 Level of Care: Inpatient Floor    Guideline Day 3    Level Of Care    (X) * Activity level acceptable    Clinical Status    (X) * Hemodynamic stability    (X) * Respiratory status acceptable    (X) * Neurologic status acceptable    (X) * Temperature status acceptable    (X) * No infection, or status acceptable    (X) * No neutropenia, or status acceptable    ( ) * Isolation not indicated, or is performable at next level of care    ( ) * Electrolyte status acceptable    ( ) * General Discharge Criteria met    Interventions    (X) * Intake acceptable    ( ) * No inpatient interventions needed    * Milestone   Additional Notes   12/24/2022      ATTENDING NOTE:       Patient stated he was having diarrhea 6 times yesterday. He was eating well and drinking well. No chest pain or SOB. Assessment & Plan:   # MARS on CKD3a               - Baseline sCr mid 1s, increased to 2.2 on 12/24. Follow-up with UA, urine studies   Hold diuretics, encourage PO intake,    repeat BMP tomorrow.     Follow-up with ultrasound of the kidney   Nephrology consulted       # Cirrhosis // large volume ascites               - Para 12/15 with 7.5L removed. - Diuretics held as above. GI s/o. # Partial SBO               - Resolved. Previously seen by Surgery. NGT out several days ago. # Cholelithiasis               - No s/s of cholecystitis. Prior recs from GI were to consider cholecystostomy tube if that were to develop. # Alcohol abuse               - Cessation advised. Folic acid. # DM2 with neuropathy               - SSI. # HTN               - Coreg. # GERD               - PPI       # Chronc LLE diabetic ulcer               - Con't wound care efforts. Discharge Planning: Medically stable,   [employer] denied the request for [facility]. The pt does have the right to do a pt/family appeal.       Diet:  ADULT DIET; Regular; Low Sodium (2 gm)   ADULT ORAL NUTRITION SUPPLEMENT; Dinner; Low Calorie/High Protein Oral Supplement   DVT PPx: SCD, ambulation   Code status: Full Code      Physical Exam:    Blood pressure (!) 126/90, pulse 88, temperature 98.2 °F (36.8 °C), temperature source Oral, resp. rate 18, height 5' 11\" (1.803 m), weight 221 lb 4.8 oz (100.4 kg), SpO2 100 %. General:          Well nourished. No overt distress. Appears older than stated age. Pleasant and conversant. Head:               Normocephalic, atraumatic   Eyes:               Sclerae appear normal. Pupils equally round. ENT:                Nares appear normal, no drainage. Moist oral mucosa   Neck:               No restricted ROM. Trachea midline. CV:                  RRR. No m/r/g. No jugular venous distension. Lungs:             CTAB. No wheezing, rhonchi, or rales. Respirations even, unlabored. Abdomen: Bowel sounds present. Soft, nontender, mildly distended. 2+ b/l LE pitting edema. Skin:                No rashes and normal coloration. Warm and dry.     Extremities Rt leg wound is present Neuro:             CN II-XII grossly intact. Sensation intact. A&Ox3   Psych:             Normal mood and affect. Clinical 1/2/2023 by Guadalupe Iniguez RN       Review Entered Review Status   1/2/2023 1532 In Primary      Criteria Review   His diarrhea got resolved. He was eating well and drinking well. No chest pain or SOB. Assessment & Plan:  # MARS on CKD3a  - Baseline sCr mid 1s, Cr improved 1.5  Hold diuretics, encourage PO intake,compatible with chronic medical renal disease   Nephrology signed off     ABLA  No signs of active bleeding  Hb is 6.8 and s/p PRBC  Hb stable around 8-9  Continue Protonix 40 mg IV twice daily  Monitor H&H daily     Chronic diarrhea   Continue Imodium  stool studies normal  and C diff negative     Mild hyperkalemia  Resolved. Holding Aldactone      Hypomagnesemia  Follow-up with magnesium      # Cirrhosis // large volume ascites              - Para 12/15 with 7.5L removed  S/p paracentesis of 6.2L on 12/27               - Diuretics held as above. GI s/o. Mild hyponatremia due to cirrhosis  Resolved     Metabolic acidosis   Continue bicarb tabs po     # Partial SBO              - Resolved. Previously seen by Surgery. NGT out several days ago. # Cholelithiasis              - No s/s of cholecystitis. Prior recs from GI were to consider cholecystostomy tube if that were to develop. # Alcohol abuse              - Cessation advised. Folic acid. # DM2 with neuropathy              -Discontinuing SSI as pt BG is well controlled and Hba1c is 5. .     # HTN              - Increased Coreg. on 12/29  # GERD              - PPI     # Chronc LLE diabetic ulcer              - Con't wound care efforts. PT/OT recommended STR. Discharge Planning: Medically stable,  [employer] denied the request for [facility]. The pt does have the right to do a pt/family appeal. Pending STR      Diet:  ADULT DIET; Regular; Low Sodium (2 gm);  Low Potassium (Less than 3000 mg/day)  DVT PPx: SCD, ambulation  Code status: Full Code     Physical Exam:   Blood pressure 121/83, pulse (!) 104, temperature 97.2 °F (36.2 °C), resp. rate 18, height 5' 11\" (1.803 m), weight 221 lb 4.8 oz (100.4 kg), SpO2 91 %. General:          Well nourished. No overt distress. Appears older than stated age. Pleasant and conversant. Head:               Normocephalic, atraumatic  Eyes:               Sclerae appear normal. Pupils equally round. ENT:                Nares appear normal, no drainage. Moist oral mucosa  Neck:               No restricted ROM. Trachea midline. CV:                  RRR. No m/r/g. No jugular venous distension. Lungs:             CTAB. No wheezing, rhonchi, or rales. Respirations even, unlabored. Abdomen: Bowel sounds present. Soft, nontender, mildly distended. 2+ b/l LE pitting edema. Skin:                No rashes and normal coloration. Warm and dry. Extremities Rt leg wound is present  Neuro:             CN II-XII grossly intact. Sensation intact. A&Ox3  Psych:             Normal mood and affect. Additional Notes            Clinical 1/1/2023 by Angelina Madrid RN       Review Entered Review Status   1/1/2023 1531 In Primary      Criteria Review   Patient stated he was having diarrhea but improving. He was eating well and drinking well. No chest pain or SOB. Assessment & Plan:  # MARS on CKD3a  - Baseline sCr mid 1s, Cr improved 1.5  Hold diuretics, encourage PO intake,compatible with chronic medical renal disease   Nephrology signed off     ABLA  No signs of active bleeding  Hb is 6.8 and s/p PRBC  Hb stable around 8-9  Continue Protonix 40 mg IV twice daily  Monitor H&H daily     Chronic diarrhea   Continue Imodium  stool studies normal  and C diff negative     Mild hyperkalemia  Resolved.   Holding Aldactone      Hypomagnesemia  Follow-up with magnesium      # Cirrhosis // large volume ascites              - Para 12/15 with 7.5L removed  S/p paracentesis of 6.2L on 12/27 - Diuretics held as above. GI s/o. Mild hyponatremia due to cirrhosis  Resolved     Metabolic acidosis   Continue bicarb tabs po     # Partial SBO              - Resolved. Previously seen by Surgery. NGT out several days ago. # Cholelithiasis              - No s/s of cholecystitis. Prior recs from GI were to consider cholecystostomy tube if that were to develop. # Alcohol abuse              - Cessation advised. Folic acid. # DM2 with neuropathy              -Discontinuing SSI as pt BG is well controlled and Hba1c is 5. .     # HTN              - Increased Coreg. on 12/29  # GERD              - PPI     # Chronc LLE diabetic ulcer              - Con't wound care efforts. PT/OT recommended STR. Discharge Planning: Medically stable,  [employer] denied the request for [facility]. The pt does have the right to do a pt/family appeal. Pending STR      Diet:  ADULT DIET; Regular; Low Sodium (2 gm); Low Potassium (Less than 3000 mg/day)  DVT PPx: SCD, ambulation  Code status: Full Code     Physical Exam:   Blood pressure (!) 141/86, pulse 74, temperature 97.9 °F (36.6 °C), resp. rate 18, height 5' 11\" (1.803 m), weight 221 lb 4.8 oz (100.4 kg), SpO2 99 %. General:          Well nourished. No overt distress. Appears older than stated age. Pleasant and conversant. Head:               Normocephalic, atraumatic  Eyes:               Sclerae appear normal. Pupils equally round. ENT:                Nares appear normal, no drainage. Moist oral mucosa  Neck:               No restricted ROM. Trachea midline. CV:                  RRR. No m/r/g. No jugular venous distension. Lungs:             CTAB. No wheezing, rhonchi, or rales. Respirations even, unlabored. Abdomen: Bowel sounds present. Soft, nontender, mildly distended. 2+ b/l LE pitting edema. Skin:                No rashes and normal coloration. Warm and dry. Extremities Rt leg wound is present  Neuro:             CN II-XII grossly intact. Sensation intact. A&Ox3  Psych:             Normal mood and affect. Clinical 12/31/2022 by Delvis Bush RN       Review Entered Review Status   12/30/2022 1529 In Primary      Criteria Review   Patient stated he was having diarrhea but improving. He was eating well and drinking well. No chest pain or SOB. Assessment & Plan:  # MARS on CKD3a  - Baseline sCr mid 1s, Cr improved 1.5  Hold diuretics, encourage PO intake,compatible with chronic medical renal disease   Nephrology signed off     ABLA  No signs of active bleeding  Hb is 6.8 and s/p PRBC  Hb stable around 8-9  Continue Protonix 40 mg IV twice daily  Monitor H&H daily     Chronic diarrhea   Continue Imodium  stool studies normal  and C diff negative     Mild hyperkalemia  Resolved. Holding Aldactone      Hypomagnesemia  Follow-up with magnesium      # Cirrhosis // large volume ascites              - Para 12/15 with 7.5L removed  S/p paracentesis of 6.2L on 12/27               - Diuretics held as above. GI s/o. Mild hyponatremia due to cirrhosis  Resolved     Metabolic acidosis   Continue bicarb tabs po     # Partial SBO              - Resolved. Previously seen by Surgery. NGT out several days ago. # Cholelithiasis              - No s/s of cholecystitis. Prior recs from GI were to consider cholecystostomy tube if that were to develop. # Alcohol abuse              - Cessation advised. Folic acid. # DM2 with neuropathy              -Discontinuing SSI as pt BG is well controlled and Hba1c is 5. .     # HTN              - Increased Coreg. on 12/29  # GERD              - PPI     # Chronc LLE diabetic ulcer              - Con't wound care efforts. PT/OT recommended STR. Discharge Planning: Medically stable,  [employer] denied the request for [facility]. The pt does have the right to do a pt/family appeal. Pending STR      Diet:  ADULT DIET; Regular; Low Sodium (2 gm);  Low Potassium (Less than 3000 mg/day)  DVT PPx: SCD, ambulation  Code status: Full Code     Physical Exam:   Blood pressure (!) 141/86, pulse 74, temperature 97.9 °F (36.6 °C), resp. rate 18, height 5' 11\" (1.803 m), weight 221 lb 4.8 oz (100.4 kg), SpO2 99 %. General:          Well nourished. No overt distress. Appears older than stated age. Pleasant and conversant. Head:               Normocephalic, atraumatic  Eyes:               Sclerae appear normal. Pupils equally round. ENT:                Nares appear normal, no drainage. Moist oral mucosa  Neck:               No restricted ROM. Trachea midline. CV:                  RRR. No m/r/g. No jugular venous distension. Lungs:             CTAB. No wheezing, rhonchi, or rales. Respirations even, unlabored. Abdomen: Bowel sounds present. Soft, nontender, mildly distended. 2+ b/l LE pitting edema. Skin:                No rashes and normal coloration. Warm and dry. Extremities Rt leg wound is present  Neuro:             CN II-XII grossly intact. Sensation intact. A&Ox3  Psych:             Normal mood and affect. Clinical 12/30 by Jennifer Montenegro RN       Review Entered Review Status   12/30/2022 1528 In Primary      Criteria Review   Patient stated he was having diarrhea but improving. He was eating well and drinking well. No chest pain or SOB. Assessment & Plan:  # MARS on CKD3a  - Baseline sCr mid 1s, Cr improved 1.5  Hold diuretics, encourage PO intake,compatible with chronic medical renal disease   Nephrology signed off     ABLA  No signs of active bleeding  Hb is 6.8 and s/p PRBC  Hb stable around 8-9  Continue Protonix 40 mg IV twice daily  Monitor H&H daily     Chronic diarrhea   Continue Imodium  stool studies normal  and C diff negative     Mild hyperkalemia  Resolved. Holding Aldactone      Hypomagnesemia  Follow-up with magnesium      # Cirrhosis // large volume ascites              - Para 12/15 with 7.5L removed  S/p paracentesis of 6.2L on 12/27               - Diuretics held as above.  GI s/o.     Mild hyponatremia due to cirrhosis  Resolved     Metabolic acidosis   Continue bicarb tabs po     # Partial SBO              - Resolved. Previously seen by Surgery. NGT out several days ago. # Cholelithiasis              - No s/s of cholecystitis. Prior recs from GI were to consider cholecystostomy tube if that were to develop. # Alcohol abuse              - Cessation advised. Folic acid. # DM2 with neuropathy              -Discontinuing SSI as pt BG is well controlled and Hba1c is 5. .     # HTN              - Increased Coreg. on 12/29  # GERD              - PPI     # Chronc LLE diabetic ulcer              - Con't wound care efforts. PT/OT recommended STR. Discharge Planning: Medically stable,  [employer] denied the request for [facility]. The pt does have the right to do a pt/family appeal. Pending STR      Diet:  ADULT DIET; Regular; Low Sodium (2 gm); Low Potassium (Less than 3000 mg/day)  DVT PPx: SCD, ambulation  Code status: Full Code     Physical Exam:   Blood pressure 133/88, pulse 84, temperature 97.9 °F (36.6 °C), temperature source Oral, resp. rate 16, height 5' 11\" (1.803 m), weight 221 lb 4.8 oz (100.4 kg), SpO2 100 %. General:          Well nourished. No overt distress. Appears older than stated age. Pleasant and conversant. Head:               Normocephalic, atraumatic  Eyes:               Sclerae appear normal. Pupils equally round. ENT:                Nares appear normal, no drainage. Moist oral mucosa  Neck:               No restricted ROM. Trachea midline. CV:                  RRR. No m/r/g. No jugular venous distension. Lungs:             CTAB. No wheezing, rhonchi, or rales. Respirations even, unlabored. Abdomen: Bowel sounds present. Soft, nontender, mildly distended. 2+ b/l LE pitting edema. Skin:                No rashes and normal coloration. Warm and dry. Extremities Rt leg wound is present  Neuro:             CN II-XII grossly intact. Sensation intact. A&Ox3  Psych:             Normal mood and affect. Clinical 22 by Jose Alberto Ramsey RN       Review Entered Review Status   2022 1527 In Primary      Criteria Review   Patient stated he was having diarrhea 2 times yesterday. He was tearful and emotional as his cousin . He was eating well and drinking well. No chest pain or SOB. Assessment & Plan:  # MARS on CKD3a  - Baseline sCr mid 1s, Cr improved 1.6  Hold diuretics, encourage PO intake,compatible with chronic medical renal disease   Nephrology signed off     ABLA  No signs of active bleeding  Hb is 6.8 and s/p PRBC  Hb stable around 8-9  Continue Protonix 40 mg IV twice daily  Monitor H&H daily     Chronic diarrhea   Continue Imodium  stool studies normal  and C diff negative     Mild hyperkalemia  Resolved. Holding Aldactone      Hypomagnesemia  Follow-up with magnesium      # Cirrhosis // large volume ascites              - Para 12/15 with 7.5L removed  S/p paracentesis of 6.2L on                - Diuretics held as above. GI s/o. Mild hyponatremia due to cirrhosis  Resolved     Metabolic acidosis   Continue bicarb tabs po     # Partial SBO              - Resolved. Previously seen by Surgery. NGT out several days ago. # Cholelithiasis              - No s/s of cholecystitis. Prior recs from GI were to consider cholecystostomy tube if that were to develop. # Alcohol abuse              - Cessation advised. Folic acid. # DM2 with neuropathy              - SSI. # HTN              - Increased Coreg. on   # GERD              - PPI     # Chronc LLE diabetic ulcer              - Con't wound care efforts. PT/OT recommended STR. Discharge Planning: Medically stable,  [employer] denied the request for [facility]. The pt does have the right to do a pt/family appeal. Pending STR      Diet:  ADULT DIET; Regular; Low Sodium (2 gm);  Low Potassium (Less than 3000 mg/day)  DVT PPx: SCD, ambulation  Code status: Full Code Physical Exam:   Blood pressure (!) 138/92, pulse 80, temperature 98.1 °F (36.7 °C), temperature source Oral, resp. rate 19, height 5' 11\" (1.803 m), weight 221 lb 4.8 oz (100.4 kg), SpO2 100 %. General:          Well nourished. No overt distress. Appears older than stated age. Pleasant and conversant. Head:               Normocephalic, atraumatic  Eyes:               Sclerae appear normal. Pupils equally round. ENT:                Nares appear normal, no drainage. Moist oral mucosa  Neck:               No restricted ROM. Trachea midline. CV:                  RRR. No m/r/g. No jugular venous distension. Lungs:             CTAB. No wheezing, rhonchi, or rales. Respirations even, unlabored. Abdomen: Bowel sounds present. Soft, nontender, mildly distended. 2+ b/l LE pitting edema. Skin:                No rashes and normal coloration. Warm and dry. Extremities Rt leg wound is present  Neuro:             CN II-XII grossly intact. Sensation intact. A&Ox3  Psych:             Normal mood and affect. Additional Notes            Clinical 12/28/2022 by Audrey Maynard RN       Review Entered Review Status   12/28/2022 1526 In Primary      Criteria Review   Patient stated he was having diarrhea 1 time yesterday. Imodium is helping. Margaret Crawford He was eating well and drinking well. No chest pain or SOB. Assessment & Plan:  # MARS on CKD3a  - Baseline sCr mid 1s, Cr improved 1.7  Hold diuretics, encourage PO intake,compatible with chronic medical renal disease   Nephrology signed off     ABLA  No signs of active bleeding  Hb is 6.8   Giving one unit of PRBC  F/U Post transfusion PRBC   Monitor Hb q 12 hrs  Continue Protonix 40 mg IV twice daily  Monitor H&H daily     Chronic diarrhea   Continue Imodium  stool studies normal  and C diff negative     Mild hyperkalemia  Resolved.   Holding Aldactone      Hypomagnesemia  Giving Mg 1 gr iv   Follow-up with magnesium      # Cirrhosis // large volume ascites - Para 12/15 with 7.5L removed  S/p paracentesis of 6.2L on 12/27               - Diuretics held as above. GI s/o. Consulted IR for repeat paracentesis     Mild hyponatremia due to cirrhosis  Resolved     Metabolic acidosis   Continue bicarb tabs po     # Partial SBO              - Resolved. Previously seen by Surgery. NGT out several days ago. # Cholelithiasis              - No s/s of cholecystitis. Prior recs from GI were to consider cholecystostomy tube if that were to develop. # Alcohol abuse              - Cessation advised. Folic acid. # DM2 with neuropathy              - SSI. # HTN              - Increased Coreg. on 12/27     # GERD              - PPI     # Chronc LLE diabetic ulcer              - Con't wound care efforts. PT/OT recommended STR. Discharge Planning: Medically stable,  [employer] denied the request for [facility]. The pt does have the right to do a pt/family appeal.     Diet:  ADULT DIET; Regular; Low Sodium (2 gm); Low Potassium (Less than 3000 mg/day)  DVT PPx: SCD, ambulation  Code status: Full Code     Physical Exam:   Blood pressure (!) 120/90, pulse 87, temperature 98.4 °F (36.9 °C), temperature source Oral, resp. rate 18, height 5' 11\" (1.803 m), weight 221 lb 4.8 oz (100.4 kg), SpO2 100 %. General:          Well nourished. No overt distress. Appears older than stated age. Pleasant and conversant. Head:               Normocephalic, atraumatic  Eyes:               Sclerae appear normal. Pupils equally round. ENT:                Nares appear normal, no drainage. Moist oral mucosa  Neck:               No restricted ROM. Trachea midline. CV:                  RRR. No m/r/g. No jugular venous distension. Lungs:             CTAB. No wheezing, rhonchi, or rales. Respirations even, unlabored. Abdomen: Bowel sounds present. Soft, nontender, mildly distended. 2+ b/l LE pitting edema. Skin:                No rashes and normal coloration. Warm and dry.    Extremities Rt leg wound is present  Neuro:             CN II-XII grossly intact. Sensation intact. A&Ox3  Psych:             Normal mood and affect. Clinical 12/27/2022 by Yocasta Kumar RN       Review Entered Review Status   12/27/2022 1523 In Primary      Criteria Review   Patient stated he was having diarrhea 6 times yesterday. Imodium is not helping. It was foul smelling. He was eating well and drinking well. No chest pain or SOB. Assessment & Plan:  # MARS on CKD3a  - Baseline sCr mid 1s, stable around 2  Hold diuretics, encourage PO intake,compatible with chronic medical renal disease   Nephrology signed off     Chronic diarrhea   Continue Imodium  F/u stool studies and C diff      Mild hyperkalemia  Resolved. Holding Aldactone      Hypomagnesemia  Magnesium 1.6  Ordered magnesium 2 g IV  Follow-up with magnesium in p.m. # Cirrhosis // large volume ascites              - Para 12/15 with 7.5L removed  S/p paracentesis of 6.2L on 12/27               - Diuretics held as above. GI s/o. Consulted IR for repeat paracentesis     Mild hyponatremia due to cirrhosis  Resolved     Metabolic acidosis   Continue bicarb tabs po     # Partial SBO              - Resolved. Previously seen by Surgery. NGT out several days ago. # Cholelithiasis              - No s/s of cholecystitis. Prior recs from GI were to consider cholecystostomy tube if that were to develop. # Alcohol abuse              - Cessation advised. Folic acid. # DM2 with neuropathy              - SSI. # HTN              - Increased Coreg. on 12/27     # GERD              - PPI     # Chronc LLE diabetic ulcer              - Con't wound care efforts. PT/OT recommended STR. Discharge Planning: Medically stable,  [employer] denied the request for [facility]. The pt does have the right to do a pt/family appeal.     Diet:  ADULT DIET; Regular; Low Sodium (2 gm);  Low Potassium (Less than 3000 mg/day)  DVT PPx: SCD, ambulation  Code status: Full Code Physical Exam:   Blood pressure 137/84, pulse 84, temperature 97.3 °F (36.3 °C), temperature source Oral, resp. rate 19, height 5' 11\" (1.803 m), weight 221 lb 4.8 oz (100.4 kg), SpO2 100 %. General:          Well nourished. No overt distress. Appears older than stated age. Pleasant and conversant. Head:               Normocephalic, atraumatic  Eyes:               Sclerae appear normal. Pupils equally round. ENT:                Nares appear normal, no drainage. Moist oral mucosa  Neck:               No restricted ROM. Trachea midline. CV:                  RRR. No m/r/g. No jugular venous distension. Lungs:             CTAB. No wheezing, rhonchi, or rales. Respirations even, unlabored. Abdomen: Bowel sounds present. Soft, nontender, mildly distended. 2+ b/l LE pitting edema. Skin:                No rashes and normal coloration. Warm and dry. Extremities Rt leg wound is present  Neuro:             CN II-XII grossly intact. Sensation intact. A&Ox3  Psych:             Normal mood and affect. ADDITIONAL CLINICAL NOTE by Taylor Barrera RN       Review Entered Review Status   12/25/2022 1511 In Primary      Criteria Review   Patient stated he was having diarrhea 6 times yesterday. He was eating well and drinking well. No chest pain or SOB. Assessment & Plan:  # MARS on CKD3a              - Baseline sCr mid 1s, increased to 2.2 on 12/24. Follow-up with UA, urine studies  Hold diuretics, encourage PO intake,   repeat BMP tomorrow. Follow-up with ultrasound of the kidney  Nephrology consulted     # Cirrhosis // large volume ascites              - Para 12/15 with 7.5L removed. - Diuretics held as above. GI s/o. # Partial SBO              - Resolved. Previously seen by Surgery. NGT out several days ago. # Cholelithiasis              - No s/s of cholecystitis. Prior recs from GI were to consider cholecystostomy tube if that were to develop.      # Alcohol abuse - Cessation advised. Folic acid. # DM2 with neuropathy              - SSI. # HTN              - Coreg. # GERD              - PPI     # Chronc LLE diabetic ulcer              - Con't wound care efforts. Discharge Planning: Medically stable,  [employer] denied the request for [facility]. The pt does have the right to do a pt/family appeal.     Diet:  ADULT DIET; Regular; Low Sodium (2 gm)  ADULT ORAL NUTRITION SUPPLEMENT; Dinner; Low Calorie/High Protein Oral Supplement  DVT PPx: SCD, ambulation  Code status: Full Code     Physical Exam:   Blood pressure (!) 126/90, pulse 88, temperature 98.2 °F (36.8 °C), temperature source Oral, resp. rate 18, height 5' 11\" (1.803 m), weight 221 lb 4.8 oz (100.4 kg), SpO2 100 %. General:          Well nourished. No overt distress. Appears older than stated age. Pleasant and conversant. Head:               Normocephalic, atraumatic  Eyes:               Sclerae appear normal. Pupils equally round. ENT:                Nares appear normal, no drainage. Moist oral mucosa  Neck:               No restricted ROM. Trachea midline. CV:                  RRR. No m/r/g. No jugular venous distension. Lungs:             CTAB. No wheezing, rhonchi, or rales. Respirations even, unlabored. Abdomen: Bowel sounds present. Soft, nontender, mildly distended. 2+ b/l LE pitting edema. Skin:                No rashes and normal coloration. Warm and dry. Extremities Rt leg wound is present  Neuro:             CN II-XII grossly intact. Sensation intact. A&Ox3  Psych:             Normal mood and affect.

## 2023-01-07 ENCOUNTER — HOME CARE VISIT (OUTPATIENT)
Dept: SCHEDULING | Facility: HOME HEALTH | Age: 58
End: 2023-01-07

## 2023-01-07 VITALS
HEART RATE: 104 BPM | RESPIRATION RATE: 16 BRPM | DIASTOLIC BLOOD PRESSURE: 90 MMHG | OXYGEN SATURATION: 100 % | TEMPERATURE: 98.6 F | SYSTOLIC BLOOD PRESSURE: 128 MMHG

## 2023-01-07 VITALS
TEMPERATURE: 97.3 F | HEART RATE: 92 BPM | RESPIRATION RATE: 19 BRPM | SYSTOLIC BLOOD PRESSURE: 128 MMHG | BODY MASS INDEX: 32.08 KG/M2 | DIASTOLIC BLOOD PRESSURE: 82 MMHG | WEIGHT: 230 LBS | OXYGEN SATURATION: 99 %

## 2023-01-07 PROCEDURE — G0299 HHS/HOSPICE OF RN EA 15 MIN: HCPCS

## 2023-01-07 PROCEDURE — G0151 HHCP-SERV OF PT,EA 15 MIN: HCPCS

## 2023-01-07 ASSESSMENT — ENCOUNTER SYMPTOMS
DYSPNEA ACTIVITY LEVEL: AFTER AMBULATING 10 - 20 FT
STOOL DESCRIPTION: FORMED

## 2023-01-09 ENCOUNTER — HOME CARE VISIT (OUTPATIENT)
Dept: HOME HEALTH SERVICES | Facility: HOME HEALTH | Age: 58
End: 2023-01-09

## 2023-01-09 ENCOUNTER — HOME CARE VISIT (OUTPATIENT)
Dept: SCHEDULING | Facility: HOME HEALTH | Age: 58
End: 2023-01-09

## 2023-01-09 NOTE — CASE COMMUNICATION
SN left voicevmessage letting patient know of visit on this day. Arrived at patient's home, but patient wasn't home. SN called voicemail. Voicemail was full. SN called spouse's phone and spouse stated they were at a doctor's appointment.

## 2023-01-10 ENCOUNTER — HOME CARE VISIT (OUTPATIENT)
Dept: SCHEDULING | Facility: HOME HEALTH | Age: 58
End: 2023-01-10

## 2023-01-10 ENCOUNTER — HOSPITAL ENCOUNTER (EMERGENCY)
Age: 58
Discharge: HOME | End: 2023-01-10
Attending: EMERGENCY MEDICINE
Payer: MEDICARE

## 2023-01-10 ENCOUNTER — APPOINTMENT (OUTPATIENT)
Dept: INTERVENTIONAL RADIOLOGY/VASCULAR | Age: 58
End: 2023-01-10
Payer: MEDICARE

## 2023-01-10 ENCOUNTER — HOME CARE VISIT (OUTPATIENT)
Dept: HOME HEALTH SERVICES | Facility: HOME HEALTH | Age: 58
End: 2023-01-10

## 2023-01-10 ENCOUNTER — APPOINTMENT (OUTPATIENT)
Dept: GENERAL RADIOLOGY | Age: 58
End: 2023-01-10
Payer: MEDICARE

## 2023-01-10 VITALS
TEMPERATURE: 98.3 F | OXYGEN SATURATION: 100 % | HEART RATE: 85 BPM | SYSTOLIC BLOOD PRESSURE: 145 MMHG | RESPIRATION RATE: 16 BRPM | DIASTOLIC BLOOD PRESSURE: 83 MMHG

## 2023-01-10 VITALS
RESPIRATION RATE: 17 BRPM | TEMPERATURE: 97.6 F | HEART RATE: 86 BPM | SYSTOLIC BLOOD PRESSURE: 136 MMHG | OXYGEN SATURATION: 100 % | DIASTOLIC BLOOD PRESSURE: 90 MMHG

## 2023-01-10 VITALS
SYSTOLIC BLOOD PRESSURE: 120 MMHG | OXYGEN SATURATION: 98 % | HEART RATE: 96 BPM | RESPIRATION RATE: 24 BRPM | TEMPERATURE: 98 F | DIASTOLIC BLOOD PRESSURE: 90 MMHG

## 2023-01-10 DIAGNOSIS — R60.9 DEPENDENT EDEMA: ICD-10-CM

## 2023-01-10 DIAGNOSIS — R14.0 ABDOMINAL DISTENTION: Primary | ICD-10-CM

## 2023-01-10 DIAGNOSIS — R06.02 SHORTNESS OF BREATH: ICD-10-CM

## 2023-01-10 DIAGNOSIS — R19.7 DIARRHEA, UNSPECIFIED TYPE: ICD-10-CM

## 2023-01-10 LAB
ALBUMIN SERPL-MCNC: 1.7 G/DL (ref 3.5–5)
ALBUMIN/GLOB SERPL: 0.3 (ref 0.4–1.6)
ALP SERPL-CCNC: 142 U/L (ref 50–136)
ALT SERPL-CCNC: 17 U/L (ref 12–65)
ANION GAP SERPL CALC-SCNC: 7 MMOL/L (ref 2–11)
AST SERPL-CCNC: 29 U/L (ref 15–37)
BASOPHILS # BLD: 0.1 K/UL (ref 0–0.2)
BASOPHILS NFR BLD: 1 % (ref 0–2)
BILIRUB SERPL-MCNC: 0.3 MG/DL (ref 0.2–1.1)
BUN SERPL-MCNC: 22 MG/DL (ref 6–23)
CALCIUM SERPL-MCNC: 8.5 MG/DL (ref 8.3–10.4)
CHLORIDE SERPL-SCNC: 110 MMOL/L (ref 101–110)
CO2 SERPL-SCNC: 18 MMOL/L (ref 21–32)
CREAT SERPL-MCNC: 1.6 MG/DL (ref 0.8–1.5)
DIFFERENTIAL METHOD BLD: ABNORMAL
EOSINOPHIL # BLD: 0.4 K/UL (ref 0–0.8)
EOSINOPHIL NFR BLD: 3 % (ref 0.5–7.8)
ERYTHROCYTE [DISTWIDTH] IN BLOOD BY AUTOMATED COUNT: 14.3 % (ref 11.9–14.6)
GLOBULIN SER CALC-MCNC: 5.6 G/DL (ref 2.8–4.5)
GLUCOSE BLD STRIP.AUTO-MCNC: 94 MG/DL (ref 65–100)
GLUCOSE SERPL-MCNC: 92 MG/DL (ref 65–100)
HCT VFR BLD AUTO: 30.9 % (ref 41.1–50.3)
HGB BLD-MCNC: 10.3 G/DL (ref 13.6–17.2)
IMM GRANULOCYTES # BLD AUTO: 0.1 K/UL (ref 0–0.5)
IMM GRANULOCYTES NFR BLD AUTO: 1 % (ref 0–5)
INR PPP: 1.2
LYMPHOCYTES # BLD: 2.3 K/UL (ref 0.5–4.6)
LYMPHOCYTES NFR BLD: 21 % (ref 13–44)
MAGNESIUM SERPL-MCNC: 1.7 MG/DL (ref 1.8–2.4)
MCH RBC QN AUTO: 28.1 PG (ref 26.1–32.9)
MCHC RBC AUTO-ENTMCNC: 33.3 G/DL (ref 31.4–35)
MCV RBC AUTO: 84.2 FL (ref 82–102)
MONOCYTES # BLD: 1 K/UL (ref 0.1–1.3)
MONOCYTES NFR BLD: 9 % (ref 4–12)
NEUTS SEG # BLD: 7.2 K/UL (ref 1.7–8.2)
NEUTS SEG NFR BLD: 66 % (ref 43–78)
NRBC # BLD: 0 K/UL (ref 0–0.2)
NT PRO BNP: 1086 PG/ML (ref 5–125)
PLATELET # BLD AUTO: 263 K/UL (ref 150–450)
PMV BLD AUTO: 10.2 FL (ref 9.4–12.3)
POTASSIUM SERPL-SCNC: 4.6 MMOL/L (ref 3.5–5.1)
PROT SERPL-MCNC: 7.3 G/DL (ref 6.3–8.2)
PROTHROMBIN TIME: 15.6 SEC (ref 12.6–14.3)
RBC # BLD AUTO: 3.67 M/UL (ref 4.23–5.6)
SERVICE CMNT-IMP: NORMAL
SODIUM SERPL-SCNC: 135 MMOL/L (ref 133–143)
TROPONIN I SERPL HS-MCNC: 11 PG/ML (ref 0–14)
WBC # BLD AUTO: 11 K/UL (ref 4.3–11.1)

## 2023-01-10 PROCEDURE — G0152 HHCP-SERV OF OT,EA 15 MIN: HCPCS

## 2023-01-10 PROCEDURE — 94762 N-INVAS EAR/PLS OXIMTRY CONT: CPT

## 2023-01-10 PROCEDURE — 82962 GLUCOSE BLOOD TEST: CPT

## 2023-01-10 PROCEDURE — 6360000002 HC RX W HCPCS: Performed by: PHYSICIAN ASSISTANT

## 2023-01-10 PROCEDURE — 85610 PROTHROMBIN TIME: CPT

## 2023-01-10 PROCEDURE — 83735 ASSAY OF MAGNESIUM: CPT

## 2023-01-10 PROCEDURE — P9047 ALBUMIN (HUMAN), 25%, 50ML: HCPCS | Performed by: PHYSICIAN ASSISTANT

## 2023-01-10 PROCEDURE — C1729 CATH, DRAINAGE: HCPCS

## 2023-01-10 PROCEDURE — 85025 COMPLETE CBC W/AUTO DIFF WBC: CPT

## 2023-01-10 PROCEDURE — 80053 COMPREHEN METABOLIC PANEL: CPT

## 2023-01-10 PROCEDURE — G0157 HHC PT ASSISTANT EA 15: HCPCS

## 2023-01-10 PROCEDURE — 99285 EMERGENCY DEPT VISIT HI MDM: CPT

## 2023-01-10 PROCEDURE — 71045 X-RAY EXAM CHEST 1 VIEW: CPT

## 2023-01-10 PROCEDURE — 2500000003 HC RX 250 WO HCPCS: Performed by: PHYSICIAN ASSISTANT

## 2023-01-10 PROCEDURE — 84484 ASSAY OF TROPONIN QUANT: CPT

## 2023-01-10 PROCEDURE — 83880 ASSAY OF NATRIURETIC PEPTIDE: CPT

## 2023-01-10 RX ORDER — ONDANSETRON 2 MG/ML
4 INJECTION INTRAMUSCULAR; INTRAVENOUS
Status: DISCONTINUED | OUTPATIENT
Start: 2023-01-10 | End: 2023-01-10 | Stop reason: HOSPADM

## 2023-01-10 RX ORDER — INSULIN LISPRO 100 [IU]/ML
0-4 INJECTION, SOLUTION INTRAVENOUS; SUBCUTANEOUS
Qty: 5 ML | Refills: 0 | Status: CANCELLED | OUTPATIENT
Start: 2023-01-10

## 2023-01-10 RX ORDER — ALBUMIN (HUMAN) 12.5 G/50ML
SOLUTION INTRAVENOUS CONTINUOUS PRN
Status: COMPLETED | OUTPATIENT
Start: 2023-01-10 | End: 2023-01-10

## 2023-01-10 RX ORDER — CARVEDILOL 25 MG/1
12.5 TABLET ORAL 2 TIMES DAILY WITH MEALS
Qty: 60 TABLET | Refills: 3 | Status: CANCELLED | OUTPATIENT
Start: 2023-01-10

## 2023-01-10 RX ORDER — FUROSEMIDE 40 MG/1
40 TABLET ORAL 2 TIMES DAILY
Qty: 60 TABLET | Refills: 0 | Status: CANCELLED | OUTPATIENT
Start: 2023-01-10

## 2023-01-10 RX ORDER — LIDOCAINE HYDROCHLORIDE 10 MG/ML
INJECTION, SOLUTION INFILTRATION; PERINEURAL
Status: COMPLETED | OUTPATIENT
Start: 2023-01-10 | End: 2023-01-10

## 2023-01-10 RX ADMIN — ALBUMIN (HUMAN) 12.5 G: 0.25 INJECTION, SOLUTION INTRAVENOUS at 17:12

## 2023-01-10 RX ADMIN — LIDOCAINE HYDROCHLORIDE 8 ML: 10 INJECTION, SOLUTION INFILTRATION; PERINEURAL at 16:48

## 2023-01-10 ASSESSMENT — ENCOUNTER SYMPTOMS
VOMITING: 0
COLOR CHANGE: 0
DIARRHEA: 1
STRIDOR: 0
SHORTNESS OF BREATH: 1
NAUSEA: 1
DYSPNEA ACTIVITY LEVEL: AFTER AMBULATING LESS THAN 10 FT
ABDOMINAL DISTENTION: 1
ABDOMINAL PAIN: 1
PAIN LOCATION - PAIN QUALITY: BLOATING
CONSTIPATION: 0
CONSTIPATION: 1
PAIN LOCATION - PAIN QUALITY: FULL
COUGH: 0
WHEEZING: 0

## 2023-01-10 ASSESSMENT — PAIN - FUNCTIONAL ASSESSMENT: PAIN_FUNCTIONAL_ASSESSMENT: 0-10

## 2023-01-10 ASSESSMENT — PAIN SCALES - GENERAL: PAINLEVEL_OUTOF10: 0

## 2023-01-10 NOTE — ED PROVIDER NOTES
Emergency Department Provider Note                   PCP:                None None               Age: 65 y.o. Sex: male       ICD-10-CM    1. Abdominal distention  R14.0     Due to ascites/cirrhosis of the liver      2. Dependent edema  R60.9       3. Shortness of breath  R06.02       4. Diarrhea, unspecified type  R19.7           DISPOSITION Decision To Discharge 01/10/2023 05:20:55 PM        Medical Decision Making  Amount and/or Complexity of Data Reviewed  Labs: ordered. Radiology: ordered. ECG/medicine tests: ordered. Risk  Prescription drug management. Complexity of Problem: 2 or more acute complicated illnesses (4)  The patients assessment required an independent historian: I spoke with the paramedic. I have conducted an independent ordering and review of Labs. I have conducted an independent ordering and review of EKG. I have reviewed records from an external source: ED records from outside this hospital.  I have reviewed records from an external source: provider visit notes from PCP. Considerations: Hospitalization was considered. I discussed case with a . I have had a discussion with external consultants.          Orders Placed This Encounter   Procedures    XR CHEST PORTABLE    IR US GUIDED PARACENTESIS    CBC with Auto Differential    Comprehensive Metabolic Panel    Magnesium    Troponin    Brain Natriuretic Peptide    Protime-INR    Continuous Pulse Oximetry    Cardiac Monitor - ED Only    MATERIAL DISTRIBUTION MESSAGE    POCT Glucose    EKG 12 Lead        Medications   ondansetron (ZOFRAN) injection 4 mg (has no administration in time range)   albumin human 25 % IV solution (12.5 g IntraVENous New Bag 1/10/23 1712)   lidocaine 1 % injection (8 mLs IntraDERmal Given 1/10/23 1648)       New Prescriptions    No medications on file        Ever See is a 65 y.o. male who presents to the Emergency Department with chief complaint of    Chief Complaint Patient presents with    Shortness of Breath      Per nurses notes: \"Pt arrives via [facility] from home, called out for shob. Pt was discharged from our facility on 1/03 with cirrhosis and ascites. Home health came out and saw pt was weeping all over and very swollen. Per EMS, pt's lungs sound wet. Pt has not been eating well at home, lives with his wife. Pt reports he has been taking his lasix, denies taking any other medications since he has been home. Pt states he has shob at this time, denies chest pain, abd pain, fevers/chills. Pt states he has intermittently been vomiting. \"    Per the patient, since his discharge a week ago he has had progressive distention of his abdomen and swelling in his legs as well as increasing dyspnea on exertion. He denies any chest pain or diaphoresis, but does describe some nausea but no vomiting. Patient has been taking Imodium for diarrhea with some improvement, but states that the Lasix is not helping with all the swelling. Patient now complains of shortness of breath due to the enlarged abdomen but denies any fever or chills. States the abdomen feels tight. Denies any melena or hematochezia. The history is provided by the patient and the EMS personnel. Review of Systems   Constitutional:  Negative for chills and fever. HENT:  Negative for nosebleeds. Respiratory:  Positive for shortness of breath. Negative for cough, wheezing and stridor. Cardiovascular:  Positive for leg swelling. Negative for chest pain and palpitations. Gastrointestinal:  Positive for abdominal distention, abdominal pain (\"Feels tight\"), diarrhea and nausea. Negative for constipation and vomiting. Genitourinary:  Negative for difficulty urinating, dysuria and frequency. Skin:  Positive for wound (Chronic diabetic ulcer left lower extremity followed by wound clinic). Negative for color change. All other systems reviewed and are negative.     Past Medical History:   Diagnosis Date    Anemia     Gastroesophageal reflux disease with esophagitis and hemorrhage     HLD (hyperlipidemia)     Hyperplastic colon polyp     Hypertension     Obesity     PUD (peptic ulcer disease)     Type 2 diabetes mellitus with diabetic polyneuropathy, with long-term current use of insulin (HCC)     Ulcer of the duodenum caused by bacteria (H. pylori)     Upper GI bleed 03/28/2016    Last Assessment & Plan:  Formatting of this note might be different from the original. Status post EGD yesterday which revealed esophagitis, gastric and duodenal ulcers Continue PPI, GI follow-up. Monitor hemoglobin Avoid and states Biopsy results- pending        Past Surgical History:   Procedure Laterality Date    COLONOSCOPY      ESOPHAGOGASTRODUODENOSCOPY      UPPER GASTROINTESTINAL ENDOSCOPY N/A 9/6/2022    EGD ESOPHAGOGASTRODUODENOSCOPY/ Rm 215 performed by Kirill Lincoln MD at [facility] ENDOSCOPY        Family History   Problem Relation Age of Onset    Hypertension Sister     Diabetes Neg Hx         Social History     Socioeconomic History    Marital status:    Tobacco Use    Smoking status: Never    Smokeless tobacco: Never   Substance and Sexual Activity    Alcohol use: Yes    Drug use: Never         Patient has no known allergies. Previous Medications    ACETAMINOPHEN (TYLENOL) 325 MG TABLET    Take 650 mg by mouth every 6 hours as needed for Pain.     CALCIUM CARB-CHOLECALCIFEROL 250-125 MG-UNIT TABS    Take 1 tablet by mouth daily    CARVEDILOL (COREG) 25 MG TABLET    Take 0.5 tablets by mouth 2 times daily (with meals)    CHOLESTYRAMINE LIGHT 4 G PACKET    Take 1 packet by mouth 2 times daily    FOLIC ACID (FOLVITE) 1 MG TABLET    Take 1 tablet by mouth daily    FUROSEMIDE (LASIX) 40 MG TABLET    Take 1 tablet by mouth 2 times daily    GLUCOSE MONITORING (FREESTYLE FREEDOM) KIT    1 kit by Does not apply route daily Check glucose twice daily    INSULIN GLARGINE (LANTUS SOLOSTAR) 100 UNIT/ML INJECTION PEN Inject 10 Units into the skin daily    INSULIN LISPRO (HUMALOG) 100 UNIT/ML SOLN INJECTION VIAL    Inject 0-4 Units into the skin 3 times daily (with meals)    INSULIN LISPRO (HUMALOG) 100 UNIT/ML SOLN INJECTION VIAL    Inject 0-4 Units into the skin nightly    LOPERAMIDE (IMODIUM) 2 MG CAPSULE    Take 1 capsule by mouth 4 times daily as needed for Diarrhea    NALOXONE 4 MG/0.1ML LIQD NASAL SPRAY    1 SPRAY INTO A NOSTRIL AS NEEDED FOR OPIOID OVERDOSE. PANTOPRAZOLE (PROTONIX) 40 MG TABLET    Take 1 tablet by mouth 2 times daily (before meals)    SODIUM BICARBONATE 650 MG TABLET    Take 2 tablets by mouth 2 times daily    VITAMIN B-12 1000 MCG TABLET    Take 1 tablet by mouth daily        Vitals signs and nursing note reviewed. Patient Vitals for the past 4 hrs:   Pulse Resp BP SpO2   01/10/23 1719 85 16 (!) 167/98 100 %   01/10/23 1626 95 22 (!) 155/98 100 %   01/10/23 1445 96 21 (!) 126/101 99 %   01/10/23 1345 90 21 (!) 131/110 99 %   01/10/23 1330 98 18 (!) 139/115 99 %          Physical Exam  Vitals and nursing note reviewed. Constitutional:       General: He is not in acute distress. HENT:      Head: Normocephalic and atraumatic. Right Ear: External ear normal.      Left Ear: External ear normal.      Nose: Nose normal.      Mouth/Throat:      Mouth: Mucous membranes are moist.   Eyes:      Extraocular Movements: Extraocular movements intact. Conjunctiva/sclera: Conjunctivae normal.      Pupils: Pupils are equal, round, and reactive to light. Cardiovascular:      Rate and Rhythm: Normal rate and regular rhythm. Heart sounds: No murmur heard. Pulmonary:      Effort: Pulmonary effort is normal. No tachypnea. Breath sounds: Normal breath sounds. No wheezing or rhonchi. Abdominal:      General: Bowel sounds are normal. There is distension. Palpations: Abdomen is soft. There is no mass. Tenderness: There is no abdominal tenderness.  There is no right CVA tenderness, guarding or rebound. Hernia: No hernia is present. Musculoskeletal:         General: Normal range of motion. Cervical back: Normal range of motion and neck supple. Right lower leg: Edema (2-3+ bilateral lower extremities) present. Left lower leg: Edema present. Skin:     General: Skin is warm and dry. Neurological:      General: No focal deficit present. Mental Status: He is alert and oriented to person, place, and time. Psychiatric:         Mood and Affect: Mood normal.         Behavior: Behavior normal.        Procedures    The patient was observed in the ED. lab work essentially unchanged from prior. Due to the patient's discomfort and abdominal distention, the case was discussed with interventional radiology who will take the patient to IR for therapeutic paracentesis. Case management was involved in the patient's care and follow-up appointments were verified. Nursing staff will also code the patient's gastroenterology office to obtain follow-up there as well.   Patient's prescriptions were initially unable to be filled secondary to the fact they were sent to the [facility] pharmacy, we will refill and sent to [facility] on [address] per patient's request.    Results Reviewed:      Recent Results (from the past 24 hour(s))   CBC with Auto Differential    Collection Time: 01/10/23  1:08 PM   Result Value Ref Range    WBC 11.0 4.3 - 11.1 K/uL    RBC 3.67 (L) 4.23 - 5.6 M/uL    Hemoglobin 10.3 (L) 13.6 - 17.2 g/dL    Hematocrit 30.9 (L) 41.1 - 50.3 %    MCV 84.2 82 - 102 FL    MCH 28.1 26.1 - 32.9 PG    MCHC 33.3 31.4 - 35.0 g/dL    RDW 14.3 11.9 - 14.6 %    Platelets 536 549 - 533 K/uL    MPV 10.2 9.4 - 12.3 FL    nRBC 0.00 0.0 - 0.2 K/uL    Differential Type AUTOMATED      Seg Neutrophils 66 43 - 78 %    Lymphocytes 21 13 - 44 %    Monocytes 9 4.0 - 12.0 %    Eosinophils % 3 0.5 - 7.8 %    Basophils 1 0.0 - 2.0 %    Immature Granulocytes 1 0.0 - 5.0 %    Segs Absolute 7.2 1.7 - 8.2 K/UL Absolute Lymph # 2.3 0.5 - 4.6 K/UL    Absolute Mono # 1.0 0.1 - 1.3 K/UL    Absolute Eos # 0.4 0.0 - 0.8 K/UL    Basophils Absolute 0.1 0.0 - 0.2 K/UL    Absolute Immature Granulocyte 0.1 0.0 - 0.5 K/UL   Comprehensive Metabolic Panel    Collection Time: 01/10/23  1:08 PM   Result Value Ref Range    Sodium 135 133 - 143 mmol/L    Potassium 4.6 3.5 - 5.1 mmol/L    Chloride 110 101 - 110 mmol/L    CO2 18 (L) 21 - 32 mmol/L    Anion Gap 7 2 - 11 mmol/L    Glucose 92 65 - 100 mg/dL    BUN 22 6 - 23 MG/DL    Creatinine 1.60 (H) 0.8 - 1.5 MG/DL    Est, Glom Filt Rate 50 (L) >60 ml/min/1.73m2    Calcium 8.5 8.3 - 10.4 MG/DL    Total Bilirubin 0.3 0.2 - 1.1 MG/DL    ALT 17 12 - 65 U/L    AST 29 15 - 37 U/L    Alk Phosphatase 142 (H) 50 - 136 U/L    Total Protein 7.3 6.3 - 8.2 g/dL    Albumin 1.7 (L) 3.5 - 5.0 g/dL    Globulin 5.6 (H) 2.8 - 4.5 g/dL    Albumin/Globulin Ratio 0.3 (L) 0.4 - 1.6     Magnesium    Collection Time: 01/10/23  1:08 PM   Result Value Ref Range    Magnesium 1.7 (L) 1.8 - 2.4 mg/dL   Troponin    Collection Time: 01/10/23  1:08 PM   Result Value Ref Range    Troponin, High Sensitivity 11.0 0 - 14 pg/mL   POCT Glucose    Collection Time: 01/10/23  1:08 PM   Result Value Ref Range    POC Glucose 94 65 - 100 mg/dL    Performed by: Artem    Brain Natriuretic Peptide    Collection Time: 01/10/23  1:08 PM   Result Value Ref Range    NT Pro-BNP 1,086 (H) 5 - 125 PG/ML   Protime-INR    Collection Time: 01/10/23  1:08 PM   Result Value Ref Range    Protime 15.6 (H) 12.6 - 14.3 sec    INR 1.2       XR CHEST PORTABLE   Final Result   Left lung base airspace disease reflecting atelectasis or infiltrate. IR US GUIDED PARACENTESIS    (Results Pending)           I discussed the results of all labs, procedures, radiographs, and treatments with the patient and available family. Treatment plan is agreed upon and the patient is ready for discharge.   All voiced understanding of the discharge plan and medication instructions or changes as appropriate. Questions about treatment in the ED were answered. All were encouraged to return should symptoms worsen or new problems develop. Voice dictation software was used during the making of this note. This software is not perfect and grammatical and other typographical errors may be present. This note has not been completely proofread for errors.      Peng Gonzales MD  01/10/23 2679

## 2023-01-10 NOTE — DISCHARGE INSTRUCTIONS
Your medications were called into the [facility] on [address]. Take them as directed and follow-up with your gastroenterologist as directed.

## 2023-01-10 NOTE — PROGRESS NOTES
6500 ml thin pale yellow ascitic fluid obtained. No sample sent to lab. Site closed with topical skin adhesive. No s\s of bleeding or leakage.

## 2023-01-10 NOTE — ED TRIAGE NOTES
Pt arrives via [facility] from home, called out for shob. Pt was discharged from our facility on 1/03 with cirrhosis and ascites. Home health came out and saw pt was weeping all over and very swollen. Per EMS, pt's lungs sound wet. Pt has not been eating well at home, lives with his wife. Pt reports he has been taking his lasix, denies taking any other medications since he has been home. Pt states he has shob at this time, denies chest pain, abd pain, fevers/chills. Pt states he has intermittently been vomiting.

## 2023-01-10 NOTE — PROGRESS NOTES
TRANSFER - OUT REPORT:    Verbal report given to Ellen MOORE on Sharif Brooks  being transferred to ER 10 for routine progression of patient care       Report consisted of patient's Situation, Background, Assessment and   Recommendations(SBAR). Information from the following report(s) Nurse Handoff Report, Intake/Output, and Event Log was reviewed with the receiving nurse. [place] Assessment: No data recorded  Lines:       Opportunity for questions and clarification was provided.       Patient transported with:  [facility]

## 2023-01-10 NOTE — PROGRESS NOTES
This RN made a gastroenterology appointment for pt. Pt's appt is Thursday January 19th at 2:30pm. Pt informed about appt and written on pt's discharge paperwork.

## 2023-01-11 ENCOUNTER — OFFICE VISIT (OUTPATIENT)
Dept: FAMILY MEDICINE CLINIC | Facility: CLINIC | Age: 58
End: 2023-01-11

## 2023-01-11 ENCOUNTER — HOME CARE VISIT (OUTPATIENT)
Dept: SCHEDULING | Facility: HOME HEALTH | Age: 58
End: 2023-01-11

## 2023-01-11 VITALS
WEIGHT: 206 LBS | TEMPERATURE: 97.9 F | RESPIRATION RATE: 18 BRPM | BODY MASS INDEX: 28.73 KG/M2 | HEART RATE: 88 BPM | OXYGEN SATURATION: 97 % | SYSTOLIC BLOOD PRESSURE: 106 MMHG | DIASTOLIC BLOOD PRESSURE: 72 MMHG

## 2023-01-11 VITALS
DIASTOLIC BLOOD PRESSURE: 84 MMHG | TEMPERATURE: 97.7 F | BODY MASS INDEX: 28.9 KG/M2 | HEIGHT: 71 IN | HEART RATE: 103 BPM | WEIGHT: 206.4 LBS | SYSTOLIC BLOOD PRESSURE: 103 MMHG | RESPIRATION RATE: 18 BRPM | OXYGEN SATURATION: 100 %

## 2023-01-11 DIAGNOSIS — Z09 HOSPITAL DISCHARGE FOLLOW-UP: Primary | ICD-10-CM

## 2023-01-11 DIAGNOSIS — Z79.4 TYPE 2 DIABETES MELLITUS WITHOUT COMPLICATION, WITH LONG-TERM CURRENT USE OF INSULIN (HCC): ICD-10-CM

## 2023-01-11 DIAGNOSIS — I10 ESSENTIAL HYPERTENSION: ICD-10-CM

## 2023-01-11 DIAGNOSIS — N18.31 STAGE 3A CHRONIC KIDNEY DISEASE (HCC): ICD-10-CM

## 2023-01-11 DIAGNOSIS — E11.9 TYPE 2 DIABETES MELLITUS WITHOUT COMPLICATION, WITH LONG-TERM CURRENT USE OF INSULIN (HCC): ICD-10-CM

## 2023-01-11 DIAGNOSIS — K70.31 ALCOHOLIC CIRRHOSIS OF LIVER WITH ASCITES (HCC): ICD-10-CM

## 2023-01-11 PROCEDURE — G0299 HHS/HOSPICE OF RN EA 15 MIN: HCPCS

## 2023-01-11 RX ORDER — INSULIN LISPRO 100 [IU]/ML
0-4 INJECTION, SOLUTION INTRAVENOUS; SUBCUTANEOUS NIGHTLY
Qty: 5 ML | Refills: 0 | Status: CANCELLED | OUTPATIENT
Start: 2023-01-11

## 2023-01-11 RX ORDER — CHOLESTYRAMINE LIGHT 4 G/5.7G
4 POWDER, FOR SUSPENSION ORAL 2 TIMES DAILY
Qty: 60 PACKET | Refills: 0 | Status: SHIPPED | OUTPATIENT
Start: 2023-01-11

## 2023-01-11 RX ORDER — PANTOPRAZOLE SODIUM 40 MG/1
40 TABLET, DELAYED RELEASE ORAL
Qty: 30 TABLET | Refills: 0 | Status: SHIPPED | OUTPATIENT
Start: 2023-01-11

## 2023-01-11 RX ORDER — INSULIN LISPRO 100 [IU]/ML
0-4 INJECTION, SOLUTION INTRAVENOUS; SUBCUTANEOUS
Qty: 5 ML | Refills: 0 | Status: CANCELLED | OUTPATIENT
Start: 2023-01-11

## 2023-01-11 RX ORDER — ACETAMINOPHEN 325 MG/1
650 TABLET ORAL EVERY 6 HOURS PRN
Qty: 120 TABLET | Status: CANCELLED | OUTPATIENT
Start: 2023-01-11

## 2023-01-11 RX ORDER — SODIUM BICARBONATE 650 MG/1
1300 TABLET ORAL 2 TIMES DAILY
Qty: 60 TABLET | Refills: 0 | Status: SHIPPED | OUTPATIENT
Start: 2023-01-11

## 2023-01-11 RX ORDER — NALOXONE HYDROCHLORIDE 4 MG/.1ML
SPRAY NASAL
Status: CANCELLED | OUTPATIENT
Start: 2023-01-11

## 2023-01-11 RX ORDER — INSULIN GLARGINE 100 [IU]/ML
10 INJECTION, SOLUTION SUBCUTANEOUS DAILY
Qty: 5 ADJUSTABLE DOSE PRE-FILLED PEN SYRINGE | Refills: 0 | Status: SHIPPED | OUTPATIENT
Start: 2023-01-11

## 2023-01-11 RX ORDER — BLOOD-GLUCOSE METER
1 KIT MISCELLANEOUS DAILY
Qty: 1 KIT | Refills: 0 | Status: CANCELLED | OUTPATIENT
Start: 2023-01-11

## 2023-01-11 RX ORDER — CARVEDILOL 25 MG/1
12.5 TABLET ORAL 2 TIMES DAILY WITH MEALS
Qty: 60 TABLET | Refills: 3 | Status: SHIPPED | OUTPATIENT
Start: 2023-01-11

## 2023-01-11 RX ORDER — ADHESIVE BANDAGE 3/4"
1 BANDAGE TOPICAL DAILY
Qty: 1 EACH | Refills: 0 | Status: SHIPPED | OUTPATIENT
Start: 2023-01-11

## 2023-01-11 RX ORDER — FUROSEMIDE 40 MG/1
40 TABLET ORAL 2 TIMES DAILY
Qty: 60 TABLET | Refills: 0 | Status: CANCELLED | OUTPATIENT
Start: 2023-01-11

## 2023-01-11 RX ORDER — FOLIC ACID 1 MG/1
1 TABLET ORAL DAILY
Qty: 30 TABLET | Refills: 0 | Status: SHIPPED | OUTPATIENT
Start: 2023-01-11

## 2023-01-11 NOTE — PROGRESS NOTES
Post-Discharge Transitional Care  Follow Up      Leandra Khoury   YOB: 1965    Date of Office Visit:  1/11/2023  Date of Hospital Admission: 1/10/23  Date of Hospital Discharge: 1/10/23  Risk of hospital readmission (high >=14%. Medium >=10%) :Readmission Risk Score: 14.2      Care management risk score Rising risk (score 2-5) and Complex Care (Scores >=6): No Risk Score On File     Non face to face  following discharge, date last encounter closed (first attempt may have been earlier): *No documented post hospital discharge outreach found in the last 14 days    Call initiated 2 business days of discharge: *No response recorded in the last 14 days    ASSESSMENT/PLAN:   Hospital discharge follow-up  -     HI DISCHARGE MEDS RECONCILED W/ CURRENT OUTPATIENT MED LIST  Essential hypertension  -     carvedilol (COREG) 25 MG tablet; Take 0.5 tablets by mouth 2 times daily (with meals), Disp-60 tablet, R-3Normal  -     Blood Pressure Monitoring (BLOOD PRESSURE CUFF) MISC; DAILY Starting Wed 1/11/2023, Disp-1 each, R-0, Normal  Alcoholic cirrhosis of liver with ascites (HCC)  -     cholestyramine light 4 g packet; Take 1 packet by mouth 2 times daily, Disp-60 packet, [id number]  -     folic acid (FOLVITE) 1 MG tablet; Take 1 tablet by mouth daily, Disp-30 tablet, R-0Normal  -     pantoprazole (PROTONIX) 40 MG tablet; Take 1 tablet by mouth 2 times daily (before meals), Disp-30 tablet, R-0Normal  -     sodium bicarbonate 650 MG tablet; Take 2 tablets by mouth 2 times daily, Disp-60 tablet, R-0Normal  -     cyanocobalamin 1000 MCG tablet; Take 1 tablet by mouth daily, Disp-30 tablet, R-0Normal  -     loperamide (ANTI-DIARRHEAL) 1 MG/5ML solution;  Take 10 mLs by mouth 4 times daily as needed for Diarrhea, Disp-118 mL, R-1Normal  Stage 3a chronic kidney disease (HCC)  Type 2 diabetes mellitus without complication, with long-term current use of insulin (HCC)  -     insulin glargine (LANTUS SOLOSTAR) 100 UNIT/ML injection pen; Inject 10 Units into the skin daily, Disp-5 Adjustable Dose Pre-filled Pen Syringe, R-0Normal    Blood pressure on the lower end today, advised to check at home. Can continue back on Coreg if blood pressure maintains above 390 systolic. Abdomen is not as distended today appears soft. Will refill medications for patient as this was not done at discharge. Has follow-up with GI on January 19. Has appointment with nephrology for chronic kidney disease on the 20th. Blood glucose appears to be extremely well controlled, will monitor blood sugars closely if any lows will discontinue Lantus. Medical Decision Making: high complexity  Return in about 2 weeks (around 1/25/2023). On this date 1/11/2023 I have spent 60 minutes reviewing previous notes, test results and face to face with the patient discussing the diagnosis and importance of compliance with the treatment plan as well as documenting on the day of the visit. Subjective:   HPI:  Follow up of Hospital problems/diagnosis(es): Septicemia, Abdominal pain, cirrhosis with ascites, alcohol dependence, CKD, T2DM, HTN    Inpatient course: Discharge summary reviewed- see chart. Interval history/Current status: was feeling very bloated and uncomfortable yesterday, was seen in the ER. Overall feeling better today. Needs refills on all of his prescriptions, he was not given them upon discharge. He has an appointment on the 19th with gastroenterology and on the 20th with nephrology. Diabetes -blood sugars have been going good his glucose is 114 this morning is taking 10 units of Lantus daily.      Patient Active Problem List   Diagnosis    Alcohol dependence ([address]    Diabetic peripheral neuropathy ([address]    Essential hypertension    Hyperlipidemia    Type 2 diabetes mellitus ([address]    Diabetic ulcer of both feet associated with type 2 diabetes mellitus ([address]    Hypokalemia    Acute kidney injury superimposed on CKD ([address]    PAD (peripheral artery disease) (HCC)    Hypoalbuminemia    Stage 3a chronic kidney disease (HCC)    Iron deficiency anemia    Obesity with body mass index 30 or greater    Acute blood loss anemia    Dark stools    Dry skin dermatitis    Hematochezia    High serum ferritin    Normocytic anemia    SIRS (systemic inflammatory response syndrome) (HCC)    Abdominal pain    Cirrhosis of liver with ascites (HCC)    Moderate protein malnutrition (HCC)    Cholelithiases    Positive blood culture    Partial small bowel obstruction ([facility]       Medications listed as ordered at the time of discharge from hospital     Medication List            Accurate as of January 11, 2023 12:39 PM. If you have any questions, ask your nurse or doctor. START taking these medications      Blood Pressure Cuff Misc  1 Units by Does not apply route daily  Started by: Mary Oakes MD     loperamide 1 MG/5ML solution  Commonly known as:  Anti-Diarrheal  Take 10 mLs by mouth 4 times daily as needed for Diarrhea  Started by: Mary Oakes MD            CONTINUE taking these medications      acetaminophen 325 MG tablet  Commonly known as: TYLENOL     calcium carb-cholecalciferol 250-125 MG-UNIT Tabs  Take 1 tablet by mouth daily     carvedilol 25 MG tablet  Commonly known as: COREG  Take 0.5 tablets by mouth 2 times daily (with meals)     cholestyramine light 4 g packet  Take 1 packet by mouth 2 times daily     cyanocobalamin 1000 MCG tablet  Take 1 tablet by mouth daily     folic acid 1 MG tablet  Commonly known as: FOLVITE  Take 1 tablet by mouth daily     furosemide 40 MG tablet  Commonly known as: LASIX  Take 1 tablet by mouth 2 times daily     glucose monitoring kit  1 kit by Does not apply route daily Check glucose twice daily     * insulin lispro 100 UNIT/ML Soln injection vial  Commonly known as: HUMALOG  Inject 0-4 Units into the skin 3 times daily (with meals)     * insulin lispro 100 UNIT/ML Soln injection vial  Commonly known as: HUMALOG  Inject 0-4 Units into the skin nightly     Lantus SoloStar 100 UNIT/ML injection pen  Generic drug: insulin glargine  Inject 10 Units into the skin daily     naloxone 4 MG/0.1ML Liqd nasal spray     pantoprazole 40 MG tablet  Commonly known as: PROTONIX  Take 1 tablet by mouth 2 times daily (before meals)     sodium bicarbonate 650 MG tablet  Take 2 tablets by mouth 2 times daily           * This list has 2 medication(s) that are the same as other medications prescribed for you. Read the directions carefully, and ask your doctor or other care provider to review them with you.                    Where to Get Your Medications        These medications were sent to [facility]/pharmacy #[id number], [address] [place] [address]      Phone: [phone]   Blood Pressure Cuff Misc  carvedilol 25 MG tablet  cholestyramine light 4 g packet  cyanocobalamin 6895 MCG tablet  folic acid 1 MG tablet  Lantus SoloStar 100 UNIT/ML injection pen  loperamide 1 MG/5ML solution  pantoprazole 40 MG tablet  sodium bicarbonate 650 MG tablet           Medications marked \"taking\" at this time  Outpatient Medications Marked as Taking for the 1/11/23 encounter (Office Visit) with Kellie Hernandez MD   Medication Sig Dispense Refill    carvedilol (COREG) 25 MG tablet Take 0.5 tablets by mouth 2 times daily (with meals) 60 tablet 3    cholestyramine light 4 g packet Take 1 packet by mouth 2 times daily 60 packet 0    folic acid (FOLVITE) 1 MG tablet Take 1 tablet by mouth daily 30 tablet 0    insulin glargine (LANTUS SOLOSTAR) 100 UNIT/ML injection pen Inject 10 Units into the skin daily 5 Adjustable Dose Pre-filled Pen Syringe 0    pantoprazole (PROTONIX) 40 MG tablet Take 1 tablet by mouth 2 times daily (before meals) 30 tablet 0    sodium bicarbonate 650 MG tablet Take 2 tablets by mouth 2 times daily 60 tablet 0    cyanocobalamin 1000 MCG tablet Take 1 tablet by mouth daily 30 tablet 0    Blood Pressure Monitoring (BLOOD PRESSURE CUFF) MISC 1 Units by Does not apply route daily 1 each 0    loperamide (ANTI-DIARRHEAL) 1 MG/5ML solution Take 10 mLs by mouth 4 times daily as needed for Diarrhea 118 mL 1    furosemide (LASIX) 40 MG tablet Take 1 tablet by mouth 2 times daily 60 tablet 0    insulin lispro (HUMALOG) 100 UNIT/ML SOLN injection vial Inject 0-4 Units into the skin 3 times daily (with meals) 5 mL 0    insulin lispro (HUMALOG) 100 UNIT/ML SOLN injection vial Inject 0-4 Units into the skin nightly 5 mL 0    acetaminophen (TYLENOL) 325 MG tablet Take 650 mg by mouth every 6 hours as needed for Pain.      naloxone 4 MG/0.1ML LIQD nasal spray 1 SPRAY INTO A NOSTRIL AS NEEDED FOR OPIOID OVERDOSE.      calcium carb-cholecalciferol 250-125 MG-UNIT TABS Take 1 tablet by mouth daily 60 tablet 0    glucose monitoring (FREESTYLE FREEDOM) kit 1 kit by Does not apply route daily Check glucose twice daily 1 kit 0        Medications patient taking as of now reconciled against medications ordered at time of hospital discharge: Yes    A comprehensive review of systems was negative except for what was noted in the HPI. Objective:    /84 (Site: Right Upper Arm, Position: Sitting)   Pulse (!) 103   Temp 97.7 °F (36.5 °C) (Oral)   Resp 18   Ht 5' 11\" (1.803 m)   Wt 206 lb 6.4 oz (93.6 kg)   SpO2 100%   BMI 28.79 kg/m²   General Appearance: alert and oriented to person, place and time, well-developed and well-nourished, in no acute distress  Pulmonary/Chest: clear to auscultation bilaterally- no wheezes, rales or rhonchi, normal air movement, no respiratory distress  Cardiovascular: normal rate, normal S1 and S2, no gallops, intact distal pulses, and no carotid bruits  Abdomen: soft, non-tender      An electronic signature was used to authenticate this note.   --Shagufta Gillespie MD

## 2023-01-11 NOTE — ED NOTES
I have reviewed discharge instructions with the patient. The patient verbalized understanding. Patient left ED via Discharge Method: stretcher to Home with wifeOpportunity for questions and clarification provided. Patient given 4 scripts. To continue your aftercare when you leave the hospital, you may receive an automated call from our care team to check in on how you are doing. This is a free service and part of our promise to provide the best care and service to meet your aftercare needs.  If you have questions, or wish to unsubscribe from this service please call [phone]. Thank you for Choosing our [facility] Emergency Department.         Jarad Flores RN  01/10/23 0071

## 2023-01-12 ENCOUNTER — HOME CARE VISIT (OUTPATIENT)
Dept: SCHEDULING | Facility: HOME HEALTH | Age: 58
End: 2023-01-12

## 2023-01-12 VITALS
DIASTOLIC BLOOD PRESSURE: 70 MMHG | OXYGEN SATURATION: 100 % | HEART RATE: 76 BPM | SYSTOLIC BLOOD PRESSURE: 120 MMHG | RESPIRATION RATE: 17 BRPM | TEMPERATURE: 97 F

## 2023-01-12 PROCEDURE — G0157 HHC PT ASSISTANT EA 15: HCPCS

## 2023-01-12 ASSESSMENT — ENCOUNTER SYMPTOMS: PAIN LOCATION - PAIN QUALITY: SHOOTING

## 2023-01-13 ENCOUNTER — HOME CARE VISIT (OUTPATIENT)
Dept: SCHEDULING | Facility: HOME HEALTH | Age: 58
End: 2023-01-13

## 2023-01-13 VITALS
TEMPERATURE: 97.9 F | RESPIRATION RATE: 18 BRPM | HEART RATE: 73 BPM | SYSTOLIC BLOOD PRESSURE: 122 MMHG | OXYGEN SATURATION: 96 % | DIASTOLIC BLOOD PRESSURE: 67 MMHG

## 2023-01-13 PROCEDURE — G0299 HHS/HOSPICE OF RN EA 15 MIN: HCPCS

## 2023-01-16 ENCOUNTER — HOME CARE VISIT (OUTPATIENT)
Dept: HOME HEALTH SERVICES | Facility: HOME HEALTH | Age: 58
End: 2023-01-16

## 2023-01-16 ENCOUNTER — HOME CARE VISIT (OUTPATIENT)
Dept: SCHEDULING | Facility: HOME HEALTH | Age: 58
End: 2023-01-16

## 2023-01-16 VITALS
HEART RATE: 84 BPM | RESPIRATION RATE: 17 BRPM | OXYGEN SATURATION: 99 % | DIASTOLIC BLOOD PRESSURE: 74 MMHG | TEMPERATURE: 97.7 F | SYSTOLIC BLOOD PRESSURE: 108 MMHG

## 2023-01-16 PROCEDURE — G0299 HHS/HOSPICE OF RN EA 15 MIN: HCPCS

## 2023-01-16 PROCEDURE — G0158 HHC OT ASSISTANT EA 15: HCPCS

## 2023-01-17 ENCOUNTER — HOME CARE VISIT (OUTPATIENT)
Dept: SCHEDULING | Facility: HOME HEALTH | Age: 58
End: 2023-01-17

## 2023-01-17 VITALS
SYSTOLIC BLOOD PRESSURE: 108 MMHG | DIASTOLIC BLOOD PRESSURE: 74 MMHG | RESPIRATION RATE: 16 BRPM | OXYGEN SATURATION: 99 % | HEART RATE: 84 BPM | TEMPERATURE: 98.3 F

## 2023-01-17 VITALS
HEART RATE: 80 BPM | RESPIRATION RATE: 16 BRPM | DIASTOLIC BLOOD PRESSURE: 64 MMHG | OXYGEN SATURATION: 99 % | SYSTOLIC BLOOD PRESSURE: 118 MMHG | TEMPERATURE: 98 F

## 2023-01-17 PROCEDURE — G0157 HHC PT ASSISTANT EA 15: HCPCS

## 2023-01-18 ENCOUNTER — HOME CARE VISIT (OUTPATIENT)
Dept: HOME HEALTH SERVICES | Facility: HOME HEALTH | Age: 58
End: 2023-01-18

## 2023-01-18 ENCOUNTER — HOME CARE VISIT (OUTPATIENT)
Dept: SCHEDULING | Facility: HOME HEALTH | Age: 58
End: 2023-01-18

## 2023-01-18 VITALS
DIASTOLIC BLOOD PRESSURE: 62 MMHG | SYSTOLIC BLOOD PRESSURE: 118 MMHG | RESPIRATION RATE: 16 BRPM | TEMPERATURE: 97 F | OXYGEN SATURATION: 99 % | HEART RATE: 88 BPM

## 2023-01-18 VITALS
OXYGEN SATURATION: 96 % | DIASTOLIC BLOOD PRESSURE: 70 MMHG | HEART RATE: 82 BPM | SYSTOLIC BLOOD PRESSURE: 114 MMHG | TEMPERATURE: 97.9 F | RESPIRATION RATE: 18 BRPM

## 2023-01-18 VITALS
RESPIRATION RATE: 16 BRPM | HEART RATE: 82 BPM | OXYGEN SATURATION: 99 % | TEMPERATURE: 97.9 F | DIASTOLIC BLOOD PRESSURE: 63 MMHG | SYSTOLIC BLOOD PRESSURE: 114 MMHG

## 2023-01-18 DIAGNOSIS — K70.31 ALCOHOLIC CIRRHOSIS OF LIVER WITH ASCITES (HCC): ICD-10-CM

## 2023-01-18 PROCEDURE — G0158 HHC OT ASSISTANT EA 15: HCPCS

## 2023-01-18 PROCEDURE — G0157 HHC PT ASSISTANT EA 15: HCPCS

## 2023-01-18 PROCEDURE — G0299 HHS/HOSPICE OF RN EA 15 MIN: HCPCS

## 2023-01-18 RX ORDER — PANTOPRAZOLE SODIUM 40 MG/1
TABLET, DELAYED RELEASE ORAL
Qty: 30 TABLET | Refills: 0 | OUTPATIENT
Start: 2023-01-18

## 2023-01-20 ENCOUNTER — HOME CARE VISIT (OUTPATIENT)
Dept: SCHEDULING | Facility: HOME HEALTH | Age: 58
End: 2023-01-20

## 2023-01-20 VITALS
SYSTOLIC BLOOD PRESSURE: 112 MMHG | DIASTOLIC BLOOD PRESSURE: 68 MMHG | OXYGEN SATURATION: 96 % | TEMPERATURE: 97.8 F | HEART RATE: 79 BPM | RESPIRATION RATE: 18 BRPM

## 2023-01-20 PROCEDURE — G0299 HHS/HOSPICE OF RN EA 15 MIN: HCPCS

## 2023-01-23 ENCOUNTER — HOME CARE VISIT (OUTPATIENT)
Dept: SCHEDULING | Facility: HOME HEALTH | Age: 58
End: 2023-01-23
Payer: MEDICARE

## 2023-01-23 VITALS
SYSTOLIC BLOOD PRESSURE: 118 MMHG | DIASTOLIC BLOOD PRESSURE: 82 MMHG | HEART RATE: 87 BPM | TEMPERATURE: 97.3 F | OXYGEN SATURATION: 100 %

## 2023-01-23 VITALS
HEART RATE: 84 BPM | OXYGEN SATURATION: 98 % | SYSTOLIC BLOOD PRESSURE: 110 MMHG | RESPIRATION RATE: 16 BRPM | DIASTOLIC BLOOD PRESSURE: 74 MMHG | TEMPERATURE: 98.3 F

## 2023-01-23 PROCEDURE — G0299 HHS/HOSPICE OF RN EA 15 MIN: HCPCS

## 2023-01-23 PROCEDURE — G0152 HHCP-SERV OF OT,EA 15 MIN: HCPCS

## 2023-01-23 ASSESSMENT — ENCOUNTER SYMPTOMS: DYSPNEA ACTIVITY LEVEL: AFTER AMBULATING MORE THAN 20 FT

## 2023-01-24 ENCOUNTER — HOME CARE VISIT (OUTPATIENT)
Dept: SCHEDULING | Facility: HOME HEALTH | Age: 58
End: 2023-01-24
Payer: MEDICARE

## 2023-01-24 VITALS
DIASTOLIC BLOOD PRESSURE: 74 MMHG | SYSTOLIC BLOOD PRESSURE: 118 MMHG | TEMPERATURE: 98.2 F | RESPIRATION RATE: 17 BRPM | OXYGEN SATURATION: 98 % | HEART RATE: 88 BPM

## 2023-01-24 PROCEDURE — G0157 HHC PT ASSISTANT EA 15: HCPCS

## 2023-01-25 ENCOUNTER — OFFICE VISIT (OUTPATIENT)
Dept: FAMILY MEDICINE CLINIC | Facility: CLINIC | Age: 58
End: 2023-01-25
Payer: MEDICARE

## 2023-01-25 ENCOUNTER — HOME CARE VISIT (OUTPATIENT)
Dept: SCHEDULING | Facility: HOME HEALTH | Age: 58
End: 2023-01-25
Payer: MEDICARE

## 2023-01-25 ENCOUNTER — TELEPHONE (OUTPATIENT)
Dept: FAMILY MEDICINE CLINIC | Facility: CLINIC | Age: 58
End: 2023-01-25

## 2023-01-25 VITALS
BODY MASS INDEX: 28.73 KG/M2 | WEIGHT: 206 LBS | OXYGEN SATURATION: 96 % | TEMPERATURE: 98 F | DIASTOLIC BLOOD PRESSURE: 86 MMHG | HEART RATE: 80 BPM | SYSTOLIC BLOOD PRESSURE: 124 MMHG | RESPIRATION RATE: 18 BRPM

## 2023-01-25 VITALS
HEART RATE: 90 BPM | TEMPERATURE: 99 F | HEIGHT: 71 IN | DIASTOLIC BLOOD PRESSURE: 80 MMHG | SYSTOLIC BLOOD PRESSURE: 124 MMHG | OXYGEN SATURATION: 99 % | BODY MASS INDEX: 29.26 KG/M2 | WEIGHT: 209 LBS

## 2023-01-25 DIAGNOSIS — L97.529 DIABETIC ULCER OF BOTH FEET ASSOCIATED WITH TYPE 2 DIABETES MELLITUS (HCC): Primary | ICD-10-CM

## 2023-01-25 DIAGNOSIS — E11.621 DIABETIC ULCER OF BOTH FEET ASSOCIATED WITH TYPE 2 DIABETES MELLITUS (HCC): Primary | ICD-10-CM

## 2023-01-25 DIAGNOSIS — L97.519 DIABETIC ULCER OF BOTH FEET ASSOCIATED WITH TYPE 2 DIABETES MELLITUS (HCC): Primary | ICD-10-CM

## 2023-01-25 DIAGNOSIS — I10 ESSENTIAL HYPERTENSION: Primary | ICD-10-CM

## 2023-01-25 DIAGNOSIS — K70.31 ALCOHOLIC CIRRHOSIS OF LIVER WITH ASCITES (HCC): ICD-10-CM

## 2023-01-25 DIAGNOSIS — E11.9 TYPE 2 DIABETES MELLITUS WITHOUT COMPLICATION, WITH LONG-TERM CURRENT USE OF INSULIN (HCC): ICD-10-CM

## 2023-01-25 DIAGNOSIS — Z79.4 TYPE 2 DIABETES MELLITUS WITHOUT COMPLICATION, WITH LONG-TERM CURRENT USE OF INSULIN (HCC): ICD-10-CM

## 2023-01-25 PROCEDURE — G0299 HHS/HOSPICE OF RN EA 15 MIN: HCPCS

## 2023-01-25 PROCEDURE — G8427 DOCREV CUR MEDS BY ELIG CLIN: HCPCS | Performed by: FAMILY MEDICINE

## 2023-01-25 PROCEDURE — 1111F DSCHRG MED/CURRENT MED MERGE: CPT | Performed by: FAMILY MEDICINE

## 2023-01-25 PROCEDURE — 3079F DIAST BP 80-89 MM HG: CPT | Performed by: FAMILY MEDICINE

## 2023-01-25 PROCEDURE — 1036F TOBACCO NON-USER: CPT | Performed by: FAMILY MEDICINE

## 2023-01-25 PROCEDURE — 3017F COLORECTAL CA SCREEN DOC REV: CPT | Performed by: FAMILY MEDICINE

## 2023-01-25 PROCEDURE — 99214 OFFICE O/P EST MOD 30 MIN: CPT | Performed by: FAMILY MEDICINE

## 2023-01-25 PROCEDURE — G8417 CALC BMI ABV UP PARAM F/U: HCPCS | Performed by: FAMILY MEDICINE

## 2023-01-25 PROCEDURE — 3074F SYST BP LT 130 MM HG: CPT | Performed by: FAMILY MEDICINE

## 2023-01-25 PROCEDURE — 2022F DILAT RTA XM EVC RTNOPTHY: CPT | Performed by: FAMILY MEDICINE

## 2023-01-25 PROCEDURE — 3046F HEMOGLOBIN A1C LEVEL >9.0%: CPT | Performed by: FAMILY MEDICINE

## 2023-01-25 PROCEDURE — G8484 FLU IMMUNIZE NO ADMIN: HCPCS | Performed by: FAMILY MEDICINE

## 2023-01-25 RX ORDER — FOLIC ACID 1 MG/1
1 TABLET ORAL DAILY
Qty: 30 TABLET | Refills: 0 | Status: SHIPPED | OUTPATIENT
Start: 2023-01-25

## 2023-01-25 RX ORDER — FUROSEMIDE 40 MG/1
40 TABLET ORAL 2 TIMES DAILY
Qty: 60 TABLET | Refills: 0 | Status: SHIPPED | OUTPATIENT
Start: 2023-01-25

## 2023-01-25 RX ORDER — INSULIN GLARGINE 100 [IU]/ML
10 INJECTION, SOLUTION SUBCUTANEOUS DAILY
Qty: 5 ADJUSTABLE DOSE PRE-FILLED PEN SYRINGE | Refills: 0 | Status: SHIPPED | OUTPATIENT
Start: 2023-01-25

## 2023-01-25 RX ORDER — OXYCODONE HYDROCHLORIDE 5 MG/1
TABLET ORAL
COMMUNITY

## 2023-01-25 RX ORDER — PANTOPRAZOLE SODIUM 40 MG/1
40 TABLET, DELAYED RELEASE ORAL
Qty: 30 TABLET | Refills: 0 | Status: SHIPPED | OUTPATIENT
Start: 2023-01-25

## 2023-01-25 RX ORDER — SODIUM BICARBONATE 650 MG/1
1300 TABLET ORAL 2 TIMES DAILY
Qty: 60 TABLET | Refills: 0 | Status: SHIPPED | OUTPATIENT
Start: 2023-01-25

## 2023-01-25 ASSESSMENT — ENCOUNTER SYMPTOMS
COUGH: 0
SHORTNESS OF BREATH: 0

## 2023-01-25 NOTE — TELEPHONE ENCOUNTER
Kati Montero with [facility] is requesting a referral for this patient seen today so he can get started for the wound on left lower extremity

## 2023-01-25 NOTE — PROGRESS NOTES
Alphia Burkitt (: 1965) is a 65 y.o. male, established patient, here for evaluation of the following chief complaint(s):  2 Week Follow-Up [facility] kidney disease, htn /`nobody gave rx for lasix supposed to be on, as well as naloxone and oxycodone )       ASSESSMENT/PLAN:  1. Essential hypertension  2. Alcoholic cirrhosis of liver with ascites (HCC)  -     sodium bicarbonate 650 MG tablet; Take 2 tablets by mouth 2 times daily, Disp-60 tablet, [id number]  -     folic acid (FOLVITE) 1 MG tablet; Take 1 tablet by mouth daily, Disp-30 tablet, R-0Normal  -     furosemide (LASIX) 40 MG tablet; Take 1 tablet by mouth 2 times daily, Disp-60 tablet, R-0Normal  -     pantoprazole (PROTONIX) 40 MG tablet; Take 1 tablet by mouth 2 times daily (before meals), Disp-30 tablet, R-0Normal  3. Type 2 diabetes mellitus without complication, with long-term current use of insulin ([facility])  -     insulin glargine (LANTUS SOLOSTAR) 100 UNIT/ML injection pen; Inject 10 Units into the skin daily, Disp-5 Adjustable Dose Pre-filled Pen Syringe, R-0Normal  -     Hemoglobin A1C; Future      Blood pressure normal range today, will continue Coreg. Given number for nephrology to make sure he has follow-up for CKD. Refill his medications for his cirrhosis, recently saw GI, and has follow-up upcoming. Last A1c was 5.0, before that was 8.0. We will recheck in March potentially discontinue Lantus if normal range at that time. We will watch for any lows. Return in about 2 months (around 3/25/2023) for follow up DM2 with non fasting labs 1 week prior. SUBJECTIVE/OBJECTIVE:  HPI  50-year-old male with a history of alcoholic cirrhosis, type 2 diabetes, hypertension, CKD presents for follow-up. Overall he has been doing fine, his blood sugars have been in the 150s, he still taking Lantus 10 units. He has not seen the nephrologist yet.   He did see the gastroenterologist, has upcoming colonoscopy in early February and upcoming ultrasound this week. Blood pressures have been stable when they have been checked by home health. Abdomen has been feeling fine, no acute distention. Has been having normal bowel movements. Review of Systems   Constitutional:  Negative for activity change, appetite change, fever and unexpected weight change. Respiratory:  Negative for cough and shortness of breath. Cardiovascular:  Negative for chest pain and palpitations. Skin:  Negative for rash. Neurological:  Negative for dizziness and headaches. Psychiatric/Behavioral:  Negative for sleep disturbance. Vitals:    01/25/23 0912   BP: 124/80   Pulse: 90   Temp: 99 °F (37.2 °C)   SpO2: 99%       Physical Exam  Vitals reviewed. Constitutional:       General: He is not in acute distress. Appearance: Normal appearance. He is not ill-appearing or toxic-appearing. HENT:      Head: Normocephalic and atraumatic. Eyes:      Conjunctiva/sclera: Conjunctivae normal.   Cardiovascular:      Rate and Rhythm: Normal rate and regular rhythm. Heart sounds: Normal heart sounds. No murmur heard. No friction rub. No gallop. Pulmonary:      Effort: Pulmonary effort is normal. No respiratory distress. Breath sounds: Normal breath sounds. No wheezing, rhonchi or rales. Musculoskeletal:         General: No swelling. Normal range of motion. Skin:     General: Skin is warm. Findings: No rash. Neurological:      Mental Status: He is alert. Mental status is at baseline. Psychiatric:         Mood and Affect: Mood normal.          On this date, I spent 35 minutes reviewing previous notes, test results and face to face with the patient discussing the diagnosis and importance of compliance with the treatment plan as well as documenting on the day of the visit. An electronic signature was used to authenticate this note.   -- Lina De La Garza MD

## 2023-01-26 ENCOUNTER — HOME CARE VISIT (OUTPATIENT)
Dept: HOME HEALTH SERVICES | Facility: HOME HEALTH | Age: 58
End: 2023-01-26
Payer: MEDICARE

## 2023-01-26 ENCOUNTER — TELEPHONE (OUTPATIENT)
Dept: FAMILY MEDICINE CLINIC | Facility: CLINIC | Age: 58
End: 2023-01-26

## 2023-01-27 ENCOUNTER — HOME CARE VISIT (OUTPATIENT)
Dept: SCHEDULING | Facility: HOME HEALTH | Age: 58
End: 2023-01-27
Payer: MEDICARE

## 2023-01-27 VITALS
DIASTOLIC BLOOD PRESSURE: 62 MMHG | RESPIRATION RATE: 18 BRPM | SYSTOLIC BLOOD PRESSURE: 109 MMHG | TEMPERATURE: 97.7 F | HEART RATE: 86 BPM | OXYGEN SATURATION: 96 %

## 2023-01-27 PROCEDURE — G0299 HHS/HOSPICE OF RN EA 15 MIN: HCPCS

## 2023-01-30 ENCOUNTER — HOME CARE VISIT (OUTPATIENT)
Dept: SCHEDULING | Facility: HOME HEALTH | Age: 58
End: 2023-01-30
Payer: MEDICARE

## 2023-01-30 ENCOUNTER — TELEPHONE (OUTPATIENT)
Dept: FAMILY MEDICINE CLINIC | Facility: CLINIC | Age: 58
End: 2023-01-30

## 2023-01-30 VITALS
BODY MASS INDEX: 28.73 KG/M2 | DIASTOLIC BLOOD PRESSURE: 80 MMHG | OXYGEN SATURATION: 100 % | WEIGHT: 206 LBS | HEART RATE: 84 BPM | TEMPERATURE: 98.4 F | RESPIRATION RATE: 16 BRPM | SYSTOLIC BLOOD PRESSURE: 100 MMHG

## 2023-01-30 PROCEDURE — G0299 HHS/HOSPICE OF RN EA 15 MIN: HCPCS

## 2023-01-30 ASSESSMENT — ENCOUNTER SYMPTOMS
STOOL DESCRIPTION: SOFT FORMED
DYSPNEA ACTIVITY LEVEL: AFTER AMBULATING 10 - 20 FT

## 2023-01-30 NOTE — TELEPHONE ENCOUNTER
I don't know this patient so I will defer to Dr. Betito Luu decision. He needs to be evaluated.      Yordan Duff, APRN - CNP

## 2023-01-30 NOTE — TELEPHONE ENCOUNTER
Con Nuñez with [facility] ([phone]) called to inform you that patient had decreased breath sounds with crackling and wheezing on the left side at visit today. He is alert and oriented, /80(normal) and Edema(usual lately). Is taking lasik. Please call Con Nuñez with further questions or patient with any directions.

## 2023-01-31 ENCOUNTER — OFFICE VISIT (OUTPATIENT)
Dept: FAMILY MEDICINE CLINIC | Facility: CLINIC | Age: 58
End: 2023-01-31
Payer: MEDICARE

## 2023-01-31 ENCOUNTER — HOME CARE VISIT (OUTPATIENT)
Dept: SCHEDULING | Facility: HOME HEALTH | Age: 58
End: 2023-01-31
Payer: MEDICARE

## 2023-01-31 VITALS
DIASTOLIC BLOOD PRESSURE: 70 MMHG | HEART RATE: 88 BPM | OXYGEN SATURATION: 98 % | RESPIRATION RATE: 17 BRPM | SYSTOLIC BLOOD PRESSURE: 104 MMHG | TEMPERATURE: 98.7 F

## 2023-01-31 VITALS
RESPIRATION RATE: 18 BRPM | OXYGEN SATURATION: 100 % | SYSTOLIC BLOOD PRESSURE: 113 MMHG | BODY MASS INDEX: 28.73 KG/M2 | HEART RATE: 77 BPM | HEIGHT: 71 IN | TEMPERATURE: 97.6 F | DIASTOLIC BLOOD PRESSURE: 78 MMHG

## 2023-01-31 DIAGNOSIS — R18.8 CIRRHOSIS OF LIVER WITH ASCITES, UNSPECIFIED HEPATIC CIRRHOSIS TYPE (HCC): Primary | ICD-10-CM

## 2023-01-31 DIAGNOSIS — E44.0 MODERATE PROTEIN MALNUTRITION (HCC): ICD-10-CM

## 2023-01-31 DIAGNOSIS — K74.60 CIRRHOSIS OF LIVER WITH ASCITES, UNSPECIFIED HEPATIC CIRRHOSIS TYPE (HCC): Primary | ICD-10-CM

## 2023-01-31 PROCEDURE — G8427 DOCREV CUR MEDS BY ELIG CLIN: HCPCS | Performed by: NURSE PRACTITIONER

## 2023-01-31 PROCEDURE — 3017F COLORECTAL CA SCREEN DOC REV: CPT | Performed by: NURSE PRACTITIONER

## 2023-01-31 PROCEDURE — 3078F DIAST BP <80 MM HG: CPT | Performed by: NURSE PRACTITIONER

## 2023-01-31 PROCEDURE — 1111F DSCHRG MED/CURRENT MED MERGE: CPT | Performed by: NURSE PRACTITIONER

## 2023-01-31 PROCEDURE — 1036F TOBACCO NON-USER: CPT | Performed by: NURSE PRACTITIONER

## 2023-01-31 PROCEDURE — 3074F SYST BP LT 130 MM HG: CPT | Performed by: NURSE PRACTITIONER

## 2023-01-31 PROCEDURE — 99215 OFFICE O/P EST HI 40 MIN: CPT | Performed by: NURSE PRACTITIONER

## 2023-01-31 PROCEDURE — G8484 FLU IMMUNIZE NO ADMIN: HCPCS | Performed by: NURSE PRACTITIONER

## 2023-01-31 PROCEDURE — G8417 CALC BMI ABV UP PARAM F/U: HCPCS | Performed by: NURSE PRACTITIONER

## 2023-01-31 PROCEDURE — G0157 HHC PT ASSISTANT EA 15: HCPCS

## 2023-01-31 ASSESSMENT — ENCOUNTER SYMPTOMS
SORE THROAT: 0
VOMITING: 0
COUGH: 0
NAUSEA: 0
BACK PAIN: 0
SHORTNESS OF BREATH: 1
CONSTIPATION: 0
SINUS PAIN: 0
ABDOMINAL PAIN: 0
ABDOMINAL DISTENTION: 1
EYE PAIN: 0
RHINORRHEA: 0
DIARRHEA: 0

## 2023-01-31 NOTE — PROGRESS NOTES
Kimberley Lazcano (:  1965) is a 65 y.o. male,Established patient, here for evaluation of the following chief complaint(s): Other (Decreased breath sounds with crackling and wheezing)         ASSESSMENT/PLAN:  1. Cirrhosis of liver with ascites, unspecified hepatic cirrhosis type ([facility]  2. Moderate protein malnutrition ([facility]    US reviewed from 23; revealed hepatic cirrhosis and sequela of portal hypertension to include moderate to large intraperitoneal ascites. His GI, who ordered this, has been trying to reach him to discuss without success. I also reviewed labs ordered by GI on 23 and noted a critically low albumin level as well as a HGB of 8.3. His kidney function is also significant declined since 1/10/23. Given his history and presentation in clinic, I recommended evaluation in the ED, but patient and spouse would like to speak with his GI first. They tried to call that office as did I and neither of us had received a call back at the time of this writing. I do feel that he needs to have a paracentesis. Thoroughly reviewed red flags indicating emergent evaluation. Patient/spouse verbalized understanding. No follow-ups on file. Subjective   SUBJECTIVE/OBJECTIVE:  Presents after his home health nurse called to report decreased breath sounds with crackling and wheezing in the left lung. He has a history of cirrhosis and has had 2 paracentesis in the last month. He has a colonoscopy scheduled 23. He reports increased \"bloating\" in his abdomen. Is short of breath particularly if he bends over. He reports decreased appetite. Is having regular bowel movements. Review of Systems   Constitutional:  Negative for activity change, appetite change, fatigue and fever. HENT:  Negative for congestion, ear pain, rhinorrhea, sinus pain and sore throat. Eyes:  Negative for pain and visual disturbance. Respiratory:  Positive for shortness of breath. Negative for cough. Cardiovascular:  Negative for chest pain and palpitations. Gastrointestinal:  Positive for abdominal distention. Negative for abdominal pain, constipation, diarrhea, nausea and vomiting. Genitourinary:  Negative for dysuria, frequency, hematuria and urgency. Musculoskeletal:  Negative for arthralgias and back pain. Skin:  Negative for rash. Neurological:  Negative for dizziness and headaches. Objective   Physical Exam  Vitals reviewed. Constitutional:       Appearance: Normal appearance. HENT:      Head: Normocephalic and atraumatic. Eyes:      Extraocular Movements: Extraocular movements intact. Pupils: Pupils are equal, round, and reactive to light. Comments: Scleral icterus   Cardiovascular:      Rate and Rhythm: Normal rate and regular rhythm. Heart sounds: Normal heart sounds. Pulmonary:      Effort: Pulmonary effort is normal. No respiratory distress. Breath sounds: Examination of the left-upper field reveals wheezing and rhonchi. Examination of the left-middle field reveals wheezing and rhonchi. Examination of the left-lower field reveals wheezing and rhonchi. Wheezing and rhonchi present. Abdominal:      General: Bowel sounds are normal. There is distension (and tight). Skin:     General: Skin is warm and dry. Neurological:      General: No focal deficit present. Mental Status: He is alert and oriented to person, place, and time. On this date 1/31/2023 I have spent 44 minutes reviewing previous notes, test results and face to face with the patient discussing the diagnosis and importance of compliance with the treatment plan as well as documenting on the day of the visit. An electronic signature was used to authenticate this note.     --[facility], APRN - CNP

## 2023-01-31 NOTE — PROGRESS NOTES
Spoke with Viji Chen for [facility] at [address] [facility]. Provided with clinical update. The provider is out of the office today. Patient and wife did present to that office but they were at lunch.      Yordan Duff, APRN - CNP

## 2023-02-01 ENCOUNTER — HOME CARE VISIT (OUTPATIENT)
Dept: SCHEDULING | Facility: HOME HEALTH | Age: 58
End: 2023-02-01
Payer: MEDICARE

## 2023-02-01 VITALS
DIASTOLIC BLOOD PRESSURE: 82 MMHG | TEMPERATURE: 97.8 F | SYSTOLIC BLOOD PRESSURE: 110 MMHG | OXYGEN SATURATION: 98 % | RESPIRATION RATE: 18 BRPM | HEART RATE: 82 BPM

## 2023-02-01 VITALS
HEART RATE: 80 BPM | SYSTOLIC BLOOD PRESSURE: 110 MMHG | OXYGEN SATURATION: 96 % | DIASTOLIC BLOOD PRESSURE: 70 MMHG | TEMPERATURE: 97.6 F | RESPIRATION RATE: 18 BRPM

## 2023-02-01 PROCEDURE — G0299 HHS/HOSPICE OF RN EA 15 MIN: HCPCS

## 2023-02-01 PROCEDURE — G0151 HHCP-SERV OF PT,EA 15 MIN: HCPCS

## 2023-02-01 ASSESSMENT — ENCOUNTER SYMPTOMS: DYSPNEA ACTIVITY LEVEL: AFTER AMBULATING 10 - 20 FT

## 2023-02-02 DIAGNOSIS — K70.31 ALCOHOLIC CIRRHOSIS OF LIVER WITH ASCITES (HCC): ICD-10-CM

## 2023-02-02 RX ORDER — SODIUM BICARBONATE 650 MG/1
TABLET ORAL
Qty: 60 TABLET | Refills: 0 | Status: SHIPPED | OUTPATIENT
Start: 2023-02-02

## 2023-02-02 RX ORDER — FOLIC ACID 1 MG/1
TABLET ORAL
Qty: 30 TABLET | Refills: 0 | Status: SHIPPED | OUTPATIENT
Start: 2023-02-02

## 2023-02-03 ENCOUNTER — HOME CARE VISIT (OUTPATIENT)
Dept: SCHEDULING | Facility: HOME HEALTH | Age: 58
End: 2023-02-03
Payer: MEDICARE

## 2023-02-03 DIAGNOSIS — K70.31 ALCOHOLIC CIRRHOSIS OF LIVER WITH ASCITES (HCC): ICD-10-CM

## 2023-02-03 PROCEDURE — G0299 HHS/HOSPICE OF RN EA 15 MIN: HCPCS

## 2023-02-04 VITALS
RESPIRATION RATE: 18 BRPM | DIASTOLIC BLOOD PRESSURE: 62 MMHG | SYSTOLIC BLOOD PRESSURE: 108 MMHG | OXYGEN SATURATION: 95 % | BODY MASS INDEX: 28.45 KG/M2 | TEMPERATURE: 98 F | WEIGHT: 204 LBS | HEART RATE: 85 BPM

## 2023-02-04 ASSESSMENT — ENCOUNTER SYMPTOMS: PAIN LOCATION - PAIN QUALITY: ACHING

## 2023-02-06 ENCOUNTER — HOSPITAL ENCOUNTER (INPATIENT)
Age: 58
LOS: 14 days | Discharge: HOME HEALTH | DRG: 432 | End: 2023-02-20
Attending: EMERGENCY MEDICINE | Admitting: INTERNAL MEDICINE
Payer: MEDICARE

## 2023-02-06 ENCOUNTER — APPOINTMENT (OUTPATIENT)
Dept: GENERAL RADIOLOGY | Age: 58
DRG: 432 | End: 2023-02-06
Payer: MEDICARE

## 2023-02-06 ENCOUNTER — HOME CARE VISIT (OUTPATIENT)
Dept: SCHEDULING | Facility: HOME HEALTH | Age: 58
End: 2023-02-06
Payer: MEDICARE

## 2023-02-06 ENCOUNTER — TELEPHONE (OUTPATIENT)
Dept: FAMILY MEDICINE CLINIC | Facility: CLINIC | Age: 58
End: 2023-02-06

## 2023-02-06 VITALS
RESPIRATION RATE: 18 BRPM | HEART RATE: 78 BPM | DIASTOLIC BLOOD PRESSURE: 61 MMHG | TEMPERATURE: 97.6 F | SYSTOLIC BLOOD PRESSURE: 106 MMHG | OXYGEN SATURATION: 97 %

## 2023-02-06 DIAGNOSIS — R60.0 BILATERAL LEG EDEMA: ICD-10-CM

## 2023-02-06 DIAGNOSIS — R60.1 ANASARCA: Primary | ICD-10-CM

## 2023-02-06 DIAGNOSIS — M79.605 BILATERAL LEG PAIN: ICD-10-CM

## 2023-02-06 DIAGNOSIS — N50.89 SCROTAL EDEMA: ICD-10-CM

## 2023-02-06 DIAGNOSIS — N17.9 ACUTE RENAL FAILURE, UNSPECIFIED ACUTE RENAL FAILURE TYPE (HCC): ICD-10-CM

## 2023-02-06 DIAGNOSIS — M79.604 BILATERAL LEG PAIN: ICD-10-CM

## 2023-02-06 PROBLEM — N18.9 ACUTE KIDNEY INJURY SUPERIMPOSED ON CHRONIC KIDNEY DISEASE (HCC): Status: ACTIVE | Noted: 2023-02-06

## 2023-02-06 LAB
ALBUMIN SERPL-MCNC: 1.4 G/DL (ref 3.5–5)
ALBUMIN/GLOB SERPL: 0.3 (ref 0.4–1.6)
ALP SERPL-CCNC: 175 U/L (ref 50–136)
ALT SERPL-CCNC: 8 U/L (ref 12–65)
ANION GAP SERPL CALC-SCNC: 6 MMOL/L (ref 2–11)
APTT PPP: 43.4 SEC (ref 24.5–34.2)
AST SERPL-CCNC: 17 U/L (ref 15–37)
BASOPHILS # BLD: 0 K/UL (ref 0–0.2)
BASOPHILS NFR BLD: 1 % (ref 0–2)
BILIRUB SERPL-MCNC: <0.1 MG/DL (ref 0.2–1.1)
BUN SERPL-MCNC: 41 MG/DL (ref 6–23)
CALCIUM SERPL-MCNC: 7.5 MG/DL (ref 8.3–10.4)
CHLORIDE SERPL-SCNC: 115 MMOL/L (ref 101–110)
CO2 SERPL-SCNC: 19 MMOL/L (ref 21–32)
CREAT SERPL-MCNC: 4.1 MG/DL (ref 0.8–1.5)
DIFFERENTIAL METHOD BLD: ABNORMAL
EOSINOPHIL # BLD: 0.3 K/UL (ref 0–0.8)
EOSINOPHIL NFR BLD: 4 % (ref 0.5–7.8)
ERYTHROCYTE [DISTWIDTH] IN BLOOD BY AUTOMATED COUNT: 14.6 % (ref 11.9–14.6)
GLOBULIN SER CALC-MCNC: 5.3 G/DL (ref 2.8–4.5)
GLUCOSE BLD STRIP.AUTO-MCNC: 115 MG/DL (ref 65–100)
GLUCOSE BLD STRIP.AUTO-MCNC: 128 MG/DL (ref 65–100)
GLUCOSE BLD STRIP.AUTO-MCNC: 61 MG/DL (ref 65–100)
GLUCOSE SERPL-MCNC: 113 MG/DL (ref 65–100)
HCT VFR BLD AUTO: 25.9 % (ref 41.1–50.3)
HGB BLD-MCNC: 8.4 G/DL (ref 13.6–17.2)
IMM GRANULOCYTES # BLD AUTO: 0 K/UL (ref 0–0.5)
IMM GRANULOCYTES NFR BLD AUTO: 1 % (ref 0–5)
INR PPP: 1.3
LYMPHOCYTES # BLD: 2 K/UL (ref 0.5–4.6)
LYMPHOCYTES NFR BLD: 29 % (ref 13–44)
MCH RBC QN AUTO: 26.7 PG (ref 26.1–32.9)
MCHC RBC AUTO-ENTMCNC: 32.4 G/DL (ref 31.4–35)
MCV RBC AUTO: 82.2 FL (ref 82–102)
MONOCYTES # BLD: 0.7 K/UL (ref 0.1–1.3)
MONOCYTES NFR BLD: 10 % (ref 4–12)
NEUTS SEG # BLD: 3.8 K/UL (ref 1.7–8.2)
NEUTS SEG NFR BLD: 55 % (ref 43–78)
NRBC # BLD: 0 K/UL (ref 0–0.2)
NT PRO BNP: 1761 PG/ML (ref 5–125)
PLATELET # BLD AUTO: 194 K/UL (ref 150–450)
PMV BLD AUTO: 9.9 FL (ref 9.4–12.3)
POTASSIUM SERPL-SCNC: 4.2 MMOL/L (ref 3.5–5.1)
PROT SERPL-MCNC: 6.7 G/DL (ref 6.3–8.2)
PROTHROMBIN TIME: 16.5 SEC (ref 12.6–14.3)
RBC # BLD AUTO: 3.15 M/UL (ref 4.23–5.6)
SERVICE CMNT-IMP: ABNORMAL
SODIUM SERPL-SCNC: 140 MMOL/L (ref 133–143)
TROPONIN I SERPL HS-MCNC: 9.5 PG/ML (ref 0–14)
WBC # BLD AUTO: 6.8 K/UL (ref 4.3–11.1)

## 2023-02-06 PROCEDURE — P9047 ALBUMIN (HUMAN), 25%, 50ML: HCPCS | Performed by: INTERNAL MEDICINE

## 2023-02-06 PROCEDURE — 85025 COMPLETE CBC W/AUTO DIFF WBC: CPT

## 2023-02-06 PROCEDURE — 1100000000 HC RM PRIVATE

## 2023-02-06 PROCEDURE — 82607 VITAMIN B-12: CPT

## 2023-02-06 PROCEDURE — 99285 EMERGENCY DEPT VISIT HI MDM: CPT

## 2023-02-06 PROCEDURE — 85610 PROTHROMBIN TIME: CPT

## 2023-02-06 PROCEDURE — 83880 ASSAY OF NATRIURETIC PEPTIDE: CPT

## 2023-02-06 PROCEDURE — 71045 X-RAY EXAM CHEST 1 VIEW: CPT

## 2023-02-06 PROCEDURE — 82728 ASSAY OF FERRITIN: CPT

## 2023-02-06 PROCEDURE — G0299 HHS/HOSPICE OF RN EA 15 MIN: HCPCS

## 2023-02-06 PROCEDURE — 6360000002 HC RX W HCPCS: Performed by: INTERNAL MEDICINE

## 2023-02-06 PROCEDURE — 85730 THROMBOPLASTIN TIME PARTIAL: CPT

## 2023-02-06 PROCEDURE — 82962 GLUCOSE BLOOD TEST: CPT

## 2023-02-06 PROCEDURE — 80053 COMPREHEN METABOLIC PANEL: CPT

## 2023-02-06 PROCEDURE — 84484 ASSAY OF TROPONIN QUANT: CPT

## 2023-02-06 PROCEDURE — 83540 ASSAY OF IRON: CPT

## 2023-02-06 PROCEDURE — 82746 ASSAY OF FOLIC ACID SERUM: CPT

## 2023-02-06 PROCEDURE — 6370000000 HC RX 637 (ALT 250 FOR IP): Performed by: INTERNAL MEDICINE

## 2023-02-06 RX ORDER — PANTOPRAZOLE SODIUM 40 MG/1
40 TABLET, DELAYED RELEASE ORAL
Status: DISCONTINUED | OUTPATIENT
Start: 2023-02-07 | End: 2023-02-20 | Stop reason: HOSPADM

## 2023-02-06 RX ORDER — FAMOTIDINE 20 MG/1
10 TABLET, FILM COATED ORAL DAILY PRN
Status: DISCONTINUED | OUTPATIENT
Start: 2023-02-06 | End: 2023-02-20 | Stop reason: SDUPTHER

## 2023-02-06 RX ORDER — ACETAMINOPHEN 650 MG/1
650 SUPPOSITORY RECTAL EVERY 6 HOURS PRN
Status: DISCONTINUED | OUTPATIENT
Start: 2023-02-06 | End: 2023-02-20 | Stop reason: HOSPADM

## 2023-02-06 RX ORDER — ONDANSETRON 2 MG/ML
4 INJECTION INTRAMUSCULAR; INTRAVENOUS EVERY 6 HOURS PRN
Status: DISCONTINUED | OUTPATIENT
Start: 2023-02-06 | End: 2023-02-20 | Stop reason: HOSPADM

## 2023-02-06 RX ORDER — POTASSIUM CHLORIDE 7.45 MG/ML
10 INJECTION INTRAVENOUS PRN
Status: DISCONTINUED | OUTPATIENT
Start: 2023-02-06 | End: 2023-02-09

## 2023-02-06 RX ORDER — POTASSIUM CHLORIDE 20 MEQ/1
40 TABLET, EXTENDED RELEASE ORAL PRN
Status: DISCONTINUED | OUTPATIENT
Start: 2023-02-06 | End: 2023-02-09

## 2023-02-06 RX ORDER — ENOXAPARIN SODIUM 100 MG/ML
30 INJECTION SUBCUTANEOUS DAILY
Status: DISCONTINUED | OUTPATIENT
Start: 2023-02-07 | End: 2023-02-06 | Stop reason: ALTCHOICE

## 2023-02-06 RX ORDER — ALBUMIN (HUMAN) 12.5 G/50ML
25 SOLUTION INTRAVENOUS ONCE
Status: COMPLETED | OUTPATIENT
Start: 2023-02-06 | End: 2023-02-06

## 2023-02-06 RX ORDER — SODIUM BICARBONATE 650 MG/1
650 TABLET ORAL ONCE
Status: COMPLETED | OUTPATIENT
Start: 2023-02-06 | End: 2023-02-07

## 2023-02-06 RX ORDER — BISACODYL 10 MG
10 SUPPOSITORY, RECTAL RECTAL DAILY PRN
Status: DISCONTINUED | OUTPATIENT
Start: 2023-02-06 | End: 2023-02-20 | Stop reason: HOSPADM

## 2023-02-06 RX ORDER — SODIUM CHLORIDE 0.9 % (FLUSH) 0.9 %
5-40 SYRINGE (ML) INJECTION PRN
Status: DISCONTINUED | OUTPATIENT
Start: 2023-02-06 | End: 2023-02-20 | Stop reason: HOSPADM

## 2023-02-06 RX ORDER — INSULIN GLARGINE 100 [IU]/ML
10 INJECTION, SOLUTION SUBCUTANEOUS NIGHTLY
Status: DISCONTINUED | OUTPATIENT
Start: 2023-02-06 | End: 2023-02-19

## 2023-02-06 RX ORDER — HEPARIN SODIUM 5000 [USP'U]/ML
5000 INJECTION, SOLUTION INTRAVENOUS; SUBCUTANEOUS EVERY 8 HOURS SCHEDULED
Status: DISCONTINUED | OUTPATIENT
Start: 2023-02-06 | End: 2023-02-20 | Stop reason: HOSPADM

## 2023-02-06 RX ORDER — PANTOPRAZOLE SODIUM 40 MG/1
TABLET, DELAYED RELEASE ORAL
Qty: 30 TABLET | Refills: 0 | OUTPATIENT
Start: 2023-02-06

## 2023-02-06 RX ORDER — ACETAMINOPHEN 325 MG/1
650 TABLET ORAL EVERY 6 HOURS PRN
Status: DISCONTINUED | OUTPATIENT
Start: 2023-02-06 | End: 2023-02-20 | Stop reason: HOSPADM

## 2023-02-06 RX ORDER — POLYETHYLENE GLYCOL 3350 17 G/17G
17 POWDER, FOR SOLUTION ORAL DAILY PRN
Status: DISCONTINUED | OUTPATIENT
Start: 2023-02-06 | End: 2023-02-20 | Stop reason: HOSPADM

## 2023-02-06 RX ORDER — FUROSEMIDE 10 MG/ML
40 INJECTION INTRAMUSCULAR; INTRAVENOUS DAILY
Status: CANCELLED | OUTPATIENT
Start: 2023-02-06

## 2023-02-06 RX ORDER — SODIUM CHLORIDE 9 MG/ML
INJECTION, SOLUTION INTRAVENOUS PRN
Status: DISCONTINUED | OUTPATIENT
Start: 2023-02-06 | End: 2023-02-20 | Stop reason: HOSPADM

## 2023-02-06 RX ORDER — ONDANSETRON 4 MG/1
4 TABLET, ORALLY DISINTEGRATING ORAL EVERY 8 HOURS PRN
Status: DISCONTINUED | OUTPATIENT
Start: 2023-02-06 | End: 2023-02-20 | Stop reason: HOSPADM

## 2023-02-06 RX ORDER — MAGNESIUM HYDROXIDE/ALUMINUM HYDROXICE/SIMETHICONE 120; 1200; 1200 MG/30ML; MG/30ML; MG/30ML
30 SUSPENSION ORAL EVERY 6 HOURS PRN
Status: DISCONTINUED | OUTPATIENT
Start: 2023-02-06 | End: 2023-02-20 | Stop reason: HOSPADM

## 2023-02-06 RX ORDER — HYDRALAZINE HYDROCHLORIDE 25 MG/1
25 TABLET, FILM COATED ORAL EVERY 8 HOURS PRN
Status: DISCONTINUED | OUTPATIENT
Start: 2023-02-06 | End: 2023-02-10

## 2023-02-06 RX ORDER — FOLIC ACID 1 MG/1
1000 TABLET ORAL DAILY
Status: DISCONTINUED | OUTPATIENT
Start: 2023-02-07 | End: 2023-02-07

## 2023-02-06 RX ORDER — MAGNESIUM SULFATE IN WATER 40 MG/ML
2000 INJECTION, SOLUTION INTRAVENOUS PRN
Status: DISCONTINUED | OUTPATIENT
Start: 2023-02-06 | End: 2023-02-09

## 2023-02-06 RX ORDER — CARVEDILOL 12.5 MG/1
12.5 TABLET ORAL 2 TIMES DAILY WITH MEALS
Status: DISCONTINUED | OUTPATIENT
Start: 2023-02-07 | End: 2023-02-07

## 2023-02-06 RX ORDER — FUROSEMIDE 10 MG/ML
40 INJECTION INTRAMUSCULAR; INTRAVENOUS 2 TIMES DAILY
Status: DISCONTINUED | OUTPATIENT
Start: 2023-02-06 | End: 2023-02-12

## 2023-02-06 RX ORDER — SODIUM CHLORIDE 0.9 % (FLUSH) 0.9 %
5-40 SYRINGE (ML) INJECTION EVERY 12 HOURS SCHEDULED
Status: DISCONTINUED | OUTPATIENT
Start: 2023-02-06 | End: 2023-02-20 | Stop reason: HOSPADM

## 2023-02-06 RX ADMIN — INSULIN GLARGINE 10 UNITS: 100 INJECTION, SOLUTION SUBCUTANEOUS at 21:48

## 2023-02-06 RX ADMIN — FUROSEMIDE 40 MG: 10 INJECTION, SOLUTION INTRAMUSCULAR; INTRAVENOUS at 21:37

## 2023-02-06 RX ADMIN — ALBUMIN (HUMAN) 25 G: 0.25 INJECTION, SOLUTION INTRAVENOUS at 21:42

## 2023-02-06 ASSESSMENT — ENCOUNTER SYMPTOMS
NAUSEA: 0
COLOR CHANGE: 0
SHORTNESS OF BREATH: 0
COUGH: 0
ABDOMINAL PAIN: 0
CONSTIPATION: 0
RHINORRHEA: 0
BACK PAIN: 0
SORE THROAT: 0
DIARRHEA: 0
VOMITING: 0

## 2023-02-06 ASSESSMENT — PAIN SCALES - GENERAL
PAINLEVEL_OUTOF10: 1
PAINLEVEL_OUTOF10: 5

## 2023-02-06 ASSESSMENT — PAIN DESCRIPTION - DESCRIPTORS: DESCRIPTORS: ACHING;DISCOMFORT

## 2023-02-06 ASSESSMENT — LIFESTYLE VARIABLES
HOW OFTEN DO YOU HAVE A DRINK CONTAINING ALCOHOL: NEVER
HOW MANY STANDARD DRINKS CONTAINING ALCOHOL DO YOU HAVE ON A TYPICAL DAY: PATIENT DOES NOT DRINK

## 2023-02-06 ASSESSMENT — PAIN DESCRIPTION - PAIN TYPE: TYPE: ACUTE PAIN

## 2023-02-06 ASSESSMENT — PAIN DESCRIPTION - FREQUENCY: FREQUENCY: CONTINUOUS

## 2023-02-06 ASSESSMENT — PAIN - FUNCTIONAL ASSESSMENT
PAIN_FUNCTIONAL_ASSESSMENT: 0-10
PAIN_FUNCTIONAL_ASSESSMENT: 0-10

## 2023-02-06 ASSESSMENT — PAIN DESCRIPTION - LOCATION: LOCATION: GROIN

## 2023-02-06 NOTE — ED TRIAGE NOTES
Pt c/o new onset genital swelling since yesterday morning, BLE swelling at baseline. Pt reports compliant w Lasix.  (-)cp, denies weeping from swelling, dysuria (+)slight dyspnea  A&Ox4

## 2023-02-06 NOTE — TELEPHONE ENCOUNTER
Patients wife called for a refill of all of his medications. Preferred pharmacy is [facility].

## 2023-02-06 NOTE — H&P
Hospitalist History and Physical   Admit Date:  2023  2:53 PM   Name:  Morgan Galan   Age:  57 y.o.  Sex:  male  :  1965   MRN:  [id number]   Room:  ER14/14    Presenting Complaint: Leg Swelling and Groin Swelling     Reason(s) for Admission: Anasarca [R60.1]     History of Present Illness:   Morgan Galan is a 57 y.o. male with medical history of hypertension, hyperlipidemia, type 2 diabetes, liver cirrhosis with ascites who presented with worsening bilateral lower extremity swelling, abdominal distention and scrotal swelling.  Patient had paracentesis on  with removal of 6 L of fluid but reports worsening swelling since then.  Reports abdominal tightness and cramping but otherwise denies fever, chills, chest pain, nausea, vomiting, shortness of breath.  Reports that he is compliant with his medications.  Work-up in the emergency room with proBNP of 1007 and 61, BUN 41, creatinine 4.10, albumin of 1.4, hemoglobin 8.4      Assessment & Plan:   Anasarca  Liver cirrhosis with ascites  AAOx3 without signs of hepatic encephalopathy.  Elevated ALP.  Follows at [facility] with Dr.Maria Ric Mireles  - IR consult for therapeutic paracentesis  - Lasix 40mg BID IV with albumin q6h  - monitor renal function closely    MARS on CKD stage III  Concerning for hepatorenal syndrome   - monitor renal function closely  - lasix and albumin as above.  - will consider nephro consult if does not improve/.      Anemia likely due to chronic disease from liver cirrhosis and CKD  Hb of 8.4 with baseline 10-8.  - monitor Hb  - check anemia panel    Hypertension: continue with coreg. Lasix as above    Type 2 diabetes  -Continue with insulin sliding  -Lantus 10 unit daily      Diet: ADULT DIET; Regular; 4 carb choices (60 gm/meal); Low Sodium (2 gm)  VTE ppx: lovenox  Code status: FULL      Additional Medical Decision Making factors:  Complexity: 1 or more acute or chronic illness with exacerbation that  poses a threat to life, organ, or function. (High)  Complexity: 2 or more stable chronic illnesses. (Moderate)    The patient assessment required an independent historian: ER physician. I have reviewed records from an external source: notes from outside hospital.  I have reviewed records from an external source: notes from office visits. I conducted an independent review of: Labs   I conducted an independent review of: Imaging and/or other diagnostic studies     Treatment Risks: Drug requiring intensive monitoring for toxicity. (High)  Treatment Risks: Prescription drug management. (Moderate)    Shared decision making was utilized in the care of this patient. I discussed patient's case with an ER provider. Hospital Problems:  Principal Problem:    Anasarca  Active Problems:    Diabetic peripheral neuropathy ([address]    Essential hypertension    Type 2 diabetes mellitus (HCC)    Hypoalbuminemia    Cirrhosis of liver with ascites (HCC)    Acute kidney injury superimposed on chronic kidney disease (HCC)    Stage 3a chronic kidney disease ([address]  Resolved Problems:    * No resolved hospital problems. *       Past History:     Past Medical History:   Diagnosis Date    Alcoholic cirrhosis [facility] liver ([facility]     [facility]    Anemia     Gastroesophageal reflux disease with esophagitis and hemorrhage     HLD (hyperlipidemia)     Hyperplastic colon polyp     Hypertension     Obesity     PUD (peptic ulcer disease)     Stage 3 chronic kidney disease due to diabetes mellitus ([address]     Dx in hospital    Type 2 diabetes mellitus with diabetic polyneuropathy, with long-term current use of insulin (HCC)     Ulcer of the duodenum caused by bacteria (H. pylori)     Upper GI bleed 03/28/2016    Last Assessment & Plan:  Formatting of this note might be different from the original. Status post EGD yesterday which revealed esophagitis, gastric and duodenal ulcers Continue PPI, GI follow-up.  Monitor hemoglobin Avoid and states Biopsy results- pending       Past Surgical History:   Procedure Laterality Date    COLONOSCOPY      ESOPHAGOGASTRODUODENOSCOPY      UPPER GASTROINTESTINAL ENDOSCOPY N/A 9/6/2022    EGD ESOPHAGOGASTRODUODENOSCOPY/ Rm 215 performed by Dominga Leal MD at [facility] ENDOSCOPY        Social History     Tobacco Use    Smoking status: Never    Smokeless tobacco: Never   Substance Use Topics    Alcohol use: Yes      Social History     Substance and Sexual Activity   Drug Use Never       Family History   Problem Relation Age of Onset    Hypertension Sister     Diabetes Neg Hx         Immunization History   Administered Date(s) Administered    Influenza Virus Vaccine 10/16/2017    Influenza, FLUCELVAX, (age 10 mo+), MDCK, MDV, 0.5mL 10/10/2018    PPD Test 09/01/2022, 12/15/2022    Pneumococcal Polysaccharide ([id number]) 10/16/2017    Tdap (Boostrix, Adacel) 10/10/2018     No Known Allergies  Prior to Admit Medications:  Current Outpatient Medications   Medication Instructions    acetaminophen (TYLENOL) 650 mg, Oral, EVERY 6 HOURS PRN    Blood Pressure Monitoring (BLOOD PRESSURE CUFF) MISC 1 Units, Does not apply, DAILY    calcium carb-cholecalciferol 250-125 MG-UNIT TABS 1 tablet, Oral, DAILY    carvedilol (COREG) 12.5 mg, Oral, 2 TIMES DAILY WITH MEALS    cholestyramine light 4 g, Oral, 2 TIMES DAILY    cyanocobalamin 1,000 mcg, Oral, DAILY    folic acid (FOLVITE) 1 MG tablet TAKE 1 TABLET BY MOUTH EVERY DAY    furosemide (LASIX) 40 mg, Oral, 2 TIMES DAILY    glucose monitoring (FREESTYLE FREEDOM) kit 1 kit, Does not apply, DAILY, Check glucose twice daily    Lantus SoloStar 10 Units, SubCUTAneous, DAILY    naloxone 4 MG/0.1ML LIQD nasal spray 1 SPRAY INTO A NOSTRIL AS NEEDED FOR OPIOID OVERDOSE.    oxyCODONE (ROXICODONE) 5 MG immediate release tablet oxycodone 5 mg tablet   TAKE 1 TABLET BY MOUTH EVERY 8 HOURS AS NEEDED FOR PAIN FOR UPTO 3 DAYS    pantoprazole (PROTONIX) 40 mg, Oral, 2 TIMES DAILY BEFORE MEALS sodium bicarbonate 650 MG tablet TAKE 2 TABLETS BY MOUTH TWICE A DAY         Objective:   Patient Vitals for the past 24 hrs:   Temp Pulse Resp BP SpO2   02/06/23 1933 -- 73 16 (!) 161/99 100 %   02/06/23 1855 -- 75 16 (!) 179/120 100 %   02/06/23 1845 -- 73 18 (!) 173/115 100 %   02/06/23 1836 -- 74 15 (!) 151/113 100 %   02/06/23 1835 -- 72 16 (!) 151/113 100 %   02/06/23 1815 -- 70 -- (!) 151/103 100 %   02/06/23 1755 -- 74 -- (!) 143/107 100 %   02/06/23 1745 -- 72 -- (!) 158/109 99 %   02/06/23 1707 -- 72 18 (!) 162/109 100 %   02/06/23 1440 97.3 °F (36.3 °C) 75 18 (!) 139/93 100 %       Oxygen Therapy  SpO2: 100 %  Pulse Oximeter Device Mode: Continuous  O2 Device: None (Room air)    Estimated body mass index is 28.73 kg/m² as calculated from the following:    Height as of this encounter: 5' 11\" (1.803 m). Weight as of this encounter: 206 lb (93.4 kg). Intake/Output Summary (Last 24 hours) at 2/6/2023 2008  Last data filed at 2/6/2023 1706  Gross per 24 hour   Intake --   Output 250 ml   Net -250 ml         Physical Exam:    Blood pressure (!) 161/99, pulse 73, temperature 97.3 °F (36.3 °C), temperature source Oral, resp. rate 16, height 5' 11\" (1.803 m), weight 206 lb (93.4 kg), SpO2 100 %. General:    Well nourished. Head:  Normocephalic, atraumatic  Eyes:  Sclerae appear normal.  Pupils equally round. ENT:  Nares appear normal.  Moist oral mucosa  Neck:  No restricted ROM. Trachea midline   CV:   RRR. No m/r/g. No jugular venous distension. Lungs:   CTAB. No wheezing. Symmetric expansion. Abdomen:   firm, nontender, distended. Extremities: No cyanosis or clubbing. 3+ edema and scrotal swelling  Skin:     No rashes and normal coloration. Warm and dry. Neuro:  CN II-XII grossly intact. Sensation intact. Psych:  Normal mood and affect.       I have personally reviewed labs and tests:  Recent Labs:  Recent Results (from the past 24 hour(s))   Troponin    Collection Time: 02/06/23 3:24 PM   Result Value Ref Range    Troponin, High Sensitivity 9.5 0 - 14 pg/mL   CBC with Auto Differential    Collection Time: 02/06/23  3:24 PM   Result Value Ref Range    WBC 6.8 4.3 - 11.1 K/uL    RBC 3.15 (L) 4.23 - 5.6 M/uL    Hemoglobin 8.4 (L) 13.6 - 17.2 g/dL    Hematocrit 25.9 (L) 41.1 - 50.3 %    MCV 82.2 82 - 102 FL    MCH 26.7 26.1 - 32.9 PG    MCHC 32.4 31.4 - 35.0 g/dL    RDW 14.6 11.9 - 14.6 %    Platelets 762 888 - 595 K/uL    MPV 9.9 9.4 - 12.3 FL    nRBC 0.00 0.0 - 0.2 K/uL    Differential Type AUTOMATED      Seg Neutrophils 55 43 - 78 %    Lymphocytes 29 13 - 44 %    Monocytes 10 4.0 - 12.0 %    Eosinophils % 4 0.5 - 7.8 %    Basophils 1 0.0 - 2.0 %    Immature Granulocytes 1 0.0 - 5.0 %    Segs Absolute 3.8 1.7 - 8.2 K/UL    Absolute Lymph # 2.0 0.5 - 4.6 K/UL    Absolute Mono # 0.7 0.1 - 1.3 K/UL    Absolute Eos # 0.3 0.0 - 0.8 K/UL    Basophils Absolute 0.0 0.0 - 0.2 K/UL    Absolute Immature Granulocyte 0.0 0.0 - 0.5 K/UL   Comprehensive Metabolic Panel    Collection Time: 02/06/23  3:24 PM   Result Value Ref Range    Sodium 140 133 - 143 mmol/L    Potassium 4.2 3.5 - 5.1 mmol/L    Chloride 115 (H) 101 - 110 mmol/L    CO2 19 (L) 21 - 32 mmol/L    Anion Gap 6 2 - 11 mmol/L    Glucose 113 (H) 65 - 100 mg/dL    BUN 41 (H) 6 - 23 MG/DL    Creatinine 4.10 (H) 0.8 - 1.5 MG/DL    Est, Glom Filt Rate 16 (L) >60 ml/min/1.73m2    Calcium 7.5 (L) 8.3 - 10.4 MG/DL    Total Bilirubin <0.1 (L) 0.2 - 1.1 MG/DL    ALT 8 (L) 12 - 65 U/L    AST 17 15 - 37 U/L    Alk Phosphatase 175 (H) 50 - 136 U/L    Total Protein 6.7 6.3 - 8.2 g/dL    Albumin 1.4 (L) 3.5 - 5.0 g/dL    Globulin 5.3 (H) 2.8 - 4.5 g/dL    Albumin/Globulin Ratio 0.3 (L) 0.4 - 1.6     Protime-INR    Collection Time: 02/06/23  3:24 PM   Result Value Ref Range    Protime 16.5 (H) 12.6 - 14.3 sec    INR 1.3     APTT    Collection Time: 02/06/23  3:24 PM   Result Value Ref Range    PTT 43.4 (H) 24.5 - 34.2 SEC   Brain Natriuretic Peptide Collection Time: 02/06/23  3:24 PM   Result Value Ref Range    NT Pro-BNP 1,761 (H) 5 - 125 PG/ML       I have personally reviewed imaging studies:  XR CHEST 1 VIEW    Result Date: 2/6/2023  Chest X-ray INDICATION: Shortness of breath AP/PA view of the chest was obtained. FINDINGS: There is stable left hilar density, likely scarring based on prior CT. There is improved aeration of the left lung base. No definite acute infiltrate. The heart size is stable. The bony thorax is intact. No acute findings in the chest       Echocardiogram:  12/13/22    TRANSTHORACIC ECHOCARDIOGRAM (TTE) COMPLETE (CONTRAST/BUBBLE/3D PRN) 12/22/2022  3:43 PM, 12/22/2022 12:00 AM (Final)    Interpretation Summary    Left Ventricle: Normal left ventricular systolic function with a visually estimated EF of 65 - 70%. Left ventricle size is normal. Mildly increased wall thickness. Findings consistent with mild concentric hypertrophy. Normal wall motion. Normal diastolic function for age. Average E/e' ratio is 9.89. Aortic Valve: Moderate sclerosis of the aortic valve, right cusp-with no significant stenosis. Left Atrium: Left atrium is mildly dilated. Extracardiac: Left pleural effusion. Signed by: Carol Cortez MD on 12/22/2022  3:43 PM, Signed by: Unknown Provider Result on 12/22/2022 12:00 AM        Orders Placed This Encounter   Medications    insulin glargine (LANTUS) injection vial 10 Units    albumin human 25 % IV solution 25 g    furosemide (LASIX) injection 40 mg           Signed:  Josette Hdez MD    Part of this note may have been written by using a voice dictation software. The note has been proof read but may still contain some grammatical/other typographical errors.

## 2023-02-06 NOTE — ED PROVIDER NOTES
Emergency Department Provider Note                   PCP:                Babs Cunha MD               Age: 65 y.o. Sex: male     No diagnosis found. DISPOSITION         Medical Decision Making  65 y.o. patient  has a past medical history of Alcoholic cirrhosis of liver ([facility] [address] Anemia, Gastroesophageal reflux disease with esophagitis and hemorrhage, HLD (hyperlipidemia), Hyperplastic colon polyp, Hypertension, Obesity, PUD (peptic ulcer disease), Stage 3 chronic kidney disease due to diabetes mellitus ([facility] [address] Type 2 diabetes mellitus with diabetic polyneuropathy, with long-term current use of insulin ([facility] [address] Ulcer of the duodenum caused by bacteria (H. pylori), and Upper GI bleed (03/28/2016). Patient with cirrhosis of the liver. Has had bilateral lower extremity edema. Has had fluid taken off his abdomen. He presents with continued swelling of both legs but new onset of swelling in his scrotum. States the Lasix he is taking is not helping. Has had decreased urine output. Comes in for evaluation.         Ordered and Reviewed Labs: Labs Reviewed  CBC WITH AUTO DIFFERENTIAL - Abnormal; Notable for the following components:      RBC                           3.15 (*)               Hemoglobin                    8.4 (*)                Hematocrit                    25.9 (*)            All other components within normal limits  COMPREHENSIVE METABOLIC PANEL - Abnormal; Notable for the following components:     Chloride                      115 (*)                CO2                           19 (*)                 Glucose                       113 (*)                BUN                           41 (*)                 Creatinine                    4.10 (*)               Est, Glom Filt Rate           16 (*)                 Calcium                       7.5 (*)                Total Bilirubin               <0.1 (*)               ALT                           8 (*)                  Alk Phosphatase 175 (*)                Albumin                       1.4 (*)                Globulin                      5.3 (*)                Albumin/Globulin Ratio        0.3 (*)             All other components within normal limits  PROTIME-INR - Abnormal; Notable for the following components:     Protime                       16.5 (*)            All other components within normal limits  APTT - Abnormal; Notable for the following components:     PTT                           43.4 (*)            All other components within normal limits  BRAIN NATRIURETIC PEPTIDE - Abnormal; Notable for the following components:     NT Pro-BNP                    1,761 (*)            All other components within normal limits  TROPONIN    Imaging Independent Interpretation: XR CHEST 1 VIEW   Final Result    No acute findings in the chest           External Note Reviewed:  Greta    Consideration regarding hospitalization: Patient with acute renal failure and worsening edema. Will admit. Reassess: Patient stable. Disposition: Admit      Final DX: Acute renal failure      Amount and/or Complexity of Data Reviewed  Labs: ordered. Radiology: ordered. ECG/medicine tests: ordered. Risk  Decision regarding hospitalization. Orders Placed This Encounter   Procedures    XR CHEST 1 VIEW    Troponin    Brain Natriuretic Peptide    CBC with Auto Differential    Comprehensive Metabolic Panel    Protime-INR    APTT    EKG 12 Lead        Medications - No data to display    New Prescriptions    No medications on file        Nohelia Abad is a 65 y.o. male who presents to the Emergency Department with chief complaint of    Chief Complaint   Patient presents with    Leg Swelling    Groin Swelling      Patient with a history of cirrhosis and swelling in his belly and legs. States for the past couple days has had swelling in his penis and scrotum. Worse today. Has some shortness of breath with it.   Worse with exertion. Takes Lasix but states it does not seem to be helping. It does not make him urinate anymore. Denies any history of congestive heart failure. Has diabetes and hypertension. Came here for evaluation. The history is provided by the patient. No  was used. Ankle Problem  Location:  Leg  Injury: no    Leg location:  L leg, R leg, L upper leg, R upper leg, L lower leg and R lower leg  Pain details:     Severity:  No pain    Timing:  Constant    Progression:  Unchanged  Chronicity:  Chronic  Relieved by:  Nothing  Worsened by:  Nothing  Associated symptoms: swelling    Associated symptoms: no back pain, no fatigue, no fever, no neck pain and no numbness       Review of Systems   Constitutional:  Negative for chills, fatigue and fever. HENT:  Negative for rhinorrhea and sore throat. Respiratory:  Negative for cough and shortness of breath. Cardiovascular:  Positive for leg swelling. Negative for chest pain and palpitations. Gastrointestinal:  Negative for abdominal pain, constipation, diarrhea, nausea and vomiting. Genitourinary:  Positive for penile swelling and scrotal swelling. Negative for dysuria, hematuria and penile pain. Musculoskeletal:  Negative for back pain and neck pain. Skin:  Negative for color change and rash. Neurological:  Negative for numbness and headaches. All other systems reviewed and are negative.     Past Medical History:   Diagnosis Date    Alcoholic cirrhosis of liver ([facility] [address]     [facility]    Anemia     Gastroesophageal reflux disease with esophagitis and hemorrhage     HLD (hyperlipidemia)     Hyperplastic colon polyp     Hypertension     Obesity     PUD (peptic ulcer disease)     Stage 3 chronic kidney disease due to diabetes mellitus ([facility] [address]     Dx in hospital    Type 2 diabetes mellitus with diabetic polyneuropathy, with long-term current use of insulin ([facility] [address]     Ulcer of the duodenum caused by bacteria (H. pylori)     Upper GI bleed 03/28/2016    Last Assessment & Plan:  Formatting of this note might be different from the original. Status post EGD yesterday which revealed esophagitis, gastric and duodenal ulcers Continue PPI, GI follow-up. Monitor hemoglobin Avoid and states Biopsy results- pending        Past Surgical History:   Procedure Laterality Date    COLONOSCOPY      ESOPHAGOGASTRODUODENOSCOPY      UPPER GASTROINTESTINAL ENDOSCOPY N/A 9/6/2022    EGD ESOPHAGOGASTRODUODENOSCOPY/ Rm 215 performed by Dana Mendoza MD at [facility] ENDOSCOPY        Family History   Problem Relation Age of Onset    Hypertension Sister     Diabetes Neg Hx         Social History     Socioeconomic History    Marital status:      Spouse name: None    Number of children: None    Years of education: None    Highest education level: None   Tobacco Use    Smoking status: Never    Smokeless tobacco: Never   Vaping Use    Vaping Use: Never used   Substance and Sexual Activity    Alcohol use: Yes    Drug use: Never        Allergies: Patient has no known allergies. Previous Medications    ACETAMINOPHEN (TYLENOL) 325 MG TABLET    Take 650 mg by mouth every 6 hours as needed for Pain.     BLOOD PRESSURE MONITORING (BLOOD PRESSURE CUFF) MISC    1 Units by Does not apply route daily    CALCIUM CARB-CHOLECALCIFEROL 250-125 MG-UNIT TABS    Take 1 tablet by mouth daily    CARVEDILOL (COREG) 25 MG TABLET    Take 0.5 tablets by mouth 2 times daily (with meals)    CHOLESTYRAMINE LIGHT 4 G PACKET    Take 1 packet by mouth 2 times daily    CYANOCOBALAMIN 1000 MCG TABLET    Take 1 tablet by mouth daily    FOLIC ACID (FOLVITE) 1 MG TABLET    TAKE 1 TABLET BY MOUTH EVERY DAY    FUROSEMIDE (LASIX) 40 MG TABLET    Take 1 tablet by mouth 2 times daily    GLUCOSE MONITORING (FREESTYLE FREEDOM) KIT    1 kit by Does not apply route daily Check glucose twice daily    INSULIN GLARGINE (LANTUS SOLOSTAR) 100 UNIT/ML INJECTION PEN    Inject 10 Units into the skin daily NALOXONE 4 MG/0.1ML LIQD NASAL SPRAY    1 SPRAY INTO A NOSTRIL AS NEEDED FOR OPIOID OVERDOSE. OXYCODONE (ROXICODONE) 5 MG IMMEDIATE RELEASE TABLET    oxycodone 5 mg tablet   TAKE 1 TABLET BY MOUTH EVERY 8 HOURS AS NEEDED FOR PAIN FOR UPTO 3 DAYS    PANTOPRAZOLE (PROTONIX) 40 MG TABLET    Take 1 tablet by mouth 2 times daily (before meals)    SODIUM BICARBONATE 650 MG TABLET    TAKE 2 TABLETS BY MOUTH TWICE A DAY        Vitals signs and nursing note reviewed. Patient Vitals for the past 4 hrs:   Temp Pulse Resp BP SpO2   02/06/23 1440 97.3 °F (36.3 °C) 75 18 (!) 139/93 100 %          Physical Exam  Vitals and nursing note reviewed. Constitutional:       Appearance: Normal appearance. HENT:      Head: Normocephalic and atraumatic. Cardiovascular:      Rate and Rhythm: Normal rate and regular rhythm. Pulmonary:      Effort: Pulmonary effort is normal. No respiratory distress. Breath sounds: Normal breath sounds. No wheezing. Abdominal:      General: Bowel sounds are normal.      Palpations: Abdomen is soft. Tenderness: There is no abdominal tenderness. Musculoskeletal:         General: Swelling present. Normal range of motion. Cervical back: Normal range of motion. No tenderness. Right lower leg: Edema (Swelling in the leg and thigh area bilaterally.) present. Left lower leg: Edema present. Skin:     General: Skin is warm and dry. Neurological:      Mental Status: He is alert. Procedures      No results found for any visits on 02/06/23. XR CHEST 1 VIEW    (Results Pending)                         Voice dictation software was used during the making of this note. This software is not perfect and grammatical and other typographical errors may be present. This note has not been completely proofread for errors.      Lorice Cheadle, MD  02/06/23 1997

## 2023-02-07 LAB
AMPHET UR QL SCN: NEGATIVE
ANION GAP SERPL CALC-SCNC: 8 MMOL/L (ref 2–11)
APPEARANCE UR: CLEAR
BACTERIA URNS QL MICRO: NEGATIVE /HPF
BARBITURATES UR QL SCN: NEGATIVE
BASOPHILS # BLD: 0 K/UL (ref 0–0.2)
BASOPHILS NFR BLD: 1 % (ref 0–2)
BENZODIAZ UR QL: NEGATIVE
BILIRUB UR QL: NEGATIVE
BUN SERPL-MCNC: 44 MG/DL (ref 6–23)
CALCIUM SERPL-MCNC: 8 MG/DL (ref 8.3–10.4)
CANNABINOIDS UR QL SCN: NEGATIVE
CASTS URNS QL MICRO: ABNORMAL /LPF
CHLORIDE SERPL-SCNC: 116 MMOL/L (ref 101–110)
CHLORIDE UR-SCNC: 123 MMOL/L
CO2 SERPL-SCNC: 16 MMOL/L (ref 21–32)
COCAINE UR QL SCN: NEGATIVE
COLOR UR: ABNORMAL
CREAT SERPL-MCNC: 4.1 MG/DL (ref 0.8–1.5)
CREAT UR-MCNC: 40 MG/DL
DIFFERENTIAL METHOD BLD: ABNORMAL
EOSINOPHIL # BLD: 0.2 K/UL (ref 0–0.8)
EOSINOPHIL NFR BLD: 3 % (ref 0.5–7.8)
EPI CELLS #/AREA URNS HPF: ABNORMAL /HPF
ERYTHROCYTE [DISTWIDTH] IN BLOOD BY AUTOMATED COUNT: 14.4 % (ref 11.9–14.6)
FERRITIN SERPL-MCNC: 431 NG/ML (ref 8–388)
FOLATE SERPL-MCNC: 29.2 NG/ML (ref 3.1–17.5)
GLUCOSE BLD STRIP.AUTO-MCNC: 108 MG/DL (ref 65–100)
GLUCOSE BLD STRIP.AUTO-MCNC: 109 MG/DL (ref 65–100)
GLUCOSE BLD STRIP.AUTO-MCNC: 54 MG/DL (ref 65–100)
GLUCOSE BLD STRIP.AUTO-MCNC: 67 MG/DL (ref 65–100)
GLUCOSE BLD STRIP.AUTO-MCNC: 68 MG/DL (ref 65–100)
GLUCOSE BLD STRIP.AUTO-MCNC: 92 MG/DL (ref 65–100)
GLUCOSE BLD STRIP.AUTO-MCNC: 94 MG/DL (ref 65–100)
GLUCOSE SERPL-MCNC: 100 MG/DL (ref 65–100)
GLUCOSE UR STRIP.AUTO-MCNC: NEGATIVE MG/DL
HCT VFR BLD AUTO: 23.6 % (ref 41.1–50.3)
HEMOCCULT STL QL: POSITIVE
HGB BLD-MCNC: 7.8 G/DL (ref 13.6–17.2)
HGB UR QL STRIP: ABNORMAL
IMM GRANULOCYTES # BLD AUTO: 0 K/UL (ref 0–0.5)
IMM GRANULOCYTES NFR BLD AUTO: 0 % (ref 0–5)
IRON SATN MFR SERPL: 32 %
IRON SERPL-MCNC: 44 UG/DL (ref 35–150)
KETONES UR QL STRIP.AUTO: NEGATIVE MG/DL
LEUKOCYTE ESTERASE UR QL STRIP.AUTO: NEGATIVE
LYMPHOCYTES # BLD: 2.3 K/UL (ref 0.5–4.6)
LYMPHOCYTES NFR BLD: 28 % (ref 13–44)
MCH RBC QN AUTO: 26.9 PG (ref 26.1–32.9)
MCHC RBC AUTO-ENTMCNC: 33.1 G/DL (ref 31.4–35)
MCV RBC AUTO: 81.4 FL (ref 82–102)
METHADONE UR QL: NEGATIVE
MONOCYTES # BLD: 0.8 K/UL (ref 0.1–1.3)
MONOCYTES NFR BLD: 10 % (ref 4–12)
NEUTS SEG # BLD: 4.9 K/UL (ref 1.7–8.2)
NEUTS SEG NFR BLD: 58 % (ref 43–78)
NITRITE UR QL STRIP.AUTO: NEGATIVE
NRBC # BLD: 0 K/UL (ref 0–0.2)
OPIATES UR QL: NEGATIVE
OSMOLALITY UR: 325 MOSM/KG H2O (ref 50–1400)
PCP UR QL: NEGATIVE
PH UR STRIP: 5 (ref 5–9)
PLATELET # BLD AUTO: 200 K/UL (ref 150–450)
PMV BLD AUTO: 10.7 FL (ref 9.4–12.3)
POTASSIUM SERPL-SCNC: 4.5 MMOL/L (ref 3.5–5.1)
PROT UR STRIP-MCNC: NEGATIVE MG/DL
PROT UR-MCNC: 7 MG/DL
PROT/CREAT UR-RTO: 0.2
RBC # BLD AUTO: 2.9 M/UL (ref 4.23–5.6)
RBC #/AREA URNS HPF: ABNORMAL /HPF
SERVICE CMNT-IMP: ABNORMAL
SERVICE CMNT-IMP: NORMAL
SODIUM SERPL-SCNC: 140 MMOL/L (ref 133–143)
SODIUM UR-SCNC: 105 MMOL/L
SP GR UR REFRACTOMETRY: 1.01 (ref 1–1.02)
TIBC SERPL-MCNC: 139 UG/DL (ref 250–450)
UROBILINOGEN UR QL STRIP.AUTO: 0.2 EU/DL (ref 0.2–1)
VIT B12 SERPL-MCNC: 1376 PG/ML (ref 193–986)
WBC # BLD AUTO: 8.3 K/UL (ref 4.3–11.1)
WBC URNS QL MICRO: ABNORMAL /HPF

## 2023-02-07 PROCEDURE — 82962 GLUCOSE BLOOD TEST: CPT

## 2023-02-07 PROCEDURE — 84300 ASSAY OF URINE SODIUM: CPT

## 2023-02-07 PROCEDURE — 80307 DRUG TEST PRSMV CHEM ANLYZR: CPT

## 2023-02-07 PROCEDURE — 36415 COLL VENOUS BLD VENIPUNCTURE: CPT

## 2023-02-07 PROCEDURE — 6370000000 HC RX 637 (ALT 250 FOR IP): Performed by: INTERNAL MEDICINE

## 2023-02-07 PROCEDURE — 82436 ASSAY OF URINE CHLORIDE: CPT

## 2023-02-07 PROCEDURE — 6360000002 HC RX W HCPCS: Performed by: INTERNAL MEDICINE

## 2023-02-07 PROCEDURE — 81001 URINALYSIS AUTO W/SCOPE: CPT

## 2023-02-07 PROCEDURE — 83935 ASSAY OF URINE OSMOLALITY: CPT

## 2023-02-07 PROCEDURE — 6370000000 HC RX 637 (ALT 250 FOR IP): Performed by: HOSPITALIST

## 2023-02-07 PROCEDURE — 80048 BASIC METABOLIC PNL TOTAL CA: CPT

## 2023-02-07 PROCEDURE — 82272 OCCULT BLD FECES 1-3 TESTS: CPT

## 2023-02-07 PROCEDURE — 1100000000 HC RM PRIVATE

## 2023-02-07 PROCEDURE — 84156 ASSAY OF PROTEIN URINE: CPT

## 2023-02-07 PROCEDURE — 85025 COMPLETE CBC W/AUTO DIFF WBC: CPT

## 2023-02-07 PROCEDURE — 2580000003 HC RX 258: Performed by: INTERNAL MEDICINE

## 2023-02-07 PROCEDURE — 6370000000 HC RX 637 (ALT 250 FOR IP): Performed by: STUDENT IN AN ORGANIZED HEALTH CARE EDUCATION/TRAINING PROGRAM

## 2023-02-07 RX ORDER — SODIUM BICARBONATE 650 MG/1
650 TABLET ORAL 4 TIMES DAILY
Status: DISCONTINUED | OUTPATIENT
Start: 2023-02-07 | End: 2023-02-13

## 2023-02-07 RX ORDER — INSULIN LISPRO 100 [IU]/ML
0-4 INJECTION, SOLUTION INTRAVENOUS; SUBCUTANEOUS NIGHTLY
Status: DISCONTINUED | OUTPATIENT
Start: 2023-02-07 | End: 2023-02-20 | Stop reason: HOSPADM

## 2023-02-07 RX ORDER — HYDRALAZINE HYDROCHLORIDE 20 MG/ML
5 INJECTION INTRAMUSCULAR; INTRAVENOUS EVERY 6 HOURS PRN
Status: DISCONTINUED | OUTPATIENT
Start: 2023-02-07 | End: 2023-02-10

## 2023-02-07 RX ORDER — CHOLESTYRAMINE 4 G/9G
POWDER, FOR SUSPENSION ORAL
Qty: 60 PACKET | OUTPATIENT
Start: 2023-02-07

## 2023-02-07 RX ORDER — HYDROCODONE BITARTRATE AND ACETAMINOPHEN 7.5; 325 MG/1; MG/1
1 TABLET ORAL EVERY 6 HOURS PRN
Status: DISCONTINUED | OUTPATIENT
Start: 2023-02-07 | End: 2023-02-07

## 2023-02-07 RX ORDER — HYDROCODONE BITARTRATE AND ACETAMINOPHEN 7.5; 325 MG/1; MG/1
1 TABLET ORAL EVERY 6 HOURS PRN
Status: DISCONTINUED | OUTPATIENT
Start: 2023-02-07 | End: 2023-02-20 | Stop reason: HOSPADM

## 2023-02-07 RX ORDER — HYDROMORPHONE HYDROCHLORIDE 2 MG/1
1 TABLET ORAL EVERY 8 HOURS PRN
Status: ACTIVE | OUTPATIENT
Start: 2023-02-07 | End: 2023-02-08

## 2023-02-07 RX ORDER — DEXTROSE MONOHYDRATE 100 MG/ML
INJECTION, SOLUTION INTRAVENOUS CONTINUOUS PRN
Status: DISCONTINUED | OUTPATIENT
Start: 2023-02-07 | End: 2023-02-20 | Stop reason: HOSPADM

## 2023-02-07 RX ORDER — OXYCODONE HYDROCHLORIDE 5 MG/1
5 TABLET ORAL ONCE
Status: COMPLETED | OUTPATIENT
Start: 2023-02-07 | End: 2023-02-07

## 2023-02-07 RX ORDER — CARVEDILOL 12.5 MG/1
25 TABLET ORAL 2 TIMES DAILY WITH MEALS
Status: DISCONTINUED | OUTPATIENT
Start: 2023-02-07 | End: 2023-02-20 | Stop reason: HOSPADM

## 2023-02-07 RX ORDER — INSULIN LISPRO 100 [IU]/ML
0-4 INJECTION, SOLUTION INTRAVENOUS; SUBCUTANEOUS
Status: DISCONTINUED | OUTPATIENT
Start: 2023-02-07 | End: 2023-02-20 | Stop reason: HOSPADM

## 2023-02-07 RX ADMIN — PANTOPRAZOLE SODIUM 40 MG: 40 TABLET, DELAYED RELEASE ORAL at 16:06

## 2023-02-07 RX ADMIN — SODIUM BICARBONATE 650 MG: 650 TABLET ORAL at 00:08

## 2023-02-07 RX ADMIN — FUROSEMIDE 40 MG: 10 INJECTION, SOLUTION INTRAMUSCULAR; INTRAVENOUS at 09:00

## 2023-02-07 RX ADMIN — HYDRALAZINE HYDROCHLORIDE 25 MG: 25 TABLET, FILM COATED ORAL at 17:00

## 2023-02-07 RX ADMIN — CARVEDILOL 25 MG: 25 TABLET, FILM COATED ORAL at 16:06

## 2023-02-07 RX ADMIN — FUROSEMIDE 40 MG: 10 INJECTION, SOLUTION INTRAMUSCULAR; INTRAVENOUS at 17:27

## 2023-02-07 RX ADMIN — HEPARIN SODIUM 5000 UNITS: 5000 INJECTION INTRAVENOUS; SUBCUTANEOUS at 00:08

## 2023-02-07 RX ADMIN — HYDROCODONE BITARTRATE AND ACETAMINOPHEN 1 TABLET: 7.5; 325 TABLET ORAL at 09:00

## 2023-02-07 RX ADMIN — SODIUM CHLORIDE, PRESERVATIVE FREE 10 ML: 5 INJECTION INTRAVENOUS at 09:00

## 2023-02-07 RX ADMIN — PANTOPRAZOLE SODIUM 40 MG: 40 TABLET, DELAYED RELEASE ORAL at 06:18

## 2023-02-07 RX ADMIN — SODIUM CHLORIDE, PRESERVATIVE FREE 10 ML: 5 INJECTION INTRAVENOUS at 20:50

## 2023-02-07 RX ADMIN — HEPARIN SODIUM 5000 UNITS: 5000 INJECTION INTRAVENOUS; SUBCUTANEOUS at 13:47

## 2023-02-07 RX ADMIN — OXYCODONE HYDROCHLORIDE 5 MG: 5 TABLET ORAL at 02:57

## 2023-02-07 RX ADMIN — HEPARIN SODIUM 5000 UNITS: 5000 INJECTION INTRAVENOUS; SUBCUTANEOUS at 06:18

## 2023-02-07 RX ADMIN — SODIUM BICARBONATE 650 MG: 650 TABLET ORAL at 13:47

## 2023-02-07 RX ADMIN — HYDRALAZINE HYDROCHLORIDE 25 MG: 25 TABLET, FILM COATED ORAL at 00:55

## 2023-02-07 RX ADMIN — SODIUM BICARBONATE 650 MG: 650 TABLET ORAL at 09:00

## 2023-02-07 RX ADMIN — SODIUM CHLORIDE, PRESERVATIVE FREE 10 ML: 5 INJECTION INTRAVENOUS at 01:12

## 2023-02-07 RX ADMIN — SODIUM BICARBONATE 650 MG: 650 TABLET ORAL at 20:47

## 2023-02-07 RX ADMIN — SODIUM BICARBONATE 650 MG: 650 TABLET ORAL at 16:06

## 2023-02-07 ASSESSMENT — PAIN SCALES - GENERAL
PAINLEVEL_OUTOF10: 8
PAINLEVEL_OUTOF10: 7
PAINLEVEL_OUTOF10: 0
PAINLEVEL_OUTOF10: 5

## 2023-02-07 ASSESSMENT — PAIN DESCRIPTION - ORIENTATION: ORIENTATION: LEFT;RIGHT

## 2023-02-07 ASSESSMENT — PAIN DESCRIPTION - LOCATION
LOCATION: LEG;SCROTUM
LOCATION: LEG;SCROTUM
LOCATION: SCROTUM;LEG

## 2023-02-07 ASSESSMENT — PAIN DESCRIPTION - DESCRIPTORS: DESCRIPTORS: ACHING

## 2023-02-07 ASSESSMENT — PAIN - FUNCTIONAL ASSESSMENT
PAIN_FUNCTIONAL_ASSESSMENT: NONE - DENIES PAIN
PAIN_FUNCTIONAL_ASSESSMENT: ACTIVITIES ARE NOT PREVENTED

## 2023-02-07 NOTE — PROGRESS NOTES
Patient awake, alert and oriented to person, place, time and situation. Vitals signs slightly elevated. Medication administered as ordered. Hourly rounds completed during this shift. Complains of leg pain. Medications administered as ordered. Attempted to place balderas. Was unsuccessful. MD aware. Urology was consulted. Plan for paracentesis tomorrow. Bed in low position and wheels locked, safety precautions maintained, call bell in reach and personal items are in reach. Will continue to monitor patient. Report to be given to night shift nurse.

## 2023-02-07 NOTE — CONSULTS
Nephrology consult    Admission Date:  2/6/2023    Admission Diagnosis  Anasarca [R60.1]  Scrotal edema [N50.89]  Bilateral leg edema [R60.0]  Acute renal failure, unspecified acute renal failure type ([facility] [N17.9]    We are asked by Rock Spencer MD    History of Present Illness: Mr. Cheli Hernandez is a 65 y.o male with PMH significant for HLD, HTN, DM type II, liver cirrhosis with ascites, PUD and CKD 3b reportedly admitted with complaints of worsening LE edema and abdominal distention. Last paracentesis noted 1/10 with 6.5 L removed. We are consulted for MARS/CKD 3b. From a renal standpoint his Cr/BUN on admission was 4.10/44, CO2 16, baseline Cr 1.5-1.8. no noted hypotension since admission, Albumin 1.4 s/p albumin gtts, lasix in ED. Home meds significant for PPI, lasix. Pt seen and examined in room he reports lives with his wife has had LE edema issues since September 2022, worsening over the last few weeks, reports has exertional SOB and poor appetite when abd distended, reports stopped ETOH intake since September 2022, no flank pain, CP, N/V/D, fever/chills, np syncope or recent falls. Reports has been taking 2-3 Aleve daily over the last several days.         Past Medical History:   Diagnosis Date    Alcoholic cirrhosis of liver ([facility]     2022 in the hopsital dx    Anemia     Gastroesophageal reflux disease with esophagitis and hemorrhage     HLD (hyperlipidemia)     Hyperplastic colon polyp     Hypertension     Obesity     PUD (peptic ulcer disease)     Stage 3 chronic kidney disease due to diabetes mellitus ([facility]     Dx in hospital    Type 2 diabetes mellitus with diabetic polyneuropathy, with long-term current use of insulin (HCC)     Ulcer of the duodenum caused by bacteria (H. pylori)     Upper GI bleed 03/28/2016    Last Assessment & Plan:  Formatting of this note might be different from the original. Status post EGD yesterday which revealed esophagitis, gastric and duodenal ulcers Continue PPI, GI follow-up.  Monitor hemoglobin Avoid and states Biopsy results- pending      Past Surgical History:   Procedure Laterality Date    COLONOSCOPY      ESOPHAGOGASTRODUODENOSCOPY      UPPER GASTROINTESTINAL ENDOSCOPY N/A 9/6/2022    EGD ESOPHAGOGASTRODUODENOSCOPY/ Rm 215 performed by Souleymane Phelps MD at [facility] ENDOSCOPY      Current Facility-Administered Medications   Medication Dose Route Frequency    sodium bicarbonate tablet 650 mg  650 mg Oral 4x Daily    carvedilol (COREG) tablet 25 mg  25 mg Oral BID WC    HYDROmorphone (DILAUDID) tablet 1 mg  1 mg Oral Q8H PRN    insulin lispro (HUMALOG) injection vial 0-4 Units  0-4 Units SubCUTAneous TID WC    insulin lispro (HUMALOG) injection vial 0-4 Units  0-4 Units SubCUTAneous Nightly    glucose chewable tablet 16 g  4 tablet Oral PRN    dextrose bolus 10% 125 mL  125 mL IntraVENous PRN    Or    dextrose bolus 10% 250 mL  250 mL IntraVENous PRN    glucagon (rDNA) injection 1 mg  1 mg SubCUTAneous PRN    dextrose 10 % infusion   IntraVENous Continuous PRN    HYDROcodone-acetaminophen (NORCO) 7.5-325 MG per tablet 1 tablet  1 tablet Oral Q6H PRN    pantoprazole (PROTONIX) tablet 40 mg  40 mg Oral BID AC    [Held by provider] insulin glargine (LANTUS) injection vial 10 Units  10 Units SubCUTAneous Nightly    sodium chloride flush 0.9 % injection 5-40 mL  5-40 mL IntraVENous 2 times per day    sodium chloride flush 0.9 % injection 5-40 mL  5-40 mL IntraVENous PRN    0.9 % sodium chloride infusion   IntraVENous PRN    potassium chloride (KLOR-CON M) extended release tablet 40 mEq  40 mEq Oral PRN    Or    potassium bicarb-citric acid (EFFER-K) effervescent tablet 40 mEq  40 mEq Oral PRN    Or    potassium chloride 10 mEq/100 mL IVPB (Peripheral Line)  10 mEq IntraVENous PRN    potassium chloride 10 mEq/100 mL IVPB (Peripheral Line)  10 mEq IntraVENous PRN    magnesium sulfate 2000 mg in 50 mL IVPB premix  2,000 mg IntraVENous PRN    ondansetron (ZOFRAN-ODT) disintegrating tablet 4 mg  4 mg Oral Q8H PRN    Or    ondansetron (ZOFRAN) injection 4 mg  4 mg IntraVENous Q6H PRN    polyethylene glycol (GLYCOLAX) packet 17 g  17 g Oral Daily PRN    bisacodyl (DULCOLAX) suppository 10 mg  10 mg Rectal Daily PRN    famotidine (PEPCID) tablet 10 mg  10 mg Oral Daily PRN    aluminum & magnesium hydroxide-simethicone (MAALOX) 200-200-20 MG/5ML suspension 30 mL  30 mL Oral Q6H PRN    acetaminophen (TYLENOL) tablet 650 mg  650 mg Oral Q6H PRN    Or    acetaminophen (TYLENOL) suppository 650 mg  650 mg Rectal Q6H PRN    furosemide (LASIX) injection 40 mg  40 mg IntraVENous BID    hydrALAZINE (APRESOLINE) tablet 25 mg  25 mg Oral Q8H PRN    heparin (porcine) injection 5,000 Units  5,000 Units SubCUTAneous 3 times per day     No Known Allergies   Social History     Tobacco Use    Smoking status: Never    Smokeless tobacco: Never   Substance Use Topics    Alcohol use: Yes      Family History   Problem Relation Age of Onset    Hypertension Sister     Diabetes Neg Hx         Review of Systems  Gen - no fever, no chills, appetite poor  HEENT - no sore throat, no decreased vision, no hearing loss  CV - no chest pain, no palpitation  Lung - + exertional shortness of breath, no cough  Abd - no tenderness, no nausea/vomiting, no bloody stool  Ext - ++ edema, no clubbing, no cyanosis  Musculoskeletal - no joint pain, no back pain, walker dependent   Neurologic - no headaches, no dizziness, no seizures  Psychiatric - no anxiety, no depression  Skin - no rashes, no pupura  Genitourinary - + decreased urine output, no hematuria, no dysuria     Objective:     Vitals:    02/07/23 0412 02/07/23 0748 02/07/23 0900 02/07/23 0930   BP: 117/77 (!) 156/98     Pulse: 76 78     Resp: 18 20 18 18   Temp: 97.6 °F (36.4 °C) 97.5 °F (36.4 °C)     TempSrc: Oral Axillary     SpO2: 100% 100%     Weight:       Height:           Intake/Output Summary (Last 24 hours) at 2/7/2023 1011  Last data filed at 2/7/2023 1007  Gross per 24 hour   Intake 10 ml   Output 950 ml   Net -940 ml       Physical Exam  GEN :in no distress, alert and oriented  HEENT: anicteric sclerae, Mucous membranes are moist.  Neck - supple without JVD  CV - regular rate, S1, S2, no Rub   Lung - clear bilaterally, lungs expand symmetrically  Abd - taut distention, bowel sounds present  Ext -  +3 edema/anasarca   Neurologic - nonfocal  Skin - no rashes, no purpura  Psychiatric: Normal mood and affect. Data Review:   Recent Labs     02/06/23  1524 02/07/23  0314   WBC 6.8 8.3   HGB 8.4* 7.8*   HCT 25.9* 23.6*    200     Recent Labs     02/06/23  1524 02/07/23  0314    140   K 4.2 4.5   * 116*   CO2 19* 16*   BUN 41* 44*   CREATININE 4.10* 4.10*     No results for input(s): PH, PCO2, PO2, PCO2 in the last 72 hours.     Problem List:     Patient Active Problem List    Diagnosis Date Noted    Obesity with body mass index 30 or greater 10/16/2017    Anasarca 02/06/2023    Acute kidney injury superimposed on chronic kidney disease ([address] 02/06/2023    Partial small bowel obstruction ([address] 12/17/2022    Cirrhosis of liver with ascites ([address] 12/15/2022    Moderate protein malnutrition ([address] 12/15/2022    Cholelithiases 12/15/2022    Positive blood culture 12/15/2022    Abdominal pain 12/14/2022    SIRS (systemic inflammatory response syndrome) ([address] 12/13/2022    Hypoalbuminemia 09/14/2022    Iron deficiency anemia 09/14/2022    PAD (peripheral artery disease) ([address] 09/02/2022    Diabetic ulcer of both feet associated with type 2 diabetes mellitus ([address] 08/31/2022    Acute kidney injury superimposed on CKD ([address] 08/31/2022    Dry skin dermatitis 05/13/2022    Hypokalemia 04/15/2021    Hematochezia 02/04/2021    Dark stools 12/21/2020    Normocytic anemia 12/21/2020    High serum ferritin 11/27/2019    Alcohol dependence ([facility] 10/16/2017    Diabetic peripheral neuropathy ([facility] 10/16/2017    Hyperlipidemia 10/16/2017    Essential hypertension 03/28/2016    Type 2 diabetes mellitus (HCC) 03/28/2016    Acute blood loss anemia 03/28/2016    Stage 3a chronic kidney disease (HCC) 09/14/2022       Impression:    Plan:   MARS/CKD 3b- ? HRS, Ivone will not be accelerate while on diuretic   -Baseline Cr 1.5-1.8  -obtain renal US, UA, P/C ratio- further work up pending clinical course   -no indication for acute RRT at this time     2. Hypoalbuminemia/anasarca- 1.4, s/p albumin IV, lasix     3. Cirrhosis with ascites, paracentesis per primary     4. Hypertension stable on BB    5. Acute metabolic acidosis- add PO NaHCO3     6. DM type II- SSI per hosp     7. Anemia- Fe studies ok

## 2023-02-07 NOTE — ED NOTES
TRANSFER - OUT REPORT:    Verbal report given to TERESA Ragsdale on Morgan Galan  being transferred to [facility] for routine progression of patient care       Report consisted of patient's Situation, Background, Assessment and   Recommendations(SBAR).     Information from the following report(s) Nurse Handoff Report, ED Encounter Summary, ED SBAR, Adult Overview, Intake/Output, MAR and Recent Results was reviewed with the receiving nurse.  [place] Assessment: Presents to emergency department  because of falls (Syncope, seizure, or loss of consciousness): No, Age > 70: No, Altered Mental Status, Intoxication with alcohol or substance confusion (Disorientation, impaired judgment, poor safety awaremess, or inability to follow instructions): No, Impaired Mobility: Ambulates or transfers with assistive devices or assistance; Unable to ambulate or transer.: Yes, Nursing Judgement: Yes  Lines:   Peripheral IV 02/06/23 Right Arm (Active)   Line Status Brisk blood return;Blood return noted 02/06/23 1525   Line Care Connections checked and tightened 02/06/23 1525   Phlebitis Assessment No symptoms 02/06/23 1525   Infiltration Assessment 0 02/06/23 1525   Dressing Status New dressing applied 02/06/23 1525        Opportunity for questions and clarification was provided.      Patient transported with:  Registered Nurse           Ritchie Pena RN  02/07/23 0032

## 2023-02-07 NOTE — CARE COORDINATION
CM spoke with patient's spouse to complete initial assessment. Spouse states that her and patient live in a one story home with 2STE. Patient requires assistance with all ADL's, including bathing and dressing. Patient's spouse is patient's caregiver - she also is the one that transports patient everywhere. Patient uses a RW, BSC and cane. Demographics, insurance and confirmed. No hx of STR. Patient recently had [facility], PT/OT/RN. Therapies discharged however, patient is still current with nursing for wound care through [facility]. Spouse states that DCP is for patient to return home with her with services resumed. Order placed for [facility]. Referral sent to [facility]. Spouse inquired about CLTC services. CM expressed the need for patient to have Medicaid in which spouse states that she believes that patient may have Medicaid? CM provided her with the phone number to the [facility] office and the number to [address] [place] [address] to check to see if patient has Medicaid - if he does not, she was made aware that she can apply for it. CM will need to update HH order to reflect wound care needs prior to discharge. CM will continue to follow to assist with any other needs that may arise. 02/07/23 1314   Service Assessment   History Provided By Significant Other   Support Systems Spouse/Significant Other   PCP Verified by CM Yes   Prior Functional Level Assistance with the following:;Dressing; Bathing; Toileting;Feeding;Cooking   Current Functional Level Assistance with the following:;Bathing;Dressing; Toileting;Feeding;Cooking   Can patient return to prior living arrangement Yes   Family able to assist with home care needs: Yes   Would you like for me to discuss the discharge plan with any other family members/significant others, and if so, who?  Yes  (patient's wife)   Social/Functional History   Lives With Spouse   Type of [address] One level   Entrance Stairs - Number of Steps 2   Bathroom Toilet Bedside commode   Home Equipment Cane;Walker, rolling   Receives Help From Family   Discharge Planning   Type of Residence House   Living Arrangements Spouse/Significant Other   Current Services Prior To Admission [facility]   DME Ordered?  No   Potential Assistance Purchasing Medications No   Type of Home Care Services Nursing Services   Patient expects to be discharged to: [facility] Discharge   Services At/After Discharge Home Health;Nursing services

## 2023-02-07 NOTE — PROGRESS NOTES
Hospitalist Progress Note   Admit Date:  2023  2:53 PM   Name:  Ever See   Age:  65 y.o. Sex:  male  :  1965   MRN:  [id number]   Room:  [place] [address]    Presenting Complaint: Leg Swelling and Groin Swelling     Reason(s) for Admission: Anasarca [R60.1]  Scrotal edema [N50.89]  Bilateral leg edema [R60.0]  Acute renal failure, unspecified acute renal failure type [facility]) [N17.9]     Hospital Course:   Ever See is a 65 y.o. male with medical history of hypertension, hyperlipidemia, type 2 diabetes, liver cirrhosis with ascites who presented with worsening bilateral lower extremity swelling, abdominal distention and scrotal swelling. Patient had paracentesis on  with removal of 6 L of fluid but reports worsening swelling since then. Reports abdominal tightness and cramping but otherwise denies fever, chills, chest pain, nausea, vomiting, shortness of breath. Reports that he is compliant with his medications. Work-up in the emergency room with proBNP of 1007 and 61, BUN 41, creatinine 4.10, albumin of 1.4, hemoglobin 8.4. Subjective & 24hr Events (23): Patient was seen and examined at the bedside. Patient is complaining of pain near the scrotum. He is unable to walk due to abdominal distention and bilateral leg swelling. He stated he was taking ibuprofen for pain. He is c/o sob with exertion. Assessment & Plan:     Anasarca  Liver cirrhosis with ascites  AAOx3 without signs of hepatic encephalopathy. Elevated ALP.   Follows at [facility] with [place] consulted for therapeutic paracentesis  - Lasix 40mg BID IV with albumin q6h  -     MARS on CKD stage III  Baseline creatinine of 1.5-1.8 currently creatinine is around 4  Follow-up with urine lites  Follow-up with ultrasound  Ordered Hector but unable to place it  Monitor I's and O's  Ordered bicarb  nephro consulted     Hypoalbuminemia  Elevated 4.4  Continue albumin    Scrotal swelling  Follow-up with ultrasound of the scrotum    Folic acid level of 29  Discontinued folic acid        Anemia likely due to chronic disease from liver cirrhosis and CKD  Hb of 8.4 with baseline 10-8.  - monitor Hb  Iron studies normal     Hypertension: Increased coreg. Hypoglycemia  Blood glucose of 54  Discontinued Lantus  Repeat blood glucose >100     Type 2 diabetes  A1c of 5  -Continue with insulin sliding        Diet: ADULT DIET; Regular; 4 carb choices (60 gm/meal); Low Sodium (2 gm)  VTE ppx: lovenox  Code status: FULL        Additional Medical Decision Making factors:  Complexity: 1 or more acute or chronic illness with exacerbation that poses a threat to life, organ, or function. (High)  Complexity: 2 or more stable chronic illnesses. (Moderate)     The patient assessment required an independent historian: ER physician. I have reviewed records from an external source: notes from outside hospital.  I have reviewed records from an external source: notes from office visits. I conducted an independent review of: Labs   I conducted an independent review of: Imaging and/or other diagnostic studies      Treatment Risks: Drug requiring intensive monitoring for toxicity. (High)  Treatment Risks: Prescription drug management. (Moderate)     Shared decision making was utilized in the care of this patient. Hospital Problems:  Principal Problem:    Anasarca  Active Problems:    Diabetic peripheral neuropathy ([address]    Essential hypertension    Type 2 diabetes mellitus (HCC)    Hypoalbuminemia    Cirrhosis of liver with ascites (HCC)    Acute kidney injury superimposed on chronic kidney disease (HCC)    Stage 3a chronic kidney disease ([address]  Resolved Problems:    * No resolved hospital problems.  *      Objective:   Patient Vitals for the past 24 hrs:   Temp Pulse Resp BP SpO2   02/07/23 1111 97.8 °F (36.6 °C) 74 16 (!) 150/114 98 %   02/07/23 0930 -- -- 18 -- --   02/07/23 0900 -- -- 18 -- -- 02/07/23 0748 97.5 °F (36.4 °C) 78 20 (!) 156/98 100 %   02/07/23 0412 97.6 °F (36.4 °C) 76 18 117/77 100 %   02/07/23 0257 -- -- 18 -- --   02/07/23 0228 -- 78 -- 138/79 --   02/07/23 0051 97.7 °F (36.5 °C) 80 20 (!) 158/113 --   02/07/23 0000 -- 81 21 (!) 142/72 100 %   02/06/23 2345 -- 80 18 (!) 150/96 100 %   02/06/23 2333 -- 79 16 (!) 168/99 100 %   02/06/23 2323 -- 79 21 -- 100 %   02/06/23 2313 -- 81 20 (!) 192/149 --   02/06/23 2258 -- 80 22 (!) 178/106 --   02/06/23 2256 -- 83 24 (!) 176/109 --   02/06/23 2242 -- 79 18 (!) 155/107 --   02/06/23 2142 -- 73 17 (!) 149/101 100 %   02/06/23 2047 -- 73 (!) 31 (!) 185/120 99 %   02/06/23 2027 -- 70 15 (!) 155/114 100 %   02/06/23 2010 -- 76 17 (!) 162/108 100 %   02/06/23 2000 -- 76 20 (!) 158/111 100 %   02/06/23 1940 -- 74 (!) 32 (!) 149/116 99 %   02/06/23 1933 -- 73 16 (!) 161/99 100 %   02/06/23 1855 -- 75 16 (!) 179/120 100 %   02/06/23 1845 -- 73 18 (!) 173/115 100 %   02/06/23 1836 -- 74 15 (!) 151/113 100 %   02/06/23 1835 -- 72 16 (!) 151/113 100 %   02/06/23 1815 -- 70 -- (!) 151/103 100 %   02/06/23 1755 -- 74 -- (!) 143/107 100 %   02/06/23 1745 -- 72 -- (!) 158/109 99 %   02/06/23 1707 -- 72 18 (!) 162/109 100 %   02/06/23 1440 97.3 °F (36.3 °C) 75 18 (!) 139/93 100 %       Oxygen Therapy  SpO2: 98 %  Pulse Oximeter Device Mode: Continuous  O2 Device: None (Room air)    Estimated body mass index is 28.73 kg/m² as calculated from the following:    Height as of this encounter: 5' 11\" (1.803 m). Weight as of this encounter: 206 lb (93.4 kg). Intake/Output Summary (Last 24 hours) at 2/7/2023 1238  Last data filed at 2/7/2023 1111  Gross per 24 hour   Intake 230 ml   Output 1150 ml   Net -920 ml         Physical Exam:     Blood pressure (!) 150/114, pulse 74, temperature 97.8 °F (36.6 °C), temperature source Oral, resp. rate 16, height 5' 11\" (1.803 m), weight 206 lb (93.4 kg), SpO2 98 %. General:    Well nourished.     Head:  Normocephalic, atraumatic  Eyes:  Sclerae appear normal.  Pupils equally round.  ENT:  Nares appear normal.  Moist oral mucosa  Neck:  No restricted ROM.  Trachea midline   CV:   RRR.  No m/r/g.  No jugular venous distension.  Lungs:   CTAB.  No wheezing, rhonchi, or rales.  Symmetric expansion.  Abdomen:   firm, nontender, distended.  Extremities: No cyanosis or clubbing.   3+ edema and scrotal swelling  Skin:     No rashes and normal coloration.   Warm and dry.    Neuro:  CN II-XII grossly intact.    Psych:  Normal mood and affect.      I have personally reviewed labs and tests:  Recent Labs:  Recent Results (from the past 48 hour(s))   Troponin    Collection Time: 02/06/23  3:24 PM   Result Value Ref Range    Troponin, High Sensitivity 9.5 0 - 14 pg/mL   CBC with Auto Differential    Collection Time: 02/06/23  3:24 PM   Result Value Ref Range    WBC 6.8 4.3 - 11.1 K/uL    RBC 3.15 (L) 4.23 - 5.6 M/uL    Hemoglobin 8.4 (L) 13.6 - 17.2 g/dL    Hematocrit 25.9 (L) 41.1 - 50.3 %    MCV 82.2 82 - 102 FL    MCH 26.7 26.1 - 32.9 PG    MCHC 32.4 31.4 - 35.0 g/dL    RDW 14.6 11.9 - 14.6 %    Platelets 194 150 - 450 K/uL    MPV 9.9 9.4 - 12.3 FL    nRBC 0.00 0.0 - 0.2 K/uL    Differential Type AUTOMATED      Seg Neutrophils 55 43 - 78 %    Lymphocytes 29 13 - 44 %    Monocytes 10 4.0 - 12.0 %    Eosinophils % 4 0.5 - 7.8 %    Basophils 1 0.0 - 2.0 %    Immature Granulocytes 1 0.0 - 5.0 %    Segs Absolute 3.8 1.7 - 8.2 K/UL    Absolute Lymph # 2.0 0.5 - 4.6 K/UL    Absolute Mono # 0.7 0.1 - 1.3 K/UL    Absolute Eos # 0.3 0.0 - 0.8 K/UL    Basophils Absolute 0.0 0.0 - 0.2 K/UL    Absolute Immature Granulocyte 0.0 0.0 - 0.5 K/UL   Comprehensive Metabolic Panel    Collection Time: 02/06/23  3:24 PM   Result Value Ref Range    Sodium 140 133 - 143 mmol/L    Potassium 4.2 3.5 - 5.1 mmol/L    Chloride 115 (H) 101 - 110 mmol/L    CO2 19 (L) 21 - 32 mmol/L    Anion Gap 6 2 - 11 mmol/L    Glucose 113 (H) 65 - 100 mg/dL    BUN 41 (H) 6 - 23 MG/DL  Creatinine 4.10 (H) 0.8 - 1.5 MG/DL    Est, Glom Filt Rate 16 (L) >60 ml/min/1.73m2    Calcium 7.5 (L) 8.3 - 10.4 MG/DL    Total Bilirubin <0.1 (L) 0.2 - 1.1 MG/DL    ALT 8 (L) 12 - 65 U/L    AST 17 15 - 37 U/L    Alk Phosphatase 175 (H) 50 - 136 U/L    Total Protein 6.7 6.3 - 8.2 g/dL    Albumin 1.4 (L) 3.5 - 5.0 g/dL    Globulin 5.3 (H) 2.8 - 4.5 g/dL    Albumin/Globulin Ratio 0.3 (L) 0.4 - 1.6     Protime-INR    Collection Time: 02/06/23  3:24 PM   Result Value Ref Range    Protime 16.5 (H) 12.6 - 14.3 sec    INR 1.3     APTT    Collection Time: 02/06/23  3:24 PM   Result Value Ref Range    PTT 43.4 (H) 24.5 - 34.2 SEC   Brain Natriuretic Peptide    Collection Time: 02/06/23  3:24 PM   Result Value Ref Range    NT Pro-BNP 1,761 (H) 5 - 125 PG/ML   POCT Glucose    Collection Time: 02/06/23  9:55 PM   Result Value Ref Range    POC Glucose 61 (L) 65 - 100 mg/dL    Performed by: Alisha    POCT Glucose    Collection Time: 02/06/23 10:38 PM   Result Value Ref Range    POC Glucose 115 (H) 65 - 100 mg/dL    Performed by: Alisha    Transferrin Saturation    Collection Time: 02/06/23 11:15 PM   Result Value Ref Range    Iron 44 35 - 150 ug/dL    TIBC 139 (L) 250 - 450 ug/dL    TRANSFERRIN SATURATION 32 >20 %   Vitamin B12    Collection Time: 02/06/23 11:15 PM   Result Value Ref Range    Vitamin B-12 1376 (H) 193 - 986 pg/mL   Folate    Collection Time: 02/06/23 11:15 PM   Result Value Ref Range    Folate 29.2 (H) 3.1 - 17.5 ng/mL   Ferritin    Collection Time: 02/06/23 11:15 PM   Result Value Ref Range    Ferritin 431 (H) 8 - 388 NG/ML   POCT Glucose    Collection Time: 02/06/23 11:46 PM   Result Value Ref Range    POC Glucose 128 (H) 65 - 100 mg/dL    Performed by: Alisha    Occult Blood, Fecal    Collection Time: 02/07/23  1:32 AM   Result Value Ref Range    POC Occult Blood, Fecal Positive     Basic Metabolic Panel w/ Reflex to MG    Collection Time: 02/07/23  3:14 AM   Result Value Ref Range    Sodium 140 133 - 143 mmol/L    Potassium 4.5 3.5 - 5.1 mmol/L    Chloride 116 (H) 101 - 110 mmol/L    CO2 16 (L) 21 - 32 mmol/L    Anion Gap 8 2 - 11 mmol/L    Glucose 100 65 - 100 mg/dL    BUN 44 (H) 6 - 23 MG/DL    Creatinine 4.10 (H) 0.8 - 1.5 MG/DL    Est, Glom Filt Rate 16 (L) >60 ml/min/1.73m2    Calcium 8.0 (L) 8.3 - 10.4 MG/DL   CBC with Auto Differential    Collection Time: 02/07/23  3:14 AM   Result Value Ref Range    WBC 8.3 4.3 - 11.1 K/uL    RBC 2.90 (L) 4.23 - 5.6 M/uL    Hemoglobin 7.8 (L) 13.6 - 17.2 g/dL    Hematocrit 23.6 (L) 41.1 - 50.3 %    MCV 81.4 (L) 82 - 102 FL    MCH 26.9 26.1 - 32.9 PG    MCHC 33.1 31.4 - 35.0 g/dL    RDW 14.4 11.9 - 14.6 %    Platelets 719 052 - 936 K/uL    MPV 10.7 9.4 - 12.3 FL    nRBC 0.00 0.0 - 0.2 K/uL    Differential Type AUTOMATED      Seg Neutrophils 58 43 - 78 %    Lymphocytes 28 13 - 44 %    Monocytes 10 4.0 - 12.0 %    Eosinophils % 3 0.5 - 7.8 %    Basophils 1 0.0 - 2.0 %    Immature Granulocytes 0 0.0 - 5.0 %    Segs Absolute 4.9 1.7 - 8.2 K/UL    Absolute Lymph # 2.3 0.5 - 4.6 K/UL    Absolute Mono # 0.8 0.1 - 1.3 K/UL    Absolute Eos # 0.2 0.0 - 0.8 K/UL    Basophils Absolute 0.0 0.0 - 0.2 K/UL    Absolute Immature Granulocyte 0.0 0.0 - 0.5 K/UL   POCT Glucose    Collection Time: 02/07/23  7:41 AM   Result Value Ref Range    POC Glucose 54 (L) 65 - 100 mg/dL    Performed by: Teena Mccarty    POCT Glucose    Collection Time: 02/07/23  9:04 AM   Result Value Ref Range    POC Glucose 108 (H) 65 - 100 mg/dL    Performed by: Tristan Gray    Urinalysis w rflx microscopic    Collection Time: 02/07/23 10:30 AM   Result Value Ref Range    Color, UA YELLOW/STRAW      Appearance CLEAR      Specific Gravity, UA 1.009 1.001 - 1.023      pH, Urine 5.0 5.0 - 9.0      Protein, UA Negative NEG mg/dL    Glucose, UA Negative mg/dL    Ketones, Urine Negative NEG mg/dL    Bilirubin Urine Negative NEG      Blood, Urine TRACE (A) NEG      Urobilinogen, Urine 0.2 0.2 - 1.0 EU/dL    Nitrite, Urine Negative NEG      Leukocyte Esterase, Urine Negative NEG      WBC, UA 0-4 U4 /hpf    RBC, UA 10-20 (A) U5 /hpf    Epithelial Cells UA 0-5 U5 /hpf    BACTERIA, URINE Negative NEG /hpf    Casts 0-2 U2 /lpf   Sodium, urine, random    Collection Time: 02/07/23 10:31 AM   Result Value Ref Range    SODIUM, RANDOM URINE 105 MMOL/L   Chloride, Random Urine    Collection Time: 02/07/23 10:31 AM   Result Value Ref Range    Chloride 123 MMOL/L   Osmolality, Urine    Collection Time: 02/07/23 10:31 AM   Result Value Ref Range    Osmolality, Ur 325 50 - 1400 MOSM/kg H2O   Urine Drug Screen    Collection Time: 02/07/23 10:31 AM   Result Value Ref Range    PCP, Urine Negative NEG      Benzodiazepines, Urine Negative NEG      Cocaine, Urine Negative NEG      Amphetamine, Urine Negative NEG      Methadone, Urine Negative NEG      THC, TH-Cannabinol, Urine Negative NEG      Opiates, Urine Negative NEG      Barbiturates, Urine Negative NEG     Protein / creatinine ratio, urine    Collection Time: 02/07/23 10:31 AM   Result Value Ref Range    Protein, Urine, Random 7 <11.9 mg/dL    Creatinine, Ur 40.00 mg/dL    PROTEIN/CREAT RATIO URINE RAN 0.2         I have personally reviewed imaging studies:  Other Studies:  XR CHEST 1 VIEW   Final Result   No acute findings in the chest         US ABDOMEN COMPLETE    (Results Pending)   US SCROTUM AND TESTICLES    (Results Pending)   US RETROPERITONEAL COMPLETE    (Results Pending)       Current Meds:  Current Facility-Administered Medications   Medication Dose Route Frequency    sodium bicarbonate tablet 650 mg  650 mg Oral 4x Daily    carvedilol (COREG) tablet 25 mg  25 mg Oral BID WC    HYDROmorphone (DILAUDID) tablet 1 mg  1 mg Oral Q8H PRN    insulin lispro (HUMALOG) injection vial 0-4 Units  0-4 Units SubCUTAneous TID WC    insulin lispro (HUMALOG) injection vial 0-4 Units  0-4 Units SubCUTAneous Nightly    glucose chewable tablet 16 g  4 tablet Oral PRN    dextrose bolus 10% 125 mL  125 mL IntraVENous PRN    Or    dextrose bolus 10% 250 mL  250 mL IntraVENous PRN    glucagon (rDNA) injection 1 mg  1 mg SubCUTAneous PRN    dextrose 10 % infusion   IntraVENous Continuous PRN    HYDROcodone-acetaminophen (NORCO) 7.5-325 MG per tablet 1 tablet  1 tablet Oral Q6H PRN    pantoprazole (PROTONIX) tablet 40 mg  40 mg Oral BID AC    [Held by provider] insulin glargine (LANTUS) injection vial 10 Units  10 Units SubCUTAneous Nightly    sodium chloride flush 0.9 % injection 5-40 mL  5-40 mL IntraVENous 2 times per day    sodium chloride flush 0.9 % injection 5-40 mL  5-40 mL IntraVENous PRN    0.9 % sodium chloride infusion   IntraVENous PRN    potassium chloride (KLOR-CON M) extended release tablet 40 mEq  40 mEq Oral PRN    Or    potassium bicarb-citric acid (EFFER-K) effervescent tablet 40 mEq  40 mEq Oral PRN    Or    potassium chloride 10 mEq/100 mL IVPB (Peripheral Line)  10 mEq IntraVENous PRN    potassium chloride 10 mEq/100 mL IVPB (Peripheral Line)  10 mEq IntraVENous PRN    magnesium sulfate 2000 mg in 50 mL IVPB premix  2,000 mg IntraVENous PRN    ondansetron (ZOFRAN-ODT) disintegrating tablet 4 mg  4 mg Oral Q8H PRN    Or    ondansetron (ZOFRAN) injection 4 mg  4 mg IntraVENous Q6H PRN    polyethylene glycol (GLYCOLAX) packet 17 g  17 g Oral Daily PRN    bisacodyl (DULCOLAX) suppository 10 mg  10 mg Rectal Daily PRN    famotidine (PEPCID) tablet 10 mg  10 mg Oral Daily PRN    aluminum & magnesium hydroxide-simethicone (MAALOX) 200-200-20 MG/5ML suspension 30 mL  30 mL Oral Q6H PRN    acetaminophen (TYLENOL) tablet 650 mg  650 mg Oral Q6H PRN    Or    acetaminophen (TYLENOL) suppository 650 mg  650 mg Rectal Q6H PRN    furosemide (LASIX) injection 40 mg  40 mg IntraVENous BID    hydrALAZINE (APRESOLINE) tablet 25 mg  25 mg Oral Q8H PRN    heparin (porcine) injection 5,000 Units  5,000 Units SubCUTAneous 3 times per day       Signed:  Noa Hdez MD    Part of this note may have been written by using a voice dictation software. The note has been proof read but may still contain some grammatical/other typographical errors.

## 2023-02-07 NOTE — PROGRESS NOTES
TRANSFER - OUT REPORT:    Verbal report given to kirk (name) on Theadore Danita  being transferred to [id number] (unit) for routine progression of patient care       Report consisted of patients Situation, Background, Assessment and   Recommendations(SBAR). Information from the following report(s) Nurse Handoff Report was reviewed with the receiving nurse. Opportunity for questions and clarification was provided. Patient transported with:   Monitor    Lines: 1 Help Text [id number]     This SmartLink retrieves the last documented value for LDA assessment data, retrieved by either LDA type or by LDA group ID. This SmartLink should be used in a SmartText or SmartPhrase. If one is not available, please contact your .

## 2023-02-08 ENCOUNTER — APPOINTMENT (OUTPATIENT)
Dept: ULTRASOUND IMAGING | Age: 58
DRG: 432 | End: 2023-02-08
Payer: MEDICARE

## 2023-02-08 ENCOUNTER — HOME CARE VISIT (OUTPATIENT)
Dept: SCHEDULING | Facility: HOME HEALTH | Age: 58
End: 2023-02-08
Payer: MEDICARE

## 2023-02-08 ENCOUNTER — APPOINTMENT (OUTPATIENT)
Dept: INTERVENTIONAL RADIOLOGY/VASCULAR | Age: 58
DRG: 432 | End: 2023-02-08
Payer: MEDICARE

## 2023-02-08 LAB
ANION GAP SERPL CALC-SCNC: 8 MMOL/L (ref 2–11)
BASOPHILS # BLD: 0.1 K/UL (ref 0–0.2)
BASOPHILS NFR BLD: 1 % (ref 0–2)
BUN SERPL-MCNC: 45 MG/DL (ref 6–23)
CALCIUM SERPL-MCNC: 8.3 MG/DL (ref 8.3–10.4)
CHLORIDE SERPL-SCNC: 112 MMOL/L (ref 101–110)
CO2 SERPL-SCNC: 18 MMOL/L (ref 21–32)
CREAT SERPL-MCNC: 4 MG/DL (ref 0.8–1.5)
DIFFERENTIAL METHOD BLD: ABNORMAL
EOSINOPHIL # BLD: 0.3 K/UL (ref 0–0.8)
EOSINOPHIL NFR BLD: 4 % (ref 0.5–7.8)
ERYTHROCYTE [DISTWIDTH] IN BLOOD BY AUTOMATED COUNT: 14.6 % (ref 11.9–14.6)
GLUCOSE BLD STRIP.AUTO-MCNC: 114 MG/DL (ref 65–100)
GLUCOSE BLD STRIP.AUTO-MCNC: 115 MG/DL (ref 65–100)
GLUCOSE BLD STRIP.AUTO-MCNC: 124 MG/DL (ref 65–100)
GLUCOSE BLD STRIP.AUTO-MCNC: 54 MG/DL (ref 65–100)
GLUCOSE BLD STRIP.AUTO-MCNC: 75 MG/DL (ref 65–100)
GLUCOSE SERPL-MCNC: 102 MG/DL (ref 65–100)
HCT VFR BLD AUTO: 23.1 % (ref 41.1–50.3)
HGB BLD-MCNC: 7.6 G/DL (ref 13.6–17.2)
IMM GRANULOCYTES # BLD AUTO: 0 K/UL (ref 0–0.5)
IMM GRANULOCYTES NFR BLD AUTO: 0 % (ref 0–5)
LYMPHOCYTES # BLD: 2.6 K/UL (ref 0.5–4.6)
LYMPHOCYTES NFR BLD: 33 % (ref 13–44)
MCH RBC QN AUTO: 26.8 PG (ref 26.1–32.9)
MCHC RBC AUTO-ENTMCNC: 32.9 G/DL (ref 31.4–35)
MCV RBC AUTO: 81.3 FL (ref 82–102)
MONOCYTES # BLD: 0.8 K/UL (ref 0.1–1.3)
MONOCYTES NFR BLD: 10 % (ref 4–12)
NEUTS SEG # BLD: 4.1 K/UL (ref 1.7–8.2)
NEUTS SEG NFR BLD: 52 % (ref 43–78)
NRBC # BLD: 0 K/UL (ref 0–0.2)
PHOSPHATE SERPL-MCNC: 6.2 MG/DL (ref 2.5–4.5)
PLATELET # BLD AUTO: 188 K/UL (ref 150–450)
PMV BLD AUTO: 10.6 FL (ref 9.4–12.3)
POTASSIUM SERPL-SCNC: 4.7 MMOL/L (ref 3.5–5.1)
RBC # BLD AUTO: 2.84 M/UL (ref 4.23–5.6)
SERVICE CMNT-IMP: ABNORMAL
SERVICE CMNT-IMP: NORMAL
SODIUM SERPL-SCNC: 138 MMOL/L (ref 133–143)
WBC # BLD AUTO: 7.8 K/UL (ref 4.3–11.1)

## 2023-02-08 PROCEDURE — 6370000000 HC RX 637 (ALT 250 FOR IP): Performed by: STUDENT IN AN ORGANIZED HEALTH CARE EDUCATION/TRAINING PROGRAM

## 2023-02-08 PROCEDURE — 84100 ASSAY OF PHOSPHORUS: CPT

## 2023-02-08 PROCEDURE — C1729 CATH, DRAINAGE: HCPCS

## 2023-02-08 PROCEDURE — 76700 US EXAM ABDOM COMPLETE: CPT

## 2023-02-08 PROCEDURE — 6360000002 HC RX W HCPCS: Performed by: INTERNAL MEDICINE

## 2023-02-08 PROCEDURE — 80048 BASIC METABOLIC PNL TOTAL CA: CPT

## 2023-02-08 PROCEDURE — 36415 COLL VENOUS BLD VENIPUNCTURE: CPT

## 2023-02-08 PROCEDURE — 97530 THERAPEUTIC ACTIVITIES: CPT

## 2023-02-08 PROCEDURE — 2580000003 HC RX 258: Performed by: INTERNAL MEDICINE

## 2023-02-08 PROCEDURE — 85025 COMPLETE CBC W/AUTO DIFF WBC: CPT

## 2023-02-08 PROCEDURE — 2500000003 HC RX 250 WO HCPCS: Performed by: PHYSICIAN ASSISTANT

## 2023-02-08 PROCEDURE — 1100000000 HC RM PRIVATE

## 2023-02-08 PROCEDURE — 6370000000 HC RX 637 (ALT 250 FOR IP): Performed by: INTERNAL MEDICINE

## 2023-02-08 PROCEDURE — 76870 US EXAM SCROTUM: CPT

## 2023-02-08 PROCEDURE — P9047 ALBUMIN (HUMAN), 25%, 50ML: HCPCS | Performed by: PHYSICIAN ASSISTANT

## 2023-02-08 PROCEDURE — 97161 PT EVAL LOW COMPLEX 20 MIN: CPT

## 2023-02-08 PROCEDURE — 6360000002 HC RX W HCPCS: Performed by: PHYSICIAN ASSISTANT

## 2023-02-08 PROCEDURE — 99222 1ST HOSP IP/OBS MODERATE 55: CPT | Performed by: UROLOGY

## 2023-02-08 PROCEDURE — 0W9G3ZZ DRAINAGE OF PERITONEAL CAVITY, PERCUTANEOUS APPROACH: ICD-10-PCS | Performed by: RADIOLOGY

## 2023-02-08 PROCEDURE — 82962 GLUCOSE BLOOD TEST: CPT

## 2023-02-08 RX ORDER — LIDOCAINE HYDROCHLORIDE 10 MG/ML
INJECTION, SOLUTION EPIDURAL; INFILTRATION; INTRACAUDAL; PERINEURAL
Status: COMPLETED | OUTPATIENT
Start: 2023-02-08 | End: 2023-02-08

## 2023-02-08 RX ORDER — ALBUMIN (HUMAN) 12.5 G/50ML
SOLUTION INTRAVENOUS CONTINUOUS PRN
Status: COMPLETED | OUTPATIENT
Start: 2023-02-08 | End: 2023-02-08

## 2023-02-08 RX ADMIN — HYDROCODONE BITARTRATE AND ACETAMINOPHEN 1 TABLET: 7.5; 325 TABLET ORAL at 11:24

## 2023-02-08 RX ADMIN — HYDROCODONE BITARTRATE AND ACETAMINOPHEN 1 TABLET: 7.5; 325 TABLET ORAL at 22:39

## 2023-02-08 RX ADMIN — CARVEDILOL 25 MG: 25 TABLET, FILM COATED ORAL at 10:48

## 2023-02-08 RX ADMIN — SODIUM CHLORIDE, PRESERVATIVE FREE 10 ML: 5 INJECTION INTRAVENOUS at 21:20

## 2023-02-08 RX ADMIN — LIDOCAINE HYDROCHLORIDE 10 ML: 10 INJECTION, SOLUTION EPIDURAL; INFILTRATION; INTRACAUDAL; PERINEURAL at 07:55

## 2023-02-08 RX ADMIN — SODIUM CHLORIDE, PRESERVATIVE FREE 10 ML: 5 INJECTION INTRAVENOUS at 10:49

## 2023-02-08 RX ADMIN — FUROSEMIDE 40 MG: 10 INJECTION, SOLUTION INTRAMUSCULAR; INTRAVENOUS at 17:45

## 2023-02-08 RX ADMIN — FUROSEMIDE 40 MG: 10 INJECTION, SOLUTION INTRAMUSCULAR; INTRAVENOUS at 10:48

## 2023-02-08 RX ADMIN — SODIUM BICARBONATE 650 MG: 650 TABLET ORAL at 15:46

## 2023-02-08 RX ADMIN — SODIUM BICARBONATE 650 MG: 650 TABLET ORAL at 10:48

## 2023-02-08 RX ADMIN — ALBUMIN (HUMAN) 12.5 G: 0.25 INJECTION, SOLUTION INTRAVENOUS at 08:39

## 2023-02-08 RX ADMIN — HEPARIN SODIUM 5000 UNITS: 5000 INJECTION INTRAVENOUS; SUBCUTANEOUS at 21:20

## 2023-02-08 RX ADMIN — SODIUM BICARBONATE 650 MG: 650 TABLET ORAL at 21:20

## 2023-02-08 RX ADMIN — CARVEDILOL 25 MG: 25 TABLET, FILM COATED ORAL at 15:46

## 2023-02-08 RX ADMIN — SODIUM BICARBONATE 650 MG: 650 TABLET ORAL at 13:30

## 2023-02-08 RX ADMIN — PANTOPRAZOLE SODIUM 40 MG: 40 TABLET, DELAYED RELEASE ORAL at 15:47

## 2023-02-08 RX ADMIN — PANTOPRAZOLE SODIUM 40 MG: 40 TABLET, DELAYED RELEASE ORAL at 05:04

## 2023-02-08 ASSESSMENT — PAIN SCALES - GENERAL
PAINLEVEL_OUTOF10: 0
PAINLEVEL_OUTOF10: 6
PAINLEVEL_OUTOF10: 6

## 2023-02-08 ASSESSMENT — PAIN DESCRIPTION - LOCATION
LOCATION: GENERALIZED
LOCATION: LEG

## 2023-02-08 ASSESSMENT — PAIN DESCRIPTION - PAIN TYPE: TYPE: ACUTE PAIN

## 2023-02-08 ASSESSMENT — PAIN DESCRIPTION - ORIENTATION: ORIENTATION: LEFT

## 2023-02-08 ASSESSMENT — PAIN DESCRIPTION - FREQUENCY: FREQUENCY: CONTINUOUS

## 2023-02-08 ASSESSMENT — PAIN DESCRIPTION - ONSET: ONSET: ON-GOING

## 2023-02-08 ASSESSMENT — PAIN DESCRIPTION - DESCRIPTORS: DESCRIPTORS: ACHING

## 2023-02-08 NOTE — PROGRESS NOTES
TRANSFER - OUT REPORT:    Verbal report given to [employer] RN on Guillermo Zimmerman  being transferred to [phone] for routine progression of patient care       Report consisted of patient's Situation, Background, Assessment and   Recommendations(SBAR). Information from the following report(s) Nurse Handoff Report, Intake/Output, and MAR was reviewed with the receiving nurse. [place] Assessment: Presents to emergency department  because of falls (Syncope, seizure, or loss of consciousness): No, Age > 79: No, Altered Mental Status, Intoxication with alcohol or substance confusion (Disorientation, impaired judgment, poor safety awaremess, or inability to follow instructions): No, Impaired Mobility: Ambulates or transfers with assistive devices or assistance; Unable to ambulate or transer.: Yes, Nursing Judgement: Yes  Lines:   Peripheral IV 02/06/23 Right Arm (Active)   Site Assessment Clean, dry & intact 02/08/23 0312   Line Status Normal saline locked 02/08/23 0312   Line Care Connections checked and tightened 02/08/23 0312   Phlebitis Assessment No symptoms 02/08/23 0312   Infiltration Assessment 0 02/08/23 0312   Alcohol Cap Used No 02/08/23 0312   Dressing Status Clean, dry & intact 02/08/23 0312   Dressing Type Transparent 02/08/23 7020        Opportunity for questions and clarification was provided.       Patient transported with:  [employer]

## 2023-02-08 NOTE — PROGRESS NOTES
Hospitalist Progress Note   Admit Date:  2023  2:53 PM   Name:  Leandra Khoury   Age:  65 y.o. Sex:  male  :  1965   MRN:  [id number]   Room:  803/01    Presenting Complaint: Leg Swelling and Groin Swelling     Reason(s) for Admission: Anasarca [R60.1]  Scrotal edema [N50.89]  Bilateral leg edema [R60.0]  Acute renal failure, unspecified acute renal failure type [facility]) [N17.9]     Hospital Course:   Leandra Khoury is a 65 y.o. male with medical history of hypertension, hyperlipidemia, type 2 diabetes, liver cirrhosis with ascites who presented with worsening bilateral lower extremity swelling, abdominal distention and scrotal swelling. Patient had paracentesis on  with removal of 6 L of fluid but reports worsening swelling since then    Work-up in the emergency room with proBNP of 1007 and 61, BUN 41, creatinine 4.10, albumin of 1.4, hemoglobin 8.4. Subjective & 24hr Events (23): Patient endorses continued scrotal pain that was exacerbated with attempt at balderas placement. Denies fever, chills, N/V      Assessment & Plan:     Principal Problem:    Anasarca  Liver cirrhosis with ascites  -Follows at [facility] with Dr.Maria Lesly Marie  - Lasix 40mg BID IV  -Albumin completed. Can likely change to oral  lasix tomorrow. Fluid status improved  -S/P Paracentesis today       MARS on CKD stage III  23  -Baseline creatinine of 1.5-1.8 currently creatinine is around 4  -Normal Urine lytes  -Ultrasound completed with no hydronephrosis seen, lobular liver w/ non obstructive cholelithiasis   -Ordered Balderas but unable to place it  -Monitor I's and O's  -Continue bicarb  -Nephro following; results appreciated    -UOP with 1,850 in last 24 hours  -Therapy with recs for [facility]     Hypoalbuminemia  -Albumin 1.4  -Has received albumin    LLE wound  -Wound consult     Scrotal Hydrocele  -found on US  -Urology consult pending   -Patient afebrile with normal white count.  No indication for abx      Anemia   -likely due to chronic disease from liver cirrhosis and CKD  -Hb 7.6 with baseline 8-10  - monitor Hb  -Iron studies normal     Hypertension  -Continue coreg, lasix; adjust as needed     Hypoglycemia(Resolved)  -Monitor      Type 2 diabetes  A1c of 5  -Continue with insulin sliding            Anticipated discharge needs:      Dispo pending     Diet:  ADULT DIET; Regular; 4 carb choices (60 gm/meal); Low Sodium (2 gm)  DVT PPx: Heparin   Code status: Full Code    Hospital Problems:  Principal Problem:    Anasarca  Active Problems:    Diabetic peripheral neuropathy ([facility] [address]    Essential hypertension    Type 2 diabetes mellitus (HCC)    Hypoalbuminemia    Cirrhosis of liver with ascites (HCC)    Acute kidney injury superimposed on chronic kidney disease (HCC)    Stage 3a chronic kidney disease ([facility]  Resolved Problems:    * No resolved hospital problems. *      Objective:   Patient Vitals for the past 24 hrs:   Temp Pulse Resp BP SpO2   02/08/23 1030 97.8 °F (36.6 °C) 80 17 (!) 156/99 100 %   02/08/23 0826 -- -- -- (!) 147/93 100 %   02/08/23 0816 -- -- -- (!) 146/91 100 %   02/08/23 0806 -- -- -- (!) 144/83 100 %   02/08/23 0756 -- -- -- (!) 162/97 100 %   02/08/23 0746 -- -- -- (!) 159/107 98 %   02/08/23 0724 97.4 °F (36.3 °C) 82 19 (!) 159/102 100 %   02/08/23 0417 97.2 °F (36.2 °C) 79 19 124/85 98 %   02/07/23 2327 97.9 °F (36.6 °C) 74 19 136/86 99 %   02/07/23 1940 97.2 °F (36.2 °C) 74 19 115/87 100 %   02/07/23 1830 97.6 °F (36.4 °C) 72 20 (!) 126/98 99 %   02/07/23 1657 -- -- -- (!) 154/114 --   02/07/23 1555 98 °F (36.7 °C) 81 20 (!) 155/110 100 %   02/07/23 1532 97.9 °F (36.6 °C) 74 20 (!) 161/100 100 %       Oxygen Therapy  SpO2: 100 %  Pulse Oximeter Device Mode: Continuous  O2 Device: None (Room air)    Estimated body mass index is 28.73 kg/m² as calculated from the following:    Height as of this encounter: 5' 11\" (1.803 m).     Weight as of this encounter: 206 lb (93.4 kg).    Intake/Output Summary (Last 24 hours) at 2/8/2023 1113  Last data filed at 2/8/2023 0317  Gross per 24 hour   Intake 220 ml   Output 1150 ml   Net -930 ml         Physical Exam:     Blood pressure (!) 156/99, pulse 80, temperature 97.8 °F (36.6 °C), temperature source Oral, resp. rate 17, height 5' 11\" (1.803 m), weight 206 lb (93.4 kg), SpO2 100 %. General:    Well nourished. Head:  Normocephalic, atraumatic  Eyes:  Sclerae appear normal.  Pupils equally round. ENT:  Nares appear normal.  Moist oral mucosa  Neck:  No restricted ROM. Trachea midline   CV:   RRR. No m/r/g. No jugular venous distension. Lungs:   CTAB. No wheezing, rhonchi, or rales. Symmetric expansion. Abdomen:   Soft, nontender, nondistended. Extremities: No cyanosis or clubbing. No edema  Skin:     No rashes and normal coloration. Warm and dry. Neuro:  CN II-XII grossly intact. Psych:  Normal mood and affect.       I have personally reviewed labs and tests:  Recent Labs:  Recent Results (from the past 48 hour(s))   Troponin    Collection Time: 02/06/23  3:24 PM   Result Value Ref Range    Troponin, High Sensitivity 9.5 0 - 14 pg/mL   CBC with Auto Differential    Collection Time: 02/06/23  3:24 PM   Result Value Ref Range    WBC 6.8 4.3 - 11.1 K/uL    RBC 3.15 (L) 4.23 - 5.6 M/uL    Hemoglobin 8.4 (L) 13.6 - 17.2 g/dL    Hematocrit 25.9 (L) 41.1 - 50.3 %    MCV 82.2 82 - 102 FL    MCH 26.7 26.1 - 32.9 PG    MCHC 32.4 31.4 - 35.0 g/dL    RDW 14.6 11.9 - 14.6 %    Platelets 627 693 - 749 K/uL    MPV 9.9 9.4 - 12.3 FL    nRBC 0.00 0.0 - 0.2 K/uL    Differential Type AUTOMATED      Seg Neutrophils 55 43 - 78 %    Lymphocytes 29 13 - 44 %    Monocytes 10 4.0 - 12.0 %    Eosinophils % 4 0.5 - 7.8 %    Basophils 1 0.0 - 2.0 %    Immature Granulocytes 1 0.0 - 5.0 %    Segs Absolute 3.8 1.7 - 8.2 K/UL    Absolute Lymph # 2.0 0.5 - 4.6 K/UL    Absolute Mono # 0.7 0.1 - 1.3 K/UL    Absolute Eos # 0.3 0.0 - 0.8 K/UL    Basophils Absolute 0.0 0.0 - 0.2 K/UL    Absolute Immature Granulocyte 0.0 0.0 - 0.5 K/UL   Comprehensive Metabolic Panel    Collection Time: 02/06/23  3:24 PM   Result Value Ref Range    Sodium 140 133 - 143 mmol/L    Potassium 4.2 3.5 - 5.1 mmol/L    Chloride 115 (H) 101 - 110 mmol/L    CO2 19 (L) 21 - 32 mmol/L    Anion Gap 6 2 - 11 mmol/L    Glucose 113 (H) 65 - 100 mg/dL    BUN 41 (H) 6 - 23 MG/DL    Creatinine 4.10 (H) 0.8 - 1.5 MG/DL    Est, Glom Filt Rate 16 (L) >60 ml/min/1.73m2    Calcium 7.5 (L) 8.3 - 10.4 MG/DL    Total Bilirubin <0.1 (L) 0.2 - 1.1 MG/DL    ALT 8 (L) 12 - 65 U/L    AST 17 15 - 37 U/L    Alk Phosphatase 175 (H) 50 - 136 U/L    Total Protein 6.7 6.3 - 8.2 g/dL    Albumin 1.4 (L) 3.5 - 5.0 g/dL    Globulin 5.3 (H) 2.8 - 4.5 g/dL    Albumin/Globulin Ratio 0.3 (L) 0.4 - 1.6     Protime-INR    Collection Time: 02/06/23  3:24 PM   Result Value Ref Range    Protime 16.5 (H) 12.6 - 14.3 sec    INR 1.3     APTT    Collection Time: 02/06/23  3:24 PM   Result Value Ref Range    PTT 43.4 (H) 24.5 - 34.2 SEC   Brain Natriuretic Peptide    Collection Time: 02/06/23  3:24 PM   Result Value Ref Range    NT Pro-BNP 1,761 (H) 5 - 125 PG/ML   POCT Glucose    Collection Time: 02/06/23  9:55 PM   Result Value Ref Range    POC Glucose 61 (L) 65 - 100 mg/dL    Performed by: Alisha    POCT Glucose    Collection Time: 02/06/23 10:38 PM   Result Value Ref Range    POC Glucose 115 (H) 65 - 100 mg/dL    Performed by: Alisha    Transferrin Saturation    Collection Time: 02/06/23 11:15 PM   Result Value Ref Range    Iron 44 35 - 150 ug/dL    TIBC 139 (L) 250 - 450 ug/dL    TRANSFERRIN SATURATION 32 >20 %   Vitamin B12    Collection Time: 02/06/23 11:15 PM   Result Value Ref Range    Vitamin B-12 1376 (H) 193 - 986 pg/mL   Folate    Collection Time: 02/06/23 11:15 PM   Result Value Ref Range    Folate 29.2 (H) 3.1 - 17.5 ng/mL   Ferritin    Collection Time: 02/06/23 11:15 PM   Result Value Ref Range Ferritin 431 (H) 8 - 388 NG/ML   POCT Glucose    Collection Time: 02/06/23 11:46 PM   Result Value Ref Range    POC Glucose 128 (H) 65 - 100 mg/dL    Performed by: Alisha    Occult Blood, Fecal    Collection Time: 02/07/23  1:32 AM   Result Value Ref Range    POC Occult Blood, Fecal Positive     Basic Metabolic Panel w/ Reflex to MG    Collection Time: 02/07/23  3:14 AM   Result Value Ref Range    Sodium 140 133 - 143 mmol/L    Potassium 4.5 3.5 - 5.1 mmol/L    Chloride 116 (H) 101 - 110 mmol/L    CO2 16 (L) 21 - 32 mmol/L    Anion Gap 8 2 - 11 mmol/L    Glucose 100 65 - 100 mg/dL    BUN 44 (H) 6 - 23 MG/DL    Creatinine 4.10 (H) 0.8 - 1.5 MG/DL    Est, Glom Filt Rate 16 (L) >60 ml/min/1.73m2    Calcium 8.0 (L) 8.3 - 10.4 MG/DL   CBC with Auto Differential    Collection Time: 02/07/23  3:14 AM   Result Value Ref Range    WBC 8.3 4.3 - 11.1 K/uL    RBC 2.90 (L) 4.23 - 5.6 M/uL    Hemoglobin 7.8 (L) 13.6 - 17.2 g/dL    Hematocrit 23.6 (L) 41.1 - 50.3 %    MCV 81.4 (L) 82 - 102 FL    MCH 26.9 26.1 - 32.9 PG    MCHC 33.1 31.4 - 35.0 g/dL    RDW 14.4 11.9 - 14.6 %    Platelets 367 074 - 952 K/uL    MPV 10.7 9.4 - 12.3 FL    nRBC 0.00 0.0 - 0.2 K/uL    Differential Type AUTOMATED      Seg Neutrophils 58 43 - 78 %    Lymphocytes 28 13 - 44 %    Monocytes 10 4.0 - 12.0 %    Eosinophils % 3 0.5 - 7.8 %    Basophils 1 0.0 - 2.0 %    Immature Granulocytes 0 0.0 - 5.0 %    Segs Absolute 4.9 1.7 - 8.2 K/UL    Absolute Lymph # 2.3 0.5 - 4.6 K/UL    Absolute Mono # 0.8 0.1 - 1.3 K/UL    Absolute Eos # 0.2 0.0 - 0.8 K/UL    Basophils Absolute 0.0 0.0 - 0.2 K/UL    Absolute Immature Granulocyte 0.0 0.0 - 0.5 K/UL   POCT Glucose    Collection Time: 02/07/23  7:41 AM   Result Value Ref Range    POC Glucose 54 (L) 65 - 100 mg/dL    Performed by: Giovanni)    POCT Glucose    Collection Time: 02/07/23  9:04 AM   Result Value Ref Range    POC Glucose 108 (H) 65 - 100 mg/dL    Performed by: Ravin Jean    Urinalysis w rflx microscopic    Collection Time: 02/07/23 10:30 AM   Result Value Ref Range    Color, UA YELLOW/STRAW      Appearance CLEAR      Specific Gravity, UA 1.009 1.001 - 1.023      pH, Urine 5.0 5.0 - 9.0      Protein, UA Negative NEG mg/dL    Glucose, UA Negative mg/dL    Ketones, Urine Negative NEG mg/dL    Bilirubin Urine Negative NEG      Blood, Urine TRACE (A) NEG      Urobilinogen, Urine 0.2 0.2 - 1.0 EU/dL    Nitrite, Urine Negative NEG      Leukocyte Esterase, Urine Negative NEG      WBC, UA 0-4 U4 /hpf    RBC, UA 10-20 (A) U5 /hpf    Epithelial Cells UA 0-5 U5 /hpf    BACTERIA, URINE Negative NEG /hpf    Casts 0-2 U2 /lpf   Sodium, urine, random    Collection Time: 02/07/23 10:31 AM   Result Value Ref Range    SODIUM, RANDOM URINE 105 MMOL/L   Chloride, Random Urine    Collection Time: 02/07/23 10:31 AM   Result Value Ref Range    Chloride 123 MMOL/L   Osmolality, Urine    Collection Time: 02/07/23 10:31 AM   Result Value Ref Range    Osmolality, Ur 325 50 - 1400 MOSM/kg H2O   Urine Drug Screen    Collection Time: 02/07/23 10:31 AM   Result Value Ref Range    PCP, Urine Negative NEG      Benzodiazepines, Urine Negative NEG      Cocaine, Urine Negative NEG      Amphetamine, Urine Negative NEG      Methadone, Urine Negative NEG      THC, TH-Cannabinol, Urine Negative NEG      Opiates, Urine Negative NEG      Barbiturates, Urine Negative NEG     Protein / creatinine ratio, urine    Collection Time: 02/07/23 10:31 AM   Result Value Ref Range    Protein, Urine, Random 7 <11.9 mg/dL    Creatinine, Ur 40.00 mg/dL    PROTEIN/CREAT RATIO URINE RAN 0.2     POCT Glucose    Collection Time: 02/07/23 11:08 AM   Result Value Ref Range    POC Glucose 67 65 - 100 mg/dL    Performed by: Harris (Castillo)    POCT Glucose    Collection Time: 02/07/23 12:12 PM   Result Value Ref Range    POC Glucose 92 65 - 100 mg/dL    Performed by: Yasmine Pete    POCT Glucose    Collection Time: 02/07/23  3:51 PM   Result Value Ref Range POC Glucose 94 65 - 100 mg/dL    Performed by: Gilma Zamora    POCT Glucose    Collection Time: 02/07/23  8:59 PM   Result Value Ref Range    POC Glucose 68 65 - 100 mg/dL    Performed by: Flores    POCT Glucose    Collection Time: 02/07/23  9:37 PM   Result Value Ref Range    POC Glucose 109 (H) 65 - 100 mg/dL    Performed by: Flores    POCT Glucose    Collection Time: 02/08/23  3:03 AM   Result Value Ref Range    POC Glucose 54 (L) 65 - 100 mg/dL    Performed by: Flores    POCT Glucose    Collection Time: 02/08/23  3:25 AM   Result Value Ref Range    POC Glucose 75 65 - 100 mg/dL    Performed by: [facility]    Basic Metabolic Panel w/ Reflex to MG    Collection Time: 02/08/23  4:28 AM   Result Value Ref Range    Sodium 138 133 - 143 mmol/L    Potassium 4.7 3.5 - 5.1 mmol/L    Chloride 112 (H) 101 - 110 mmol/L    CO2 18 (L) 21 - 32 mmol/L    Anion Gap 8 2 - 11 mmol/L    Glucose 102 (H) 65 - 100 mg/dL    BUN 45 (H) 6 - 23 MG/DL    Creatinine 4.00 (H) 0.8 - 1.5 MG/DL    Est, Glom Filt Rate 17 (L) >60 ml/min/1.73m2    Calcium 8.3 8.3 - 10.4 MG/DL   CBC with Auto Differential    Collection Time: 02/08/23  4:28 AM   Result Value Ref Range    WBC 7.8 4.3 - 11.1 K/uL    RBC 2.84 (L) 4.23 - 5.6 M/uL    Hemoglobin 7.6 (L) 13.6 - 17.2 g/dL    Hematocrit 23.1 (L) 41.1 - 50.3 %    MCV 81.3 (L) 82 - 102 FL    MCH 26.8 26.1 - 32.9 PG    MCHC 32.9 31.4 - 35.0 g/dL    RDW 14.6 11.9 - 14.6 %    Platelets 367 284 - 192 K/uL    MPV 10.6 9.4 - 12.3 FL    nRBC 0.00 0.0 - 0.2 K/uL    Differential Type AUTOMATED      Seg Neutrophils 52 43 - 78 %    Lymphocytes 33 13 - 44 %    Monocytes 10 4.0 - 12.0 %    Eosinophils % 4 0.5 - 7.8 %    Basophils 1 0.0 - 2.0 %    Immature Granulocytes 0 0.0 - 5.0 %    Segs Absolute 4.1 1.7 - 8.2 K/UL    Absolute Lymph # 2.6 0.5 - 4.6 K/UL    Absolute Mono # 0.8 0.1 - 1.3 K/UL    Absolute Eos # 0.3 0.0 - 0.8 K/UL    Basophils Absolute 0.1 0.0 - 0.2 K/UL Absolute Immature Granulocyte 0.0 0.0 - 0.5 K/UL   Phosphorus    Collection Time: 02/08/23  4:28 AM   Result Value Ref Range    Phosphorus 6.2 (H) 2.5 - 4.5 MG/DL       I have personally reviewed imaging studies:  Other Studies:  XR CHEST 1 VIEW   Final Result   No acute findings in the chest         US ABDOMEN COMPLETE    (Results Pending)   US SCROTUM AND TESTICLES    (Results Pending)   IR US GUIDED PARACENTESIS    (Results Pending)       Current Meds:  Current Facility-Administered Medications   Medication Dose Route Frequency    sodium bicarbonate tablet 650 mg  650 mg Oral 4x Daily    carvedilol (COREG) tablet 25 mg  25 mg Oral BID WC    insulin lispro (HUMALOG) injection vial 0-4 Units  0-4 Units SubCUTAneous TID WC    insulin lispro (HUMALOG) injection vial 0-4 Units  0-4 Units SubCUTAneous Nightly    glucose chewable tablet 16 g  4 tablet Oral PRN    dextrose bolus 10% 125 mL  125 mL IntraVENous PRN    Or    dextrose bolus 10% 250 mL  250 mL IntraVENous PRN    glucagon (rDNA) injection 1 mg  1 mg SubCUTAneous PRN    dextrose 10 % infusion   IntraVENous Continuous PRN    HYDROcodone-acetaminophen (NORCO) 7.5-325 MG per tablet 1 tablet  1 tablet Oral Q6H PRN    hydrALAZINE (APRESOLINE) injection 5 mg  5 mg IntraVENous Q6H PRN    pantoprazole (PROTONIX) tablet 40 mg  40 mg Oral BID AC    [Held by provider] insulin glargine (LANTUS) injection vial 10 Units  10 Units SubCUTAneous Nightly    sodium chloride flush 0.9 % injection 5-40 mL  5-40 mL IntraVENous 2 times per day    sodium chloride flush 0.9 % injection 5-40 mL  5-40 mL IntraVENous PRN    0.9 % sodium chloride infusion   IntraVENous PRN    potassium chloride (KLOR-CON M) extended release tablet 40 mEq  40 mEq Oral PRN    Or    potassium bicarb-citric acid (EFFER-K) effervescent tablet 40 mEq  40 mEq Oral PRN    Or    potassium chloride 10 mEq/100 mL IVPB (Peripheral Line)  10 mEq IntraVENous PRN    potassium chloride 10 mEq/100 mL IVPB (Peripheral Line) 10 mEq IntraVENous PRN    magnesium sulfate 2000 mg in 50 mL IVPB premix  2,000 mg IntraVENous PRN    ondansetron (ZOFRAN-ODT) disintegrating tablet 4 mg  4 mg Oral Q8H PRN    Or    ondansetron (ZOFRAN) injection 4 mg  4 mg IntraVENous Q6H PRN    polyethylene glycol (GLYCOLAX) packet 17 g  17 g Oral Daily PRN    bisacodyl (DULCOLAX) suppository 10 mg  10 mg Rectal Daily PRN    famotidine (PEPCID) tablet 10 mg  10 mg Oral Daily PRN    aluminum & magnesium hydroxide-simethicone (MAALOX) 200-200-20 MG/5ML suspension 30 mL  30 mL Oral Q6H PRN    acetaminophen (TYLENOL) tablet 650 mg  650 mg Oral Q6H PRN    Or    acetaminophen (TYLENOL) suppository 650 mg  650 mg Rectal Q6H PRN    furosemide (LASIX) injection 40 mg  40 mg IntraVENous BID    hydrALAZINE (APRESOLINE) tablet 25 mg  25 mg Oral Q8H PRN    heparin (porcine) injection 5,000 Units  5,000 Units SubCUTAneous 3 times per day       Signed:  DESIRE Michael - CNP    Part of this note may have been written by using a voice dictation software. The note has been proof read but may still contain some grammatical/other typographical errors.

## 2023-02-08 NOTE — PROGRESS NOTES
[facility]    Follow-Up on:MARS on Stage 3b    HPI: Pt seen and examined. No new complaints.     ROS:  Denies CP, SOB.    Current Facility-Administered Medications   Medication Dose Route Frequency    sodium bicarbonate tablet 650 mg  650 mg Oral 4x Daily    carvedilol (COREG) tablet 25 mg  25 mg Oral BID WC    insulin lispro (HUMALOG) injection vial 0-4 Units  0-4 Units SubCUTAneous TID WC    insulin lispro (HUMALOG) injection vial 0-4 Units  0-4 Units SubCUTAneous Nightly    glucose chewable tablet 16 g  4 tablet Oral PRN    dextrose bolus 10% 125 mL  125 mL IntraVENous PRN    Or    dextrose bolus 10% 250 mL  250 mL IntraVENous PRN    glucagon (rDNA) injection 1 mg  1 mg SubCUTAneous PRN    dextrose 10 % infusion   IntraVENous Continuous PRN    HYDROcodone-acetaminophen (NORCO) 7.5-325 MG per tablet 1 tablet  1 tablet Oral Q6H PRN    hydrALAZINE (APRESOLINE) injection 5 mg  5 mg IntraVENous Q6H PRN    pantoprazole (PROTONIX) tablet 40 mg  40 mg Oral BID AC    [Held by provider] insulin glargine (LANTUS) injection vial 10 Units  10 Units SubCUTAneous Nightly    sodium chloride flush 0.9 % injection 5-40 mL  5-40 mL IntraVENous 2 times per day    sodium chloride flush 0.9 % injection 5-40 mL  5-40 mL IntraVENous PRN    0.9 % sodium chloride infusion   IntraVENous PRN    potassium chloride (KLOR-CON M) extended release tablet 40 mEq  40 mEq Oral PRN    Or    potassium bicarb-citric acid (EFFER-K) effervescent tablet 40 mEq  40 mEq Oral PRN    Or    potassium chloride 10 mEq/100 mL IVPB (Peripheral Line)  10 mEq IntraVENous PRN    potassium chloride 10 mEq/100 mL IVPB (Peripheral Line)  10 mEq IntraVENous PRN    magnesium sulfate 2000 mg in 50 mL IVPB premix  2,000 mg IntraVENous PRN    ondansetron (ZOFRAN-ODT) disintegrating tablet 4 mg  4 mg Oral Q8H PRN    Or    ondansetron (ZOFRAN) injection 4 mg  4 mg IntraVENous Q6H PRN    polyethylene glycol (GLYCOLAX) packet 17 g  17 g Oral Daily PRN    bisacodyl  (DULCOLAX) suppository 10 mg  10 mg Rectal Daily PRN    famotidine (PEPCID) tablet 10 mg  10 mg Oral Daily PRN    aluminum & magnesium hydroxide-simethicone (MAALOX) 200-200-20 MG/5ML suspension 30 mL  30 mL Oral Q6H PRN    acetaminophen (TYLENOL) tablet 650 mg  650 mg Oral Q6H PRN    Or    acetaminophen (TYLENOL) suppository 650 mg  650 mg Rectal Q6H PRN    furosemide (LASIX) injection 40 mg  40 mg IntraVENous BID    hydrALAZINE (APRESOLINE) tablet 25 mg  25 mg Oral Q8H PRN    heparin (porcine) injection 5,000 Units  5,000 Units SubCUTAneous 3 times per day       Exam:  Vitals:    02/08/23 0806 02/08/23 0816 02/08/23 0826 02/08/23 1030   BP: (!) 144/83 (!) 146/91 (!) 147/93 (!) 156/99   Pulse:    80   Resp:    17   Temp:    97.8 °F (36.6 °C)   TempSrc:    Oral   SpO2: 100% 100% 100% 100%   Weight:       Height:             Intake/Output Summary (Last 24 hours) at 2/8/2023 1055  Last data filed at 2/8/2023 0317  Gross per 24 hour   Intake 340 ml   Output 1350 ml   Net -1010 ml     PE:  GEN - in no distress  CV - regular, no murmur, no rub  Lung - clear bilaterally  Abd - soft, nontender  Ext - anasarca    Labs  Recent Labs     02/06/23  1524 02/07/23  0314 02/08/23  0428   WBC 6.8 8.3 7.8   HGB 8.4* 7.8* 7.6*   HCT 25.9* 23.6* 23.1*    200 188     Recent Labs     02/06/23  1524 02/07/23  0314 02/08/23  0428    140 138   K 4.2 4.5 4.7   * 116* 112*   CO2 19* 16* 18*   BUN 41* 44* 45*   PHOS  --   --  6.2*     No results for input(s): PH, PCO2, PO2, PCO2 in the last 72 hours.     Problem List:  Patient Active Problem List    Diagnosis Date Noted    Obesity with body mass index 30 or greater 10/16/2017    Anasarca 02/06/2023    Acute kidney injury superimposed on chronic kidney disease ([facility] 02/06/2023    Partial small bowel obstruction ([facility] [facility] 12/17/2022    Cirrhosis of liver with ascites ([facility] 12/15/2022    Moderate protein malnutrition ([facility] 12/15/2022    Cholelithiases 12/15/2022    Positive blood culture 12/15/2022    Abdominal pain 12/14/2022    SIRS (systemic inflammatory response syndrome) ([facility] 12/13/2022    Hypoalbuminemia 09/14/2022    Iron deficiency anemia 09/14/2022    PAD (peripheral artery disease) ([facility] 09/02/2022    Diabetic ulcer of both feet associated with type 2 diabetes mellitus [facility] 08/31/2022    Acute kidney injury superimposed on CKD [facility] 08/31/2022    Dry skin dermatitis 05/13/2022    Hypokalemia 04/15/2021    Hematochezia 02/04/2021    Dark stools 12/21/2020    Normocytic anemia 12/21/2020    High serum ferritin 11/27/2019    Alcohol dependence ([facility] 10/16/2017    Diabetic peripheral neuropathy ([facility] 10/16/2017    Hyperlipidemia 10/16/2017    Essential hypertension 03/28/2016    Type 2 diabetes mellitus ([facility] 03/28/2016    Acute blood loss anemia 03/28/2016    Stage 3a chronic kidney disease ([facility] 09/14/2022       Issues Addressed By Nephrology:    Plan:   ETOHic cirrhosis  MARS on CKD stage 3b HRS vs ATN renal fxn stabilized on current regimen  Anasarca  Continue current regimen

## 2023-02-08 NOTE — PROGRESS NOTES
ACUTE PHYSICAL THERAPY GOALS:   (Developed with and agreed upon by patient and/or caregiver. )  LTG:  (1.)Mr. Mason Taylor will move from supine to sit and sit to supine , scoot up and down, and roll side to side in bed with MODIFIED INDEPENDENCE within 7 treatment day(s). (2.)Mr. Mason Taylor will transfer from bed to chair and chair to bed with MODIFIED INDEPENDENCE using the least restrictive device within 7 treatment day(s). (3.)Mr. Mason Taylor will ambulate with STAND BY ASSIST for 500+ feet with the least restrictive device within 7 treatment day(s) while maintaining normal vital signs. (4.)Mr. Mason Taylor will perform 2 stairs with HR and CGA within 7 treatment days for ascending and descending stairs for home.   ________________________________________________________________________________________________       PHYSICAL THERAPY Initial Assessment and PM  (Link to Caseload Tracking: PT Visit Days : 1  Acknowledge Orders  Time In/Out  PT Charge Capture  Rehab Caseload Tracker    Ezequiel Lutz is a 65 y.o. male   PRIMARY DIAGNOSIS: Anasarca  Anasarca [R60.1]  Scrotal edema [N50.89]  Bilateral leg edema [R60.0]  Acute renal failure, unspecified acute renal failure type ([facility] [address] [N17.9]       Reason for Referral: Difficulty in walking, Not elsewhere classified (R26.2)  Inpatient: Payor: Delia Ferrari / Plan: Gwen Terrazas / Product Type: *No Product type* /     ASSESSMENT:     REHAB RECOMMENDATIONS:   Recommendation to date pending progress:  Setting:  Home Health Therapy    Equipment:    None     ASSESSMENT:  Mr. Mason Taylor presents with decreased bed mobility, transfers, ambulation, balance, activity tolerance and overall general functional mobility s/p hospital admission on 2/6/23 with B LE and scrotal edema. Pt A & O x 4, reports lives with spouse who is CG, has assist ADLs, ambulates with RW at home, states no falls.  Pt today feeling better since paracentesis this am. Pt with CGA to EOB, good static sitting balance, sit to stand with elevated bed and SBA/CGA. Pt ambulated with RW for 250', slow steady jason, slight \"waddle\" due to edema. Pt ambulated on RA, stats >90% with activity. Pt overall most likely close to baseline for mobility, PT to follow for acute care needs to address deficits noted above. [address] [facility] [address] [address] AM-PAC 6 Clicks Basic Mobility Inpatient Short Form  AM-PAC Basic Mobility - Inpatient   How much help is needed turning from your back to your side while in a flat bed without using bedrails?: A Lot  How much help is needed moving from lying on your back to sitting on the side of a flat bed without using bedrails?: A Lot  How much help is needed moving to and from a bed to a chair?: A Little  How much help is needed standing up from a chair using your arms?: A Little  How much help is needed walking in hospital room?: A Little  How much help is needed climbing 3-5 steps with a railing?: A Lot  AM-PAC Inpatient Mobility Raw Score : 15  AM-PAC Inpatient T-Scale Score : 39.45  Mobility Inpatient CMS 0-100% Score: 57.7  Mobility Inpatient CMS G-Code Modifier : CK    SUBJECTIVE:   Mr. Jesus Otero states, \"I am doing much better now that all that fluid is off\"     Social/Functional Lives With: Spouse  Type of Home: House  Home Layout: One level  Entrance Stairs - Number of Steps: 2  Bathroom Toilet: Bedside commode  Home Equipment: Rilla Bearoleg cartwright  Receives Help From: Family  Reports uses RW at baseline, lives with spouse who is primary CG, assist ADLs.    OBJECTIVE:     PAIN: Zen Patience / O2: PRECAUTION / Evalene Schillings / DRAINS:   Pre Treatment:    0/10      Post Treatment: 0/10 Vitals    WDL    Oxygen   RA   None    RESTRICTIONS/PRECAUTIONS:                    GROSS EVALUATION: Intact Impaired (Comments):   AROM [x]  Tight due to fluid and edema   PROM [x]    Strength []  Grossly 4/5   Balance []  Fair + with RW   Posture [] Forward Head  Rounded Shoulders   Sensation [x] Coordination [x]      Tone [x]     Edema [] B LE, scrotum   Activity Tolerance []  General fatigue    []      COGNITION/  PERCEPTION: Intact Impaired (Comments):   Orientation [x]     Vision [x]     Hearing [x]     Cognition  [x]       MOBILITY: I Mod I S SBA CGA Min Mod Max Total  NT x2 Comments:   Bed Mobility    Rolling [] [] [] [] [x] [] [] [] [] [] []    Supine to Sit [] [] [] [] [x] [] [] [] [] [] [] Using bed rail   Scooting [] [] [] [x] [] [] [] [] [] [] []    Sit to Supine [] [] [] [] [x] [] [] [] [] [] [] For B LE   Transfers    Sit to Stand [] [] [] [] [x] [] [] [] [] [] []    Bed to Chair [] [] [] [] [] [] [] [] [] [x] []    Stand to Sit [] [] [] [] [x] [] [] [] [] [] []     [] [] [] [] [] [] [] [] [] [] []    I=Independent, Mod I=Modified Independent, S=Supervision, SBA=Standby Assistance, CGA=Contact Guard Assistance,   Min=Minimal Assistance, Mod=Moderate Assistance, Max=Maximal Assistance, Total=Total Assistance, NT=Not Tested    GAIT: I Mod I S SBA CGA Min Mod Max Total  NT x2 Comments:   Level of Assistance [] [] [] [] [x] [] [] [] [] [] []    Distance 250  feet    DME Rolling Walker    Gait Quality Decreased jason , Decreased step clearance, and Decreased step length    Weightbearing Status      Stairs      I=Independent, Mod I=Modified Independent, S=Supervision, SBA=Standby Assistance, CGA=Contact Guard Assistance,   Min=Minimal Assistance, Mod=Moderate Assistance, Max=Maximal Assistance, Total=Total Assistance, NT=Not Tested    PLAN:   FREQUENCY AND DURATION: 3 times/week for duration of hospital stay or until stated goals are met, whichever comes first.    THERAPY PROGNOSIS: Good    PROBLEM LIST:   (Skilled intervention is medically necessary to address:)  Decreased ADL/Functional Activities  Decreased Activity Tolerance  Decreased Balance  Decreased Gait Ability  Decreased Strength  Decreased Transfer Abilities INTERVENTIONS PLANNED:   (Benefits and precautions of physical therapy have been discussed with the patient.)  Therapeutic Activity  Therapeutic Exercise/HEP  Neuromuscular Re-education  Gait Training  Education       TREATMENT:   EVALUATION: LOW COMPLEXITY: (Untimed Charge)    TREATMENT:   Therapeutic Activity (10 Minutes): Therapeutic activity included Supine to Sit, Sit to Supine, Scooting, Transfer Training, Ambulation on level ground, Sitting balance , and Standing balance to improve functional Activity tolerance, Balance, Coordination, and Mobility.     TREATMENT GRID:  N/A    AFTER TREATMENT PRECAUTIONS: Bed, Bed/Chair Locked, Call light within reach, Needs within reach, RN notified, and Side rails x2    INTERDISCIPLINARY COLLABORATION:  RN/ PCT and PT/ PTA    EDUCATION: Education Given To: Patient  Education Provided: Role of Therapy;Plan of Care  Education Method: Verbal  Barriers to Learning: None  Education Outcome: Verbalized understanding    TIME IN/OUT:  Time In: 1304  Time Out: Jonathan  Minutes: 1440 Lissette Anders PT

## 2023-02-08 NOTE — CARE COORDINATION
MSN CM:  patient had paracentesis yesterday with 6150 removed. Patient's creatinine is at 4.0; nephrology following. Patient is current with [facility] for wound care and is agreeable to home palliative care. Case Management will continue to follow.

## 2023-02-09 LAB
ANION GAP SERPL CALC-SCNC: 11 MMOL/L (ref 2–11)
BASOPHILS # BLD: 0 K/UL (ref 0–0.2)
BASOPHILS NFR BLD: 1 % (ref 0–2)
BUN SERPL-MCNC: 45 MG/DL (ref 6–23)
CALCIUM SERPL-MCNC: 8.2 MG/DL (ref 8.3–10.4)
CHLORIDE SERPL-SCNC: 109 MMOL/L (ref 101–110)
CO2 SERPL-SCNC: 19 MMOL/L (ref 21–32)
CREAT SERPL-MCNC: 4.1 MG/DL (ref 0.8–1.5)
DIFFERENTIAL METHOD BLD: ABNORMAL
EOSINOPHIL # BLD: 0.2 K/UL (ref 0–0.8)
EOSINOPHIL NFR BLD: 2 % (ref 0.5–7.8)
ERYTHROCYTE [DISTWIDTH] IN BLOOD BY AUTOMATED COUNT: 14.7 % (ref 11.9–14.6)
GLUCOSE BLD STRIP.AUTO-MCNC: 151 MG/DL (ref 65–100)
GLUCOSE BLD STRIP.AUTO-MCNC: 155 MG/DL (ref 65–100)
GLUCOSE BLD STRIP.AUTO-MCNC: 187 MG/DL (ref 65–100)
GLUCOSE BLD STRIP.AUTO-MCNC: 68 MG/DL (ref 65–100)
GLUCOSE BLD STRIP.AUTO-MCNC: 95 MG/DL (ref 65–100)
GLUCOSE SERPL-MCNC: 60 MG/DL (ref 65–100)
HCT VFR BLD AUTO: 22.4 % (ref 41.1–50.3)
HGB BLD-MCNC: 7.6 G/DL (ref 13.6–17.2)
IMM GRANULOCYTES # BLD AUTO: 0 K/UL (ref 0–0.5)
IMM GRANULOCYTES NFR BLD AUTO: 0 % (ref 0–5)
LYMPHOCYTES # BLD: 2.6 K/UL (ref 0.5–4.6)
LYMPHOCYTES NFR BLD: 32 % (ref 13–44)
MCH RBC QN AUTO: 26.8 PG (ref 26.1–32.9)
MCHC RBC AUTO-ENTMCNC: 33.9 G/DL (ref 31.4–35)
MCV RBC AUTO: 78.9 FL (ref 82–102)
MONOCYTES # BLD: 0.9 K/UL (ref 0.1–1.3)
MONOCYTES NFR BLD: 11 % (ref 4–12)
NEUTS SEG # BLD: 4.5 K/UL (ref 1.7–8.2)
NEUTS SEG NFR BLD: 54 % (ref 43–78)
NRBC # BLD: 0 K/UL (ref 0–0.2)
PHOSPHATE SERPL-MCNC: 6.1 MG/DL (ref 2.5–4.5)
PLATELET # BLD AUTO: 248 K/UL (ref 150–450)
PMV BLD AUTO: 11.9 FL (ref 9.4–12.3)
POTASSIUM SERPL-SCNC: 4.7 MMOL/L (ref 3.5–5.1)
RBC # BLD AUTO: 2.84 M/UL (ref 4.23–5.6)
SERVICE CMNT-IMP: ABNORMAL
SERVICE CMNT-IMP: NORMAL
SERVICE CMNT-IMP: NORMAL
SODIUM SERPL-SCNC: 139 MMOL/L (ref 133–143)
WBC # BLD AUTO: 8.3 K/UL (ref 4.3–11.1)

## 2023-02-09 PROCEDURE — 6360000002 HC RX W HCPCS: Performed by: INTERNAL MEDICINE

## 2023-02-09 PROCEDURE — 80048 BASIC METABOLIC PNL TOTAL CA: CPT

## 2023-02-09 PROCEDURE — 6370000000 HC RX 637 (ALT 250 FOR IP): Performed by: INTERNAL MEDICINE

## 2023-02-09 PROCEDURE — 2580000003 HC RX 258: Performed by: INTERNAL MEDICINE

## 2023-02-09 PROCEDURE — 82962 GLUCOSE BLOOD TEST: CPT

## 2023-02-09 PROCEDURE — 97165 OT EVAL LOW COMPLEX 30 MIN: CPT

## 2023-02-09 PROCEDURE — 6370000000 HC RX 637 (ALT 250 FOR IP): Performed by: STUDENT IN AN ORGANIZED HEALTH CARE EDUCATION/TRAINING PROGRAM

## 2023-02-09 PROCEDURE — 36415 COLL VENOUS BLD VENIPUNCTURE: CPT

## 2023-02-09 PROCEDURE — 97535 SELF CARE MNGMENT TRAINING: CPT

## 2023-02-09 PROCEDURE — 84100 ASSAY OF PHOSPHORUS: CPT

## 2023-02-09 PROCEDURE — 85025 COMPLETE CBC W/AUTO DIFF WBC: CPT

## 2023-02-09 PROCEDURE — 1100000000 HC RM PRIVATE

## 2023-02-09 RX ADMIN — SODIUM CHLORIDE, PRESERVATIVE FREE 10 ML: 5 INJECTION INTRAVENOUS at 08:26

## 2023-02-09 RX ADMIN — FUROSEMIDE 40 MG: 10 INJECTION, SOLUTION INTRAMUSCULAR; INTRAVENOUS at 16:24

## 2023-02-09 RX ADMIN — HYDROCODONE BITARTRATE AND ACETAMINOPHEN 1 TABLET: 7.5; 325 TABLET ORAL at 21:03

## 2023-02-09 RX ADMIN — HEPARIN SODIUM 5000 UNITS: 5000 INJECTION INTRAVENOUS; SUBCUTANEOUS at 14:50

## 2023-02-09 RX ADMIN — CARVEDILOL 25 MG: 25 TABLET, FILM COATED ORAL at 16:24

## 2023-02-09 RX ADMIN — CARVEDILOL 25 MG: 25 TABLET, FILM COATED ORAL at 08:24

## 2023-02-09 RX ADMIN — HEPARIN SODIUM 5000 UNITS: 5000 INJECTION INTRAVENOUS; SUBCUTANEOUS at 06:09

## 2023-02-09 RX ADMIN — PANTOPRAZOLE SODIUM 40 MG: 40 TABLET, DELAYED RELEASE ORAL at 06:09

## 2023-02-09 RX ADMIN — PANTOPRAZOLE SODIUM 40 MG: 40 TABLET, DELAYED RELEASE ORAL at 16:25

## 2023-02-09 RX ADMIN — HEPARIN SODIUM 5000 UNITS: 5000 INJECTION INTRAVENOUS; SUBCUTANEOUS at 21:03

## 2023-02-09 RX ADMIN — SODIUM CHLORIDE, PRESERVATIVE FREE 10 ML: 5 INJECTION INTRAVENOUS at 21:12

## 2023-02-09 RX ADMIN — FUROSEMIDE 40 MG: 10 INJECTION, SOLUTION INTRAMUSCULAR; INTRAVENOUS at 08:24

## 2023-02-09 RX ADMIN — SODIUM BICARBONATE 650 MG: 650 TABLET ORAL at 21:03

## 2023-02-09 RX ADMIN — SODIUM BICARBONATE 650 MG: 650 TABLET ORAL at 08:24

## 2023-02-09 RX ADMIN — SODIUM BICARBONATE 650 MG: 650 TABLET ORAL at 16:24

## 2023-02-09 RX ADMIN — SODIUM BICARBONATE 650 MG: 650 TABLET ORAL at 11:55

## 2023-02-09 ASSESSMENT — PAIN SCALES - GENERAL
PAINLEVEL_OUTOF10: 0
PAINLEVEL_OUTOF10: 0
PAINLEVEL_OUTOF10: 5
PAINLEVEL_OUTOF10: 0
PAINLEVEL_OUTOF10: 0

## 2023-02-09 ASSESSMENT — PAIN DESCRIPTION - LOCATION: LOCATION: LEG

## 2023-02-09 ASSESSMENT — PAIN DESCRIPTION - ORIENTATION: ORIENTATION: LEFT

## 2023-02-09 NOTE — PROGRESS NOTES
Nohelia Abad  Admission Date: 2/6/2023         [facility] Progress Note: 2/9/2023    Follow-up for: MARS/CKD 3b    The patient's chart is reviewed and the patient is discussed with the staff.     Subjective:   Pt seen and examined in room, abdominal distention better s/p paracentesis, still with significant LE edema, good uop    ROS:  Gen - no fever, no chills, appetite unchanged  CV - no chest pain, no palpitation  Lung - no shortness of breath, no cough  Abd - no tenderness, no nausea/vomiting, no diarrhea  Ext - + edema    Current Facility-Administered Medications   Medication Dose Route Frequency    sodium bicarbonate tablet 650 mg  650 mg Oral 4x Daily    carvedilol (COREG) tablet 25 mg  25 mg Oral BID WC    insulin lispro (HUMALOG) injection vial 0-4 Units  0-4 Units SubCUTAneous TID WC    insulin lispro (HUMALOG) injection vial 0-4 Units  0-4 Units SubCUTAneous Nightly    glucose chewable tablet 16 g  4 tablet Oral PRN    dextrose bolus 10% 125 mL  125 mL IntraVENous PRN    Or    dextrose bolus 10% 250 mL  250 mL IntraVENous PRN    glucagon (rDNA) injection 1 mg  1 mg SubCUTAneous PRN    dextrose 10 % infusion   IntraVENous Continuous PRN    HYDROcodone-acetaminophen (NORCO) 7.5-325 MG per tablet 1 tablet  1 tablet Oral Q6H PRN    hydrALAZINE (APRESOLINE) injection 5 mg  5 mg IntraVENous Q6H PRN    pantoprazole (PROTONIX) tablet 40 mg  40 mg Oral BID AC    [Held by provider] insulin glargine (LANTUS) injection vial 10 Units  10 Units SubCUTAneous Nightly    sodium chloride flush 0.9 % injection 5-40 mL  5-40 mL IntraVENous 2 times per day    sodium chloride flush 0.9 % injection 5-40 mL  5-40 mL IntraVENous PRN    0.9 % sodium chloride infusion   IntraVENous PRN    potassium chloride (KLOR-CON M) extended release tablet 40 mEq  40 mEq Oral PRN    Or    potassium bicarb-citric acid (EFFER-K) effervescent tablet 40 mEq  40 mEq Oral PRN    Or    potassium chloride 10 mEq/100 mL IVPB (Peripheral Line)  10 mEq IntraVENous PRN    potassium chloride 10 mEq/100 mL IVPB (Peripheral Line)  10 mEq IntraVENous PRN    magnesium sulfate 2000 mg in 50 mL IVPB premix  2,000 mg IntraVENous PRN    ondansetron (ZOFRAN-ODT) disintegrating tablet 4 mg  4 mg Oral Q8H PRN    Or    ondansetron (ZOFRAN) injection 4 mg  4 mg IntraVENous Q6H PRN    polyethylene glycol (GLYCOLAX) packet 17 g  17 g Oral Daily PRN    bisacodyl (DULCOLAX) suppository 10 mg  10 mg Rectal Daily PRN    famotidine (PEPCID) tablet 10 mg  10 mg Oral Daily PRN    aluminum & magnesium hydroxide-simethicone (MAALOX) 200-200-20 MG/5ML suspension 30 mL  30 mL Oral Q6H PRN    acetaminophen (TYLENOL) tablet 650 mg  650 mg Oral Q6H PRN    Or    acetaminophen (TYLENOL) suppository 650 mg  650 mg Rectal Q6H PRN    furosemide (LASIX) injection 40 mg  40 mg IntraVENous BID    hydrALAZINE (APRESOLINE) tablet 25 mg  25 mg Oral Q8H PRN    heparin (porcine) injection 5,000 Units  5,000 Units SubCUTAneous 3 times per day         Objective:     Vitals:    02/08/23 2245 02/08/23 2309 02/09/23 0352 02/09/23 0714   BP: (!) 141/95  134/89 (!) 140/90   Pulse: 77  76 78   Resp: 18 18 18 18   Temp: 98.6 °F (37 °C)  97.7 °F (36.5 °C) 98.6 °F (37 °C)   TempSrc:    Oral   SpO2: 100%  98% 99%   Weight:       Height:         Intake and Output:   02/07 1901 - 02/09 0700  In: -   Out: 1975 [MDNVM:2341]  02/09 0701 - 02/09 1900  In: 236 [P.O.:236]  Out: 200 [Urine:200]    Physical Exam:   Constitutional:  the patient is well developed and in no acute distress, alert and following commands  HEENT:  Sclera clear, pupils equal, oral mucosa moist  Lungs: clear bilaterally   Cardiovascular:  Regular rate, S1, S2, no Rub   Abd/GI: soft distention, and non-tender; with positive bowel sounds. Ext: warm without cyanosis. There is 3+ lower leg edema/anasarca. Skin:  no jaundice or rashes  Neuro: no gross neuro deficits   Psychiatric: Calm.        LAB  Recent Labs 02/06/23  1524 02/07/23  0314 02/08/23  0428 02/09/23 0452   WBC 6.8 8.3 7.8 8.3   HGB 8.4* 7.8* 7.6* 7.6*   HCT 25.9* 23.6* 23.1* 22.4*    200 188 248   INR 1.3  --   --   --      Recent Labs     02/07/23  0314 02/08/23 0428 02/09/23 0452    138 139   K 4.5 4.7 4.7   * 112* 109   CO2 16* 18* 19*   BUN 44* 45* 45*   CREATININE 4.10* 4.00* 4.10*   PHOS  --  6.2* 6.1*     No results for input(s): PH, PCO2, PO2, HCO3 in the last 72 hours. Assessment/Plan:  (Medical Decision Making)   MARS/CKD 3b- ? HRS, vs ATN Pedrito Villalba will not be accelerate while on diuretic   -Baseline Cr 1.5-1.8  -renal US no evidence of obstructive nephropathy, UA RBC 10-20, P/C ratio 0.2- further work up pending clinical course   -no indication for acute RRT at this time   -renal function holding, non oliguric, tolerating diuretic      2. Hypoalbuminemia/anasarca- 1.4, s/p albumin IV, lasix   Recheck albumin      3. Cirrhosis with ascites, s/p paracentesis 2/8 with 6150 cc removed     4. Hypertension stable on BB     5. Acute metabolic acidosis- continue PO NaHCO3      6. DM type II- SSI per hosp      7.  Anemia- Fe studies ok      Leslie Avery APRN - [id number] [place]

## 2023-02-09 NOTE — ADT AUTH CERT
CONCURRENT REVIEW 23-23    Patient Demographics    Name Patient ID SSN Gender Identity Birth Date   Evonne Juarez [id number]  Male [birth year] (57 yrs)     Address Phone Email Employer    [address] [facility] [address]  [phone] (S)   [phone] (M) -- [address] Race Occupation Emp Status    Luis Began / Case Jumper -- Disabled      Reg Status PCP Date Last Verified Next Review Date    Verified [place], [place]  [phone] 23      Admission Date Discharge Date Admitting Provider     23 -- Eduard Holguin MD       Marital Status Islam       None        Emergency Contact 1   Jannette Hammer (2) [phone] Shukri Lesley)     [facility] [de-identified]  CVG Subscriber Name/Sex/Relation Subscriber  Subscriber Address/Phone Subscriber Emp/Emp Phone   1.  [employer]   [id number] [facility] - Male   (Self) 1965 [address] [facility],  [facility]   [phone](E) DISABLED      Utilization Reviews       Liver Disease Complications - Care Day 4 (2023) by Tanna Pompa RN       Review Entered Review Status   2023 1336 Completed      Criteria Review      Care Day: 4 Care Date: 2023 Level of Care: Inpatient Floor    Guideline Day 2    Level Of Care    (X) Floor    2023 1:36 PM EST by Romeo Latif      medical bed    Clinical Status    (X) * Hypoxemia absent    2023 1:36 PM EST by Romeo Latif      on RA    (X) * Hemodynamic stability    2023 1:36 PM EST by Romeo Latif      VS: T: 98.6, B/P: 140/90, HR 78, RR 18, SPO2 99 RA    (X) * Ascites absent, stable, or improved    2023 1:36 PM EST by Romeo Latif      abdominal distention better s/p paracentesis, still with significant LE edema, good uop    (X) * Mental status at baseline or improved    2023 1:36 PM EST by Romeo Both      oriented x4    Activity    (X) Activity as tolerated    2023 1:36 PM EST by Romeo Latif      up with assist prn    Routes    ( ) * Clear liquid diet    2/9/2023 1:36 PM EST by Romeo Latif      ADULT DIET; Regular; 4 carb choices (60 gm/meal); Low Sodium (2 gm)    Interventions    (X) WBC    2/9/2023 1:36 PM EST by Romeo Latif      wbc 8.3    Medications    (X) Possible diuretics and antibiotics    2/9/2023 1:36 PM EST by Romeo Latif      lasix 40mg iv 2x/day    2/9/2023 1:36 PM EST by Romeo Latif    Subject: Additional Clinical Information    abnormal labs: h/h 7.6/22.4,phos 6.1,CO2 19,gluc 60,BUN 45,creat 4.10. eGFR 16,ca 8.2       2/9/2023 1:36 PM EST by Romeo Latif    Subject: Additional Clinical Information    Other meds      coreg 25mg po 2x/day      heparin 5000units sc 3x/day      protonix 40mg po 2x/day      NaHCO3 650mg po 4x/day                  Definitions for Care Day 4    Hypoxemia absent    (X) Hypoxemia absent, as indicated by  1 or more  of the following  (1):       (X) Arterial oxygen saturation (SaO2) of 90% or greater or arterial partial pressure of oxygen       (PO2) of 60 mm Hg (8.0 kPa) or greater on room air [A]       Hemodynamic stability    (X) Hemodynamic stability, as indicated by  1 or more  of the following :       (X) Patient hemodynamically stable, as indicated by  ALL  of the following  (1) (2) (3) (4) (5):          (X) Tachycardia absent          (X) Hypotension absent          (X) No evidence of inadequate perfusion (eg, no myocardial ischemia)          (X) No other hemodynamic abnormalities (eg, no Orthostatic hypotension)       Tachycardia absent    (X) Tachycardia absent, as indicated by  1 or more  of the following  (1) (2):       (X) Heart rate less than or equal to 100 beats per minute in adult or child 6 years or older       Hypotension absent    (X) Hypotension absent, as indicated by  1 or more  of the following  (1) (2) (3) (4):       (X) SBP greater than or equal to 90 mm Hg and without recent decrease greater than 40 mm Hg from       baseline in adult or child 10 years or older       * Milestone   Additional Notes   2/9/2023   NEPHROLOGY   Subjective:   Pt seen and examined in room, abdominal distention better s/p paracentesis, still with significant LE edema, good uop      Physical Exam:    Constitutional:  the patient is well developed and in no acute distress, alert and following commands   HEENT:  Sclera clear, pupils equal, oral mucosa moist   Lungs: clear bilaterally    Cardiovascular:  Regular rate, S1, S2, no Rub    Abd/GI: soft distention, and non-tender; with positive bowel sounds. Ext: warm without cyanosis. There is 3+ lower leg edema/anasarca. Skin:  no jaundice or rashes   Neuro: no gross neuro deficits    Psychiatric: Calm. Assessment/Plan:  (Medical Decision Making)   MARS/CKD 3b- ? HRS, vs ATN Lenda Horns will not be accelerate while on diuretic    -Baseline Cr 1.5-1.8   -renal US no evidence of obstructive nephropathy, UA RBC 10-20, P/C ratio 0.2- further work up pending clinical course    -no indication for acute RRT at this time    -renal function holding, non oliguric, tolerating diuretic        2. Hypoalbuminemia/anasarca- 1.4, s/p albumin IV, lasix    Recheck albumin        3. Cirrhosis with ascites, s/p paracentesis 2/8 with 6150 cc removed       4. Hypertension stable on BB       5. Acute metabolic acidosis- continue PO NaHCO3        6. DM type II- SSI per hosp        7.  Anemia- Fe studies ok               Liver Disease Complications - Care Day 3 (2/8/2023) by Liliana Boyce RN       Review Entered Review Status   2/9/2023 1325 Completed      Criteria Review      Care Day: 3 Care Date: 2/8/2023 Level of Care: Inpatient Floor    Guideline Day 2    Level Of Care    (X) Floor    2/9/2023 1:25 PM EST by Allyson Kidney      medical bed    Clinical Status    (X) * Hypoxemia absent    2/9/2023 1:25 PM EST by Allyson Kidney      on RA    (X) * Hemodynamic stability    2/9/2023 1:25 PM EST by Devorah Figueroa      VS: T: 97.5,B/P: 134/90, HR 72, RR 18, SPO2 96 RA    (X) * Ascites absent, stable, or improved    2/9/2023 1:25 PM EST by Devorah Figueroa      Fluid status improved  -S/P Paracentesis today    (X) * Mental status at baseline or improved    2/9/2023 1:25 PM EST by Devorah Figueroa      oriented x4    Activity    (X) Activity as tolerated    2/9/2023 1:25 PM EST by Devorah Figueroa      up with assist prn    Routes    ( ) * Clear liquid diet    2/9/2023 1:25 PM EST by Devorah Figueroa      ADULT DIET; Regular; 4 carb choices (60 gm/meal); Low Sodium (2 gm)    (X) IV fluids and medication    2/9/2023 1:25 PM EST by Devorah Figueroa      albumin human 25% 220ml/hr    Interventions    (X) WBC    2/9/2023 1:25 PM EST by Devorah Figueroa      wbc 7.8    Medications    (X) Possible diuretics and antibiotics    2/9/2023 1:25 PM EST by Devorah Figueroa      lasix 40mg iv 2x/day    2/9/2023 1:25 PM EST by Devorah Figueroa    Subject: Additional Clinical Information    abnormal labs: h/h 7.6/23.1,phos 6.2,cl 112,CO2 18,gluc 102,BUN 45,creat 4.00,eGFR 17      POC gluc 60,780,324,494              IR US giuded paracentesis: Uncomplicated ultrasound guided paracentesis.        A total of 6150 cc of yellow fluid was removed       2/9/2023 1:25 PM EST by Devorah Figueroa    Subject: Additional Clinical Information    Other meds      coreg 25mg po 2x/day,      heparin 5000units sc 3x/day      protonix 40mg po 2x/day      NaHCO3 650mg po 4x/day      norco 7.5-325mg po q6hrs prn x2      lidocaine 1% injections 10ml          Definitions for Care Day 3    Hypoxemia absent    (X) Hypoxemia absent, as indicated by  1 or more  of the following  (1):       (X) Arterial oxygen saturation (SaO2) of 90% or greater or arterial partial pressure of oxygen       (PO2) of 60 mm Hg (8.0 kPa) or greater on room air [A]       Hemodynamic stability    (X) Hemodynamic stability, as indicated by  1 or more  of the following :       (X) Patient hemodynamically stable, as indicated by  ALL  of the following  (1) (2) (3) (4) (5):          (X) Tachycardia absent          (X) Hypotension absent          (X) No evidence of inadequate perfusion (eg, no myocardial ischemia)          (X) No other hemodynamic abnormalities (eg, no Orthostatic hypotension)       Tachycardia absent    (X) Tachycardia absent, as indicated by  1 or more  of the following  (1) (2):       (X) Heart rate less than or equal to 100 beats per minute in adult or child 6 years or older       Hypotension absent    (X) Hypotension absent, as indicated by  1 or more  of the following  (1) (2) (3) (4):       (X) SBP greater than or equal to 90 mm Hg and without recent decrease greater than 40 mm Hg from       baseline in adult or child 10 years or older       * Milestone   Additional Notes   2023   US abd   1) Probable increased renal echogenicity, nonspecific, without hydronephrosis. 2) Coarse echogenic lobular liver consistent with cirrhosis. 3) Cholelithiasis without biliary tree obstruction. US scrotum and testicles   Negative for testicular torsion. Small bilateral minimally   complicated hydroceles. Small epididymal cysts on the right. Scrotal thickening   on the left. UROLOGY   HPI    65 y.o., male referred by Dr. Kimberlyn Leary admitted for anasarca due to liver cirrhosis and MARS. Cr remains 4. Nephrology following. Abd ultrasound today shows MRD with no hydro. Pt reports a 3-4 day history of scrotal and penile swelling. Voiding well without complaint. This swelling has improved with diuresis. MAGDI today shows small bilateral hydroceles with small R epididymal cysts. Assessment/Plan   Scrotal and penile swelling due to anasarca. This should improved with continued diuresis. Encouraged scrotal elevation. Call for questions/concerns.       PT   ASSESSMENT:   REHAB RECOMMENDATIONS:   Recommendation to date pending progress:   [address] [place] [address] Equipment:     None       ASSESSMENT:   Mr. Mirian Oliva presents with decreased bed mobility, transfers, ambulation, balance, activity tolerance and overall general functional mobility s/p hospital admission on 2/6/23 with B LE and scrotal edema. Pt A & O x 4, reports lives with spouse who is CG, has assist ADLs, ambulates with RW at home, states no falls. Pt today feeling better since paracentesis this am. Pt with CGA to EOB, good static sitting balance, sit to stand with elevated bed and SBA/CGA. Pt ambulated with RW for 250', slow steady jason, slight \"waddle\" due to edema. Pt ambulated on RA, stats >90% with activity. Pt overall most likely close to baseline for mobility, PT to follow for acute care needs to address deficits noted above. INTERNAL MED   Subjective & 24hr Events (02/08/23): Patient endorses continued scrotal pain that was exacerbated with attempt at balderas placement. Denies fever, chills, N/V       General:          Well nourished. Head:               Normocephalic, atraumatic   Eyes:               Sclerae appear normal.  Pupils equally round. ENT:                Nares appear normal.  Moist oral mucosa   Neck:               No restricted ROM. Trachea midline    CV:                  RRR. No m/r/g. No jugular venous distension. Lungs:             CTAB. No wheezing, rhonchi, or rales. Symmetric expansion. Abdomen:        Soft, nontender, nondistended. Extremities:     No cyanosis or clubbing. No edema   Skin:                No rashes and normal coloration. Warm and dry. Neuro:             CN II-XII grossly intact. Psych:             Normal mood and affect. Assessment & Plan:   Principal Problem:   Anasarca   Liver cirrhosis with ascites   -Follows at [facility] with Dr.Maria Rhoda Reilly   - Lasix 40mg BID IV   -Albumin completed. Can likely change to oral  lasix tomorrow.  Fluid status improved   -S/P Paracentesis today        MARS on CKD stage III   2/8/23   -Baseline creatinine of 1.5-1.8 currently creatinine is around 4   -Normal Urine lytes   -Ultrasound completed with no hydronephrosis seen, lobular liver w/ non obstructive cholelithiasis    -Ordered Hector but unable to place it   -Monitor I's and O's   -Continue bicarb   -Nephro following; results appreciated     -UOP with 1,850 in last 24 hours   -Therapy with recs for [place]       Hypoalbuminemia   -Albumin 1.4   -Has received albumin       LLE wound   -Wound consult       Scrotal Hydrocele   -found on US   -Urology consult pending    -Patient afebrile with normal white count. No indication for abx        Anemia    -likely due to chronic disease from liver cirrhosis and CKD   -Hb 7.6 with baseline 8-10   - monitor Hb   -Iron studies normal       Hypertension   -Continue coreg, lasix; adjust as needed       Hypoglycemia(Resolved)   -Monitor        Type 2 diabetes   A1c of 5   -Continue with insulin sliding               Anticipated discharge needs:       Dispo pending        Diet:  ADULT DIET; Regular; 4 carb choices (60 gm/meal); Low Sodium (2 gm)   DVT PPx: Heparin    Code status: Full Code         NEPHROLOGY   HPI: Pt seen and examined.  No new complaints      Plan:    ETOHic cirrhosis   MARS on CKD stage 3b HRS vs ATN renal fxn stabilized on current regimen   Anasarca   Continue current regimen              Liver Disease Complications - Care Day 2 (2/7/2023) by Danny Michelle RN       Review Entered Review Status   2/9/2023 1312 Completed      Criteria Review      Care Day: 2 Care Date: 2/7/2023 Level of Care: Inpatient Floor    Guideline Day 2    Level Of Care    (X) Floor    2/9/2023 1:12 PM EST by Samanta Kolb      medical bed    Clinical Status    (X) * Hypoxemia absent    2/9/2023 1:12 PM EST by Samanta Kolb      on RA    (X) * Hemodynamic stability    2/9/2023 1:12 PM EST by Samanta Kolb      VS: T: 98, B/P: 161/100, HR 81, RR 20, SPO2 98 RA    ( ) * Ascites absent, stable, or improved    2/9/2023 1:12 PM EST by Bindu Treviño      firm, nontender, distended  He is unable to walk due to abdominal distention and bilateral leg swelling    (X) * Mental status at baseline or improved    2/9/2023 1:12 PM EST by Bindu Treviño      oriented x4    Activity    (X) Activity as tolerated    2/9/2023 1:12 PM EST by Bindu Treviño      up with assist prn    Routes    ( ) * Clear liquid diet    2/9/2023 1:12 PM EST by Bindu Treviño      ADULT DIET; Regular; 4 carb choices (60 gm/meal);  Low Sodium (2 gm)    Interventions    (X) WBC    2/9/2023 1:12 PM EST by Bindu Treviño      wbc 8.3    Medications    (X) Possible diuretics and antibiotics    2/9/2023 1:12 PM EST by Bindu Treviño      lasix 40mg iv 2x/day    2/9/2023 1:12 PM EST by Bindu Treviño    Subject: Additional Clinical Information    Other abnormal labs: h/h 7.8/23.6, cl 116,CO2 16,BUN 44, creat 4.10,eGFR 16,ca 8.0      urine: blood trace,rbc 10-20      POC gluc 54,108,109       2/9/2023 1:12 PM EST by Bindu Treviño    Subject: Additional Clinical Information    Other meds      norco 7.5-325mg po q 6hrs prn x1      hydralazine 25mg po q 8hrs prn x2      NaHCO3 650mg po      NaHCO3 650mg po 4x/day      protonix 40mg po 2x/day      roxicodone 5mg po      heparin 5000units sc 3x/day      coreg 25mg po          Definitions for Care Day 2    Hypoxemia absent    (X) Hypoxemia absent, as indicated by  1 or more  of the following  (1):       (X) Arterial oxygen saturation (SaO2) of 90% or greater or arterial partial pressure of oxygen       (PO2) of 60 mm Hg (8.0 kPa) or greater on room air [A]       Hemodynamic stability    (X) Hemodynamic stability, as indicated by  1 or more  of the following :       (X) Patient hemodynamically stable, as indicated by  ALL  of the following  (1) (2) (3) (4) (5):          (X) Tachycardia absent          (X) Hypotension absent          (X) No evidence of inadequate perfusion (eg, no myocardial ischemia) (X) No other hemodynamic abnormalities (eg, no Orthostatic hypotension)       Tachycardia absent    (X) Tachycardia absent, as indicated by  1 or more  of the following  (1) (2):       (X) Heart rate less than or equal to 100 beats per minute in adult or child 6 years or older       Hypotension absent    (X) Hypotension absent, as indicated by  1 or more  of the following  (1) (2) (3) (4):       (X) SBP greater than or equal to 90 mm Hg and without recent decrease greater than 40 mm Hg from       baseline in adult or child 10 years or older       * Milestone   Additional Notes   2/7/2023      Occult blood fecal +      INTERNAL MED   Subjective & 24hr Events (02/07/23): Patient was seen and examined at the bedside. Patient is complaining of pain near the scrotum. He is unable to walk due to abdominal distention and bilateral leg swelling. He stated he was taking ibuprofen for pain. He is c/o sob with exertion. General:          Well nourished. Head:               Normocephalic, atraumatic   Eyes:               Sclerae appear normal.  Pupils equally round. ENT:                Nares appear normal.  Moist oral mucosa   Neck:               No restricted ROM. Trachea midline    CV:                  RRR. No m/r/g. No jugular venous distension. Lungs:             CTAB. No wheezing, rhonchi, or rales. Symmetric expansion. Abdomen:        firm, nontender, distended. Extremities:     No cyanosis or clubbing. 3+ edema and scrotal swelling   Skin:                No rashes and normal coloration. Warm and dry. Neuro:             CN II-XII grossly intact. Psych:             Normal mood and affect. Assessment & Plan:   Anasarca   Liver cirrhosis with ascites   AAOx3 without signs of hepatic encephalopathy. Elevated ALP.    Follows at [facility] with [address] consulted for therapeutic paracentesis   - Lasix 40mg BID IV with albumin q6h   - MARS on CKD stage III   Baseline creatinine of 1.5-1.8 currently creatinine is around 4   Follow-up with urine lites   Follow-up with ultrasound   Ordered Hector but unable to place it   Monitor I's and O's   Ordered bicarb   nephro consulted        Hypoalbuminemia   Elevated 4.4   Continue albumin       Scrotal swelling   Follow-up with ultrasound of the scrotum       Folic acid level of 29   Discontinued folic acid        Anemia likely due to chronic disease from liver cirrhosis and CKD   Hb of 8.4 with baseline 10-8.   - monitor Hb   Iron studies normal       Hypertension: Increased coreg. Hypoglycemia   Blood glucose of 54   Discontinued Lantus   Repeat blood glucose >100       Type 2 diabetes   A1c of 5   -Continue with insulin sliding         Diet: ADULT DIET; Regular; 4 carb choices (60 gm/meal); Low Sodium (2 gm)   VTE ppx: lovenox   Code status: FULL       NEPHROLOGY   History of Present Illness: Mr. Sheila Joyce is a 65 y.o male with PMH significant for HLD, HTN, DM type II, liver cirrhosis with ascites, PUD and CKD 3b reportedly admitted with complaints of worsening LE edema and abdominal distention. Last paracentesis noted 1/10 with 6.5 L removed. We are consulted for MARS/CKD 3b. From a renal standpoint his Cr/BUN on admission was 4.10/44, CO2 16, baseline Cr 1.5-1.8. no noted hypotension since admission, Albumin 1.4 s/p albumin gtts, lasix in ED. Home meds significant for PPI, lasix. Pt seen and examined in room he reports lives with his wife has had LE edema issues since September 2022, worsening over the last few weeks, reports has exertional SOB and poor appetite when abd distended, reports stopped ETOH intake since September 2022, no flank pain, CP, N/V/D, fever/chills, np syncope or recent falls. Reports has been taking 2-3 Aleve daily over the last several days.          Impression:       Plan:   MARS/CKD 3b- ? Fred Lav will not be accelerate while on diuretic    -Baseline Cr 1.5-1.8 -obtain renal US, UA, P/C ratio- further work up pending clinical course    -no indication for acute RRT at this time        2. Hypoalbuminemia/anasarca- 1.4, s/p albumin IV, lasix        3. Cirrhosis with ascites, paracentesis per primary        4. Hypertension stable on BB       5. Acute metabolic acidosis- add PO NaHCO3        6. DM type II- SSI per hosp        7.  Anemia- Fe studies ok

## 2023-02-09 NOTE — PROGRESS NOTES
ACUTE OCCUPATIONAL THERAPY GOALS:   (Developed with and agreed upon by patient and/or caregiver.)  1. Patient will complete lower body bathing and dressing with MINIMAL ASSIST and adaptive equipment as needed. 2. Patient will complete toileting with INDEPENDENCE. 3. Patient will complete grooming ADL standing at sink with INDEPENDENCE.  4. Patient will tolerate 25 minutes of OT treatment with 1-2 rest breaks to increase activity tolerance for ADLs. 5. Patient will complete functional transfers with MOD I and adaptive equipment as needed. 6. Patient will tolerate 10 minutes BUE exercises to increase strength for safe, functional transfers. Timeframe: 7 visits     OCCUPATIONAL THERAPY Initial Assessment and Daily Note       OT Visit Days: 1  Acknowledge Orders  Time  OT Charge Capture  Rehab Caseload Tracker      Dionicio Pendleton is a 65 y.o. male   PRIMARY DIAGNOSIS: Anasarca  Anasarca [R60.1]  Scrotal edema [N50.89]  Bilateral leg edema [R60.0]  Acute renal failure, unspecified acute renal failure type ([facility] [address] [N17.9]       Reason for Referral: Generalized Muscle Weakness (M62.81)  Difficulty in walking, Not elsewhere classified (R26.2)  Other abnormalities of gait and mobility (R26.89)  Inpatient: Payor: Jose Hoffman / Plan: Paula Elliott / Product Type: *No Product type* /     ASSESSMENT:     REHAB RECOMMENDATIONS:   Recommendation to date pending progress:  Setting:  No further skilled therapy after discharge from hospital    Equipment:    None--pt has RW and sponge bathes at home     ASSESSMENT:  Mr. Jesus Otero is a 66 y/o male presents with BLE and scrotal edema and found to have acute renal failure. At baseline pt lives with his wife, requires min A for LE dressing otherwise mod I-independent ADLs and uses RW for ambulation. Today pt presents with decreased activity tolerance, balance and mobility impacting ADLs.  Pt overall CGA RW for functional transfers and mobility of household/community distances in hallway. Pt min A for LE dressing sitting/standing from chair and SBA for grooming ADLs standing at sink. Pt with forward leaning posture on RW and fair dynamic balance. Pt is currently functioning below baseline and would benefit from skilled OT services to address OT goals and plan of care. Likely no needs at d/c.       [address] [facility] [address] [id number] AM-PAC 6 Clicks Daily Activity Inpatient Short Form:    AM-PAC Daily Activity - Inpatient   How much help is needed for putting on and taking off regular lower body clothing?: A Little  How much help is needed for bathing (which includes washing, rinsing, drying)?: A Little  How much help is needed for toileting (which includes using toilet, bedpan, or urinal)?: A Little  How much help is needed for putting on and taking off regular upper body clothing?: None  How much help is needed for taking care of personal grooming?: None  How much help for eating meals?: None  AM-[facility] Inpatient Daily Activity Raw Score: 21  AM-PAC Inpatient ADL T-Scale Score : 44.27  ADL Inpatient CMS 0-100% Score: 32.79  ADL Inpatient CMS G-Code Modifier : CJ           SUBJECTIVE:     Mr. Emma Morrow states, \"The swelling has gone down so I feel better\"     Social/Functional Lives With: Spouse  Type of Home: House  Home Layout: One level  Entrance Stairs - Number of Steps: 2  Bathroom Toilet: Bedside commode  Home Equipment: Gala Opal, rolling  Receives Help From: Family    OBJECTIVE:     Tellis Merlin / Zoya Bland / Jackeline Law: None    RESTRICTIONS/PRECAUTIONS:  Restrictions/Precautions: Fall Risk    PAIN: VITALS / O2:   Pre Treatment:   Pain Assessment: None - Denies Pain      Post Treatment: no c/o pain, resting comfortably in bedside chair       Vitals          Oxygen            GROSS EVALUATION: INTACT IMPAIRED   (See Comments)   UE AROM [x] []   UE PROM [x] []   Strength [x]       Posture / Balance [] Posture: Fair  Sitting - Static: Good  Sitting - Dynamic: Good, -  Standing - Static: Fair, +  Standing - Dynamic: Fair   Sensation [x]     Coordination [x]       Tone [x]       Edema [] Slight BLE edema, much better than admission per pt   Activity Tolerance [] Patient limited by fatigue, Patient Tolerated treatment well     Hand Dominance R [] L []      COGNITION/  PERCEPTION: INTACT IMPAIRED   (See Comments)   Orientation [x]     Vision [x]     Hearing [x]     Cognition  [x]     Perception [x]       MOBILITY: I Mod I S SBA CGA Min Mod Max Total  NT x2 Comments:   Bed Mobility    Rolling [] [] [] [] [] [] [] [] [] [x] []    Supine to Sit [] [] [] [] [] [x] [] [] [] [] []    Scooting [] [] [] [] [x] [] [] [] [] [] []    Sit to Supine [] [] [] [] [] [] [] [] [] [x] []    Transfers    Sit to Stand [] [] [] [x] [] [] [] [] [] [] [] RW   Bed to Chair [] [] [] [x] [x] [] [] [] [] [] [] RW   Stand to Sit [] [] [] [x] [] [] [] [] [] [] [] RW   Tub/Shower [] [] [] [] [] [] [] [] [] [x] []     Toilet [] [] [] [] [] [] [] [] [] [x] []      [] [] [] [] [] [] [] [] [] [x] []    I=Independent, Mod I=Modified Independent, S=Supervision/Setup, SBA=Standby Assistance, CGA=Contact Guard Assistance, Min=Minimal Assistance, Mod=Moderate Assistance, Max=Maximal Assistance, Total=Total Assistance, NT=Not Tested    ACTIVITIES OF DAILY LIVING: I Mod I S SBA CGA Min Mod Max Total NT Comments   BASIC ADLs:              Upper Body Bathing  [] [] [] [] [] [] [] [] [] [x]    Lower Body Bathing [] [] [] [] [] [] [] [] [] [x]    Toileting [] [] [] [x] [] [] [] [] [] [] For BM hygiene standing RW   Upper Body Dressing [] [] [] [] [] [] [] [] [] [x]    Lower Body Dressing [] [] [] [] [] [x] [] [] [] [] Donning/doffing pull up brief sitting/standing from chair RW   Feeding [] [] [] [] [] [] [] [] [] [x]    Grooming [] [] [] [x] [] [] [] [] [] [] Brushing teeth and washing face standing at sink [facility] [] [] [] [] [] [] [] [] [] [x]    Functional Mobility [] [] [] [x] [x] [] [] [] [] [] RW   I=Independent, Mod I=Modified Independent, S=Supervision/Setup, SBA=Standby Assistance, CGA=Contact Guard Assistance, Min=Minimal Assistance, Mod=Moderate Assistance, Max=Maximal Assistance, Total=Total Assistance, NT=Not Tested    PLAN:   [facility]: 3 times/week for duration of hospital stay or until stated goals are met, whichever comes first.    PROBLEM LIST:   (Skilled intervention is medically necessary to address:)  Decreased ADL/Functional Activities  Decreased Activity Tolerance  Decreased Balance  Decreased Transfer Abilities   INTERVENTIONS PLANNED:  (Benefits and precautions of occupational therapy have been discussed with the patient.)  Self Care Training  Therapeutic Activity  Therapeutic Exercise/HEP  Neuromuscular Re-education  Manual Therapy  Education         TREATMENT:     EVALUATION: LOW COMPLEXITY: (Untimed Charge)    TREATMENT:   Self Care (12 minutes): Patient participated in toileting, lower body dressing, and grooming ADLs in unsupported sitting and standing with minimal verbal and tactile cueing to increase independence, decrease assistance required, and increase activity tolerance. Patient also participated in functional mobility, functional transfer, and adaptive equipment training to increase independence, decrease assistance required, increase activity tolerance, and increase safety awareness.      TREATMENT GRID:  N/A    AFTER TREATMENT PRECAUTIONS: Call light within reach, Chair, Needs within reach, and RN notified    INTERDISCIPLINARY COLLABORATION:  RN/ PCT and OT/ HAYDEN    EDUCATION:  Education Given To: Patient  Education Provided: Role of Therapy;Plan of Care  Education Method: Verbal  Barriers to Learning: None  Education Outcome: Verbalized understanding    TOTAL TREATMENT DURATION AND TIME:  Time In: [address]  Time Out: [address] [place] [address]  Minutes: [address] [facility], OT

## 2023-02-09 NOTE — PROGRESS NOTES
Pt resting in bed awake. Alert and oriented times 3 at this time. On RA. No s/sx of distress noted. No complaints of pain. Encouraged to call for assistance as needed. Call light within reach. Will continue to monitor.

## 2023-02-09 NOTE — CONSULTS
[facility]  [place], [address] [facility]  [phone]    Geraldine Lott  : 1965     HPI   65 y.o., male referred by Dr. Sammy Perkins admitted for anasarca due to liver cirrhosis and MARS. Cr remains 4. Nephrology following. Abd ultrasound today shows MRD with no hydro. Pt reports a 3-4 day history of scrotal and penile swelling. Voiding well without complaint. This swelling has improved with diuresis. MAGDI today shows small bilateral hydroceles with small R epididymal cysts. Past Medical History:   Diagnosis Date    Alcoholic cirrhosis of liver ([facility] [address]      in the hopsital dx    Anemia     Gastroesophageal reflux disease with esophagitis and hemorrhage     HLD (hyperlipidemia)     Hyperplastic colon polyp     Hypertension     Obesity     PUD (peptic ulcer disease)     Stage 3 chronic kidney disease due to diabetes mellitus ([facility] [address]     Dx in hospital    Type 2 diabetes mellitus with diabetic polyneuropathy, with long-term current use of insulin (HCC)     Ulcer of the duodenum caused by bacteria (H. pylori)     Upper GI bleed 2016    Last Assessment & Plan:  Formatting of this note might be different from the original. Status post EGD yesterday which revealed esophagitis, gastric and duodenal ulcers Continue PPI, GI follow-up.  Monitor hemoglobin Avoid and states Biopsy results- pending     Past Surgical History:   Procedure Laterality Date    COLONOSCOPY      ESOPHAGOGASTRODUODENOSCOPY      UPPER GASTROINTESTINAL ENDOSCOPY N/A 2022    EGD ESOPHAGOGASTRODUODENOSCOPY/ Rm 215 performed by Raheem Balderas MD at [facility] ENDOSCOPY     Current Facility-Administered Medications   Medication Dose Route Frequency Provider Last Rate Last Admin    sodium bicarbonate tablet 650 mg  650 mg Oral 4x Daily Jessica Blount MD   650 mg at 23 1546    carvedilol (COREG) tablet 25 mg  25 mg Oral BID WC Jessica Blount MD   25 mg at 23 1546    insulin lispro (HUMALOG) injection vial 0-4 Units  0-4 Units SubCUTAneous TID  Gita Rich MD        insulin lispro (HUMALOG) injection vial 0-4 Units  0-4 Units SubCUTAneous Nightly Gita Rich MD        glucose chewable tablet 16 g  4 tablet Oral PRN Gita Rich MD        dextrose bolus 10% 125 mL  125 mL IntraVENous PRN Gita Rich MD        Or    dextrose bolus 10% 250 mL  250 mL IntraVENous PRN Gita Rich MD        glucagon (rDNA) injection 1 mg  1 mg SubCUTAneous PRN Gita Rich MD        dextrose 10 % infusion   IntraVENous Continuous PRN Gita Rich MD        HYDROcodone-acetaminophen (Nayeli Boucher) 7.5-325 MG per tablet 1 tablet  1 tablet Oral Q6H PRN Gita Rich MD   1 tablet at 02/08/23 1124    hydrALAZINE (APRESOLINE) injection 5 mg  5 mg IntraVENous Q6H PRN Gita Rich MD        pantoprazole (PROTONIX) tablet 40 mg  40 mg Oral BID AC Nicolas Isaac MD   40 mg at 02/08/23 1547    [Held by provider] insulin glargine (LANTUS) injection vial 10 Units  10 Units SubCUTAneous Nightly Nicolas Isaac MD   10 Units at 02/06/23 2148    sodium chloride flush 0.9 % injection 5-40 mL  5-40 mL IntraVENous 2 times per day Nicolas Isaac MD   10 mL at 02/08/23 1049    sodium chloride flush 0.9 % injection 5-40 mL  5-40 mL IntraVENous PRN Nicolas Isaac MD        0.9 % sodium chloride infusion   IntraVENous PRN Nicolas Isaac MD        potassium chloride (KLOR-CON M) extended release tablet 40 mEq  40 mEq Oral PRN Nicolas Isaac MD        Or    potassium bicarb-citric acid (EFFER-K) effervescent tablet 40 mEq  40 mEq Oral PRN Nicolas Isaac MD        Or    potassium chloride 10 mEq/100 mL IVPB (Peripheral Line)  10 mEq IntraVENous PRN Nicolas Isaac MD        potassium chloride 10 mEq/100 mL IVPB (Peripheral Line)  10 mEq IntraVENous PRN Nicolas Isaac MD        magnesium sulfate 2000 mg in 50 mL IVPB premix  2,000 mg IntraVENous PRN Nicolas Isaac MD        ondansetron (ZOFRAN-ODT) disintegrating tablet 4 mg  4 mg Oral Q8H PRN Nicolas Isaac MD        Or ondansetron (ZOFRAN) injection 4 mg  4 mg IntraVENous Q6H PRN Cr Gonzalez MD        polyethylene glycol (GLYCOLAX) packet 17 g  17 g Oral Daily PRN Cr Gonzalez MD        bisacodyl (DULCOLAX) suppository 10 mg  10 mg Rectal Daily PRN Cr Gonzalez MD        famotidine (PEPCID) tablet 10 mg  10 mg Oral Daily PRN Cr Gonzalez MD        aluminum & magnesium hydroxide-simethicone (MAALOX) 200-200-20 MG/5ML suspension 30 mL  30 mL Oral Q6H PRN Cr Gonzalez MD        acetaminophen (TYLENOL) tablet 650 mg  650 mg Oral Q6H PRN Cr Gonzalez MD        Or    acetaminophen (TYLENOL) suppository 650 mg  650 mg Rectal Q6H PRN Cr Gonzalez MD        furosemide (LASIX) injection 40 mg  40 mg IntraVENous BID Cr Gonzalez MD   40 mg at 02/08/23 1745    hydrALAZINE (APRESOLINE) tablet 25 mg  25 mg Oral Q8H PRN Cr Gonzalez MD   25 mg at 02/07/23 1700    heparin (porcine) injection 5,000 Units  5,000 Units SubCUTAneous 3 times per day Cr Gonzalez MD   5,000 Units at 02/07/23 1347     No Known Allergies  Social History     Socioeconomic History    Marital status:      Spouse name: Not on file    Number of children: Not on file    Years of education: Not on file    Highest education level: Not on file   Occupational History    Not on file   Tobacco Use    Smoking status: Never    Smokeless tobacco: Never   Vaping Use    Vaping Use: Never used   Substance and Sexual Activity    Alcohol use: Yes    Drug use: Never    Sexual activity: Not on file   Other Topics Concern    Not on file   Social History Narrative    Not on file     Social Determinants of Health     Financial Resource Strain: Not on file   Food Insecurity: Not on file   Transportation Needs: Not on file   Physical Activity: Not on file   Stress: Not on file   Social Connections: Not on file   Intimate Partner Violence: Not on file   Housing Stability: Not on file     Family History   Problem Relation Age of Onset    Hypertension Sister     Diabetes Neg Hx        Review of Systems  All systems reviewed and are negative at this time. Physical Exam  /88   Pulse 74   Temp 97.5 °F (36.4 °C) (Oral)   Resp 18   Ht 5' 11\" (1.803 m)   Wt 206 lb (93.4 kg)   SpO2 96%   BMI 28.73 kg/m²   General appearance - alert, well appearing, and in no distress  Mental status - alert, oriented to person, place, and time  Eyes - extraocular eye movements intact, sclera anicteric  Nose - normal and patent, no erythema, discharge or polyps  Mouth - mucous membranes moist  Abdomen - soft, nontender, nondistended, no masses or organomegaly, obese  - mod penile and scrotal edema with phimosis. No testicular masses.   Lymphatic-  No palpable lymphadenopathy  Neurological -  normal speech, no focal findings or movement disorder noted  Musculoskeletal - no deformity or swelling  Extremities - no pedal edema, no clubbing or cyanosis  Skin - normal coloration and turgor      Urinalysis  UA - Dipstick  Results for orders placed or performed during the hospital encounter of 02/06/23   Troponin   Result Value Ref Range    Troponin, High Sensitivity 9.5 0 - 14 pg/mL   CBC with Auto Differential   Result Value Ref Range    WBC 6.8 4.3 - 11.1 K/uL    RBC 3.15 (L) 4.23 - 5.6 M/uL    Hemoglobin 8.4 (L) 13.6 - 17.2 g/dL    Hematocrit 25.9 (L) 41.1 - 50.3 %    MCV 82.2 82 - 102 FL    MCH 26.7 26.1 - 32.9 PG    MCHC 32.4 31.4 - 35.0 g/dL    RDW 14.6 11.9 - 14.6 %    Platelets 146 048 - 468 K/uL    MPV 9.9 9.4 - 12.3 FL    nRBC 0.00 0.0 - 0.2 K/uL    Differential Type AUTOMATED      Seg Neutrophils 55 43 - 78 %    Lymphocytes 29 13 - 44 %    Monocytes 10 4.0 - 12.0 %    Eosinophils % 4 0.5 - 7.8 %    Basophils 1 0.0 - 2.0 %    Immature Granulocytes 1 0.0 - 5.0 %    Segs Absolute 3.8 1.7 - 8.2 K/UL    Absolute Lymph # 2.0 0.5 - 4.6 K/UL    Absolute Mono # 0.7 0.1 - 1.3 K/UL    Absolute Eos # 0.3 0.0 - 0.8 K/UL    Basophils Absolute 0.0 0.0 - 0.2 K/UL    Absolute Immature Granulocyte 0.0 0.0 - 0.5 K/UL   Comprehensive Metabolic Panel   Result Value Ref Range    Sodium 140 133 - 143 mmol/L    Potassium 4.2 3.5 - 5.1 mmol/L    Chloride 115 (H) 101 - 110 mmol/L    CO2 19 (L) 21 - 32 mmol/L    Anion Gap 6 2 - 11 mmol/L    Glucose 113 (H) 65 - 100 mg/dL    BUN 41 (H) 6 - 23 MG/DL    Creatinine 4.10 (H) 0.8 - 1.5 MG/DL    Est, Glom Filt Rate 16 (L) >60 ml/min/1.73m2    Calcium 7.5 (L) 8.3 - 10.4 MG/DL    Total Bilirubin <0.1 (L) 0.2 - 1.1 MG/DL    ALT 8 (L) 12 - 65 U/L    AST 17 15 - 37 U/L    Alk Phosphatase 175 (H) 50 - 136 U/L    Total Protein 6.7 6.3 - 8.2 g/dL    Albumin 1.4 (L) 3.5 - 5.0 g/dL    Globulin 5.3 (H) 2.8 - 4.5 g/dL    Albumin/Globulin Ratio 0.3 (L) 0.4 - 1.6     Protime-INR   Result Value Ref Range    Protime 16.5 (H) 12.6 - 14.3 sec    INR 1.3     APTT   Result Value Ref Range    PTT 43.4 (H) 24.5 - 34.2 SEC   Brain Natriuretic Peptide   Result Value Ref Range    NT Pro-BNP 1,761 (H) 5 - 125 PG/ML   Transferrin Saturation   Result Value Ref Range    Iron 44 35 - 150 ug/dL    TIBC 139 (L) 250 - 450 ug/dL    TRANSFERRIN SATURATION 32 >20 %   Vitamin B12   Result Value Ref Range    Vitamin B-12 1376 (H) 193 - 986 pg/mL   Folate   Result Value Ref Range    Folate 29.2 (H) 3.1 - 17.5 ng/mL   Ferritin   Result Value Ref Range    Ferritin 431 (H) 8 - 388 NG/ML   Basic Metabolic Panel w/ Reflex to MG   Result Value Ref Range    Sodium 140 133 - 143 mmol/L    Potassium 4.5 3.5 - 5.1 mmol/L    Chloride 116 (H) 101 - 110 mmol/L    CO2 16 (L) 21 - 32 mmol/L    Anion Gap 8 2 - 11 mmol/L    Glucose 100 65 - 100 mg/dL    BUN 44 (H) 6 - 23 MG/DL    Creatinine 4.10 (H) 0.8 - 1.5 MG/DL    Est, Glom Filt Rate 16 (L) >60 ml/min/1.73m2    Calcium 8.0 (L) 8.3 - 10.4 MG/DL   CBC with Auto Differential   Result Value Ref Range    WBC 8.3 4.3 - 11.1 K/uL    RBC 2.90 (L) 4.23 - 5.6 M/uL    Hemoglobin 7.8 (L) 13.6 - 17.2 g/dL    Hematocrit 23.6 (L) 41.1 - 50.3 %    MCV 81.4 (L) 82 - 102 FL    MCH 26.9 26.1 - 32.9 PG    MCHC  33.1 31.4 - 35.0 g/dL    RDW 14.4 11.9 - 14.6 %    Platelets 192 281 - 691 K/uL    MPV 10.7 9.4 - 12.3 FL    nRBC 0.00 0.0 - 0.2 K/uL    Differential Type AUTOMATED      Seg Neutrophils 58 43 - 78 %    Lymphocytes 28 13 - 44 %    Monocytes 10 4.0 - 12.0 %    Eosinophils % 3 0.5 - 7.8 %    Basophils 1 0.0 - 2.0 %    Immature Granulocytes 0 0.0 - 5.0 %    Segs Absolute 4.9 1.7 - 8.2 K/UL    Absolute Lymph # 2.3 0.5 - 4.6 K/UL    Absolute Mono # 0.8 0.1 - 1.3 K/UL    Absolute Eos # 0.2 0.0 - 0.8 K/UL    Basophils Absolute 0.0 0.0 - 0.2 K/UL    Absolute Immature Granulocyte 0.0 0.0 - 0.5 K/UL   Occult Blood, Fecal   Result Value Ref Range    POC Occult Blood, Fecal Positive     Sodium, urine, random   Result Value Ref Range    SODIUM, RANDOM URINE 105 MMOL/L   Chloride, Random Urine   Result Value Ref Range    Chloride 123 MMOL/L   Osmolality, Urine   Result Value Ref Range    Osmolality, Ur 325 50 - 1400 MOSM/kg H2O   Urine Drug Screen   Result Value Ref Range    PCP, Urine Negative NEG      Benzodiazepines, Urine Negative NEG      Cocaine, Urine Negative NEG      Amphetamine, Urine Negative NEG      Methadone, Urine Negative NEG      THC, TH-Cannabinol, Urine Negative NEG      Opiates, Urine Negative NEG      Barbiturates, Urine Negative NEG     Urinalysis w rflx microscopic   Result Value Ref Range    Color, UA YELLOW/STRAW      Appearance CLEAR      Specific Gravity, UA 1.009 1.001 - 1.023      pH, Urine 5.0 5.0 - 9.0      Protein, UA Negative NEG mg/dL    Glucose, UA Negative mg/dL    Ketones, Urine Negative NEG mg/dL    Bilirubin Urine Negative NEG      Blood, Urine TRACE (A) NEG      Urobilinogen, Urine 0.2 0.2 - 1.0 EU/dL    Nitrite, Urine Negative NEG      Leukocyte Esterase, Urine Negative NEG      WBC, UA 0-4 U4 /hpf    RBC, UA 10-20 (A) U5 /hpf    Epithelial Cells UA 0-5 U5 /hpf    BACTERIA, URINE Negative NEG /hpf    Casts 0-2 U2 /lpf   Protein / creatinine ratio, urine   Result Value Ref Range    Protein, Urine, Random 7 <11.9 mg/dL    Creatinine, Ur 40.00 mg/dL    PROTEIN/CREAT RATIO URINE RAN 0.2     Basic Metabolic Panel w/ Reflex to MG   Result Value Ref Range    Sodium 138 133 - 143 mmol/L    Potassium 4.7 3.5 - 5.1 mmol/L    Chloride 112 (H) 101 - 110 mmol/L    CO2 18 (L) 21 - 32 mmol/L    Anion Gap 8 2 - 11 mmol/L    Glucose 102 (H) 65 - 100 mg/dL    BUN 45 (H) 6 - 23 MG/DL    Creatinine 4.00 (H) 0.8 - 1.5 MG/DL    Est, Glom Filt Rate 17 (L) >60 ml/min/1.73m2    Calcium 8.3 8.3 - 10.4 MG/DL   CBC with Auto Differential   Result Value Ref Range    WBC 7.8 4.3 - 11.1 K/uL    RBC 2.84 (L) 4.23 - 5.6 M/uL    Hemoglobin 7.6 (L) 13.6 - 17.2 g/dL    Hematocrit 23.1 (L) 41.1 - 50.3 %    MCV 81.3 (L) 82 - 102 FL    MCH 26.8 26.1 - 32.9 PG    MCHC 32.9 31.4 - 35.0 g/dL    RDW 14.6 11.9 - 14.6 %    Platelets 909 891 - 223 K/uL    MPV 10.6 9.4 - 12.3 FL    nRBC 0.00 0.0 - 0.2 K/uL    Differential Type AUTOMATED      Seg Neutrophils 52 43 - 78 %    Lymphocytes 33 13 - 44 %    Monocytes 10 4.0 - 12.0 %    Eosinophils % 4 0.5 - 7.8 %    Basophils 1 0.0 - 2.0 %    Immature Granulocytes 0 0.0 - 5.0 %    Segs Absolute 4.1 1.7 - 8.2 K/UL    Absolute Lymph # 2.6 0.5 - 4.6 K/UL    Absolute Mono # 0.8 0.1 - 1.3 K/UL    Absolute Eos # 0.3 0.0 - 0.8 K/UL    Basophils Absolute 0.1 0.0 - 0.2 K/UL    Absolute Immature Granulocyte 0.0 0.0 - 0.5 K/UL   Phosphorus   Result Value Ref Range    Phosphorus 6.2 (H) 2.5 - 4.5 MG/DL   POCT Glucose   Result Value Ref Range    POC Glucose 61 (L) 65 - 100 mg/dL    Performed by: Alisha    POCT Glucose   Result Value Ref Range    POC Glucose 115 (H) 65 - 100 mg/dL    Performed by: Alisha    POCT Glucose   Result Value Ref Range    POC Glucose 128 (H) 65 - 100 mg/dL    Performed by: Alisha    POCT Glucose   Result Value Ref Range    POC Glucose 54 (L) 65 - 100 mg/dL    Performed by: Giovanni)    POCT Glucose   Result Value Ref Range    POC Glucose 108 (H) 65 - 100 mg/dL    Performed by: Cathlamet Moultrie    POCT Glucose   Result Value Ref Range    POC Glucose 67 65 - 100 mg/dL    Performed by: [employer](Angelo)    POCT Glucose   Result Value Ref Range    POC Glucose 92 65 - 100 mg/dL    Performed by: Cathlamet Moultrie    POCT Glucose   Result Value Ref Range    POC Glucose 94 65 - 100 mg/dL    Performed by: [employer](Stephens)    POCT Glucose   Result Value Ref Range    POC Glucose 68 65 - 100 mg/dL    Performed by: KauffmanRaymondBSN    POCT Glucose   Result Value Ref Range    POC Glucose 109 (H) 65 - 100 mg/dL    Performed by: KauffmanRaymondBSN    POCT Glucose   Result Value Ref Range    POC Glucose 54 (L) 65 - 100 mg/dL    Performed by: KauffmanRaymondBSN    POCT Glucose   Result Value Ref Range    POC Glucose 75 65 - 100 mg/dL    Performed by: KauffmanRaymondBSN    POCT Glucose   Result Value Ref Range    POC Glucose 114 (H) 65 - 100 mg/dL    Performed by: [email or web address]    POCT Glucose   Result Value Ref Range    POC Glucose 124 (H) 65 - 100 mg/dL    Performed by: SimmsGomesAngeliquePCT            Assessment/Plan  Scrotal and penile swelling due to anasarca. This should improved with continued diuresis. Encouraged scrotal elevation. Call for questions/concerns.     BIJU FLORES, DO

## 2023-02-09 NOTE — PROGRESS NOTES
Hospitalist Progress Note   Admit Date:  2023  2:53 PM   Name:  Morgan Galan   Age:  57 y.o.  Sex:  male  :  1965   MRN:  [id number]   Room:  803/01    Presenting Complaint: Leg Swelling and Groin Swelling     Reason(s) for Admission: Anasarca [R60.1]  Scrotal edema [N50.89]  Bilateral leg edema [R60.0]  Acute renal failure, unspecified acute renal failure type (HCC) [N17.9]     Hospital Course:   Morgan Galan is a 57 y.o. AAM w/ a PMH of hypertension, hyperlipidemia, type 2 diabetes, liver cirrhosis with ascites who presented with worsening bilateral lower extremity swelling, abdominal distention, and scrotal swelling.  Patient had paracentesis on  with removal of 6 L of fluid but reports worsening swelling since then.     Work-up in the ER with proBNP of 1007 and 61, BUN 41, creatinine 4.10, albumin of 1.4, hemoglobin 8.4.  He was admitted to the Hospitalist service and Nephrology was consulted.    Subjective & 24hr Events (23):   No AEO.  The patient is resting comfortably.  Afebrile and saturating well on room air.  Nephrology notes reviewed.  Continue diuresis.  Urology signed off, penile/scrotal pain should improve with continued diuresis.  Elevate scrotum.    Assessment & Plan:     Principal Problem:  Anasarca  Liver cirrhosis with ascites  -Follows at [facility] with Dr. Margaret Mireles  -Continue Lasix 40mg BID IV  -S/P paracentesis on  w/ ~6 L removed     MARS on CKD stage III  -Baseline creatinine of 1.5-1.8 currently creatinine is around 4  -Normal urine lytes  -US completed with no hydronephrosis seen, lobular liver w/ non-obstructive cholelithiasis   -Ordered Hector but unable to place it  -I&O  -Continue bicarb  -Nephrology on board     Hypoalbuminemia  -Albumin 1.4  -Has received albumin     LLE wound  -Wound consult     Scrotal hydrocele  -found on US  -Urology consulted and signed off  -Patient afebrile with normal white count. No indication  for abx     Anemia   -likely due to chronic disease from liver cirrhosis and CKD  -monitor  -Iron studies normal     Hypertension  -Continue Coreg, Lasix; adjust as needed     Hypoglycemia  -Resolved     Type 2 diabetes  A1c 5 x2 months ago  -Continue with insulin correction scale      Anticipated discharge needs: Pending      Diet:  ADULT DIET; Regular; 4 carb choices (60 gm/meal); Low Sodium (2 gm)  DVT PPx: heparin  Code status: Full Code    Hospital Problems:  Principal Problem:    Anasarca  Active Problems:    Diabetic peripheral neuropathy ([facility] [address]    Essential hypertension    Type 2 diabetes mellitus (HCC)    Hypoalbuminemia    Cirrhosis of liver with ascites (HCC)    Acute kidney injury superimposed on chronic kidney disease (HCC)    Stage 3a chronic kidney disease ([facility] [address]  Resolved Problems:    * No resolved hospital problems. *      Objective:   Patient Vitals for the past 24 hrs:   Temp Pulse Resp BP SpO2   02/09/23 1114 98.2 °F (36.8 °C) 74 18 (!) 127/95 99 %   02/09/23 0714 98.6 °F (37 °C) 78 18 (!) 140/90 99 %   02/09/23 0352 97.7 °F (36.5 °C) 76 18 134/89 98 %   02/08/23 2309 -- -- 18 -- --   02/08/23 2245 98.6 °F (37 °C) 77 18 (!) 141/95 100 %   02/08/23 2239 -- -- 18 -- --   02/08/23 1933 97.5 °F (36.4 °C) 77 18 (!) 130/93 97 %   02/08/23 1744 -- 74 -- 130/88 --   02/08/23 1505 97.5 °F (36.4 °C) 72 18 (!) 134/90 96 %       Oxygen Therapy  SpO2: 99 %  Pulse Oximeter Device Mode: Continuous  O2 Device: None (Room air)    Estimated body mass index is 28.73 kg/m² as calculated from the following:    Height as of this encounter: 5' 11\" (1.803 m). Weight as of this encounter: 206 lb (93.4 kg). Intake/Output Summary (Last 24 hours) at 2/9/2023 1353  Last data filed at 2/9/2023 1155  Gross per 24 hour   Intake 236 ml   Output 1775 ml   Net -1539 ml         Physical Exam:   Blood pressure (!) 127/95, pulse 74, temperature 98.2 °F (36.8 °C), temperature source Oral, resp.  rate 18, height 5' 11\" (1.803 m), weight 206 lb (93.4 kg), SpO2 99 %. General:    NAD  Head:  Normocephalic, atraumatic  Eyes:  Sclerae appear normal.  Pupils equally round. ENT:  Nares appear normal.  Moist oral mucosa  Neck:  No restricted ROM. Trachea midline   CV:   RRR. No m/r/g. Lungs: No wheezing. Symmetric expansion. Abdomen:   Soft, nontender, nondistended. Extremities: No cyanosis or clubbing. Scrotal edema. Skin:     No rashes and normal coloration. Neuro:  CN II-XII grossly intact. Psych:  Normal mood and affect.       I have personally reviewed labs and tests:  Recent Labs:  Recent Results (from the past 48 hour(s))   POCT Glucose    Collection Time: 02/07/23  3:51 PM   Result Value Ref Range    POC Glucose 94 65 - 100 mg/dL    Performed by: Teena Mccarty    POCT Glucose    Collection Time: 02/07/23  8:59 PM   Result Value Ref Range    POC Glucose 68 65 - 100 mg/dL    Performed by: Flores    POCT Glucose    Collection Time: 02/07/23  9:37 PM   Result Value Ref Range    POC Glucose 109 (H) 65 - 100 mg/dL    Performed by: Flores    POCT Glucose    Collection Time: 02/08/23  3:03 AM   Result Value Ref Range    POC Glucose 54 (L) 65 - 100 mg/dL    Performed by: Flores    POCT Glucose    Collection Time: 02/08/23  3:25 AM   Result Value Ref Range    POC Glucose 75 65 - 100 mg/dL    Performed by: [facility]    Basic Metabolic Panel w/ Reflex to MG    Collection Time: 02/08/23  4:28 AM   Result Value Ref Range    Sodium 138 133 - 143 mmol/L    Potassium 4.7 3.5 - 5.1 mmol/L    Chloride 112 (H) 101 - 110 mmol/L    CO2 18 (L) 21 - 32 mmol/L    Anion Gap 8 2 - 11 mmol/L    Glucose 102 (H) 65 - 100 mg/dL    BUN 45 (H) 6 - 23 MG/DL    Creatinine 4.00 (H) 0.8 - 1.5 MG/DL    Est, Glom Filt Rate 17 (L) >60 ml/min/1.73m2    Calcium 8.3 8.3 - 10.4 MG/DL   CBC with Auto Differential    Collection Time: 02/08/23  4:28 AM   Result Value Ref Range    WBC 7.8 4.3 - 11.1 K/uL    RBC 2.84 (L) 4.23 - 5.6 M/uL    Hemoglobin 7.6 (L) 13.6 - 17.2 g/dL    Hematocrit 23.1 (L) 41.1 - 50.3 %    MCV 81.3 (L) 82 - 102 FL    MCH 26.8 26.1 - 32.9 PG    MCHC 32.9 31.4 - 35.0 g/dL    RDW 14.6 11.9 - 14.6 %    Platelets 604 386 - 881 K/uL    MPV 10.6 9.4 - 12.3 FL    nRBC 0.00 0.0 - 0.2 K/uL    Differential Type AUTOMATED      Seg Neutrophils 52 43 - 78 %    Lymphocytes 33 13 - 44 %    Monocytes 10 4.0 - 12.0 %    Eosinophils % 4 0.5 - 7.8 %    Basophils 1 0.0 - 2.0 %    Immature Granulocytes 0 0.0 - 5.0 %    Segs Absolute 4.1 1.7 - 8.2 K/UL    Absolute Lymph # 2.6 0.5 - 4.6 K/UL    Absolute Mono # 0.8 0.1 - 1.3 K/UL    Absolute Eos # 0.3 0.0 - 0.8 K/UL    Basophils Absolute 0.1 0.0 - 0.2 K/UL    Absolute Immature Granulocyte 0.0 0.0 - 0.5 K/UL   Phosphorus    Collection Time: 02/08/23  4:28 AM   Result Value Ref Range    Phosphorus 6.2 (H) 2.5 - 4.5 MG/DL   POCT Glucose    Collection Time: 02/08/23 11:20 AM   Result Value Ref Range    POC Glucose 114 (H) 65 - 100 mg/dL    Performed by: [facility]    POCT Glucose    Collection Time: 02/08/23  4:35 PM   Result Value Ref Range    POC Glucose 124 (H) 65 - 100 mg/dL    Performed by: [facility]    POCT Glucose    Collection Time: 02/08/23  9:12 PM   Result Value Ref Range    POC Glucose 115 (H) 65 - 100 mg/dL    [facility]: [facility]    Basic Metabolic Panel w/ Reflex to MG    Collection Time: 02/09/23  4:52 AM   Result Value Ref Range    Sodium 139 133 - 143 mmol/L    Potassium 4.7 3.5 - 5.1 mmol/L    Chloride 109 101 - 110 mmol/L    CO2 19 (L) 21 - 32 mmol/L    Anion Gap 11 2 - 11 mmol/L    Glucose 60 (L) 65 - 100 mg/dL    BUN 45 (H) 6 - 23 MG/DL    Creatinine 4.10 (H) 0.8 - 1.5 MG/DL    Est, Glom Filt Rate 16 (L) >60 ml/min/1.73m2    Calcium 8.2 (L) 8.3 - 10.4 MG/DL   CBC with Auto Differential    Collection Time: 02/09/23  4:52 AM   Result Value Ref Range    WBC 8.3 4.3 - 11.1 K/uL    RBC 2.84 (L) 4.23 - 5.6 M/uL    Hemoglobin 7.6 (L) 13.6 - 17.2 g/dL    Hematocrit 22.4 (L) 41.1 - 50.3 %    MCV 78.9 (L) 82 - 102 FL    MCH 26.8 26.1 - 32.9 PG    MCHC 33.9 31.4 - 35.0 g/dL    RDW 14.7 (H) 11.9 - 14.6 %    Platelets 419 963 - 473 K/uL    MPV 11.9 9.4 - 12.3 FL    nRBC 0.00 0.0 - 0.2 K/uL    Differential Type AUTOMATED      Seg Neutrophils 54 43 - 78 %    Lymphocytes 32 13 - 44 %    Monocytes 11 4.0 - 12.0 %    Eosinophils % 2 0.5 - 7.8 %    Basophils 1 0.0 - 2.0 %    Immature Granulocytes 0 0.0 - 5.0 %    Segs Absolute 4.5 1.7 - 8.2 K/UL    Absolute Lymph # 2.6 0.5 - 4.6 K/UL    Absolute Mono # 0.9 0.1 - 1.3 K/UL    Absolute Eos # 0.2 0.0 - 0.8 K/UL    Basophils Absolute 0.0 0.0 - 0.2 K/UL    Absolute Immature Granulocyte 0.0 0.0 - 0.5 K/UL   Phosphorus    Collection Time: 02/09/23  4:52 AM   Result Value Ref Range    Phosphorus 6.1 (H) 2.5 - 4.5 MG/DL   POCT Glucose    Collection Time: 02/09/23  7:15 AM   Result Value Ref Range    POC Glucose 68 65 - 100 mg/dL    Performed by: [employer]    POCT Glucose    Collection Time: 02/09/23  7:59 AM   Result Value Ref Range    POC Glucose 95 65 - 100 mg/dL    Performed by: [employer]    POCT Glucose    Collection Time: 02/09/23 11:15 AM   Result Value Ref Range    POC Glucose 155 (H) 65 - 100 mg/dL    Performed by: [employer]        I have personally reviewed imaging studies:  Other Studies:  US ABDOMEN COMPLETE   Final Result   1) Probable increased renal echogenicity, nonspecific, without hydronephrosis. 2) Coarse echogenic lobular liver consistent with cirrhosis. 3) Cholelithiasis without biliary tree obstruction. US SCROTUM AND TESTICLES   Final Result   Negative for testicular torsion. Small bilateral minimally   complicated hydroceles. Small epididymal cysts on the right. Scrotal thickening   on the left. IR US GUIDED PARACENTESIS   Final Result   Uncomplicated ultrasound guided paracentesis.                   XR CHEST 1 VIEW   Final Result   No acute findings in the chest             Current Meds:  Current Facility-Administered Medications   Medication Dose Route Frequency    sodium bicarbonate tablet 650 mg  650 mg Oral 4x Daily    carvedilol (COREG) tablet 25 mg  25 mg Oral BID WC    insulin lispro (HUMALOG) injection vial 0-4 Units  0-4 Units SubCUTAneous TID WC    insulin lispro (HUMALOG) injection vial 0-4 Units  0-4 Units SubCUTAneous Nightly    glucose chewable tablet 16 g  4 tablet Oral PRN    dextrose bolus 10% 125 mL  125 mL IntraVENous PRN    Or    dextrose bolus 10% 250 mL  250 mL IntraVENous PRN    glucagon (rDNA) injection 1 mg  1 mg SubCUTAneous PRN    dextrose 10 % infusion   IntraVENous Continuous PRN    HYDROcodone-acetaminophen (NORCO) 7.5-325 MG per tablet 1 tablet  1 tablet Oral Q6H PRN    hydrALAZINE (APRESOLINE) injection 5 mg  5 mg IntraVENous Q6H PRN    pantoprazole (PROTONIX) tablet 40 mg  40 mg Oral BID AC    [Held by provider] insulin glargine (LANTUS) injection vial 10 Units  10 Units SubCUTAneous Nightly    sodium chloride flush 0.9 % injection 5-40 mL  5-40 mL IntraVENous 2 times per day    sodium chloride flush 0.9 % injection 5-40 mL  5-40 mL IntraVENous PRN    0.9 % sodium chloride infusion   IntraVENous PRN    ondansetron (ZOFRAN-ODT) disintegrating tablet 4 mg  4 mg Oral Q8H PRN    Or    ondansetron (ZOFRAN) injection 4 mg  4 mg IntraVENous Q6H PRN    polyethylene glycol (GLYCOLAX) packet 17 g  17 g Oral Daily PRN    bisacodyl (DULCOLAX) suppository 10 mg  10 mg Rectal Daily PRN    famotidine (PEPCID) tablet 10 mg  10 mg Oral Daily PRN    aluminum & magnesium hydroxide-simethicone (MAALOX) 200-200-20 MG/5ML suspension 30 mL  30 mL Oral Q6H PRN    acetaminophen (TYLENOL) tablet 650 mg  650 mg Oral Q6H PRN    Or    acetaminophen (TYLENOL) suppository 650 mg  650 mg Rectal Q6H PRN    furosemide (LASIX) injection 40 mg  40 mg IntraVENous BID    hydrALAZINE (APRESOLINE) tablet 25 mg  25 mg Oral Q8H PRN    heparin (porcine) injection 5,000 Units  5,000 Units SubCUTAneous 3 times per day       Signed:  Earvin Kayser, [employer] - RYLIE    Part of this note may have been written by using a voice dictation software. The note has been proof read but may still contain some grammatical/other typographical errors.

## 2023-02-09 NOTE — PROGRESS NOTES
completed initial visit with patient. Patient expressed some concerns for his own health and recovery, but was especially bothered by the grief of his  mother.  facilitated life review, in which patient gave a positive reflection of his mother and family.  provided pastoral presence, prayer and empathetic listening.    Signed by  Jessi Traore M.Div.

## 2023-02-09 NOTE — WOUND CARE
Left leg chronic wounds, upper nealry healed new epithilizaiton and flaky skin 4x1cm  lower is 5x3.6x.0.2granular base well healing, using hydrafera blue outpatient, will use opticel ag while in acute care. Should have continued home health for wound care after discharge. May benefit from outpatient wound clinic if possible. Will monitor.

## 2023-02-10 LAB
ALBUMIN SERPL-MCNC: 1.3 G/DL (ref 3.5–5)
ANION GAP SERPL CALC-SCNC: 9 MMOL/L (ref 2–11)
BASOPHILS # BLD: 0 K/UL (ref 0–0.2)
BASOPHILS NFR BLD: 1 % (ref 0–2)
BUN SERPL-MCNC: 44 MG/DL (ref 6–23)
CALCIUM SERPL-MCNC: 8 MG/DL (ref 8.3–10.4)
CHLORIDE SERPL-SCNC: 105 MMOL/L (ref 101–110)
CO2 SERPL-SCNC: 20 MMOL/L (ref 21–32)
CREAT SERPL-MCNC: 4.2 MG/DL (ref 0.8–1.5)
DIFFERENTIAL METHOD BLD: ABNORMAL
EOSINOPHIL # BLD: 0.2 K/UL (ref 0–0.8)
EOSINOPHIL NFR BLD: 3 % (ref 0.5–7.8)
ERYTHROCYTE [DISTWIDTH] IN BLOOD BY AUTOMATED COUNT: 14.8 % (ref 11.9–14.6)
GLUCOSE BLD STRIP.AUTO-MCNC: 124 MG/DL (ref 65–100)
GLUCOSE BLD STRIP.AUTO-MCNC: 125 MG/DL (ref 65–100)
GLUCOSE BLD STRIP.AUTO-MCNC: 174 MG/DL (ref 65–100)
GLUCOSE BLD STRIP.AUTO-MCNC: 213 MG/DL (ref 65–100)
GLUCOSE SERPL-MCNC: 130 MG/DL (ref 65–100)
HCT VFR BLD AUTO: 22.3 % (ref 41.1–50.3)
HGB BLD-MCNC: 7.6 G/DL (ref 13.6–17.2)
IMM GRANULOCYTES # BLD AUTO: 0 K/UL (ref 0–0.5)
IMM GRANULOCYTES NFR BLD AUTO: 0 % (ref 0–5)
LYMPHOCYTES # BLD: 2.4 K/UL (ref 0.5–4.6)
LYMPHOCYTES NFR BLD: 29 % (ref 13–44)
MCH RBC QN AUTO: 27 PG (ref 26.1–32.9)
MCHC RBC AUTO-ENTMCNC: 34.1 G/DL (ref 31.4–35)
MCV RBC AUTO: 79.1 FL (ref 82–102)
MONOCYTES # BLD: 1 K/UL (ref 0.1–1.3)
MONOCYTES NFR BLD: 13 % (ref 4–12)
NEUTS SEG # BLD: 4.5 K/UL (ref 1.7–8.2)
NEUTS SEG NFR BLD: 54 % (ref 43–78)
NRBC # BLD: 0 K/UL (ref 0–0.2)
PHOSPHATE SERPL-MCNC: 5.6 MG/DL (ref 2.5–4.5)
PLATELET # BLD AUTO: 149 K/UL (ref 150–450)
PMV BLD AUTO: 10.6 FL (ref 9.4–12.3)
POTASSIUM SERPL-SCNC: 4.5 MMOL/L (ref 3.5–5.1)
RBC # BLD AUTO: 2.82 M/UL (ref 4.23–5.6)
SERVICE CMNT-IMP: ABNORMAL
SODIUM SERPL-SCNC: 134 MMOL/L (ref 133–143)
WBC # BLD AUTO: 8.2 K/UL (ref 4.3–11.1)

## 2023-02-10 PROCEDURE — 2580000003 HC RX 258: Performed by: INTERNAL MEDICINE

## 2023-02-10 PROCEDURE — 6360000002 HC RX W HCPCS: Performed by: INTERNAL MEDICINE

## 2023-02-10 PROCEDURE — 82040 ASSAY OF SERUM ALBUMIN: CPT

## 2023-02-10 PROCEDURE — 1100000000 HC RM PRIVATE

## 2023-02-10 PROCEDURE — 85025 COMPLETE CBC W/AUTO DIFF WBC: CPT

## 2023-02-10 PROCEDURE — 80048 BASIC METABOLIC PNL TOTAL CA: CPT

## 2023-02-10 PROCEDURE — 6370000000 HC RX 637 (ALT 250 FOR IP): Performed by: STUDENT IN AN ORGANIZED HEALTH CARE EDUCATION/TRAINING PROGRAM

## 2023-02-10 PROCEDURE — 6370000000 HC RX 637 (ALT 250 FOR IP): Performed by: INTERNAL MEDICINE

## 2023-02-10 PROCEDURE — 36415 COLL VENOUS BLD VENIPUNCTURE: CPT

## 2023-02-10 PROCEDURE — 84100 ASSAY OF PHOSPHORUS: CPT

## 2023-02-10 PROCEDURE — 82962 GLUCOSE BLOOD TEST: CPT

## 2023-02-10 RX ORDER — HYDRALAZINE HYDROCHLORIDE 20 MG/ML
5 INJECTION INTRAMUSCULAR; INTRAVENOUS EVERY 6 HOURS PRN
Status: DISCONTINUED | OUTPATIENT
Start: 2023-02-10 | End: 2023-02-20 | Stop reason: HOSPADM

## 2023-02-10 RX ADMIN — SODIUM CHLORIDE, PRESERVATIVE FREE 10 ML: 5 INJECTION INTRAVENOUS at 08:44

## 2023-02-10 RX ADMIN — SODIUM BICARBONATE 650 MG: 650 TABLET ORAL at 08:28

## 2023-02-10 RX ADMIN — SODIUM CHLORIDE, PRESERVATIVE FREE 10 ML: 5 INJECTION INTRAVENOUS at 20:49

## 2023-02-10 RX ADMIN — FUROSEMIDE 40 MG: 10 INJECTION, SOLUTION INTRAMUSCULAR; INTRAVENOUS at 17:30

## 2023-02-10 RX ADMIN — HEPARIN SODIUM 5000 UNITS: 5000 INJECTION INTRAVENOUS; SUBCUTANEOUS at 14:05

## 2023-02-10 RX ADMIN — CARVEDILOL 25 MG: 25 TABLET, FILM COATED ORAL at 16:10

## 2023-02-10 RX ADMIN — SODIUM BICARBONATE 650 MG: 650 TABLET ORAL at 12:45

## 2023-02-10 RX ADMIN — SODIUM BICARBONATE 650 MG: 650 TABLET ORAL at 20:49

## 2023-02-10 RX ADMIN — SODIUM BICARBONATE 650 MG: 650 TABLET ORAL at 16:10

## 2023-02-10 RX ADMIN — PANTOPRAZOLE SODIUM 40 MG: 40 TABLET, DELAYED RELEASE ORAL at 16:10

## 2023-02-10 RX ADMIN — CARVEDILOL 25 MG: 25 TABLET, FILM COATED ORAL at 08:44

## 2023-02-10 RX ADMIN — FUROSEMIDE 40 MG: 10 INJECTION, SOLUTION INTRAMUSCULAR; INTRAVENOUS at 08:28

## 2023-02-10 RX ADMIN — HYDROCODONE BITARTRATE AND ACETAMINOPHEN 1 TABLET: 7.5; 325 TABLET ORAL at 16:14

## 2023-02-10 RX ADMIN — HEPARIN SODIUM 5000 UNITS: 5000 INJECTION INTRAVENOUS; SUBCUTANEOUS at 20:49

## 2023-02-10 RX ADMIN — PANTOPRAZOLE SODIUM 40 MG: 40 TABLET, DELAYED RELEASE ORAL at 06:21

## 2023-02-10 RX ADMIN — HEPARIN SODIUM 5000 UNITS: 5000 INJECTION INTRAVENOUS; SUBCUTANEOUS at 06:21

## 2023-02-10 RX ADMIN — INSULIN LISPRO 1 UNITS: 100 INJECTION, SOLUTION INTRAVENOUS; SUBCUTANEOUS at 08:32

## 2023-02-10 ASSESSMENT — PAIN SCALES - GENERAL
PAINLEVEL_OUTOF10: 0
PAINLEVEL_OUTOF10: 6

## 2023-02-10 ASSESSMENT — PAIN DESCRIPTION - LOCATION: LOCATION: LEG

## 2023-02-10 NOTE — PROGRESS NOTES
Hospitalist Progress Note   Admit Date:  2023  2:53 PM   Name:  Ezequiel Lutz   Age:  65 y.o. Sex:  male  :  1965   MRN:  [id number]   Room:  3/    Presenting Complaint: Leg Swelling and Groin Swelling     Reason(s) for Admission: Anasarca [R60.1]  Scrotal edema [N50.89]  Bilateral leg edema [R60.0]  Acute renal failure, unspecified acute renal failure type [facility]) [N17.9]     Hospital Course:   Ezequiel Lutz is a 65 y.o. AAM w/ a PMH of hypertension, hyperlipidemia, type 2 diabetes, liver cirrhosis with ascites who presented with worsening bilateral lower extremity swelling, abdominal distention, and scrotal swelling. Patient had paracentesis on  with removal of 6 L of fluid but reports worsening swelling since then. Work-up in the ER with proBNP of 1007 and 61, BUN 41, creatinine 4.10, albumin of 1.4, hemoglobin 8.4. He was admitted to the Hospitalist service and Nephrology was consulted. Subjective & 24hr Events (02/10/23): No AEO. Afebrile and saturating well on room air. No new complaints, he stated that he has noticed decreased swelling in his legs. I discussed with Nephrology NP this morning. Renal fx not improving, CTM over the weekend. Continue IV furosemide. Assessment & Plan:     Principal Problem:  Anasarca  Liver cirrhosis with ascites  Hypoalbuminemia  -Follows at [facility] with Dr. Gi Boswell  -Continue Lasix 40mg BID IV  -S/P paracentesis on  w/ ~6 L removed     MARS on CKD stage IIIb  -Baseline creatinine of 1.5-1.8 currently creatinine is around 4  -Normal urine lytes  -US completed with no hydronephrosis seen, lobular liver w/ non-obstructive cholelithiasis   -Ordered Hector but unable to place it  -I&O  -Continue bicarb  -Nephrology on board     LLE wound  -Wound consult     Scrotal hydrocele  -found on US  -Urology consulted and signed off  -Patient afebrile with normal white count.  No indication for abx     Anemia -likely due to chronic disease from liver cirrhosis and CKD  -monitor  -Iron studies normal     Hypertension  -Continue Coreg, Lasix; adjust as needed     Hypoglycemia  -Resolved     Type 2 diabetes  A1c 5 x2 months ago  -Continue with insulin correction scale      Anticipated discharge needs: Pending      Diet:  ADULT DIET; Regular; 4 carb choices (60 gm/meal); Low Sodium (2 gm)  DVT PPx: heparin  Code status: Full Code    Hospital Problems:  Principal Problem:    Anasarca  Active Problems:    Diabetic peripheral neuropathy ([facility] [address]    Essential hypertension    Type 2 diabetes mellitus (HCC)    Hypoalbuminemia    Cirrhosis of liver with ascites (HCC)    Acute kidney injury superimposed on chronic kidney disease (HCC)    Stage 3a chronic kidney disease ([facility] [address]  Resolved Problems:    * No resolved hospital problems. *      Objective:   Patient Vitals for the past 24 hrs:   Temp Pulse Resp BP SpO2   02/10/23 0720 97.9 °F (36.6 °C) 83 18 128/85 98 %   02/10/23 0302 98.1 °F (36.7 °C) 77 19 133/88 98 %   02/09/23 2345 98.2 °F (36.8 °C) 76 18 124/89 97 %   02/09/23 1928 98.4 °F (36.9 °C) 76 18 (!) 148/98 97 %   02/09/23 1451 98.6 °F (37 °C) 76 18 128/89 100 %   02/09/23 1114 98.2 °F (36.8 °C) 74 18 (!) 127/95 99 %         Oxygen Therapy  SpO2: 98 %  Pulse Oximeter Device Mode: Continuous  O2 Device: None (Room air)    Estimated body mass index is 28.73 kg/m² as calculated from the following:    Height as of this encounter: 5' 11\" (1.803 m). Weight as of this encounter: 206 lb (93.4 kg). Intake/Output Summary (Last 24 hours) at 2/10/2023 0933  Last data filed at 2/10/2023 0820  Gross per 24 hour   Intake --   Output 1875 ml   Net -1875 ml           Physical Exam:   Blood pressure 128/85, pulse 83, temperature 97.9 °F (36.6 °C), temperature source Oral, resp. rate 18, height 5' 11\" (1.803 m), weight 206 lb (93.4 kg), SpO2 98 %.   General:    NAD  Head:  Normocephalic, atraumatic  Eyes:  Sclerae appear normal.  Pupils equally round. ENT:  Nares appear normal.  Moist oral mucosa  Neck:  No restricted ROM. Trachea midline   CV:   RRR. No m/r/g. Lungs: No wheezing. Symmetric expansion. Abdomen:   Soft, nontender, nondistended. Extremities: No cyanosis or clubbing. Scrotal edema. 2+ BLE edema  Skin:     No rashes and normal coloration. Neuro:  CN II-XII grossly intact. Psych:  Normal mood and affect.       I have personally reviewed labs and tests:  Recent Labs:  Recent Results (from the past 48 hour(s))   POCT Glucose    Collection Time: 02/08/23 11:20 AM   Result Value Ref Range    POC Glucose 114 (H) 65 - 100 mg/dL    Performed by: [facility]    POCT Glucose    Collection Time: 02/08/23  4:35 PM   Result Value Ref Range    POC Glucose 124 (H) 65 - 100 mg/dL    Performed by: [employer]    POCT Glucose    Collection Time: 02/08/23  9:12 PM   Result Value Ref Range    POC Glucose 115 (H) 65 - 100 mg/dL    Performed by: [employer]    Basic Metabolic Panel w/ Reflex to MG    Collection Time: 02/09/23  4:52 AM   Result Value Ref Range    Sodium 139 133 - 143 mmol/L    Potassium 4.7 3.5 - 5.1 mmol/L    Chloride 109 101 - 110 mmol/L    CO2 19 (L) 21 - 32 mmol/L    Anion Gap 11 2 - 11 mmol/L    Glucose 60 (L) 65 - 100 mg/dL    BUN 45 (H) 6 - 23 MG/DL    Creatinine 4.10 (H) 0.8 - 1.5 MG/DL    Est, Glom Filt Rate 16 (L) >60 ml/min/1.73m2    Calcium 8.2 (L) 8.3 - 10.4 MG/DL   CBC with Auto Differential    Collection Time: 02/09/23  4:52 AM   Result Value Ref Range    WBC 8.3 4.3 - 11.1 K/uL    RBC 2.84 (L) 4.23 - 5.6 M/uL    Hemoglobin 7.6 (L) 13.6 - 17.2 g/dL    Hematocrit 22.4 (L) 41.1 - 50.3 %    MCV 78.9 (L) 82 - 102 FL    MCH 26.8 26.1 - 32.9 PG    MCHC 33.9 31.4 - 35.0 g/dL    RDW 14.7 (H) 11.9 - 14.6 %    Platelets 023 370 - 754 K/uL    MPV 11.9 9.4 - 12.3 FL    nRBC 0.00 0.0 - 0.2 K/uL    Differential Type AUTOMATED      Seg Neutrophils 54 43 - 78 %    Lymphocytes 32 13 - 44 % Monocytes 11 4.0 - 12.0 %    Eosinophils % 2 0.5 - 7.8 %    Basophils 1 0.0 - 2.0 %    Immature Granulocytes 0 0.0 - 5.0 %    Segs Absolute 4.5 1.7 - 8.2 K/UL    Absolute Lymph # 2.6 0.5 - 4.6 K/UL    Absolute Mono # 0.9 0.1 - 1.3 K/UL    Absolute Eos # 0.2 0.0 - 0.8 K/UL    Basophils Absolute 0.0 0.0 - 0.2 K/UL    Absolute Immature Granulocyte 0.0 0.0 - 0.5 K/UL   Phosphorus    Collection Time: 02/09/23  4:52 AM   Result Value Ref Range    Phosphorus 6.1 (H) 2.5 - 4.5 MG/DL   POCT Glucose    Collection Time: 02/09/23  7:15 AM   Result Value Ref Range    POC Glucose 68 65 - 100 mg/dL    Performed by: [facility]    POCT Glucose    Collection Time: 02/09/23  7:59 AM   Result Value Ref Range    POC Glucose 95 65 - 100 mg/dL    Performed by: [facility]    POCT Glucose    Collection Time: 02/09/23 11:15 AM   Result Value Ref Range    POC Glucose 155 (H) 65 - 100 mg/dL    Performed by: [employer]    POCT Glucose    Collection Time: 02/09/23  4:39 PM   Result Value Ref Range    POC Glucose 151 (H) 65 - 100 mg/dL    Performed by: [employer]    POCT Glucose    Collection Time: 02/09/23  8:29 PM   Result Value Ref Range    POC Glucose 187 (H) 65 - 100 mg/dL    Performed by: [facility]    Basic Metabolic Panel w/ Reflex to MG    Collection Time: 02/10/23  3:21 AM   Result Value Ref Range    Sodium 134 133 - 143 mmol/L    Potassium 4.5 3.5 - 5.1 mmol/L    Chloride 105 101 - 110 mmol/L    CO2 20 (L) 21 - 32 mmol/L    Anion Gap 9 2 - 11 mmol/L    Glucose 130 (H) 65 - 100 mg/dL    BUN 44 (H) 6 - 23 MG/DL    Creatinine 4.20 (H) 0.8 - 1.5 MG/DL    Est, Glom Filt Rate 16 (L) >60 ml/min/1.73m2    Calcium 8.0 (L) 8.3 - 10.4 MG/DL   CBC with Auto Differential    Collection Time: 02/10/23  3:21 AM   Result Value Ref Range    WBC 8.2 4.3 - 11.1 K/uL    RBC 2.82 (L) 4.23 - 5.6 M/uL    Hemoglobin 7.6 (L) 13.6 - 17.2 g/dL    Hematocrit 22.3 (L) 41.1 - 50.3 %    MCV 79.1 (L) 82 - 102 FL    MCH 27.0 26.1 - 32.9 PG    MCHC 34.1 31.4 - 35.0 g/dL    RDW 14.8 (H) 11.9 - 14.6 %    Platelets 649 (L) 933 - 450 K/uL    MPV 10.6 9.4 - 12.3 FL    nRBC 0.00 0.0 - 0.2 K/uL    Differential Type AUTOMATED      Seg Neutrophils 54 43 - 78 %    Lymphocytes 29 13 - 44 %    Monocytes 13 (H) 4.0 - 12.0 %    Eosinophils % 3 0.5 - 7.8 %    Basophils 1 0.0 - 2.0 %    Immature Granulocytes 0 0.0 - 5.0 %    Segs Absolute 4.5 1.7 - 8.2 K/UL    Absolute Lymph # 2.4 0.5 - 4.6 K/UL    Absolute Mono # 1.0 0.1 - 1.3 K/UL    Absolute Eos # 0.2 0.0 - 0.8 K/UL    Basophils Absolute 0.0 0.0 - 0.2 K/UL    Absolute Immature Granulocyte 0.0 0.0 - 0.5 K/UL   Phosphorus    Collection Time: 02/10/23  3:21 AM   Result Value Ref Range    Phosphorus 5.6 (H) 2.5 - 4.5 MG/DL   POCT Glucose    Collection Time: 02/10/23  7:18 AM   Result Value Ref Range    POC Glucose 213 (H) 65 - 100 mg/dL    Performed by: Leann Braun        I have personally reviewed imaging studies:  Other Studies:  US ABDOMEN COMPLETE   Final Result   1) Probable increased renal echogenicity, nonspecific, without hydronephrosis. 2) Coarse echogenic lobular liver consistent with cirrhosis. 3) Cholelithiasis without biliary tree obstruction. US SCROTUM AND TESTICLES   Final Result   Negative for testicular torsion. Small bilateral minimally   complicated hydroceles. Small epididymal cysts on the right. Scrotal thickening   on the left. IR US GUIDED PARACENTESIS   Final Result   Uncomplicated ultrasound guided paracentesis.                   XR CHEST 1 VIEW   Final Result   No acute findings in the chest             Current Meds:  Current Facility-Administered Medications   Medication Dose Route Frequency    sodium bicarbonate tablet 650 mg  650 mg Oral 4x Daily    carvedilol (COREG) tablet 25 mg  25 mg Oral BID WC    insulin lispro (HUMALOG) injection vial 0-4 Units  0-4 Units SubCUTAneous TID WC    insulin lispro (HUMALOG) injection vial 0-4 Units  0-4 Units SubCUTAneous Nightly    glucose chewable tablet 16 g  4 tablet Oral PRN    dextrose bolus 10% 125 mL  125 mL IntraVENous PRN    Or    dextrose bolus 10% 250 mL  250 mL IntraVENous PRN    glucagon (rDNA) injection 1 mg  1 mg SubCUTAneous PRN    dextrose 10 % infusion   IntraVENous Continuous PRN    HYDROcodone-acetaminophen (NORCO) 7.5-325 MG per tablet 1 tablet  1 tablet Oral Q6H PRN    hydrALAZINE (APRESOLINE) injection 5 mg  5 mg IntraVENous Q6H PRN    pantoprazole (PROTONIX) tablet 40 mg  40 mg Oral BID AC    [Held by provider] insulin glargine (LANTUS) injection vial 10 Units  10 Units SubCUTAneous Nightly    sodium chloride flush 0.9 % injection 5-40 mL  5-40 mL IntraVENous 2 times per day    sodium chloride flush 0.9 % injection 5-40 mL  5-40 mL IntraVENous PRN    0.9 % sodium chloride infusion   IntraVENous PRN    ondansetron (ZOFRAN-ODT) disintegrating tablet 4 mg  4 mg Oral Q8H PRN    Or    ondansetron (ZOFRAN) injection 4 mg  4 mg IntraVENous Q6H PRN    polyethylene glycol (GLYCOLAX) packet 17 g  17 g Oral Daily PRN    bisacodyl (DULCOLAX) suppository 10 mg  10 mg Rectal Daily PRN    famotidine (PEPCID) tablet 10 mg  10 mg Oral Daily PRN    aluminum & magnesium hydroxide-simethicone (MAALOX) 200-200-20 MG/5ML suspension 30 mL  30 mL Oral Q6H PRN    acetaminophen (TYLENOL) tablet 650 mg  650 mg Oral Q6H PRN    Or    acetaminophen (TYLENOL) suppository 650 mg  650 mg Rectal Q6H PRN    furosemide (LASIX) injection 40 mg  40 mg IntraVENous BID    hydrALAZINE (APRESOLINE) tablet 25 mg  25 mg Oral Q8H PRN    heparin (porcine) injection 5,000 Units  5,000 Units SubCUTAneous 3 times per day       Signed:  DESIRE Allison - CNP    Part of this note may have been written by using a voice dictation software. The note has been proof read but may still contain some grammatical/other typographical errors.

## 2023-02-10 NOTE — PROGRESS NOTES
Belia Hairston  Admission Date: 2/6/2023         [facility] Progress Note: 2/10/2023    Follow-up for: MARS/CKD 3b    The patient's chart is reviewed and the patient is discussed with the staff. Subjective:   Pt seen and examined in room reports tolerated breakfast this AM, good uop, still with significant LE edema.      ROS:  Gen - no fever, no chills   CV - no chest pain, no palpitation  Lung - no shortness of breath, no cough  Abd - no tenderness, no nausea/vomiting, no diarrhea  Ext - + edema    Current Facility-Administered Medications   Medication Dose Route Frequency    sodium bicarbonate tablet 650 mg  650 mg Oral 4x Daily    carvedilol (COREG) tablet 25 mg  25 mg Oral BID WC    insulin lispro (HUMALOG) injection vial 0-4 Units  0-4 Units SubCUTAneous TID WC    insulin lispro (HUMALOG) injection vial 0-4 Units  0-4 Units SubCUTAneous Nightly    glucose chewable tablet 16 g  4 tablet Oral PRN    dextrose bolus 10% 125 mL  125 mL IntraVENous PRN    Or    dextrose bolus 10% 250 mL  250 mL IntraVENous PRN    glucagon (rDNA) injection 1 mg  1 mg SubCUTAneous PRN    dextrose 10 % infusion   IntraVENous Continuous PRN    HYDROcodone-acetaminophen (NORCO) 7.5-325 MG per tablet 1 tablet  1 tablet Oral Q6H PRN    hydrALAZINE (APRESOLINE) injection 5 mg  5 mg IntraVENous Q6H PRN    pantoprazole (PROTONIX) tablet 40 mg  40 mg Oral BID AC    [Held by provider] insulin glargine (LANTUS) injection vial 10 Units  10 Units SubCUTAneous Nightly    sodium chloride flush 0.9 % injection 5-40 mL  5-40 mL IntraVENous 2 times per day    sodium chloride flush 0.9 % injection 5-40 mL  5-40 mL IntraVENous PRN    0.9 % sodium chloride infusion   IntraVENous PRN    ondansetron (ZOFRAN-ODT) disintegrating tablet 4 mg  4 mg Oral Q8H PRN    Or    ondansetron (ZOFRAN) injection 4 mg  4 mg IntraVENous Q6H PRN    polyethylene glycol (GLYCOLAX) packet 17 g  17 g Oral Daily PRN    bisacodyl (DULCOLAX) suppository 10 mg  10 mg Rectal Daily PRN    famotidine (PEPCID) tablet 10 mg  10 mg Oral Daily PRN    aluminum & magnesium hydroxide-simethicone (MAALOX) 200-200-20 MG/5ML suspension 30 mL  30 mL Oral Q6H PRN    acetaminophen (TYLENOL) tablet 650 mg  650 mg Oral Q6H PRN    Or    acetaminophen (TYLENOL) suppository 650 mg  650 mg Rectal Q6H PRN    furosemide (LASIX) injection 40 mg  40 mg IntraVENous BID    hydrALAZINE (APRESOLINE) tablet 25 mg  25 mg Oral Q8H PRN    heparin (porcine) injection 5,000 Units  5,000 Units SubCUTAneous 3 times per day         Objective:     Vitals:    02/09/23 1928 02/09/23 2345 02/10/23 0302 02/10/23 0720   BP: (!) 148/98 124/89 133/88 128/85   Pulse: 76 76 77 83   Resp: 18 18 19 18   Temp: 98.4 °F (36.9 °C) 98.2 °F (36.8 °C) 98.1 °F (36.7 °C) 97.9 °F (36.6 °C)   TempSrc:    Oral   SpO2: 97% 97% 98% 98%   Weight:       Height:         Intake and Output:   02/08 1901 - 02/10 0700  In: 236 [P.O.:236]  Out: 9750 [Urine:2775]  02/10 0701 - 02/10 1900  In: -   Out: 300 [Urine:300]    Physical Exam:   Constitutional:  the patient is well developed and in no acute distress, alert and following commands  HEENT:  Sclera clear, pupils equal, oral mucosa moist  Lungs: clear bilaterally   Cardiovascular:  Regular rate, S1, S2, no Rub   Abd/GI: soft distention, and non-tender; with positive bowel sounds. Ext: warm without cyanosis. There is 2+ lower leg edema/anasarca. Skin:  no jaundice or rashes  Neuro: no gross neuro deficits   Psychiatric: Calm. LAB  Recent Labs     02/08/23  0428 02/09/23  0452 02/10/23  0321   WBC 7.8 8.3 8.2   HGB 7.6* 7.6* 7.6*   HCT 23.1* 22.4* 22.3*    248 149*       Recent Labs     02/08/23  0428 02/09/23  0452 02/10/23  0321    139 134   K 4.7 4.7 4.5   * 109 105   CO2 18* 19* 20*   BUN 45* 45* 44*   CREATININE 4.00* 4.10* 4.20*   PHOS 6.2* 6.1* 5.6*       No results for input(s): PH, PCO2, PO2, HCO3 in the last 72 hours.       Assessment/Plan: (Medical Decision Making)   MARS/CKD 3b- ? HRS, vs Ehcait Sin will not be accelerate while on diuretic   -Baseline Cr 1.5-1.8  -renal US no evidence of obstructive nephropathy, UA RBC 10-20, P/C ratio 0.2- further work up pending clinical course   -no indication for acute RRT at this time   -Cr a bit up 4.20, non oliguric, still volume up would continue diuretic      2. Hypoalbuminemia/anasarca- 1.4, s/p albumin IV, continue IV lasix      3. Cirrhosis with ascites, s/p paracentesis  with 6150 cc removed     4. Hypertension stable on BB     5. Acute metabolic acidosis- continue PO NaHCO3      6. DM type II- SSI per hosp      7.  Anemia- Fe studies ok DW Fairy Apgar NP Esteban Gave,  [facility]

## 2023-02-10 NOTE — CARE COORDINATION
MSN, cM:  patient's renal function not improving. Will watching over weekend for improvement. Patient to be discharged home with [facility] and [address] [place] [address] when medically stable.

## 2023-02-11 LAB
ANION GAP SERPL CALC-SCNC: 10 MMOL/L (ref 2–11)
BASOPHILS # BLD: 0 K/UL (ref 0–0.2)
BASOPHILS NFR BLD: 1 % (ref 0–2)
BUN SERPL-MCNC: 44 MG/DL (ref 6–23)
CALCIUM SERPL-MCNC: 7.7 MG/DL (ref 8.3–10.4)
CHLORIDE SERPL-SCNC: 102 MMOL/L (ref 101–110)
CO2 SERPL-SCNC: 22 MMOL/L (ref 21–32)
CREAT SERPL-MCNC: 4.3 MG/DL (ref 0.8–1.5)
DIFFERENTIAL METHOD BLD: ABNORMAL
EOSINOPHIL # BLD: 0.2 K/UL (ref 0–0.8)
EOSINOPHIL NFR BLD: 3 % (ref 0.5–7.8)
ERYTHROCYTE [DISTWIDTH] IN BLOOD BY AUTOMATED COUNT: 14.6 % (ref 11.9–14.6)
GLUCOSE BLD STRIP.AUTO-MCNC: 156 MG/DL (ref 65–100)
GLUCOSE BLD STRIP.AUTO-MCNC: 160 MG/DL (ref 65–100)
GLUCOSE BLD STRIP.AUTO-MCNC: 180 MG/DL (ref 65–100)
GLUCOSE BLD STRIP.AUTO-MCNC: 99 MG/DL (ref 65–100)
GLUCOSE SERPL-MCNC: 109 MG/DL (ref 65–100)
HCT VFR BLD AUTO: 22.4 % (ref 41.1–50.3)
HGB BLD-MCNC: 7.6 G/DL (ref 13.6–17.2)
IMM GRANULOCYTES # BLD AUTO: 0 K/UL (ref 0–0.5)
IMM GRANULOCYTES NFR BLD AUTO: 1 % (ref 0–5)
LYMPHOCYTES # BLD: 2.5 K/UL (ref 0.5–4.6)
LYMPHOCYTES NFR BLD: 31 % (ref 13–44)
MCH RBC QN AUTO: 26.4 PG (ref 26.1–32.9)
MCHC RBC AUTO-ENTMCNC: 33.9 G/DL (ref 31.4–35)
MCV RBC AUTO: 77.8 FL (ref 82–102)
MONOCYTES # BLD: 1 K/UL (ref 0.1–1.3)
MONOCYTES NFR BLD: 13 % (ref 4–12)
NEUTS SEG # BLD: 4.2 K/UL (ref 1.7–8.2)
NEUTS SEG NFR BLD: 51 % (ref 43–78)
NRBC # BLD: 0 K/UL (ref 0–0.2)
PLATELET # BLD AUTO: 145 K/UL (ref 150–450)
PMV BLD AUTO: 10.8 FL (ref 9.4–12.3)
POTASSIUM SERPL-SCNC: 4.1 MMOL/L (ref 3.5–5.1)
RBC # BLD AUTO: 2.88 M/UL (ref 4.23–5.6)
SERVICE CMNT-IMP: ABNORMAL
SERVICE CMNT-IMP: NORMAL
SODIUM SERPL-SCNC: 134 MMOL/L (ref 133–143)
WBC # BLD AUTO: 8 K/UL (ref 4.3–11.1)

## 2023-02-11 PROCEDURE — 80048 BASIC METABOLIC PNL TOTAL CA: CPT

## 2023-02-11 PROCEDURE — 6360000002 HC RX W HCPCS: Performed by: INTERNAL MEDICINE

## 2023-02-11 PROCEDURE — P9047 ALBUMIN (HUMAN), 25%, 50ML: HCPCS | Performed by: INTERNAL MEDICINE

## 2023-02-11 PROCEDURE — 1100000000 HC RM PRIVATE

## 2023-02-11 PROCEDURE — 6370000000 HC RX 637 (ALT 250 FOR IP): Performed by: STUDENT IN AN ORGANIZED HEALTH CARE EDUCATION/TRAINING PROGRAM

## 2023-02-11 PROCEDURE — 82962 GLUCOSE BLOOD TEST: CPT

## 2023-02-11 PROCEDURE — 36415 COLL VENOUS BLD VENIPUNCTURE: CPT

## 2023-02-11 PROCEDURE — 2580000003 HC RX 258: Performed by: INTERNAL MEDICINE

## 2023-02-11 PROCEDURE — 85025 COMPLETE CBC W/AUTO DIFF WBC: CPT

## 2023-02-11 PROCEDURE — 6370000000 HC RX 637 (ALT 250 FOR IP): Performed by: INTERNAL MEDICINE

## 2023-02-11 RX ORDER — ALBUMIN (HUMAN) 12.5 G/50ML
25 SOLUTION INTRAVENOUS DAILY
Status: COMPLETED | OUTPATIENT
Start: 2023-02-11 | End: 2023-02-13

## 2023-02-11 RX ADMIN — HEPARIN SODIUM 5000 UNITS: 5000 INJECTION INTRAVENOUS; SUBCUTANEOUS at 05:40

## 2023-02-11 RX ADMIN — HYDROCODONE BITARTRATE AND ACETAMINOPHEN 1 TABLET: 7.5; 325 TABLET ORAL at 13:17

## 2023-02-11 RX ADMIN — SODIUM CHLORIDE, PRESERVATIVE FREE 10 ML: 5 INJECTION INTRAVENOUS at 21:11

## 2023-02-11 RX ADMIN — SODIUM BICARBONATE 650 MG: 650 TABLET ORAL at 17:28

## 2023-02-11 RX ADMIN — HEPARIN SODIUM 5000 UNITS: 5000 INJECTION INTRAVENOUS; SUBCUTANEOUS at 13:17

## 2023-02-11 RX ADMIN — HYDROCODONE BITARTRATE AND ACETAMINOPHEN 1 TABLET: 7.5; 325 TABLET ORAL at 21:11

## 2023-02-11 RX ADMIN — PANTOPRAZOLE SODIUM 40 MG: 40 TABLET, DELAYED RELEASE ORAL at 05:40

## 2023-02-11 RX ADMIN — FUROSEMIDE 40 MG: 10 INJECTION, SOLUTION INTRAMUSCULAR; INTRAVENOUS at 17:29

## 2023-02-11 RX ADMIN — CARVEDILOL 25 MG: 25 TABLET, FILM COATED ORAL at 17:28

## 2023-02-11 RX ADMIN — SODIUM BICARBONATE 650 MG: 650 TABLET ORAL at 21:11

## 2023-02-11 RX ADMIN — PANTOPRAZOLE SODIUM 40 MG: 40 TABLET, DELAYED RELEASE ORAL at 17:29

## 2023-02-11 RX ADMIN — HYDROCODONE BITARTRATE AND ACETAMINOPHEN 1 TABLET: 7.5; 325 TABLET ORAL at 06:35

## 2023-02-11 RX ADMIN — SODIUM BICARBONATE 650 MG: 650 TABLET ORAL at 13:18

## 2023-02-11 RX ADMIN — ALBUMIN (HUMAN) 25 G: 0.25 INJECTION, SOLUTION INTRAVENOUS at 11:15

## 2023-02-11 RX ADMIN — CARVEDILOL 25 MG: 25 TABLET, FILM COATED ORAL at 10:14

## 2023-02-11 RX ADMIN — SODIUM BICARBONATE 650 MG: 650 TABLET ORAL at 10:16

## 2023-02-11 RX ADMIN — FUROSEMIDE 40 MG: 10 INJECTION, SOLUTION INTRAMUSCULAR; INTRAVENOUS at 10:15

## 2023-02-11 RX ADMIN — SODIUM CHLORIDE, PRESERVATIVE FREE 5 ML: 5 INJECTION INTRAVENOUS at 10:16

## 2023-02-11 RX ADMIN — HEPARIN SODIUM 5000 UNITS: 5000 INJECTION INTRAVENOUS; SUBCUTANEOUS at 21:11

## 2023-02-11 ASSESSMENT — PAIN SCALES - GENERAL
PAINLEVEL_OUTOF10: 0
PAINLEVEL_OUTOF10: 0
PAINLEVEL_OUTOF10: 6
PAINLEVEL_OUTOF10: 6
PAINLEVEL_OUTOF10: 0
PAINLEVEL_OUTOF10: 0
PAINLEVEL_OUTOF10: 7

## 2023-02-11 ASSESSMENT — PAIN DESCRIPTION - LOCATION
LOCATION: LEG
LOCATION: GENERALIZED
LOCATION: LEG

## 2023-02-11 ASSESSMENT — PAIN DESCRIPTION - ORIENTATION: ORIENTATION: RIGHT;LEFT

## 2023-02-11 NOTE — PROGRESS NOTES
Bedside report received from night nurse Adelina Cabrera. Assessment done as noted  Respiration even and unlabored 20/min; denies pain or nausea at present. Encouraged to call with needs.

## 2023-02-11 NOTE — PROGRESS NOTES
Hospitalist Progress Note   Admit Date:  2023  2:53 PM   Name:  Araseli Lewis   Age:  65 y.o. Sex:  male  :  1965   MRN:  [id number]   Room:  803/01    Presenting Complaint: Leg Swelling and Groin Swelling     Reason(s) for Admission: Anasarca [R60.1]  Scrotal edema [N50.89]  Bilateral leg edema [R60.0]  Acute renal failure, unspecified acute renal failure type [facility]) [N17.9]     Hospital Course:   Araseli Lewis is a 65 y.o. male with a h/o cirrhosis, HLD, HTN, DM2 who was admitted to our service on  with anasarca and MARS. He underwent paracentesis on  with apparently 6L removed, though continued to have edema. Baseline sCr is 1.6-1.9, on admission was 4.1. He was started on IV furosemide and albumin. Repeat para on  with 6L removed. Cr has remained >4 so Nephrology was consulted. Scrotal US showed small b/l hydroceles and small epididymal cysts. Urology consulted, conservative mgmt especially for penile/scrotal edema. Subjective & 24hr Events (23): Feels a little better, still swollen in legs, scrotum. Cr is stable today at 4.3. Making urine. Assessment & Plan:   # Anasarca // cirrhosis with ascites   - S/p para on , 6L removed   - Con't IV diuresis and albumin   - Follows with [facility]    # MARS on CKD3   - Baseline Cr 1.6-1.9. Cr on admission 4, remains stable but still elevated at 4.3 on . Nephrology following, IV albumin , oral bicarb. # GERD   - PPI    # LLE wound   - Wound care    # HTN   - Carvedilol    # DM2   - SSI    Anticipated discharge needs: Pending. Diet:  ADULT DIET; Regular; 4 carb choices (60 gm/meal);  Low Sodium (2 gm)  DVT PPx: Heparin SQ  Code status: Full Code    Hospital Problems:  Principal Problem:    Anasarca  Active Problems:    Diabetic peripheral neuropathy ([facility] [address]    Essential hypertension    Type 2 diabetes mellitus (HCC)    Hypoalbuminemia    Cirrhosis of liver with ascites (HCC)    Acute kidney injury superimposed on chronic kidney disease (HCC)    Stage 3a chronic kidney disease (HCC)  Resolved Problems:    * No resolved hospital problems. *      Objective:   Patient Vitals for the past 24 hrs:   Temp Pulse Resp BP SpO2   02/11/23 1059 98.1 °F (36.7 °C) 81 20 (!) 149/96 98 %   02/11/23 1014 -- 85 -- (!) 149/93 --   02/11/23 0730 97.9 °F (36.6 °C) 85 -- (!) 148/93 98 %   02/11/23 0635 -- -- 18 -- --   02/11/23 0310 98.1 °F (36.7 °C) 81 19 127/89 99 %   02/10/23 2327 97.5 °F (36.4 °C) 80 19 (!) 140/90 100 %   02/10/23 1926 98.6 °F (37 °C) 77 19 108/83 99 %   02/10/23 1502 97.3 °F (36.3 °C) 72 18 (!) 137/95 99 %       Oxygen Therapy  SpO2: 98 %  Pulse Oximeter Device Mode: Continuous  O2 Device: None (Room air)    Estimated body mass index is 28.73 kg/m² as calculated from the following:    Height as of this encounter: 5' 11\" (1.803 m).    Weight as of this encounter: 206 lb (93.4 kg).    Intake/Output Summary (Last 24 hours) at 2/11/2023 1221  Last data filed at 2/11/2023 1154  Gross per 24 hour   Intake --   Output 1250 ml   Net -1250 ml         Physical Exam:   Blood pressure (!) 149/96, pulse 81, temperature 98.1 °F (36.7 °C), temperature source Oral, resp. rate 20, height 5' 11\" (1.803 m), weight 206 lb (93.4 kg), SpO2 98 %.  General:    Well nourished. Overweight. Awake, oriented.  Head:  Normocephalic, atraumatic  Eyes:  Sclerae appear normal.  Pupils equally round.  ENT:  Nares appear normal.  Moist oral mucosa  Neck:  No restricted ROM.  Trachea midline   CV:   RRR.  No m/r/g.  No jugular venous distension.  Lungs:   CTAB.  No wheezing, rhonchi, or rales.  Symmetric expansion.  Abdomen:   Soft, nontender, nondistended.  Extremities: No cyanosis or clubbing. 2+ b/l LE pitting edema.  Skin:     No rashes and normal coloration.   Warm and dry.    Neuro:  CN II-XII grossly intact.    Psych:  Normal mood and affect.      I have personally reviewed labs and tests:  Recent Labs:  Recent Results (from the past 48 hour(s))   POCT  Glucose    Collection Time: 02/09/23  4:39 PM   Result Value Ref Range    POC Glucose 151 (H) 65 - 100 mg/dL    Performed by: Julia    POCT Glucose    Collection Time: 02/09/23  8:29 PM   Result Value Ref Range    POC Glucose 187 (H) 65 - 100 mg/dL    Performed by: Colin    Basic Metabolic Panel w/ Reflex to MG    Collection Time: 02/10/23  3:21 AM   Result Value Ref Range    Sodium 134 133 - 143 mmol/L    Potassium 4.5 3.5 - 5.1 mmol/L    Chloride 105 101 - 110 mmol/L    CO2 20 (L) 21 - 32 mmol/L    Anion Gap 9 2 - 11 mmol/L    Glucose 130 (H) 65 - 100 mg/dL    BUN 44 (H) 6 - 23 MG/DL    Creatinine 4.20 (H) 0.8 - 1.5 MG/DL    Est, Glom Filt Rate 16 (L) >60 ml/min/1.73m2    Calcium 8.0 (L) 8.3 - 10.4 MG/DL   CBC with Auto Differential    Collection Time: 02/10/23  3:21 AM   Result Value Ref Range    WBC 8.2 4.3 - 11.1 K/uL    RBC 2.82 (L) 4.23 - 5.6 M/uL    Hemoglobin 7.6 (L) 13.6 - 17.2 g/dL    Hematocrit 22.3 (L) 41.1 - 50.3 %    MCV 79.1 (L) 82 - 102 FL    MCH 27.0 26.1 - 32.9 PG    MCHC 34.1 31.4 - 35.0 g/dL    RDW 14.8 (H) 11.9 - 14.6 %    Platelets 744 (L) 604 - 450 K/uL    MPV 10.6 9.4 - 12.3 FL    nRBC 0.00 0.0 - 0.2 K/uL    Differential Type AUTOMATED      Seg Neutrophils 54 43 - 78 %    Lymphocytes 29 13 - 44 %    Monocytes 13 (H) 4.0 - 12.0 %    Eosinophils % 3 0.5 - 7.8 %    Basophils 1 0.0 - 2.0 %    Immature Granulocytes 0 0.0 - 5.0 %    Segs Absolute 4.5 1.7 - 8.2 K/UL    Absolute Lymph # 2.4 0.5 - 4.6 K/UL    Absolute Mono # 1.0 0.1 - 1.3 K/UL    Absolute Eos # 0.2 0.0 - 0.8 K/UL    Basophils Absolute 0.0 0.0 - 0.2 K/UL    Absolute Immature Granulocyte 0.0 0.0 - 0.5 K/UL   Phosphorus    Collection Time: 02/10/23  3:21 AM   Result Value Ref Range    Phosphorus 5.6 (H) 2.5 - 4.5 MG/DL   Albumin    Collection Time: 02/10/23  3:21 AM   Result Value Ref Range    Albumin 1.3 (L) 3.5 - 5.0 g/dL   POCT Glucose    Collection Time: 02/10/23  7:18 AM   Result Value Ref Range POC Glucose 213 (H) 65 - 100 mg/dL    Performed by: Jennifer    POCT Glucose    Collection Time: 02/10/23 11:07 AM   Result Value Ref Range    POC Glucose 125 (H) 65 - 100 mg/dL    Performed by: Jennifer    POCT Glucose    Collection Time: 02/10/23  4:23 PM   Result Value Ref Range    POC Glucose 124 (H) 65 - 100 mg/dL    Performed by: Jennifer    POCT Glucose    Collection Time: 02/10/23  8:49 PM   Result Value Ref Range    POC Glucose 174 (H) 65 - 100 mg/dL    Performed by: [facility]    Basic Metabolic Panel w/ Reflex to MG    Collection Time: 02/11/23  3:42 AM   Result Value Ref Range    Sodium 134 133 - 143 mmol/L    Potassium 4.1 3.5 - 5.1 mmol/L    Chloride 102 101 - 110 mmol/L    CO2 22 21 - 32 mmol/L    Anion Gap 10 2 - 11 mmol/L    Glucose 109 (H) 65 - 100 mg/dL    BUN 44 (H) 6 - 23 MG/DL    Creatinine 4.30 (H) 0.8 - 1.5 MG/DL    Est, Glom Filt Rate 15 (L) >60 ml/min/1.73m2    Calcium 7.7 (L) 8.3 - 10.4 MG/DL   CBC with Auto Differential    Collection Time: 02/11/23  3:42 AM   Result Value Ref Range    WBC 8.0 4.3 - 11.1 K/uL    RBC 2.88 (L) 4.23 - 5.6 M/uL    Hemoglobin 7.6 (L) 13.6 - 17.2 g/dL    Hematocrit 22.4 (L) 41.1 - 50.3 %    MCV 77.8 (L) 82 - 102 FL    MCH 26.4 26.1 - 32.9 PG    MCHC 33.9 31.4 - 35.0 g/dL    RDW 14.6 11.9 - 14.6 %    Platelets 145 (L) 150 - 450 K/uL    MPV 10.8 9.4 - 12.3 FL    nRBC 0.00 0.0 - 0.2 K/uL    Differential Type AUTOMATED      Seg Neutrophils 51 43 - 78 %    Lymphocytes 31 13 - 44 %    Monocytes 13 (H) 4.0 - 12.0 %    Eosinophils % 3 0.5 - 7.8 %    Basophils 1 0.0 - 2.0 %    Immature Granulocytes 1 0.0 - 5.0 %    Segs Absolute 4.2 1.7 - 8.2 K/UL    Absolute Lymph # 2.5 0.5 - 4.6 K/UL    Absolute Mono # 1.0 0.1 - 1.3 K/UL    Absolute Eos # 0.2 0.0 - 0.8 K/UL    Basophils Absolute 0.0 0.0 - 0.2 K/UL    Absolute Immature Granulocyte 0.0 0.0 - 0.5 K/UL   POCT Glucose    Collection Time: 02/11/23  7:18  AM   Result Value Ref Range    POC Glucose 99 65 - 100 mg/dL    Performed by: Jennifer    POCT Glucose    Collection Time: 02/11/23 11:02 AM   Result Value Ref Range    POC Glucose 156 (H) 65 - 100 mg/dL    Performed by: Kary Gibson        I have personally reviewed imaging studies:  Other Studies:  US ABDOMEN COMPLETE   Final Result   1) Probable increased renal echogenicity, nonspecific, without hydronephrosis. 2) Coarse echogenic lobular liver consistent with cirrhosis. 3) Cholelithiasis without biliary tree obstruction. US SCROTUM AND TESTICLES   Final Result   Negative for testicular torsion. Small bilateral minimally   complicated hydroceles. Small epididymal cysts on the right. Scrotal thickening   on the left. IR US GUIDED PARACENTESIS   Final Result   Uncomplicated ultrasound guided paracentesis.                   XR CHEST 1 VIEW   Final Result   No acute findings in the chest             Current Meds:  Current Facility-Administered Medications   Medication Dose Route Frequency    albumin human 25 % IV solution 25 g  25 g IntraVENous Daily    hydrALAZINE (APRESOLINE) injection 5 mg  5 mg IntraVENous Q6H PRN    sodium bicarbonate tablet 650 mg  650 mg Oral 4x Daily    carvedilol (COREG) tablet 25 mg  25 mg Oral BID WC    insulin lispro (HUMALOG) injection vial 0-4 Units  0-4 Units SubCUTAneous TID WC    insulin lispro (HUMALOG) injection vial 0-4 Units  0-4 Units SubCUTAneous Nightly    glucose chewable tablet 16 g  4 tablet Oral PRN    dextrose bolus 10% 125 mL  125 mL IntraVENous PRN    Or    dextrose bolus 10% 250 mL  250 mL IntraVENous PRN    glucagon (rDNA) injection 1 mg  1 mg SubCUTAneous PRN    dextrose 10 % infusion   IntraVENous Continuous PRN    HYDROcodone-acetaminophen (NORCO) 7.5-325 MG per tablet 1 tablet  1 tablet Oral Q6H PRN    pantoprazole (PROTONIX) tablet 40 mg  40 mg Oral BID AC    [Held by provider] insulin glargine (LANTUS) injection vial 10 Units  10 Units SubCUTAneous Nightly    sodium chloride flush 0.9 % injection 5-40 mL  5-40 mL IntraVENous 2 times per day    sodium chloride flush 0.9 % injection 5-40 mL  5-40 mL IntraVENous PRN    0.9 % sodium chloride infusion   IntraVENous PRN    ondansetron (ZOFRAN-ODT) disintegrating tablet 4 mg  4 mg Oral Q8H PRN    Or    ondansetron (ZOFRAN) injection 4 mg  4 mg IntraVENous Q6H PRN    polyethylene glycol (GLYCOLAX) packet 17 g  17 g Oral Daily PRN    bisacodyl (DULCOLAX) suppository 10 mg  10 mg Rectal Daily PRN    famotidine (PEPCID) tablet 10 mg  10 mg Oral Daily PRN    aluminum & magnesium hydroxide-simethicone (MAALOX) 200-200-20 MG/5ML suspension 30 mL  30 mL Oral Q6H PRN    acetaminophen (TYLENOL) tablet 650 mg  650 mg Oral Q6H PRN    Or    acetaminophen (TYLENOL) suppository 650 mg  650 mg Rectal Q6H PRN    furosemide (LASIX) injection 40 mg  40 mg IntraVENous BID    heparin (porcine) injection 5,000 Units  5,000 Units SubCUTAneous 3 times per day       Signed:  Esau Black MD    Part of this note may have been written by using a voice dictation software. The note has been proof read but may still contain some grammatical/other typographical errors.

## 2023-02-11 NOTE — PROGRESS NOTES
Leandra Khoury  Admission Date: 2/6/2023         [facility] Progress Note: 2/11/2023    Follow-up for: MARS/CKD 3b    The patient's chart is reviewed and the patient is discussed with the staff. Subjective:   Pt seen and examined in room reports tolerated breakfast this AM, good uop, still with significant LE edema.      ROS:  Gen - no fever, no chills   CV - no chest pain, no palpitation  Lung - no shortness of breath, no cough  Abd - no tenderness, no nausea/vomiting, no diarrhea  Ext - + edema    Current Facility-Administered Medications   Medication Dose Route Frequency    hydrALAZINE (APRESOLINE) injection 5 mg  5 mg IntraVENous Q6H PRN    sodium bicarbonate tablet 650 mg  650 mg Oral 4x Daily    carvedilol (COREG) tablet 25 mg  25 mg Oral BID WC    insulin lispro (HUMALOG) injection vial 0-4 Units  0-4 Units SubCUTAneous TID WC    insulin lispro (HUMALOG) injection vial 0-4 Units  0-4 Units SubCUTAneous Nightly    glucose chewable tablet 16 g  4 tablet Oral PRN    dextrose bolus 10% 125 mL  125 mL IntraVENous PRN    Or    dextrose bolus 10% 250 mL  250 mL IntraVENous PRN    glucagon (rDNA) injection 1 mg  1 mg SubCUTAneous PRN    dextrose 10 % infusion   IntraVENous Continuous PRN    HYDROcodone-acetaminophen (NORCO) 7.5-325 MG per tablet 1 tablet  1 tablet Oral Q6H PRN    pantoprazole (PROTONIX) tablet 40 mg  40 mg Oral BID AC    [Held by provider] insulin glargine (LANTUS) injection vial 10 Units  10 Units SubCUTAneous Nightly    sodium chloride flush 0.9 % injection 5-40 mL  5-40 mL IntraVENous 2 times per day    sodium chloride flush 0.9 % injection 5-40 mL  5-40 mL IntraVENous PRN    0.9 % sodium chloride infusion   IntraVENous PRN    ondansetron (ZOFRAN-ODT) disintegrating tablet 4 mg  4 mg Oral Q8H PRN    Or    ondansetron (ZOFRAN) injection 4 mg  4 mg IntraVENous Q6H PRN    polyethylene glycol (GLYCOLAX) packet 17 g  17 g Oral Daily PRN    bisacodyl (DULCOLAX) suppository 10 mg  10 mg Rectal Daily PRN    famotidine (PEPCID) tablet 10 mg  10 mg Oral Daily PRN    aluminum & magnesium hydroxide-simethicone (MAALOX) 200-200-20 MG/5ML suspension 30 mL  30 mL Oral Q6H PRN    acetaminophen (TYLENOL) tablet 650 mg  650 mg Oral Q6H PRN    Or    acetaminophen (TYLENOL) suppository 650 mg  650 mg Rectal Q6H PRN    furosemide (LASIX) injection 40 mg  40 mg IntraVENous BID    heparin (porcine) injection 5,000 Units  5,000 Units SubCUTAneous 3 times per day         Objective:     Vitals:    02/10/23 2327 02/11/23 0310 02/11/23 0635 02/11/23 0730   BP: (!) 140/90 127/89  (!) 148/93   Pulse: 80 81  85   Resp: 19 19 18    Temp: 97.5 °F (36.4 °C) 98.1 °F (36.7 °C)  97.9 °F (36.6 °C)   TempSrc:  Oral  Oral   SpO2: 100% 99%  98%   Weight:       Height:         Intake and Output:   02/09 1901 - 02/11 0700  In: -   Out: 2025 [Urine:2025]  No intake/output data recorded. Physical Exam:   Constitutional:  the patient is well developed and in no acute distress, alert and following commands  HEENT:  Sclera clear, pupils equal, oral mucosa moist  Lungs: clear bilaterally   Cardiovascular:  Regular rate, S1, S2, no Rub   Abd/GI: soft distention, and non-tender; with positive bowel sounds. Ext: warm without cyanosis. There is 2+ lower leg edema/anasarca. Skin:  no jaundice or rashes  Neuro: no gross neuro deficits   Psychiatric: Calm. LAB  Recent Labs     02/09/23  0452 02/10/23  0321 02/11/23  0342   WBC 8.3 8.2 8.0   HGB 7.6* 7.6* 7.6*   HCT 22.4* 22.3* 22.4*    149* 145*       Recent Labs     02/09/23  0452 02/10/23  0321 02/11/23  0342    134 134   K 4.7 4.5 4.1    105 102   CO2 19* 20* 22   BUN 45* 44* 44*   CREATININE 4.10* 4.20* 4.30*   PHOS 6.1* 5.6*  --        No results for input(s): PH, PCO2, PO2, HCO3 in the last 72 hours.       Assessment/Plan:  (Medical Decision Making)   MARS/CKD 3b- ? HRS, vs ATN [facility] will not be accelerate while on diuretic   -Baseline Cr 1.5-1.8  - ? Chronic HRS - probably cannot  except much improvement in renal function - will try albumin infusions   -renal US no evidence of obstructive nephropathy, UA RBC 10-20, P/C ratio 0.2- further work up pending clinical course   -no indication for acute RRT at this time   -Cr a bit up 4.20, non oliguric, still volume up would continue diuretic      2. Hypoalbuminemia/anasarca- 1.4, s/p albumin IV, continue IV lasix      3. Cirrhosis with ascites, s/p paracentesis 2/8 with 6150 cc removed     4. Hypertension stable on BB     5. Acute metabolic acidosis- continue PO NaHCO3      6. DM type II- SSI per hosp      7.  Anemia- Fe studies darrell Mendez MD  [facility]

## 2023-02-12 LAB
ANION GAP SERPL CALC-SCNC: 7 MMOL/L (ref 2–11)
BUN SERPL-MCNC: 46 MG/DL (ref 6–23)
CALCIUM SERPL-MCNC: 8 MG/DL (ref 8.3–10.4)
CHLORIDE SERPL-SCNC: 102 MMOL/L (ref 101–110)
CO2 SERPL-SCNC: 24 MMOL/L (ref 21–32)
CREAT SERPL-MCNC: 3.9 MG/DL (ref 0.8–1.5)
GLUCOSE BLD STRIP.AUTO-MCNC: 123 MG/DL (ref 65–100)
GLUCOSE BLD STRIP.AUTO-MCNC: 162 MG/DL (ref 65–100)
GLUCOSE BLD STRIP.AUTO-MCNC: 172 MG/DL (ref 65–100)
GLUCOSE BLD STRIP.AUTO-MCNC: 174 MG/DL (ref 65–100)
GLUCOSE SERPL-MCNC: 115 MG/DL (ref 65–100)
POTASSIUM SERPL-SCNC: 3.9 MMOL/L (ref 3.5–5.1)
SERVICE CMNT-IMP: ABNORMAL
SODIUM SERPL-SCNC: 133 MMOL/L (ref 133–143)

## 2023-02-12 PROCEDURE — 6360000002 HC RX W HCPCS: Performed by: INTERNAL MEDICINE

## 2023-02-12 PROCEDURE — 2580000003 HC RX 258: Performed by: INTERNAL MEDICINE

## 2023-02-12 PROCEDURE — P9047 ALBUMIN (HUMAN), 25%, 50ML: HCPCS | Performed by: INTERNAL MEDICINE

## 2023-02-12 PROCEDURE — 80048 BASIC METABOLIC PNL TOTAL CA: CPT

## 2023-02-12 PROCEDURE — 6370000000 HC RX 637 (ALT 250 FOR IP): Performed by: INTERNAL MEDICINE

## 2023-02-12 PROCEDURE — 82962 GLUCOSE BLOOD TEST: CPT

## 2023-02-12 PROCEDURE — 1100000000 HC RM PRIVATE

## 2023-02-12 PROCEDURE — 36415 COLL VENOUS BLD VENIPUNCTURE: CPT

## 2023-02-12 PROCEDURE — 6370000000 HC RX 637 (ALT 250 FOR IP): Performed by: STUDENT IN AN ORGANIZED HEALTH CARE EDUCATION/TRAINING PROGRAM

## 2023-02-12 RX ORDER — FUROSEMIDE 10 MG/ML
20 INJECTION INTRAMUSCULAR; INTRAVENOUS 2 TIMES DAILY
Status: DISCONTINUED | OUTPATIENT
Start: 2023-02-12 | End: 2023-02-17

## 2023-02-12 RX ADMIN — FUROSEMIDE 40 MG: 10 INJECTION, SOLUTION INTRAMUSCULAR; INTRAVENOUS at 08:41

## 2023-02-12 RX ADMIN — CARVEDILOL 25 MG: 25 TABLET, FILM COATED ORAL at 16:15

## 2023-02-12 RX ADMIN — CARVEDILOL 25 MG: 25 TABLET, FILM COATED ORAL at 08:41

## 2023-02-12 RX ADMIN — SODIUM CHLORIDE, PRESERVATIVE FREE 10 ML: 5 INJECTION INTRAVENOUS at 08:45

## 2023-02-12 RX ADMIN — PANTOPRAZOLE SODIUM 40 MG: 40 TABLET, DELAYED RELEASE ORAL at 06:35

## 2023-02-12 RX ADMIN — SODIUM CHLORIDE, PRESERVATIVE FREE 10 ML: 5 INJECTION INTRAVENOUS at 21:38

## 2023-02-12 RX ADMIN — HEPARIN SODIUM 5000 UNITS: 5000 INJECTION INTRAVENOUS; SUBCUTANEOUS at 13:28

## 2023-02-12 RX ADMIN — SODIUM BICARBONATE 650 MG: 650 TABLET ORAL at 21:37

## 2023-02-12 RX ADMIN — HYDROCODONE BITARTRATE AND ACETAMINOPHEN 1 TABLET: 7.5; 325 TABLET ORAL at 21:37

## 2023-02-12 RX ADMIN — HEPARIN SODIUM 5000 UNITS: 5000 INJECTION INTRAVENOUS; SUBCUTANEOUS at 21:37

## 2023-02-12 RX ADMIN — SODIUM BICARBONATE 650 MG: 650 TABLET ORAL at 08:41

## 2023-02-12 RX ADMIN — SODIUM BICARBONATE 650 MG: 650 TABLET ORAL at 16:17

## 2023-02-12 RX ADMIN — ALBUMIN (HUMAN) 25 G: 0.25 INJECTION, SOLUTION INTRAVENOUS at 08:39

## 2023-02-12 RX ADMIN — PANTOPRAZOLE SODIUM 40 MG: 40 TABLET, DELAYED RELEASE ORAL at 16:17

## 2023-02-12 RX ADMIN — SODIUM BICARBONATE 650 MG: 650 TABLET ORAL at 13:27

## 2023-02-12 RX ADMIN — FUROSEMIDE 20 MG: 10 INJECTION, SOLUTION INTRAMUSCULAR; INTRAVENOUS at 18:00

## 2023-02-12 RX ADMIN — HEPARIN SODIUM 5000 UNITS: 5000 INJECTION INTRAVENOUS; SUBCUTANEOUS at 06:35

## 2023-02-12 ASSESSMENT — PAIN DESCRIPTION - LOCATION: LOCATION: GENERALIZED

## 2023-02-12 ASSESSMENT — PAIN SCALES - GENERAL
PAINLEVEL_OUTOF10: 5
PAINLEVEL_OUTOF10: 0

## 2023-02-12 NOTE — FLOWSHEET NOTE
02/11/23 8090   Pain Assessment   Pain Assessment None - Denies Pain   Pain Level 0   Response to Pain Intervention Patient satisfied   Side Effects No reported side effects

## 2023-02-12 NOTE — PROGRESS NOTES
Dana Hall  Admission Date: 2/6/2023         [address] Nephrology Progress Note: 2/12/2023    Follow-up for: MARS/CKD 3b    The patient's chart is reviewed and the patient is discussed with the staff. Subjective:   Pt seen and examined in room reports tolerated breakfast this AM, good uop, still with significant LE edema.      ROS:  Gen - no fever, no chills   CV - no chest pain, no palpitation  Lung - no shortness of breath, no cough  Abd - no tenderness, no nausea/vomiting, no diarrhea  Ext - + edema    Current Facility-Administered Medications   Medication Dose Route Frequency    albumin human 25 % IV solution 25 g  25 g IntraVENous Daily    hydrALAZINE (APRESOLINE) injection 5 mg  5 mg IntraVENous Q6H PRN    sodium bicarbonate tablet 650 mg  650 mg Oral 4x Daily    carvedilol (COREG) tablet 25 mg  25 mg Oral BID WC    insulin lispro (HUMALOG) injection vial 0-4 Units  0-4 Units SubCUTAneous TID WC    insulin lispro (HUMALOG) injection vial 0-4 Units  0-4 Units SubCUTAneous Nightly    glucose chewable tablet 16 g  4 tablet Oral PRN    dextrose bolus 10% 125 mL  125 mL IntraVENous PRN    Or    dextrose bolus 10% 250 mL  250 mL IntraVENous PRN    glucagon (rDNA) injection 1 mg  1 mg SubCUTAneous PRN    dextrose 10 % infusion   IntraVENous Continuous PRN    HYDROcodone-acetaminophen (NORCO) 7.5-325 MG per tablet 1 tablet  1 tablet Oral Q6H PRN    pantoprazole (PROTONIX) tablet 40 mg  40 mg Oral BID AC    [Held by provider] insulin glargine (LANTUS) injection vial 10 Units  10 Units SubCUTAneous Nightly    sodium chloride flush 0.9 % injection 5-40 mL  5-40 mL IntraVENous 2 times per day    sodium chloride flush 0.9 % injection 5-40 mL  5-40 mL IntraVENous PRN    0.9 % sodium chloride infusion   IntraVENous PRN    ondansetron (ZOFRAN-ODT) disintegrating tablet 4 mg  4 mg Oral Q8H PRN    Or    ondansetron (ZOFRAN) injection 4 mg  4 mg IntraVENous Q6H PRN    polyethylene glycol (GLYCOLAX) packet 17 g  17 g Oral Daily PRN    bisacodyl (DULCOLAX) suppository 10 mg  10 mg Rectal Daily PRN    famotidine (PEPCID) tablet 10 mg  10 mg Oral Daily PRN    aluminum & magnesium hydroxide-simethicone (MAALOX) 200-200-20 MG/5ML suspension 30 mL  30 mL Oral Q6H PRN    acetaminophen (TYLENOL) tablet 650 mg  650 mg Oral Q6H PRN    Or    acetaminophen (TYLENOL) suppository 650 mg  650 mg Rectal Q6H PRN    furosemide (LASIX) injection 40 mg  40 mg IntraVENous BID    heparin (porcine) injection 5,000 Units  5,000 Units SubCUTAneous 3 times per day         Objective:     Vitals:    02/12/23 0735 02/12/23 0841 02/12/23 1058 02/12/23 1059   BP: 126/85 126/85 (!) 129/90    Pulse: 84 84 77 77   Resp: 16  15    Temp: 98.6 °F (37 °C)  97.3 °F (36.3 °C)    TempSrc: Oral  Oral    SpO2: 96%  99% 100%   Weight:       Height:         Intake and Output:   02/10 1901 - 02/12 0700  In: -   Out: 4942 [Urine:1650]  02/12 0701 - 02/12 1900  In: -   Out: 600 [Urine:600]    Physical Exam:   Constitutional:  the patient is well developed and in no acute distress, alert and following commands  HEENT:  Sclera clear, pupils equal, oral mucosa moist  Lungs: clear bilaterally   Cardiovascular:  Regular rate, S1, S2, no Rub   Abd/GI: soft distention, and non-tender; with positive bowel sounds. Ext: warm without cyanosis. There is 2+ lower leg edema/anasarca. Skin:  no jaundice or rashes  Neuro: no gross neuro deficits   Psychiatric: Calm. LAB  Recent Labs     02/10/23  0321 02/11/23  0342   WBC 8.2 8.0   HGB 7.6* 7.6*   HCT 22.3* 22.4*   * 145*       Recent Labs     02/10/23  0321 02/11/23  0342 02/12/23  0313    134 133   K 4.5 4.1 3.9    102 102   CO2 20* 22 24   BUN 44* 44* 46*   CREATININE 4.20* 4.30* 3.90*   PHOS 5.6*  --   --        No results for input(s): PH, PCO2, PO2, HCO3 in the last 72 hours.       Assessment/Plan:  (Medical Decision Making)   MARS/CKD 3b- ? HRS, vs ATN Jez Older will not be accelerate while on diuretic   -Baseline Cr 1.5-1.8  - ? Chronic HRS -some improvement in renal function seen with albumin infusion. Continue albumin infusion for the next 2 days. Reduced the dose of Lasix  -renal US no evidence of obstructive nephropathy, UA RBC 10-20, P/C ratio 0.2- further work up pending clinical course   -no indication for acute RRT at this time   -Cr a bit up 4.20, non oliguric,      2. Hypoalbuminemia/anasarca- 1.4, s/p albumin IV     3. Cirrhosis with ascites, s/p paracentesis 2/8 with 6150 cc removed     4. Hypertension stable on BB     5. Acute metabolic acidosis- continue PO NaHCO3      6. DM type II- SSI per hosp      7.  Anemia- Fe studies darerll Boyd MD  [facility]

## 2023-02-12 NOTE — PROGRESS NOTES
PM assessment done. No changes noted. Respiration even and unlabored 20/min at rest. No s/s of pain noted at present. Family at bedside. Encouraged to call with needs.

## 2023-02-12 NOTE — FLOWSHEET NOTE
02/11/23 2111   Vital Signs   Resp 18   Pain Assessment   Pain Assessment 0-10   Pain Level 6   Patient's Stated Pain Goal 0 - No pain   Pain Location Generalized   Opioid-Induced Sedation   POSS Score 1     Norco 7.5mg PO

## 2023-02-12 NOTE — PROGRESS NOTES
Hospitalist Progress Note   Admit Date:  2023  2:53 PM   Name:  Flavio Ernst   Age:  65 y.o. Sex:  male  :  1965   MRN:  [id number]   Room:  803/01    Presenting Complaint: Leg Swelling and Groin Swelling     Reason(s) for Admission: Anasarca [R60.1]  Scrotal edema [N50.89]  Bilateral leg edema [R60.0]  Acute renal failure, unspecified acute renal failure type [facility]) [N17.9]     Hospital Course:   Flavio Ernst is a 65 y.o. male with a h/o cirrhosis, HLD, HTN, DM2 who was admitted to our service on  with anasarca and MARS. He underwent paracentesis on  with apparently 6L removed, though continued to have edema. Baseline sCr is 1.6-1.9, on admission was 4.1. He was started on IV furosemide and albumin. Repeat para on  with 6L removed. Cr has remained >4 so Nephrology was consulted. Scrotal US showed small b/l hydroceles and small epididymal cysts. Urology consulted, conservative mgmt especially for penile/scrotal edema. Subjective & 24hr Events (23): Resting comfortably. Thinks his swelling is better. Cr improved to 3.9 today, making urine, ambulating. Abdomen starting to feel a little more distended. Assessment & Plan:   # Anasarca // cirrhosis with ascites              - S/p para on , 6L removed. Repeat while inpatient if necessary. Remains on IV Lasix, a few doses of IV albumin added on . Follows with [facility]. # MARS on CKD3              - Baseline Cr 1.6-1.9. Cr on admission 4, peaked at 4.3 and improved to 3.9 today . Nephrology following. Albumin added on . Continue Lasix. # GERD              - PPI     # LLE wound              - Wound care     # HTN              - Carvedilol     # DM2              - SSI. Sugars controlled with basal insulin on hold. Discharge planning: Home when able  Diet:  ADULT DIET; Regular; 4 carb choices (60 gm/meal);  Low Sodium (2 gm)  VTE prophylaxis:Heparin  Code status: Full Code    Hospital Problems:  Principal Problem:    Anasarca  Active Problems:    Diabetic peripheral neuropathy ([facility]    Essential hypertension    Type 2 diabetes mellitus (HCC)    Hypoalbuminemia    Cirrhosis of liver with ascites (HCC)    Acute kidney injury superimposed on chronic kidney disease (HCC)    Stage 3a chronic kidney disease ([facility] [address]  Resolved Problems:    * No resolved hospital problems. *      Objective:   Patient Vitals for the past 24 hrs:   Temp Pulse Resp BP SpO2   02/12/23 1059 -- 77 -- -- 100 %   02/12/23 1058 97.3 °F (36.3 °C) 77 15 (!) 129/90 99 %   02/12/23 0841 -- 84 -- 126/85 --   02/12/23 0735 98.6 °F (37 °C) 84 16 126/85 96 %   02/12/23 0325 97.7 °F (36.5 °C) 77 19 129/87 97 %   02/11/23 2327 98.2 °F (36.8 °C) 83 20 96/64 98 %   02/11/23 2141 -- -- 18 -- --   02/11/23 2111 -- -- 18 -- --   02/11/23 1937 97.9 °F (36.6 °C) 81 20 (!) 139/98 97 %   02/11/23 1728 -- 76 -- (!) 142/92 --   02/11/23 1516 97.7 °F (36.5 °C) 76 18 (!) 142/92 99 %   02/11/23 1317 -- -- 18 -- --       Oxygen Therapy  SpO2: 100 %  Pulse Oximeter Device Mode: Continuous  O2 Device: None (Room air)    Estimated body mass index is 28.73 kg/m² as calculated from the following:    Height as of this encounter: 5' 11\" (1.803 m). Weight as of this encounter: 206 lb (93.4 kg). Intake/Output Summary (Last 24 hours) at 2/12/2023 1144  Last data filed at 2/12/2023 0735  Gross per 24 hour   Intake --   Output 1600 ml   Net -1600 ml         Physical Exam:   Blood pressure (!) 129/90, pulse 77, temperature 97.3 °F (36.3 °C), temperature source Oral, resp. rate 15, height 5' 11\" (1.803 m), weight 206 lb (93.4 kg), SpO2 100 %. General:    Well nourished. Resting comfortably in bed. Head:  Normocephalic, atraumatic  Eyes:  Sclerae appear normal.  Pupils equally round. ENT:  Nares appear normal.  Moist oral mucosa  Neck:  No restricted ROM. Trachea midline   CV:   RRR. No m/r/g. No jugular venous distension. Lungs:   CTAB.   No wheezing, rhonchi, or rales. Symmetric expansion. RA O2. Comfortable. Abdomen:   Soft, nontender, nondistended. Extremities: No cyanosis or clubbing. B/l LE pitting edema  Skin:     No rashes and normal coloration. Warm and dry. Neuro:  CN II-XII grossly intact. Psych:  Normal mood and affect.       I have personally reviewed labs and tests:  Recent Labs:  Recent Results (from the past 48 hour(s))   POCT Glucose    Collection Time: 02/10/23  4:23 PM   Result Value Ref Range    POC Glucose 124 (H) 65 - 100 mg/dL    Performed by: Jennifer    POCT Glucose    Collection Time: 02/10/23  8:49 PM   Result Value Ref Range    POC Glucose 174 (H) 65 - 100 mg/dL    Performed by: Belinda    Basic Metabolic Panel w/ Reflex to MG    Collection Time: 02/11/23  3:42 AM   Result Value Ref Range    Sodium 134 133 - 143 mmol/L    Potassium 4.1 3.5 - 5.1 mmol/L    Chloride 102 101 - 110 mmol/L    CO2 22 21 - 32 mmol/L    Anion Gap 10 2 - 11 mmol/L    Glucose 109 (H) 65 - 100 mg/dL    BUN 44 (H) 6 - 23 MG/DL    Creatinine 4.30 (H) 0.8 - 1.5 MG/DL    Est, Glom Filt Rate 15 (L) >60 ml/min/1.73m2    Calcium 7.7 (L) 8.3 - 10.4 MG/DL   CBC with Auto Differential    Collection Time: 02/11/23  3:42 AM   Result Value Ref Range    WBC 8.0 4.3 - 11.1 K/uL    RBC 2.88 (L) 4.23 - 5.6 M/uL    Hemoglobin 7.6 (L) 13.6 - 17.2 g/dL    Hematocrit 22.4 (L) 41.1 - 50.3 %    MCV 77.8 (L) 82 - 102 FL    MCH 26.4 26.1 - 32.9 PG    MCHC 33.9 31.4 - 35.0 g/dL    RDW 14.6 11.9 - 14.6 %    Platelets 700 (L) 271 - 450 K/uL    MPV 10.8 9.4 - 12.3 FL    nRBC 0.00 0.0 - 0.2 K/uL    Differential Type AUTOMATED      Seg Neutrophils 51 43 - 78 %    Lymphocytes 31 13 - 44 %    Monocytes 13 (H) 4.0 - 12.0 %    Eosinophils % 3 0.5 - 7.8 %    Basophils 1 0.0 - 2.0 %    Immature Granulocytes 1 0.0 - 5.0 %    Segs Absolute 4.2 1.7 - 8.2 K/UL    Absolute Lymph # 2.5 0.5 - 4.6 K/UL    Absolute Mono # 1.0 0.1 - 1.3 K/UL    Absolute Eos # 0.2 0.0 - 0.8 K/UL    Basophils Absolute 0.0 0.0 - 0.2 K/UL    Absolute Immature Granulocyte 0.0 0.0 - 0.5 K/UL   POCT Glucose    Collection Time: 02/11/23  7:18 AM   Result Value Ref Range    POC Glucose 99 65 - 100 mg/dL    Performed by: Jennifer    POCT Glucose    Collection Time: 02/11/23 11:02 AM   Result Value Ref Range    POC Glucose 156 (H) 65 - 100 mg/dL    Performed by: Jennifer    POCT Glucose    Collection Time: 02/11/23  4:56 PM   Result Value Ref Range    POC Glucose 160 (H) 65 - 100 mg/dL    Performed by: Jennifer    POCT Glucose    Collection Time: 02/11/23  8:55 PM   Result Value Ref Range    POC Glucose 180 (H) 65 - 100 mg/dL    Performed by: [facility]    Basic Metabolic Panel w/ Reflex to MG    Collection Time: 02/12/23  3:13 AM   Result Value Ref Range    Sodium 133 133 - 143 mmol/L    Potassium 3.9 3.5 - 5.1 mmol/L    Chloride 102 101 - 110 mmol/L    CO2 24 21 - 32 mmol/L    Anion Gap 7 2 - 11 mmol/L    Glucose 115 (H) 65 - 100 mg/dL    BUN 46 (H) 6 - 23 MG/DL    Creatinine 3.90 (H) 0.8 - 1.5 MG/DL    Est, Glom Filt Rate 17 (L) >60 ml/min/1.73m2    Calcium 8.0 (L) 8.3 - 10.4 MG/DL   POCT Glucose    Collection Time: 02/12/23  7:36 AM   Result Value Ref Range    POC Glucose 123 (H) 65 - 100 mg/dL    Performed by: Alem    POCT Glucose    Collection Time: 02/12/23 10:59 AM   Result Value Ref Range    POC Glucose 174 (H) 65 - 100 mg/dL    Performed by: Alem        I have personally reviewed imaging studies:  Other Studies:  US ABDOMEN COMPLETE   Final Result   1) Probable increased renal echogenicity, nonspecific, without hydronephrosis. 2) Coarse echogenic lobular liver consistent with cirrhosis. 3) Cholelithiasis without biliary tree obstruction. US SCROTUM AND TESTICLES   Final Result   Negative for testicular torsion. Small bilateral minimally   complicated hydroceles.  Small epididymal cysts on the right. Scrotal thickening   on the left. IR US GUIDED PARACENTESIS   Final Result   Uncomplicated ultrasound guided paracentesis.                   XR CHEST 1 VIEW   Final Result   No acute findings in the chest             Current Meds:  Current Facility-Administered Medications   Medication Dose Route Frequency    albumin human 25 % IV solution 25 g  25 g IntraVENous Daily    hydrALAZINE (APRESOLINE) injection 5 mg  5 mg IntraVENous Q6H PRN    sodium bicarbonate tablet 650 mg  650 mg Oral 4x Daily    carvedilol (COREG) tablet 25 mg  25 mg Oral BID WC    insulin lispro (HUMALOG) injection vial 0-4 Units  0-4 Units SubCUTAneous TID WC    insulin lispro (HUMALOG) injection vial 0-4 Units  0-4 Units SubCUTAneous Nightly    glucose chewable tablet 16 g  4 tablet Oral PRN    dextrose bolus 10% 125 mL  125 mL IntraVENous PRN    Or    dextrose bolus 10% 250 mL  250 mL IntraVENous PRN    glucagon (rDNA) injection 1 mg  1 mg SubCUTAneous PRN    dextrose 10 % infusion   IntraVENous Continuous PRN    HYDROcodone-acetaminophen (NORCO) 7.5-325 MG per tablet 1 tablet  1 tablet Oral Q6H PRN    pantoprazole (PROTONIX) tablet 40 mg  40 mg Oral BID AC    [Held by provider] insulin glargine (LANTUS) injection vial 10 Units  10 Units SubCUTAneous Nightly    sodium chloride flush 0.9 % injection 5-40 mL  5-40 mL IntraVENous 2 times per day    sodium chloride flush 0.9 % injection 5-40 mL  5-40 mL IntraVENous PRN    0.9 % sodium chloride infusion   IntraVENous PRN    ondansetron (ZOFRAN-ODT) disintegrating tablet 4 mg  4 mg Oral Q8H PRN    Or    ondansetron (ZOFRAN) injection 4 mg  4 mg IntraVENous Q6H PRN    polyethylene glycol (GLYCOLAX) packet 17 g  17 g Oral Daily PRN    bisacodyl (DULCOLAX) suppository 10 mg  10 mg Rectal Daily PRN    famotidine (PEPCID) tablet 10 mg  10 mg Oral Daily PRN    aluminum & magnesium hydroxide-simethicone (MAALOX) 200-200-20 MG/5ML suspension 30 mL  30 mL Oral Q6H PRN acetaminophen (TYLENOL) tablet 650 mg  650 mg Oral Q6H PRN    Or    acetaminophen (TYLENOL) suppository 650 mg  650 mg Rectal Q6H PRN    furosemide (LASIX) injection 40 mg  40 mg IntraVENous BID    heparin (porcine) injection 5,000 Units  5,000 Units SubCUTAneous 3 times per day       Signed:  Michelle Gaviria MD    Part of this note may have been written by using a voice dictation software. The note has been proof read but may still contain some grammatical/other typographical errors.

## 2023-02-12 NOTE — PROGRESS NOTES
Bedside report received from night nurse Haylee. Assessment done as noted  Respiration even and unlabored 20/min; denies pain or nausea at present. Encouraged to call with needs.

## 2023-02-13 LAB
ANION GAP SERPL CALC-SCNC: 8 MMOL/L (ref 2–11)
BUN SERPL-MCNC: 43 MG/DL (ref 6–23)
CALCIUM SERPL-MCNC: 7.6 MG/DL (ref 8.3–10.4)
CHLORIDE SERPL-SCNC: 99 MMOL/L (ref 101–110)
CO2 SERPL-SCNC: 25 MMOL/L (ref 21–32)
CREAT SERPL-MCNC: 3.9 MG/DL (ref 0.8–1.5)
GLUCOSE BLD STRIP.AUTO-MCNC: 158 MG/DL (ref 65–100)
GLUCOSE BLD STRIP.AUTO-MCNC: 179 MG/DL (ref 65–100)
GLUCOSE BLD STRIP.AUTO-MCNC: 181 MG/DL (ref 65–100)
GLUCOSE BLD STRIP.AUTO-MCNC: 239 MG/DL (ref 65–100)
GLUCOSE SERPL-MCNC: 162 MG/DL (ref 65–100)
POTASSIUM SERPL-SCNC: 4 MMOL/L (ref 3.5–5.1)
SERVICE CMNT-IMP: ABNORMAL
SODIUM SERPL-SCNC: 132 MMOL/L (ref 133–143)

## 2023-02-13 PROCEDURE — 97535 SELF CARE MNGMENT TRAINING: CPT

## 2023-02-13 PROCEDURE — 6360000002 HC RX W HCPCS: Performed by: INTERNAL MEDICINE

## 2023-02-13 PROCEDURE — 82962 GLUCOSE BLOOD TEST: CPT

## 2023-02-13 PROCEDURE — 2580000003 HC RX 258: Performed by: INTERNAL MEDICINE

## 2023-02-13 PROCEDURE — 1100000000 HC RM PRIVATE

## 2023-02-13 PROCEDURE — 6370000000 HC RX 637 (ALT 250 FOR IP): Performed by: STUDENT IN AN ORGANIZED HEALTH CARE EDUCATION/TRAINING PROGRAM

## 2023-02-13 PROCEDURE — 6370000000 HC RX 637 (ALT 250 FOR IP): Performed by: INTERNAL MEDICINE

## 2023-02-13 PROCEDURE — P9047 ALBUMIN (HUMAN), 25%, 50ML: HCPCS | Performed by: INTERNAL MEDICINE

## 2023-02-13 PROCEDURE — 6370000000 HC RX 637 (ALT 250 FOR IP): Performed by: NURSE PRACTITIONER

## 2023-02-13 PROCEDURE — 80048 BASIC METABOLIC PNL TOTAL CA: CPT

## 2023-02-13 PROCEDURE — 36415 COLL VENOUS BLD VENIPUNCTURE: CPT

## 2023-02-13 RX ORDER — SODIUM BICARBONATE 650 MG/1
650 TABLET ORAL 3 TIMES DAILY
Status: DISCONTINUED | OUTPATIENT
Start: 2023-02-13 | End: 2023-02-20 | Stop reason: HOSPADM

## 2023-02-13 RX ADMIN — CARVEDILOL 25 MG: 25 TABLET, FILM COATED ORAL at 09:05

## 2023-02-13 RX ADMIN — SODIUM BICARBONATE 650 MG: 650 TABLET ORAL at 21:27

## 2023-02-13 RX ADMIN — PANTOPRAZOLE SODIUM 40 MG: 40 TABLET, DELAYED RELEASE ORAL at 18:02

## 2023-02-13 RX ADMIN — HYDROCODONE BITARTRATE AND ACETAMINOPHEN 1 TABLET: 7.5; 325 TABLET ORAL at 10:20

## 2023-02-13 RX ADMIN — PANTOPRAZOLE SODIUM 40 MG: 40 TABLET, DELAYED RELEASE ORAL at 05:31

## 2023-02-13 RX ADMIN — SODIUM BICARBONATE 650 MG: 650 TABLET ORAL at 09:05

## 2023-02-13 RX ADMIN — CARVEDILOL 25 MG: 25 TABLET, FILM COATED ORAL at 18:02

## 2023-02-13 RX ADMIN — ALBUMIN (HUMAN) 25 G: 0.25 INJECTION, SOLUTION INTRAVENOUS at 09:04

## 2023-02-13 RX ADMIN — SODIUM BICARBONATE 650 MG: 650 TABLET ORAL at 13:08

## 2023-02-13 RX ADMIN — HEPARIN SODIUM 5000 UNITS: 5000 INJECTION INTRAVENOUS; SUBCUTANEOUS at 05:32

## 2023-02-13 RX ADMIN — FUROSEMIDE 20 MG: 10 INJECTION, SOLUTION INTRAMUSCULAR; INTRAVENOUS at 17:59

## 2023-02-13 RX ADMIN — FUROSEMIDE 20 MG: 10 INJECTION, SOLUTION INTRAMUSCULAR; INTRAVENOUS at 09:05

## 2023-02-13 RX ADMIN — HEPARIN SODIUM 5000 UNITS: 5000 INJECTION INTRAVENOUS; SUBCUTANEOUS at 21:27

## 2023-02-13 RX ADMIN — SODIUM CHLORIDE, PRESERVATIVE FREE 10 ML: 5 INJECTION INTRAVENOUS at 21:28

## 2023-02-13 RX ADMIN — SODIUM CHLORIDE, PRESERVATIVE FREE 10 ML: 5 INJECTION INTRAVENOUS at 09:06

## 2023-02-13 RX ADMIN — HEPARIN SODIUM 5000 UNITS: 5000 INJECTION INTRAVENOUS; SUBCUTANEOUS at 13:09

## 2023-02-13 RX ADMIN — HYDROCODONE BITARTRATE AND ACETAMINOPHEN 1 TABLET: 7.5; 325 TABLET ORAL at 22:10

## 2023-02-13 ASSESSMENT — PAIN DESCRIPTION - ORIENTATION
ORIENTATION: RIGHT;LEFT
ORIENTATION: RIGHT;LEFT

## 2023-02-13 ASSESSMENT — PAIN DESCRIPTION - DESCRIPTORS
DESCRIPTORS: ACHING
DESCRIPTORS: ACHING

## 2023-02-13 ASSESSMENT — PAIN DESCRIPTION - LOCATION: LOCATION: LEG

## 2023-02-13 ASSESSMENT — PAIN SCALES - GENERAL
PAINLEVEL_OUTOF10: 8
PAINLEVEL_OUTOF10: 6
PAINLEVEL_OUTOF10: 2

## 2023-02-13 NOTE — PROGRESS NOTES
ACUTE OCCUPATIONAL THERAPY GOALS:   (Developed with and agreed upon by patient and/or caregiver.)  1. Patient will complete lower body bathing and dressing with MINIMAL ASSIST and adaptive equipment as needed. GOAL MET 2/13/23  2. Patient will complete toileting with INDEPENDENCE. 3. Patient will complete grooming ADL standing at sink with INDEPENDENCE.  4. Patient will tolerate 25 minutes of OT treatment with 1-2 rest breaks to increase activity tolerance for ADLs. GOAL MET 2/13/23  5. Patient will complete functional transfers with MOD I and adaptive equipment as needed. 6. Patient will tolerate 10 minutes BUE exercises to increase strength for safe, functional transfers. OCCUPATIONAL THERAPY: Daily Note AM   OT Visit Days: 2   Time In/Out  OT Charge Capture  Rehab Caseload Tracker  OT Orders    Osvaldo Lira is a 65 y.o. male   PRIMARY DIAGNOSIS: Anasarca  Anasarca [R60.1]  Scrotal edema [N50.89]  Bilateral leg edema [R60.0]  Acute renal failure, unspecified acute renal failure type ([address] [N17.9]       Inpatient: Payor: [employer] / Plan: [employer] / Product Type: *No Product type* /     ASSESSMENT:     REHAB RECOMMENDATIONS: CURRENT LEVEL OF FUNCTION:  (Most Recently Demonstrated)   Recommendation to date pending progress:  Setting:  No further skilled therapy after discharge from hospital    Equipment:    None--pt has RW and sponge bathes at home Bathing:  Stand by Assist  Dressing:  Minimal Assist  Feeding/Grooming:  Modified Independent  Toileting:  Stand by Assist  Functional Mobility:  Stand by Assist RW     ASSESSMENT:  Mr. Cale Davis is a 66 y/o male presents with BLE and scrotal edema (that have since resolved) and found to have acute renal failure. Today pt presents with decreased activity tolerance, balance and mobility impacting ADLs. Pt received in supine and agreeable to therapy.  Pt overall SBA for bed mobility, sit<>stand RW and mobility of household distances in room. Pt able to complete total body bathing standing at sink with SBA--pt has increased his activity tolerance with this activity. Pt then required seated rest break, min A for LE dressing sitting/standing (which he gets help for at home) and was mod I for grooming ADLs seated. Pt did very well today and demonstrated fair+ dynamic balance throughout. Pt is progressing well toward goals, goals updated. Continue POC. SUBJECTIVE:     Mr. Dre Marquez states, \"Did you watch the super bowl last night? \"     Social/Functional Lives With: Spouse  Type of Home: House  Home Layout: One level  Entrance Stairs - Number of Steps: 2  Bathroom Toilet: Bedside commode  Home Equipment: [address] Help From: Family    OBJECTIVE:     Shirley Parham / Jelena Alexander / Junie Orn: None    RESTRICTIONS/PRECAUTIONS:           PAIN: VITALS / O2:   Pre Treatment:   Pain Assessment: None - Denies Pain        Post Treatment: no c/o pain, resting comfortably in bedside chair Vitals          Oxygen        MOBILITY: I Mod I S SBA CGA Min Mod Max Total  NT x2 Comments:   Bed Mobility    Rolling [] [] [] [] [] [] [] [] [] [x] []    Supine to Sit [] [] [] [x] [] [] [] [] [] [] []    Scooting [] [] [] [x] [] [] [] [] [] [] []    Sit to Supine [] [] [] [] [] [] [] [] [] [x] []    Transfers    Sit to Stand [] [] [] [x] [] [] [] [] [] [] [] RW   Bed to Chair [] [] [] [x] [] [] [] [] [] [] [] RW   Stand to Sit [] [] [] [x] [] [] [] [] [] [] [] RW   Tub/Shower [] [] [] [] [] [] [] [] [] [x] []     Toilet [] [] [] [] [] [] [] [] [] [x] []      [] [] [] [] [] [] [] [] [] [x] []    I=Independent, Mod I=Modified Independent, S=Supervision/Setup, SBA=Standby Assistance, CGA=Contact Guard Assistance, Min=Minimal Assistance, Mod=Moderate Assistance, Max=Maximal Assistance, Total=Total Assistance, NT=Not Tested    ACTIVITIES OF DAILY LIVING: I Mod I S SBA CGA Min Mod Max Total NT Comments   BASIC ADLs:              Upper Body   Bathing [] [] [] [x] [] [] [] [] [] [] Standing at sink RW   Lower Body Bathing [] [] [] [x] [] [] [] [] [] [] Standing at sink RW   Toileting [] [] [] [x] [] [] [] [] [] [] For malia care and BM hygiene standing RW   Upper Body Dressing [] [] [] [x] [] [] [] [] [] [] Donning/doffing gown standing RW   Lower Body Dressing [] [] [] [] [] [x] [] [] [] [] Donning/doffing pull up brief sitting/standing from chair RW   Feeding [] [] [] [] [] [] [] [] [] [x]    Grooming [] [x] [] [] [] [] [] [] [] [] Seated at sink brushing teeth   Personal Device Care [] [] [] [] [] [] [] [] [] [x]    Functional Mobility [] [] [] [x] [] [] [] [] [] [] RW   I=Independent, Mod I=Modified Independent, S=Supervision/Setup, SBA=Standby Assistance, CGA=Contact Guard Assistance, Min=Minimal Assistance, Mod=Moderate Assistance, Max=Maximal Assistance, Total=Total Assistance, NT=Not Tested    BALANCE: Good Fair+ Fair Fair- Poor NT Comments   Sitting Static [x] [] [] [] [] []    Sitting Dynamic [x] [] [] [] [] []              Standing Static [] [x] [] [] [] []    Standing Dynamic [] [x] [] [] [] []        PLAN:     FREQUENCY/DURATION   OT Plan of Care: 3 times/week for duration of hospital stay or until stated goals are met, whichever comes first.    TREATMENT:     TREATMENT:   Self Care (25 minutes): Patient participated in upper body bathing, lower body bathing, toileting, upper body dressing, lower body dressing, and grooming ADLs in unsupported sitting and standing with minimal verbal and tactile cueing to increase independence, decrease assistance required, and increase activity tolerance. Patient also participated in functional mobility, functional transfer, and adaptive equipment training to increase independence, decrease assistance required, and increase activity tolerance.      TREATMENT GRID:  N/A    AFTER TREATMENT PRECAUTIONS: Call light within reach, Chair, Needs within reach, and RN notified    INTERDISCIPLINARY COLLABORATION:  RN/ PCT and OT/ HAYDEN    EDUCATION:       TOTAL TREATMENT DURATION AND TIME:  Time In: 1059  Time Out: 1300 Ever Beard  Minutes: 701 Marky , OT

## 2023-02-13 NOTE — CARE COORDINATION
GONZÁLEZ CM:  patient continues on IV lasix. Patient's discharge plan is home with no needs at this time. Case Management will continue to follow.

## 2023-02-13 NOTE — PROGRESS NOTES
MG level 1.6. orders noted to give Mag bolus 6000mg. Spoke with Dr. Luke Armenta and orders clarified to continue transfuse magnesium bolus as ordered. 1109 right AC IV unable to flush. C/o pain at site. IV removed with cath tip intact. Exit site pink. Two failed attempts to start new IV. Spoke with charge nurse Kristin to assist with PIV. To reassess and follow. 1402 refusing to be stuck again for IV. \"I am hard stick\". PICC team called and notified to assist with PIV. To reassess and follow. [id number] [address] [id number] team called again. No answer. Message left on  to assist with PIV placement. To reassess and follow.

## 2023-02-13 NOTE — PROGRESS NOTES
Physical Therapy Note:    Attempted to see patient this AM for physical therapy treatment  session. Patient declined due to pain and is awaiting pain medicine. Will follow and re-attempt as schedule permits/patient available.  Thank you,    Mode Neely [facility]     Rehab Caseload Tracker

## 2023-02-13 NOTE — FLOWSHEET NOTE
02/12/23 6680   Pain Assessment   Pain Assessment None - Denies Pain   Pain Level 0   Response to Pain Intervention Patient satisfied   Side Effects No reported side effects

## 2023-02-13 NOTE — FLOWSHEET NOTE
02/12/23 3817   Pain Assessment   Pain Assessment 0-10   Pain Level 5   Patient's Stated Pain Goal 0 - No pain   Pain Location Generalized     Norco 7.5mg PO

## 2023-02-13 NOTE — PROGRESS NOTES
Bedside report received from night nurse Chris Mehta. Assessment done as noted  Respiration even and unlabored 20/min; denies pain or nausea at present. Encouraged to call with needs.

## 2023-02-13 NOTE — PROGRESS NOTES
Hospitalist Progress Note   Admit Date:  2023  2:53 PM   Name:  Tonie Dennis   Age:  65 y.o. Sex:  male  :  1965   MRN:  [id number]   Room:  803/01    Presenting Complaint: Leg Swelling and Groin Swelling     Reason(s) for Admission: Anasarca [R60.1]  Scrotal edema [N50.89]  Bilateral leg edema [R60.0]  Acute renal failure, unspecified acute renal failure type [facility]) [N17.9]     Hospital Course:   Tonie Dennis is a 65 y.o. male with a h/o cirrhosis, HLD, HTN, DM2 who was admitted to our service on  with anasarca and MARS. He underwent paracentesis on  with apparently 6L removed, though continued to have edema. Baseline sCr is 1.6-1.9, on admission was 4.1. He was started on IV furosemide and albumin. Repeat para on  with 6L removed. Cr has remained >4 so Nephrology was consulted. Scrotal US showed small b/l hydroceles and small epididymal cysts. Urology consulted, conservative mgmt especially for penile/scrotal edema. Subjective & 24hr Events (23): Slept well, still swollen but better. Says his feet were burning last night, improved this morning. Lasix decreased yesterday. Net neg 8.4L. Assessment & Plan:   # Anasarca // cirrhosis with ascites              - S/p para on , 6L removed. Remains on IV Lasix, dose decreased ; a few doses of IV albumin added on . Follows with [facility]. # MARS on CKD3              - Baseline Cr 1.6-1.9. Cr on admission 4, peaked at 4.3 and improved to 3.9 on  and remains the same today . New baseline? Nephrology following. # GERD              - PPI     # LLE wound              - Wound care     # HTN              - Carvedilol     # DM2              - SSI. Sugars controlled with basal insulin on hold. Diet:  ADULT DIET; Regular; 4 carb choices (60 gm/meal);  Low Sodium (2 gm)  VTE prophylaxis:Heparin  Code status: Full Code    Hospital Problems:  Principal Problem:    Anasarca  Active Problems:    Diabetic peripheral neuropathy (HCC)    Essential hypertension    Type 2 diabetes mellitus (HCC)    Hypoalbuminemia    Cirrhosis of liver with ascites (HCC)    Acute kidney injury superimposed on chronic kidney disease (HCC)    Stage 3a chronic kidney disease ([facility] [address]  Resolved Problems:    * No resolved hospital problems. *      Objective:   Patient Vitals for the past 24 hrs:   Temp Pulse Resp BP SpO2   02/13/23 0750 98.2 °F (36.8 °C) 77 18 132/89 99 %   02/12/23 2303 98.1 °F (36.7 °C) 77 16 (!) 136/91 97 %   02/12/23 1615 -- 73 -- (!) 147/87 --   02/12/23 1505 -- 73 18 (!) 147/87 94 %   02/12/23 1059 -- 77 -- -- 100 %   02/12/23 1058 97.3 °F (36.3 °C) 77 15 (!) 129/90 99 %   02/12/23 0841 -- 84 -- 126/85 --       Oxygen Therapy  SpO2: 99 %  Pulse Oximeter Device Mode: Continuous  O2 Device: None (Room air)    Estimated body mass index is 28.73 kg/m² as calculated from the following:    Height as of this encounter: 5' 11\" (1.803 m). Weight as of this encounter: 206 lb (93.4 kg). Intake/Output Summary (Last 24 hours) at 2/13/2023 0832  Last data filed at 2/12/2023 1835  Gross per 24 hour   Intake --   Output 925 ml   Net -925 ml         Physical Exam:   Blood pressure 132/89, pulse 77, temperature 98.2 °F (36.8 °C), temperature source Oral, resp. rate 18, height 5' 11\" (1.803 m), weight 206 lb (93.4 kg), SpO2 99 %. General:    Well nourished. Appears older than stated age. Head:  Normocephalic, atraumatic  Eyes:  Sclerae appear normal.  Pupils equally round. ENT:  Nares appear normal.  Moist oral mucosa  Neck:  No restricted ROM. Trachea midline   CV:   RRR. No m/r/g. No jugular venous distension. Lungs:   CTAB. No wheezing, rhonchi, or rales. Symmetric expansion. Abdomen:   Soft, nontender, nondistended. Extremities: No cyanosis or clubbing. B/l LE pitting edema. Skin:     No rashes and normal coloration. Warm and dry. Neuro:  CN II-XII grossly intact. Psych:  Normal mood and affect.       I have personally reviewed labs and tests:  Recent Labs:  Recent Results (from the past 48 hour(s))   POCT Glucose    Collection Time: 02/11/23 11:02 AM   Result Value Ref Range    POC Glucose 156 (H) 65 - 100 mg/dL    Performed by: CaitlinsPCTTrainebrett    POCT Glucose    Collection Time: 02/11/23  4:56 PM   Result Value Ref Range    POC Glucose 160 (H) 65 - 100 mg/dL    Performed by: AliceCTTrainebrett    POCT Glucose    Collection Time: 02/11/23  8:55 PM   Result Value Ref Range    POC Glucose 180 (H) 65 - 100 mg/dL    Performed by: [facility]    Basic Metabolic Panel w/ Reflex to MG    Collection Time: 02/12/23  3:13 AM   Result Value Ref Range    Sodium 133 133 - 143 mmol/L    Potassium 3.9 3.5 - 5.1 mmol/L    Chloride 102 101 - 110 mmol/L    CO2 24 21 - 32 mmol/L    Anion Gap 7 2 - 11 mmol/L    Glucose 115 (H) 65 - 100 mg/dL    BUN 46 (H) 6 - 23 MG/DL    Creatinine 3.90 (H) 0.8 - 1.5 MG/DL    Est, Glom Filt Rate 17 (L) >60 ml/min/1.73m2    Calcium 8.0 (L) 8.3 - 10.4 MG/DL   POCT Glucose    Collection Time: 02/12/23  7:36 AM   Result Value Ref Range    POC Glucose 123 (H) 65 - 100 mg/dL    Performed by: OneWireGomesAngeliquePCT    POCT Glucose    Collection Time: 02/12/23 10:59 AM   Result Value Ref Range    POC Glucose 174 (H) 65 - 100 mg/dL    Performed by: SimmsGomesAngeliquePCT    POCT Glucose    Collection Time: 02/12/23  4:36 PM   Result Value Ref Range    POC Glucose 172 (H) 65 - 100 mg/dL    Performed by: SimmsGomesAngeliquePCT    POCT Glucose    Collection Time: 02/12/23  9:23 PM   Result Value Ref Range    POC Glucose 162 (H) 65 - 100 mg/dL    Performed by: Laly    Basic Metabolic Panel w/ Reflex to MG    Collection Time: 02/13/23  5:01 AM   Result Value Ref Range    Sodium 132 (L) 133 - 143 mmol/L    Potassium 4.0 3.5 - 5.1 mmol/L    Chloride 99 (L) 101 - 110 mmol/L    CO2 25 21 - 32 mmol/L    Anion Gap 8 2 - 11 mmol/L    Glucose 162 (H) 65 - 100 mg/dL    BUN 43 (H) 6 - 23 MG/DL    Creatinine 3.90 (H) 0.8 - 1.5 MG/DL    Est, Glom Filt Rate 17 (L) >60 ml/min/1.73m2    Calcium 7.6 (L) 8.3 - 10.4 MG/DL   POCT Glucose    Collection Time: 02/13/23  7:50 AM   Result Value Ref Range    POC Glucose 181 (H) 65 - 100 mg/dL    Performed by: Julia        I have personally reviewed imaging studies:  Other Studies:  US ABDOMEN COMPLETE   Final Result   1) Probable increased renal echogenicity, nonspecific, without hydronephrosis. 2) Coarse echogenic lobular liver consistent with cirrhosis. 3) Cholelithiasis without biliary tree obstruction. US SCROTUM AND TESTICLES   Final Result   Negative for testicular torsion. Small bilateral minimally   complicated hydroceles. Small epididymal cysts on the right. Scrotal thickening   on the left. IR US GUIDED PARACENTESIS   Final Result   Uncomplicated ultrasound guided paracentesis.                   XR CHEST 1 VIEW   Final Result   No acute findings in the chest             Current Meds:  Current Facility-Administered Medications   Medication Dose Route Frequency    furosemide (LASIX) injection 20 mg  20 mg IntraVENous BID    albumin human 25 % IV solution 25 g  25 g IntraVENous Daily    hydrALAZINE (APRESOLINE) injection 5 mg  5 mg IntraVENous Q6H PRN    sodium bicarbonate tablet 650 mg  650 mg Oral 4x Daily    carvedilol (COREG) tablet 25 mg  25 mg Oral BID WC    insulin lispro (HUMALOG) injection vial 0-4 Units  0-4 Units SubCUTAneous TID WC    insulin lispro (HUMALOG) injection vial 0-4 Units  0-4 Units SubCUTAneous Nightly    glucose chewable tablet 16 g  4 tablet Oral PRN    dextrose bolus 10% 125 mL  125 mL IntraVENous PRN    Or    dextrose bolus 10% 250 mL  250 mL IntraVENous PRN    glucagon (rDNA) injection 1 mg  1 mg SubCUTAneous PRN    dextrose 10 % infusion   IntraVENous Continuous PRN    HYDROcodone-acetaminophen (NORCO) 7.5-325 MG per tablet 1 tablet  1 tablet Oral Q6H PRN    pantoprazole (PROTONIX) tablet 40 mg  40 mg Oral BID AC    [Held by provider] insulin glargine (LANTUS) injection vial 10 Units  10 Units SubCUTAneous Nightly    sodium chloride flush 0.9 % injection 5-40 mL  5-40 mL IntraVENous 2 times per day    sodium chloride flush 0.9 % injection 5-40 mL  5-40 mL IntraVENous PRN    0.9 % sodium chloride infusion   IntraVENous PRN    ondansetron (ZOFRAN-ODT) disintegrating tablet 4 mg  4 mg Oral Q8H PRN    Or    ondansetron (ZOFRAN) injection 4 mg  4 mg IntraVENous Q6H PRN    polyethylene glycol (GLYCOLAX) packet 17 g  17 g Oral Daily PRN    bisacodyl (DULCOLAX) suppository 10 mg  10 mg Rectal Daily PRN    famotidine (PEPCID) tablet 10 mg  10 mg Oral Daily PRN    aluminum & magnesium hydroxide-simethicone (MAALOX) 200-200-20 MG/5ML suspension 30 mL  30 mL Oral Q6H PRN    acetaminophen (TYLENOL) tablet 650 mg  650 mg Oral Q6H PRN    Or    acetaminophen (TYLENOL) suppository 650 mg  650 mg Rectal Q6H PRN    heparin (porcine) injection 5,000 Units  5,000 Units SubCUTAneous 3 times per day       Signed:  Jacquelin Peters MD    Part of this note may have been written by using a voice dictation software. The note has been proof read but may still contain some grammatical/other typographical errors.

## 2023-02-13 NOTE — PROGRESS NOTES
Osvaldo Soraya  Admission Date: 2/6/2023         [facility] Progress Note: 2/13/2023    Follow-up for: MARS/CKD 3b    The patient's chart is reviewed and the patient is discussed with the staff. Subjective:   Pt seen and examined in room reports LE edema with LE discomfort, abdominal swelling no worse,good uop.      ROS:  Gen - no fever, no chills   CV - no chest pain, no palpitation  Lung - no shortness of breath, no cough  Abd - no tenderness, no nausea/vomiting, no diarrhea  Ext - + edema    Current Facility-Administered Medications   Medication Dose Route Frequency    furosemide (LASIX) injection 20 mg  20 mg IntraVENous BID    albumin human 25 % IV solution 25 g  25 g IntraVENous Daily    hydrALAZINE (APRESOLINE) injection 5 mg  5 mg IntraVENous Q6H PRN    sodium bicarbonate tablet 650 mg  650 mg Oral 4x Daily    carvedilol (COREG) tablet 25 mg  25 mg Oral BID WC    insulin lispro (HUMALOG) injection vial 0-4 Units  0-4 Units SubCUTAneous TID WC    insulin lispro (HUMALOG) injection vial 0-4 Units  0-4 Units SubCUTAneous Nightly    glucose chewable tablet 16 g  4 tablet Oral PRN    dextrose bolus 10% 125 mL  125 mL IntraVENous PRN    Or    dextrose bolus 10% 250 mL  250 mL IntraVENous PRN    glucagon (rDNA) injection 1 mg  1 mg SubCUTAneous PRN    dextrose 10 % infusion   IntraVENous Continuous PRN    HYDROcodone-acetaminophen (NORCO) 7.5-325 MG per tablet 1 tablet  1 tablet Oral Q6H PRN    pantoprazole (PROTONIX) tablet 40 mg  40 mg Oral BID AC    [Held by provider] insulin glargine (LANTUS) injection vial 10 Units  10 Units SubCUTAneous Nightly    sodium chloride flush 0.9 % injection 5-40 mL  5-40 mL IntraVENous 2 times per day    sodium chloride flush 0.9 % injection 5-40 mL  5-40 mL IntraVENous PRN    0.9 % sodium chloride infusion   IntraVENous PRN    ondansetron (ZOFRAN-ODT) disintegrating tablet 4 mg  4 mg Oral Q8H PRN    Or    ondansetron (ZOFRAN) injection 4 mg  4 mg IntraVENous Q6H PRN    polyethylene glycol (GLYCOLAX) packet 17 g  17 g Oral Daily PRN    bisacodyl (DULCOLAX) suppository 10 mg  10 mg Rectal Daily PRN    famotidine (PEPCID) tablet 10 mg  10 mg Oral Daily PRN    aluminum & magnesium hydroxide-simethicone (MAALOX) 200-200-20 MG/5ML suspension 30 mL  30 mL Oral Q6H PRN    acetaminophen (TYLENOL) tablet 650 mg  650 mg Oral Q6H PRN    Or    acetaminophen (TYLENOL) suppository 650 mg  650 mg Rectal Q6H PRN    heparin (porcine) injection 5,000 Units  5,000 Units SubCUTAneous 3 times per day         Objective:     Vitals:    02/12/23 1615 02/12/23 2303 02/13/23 0750 02/13/23 0905   BP: (!) 147/87 (!) 136/91 132/89 132/89   Pulse: 73 77 77 77   Resp:  16 18    Temp:  98.1 °F (36.7 °C) 98.2 °F (36.8 °C)    TempSrc:  Oral Oral    SpO2:  97% 99%    Weight:       Height:         Intake and Output:   02/11 1901 - 02/13 0700  In: -   Out: 2125 [Urine:2125]  No intake/output data recorded. Physical Exam:   Constitutional:  the patient is well developed and in no acute distress, alert and following commands  HEENT:  Sclera clear, pupils equal, oral mucosa moist  Lungs: clear bilaterally   Cardiovascular:  Regular rate, S1, S2, no Rub   Abd/GI: soft distention, and non-tender; with positive bowel sounds. Ext: warm without cyanosis. There is 2+ lower leg edema/anasarca. Skin:  no jaundice or rashes  Neuro: no gross neuro deficits   Psychiatric: Calm. LAB  Recent Labs     02/11/23  0342   WBC 8.0   HGB 7.6*   HCT 22.4*   *       Recent Labs     02/11/23  0342 02/12/23  0313 02/13/23  0501    133 132*   K 4.1 3.9 4.0    102 99*   CO2 22 24 25   BUN 44* 46* 43*   CREATININE 4.30* 3.90* 3.90*       No results for input(s): PH, PCO2, PO2, HCO3 in the last 72 hours. Assessment/Plan:  (Medical Decision Making)   MARS/CKD 3b- ? HRS, vs ATN [place] will not be accelerate while on diuretic   -Baseline Cr 1.5-1.8  - ?  Chronic HRS -some improvement in renal function seen with albumin infusion.   -renal US no evidence of obstructive nephropathy, UA RBC 10-20, P/C ratio 0.2- further work up pending clinical course   -no indication for acute RRT at this time   -Cr down to 3.90, non oliguric,      2. Hypoalbuminemia/anasarca- on albumin and lasix      3. Cirrhosis with ascites, s/p paracentesis 2/8 with 6150 cc removed     4. Hypertension stable on BB     5. Acute metabolic acidosis- continue PO NaHCO3      6. DM type II- SSI per hosp      7.  Anemia- Fe studies ok        Sonja Lemus, APRN - [facility] [place]

## 2023-02-14 LAB
ANION GAP SERPL CALC-SCNC: 8 MMOL/L (ref 2–11)
BUN SERPL-MCNC: 41 MG/DL (ref 6–23)
CALCIUM SERPL-MCNC: 7.8 MG/DL (ref 8.3–10.4)
CHLORIDE SERPL-SCNC: 99 MMOL/L (ref 101–110)
CO2 SERPL-SCNC: 25 MMOL/L (ref 21–32)
CREAT SERPL-MCNC: 3.8 MG/DL (ref 0.8–1.5)
GLUCOSE BLD STRIP.AUTO-MCNC: 132 MG/DL (ref 65–100)
GLUCOSE BLD STRIP.AUTO-MCNC: 160 MG/DL (ref 65–100)
GLUCOSE BLD STRIP.AUTO-MCNC: 233 MG/DL (ref 65–100)
GLUCOSE BLD STRIP.AUTO-MCNC: 236 MG/DL (ref 65–100)
GLUCOSE SERPL-MCNC: 153 MG/DL (ref 65–100)
POTASSIUM SERPL-SCNC: 3.9 MMOL/L (ref 3.5–5.1)
SERVICE CMNT-IMP: ABNORMAL
SODIUM SERPL-SCNC: 132 MMOL/L (ref 133–143)

## 2023-02-14 PROCEDURE — 6370000000 HC RX 637 (ALT 250 FOR IP): Performed by: INTERNAL MEDICINE

## 2023-02-14 PROCEDURE — 36415 COLL VENOUS BLD VENIPUNCTURE: CPT

## 2023-02-14 PROCEDURE — 6370000000 HC RX 637 (ALT 250 FOR IP): Performed by: STUDENT IN AN ORGANIZED HEALTH CARE EDUCATION/TRAINING PROGRAM

## 2023-02-14 PROCEDURE — 82962 GLUCOSE BLOOD TEST: CPT

## 2023-02-14 PROCEDURE — 6360000002 HC RX W HCPCS: Performed by: INTERNAL MEDICINE

## 2023-02-14 PROCEDURE — 80048 BASIC METABOLIC PNL TOTAL CA: CPT

## 2023-02-14 PROCEDURE — 2580000003 HC RX 258: Performed by: INTERNAL MEDICINE

## 2023-02-14 PROCEDURE — 6370000000 HC RX 637 (ALT 250 FOR IP): Performed by: NURSE PRACTITIONER

## 2023-02-14 PROCEDURE — 1100000000 HC RM PRIVATE

## 2023-02-14 PROCEDURE — 97530 THERAPEUTIC ACTIVITIES: CPT

## 2023-02-14 RX ADMIN — INSULIN LISPRO 1 UNITS: 100 INJECTION, SOLUTION INTRAVENOUS; SUBCUTANEOUS at 12:33

## 2023-02-14 RX ADMIN — FUROSEMIDE 20 MG: 10 INJECTION, SOLUTION INTRAMUSCULAR; INTRAVENOUS at 16:53

## 2023-02-14 RX ADMIN — FUROSEMIDE 20 MG: 10 INJECTION, SOLUTION INTRAMUSCULAR; INTRAVENOUS at 08:50

## 2023-02-14 RX ADMIN — SODIUM CHLORIDE, PRESERVATIVE FREE 10 ML: 5 INJECTION INTRAVENOUS at 20:43

## 2023-02-14 RX ADMIN — CARVEDILOL 25 MG: 25 TABLET, FILM COATED ORAL at 16:54

## 2023-02-14 RX ADMIN — SODIUM BICARBONATE 650 MG: 650 TABLET ORAL at 20:43

## 2023-02-14 RX ADMIN — SODIUM BICARBONATE 650 MG: 650 TABLET ORAL at 13:34

## 2023-02-14 RX ADMIN — HEPARIN SODIUM 5000 UNITS: 5000 INJECTION INTRAVENOUS; SUBCUTANEOUS at 20:42

## 2023-02-14 RX ADMIN — SODIUM BICARBONATE 650 MG: 650 TABLET ORAL at 08:52

## 2023-02-14 RX ADMIN — HEPARIN SODIUM 5000 UNITS: 5000 INJECTION INTRAVENOUS; SUBCUTANEOUS at 05:28

## 2023-02-14 RX ADMIN — PANTOPRAZOLE SODIUM 40 MG: 40 TABLET, DELAYED RELEASE ORAL at 16:54

## 2023-02-14 RX ADMIN — PANTOPRAZOLE SODIUM 40 MG: 40 TABLET, DELAYED RELEASE ORAL at 05:29

## 2023-02-14 RX ADMIN — INSULIN LISPRO 1 UNITS: 100 INJECTION, SOLUTION INTRAVENOUS; SUBCUTANEOUS at 16:54

## 2023-02-14 RX ADMIN — HYDROCODONE BITARTRATE AND ACETAMINOPHEN 1 TABLET: 7.5; 325 TABLET ORAL at 21:00

## 2023-02-14 RX ADMIN — HEPARIN SODIUM 5000 UNITS: 5000 INJECTION INTRAVENOUS; SUBCUTANEOUS at 13:33

## 2023-02-14 RX ADMIN — SODIUM CHLORIDE, PRESERVATIVE FREE 10 ML: 5 INJECTION INTRAVENOUS at 08:52

## 2023-02-14 RX ADMIN — CARVEDILOL 25 MG: 25 TABLET, FILM COATED ORAL at 08:52

## 2023-02-14 ASSESSMENT — PAIN SCALES - GENERAL
PAINLEVEL_OUTOF10: 0
PAINLEVEL_OUTOF10: 0
PAINLEVEL_OUTOF10: 6

## 2023-02-14 ASSESSMENT — PAIN DESCRIPTION - LOCATION: LOCATION: LEG

## 2023-02-14 ASSESSMENT — PAIN DESCRIPTION - ORIENTATION: ORIENTATION: RIGHT;LEFT

## 2023-02-14 ASSESSMENT — PAIN DESCRIPTION - DESCRIPTORS: DESCRIPTORS: ACHING

## 2023-02-14 NOTE — PROGRESS NOTES
Pt resting in bed. Pt alert oriented times 3 at this time. Pt on RA and denies pain or distress at this time. Pt reports LE swelling is better. Pt encouraged to call for assistance if needed call light in reach, will monitor.

## 2023-02-14 NOTE — PROGRESS NOTES
Patricia Moore  Admission Date: 2/6/2023         [address] Nephrology Progress Note: 2/14/2023    Follow-up for: MARS/CKD 3b    The patient's chart is reviewed and the patient is discussed with the staff. Subjective:   Pt seen and examined in room sitting up in chair nephew at the chairside, pt reports good uop, still with significant edema, tight abdomin.      ROS:  Gen - no fever, no chills, appetite unchanged    CV - no chest pain, no palpitation  Lung - no shortness of breath, no cough  Abd - no tenderness, no nausea/vomiting, no diarrhea  Ext - + edema    Current Facility-Administered Medications   Medication Dose Route Frequency    sodium bicarbonate tablet 650 mg  650 mg Oral TID    furosemide (LASIX) injection 20 mg  20 mg IntraVENous BID    hydrALAZINE (APRESOLINE) injection 5 mg  5 mg IntraVENous Q6H PRN    carvedilol (COREG) tablet 25 mg  25 mg Oral BID WC    insulin lispro (HUMALOG) injection vial 0-4 Units  0-4 Units SubCUTAneous TID WC    insulin lispro (HUMALOG) injection vial 0-4 Units  0-4 Units SubCUTAneous Nightly    glucose chewable tablet 16 g  4 tablet Oral PRN    dextrose bolus 10% 125 mL  125 mL IntraVENous PRN    Or    dextrose bolus 10% 250 mL  250 mL IntraVENous PRN    glucagon (rDNA) injection 1 mg  1 mg SubCUTAneous PRN    dextrose 10 % infusion   IntraVENous Continuous PRN    HYDROcodone-acetaminophen (NORCO) 7.5-325 MG per tablet 1 tablet  1 tablet Oral Q6H PRN    pantoprazole (PROTONIX) tablet 40 mg  40 mg Oral BID AC    [Held by provider] insulin glargine (LANTUS) injection vial 10 Units  10 Units SubCUTAneous Nightly    sodium chloride flush 0.9 % injection 5-40 mL  5-40 mL IntraVENous 2 times per day    sodium chloride flush 0.9 % injection 5-40 mL  5-40 mL IntraVENous PRN    0.9 % sodium chloride infusion   IntraVENous PRN    ondansetron (ZOFRAN-ODT) disintegrating tablet 4 mg  4 mg Oral Q8H PRN    Or    ondansetron (ZOFRAN) injection 4 mg  4 mg IntraVENous Q6H PRN    polyethylene glycol (GLYCOLAX) packet 17 g  17 g Oral Daily PRN    bisacodyl (DULCOLAX) suppository 10 mg  10 mg Rectal Daily PRN    famotidine (PEPCID) tablet 10 mg  10 mg Oral Daily PRN    aluminum & magnesium hydroxide-simethicone (MAALOX) 200-200-20 MG/5ML suspension 30 mL  30 mL Oral Q6H PRN    acetaminophen (TYLENOL) tablet 650 mg  650 mg Oral Q6H PRN    Or    acetaminophen (TYLENOL) suppository 650 mg  650 mg Rectal Q6H PRN    heparin (porcine) injection 5,000 Units  5,000 Units SubCUTAneous 3 times per day         Objective:     Vitals:    02/14/23 0300 02/14/23 0610 02/14/23 0738 02/14/23 1105   BP: 112/83  134/85 118/85   Pulse: 74  60 77   Resp: 20  16 18   Temp: 97.3 °F (36.3 °C)  97.5 °F (36.4 °C) 97.5 °F (36.4 °C)   TempSrc: Oral  Oral Oral   SpO2: 99%  99%    Weight:  214 lb 1.1 oz (97.1 kg)     Height:         Intake and Output:   02/12 1901 - 02/14 0700  In: 240 [P.O.:240]  Out: 9819 [Urine:2375]  02/14 0701 - 02/14 1900  In: -   Out: 250 [Urine:250]    Physical Exam:   Constitutional:  the patient is well developed and in no acute distress, alert and following commands  HEENT:  Sclera clear, pupils equal, oral mucosa moist  Lungs: clear bilaterally   Cardiovascular:  Regular rate, S1, S2, no Rub   Abd/GI: tight distention, and non-tender; with positive bowel sounds. Ext: warm without cyanosis. There is 2+ lower leg edema/anasarca. Skin:  no jaundice or rashes  Neuro: no gross neuro deficits   Psychiatric: Calm. LAB  No results for input(s): WBC, HGB, HCT, PLT, INR in the last 72 hours. Recent Labs     02/12/23  0313 02/13/23  0501 02/14/23  0425    132* 132*   K 3.9 4.0 3.9    99* 99*   CO2 24 25 25   BUN 46* 43* 41*   CREATININE 3.90* 3.90* 3.80*       No results for input(s): PH, PCO2, PO2, HCO3 in the last 72 hours.       Assessment/Plan:  (Medical Decision Making)   MARS/CKD 3b- ? HRS, vs ATN Vladimir Silveira will not be accelerate while on diuretic   -Baseline Cr 1.5-1.8  - ? Chronic HRS -some improvement in renal function seen with albumin infusion.   -renal US no evidence of obstructive nephropathy, UA RBC 10-20, P/C ratio 0.2- further work up pending clinical course   -no indication for acute RRT at this time   -Cr down to 3.80, non oliguric,      2. Hypoalbuminemia/anasarca- continue albumin and lasix      3. Cirrhosis with ascites, s/p paracentesis 2/8 with 6150 cc removed     4. Hypertension stable on BB     5. Acute metabolic acidosis- continue PO NaHCO3      6. DM type II- SSI per hosp      7. Anemia- Fe studies ok    8.  Hyponatremia- 132, trend         Mingo Huber, APRN - [facility] [place]

## 2023-02-14 NOTE — PROGRESS NOTES
ACUTE PHYSICAL THERAPY GOALS:   (Developed with and agreed upon by patient and/or caregiver. )    LTG:  (1.)Mr. Ayesha Louis will move from supine to sit and sit to supine , scoot up and down, and roll side to side in bed with MODIFIED INDEPENDENCE within 7 treatment day(s). (2.)Mr. Ayesha Louis will transfer from bed to chair and chair to bed with MODIFIED INDEPENDENCE using the least restrictive device within 7 treatment day(s). (3.)Mr. Ayesha Louis will ambulate with STAND BY ASSIST for 500+ feet with the least restrictive device within 7 treatment day(s) while maintaining normal vital signs. (4.)Mr. Ayesha Louis will perform 2 stairs with HR and CGA within 7 treatment days for ascending and descending stairs for home. PHYSICAL THERAPY: Daily Note AM   (Link to Caseload Tracking: PT Visit Days : 2  Time In/Out PT Charge Capture  Rehab Caseload Tracker  Orders    Landen Donnelly is a 65 y.o. male   PRIMARY DIAGNOSIS: Anasarca  Anasarca [R60.1]  Scrotal edema [N50.89]  Bilateral leg edema [R60.0]  Acute renal failure, unspecified acute renal failure type ([address] [N17.9]       Inpatient: Payor: Es Rogersor / Plan: LINDA Lamar / Product Type: *No Product type* /     ASSESSMENT:     REHAB RECOMMENDATIONS:   Recommendation to date pending progress:  Setting:  Home Health Therapy    Equipment:    None     ASSESSMENT:  Mr. Ayesha Louis was sitting EOB on contact and willing to walk. He stood and walked 250 ft with the walker and SBA without any real issues. Moving fairly well.      SUBJECTIVE:   Mr. Ayesha Louis states, \"I have been here for all the holidays this year\"     Social/Functional Lives With: Spouse  Type of Home: House  Home Layout: One level  Entrance Stairs - Number of Steps: 2  Bathroom Toilet: Bedside commode  Home Equipment: Teto Meyer, [employer] [address] [employer] [address] Help From: Family  OBJECTIVE:     PAIN: VITALS / O2: Everlena Dilling / [employer] / DRAINS:   Pre Treatment:          Post Treatment: none mentioned Vitals        Oxygen    None    RESTRICTIONS/PRECAUTIONS:        MOBILITY: I Mod I S SBA CGA Min Mod Max Total  NT x2 Comments:   Bed Mobility    Rolling [] [] [] [] [] [] [] [] [] [] []    Supine to Sit [] [] [] [] [] [] [] [] [] [] []    Scooting [] [] [] [] [] [] [] [] [] [] []    Sit to Supine [] [] [] [] [] [] [] [] [] [] []    Transfers    Sit to Stand [] [] [] [x] [] [] [] [] [] [] []    Bed to Chair [] [] [] [x] [] [] [] [] [] [] []    Stand to Sit [] [] [] [x] [] [] [] [] [] [] []     [] [] [] [] [] [] [] [] [] [] []    I=Independent, Mod I=Modified Independent, S=Supervision, SBA=Standby Assistance, CGA=Contact Guard Assistance,   Min=Minimal Assistance, Mod=Moderate Assistance, Max=Maximal Assistance, Total=Total Assistance, NT=Not Tested    BALANCE: Good Fair+ Fair Fair- Poor NT Comments   Sitting Static [x] [] [] [] [] []    Sitting Dynamic [x] [] [] [] [] []              Standing Static [] [x] [] [] [] []    Standing Dynamic [] [x] [] [] [] []      GAIT: I Mod I S SBA CGA Min Mod Max Total  NT x2 Comments:   Level of Assistance [] [] [] [x] [] [] [] [] [] [] []    Distance 250 feet    DME Rolling Walker    Gait Quality Decreased step clearance    Weightbearing Status      Stairs      I=Independent, Mod I=Modified Independent, S=Supervision, SBA=Standby Assistance, CGA=Contact Guard Assistance,   Min=Minimal Assistance, Mod=Moderate Assistance, Max=Maximal Assistance, Total=Total Assistance, NT=Not Tested    PLAN:   FREQUENCY AND DURATION: 3 times/week for duration of hospital stay or until stated goals are met, whichever comes first.    TREATMENT:   TREATMENT:   Therapeutic Activity (10 Minutes): Therapeutic activity included Transfer Training, Ambulation on level ground, and Standing balance to improve functional Activity tolerance, Balance, Mobility, and Strength.     TREATMENT GRID:  N/A    AFTER TREATMENT PRECAUTIONS: Bed/Chair Locked, Call light within reach, Chair, Needs within reach, and RN notified    INTERDISCIPLINARY COLLABORATION:  RN/ PCT and PT/ PTA    EDUCATION:      TIME IN/OUT:  Time In: 8483  Time Out: 0935  Minutes: 10    Teodora Roca PTA

## 2023-02-14 NOTE — PROGRESS NOTES
Pt sitting up in bed. Pt on RA. No s/sx of distress noted at this time. Call light in reach, will monitor.

## 2023-02-14 NOTE — PROGRESS NOTES
Report received from Leah Alcantara RN. No distress at present. Instructed pt to call should needs arise. Pt voiced understanding. Call light within reach.

## 2023-02-14 NOTE — PROGRESS NOTES
Hospitalist Progress Note   Admit Date:  2023  2:53 PM   Name:  Elias Grullon   Age:  65 y.o. Sex:  male  :  1965   MRN:  [id number]   Room:  803/01    Presenting Complaint: Leg Swelling and Groin Swelling     Reason(s) for Admission: Anasarca [R60.1]  Scrotal edema [N50.89]  Bilateral leg edema [R60.0]  Acute renal failure, unspecified acute renal failure type [facility]) [N17.9]     Hospital Course:   Elias Grullon is a 65 y.o. male with a h/o cirrhosis, HLD, HTN, DM2 who was admitted to our service on  with anasarca and MARS. He underwent paracentesis on  with apparently 6L removed, though continued to have edema. Baseline sCr is 1.6-1.9, on admission was 4.1. He was started on IV furosemide and albumin. Repeat para on  with 6L removed. Cr has remained >4 so Nephrology was consulted. Scrotal US showed small b/l hydroceles and small epididymal cysts. Urology consulted, conservative mgmt especially for penile/scrotal edema. Cr has improved to <4 and stable at high 3s. Subjective & 24hr Events (23):   Cr 3.8 today, he thinks his swelling feels a little better. Still reporting high urine output, net neg 10.5L recorded today, was ~8.5L yesterday. Good appetite. No pain. Assessment & Plan:   # Anasarca // cirrhosis with ascites              - S/p para on , 6L removed. Remains on IV Lasix, dose decreased ; given some IV albumin last weekend. Has had about 2L uop in the past 24 hours even with lower dose IV Lasix, so will plan to continue. # MARS on CKD3              - Baseline Cr 1.6-1.9. Cr on admission 4, peaked at 4.3. Stable at 2.8 today, . Nephrology following. # GERD              - PPI     # LLE wound              - Wound care     # HTN              - Carvedilol     # DM2              - Basal insulin remains held and sugars are controlled with SSI only. Discharge planning: Home when able. Diet:  ADULT DIET; Regular; 4 carb choices (60 gm/meal);  Low Sodium (2 gm)  VTE prophylaxis:Heparin  Code status: Full Code    Hospital Problems:  Principal Problem:    Anasarca  Active Problems:    Diabetic peripheral neuropathy ([facility] [address]    Essential hypertension    Type 2 diabetes mellitus (HCC)    Hypoalbuminemia    Cirrhosis of liver with ascites (HCC)    Acute kidney injury superimposed on chronic kidney disease (HCC)    Stage 3a chronic kidney disease ([facility] [address]  Resolved Problems:    * No resolved hospital problems. *      Objective:   Patient Vitals for the past 24 hrs:   Temp Pulse Resp BP SpO2   02/14/23 0738 97.5 °F (36.4 °C) 60 16 134/85 99 %   02/14/23 0300 97.3 °F (36.3 °C) 74 20 112/83 99 %   02/13/23 2253 97.5 °F (36.4 °C) 76 18 118/89 100 %   02/13/23 1911 97.2 °F (36.2 °C) 71 20 124/88 99 %   02/13/23 1759 -- 68 -- 130/86 --   02/13/23 1529 97.7 °F (36.5 °C) 68 18 130/86 100 %   02/13/23 1132 98.1 °F (36.7 °C) 83 18 (!) 148/84 98 %   02/13/23 1020 -- -- 18 -- --   02/13/23 0905 -- 77 -- 132/89 --       Oxygen Therapy  SpO2: 99 %  Pulse Oximeter Device Mode: Continuous  O2 Device: None (Room air)    Estimated body mass index is 29.86 kg/m² as calculated from the following:    Height as of this encounter: 5' 11\" (1.803 m). Weight as of this encounter: 214 lb 1.1 oz (97.1 kg). Intake/Output Summary (Last 24 hours) at 2/14/2023 0826  Last data filed at 2/14/2023 0610  Gross per 24 hour   Intake 240 ml   Output 1825 ml   Net -1585 ml         Physical Exam:   Blood pressure 134/85, pulse 60, temperature 97.5 °F (36.4 °C), temperature source Oral, resp. rate 16, height 5' 11\" (1.803 m), weight 214 lb 1.1 oz (97.1 kg), SpO2 99 %. General:    Well nourished. Head:  Normocephalic, atraumatic  Eyes:  Sclerae appear normal.  Pupils equally round. ENT:  Nares appear normal.  Moist oral mucosa  Neck:  No restricted ROM. Trachea midline   CV:   RRR. No m/r/g. No jugular venous distension. Lungs:   CTAB. No wheezing, rhonchi, or rales.   Symmetric expansion. Abdomen:   Soft, nontender, nondistended. BS active. Extremities: No cyanosis or clubbing. Stable 2+ b/l LE pitting edema. Skin:     No rashes and normal coloration. Warm and dry. Neuro:  CN II-XII grossly intact. Psych:  Normal mood and affect.       I have personally reviewed labs and tests:  Recent Labs:  Recent Results (from the past 48 hour(s))   POCT Glucose    Collection Time: 02/12/23 10:59 AM   Result Value Ref Range    POC Glucose 174 (H) 65 - 100 mg/dL    Performed by: [email or web address]    POCT Glucose    Collection Time: 02/12/23  4:36 PM   Result Value Ref Range    POC Glucose 172 (H) 65 - 100 mg/dL    Performed by: [email or web address]    POCT Glucose    Collection Time: 02/12/23  9:23 PM   Result Value Ref Range    POC Glucose 162 (H) 65 - 100 mg/dL    Performed by: [facility]    Basic Metabolic Panel w/ Reflex to MG    Collection Time: 02/13/23  5:01 AM   Result Value Ref Range    Sodium 132 (L) 133 - 143 mmol/L    Potassium 4.0 3.5 - 5.1 mmol/L    Chloride 99 (L) 101 - 110 mmol/L    CO2 25 21 - 32 mmol/L    Anion Gap 8 2 - 11 mmol/L    Glucose 162 (H) 65 - 100 mg/dL    BUN 43 (H) 6 - 23 MG/DL    Creatinine 3.90 (H) 0.8 - 1.5 MG/DL    Est, Glom Filt Rate 17 (L) >60 ml/min/1.73m2    Calcium 7.6 (L) 8.3 - 10.4 MG/DL   POCT Glucose    Collection Time: 02/13/23  7:50 AM   Result Value Ref Range    POC Glucose 181 (H) 65 - 100 mg/dL    Performed by: [facility]    POCT Glucose    Collection Time: 02/13/23 11:57 AM   Result Value Ref Range    POC Glucose 179 (H) 65 - 100 mg/dL    Performed by: [facility]    POCT Glucose    Collection Time: 02/13/23  4:33 PM   Result Value Ref Range    POC Glucose 158 (H) 65 - 100 mg/dL    Performed by: [facility]    POCT Glucose    Collection Time: 02/13/23  8:41 PM   Result Value Ref Range    POC Glucose 239 (H) 65 - 100 mg/dL    Performed by: Randi    Basic Metabolic Panel w/ Reflex to MG Collection Time: 02/14/23  4:25 AM   Result Value Ref Range    Sodium 132 (L) 133 - 143 mmol/L    Potassium 3.9 3.5 - 5.1 mmol/L    Chloride 99 (L) 101 - 110 mmol/L    CO2 25 21 - 32 mmol/L    Anion Gap 8 2 - 11 mmol/L    Glucose 153 (H) 65 - 100 mg/dL    BUN 41 (H) 6 - 23 MG/DL    Creatinine 3.80 (H) 0.8 - 1.5 MG/DL    Est, Glom Filt Rate 18 (L) >60 ml/min/1.73m2    Calcium 7.8 (L) 8.3 - 10.4 MG/DL   POCT Glucose    Collection Time: 02/14/23  7:40 AM   Result Value Ref Range    POC Glucose 132 (H) 65 - 100 mg/dL    Performed by: Alem        I have personally reviewed imaging studies:  Other Studies:  US ABDOMEN COMPLETE   Final Result   1) Probable increased renal echogenicity, nonspecific, without hydronephrosis. 2) Coarse echogenic lobular liver consistent with cirrhosis. 3) Cholelithiasis without biliary tree obstruction. US SCROTUM AND TESTICLES   Final Result   Negative for testicular torsion. Small bilateral minimally   complicated hydroceles. Small epididymal cysts on the right. Scrotal thickening   on the left. IR US GUIDED PARACENTESIS   Final Result   Uncomplicated ultrasound guided paracentesis.                   XR CHEST 1 VIEW   Final Result   No acute findings in the chest             Current Meds:  Current Facility-Administered Medications   Medication Dose Route Frequency    sodium bicarbonate tablet 650 mg  650 mg Oral TID    furosemide (LASIX) injection 20 mg  20 mg IntraVENous BID    hydrALAZINE (APRESOLINE) injection 5 mg  5 mg IntraVENous Q6H PRN    carvedilol (COREG) tablet 25 mg  25 mg Oral BID WC    insulin lispro (HUMALOG) injection vial 0-4 Units  0-4 Units SubCUTAneous TID WC    insulin lispro (HUMALOG) injection vial 0-4 Units  0-4 Units SubCUTAneous Nightly    glucose chewable tablet 16 g  4 tablet Oral PRN    dextrose bolus 10% 125 mL  125 mL IntraVENous PRN    Or    dextrose bolus 10% 250 mL  250 mL IntraVENous PRN    glucagon (rDNA) injection 1 mg  1 mg SubCUTAneous PRN    dextrose 10 % infusion   IntraVENous Continuous PRN    HYDROcodone-acetaminophen (NORCO) 7.5-325 MG per tablet 1 tablet  1 tablet Oral Q6H PRN    pantoprazole (PROTONIX) tablet 40 mg  40 mg Oral BID AC    [Held by provider] insulin glargine (LANTUS) injection vial 10 Units  10 Units SubCUTAneous Nightly    sodium chloride flush 0.9 % injection 5-40 mL  5-40 mL IntraVENous 2 times per day    sodium chloride flush 0.9 % injection 5-40 mL  5-40 mL IntraVENous PRN    0.9 % sodium chloride infusion   IntraVENous PRN    ondansetron (ZOFRAN-ODT) disintegrating tablet 4 mg  4 mg Oral Q8H PRN    Or    ondansetron (ZOFRAN) injection 4 mg  4 mg IntraVENous Q6H PRN    polyethylene glycol (GLYCOLAX) packet 17 g  17 g Oral Daily PRN    bisacodyl (DULCOLAX) suppository 10 mg  10 mg Rectal Daily PRN    famotidine (PEPCID) tablet 10 mg  10 mg Oral Daily PRN    aluminum & magnesium hydroxide-simethicone (MAALOX) 200-200-20 MG/5ML suspension 30 mL  30 mL Oral Q6H PRN    acetaminophen (TYLENOL) tablet 650 mg  650 mg Oral Q6H PRN    Or    acetaminophen (TYLENOL) suppository 650 mg  650 mg Rectal Q6H PRN    heparin (porcine) injection 5,000 Units  5,000 Units SubCUTAneous 3 times per day       Signed:  Angelina Bajwa MD    Part of this note may have been written by using a voice dictation software. The note has been proof read but may still contain some grammatical/other typographical errors.

## 2023-02-15 LAB
ANION GAP SERPL CALC-SCNC: 9 MMOL/L (ref 2–11)
BUN SERPL-MCNC: 37 MG/DL (ref 6–23)
CALCIUM SERPL-MCNC: 7.7 MG/DL (ref 8.3–10.4)
CHLORIDE SERPL-SCNC: 97 MMOL/L (ref 101–110)
CO2 SERPL-SCNC: 25 MMOL/L (ref 21–32)
CREAT SERPL-MCNC: 3.4 MG/DL (ref 0.8–1.5)
ERYTHROCYTE [DISTWIDTH] IN BLOOD BY AUTOMATED COUNT: 14.6 % (ref 11.9–14.6)
GLUCOSE BLD STRIP.AUTO-MCNC: 120 MG/DL (ref 65–100)
GLUCOSE BLD STRIP.AUTO-MCNC: 180 MG/DL (ref 65–100)
GLUCOSE BLD STRIP.AUTO-MCNC: 185 MG/DL (ref 65–100)
GLUCOSE BLD STRIP.AUTO-MCNC: 186 MG/DL (ref 65–100)
GLUCOSE SERPL-MCNC: 117 MG/DL (ref 65–100)
HCT VFR BLD AUTO: 21.1 % (ref 41.1–50.3)
HGB BLD-MCNC: 7.2 G/DL (ref 13.6–17.2)
MCH RBC QN AUTO: 26.6 PG (ref 26.1–32.9)
MCHC RBC AUTO-ENTMCNC: 34.1 G/DL (ref 31.4–35)
MCV RBC AUTO: 77.9 FL (ref 82–102)
NRBC # BLD: 0 K/UL (ref 0–0.2)
PLATELET # BLD AUTO: 163 K/UL (ref 150–450)
PMV BLD AUTO: 10.9 FL (ref 9.4–12.3)
POTASSIUM SERPL-SCNC: 3.8 MMOL/L (ref 3.5–5.1)
RBC # BLD AUTO: 2.71 M/UL (ref 4.23–5.6)
SERVICE CMNT-IMP: ABNORMAL
SODIUM SERPL-SCNC: 131 MMOL/L (ref 133–143)
WBC # BLD AUTO: 7.9 K/UL (ref 4.3–11.1)

## 2023-02-15 PROCEDURE — 85027 COMPLETE CBC AUTOMATED: CPT

## 2023-02-15 PROCEDURE — 6370000000 HC RX 637 (ALT 250 FOR IP): Performed by: INTERNAL MEDICINE

## 2023-02-15 PROCEDURE — 6360000002 HC RX W HCPCS: Performed by: INTERNAL MEDICINE

## 2023-02-15 PROCEDURE — 97535 SELF CARE MNGMENT TRAINING: CPT

## 2023-02-15 PROCEDURE — 2580000003 HC RX 258: Performed by: INTERNAL MEDICINE

## 2023-02-15 PROCEDURE — 6370000000 HC RX 637 (ALT 250 FOR IP): Performed by: NURSE PRACTITIONER

## 2023-02-15 PROCEDURE — 80048 BASIC METABOLIC PNL TOTAL CA: CPT

## 2023-02-15 PROCEDURE — 6370000000 HC RX 637 (ALT 250 FOR IP): Performed by: STUDENT IN AN ORGANIZED HEALTH CARE EDUCATION/TRAINING PROGRAM

## 2023-02-15 PROCEDURE — 97530 THERAPEUTIC ACTIVITIES: CPT

## 2023-02-15 PROCEDURE — 36415 COLL VENOUS BLD VENIPUNCTURE: CPT

## 2023-02-15 PROCEDURE — 1100000000 HC RM PRIVATE

## 2023-02-15 PROCEDURE — 82962 GLUCOSE BLOOD TEST: CPT

## 2023-02-15 RX ADMIN — HYDROCODONE BITARTRATE AND ACETAMINOPHEN 1 TABLET: 7.5; 325 TABLET ORAL at 21:20

## 2023-02-15 RX ADMIN — HYDROCODONE BITARTRATE AND ACETAMINOPHEN 1 TABLET: 7.5; 325 TABLET ORAL at 08:40

## 2023-02-15 RX ADMIN — SODIUM BICARBONATE 650 MG: 650 TABLET ORAL at 21:20

## 2023-02-15 RX ADMIN — FUROSEMIDE 20 MG: 10 INJECTION, SOLUTION INTRAMUSCULAR; INTRAVENOUS at 08:39

## 2023-02-15 RX ADMIN — HEPARIN SODIUM 5000 UNITS: 5000 INJECTION INTRAVENOUS; SUBCUTANEOUS at 14:41

## 2023-02-15 RX ADMIN — CARVEDILOL 25 MG: 25 TABLET, FILM COATED ORAL at 08:40

## 2023-02-15 RX ADMIN — SODIUM CHLORIDE, PRESERVATIVE FREE 5 ML: 5 INJECTION INTRAVENOUS at 21:34

## 2023-02-15 RX ADMIN — HEPARIN SODIUM 5000 UNITS: 5000 INJECTION INTRAVENOUS; SUBCUTANEOUS at 04:36

## 2023-02-15 RX ADMIN — HYDROCODONE BITARTRATE AND ACETAMINOPHEN 1 TABLET: 7.5; 325 TABLET ORAL at 02:51

## 2023-02-15 RX ADMIN — SODIUM BICARBONATE 650 MG: 650 TABLET ORAL at 08:40

## 2023-02-15 RX ADMIN — HEPARIN SODIUM 5000 UNITS: 5000 INJECTION INTRAVENOUS; SUBCUTANEOUS at 21:20

## 2023-02-15 RX ADMIN — FUROSEMIDE 20 MG: 10 INJECTION, SOLUTION INTRAMUSCULAR; INTRAVENOUS at 16:50

## 2023-02-15 RX ADMIN — SODIUM BICARBONATE 650 MG: 650 TABLET ORAL at 14:41

## 2023-02-15 RX ADMIN — CARVEDILOL 25 MG: 25 TABLET, FILM COATED ORAL at 16:51

## 2023-02-15 RX ADMIN — PANTOPRAZOLE SODIUM 40 MG: 40 TABLET, DELAYED RELEASE ORAL at 16:51

## 2023-02-15 RX ADMIN — PANTOPRAZOLE SODIUM 40 MG: 40 TABLET, DELAYED RELEASE ORAL at 04:37

## 2023-02-15 RX ADMIN — HYDROCODONE BITARTRATE AND ACETAMINOPHEN 1 TABLET: 7.5; 325 TABLET ORAL at 14:41

## 2023-02-15 RX ADMIN — SODIUM CHLORIDE, PRESERVATIVE FREE 5 ML: 5 INJECTION INTRAVENOUS at 08:40

## 2023-02-15 ASSESSMENT — PAIN DESCRIPTION - DESCRIPTORS
DESCRIPTORS: ACHING
DESCRIPTORS: ACHING

## 2023-02-15 ASSESSMENT — PAIN SCALES - GENERAL
PAINLEVEL_OUTOF10: 6
PAINLEVEL_OUTOF10: 3
PAINLEVEL_OUTOF10: 6
PAINLEVEL_OUTOF10: 8
PAINLEVEL_OUTOF10: 6
PAINLEVEL_OUTOF10: 6
PAINLEVEL_OUTOF10: 0

## 2023-02-15 ASSESSMENT — PAIN DESCRIPTION - LOCATION
LOCATION: GENERALIZED
LOCATION: OTHER (COMMENT)
LOCATION: GROIN;LEG

## 2023-02-15 ASSESSMENT — PAIN DESCRIPTION - ORIENTATION: ORIENTATION: RIGHT;LEFT

## 2023-02-15 ASSESSMENT — PAIN - FUNCTIONAL ASSESSMENT: PAIN_FUNCTIONAL_ASSESSMENT: ACTIVITIES ARE NOT PREVENTED

## 2023-02-15 NOTE — CARE COORDINATION
MSN, CM:  patient continues to require IV lasix r/t fluid overload. Patient's discharge plan is home with [facility] and palliative care. Case Management will continue to follow.

## 2023-02-15 NOTE — PROGRESS NOTES
Pt sitting up in chair. Pt on RA. No s/sx of distress noted at this time. Call light In reach, will monitor.

## 2023-02-15 NOTE — PROGRESS NOTES
Pt resting in bed with eyes closed. Pt awakens when spoken to. Pt complaints of pain to legs and groin 8/10 and report pain all during the night. Pt on RA no distress noted at this time. Pt encouraged to call for assistance if needed call light in reach, will monitor.

## 2023-02-15 NOTE — PROGRESS NOTES
ACUTE OCCUPATIONAL THERAPY GOALS:   (Developed with and agreed upon by patient and/or caregiver.)  1. Patient will complete lower body bathing and dressing with MINIMAL ASSIST and adaptive equipment as needed. GOAL MET 2/13/23  2. Patient will complete toileting with INDEPENDENCE. 3. Patient will complete grooming ADL standing at sink with INDEPENDENCE.  4. Patient will tolerate 25 minutes of OT treatment with 1-2 rest breaks to increase activity tolerance for ADLs. GOAL MET 2/13/23  5. Patient will complete functional transfers with MOD I and adaptive equipment as needed. 6. Patient will tolerate 10 minutes BUE exercises to increase strength for safe, functional transfers. OCCUPATIONAL THERAPY: Daily Note AM   OT Visit Days: 3   Time In/Out  OT Charge Capture  Rehab Caseload Tracker  OT Orders    Julián Cooper is a 65 y.o. male   PRIMARY DIAGNOSIS: Anasarca  Anasarca [R60.1]  Scrotal edema [N50.89]  Bilateral leg edema [R60.0]  Acute renal failure, unspecified acute renal failure type ([address] [N17.9]       Inpatient: Payor: Cristal Berry / Plan: [employer] Dana Aschoff / Product Type: *No Product type* /     ASSESSMENT:     REHAB RECOMMENDATIONS: CURRENT LEVEL OF FUNCTION:  (Most Recently Demonstrated)   Recommendation to date pending progress:  Setting:  No further skilled therapy after discharge from hospital    Equipment:    None--pt has RW and sponge bathes at home Bathing:  Stand by Assist  Dressing:  Contact Guard Assist   Feeding/Grooming:  Stand by Assist  Toileting:  Stand by Assist  Functional Mobility:  Stand by Assist RW     ASSESSMENT:  Mr. Shirley Mayo is a 66 y/o male presents to hospital with BLE and scrotal edema, found to have acute renal failure. Pt practiced bed mobility, functional transfers, and functional mobility with SBA/RW, cues for safety and RW management. Pt completed grooming, bathing, and toileting task in stance with SBA/RW.  Pt found to have been incontinent of stool and unaware. Pt practiced functional mobility of household distances with CGA/cues for RW management and proximity progressing with SBA. Pt left seated in bedside recliner, declined elevating BLEs due to pain from LE wound. Recommended completing ankle pumps to help address edema and being mindful of how long he is sitting in BLE dependent position. Pt verbalized understanding. Pt is progressing towards goals, continue POC. SUBJECTIVE:     Mr. Halley Cuellar reports that his legs were hurting him last night and he had a poor night's sleep.      Social/Functional Lives With: Spouse  Type of Home: House  Home Layout: One level  Entrance Stairs - Number of Steps: 2  Bathroom Toilet: Bedside commode  Home Equipment: Atlee Paris, rolling  Receives Help From: Family    OBJECTIVE:     Ludin Ramsey / Samir Holden / Jerry Wahl: None    RESTRICTIONS/PRECAUTIONS:  Restrictions/Precautions  Restrictions/Precautions: Fall Risk        PAIN: VITALS / O2:   Pre Treatment:   Pain Assessment: None - Denies Pain  Pain Level: 0        Post Treatment: no c/o pain, resting comfortably in bedside chair Vitals          Oxygen        MOBILITY: I Mod I S SBA CGA Min Mod Max Total  NT x2 Comments:   Bed Mobility    Rolling [] [x] [] [] [] [] [] [] [] [] []    Supine to Sit [] [x] [x] [] [] [] [] [] [] [] []    Scooting [] [] [x] [] [] [] [] [] [] [] []    Sit to Supine [] [] [] [] [] [] [] [] [] [x] []    Transfers    Sit to Stand [] [] [] [x] [] [] [] [] [] [] []     Bed to Chair [] [] [] [x] [] [] [] [] [] [] [] RW   Stand to Sit [] [] [] [x] [] [] [] [] [] [] [] RW   Tub/Shower [] [] [] [] [] [] [] [] [] [x] []     Toilet [] [] [] [] [] [] [] [] [] [x] []      [] [] [] [] [] [] [] [] [] [x] []    I=Independent, Mod I=Modified Independent, S=Supervision/Setup, SBA=Standby Assistance, CGA=Contact Guard Assistance, Min=Minimal Assistance, Mod=Moderate Assistance, Max=Maximal Assistance, Total=Total Assistance, NT=Not Tested    ACTIVITIES OF DAILY LIVING: I Mod I S SBA CGA Min Mod Max Total NT Comments   BASIC ADLs:              Upper Body   Bathing [] [] [] [x] [] [] [] [] [] []  Standing at sink with RW, widened STU, cues for upright posture    Lower Body Bathing [] [] [] [] [] [] [] [] [] [x]     Toileting [] [] [] [x] [] [] [] [] [] [] In stance at sink at [address] [place] [address]. Pt completed bowel/malia hygiene and doffed pull up brief. Upper Body Dressing [] [] [] [] [] [] [] [] [] [x]    Lower Body Dressing [] [] [] [x] [x] [] [] [] [] [] Pt doffed brief in stance at [address] [place] [address]   Feeding [] [] [] [] [] [] [] [] [] [x]    Grooming [] [x] [] [] [] [] [] [] [] [] In stance at [address] [place] [address] [] [] [] [] [] [] [] [] [] [x]    Functional Mobility [] [] [] [x] [x] [] [] [] [] [] RW, cues for safety and RW management education    I=Independent, Mod I=Modified Independent, S=Supervision/Setup, SBA=Standby Assistance, CGA=Contact Guard Assistance, Min=Minimal Assistance, Mod=Moderate Assistance, Max=Maximal Assistance, Total=Total Assistance, NT=Not Tested    BALANCE: Good Fair+ Fair Fair- Poor NT Comments   Sitting Static [x] [] [] [] [] []    Sitting Dynamic [x] [] [] [] [] []              Standing Static [] [x] [] [] [] []    Standing Dynamic [] [x] [] [] [] []        PLAN:     FREQUENCY/DURATION   OT Plan of Care: 3 times/week for duration of hospital stay or until stated goals are met, whichever comes first.    TREATMENT:     TREATMENT:   Therapeutic Activity (10 Minutes): Patient participated in therapeutic activities including functional transfer training, adaptive equipment training, functional mobility of household distances, and standing tolerance activity with minimal verbal cues and tactile cues in order to increase independence, increase safety awareness, increase activity tolerance, and prepare for discharge home.    Self Care (14 minutes): Patient participated in upper body bathing, toileting, lower body dressing, and grooming ADLs in standing with minimal verbal cueing to increase independence, increase activity tolerance, and increase safety awareness. Patient also participated in functional mobility, functional transfer, and adaptive equipment training to increase independence, decrease assistance required, increase activity tolerance, and increase safety awareness.      TREATMENT GRID:  N/A    AFTER TREATMENT PRECAUTIONS: Bed/Chair Locked, Call light within reach, Chair, Needs within reach, and CNA at bedside    INTERDISCIPLINARY COLLABORATION:  RN/ PCT and OT/ HAYDEN    EDUCATION:   RW management, OT goals/POC    TOTAL TREATMENT DURATION AND TIME:  Time In: 1201  Time Out: [address]  Minutes: 24    Syeda Henriquez, OT

## 2023-02-15 NOTE — PROGRESS NOTES
Hospitalist Progress Note   Admit Date:  2023  2:53 PM   Name:  Daniela Chilel   Age:  65 y.o. Sex:  male  :  1965   MRN:  [id number]   Room:  803/01    Presenting Complaint: Leg Swelling and Groin Swelling     Reason(s) for Admission: Anasarca [R60.1]  Scrotal edema [N50.89]  Bilateral leg edema [R60.0]  Acute renal failure, unspecified acute renal failure type [facility]) [N17.9]     Hospital Course:   Daniela Chilel is a 65 y.o. male with a h/o cirrhosis, HLD, HTN, DM2 who was admitted to our service on  with anasarca and MARS. He underwent paracentesis on  with apparently 6L removed, though continued to have edema. Baseline sCr is 1.6-1.9, on admission was 4.1. He was started on IV furosemide and albumin. Repeat para on  with 6L removed. Cr has remained >4 so Nephrology was consulted. Scrotal US showed small b/l hydroceles and small epididymal cysts. Urology consulted, conservative mgmt especially for penile/scrotal edema. Cr has improved to <4 and stable at high 3s. Subjective & 24hr Events (02/15/23): Patient still has significant swelling in both legs. No fever no chills. No chest pain. No nausea no vomiting. Patient continues to have good urine output. Assessment & Plan: This is a 50-year-old male with:     # Anasarca // cirrhosis with ascites              - S/p para on , 6L removed. Remains on IV Lasix, dose decreased ; given some IV albumin last weekend. Net  negative 11 L since admission. Urine output of 1375 ml in the last 24 hours. Continue IV Lasix 20 mg every 12 hours. # MARS on CKD3 present on admission  ? Hepatorenal syndrome. Retroperitoneal ultrasound negative for hydronephrosis. No indication for dialysis at this time. - Baseline Cr 1.6-1.9. Cr on admission 4, peaked at 4.3. Creatinine improved to 3.4 today from 3.8 yesterday. Nephrology following. Appreciate recommendations. Continue IV Lasix.     Hyponatremia  Sodium of 131 this morning. Monitor sodium levels on IV diuretics. # GERD              - PPI     # LLE wound              - Wound care     # HTN              - Carvedilol     # DM2              - Basal insulin remains held and sugars are controlled with SSI only. Blood glucoses 120- 230. Discharge planning: Anticipate discharge home in the next 48 hours. Additional Medical Decision Making factors:  Complexity: 1 or more acute complicated illness or injury. (Moderate)    I conducted an independent review of: Labs   I conducted an independent review of: Imaging and/or other diagnostic studies     Treatment Risks: Prescription drug management. (Moderate)    I discussed patient's case with an interdisciplinary team.    Diet:  ADULT DIET; Regular; 4 carb choices (60 gm/meal); Low Sodium (2 gm)  VTE prophylaxis:Heparin  Code status: Full Code    Hospital Problems:  Principal Problem:    Anasarca  Active Problems:    Diabetic peripheral neuropathy ([facility] [address]    Essential hypertension    Type 2 diabetes mellitus (HCC)    Hypoalbuminemia    Cirrhosis of liver with ascites (HCC)    Acute kidney injury superimposed on chronic kidney disease (HCC)    Stage 3a chronic kidney disease ([address]  Resolved Problems:    * No resolved hospital problems. *      Objective:   Patient Vitals for the past 24 hrs:   Temp Pulse Resp BP SpO2   02/15/23 1128 97.7 °F (36.5 °C) 76 16 121/84 97 %   02/15/23 0729 97.9 °F (36.6 °C) 77 16 124/85 97 %   02/15/23 0246 98.2 °F (36.8 °C) 73 17 127/79 100 %   02/14/23 2315 97.5 °F (36.4 °C) 73 17 127/80 99 %   02/14/23 1921 97.3 °F (36.3 °C) 68 18 112/68 99 %   02/14/23 1452 -- 70 16 116/89 99 %   02/14/23 1451 97.2 °F (36.2 °C) 74 16 (!) 115/90 --       Oxygen Therapy  SpO2: 97 %  Pulse Oximeter Device Mode: Continuous  O2 Device: None (Room air)    Estimated body mass index is 29.79 kg/m² as calculated from the following:    Height as of this encounter: 5' 11\" (1.803 m).     Weight as of this encounter: 213 lb 10 oz (96.9 kg). Intake/Output Summary (Last 24 hours) at 2/15/2023 1242  Last data filed at 2/15/2023 4438  Gross per 24 hour   Intake 477 ml   Output 1425 ml   Net -948 ml         Physical Exam:   Blood pressure 121/84, pulse 76, temperature 97.7 °F (36.5 °C), temperature source Oral, resp. rate 16, height 5' 11\" (1.803 m), weight 213 lb 10 oz (96.9 kg), SpO2 97 %. General:    Well nourished. Alert awake male, chronically ill-appearing,  Head:  Normocephalic, atraumatic  Eyes:  Sclerae appear normal.  Pupils equally round. ENT:  Nares appear normal.  Moist oral mucosa  Neck:  No restricted ROM. Trachea midline   CV:   RRR. No m/r/g. No jugular venous distension. Lungs:   CTAB. No wheezing, rhonchi, or rales. Symmetric expansion. Abdomen:   Soft, nontender, mildly distended. BS active. Extremities: No cyanosis or clubbing. Stable 2+ b/l LE pitting edema. Skin:     No rashes and normal coloration. Warm and dry. Neuro:  CN II-XII grossly intact. Psych:  Normal mood and affect.       I have personally reviewed labs and tests:  Recent Labs:  Recent Results (from the past 48 hour(s))   POCT Glucose    Collection Time: 02/13/23  4:33 PM   Result Value Ref Range    POC Glucose 158 (H) 65 - 100 mg/dL    Performed by: Julia    POCT Glucose    Collection Time: 02/13/23  8:41 PM   Result Value Ref Range    POC Glucose 239 (H) 65 - 100 mg/dL    Performed by: Es Readh    Basic Metabolic Panel w/ Reflex to MG    Collection Time: 02/14/23  4:25 AM   Result Value Ref Range    Sodium 132 (L) 133 - 143 mmol/L    Potassium 3.9 3.5 - 5.1 mmol/L    Chloride 99 (L) 101 - 110 mmol/L    CO2 25 21 - 32 mmol/L    Anion Gap 8 2 - 11 mmol/L    Glucose 153 (H) 65 - 100 mg/dL    BUN 41 (H) 6 - 23 MG/DL    Creatinine 3.80 (H) 0.8 - 1.5 MG/DL    Est, Glom Filt Rate 18 (L) >60 ml/min/1.73m2    Calcium 7.8 (L) 8.3 - 10.4 MG/DL   POCT Glucose    Collection Time: 02/14/23  7:40 AM   Result Value Ref Range    POC Glucose 132 (H) 65 - 100 mg/dL    Performed by: [employer]    POCT Glucose    Collection Time: 02/14/23 11:09 AM   Result Value Ref Range    POC Glucose 236 (H) 65 - 100 mg/dL    Performed by: [employer]    POCT Glucose    Collection Time: 02/14/23  4:23 PM   Result Value Ref Range    POC Glucose 233 (H) 65 - 100 mg/dL    Performed by: [employer]    POCT Glucose    Collection Time: 02/14/23  8:29 PM   Result Value Ref Range    POC Glucose 160 (H) 65 - 100 mg/dL    Performed by: Tomas Lopez    Basic Metabolic Panel    Collection Time: 02/15/23  4:38 AM   Result Value Ref Range    Sodium 131 (L) 133 - 143 mmol/L    Potassium 3.8 3.5 - 5.1 mmol/L    Chloride 97 (L) 101 - 110 mmol/L    CO2 25 21 - 32 mmol/L    Anion Gap 9 2 - 11 mmol/L    Glucose 117 (H) 65 - 100 mg/dL    BUN 37 (H) 6 - 23 MG/DL    Creatinine 3.40 (H) 0.8 - 1.5 MG/DL    Est, Glom Filt Rate 20 (L) >60 ml/min/1.73m2    Calcium 7.7 (L) 8.3 - 10.4 MG/DL   CBC    Collection Time: 02/15/23  4:38 AM   Result Value Ref Range    WBC 7.9 4.3 - 11.1 K/uL    RBC 2.71 (L) 4.23 - 5.6 M/uL    Hemoglobin 7.2 (L) 13.6 - 17.2 g/dL    Hematocrit 21.1 (L) 41.1 - 50.3 %    MCV 77.9 (L) 82 - 102 FL    MCH 26.6 26.1 - 32.9 PG    MCHC 34.1 31.4 - 35.0 g/dL    RDW 14.6 11.9 - 14.6 %    Platelets 021 742 - 299 K/uL    MPV 10.9 9.4 - 12.3 FL    nRBC 0.00 0.0 - 0.2 K/uL   POCT Glucose    Collection Time: 02/15/23  7:31 AM   Result Value Ref Range    POC Glucose 120 (H) 65 - 100 mg/dL    Performed by: Alem    POCT Glucose    Collection Time: 02/15/23 11:29 AM   Result Value Ref Range    POC Glucose 185 (H) 65 - 100 mg/dL    Performed by: Alem        I have personally reviewed imaging studies:  Other Studies:  US ABDOMEN COMPLETE   Final Result   1) Probable increased renal echogenicity, nonspecific, without hydronephrosis. 2) Coarse echogenic lobular liver consistent with cirrhosis. 3) Cholelithiasis without biliary tree obstruction. US SCROTUM AND TESTICLES   Final Result   Negative for testicular torsion. Small bilateral minimally   complicated hydroceles. Small epididymal cysts on the right. Scrotal thickening   on the left. IR US GUIDED PARACENTESIS   Final Result   Uncomplicated ultrasound guided paracentesis.                   XR CHEST 1 VIEW   Final Result   No acute findings in the chest             Current Meds:  Current Facility-Administered Medications   Medication Dose Route Frequency    sodium bicarbonate tablet 650 mg  650 mg Oral TID    furosemide (LASIX) injection 20 mg  20 mg IntraVENous BID    hydrALAZINE (APRESOLINE) injection 5 mg  5 mg IntraVENous Q6H PRN    carvedilol (COREG) tablet 25 mg  25 mg Oral BID WC    insulin lispro (HUMALOG) injection vial 0-4 Units  0-4 Units SubCUTAneous TID WC    insulin lispro (HUMALOG) injection vial 0-4 Units  0-4 Units SubCUTAneous Nightly    glucose chewable tablet 16 g  4 tablet Oral PRN    dextrose bolus 10% 125 mL  125 mL IntraVENous PRN    Or    dextrose bolus 10% 250 mL  250 mL IntraVENous PRN    glucagon (rDNA) injection 1 mg  1 mg SubCUTAneous PRN    dextrose 10 % infusion   IntraVENous Continuous PRN    HYDROcodone-acetaminophen (NORCO) 7.5-325 MG per tablet 1 tablet  1 tablet Oral Q6H PRN    pantoprazole (PROTONIX) tablet 40 mg  40 mg Oral BID AC    [Held by provider] insulin glargine (LANTUS) injection vial 10 Units  10 Units SubCUTAneous Nightly    sodium chloride flush 0.9 % injection 5-40 mL  5-40 mL IntraVENous 2 times per day    sodium chloride flush 0.9 % injection 5-40 mL  5-40 mL IntraVENous PRN    0.9 % sodium chloride infusion   IntraVENous PRN    ondansetron (ZOFRAN-ODT) disintegrating tablet 4 mg  4 mg Oral Q8H PRN    Or    ondansetron (ZOFRAN) injection 4 mg  4 mg IntraVENous Q6H PRN    polyethylene glycol (GLYCOLAX) packet 17 g  17 g Oral Daily PRN    bisacodyl (DULCOLAX) suppository 10 mg  10 mg Rectal Daily PRN    famotidine (PEPCID) tablet 10 mg  10 mg Oral Daily PRN    aluminum & magnesium hydroxide-simethicone (MAALOX) 200-200-20 MG/5ML suspension 30 mL  30 mL Oral Q6H PRN    acetaminophen (TYLENOL) tablet 650 mg  650 mg Oral Q6H PRN    Or    acetaminophen (TYLENOL) suppository 650 mg  650 mg Rectal Q6H PRN    heparin (porcine) injection 5,000 Units  5,000 Units SubCUTAneous 3 times per day       Signed:  Wilson Venegas MD    Part of this note may have been written by using a voice dictation software. The note has been proof read but may still contain some grammatical/other typographical errors.

## 2023-02-15 NOTE — PROGRESS NOTES
Pt sitting on bedside chair with c/o of pain \"all over\". Pt was given Norco 7.5mg for pain rated 6/10 on pain scale. Will  con't to monitor pt.

## 2023-02-15 NOTE — PROGRESS NOTES
Tran Parkinson  Admission Date: 2/6/2023         [facility] Nephrology Progress Note: 2/15/2023    Follow-up for: MARS/CKD 3b    The patient's chart is reviewed and the patient is discussed with the staff. Subjective:   Pt seen and examined in room sitting up in bed, pt reports good uop, still with significant edema, tight abdomin.      ROS:  Gen - no fever, no chills, appetite unchanged    CV - no chest pain, no palpitation  Lung - no shortness of breath, no cough  Abd - no tenderness, no nausea/vomiting, no diarrhea  Ext - + edema    Current Facility-Administered Medications   Medication Dose Route Frequency    sodium bicarbonate tablet 650 mg  650 mg Oral TID    furosemide (LASIX) injection 20 mg  20 mg IntraVENous BID    hydrALAZINE (APRESOLINE) injection 5 mg  5 mg IntraVENous Q6H PRN    carvedilol (COREG) tablet 25 mg  25 mg Oral BID WC    insulin lispro (HUMALOG) injection vial 0-4 Units  0-4 Units SubCUTAneous TID WC    insulin lispro (HUMALOG) injection vial 0-4 Units  0-4 Units SubCUTAneous Nightly    glucose chewable tablet 16 g  4 tablet Oral PRN    dextrose bolus 10% 125 mL  125 mL IntraVENous PRN    Or    dextrose bolus 10% 250 mL  250 mL IntraVENous PRN    glucagon (rDNA) injection 1 mg  1 mg SubCUTAneous PRN    dextrose 10 % infusion   IntraVENous Continuous PRN    HYDROcodone-acetaminophen (NORCO) 7.5-325 MG per tablet 1 tablet  1 tablet Oral Q6H PRN    pantoprazole (PROTONIX) tablet 40 mg  40 mg Oral BID AC    [Held by provider] insulin glargine (LANTUS) injection vial 10 Units  10 Units SubCUTAneous Nightly    sodium chloride flush 0.9 % injection 5-40 mL  5-40 mL IntraVENous 2 times per day    sodium chloride flush 0.9 % injection 5-40 mL  5-40 mL IntraVENous PRN    0.9 % sodium chloride infusion   IntraVENous PRN    ondansetron (ZOFRAN-ODT) disintegrating tablet 4 mg  4 mg Oral Q8H PRN    Or    ondansetron (ZOFRAN) injection 4 mg  4 mg IntraVENous Q6H PRN    polyethylene glycol (GLYCOLAX) packet 17 g  17 g Oral Daily PRN    bisacodyl (DULCOLAX) suppository 10 mg  10 mg Rectal Daily PRN    famotidine (PEPCID) tablet 10 mg  10 mg Oral Daily PRN    aluminum & magnesium hydroxide-simethicone (MAALOX) 200-200-20 MG/5ML suspension 30 mL  30 mL Oral Q6H PRN    acetaminophen (TYLENOL) tablet 650 mg  650 mg Oral Q6H PRN    Or    acetaminophen (TYLENOL) suppository 650 mg  650 mg Rectal Q6H PRN    heparin (porcine) injection 5,000 Units  5,000 Units SubCUTAneous 3 times per day         Objective:     Vitals:    02/14/23 2315 02/15/23 0246 02/15/23 0600 02/15/23 0729   BP: 127/80 127/79  124/85   Pulse: 73 73  77   Resp: 17 17  16   Temp: 97.5 °F (36.4 °C) 98.2 °F (36.8 °C)  97.9 °F (36.6 °C)   TempSrc: Oral Oral  Oral   SpO2: 99% 100%  97%   Weight:   213 lb 10 oz (96.9 kg)    Height:         Intake and Output:   02/13 1901 - 02/15 0700  In: 058 [P.O.:717]  Out: 6196 [Urine:2675]  No intake/output data recorded. Physical Exam:   Constitutional:  the patient is well developed and in no acute distress, alert and following commands  HEENT:  Sclera clear, pupils equal, oral mucosa moist  Lungs: clear bilaterally   Cardiovascular:  Regular rate, S1, S2, no Rub   Abd/GI: tight distention, and non-tender; with positive bowel sounds. Ext: warm without cyanosis. There is 2+ lower leg edema/anasarca. Skin:  no jaundice or rashes  Neuro: no gross neuro deficits   Psychiatric: Calm. LAB  Recent Labs     02/15/23  0438   WBC 7.9   HGB 7.2*   HCT 21.1*          Recent Labs     02/13/23  0501 02/14/23  0425 02/15/23  0438   * 132* 131*   K 4.0 3.9 3.8   CL 99* 99* 97*   CO2 25 25 25   BUN 43* 41* 37*   CREATININE 3.90* 3.80* 3.40*       No results for input(s): PH, PCO2, PO2, HCO3 in the last 72 hours. Assessment/Plan:  (Medical Decision Making)   MARS/CKD 3b- ? HRS, vs ATN Anselm Jorge will not be accelerate while on diuretic   -Baseline Cr 1.5-1.8  - ?  Chronic HRS -some improvement in renal function seen with albumin infusion.   -renal US no evidence of obstructive nephropathy, UA RBC 10-20, P/C ratio 0.2- further work up pending clinical course   -no indication for acute RRT at this time   -Cr down to 3.40, non oliguric,      2. Hypoalbuminemia/anasarca- sp albumin and lasix      3. Cirrhosis with ascites, s/p paracentesis 2/8 with 6150 cc removed     4. Hypertension stable on BB     5. Acute metabolic acidosis- continue PO NaHCO3      6. DM type II- SSI per hosp      7. Anemia- Fe studies ok    8.  Hyponatremia- 131, trend         Adrian Nguyen, APRN - NP  [address] Nephrology, [place]

## 2023-02-15 NOTE — PROGRESS NOTES
Report received from Marlys Barahona RN. No distress. Sitting up in chair. Instructed pt to call should needs arise. Pt voiced understanding. Call light within reach.

## 2023-02-16 LAB
ALBUMIN SERPL-MCNC: 1.7 G/DL (ref 3.5–5)
ANION GAP SERPL CALC-SCNC: 7 MMOL/L (ref 2–11)
BUN SERPL-MCNC: 37 MG/DL (ref 6–23)
CALCIUM SERPL-MCNC: 8.3 MG/DL (ref 8.3–10.4)
CHLORIDE SERPL-SCNC: 96 MMOL/L (ref 101–110)
CO2 SERPL-SCNC: 25 MMOL/L (ref 21–32)
CREAT SERPL-MCNC: 3.3 MG/DL (ref 0.8–1.5)
ERYTHROCYTE [DISTWIDTH] IN BLOOD BY AUTOMATED COUNT: 14.9 % (ref 11.9–14.6)
GLUCOSE BLD STRIP.AUTO-MCNC: 153 MG/DL (ref 65–100)
GLUCOSE BLD STRIP.AUTO-MCNC: 177 MG/DL (ref 65–100)
GLUCOSE BLD STRIP.AUTO-MCNC: 208 MG/DL (ref 65–100)
GLUCOSE BLD STRIP.AUTO-MCNC: 224 MG/DL (ref 65–100)
GLUCOSE SERPL-MCNC: 184 MG/DL (ref 65–100)
HCT VFR BLD AUTO: 25.7 % (ref 41.1–50.3)
HGB BLD-MCNC: 8.2 G/DL (ref 13.6–17.2)
MCH RBC QN AUTO: 25.8 PG (ref 26.1–32.9)
MCHC RBC AUTO-ENTMCNC: 31.9 G/DL (ref 31.4–35)
MCV RBC AUTO: 80.8 FL (ref 82–102)
NRBC # BLD: 0 K/UL (ref 0–0.2)
PLATELET # BLD AUTO: 205 K/UL (ref 150–450)
PMV BLD AUTO: 10.5 FL (ref 9.4–12.3)
POTASSIUM SERPL-SCNC: 4.3 MMOL/L (ref 3.5–5.1)
RBC # BLD AUTO: 3.18 M/UL (ref 4.23–5.6)
SERVICE CMNT-IMP: ABNORMAL
SODIUM SERPL-SCNC: 128 MMOL/L (ref 133–143)
WBC # BLD AUTO: 7.9 K/UL (ref 4.3–11.1)

## 2023-02-16 PROCEDURE — 6360000002 HC RX W HCPCS: Performed by: INTERNAL MEDICINE

## 2023-02-16 PROCEDURE — 82962 GLUCOSE BLOOD TEST: CPT

## 2023-02-16 PROCEDURE — 85027 COMPLETE CBC AUTOMATED: CPT

## 2023-02-16 PROCEDURE — 2580000003 HC RX 258: Performed by: INTERNAL MEDICINE

## 2023-02-16 PROCEDURE — 80048 BASIC METABOLIC PNL TOTAL CA: CPT

## 2023-02-16 PROCEDURE — 6370000000 HC RX 637 (ALT 250 FOR IP): Performed by: INTERNAL MEDICINE

## 2023-02-16 PROCEDURE — 97530 THERAPEUTIC ACTIVITIES: CPT

## 2023-02-16 PROCEDURE — 82040 ASSAY OF SERUM ALBUMIN: CPT

## 2023-02-16 PROCEDURE — 6370000000 HC RX 637 (ALT 250 FOR IP): Performed by: NURSE PRACTITIONER

## 2023-02-16 PROCEDURE — 1100000000 HC RM PRIVATE

## 2023-02-16 PROCEDURE — 6370000000 HC RX 637 (ALT 250 FOR IP): Performed by: STUDENT IN AN ORGANIZED HEALTH CARE EDUCATION/TRAINING PROGRAM

## 2023-02-16 PROCEDURE — 36415 COLL VENOUS BLD VENIPUNCTURE: CPT

## 2023-02-16 RX ADMIN — HEPARIN SODIUM 5000 UNITS: 5000 INJECTION INTRAVENOUS; SUBCUTANEOUS at 22:30

## 2023-02-16 RX ADMIN — SODIUM BICARBONATE 650 MG: 650 TABLET ORAL at 08:08

## 2023-02-16 RX ADMIN — PANTOPRAZOLE SODIUM 40 MG: 40 TABLET, DELAYED RELEASE ORAL at 17:16

## 2023-02-16 RX ADMIN — HYDROCODONE BITARTRATE AND ACETAMINOPHEN 1 TABLET: 7.5; 325 TABLET ORAL at 12:29

## 2023-02-16 RX ADMIN — HEPARIN SODIUM 5000 UNITS: 5000 INJECTION INTRAVENOUS; SUBCUTANEOUS at 05:37

## 2023-02-16 RX ADMIN — PANTOPRAZOLE SODIUM 40 MG: 40 TABLET, DELAYED RELEASE ORAL at 05:37

## 2023-02-16 RX ADMIN — FUROSEMIDE 20 MG: 10 INJECTION, SOLUTION INTRAMUSCULAR; INTRAVENOUS at 08:07

## 2023-02-16 RX ADMIN — HEPARIN SODIUM 5000 UNITS: 5000 INJECTION INTRAVENOUS; SUBCUTANEOUS at 12:57

## 2023-02-16 RX ADMIN — CARVEDILOL 25 MG: 25 TABLET, FILM COATED ORAL at 08:08

## 2023-02-16 RX ADMIN — SODIUM BICARBONATE 650 MG: 650 TABLET ORAL at 12:57

## 2023-02-16 RX ADMIN — CARVEDILOL 25 MG: 25 TABLET, FILM COATED ORAL at 17:16

## 2023-02-16 RX ADMIN — FUROSEMIDE 20 MG: 10 INJECTION, SOLUTION INTRAMUSCULAR; INTRAVENOUS at 17:14

## 2023-02-16 RX ADMIN — SODIUM CHLORIDE, PRESERVATIVE FREE 10 ML: 5 INJECTION INTRAVENOUS at 08:08

## 2023-02-16 RX ADMIN — INSULIN LISPRO 1 UNITS: 100 INJECTION, SOLUTION INTRAVENOUS; SUBCUTANEOUS at 12:28

## 2023-02-16 RX ADMIN — HYDROCODONE BITARTRATE AND ACETAMINOPHEN 1 TABLET: 7.5; 325 TABLET ORAL at 20:01

## 2023-02-16 RX ADMIN — SODIUM BICARBONATE 650 MG: 650 TABLET ORAL at 20:01

## 2023-02-16 RX ADMIN — SODIUM CHLORIDE, PRESERVATIVE FREE 10 ML: 5 INJECTION INTRAVENOUS at 20:02

## 2023-02-16 RX ADMIN — HYDROCODONE BITARTRATE AND ACETAMINOPHEN 1 TABLET: 7.5; 325 TABLET ORAL at 03:16

## 2023-02-16 ASSESSMENT — PAIN SCALES - GENERAL
PAINLEVEL_OUTOF10: 7
PAINLEVEL_OUTOF10: 6
PAINLEVEL_OUTOF10: 3
PAINLEVEL_OUTOF10: 0
PAINLEVEL_OUTOF10: 6
PAINLEVEL_OUTOF10: 0

## 2023-02-16 ASSESSMENT — PAIN DESCRIPTION - DESCRIPTORS: DESCRIPTORS: ACHING

## 2023-02-16 ASSESSMENT — PAIN DESCRIPTION - LOCATION
LOCATION: LEG
LOCATION: LEG

## 2023-02-16 ASSESSMENT — PAIN SCALES - WONG BAKER
WONGBAKER_NUMERICALRESPONSE: 0
WONGBAKER_NUMERICALRESPONSE: 0

## 2023-02-16 ASSESSMENT — PAIN DESCRIPTION - ORIENTATION: ORIENTATION: RIGHT;LEFT

## 2023-02-16 NOTE — PROGRESS NOTES
Comprehensive Nutrition Assessment    Type and Reason for Visit: Initial  Length of Stay    Nutrition Recommendations/Plan:   Meals and Snacks:  Diet: Continue current order  Nutrition Supplement Therapy:  Medical food supplement therapy:  None - pt refusing at this time     Malnutrition Assessment:  Malnutrition Status: No malnutrition  no sven wasting of muscle or fat stores noted    Nutrition Assessment:  Nutrition History: Pt reports eating 2-3 meals/day at baseline. States his usual body weight is 230 lbs. Pt reports a decrease in appetite starting back in July of 2022. States that since his poor appetite started, he has only been eating one meal/day and has noticed weight loss but is not sure how much. Unable to fully assess weight loss at this time d/t fluid status. Do You Have Any Cultural, Lutheran, or Ethnic Food Preferences?: No   Nutrition Background:       PMH significant for HTN, HLD, T2DM, and liver cirrhosis w/ascities. Pt presents this admission for worsening BLE swelling and abdominal tightness and cramping. Nutrition Interval:  RD met w/pt at bedside. Pt reports eating 100% of meals during admission. States his appetite has come back since some extra fluid has come off. Pt refusing nutritional supplements at this time. Pt is likely meeting estimated nutrition needs given good po intake. RD will continue to follow to monitor po intake. Current Nutrition Therapies:  ADULT DIET; Regular; 4 carb choices (60 gm/meal); Low Sodium (2 gm); 1000 ml    Current Intake:   Average Meal Intake: % (per patient) Average Supplements Intake: None Ordered      Anthropometric Measures:  Height: 5' 11\" (180.3 cm)  Current Body Wt: 213 lb 10 oz (96.9 kg) (2/15), Weight source: Bed Scale  BMI: 29.8, Overweight (BMI 25.0-29. 9)  Admission Body Weight: 205 lb 14.6 oz (93.4 kg) (2/7)  Ideal Body Weight (Kg) (Calculated): 78 kg (172 lbs), 124.2 %  Usual Body Wt: 230 lb (104.3 kg) (per pt, usual weight prior to July of 2022), Percent weight change: -7.1       BMI Category Overweight (BMI 25.0-29. 9)    Estimated Daily Nutrient Needs:  Energy (kcal/day): 6055-9379 (18-22 kcal/kg) (Kcal/kg Weight used: 96.9 kg Current  Protein (g/day):  (1-1.2 g/kg) Weight Used: (Current)    Fluid (ml/day):   (1 ml/kcal)    Nutrition Diagnosis:   No nutrition diagnosis at this time     Nutrition Interventions:   Food and/or Nutrient Delivery: Continue Current Diet  Nutrition Education/Counseling: No recommendation at this time  Coordination of Nutrition Care: Continue to monitor while inpatient  Plan of Care discussed with: patient    Goals:       Active Goal: Meet at least 75% of estimated needs, by next RD assessment       Nutrition Monitoring and Evaluation:      Food/Nutrient Intake Outcomes: Food and Nutrient Intake  Physical Signs/Symptoms Outcomes: Weight, Meal Time Behavior, Fluid Status or Edema    Discharge Planning:    Continue current diet    Alina Castellon RD

## 2023-02-16 NOTE — PROGRESS NOTES
ACUTE PHYSICAL THERAPY GOALS:   (Developed with and agreed upon by patient and/or caregiver. )    LTG:  (1.)Mr. Halley Cuellar will move from supine to sit and sit to supine , scoot up and down, and roll side to side in bed with MODIFIED INDEPENDENCE within 7 treatment day(s). (2.)Mr. Halley Cuellar will transfer from bed to chair and chair to bed with MODIFIED INDEPENDENCE using the least restrictive device within 7 treatment day(s). (3.)Mr. Halley Cuellar will ambulate with STAND BY ASSIST for 500+ feet with the least restrictive device within 7 treatment day(s) while maintaining normal vital signs. (4.)Mr. Halley Cuellar will perform 2 stairs with HR and CGA within 7 treatment days for ascending and descending stairs for home. PHYSICAL THERAPY: Daily Note AM   (Link to Caseload Tracking: PT Visit Days : 3  Time In/Out PT Charge Capture  Rehab Caseload Tracker  Orders    Avis Vaughan is a 65 y.o. male   PRIMARY DIAGNOSIS: Anasarca  Anasarca [R60.1]  Scrotal edema [N50.89]  Bilateral leg edema [R60.0]  Acute renal failure, unspecified acute renal failure type ([facility] [address] [N17.9]       Inpatient: Payor: Leroy Velasquez / Plan: LINDA [place] / Product Type: *No Product type* /     ASSESSMENT:     REHAB RECOMMENDATIONS:   Recommendation to date pending progress:  Setting:  Home Health Therapy    Equipment:    None     ASSESSMENT:  Mr. Halley Cuellar was sitting EOB on contact and willing to walk. He stood and walked 250 ft with the walker and SBA without any real issues. Moving fairly well.      SUBJECTIVE:   Mr. Halley Cuellar states, \"I am ready\"     Social/Functional Lives With: Spouse  Type of Home: House  Home Layout: One level  Entrance Stairs - Number of Steps: 2  Bathroom Toilet: Bedside commode  Home Equipment: [address] Help From: Family  OBJECTIVE:     PAIN: VITALS / O2: PRECAUTION / [place] / DRAINS:   Pre Treatment:          Post Treatment: none mentioned Vitals        Oxygen None    RESTRICTIONS/PRECAUTIONS:        MOBILITY: I Mod I S SBA CGA Min Mod Max Total  NT x2 Comments:   Bed Mobility    Rolling [] [] [] [] [] [] [] [] [] [] []    Supine to Sit [] [] [] [] [] [] [] [] [] [] []    Scooting [] [x] [] [] [] [] [] [] [] [] []    Sit to Supine [] [] [] [] [] [] [] [] [] [] []    Transfers    Sit to Stand [] [] [] [x] [] [] [] [] [] [] []    Bed to Chair [] [] [] [x] [] [] [] [] [] [] []    Stand to Sit [] [] [] [x] [] [] [] [] [] [] []     [] [] [] [] [] [] [] [] [] [] []    I=Independent, Mod I=Modified Independent, S=Supervision, SBA=Standby Assistance, CGA=Contact Guard Assistance,   Min=Minimal Assistance, Mod=Moderate Assistance, Max=Maximal Assistance, Total=Total Assistance, NT=Not Tested    BALANCE: Good Fair+ Fair Fair- Poor NT Comments   Sitting Static [x] [] [] [] [] []    Sitting Dynamic [x] [] [] [] [] []              Standing Static [] [x] [] [] [] []    Standing Dynamic [] [x] [] [] [] []      GAIT: I Mod I S SBA CGA Min Mod Max Total  NT x2 Comments:   Level of Assistance [] [] [] [x] [x] [] [] [] [] [] []    Distance 250 feet    DME Rolling Walker    Gait Quality Decreased step clearance    Weightbearing Status      Stairs      I=Independent, Mod I=Modified Independent, S=Supervision, SBA=Standby Assistance, CGA=Contact Guard Assistance,   Min=Minimal Assistance, Mod=Moderate Assistance, Max=Maximal Assistance, Total=Total Assistance, NT=Not Tested    PLAN:   FREQUENCY AND DURATION: 3 times/week for duration of hospital stay or until stated goals are met, whichever comes first.    TREATMENT:   TREATMENT:   Therapeutic Activity (10 Minutes): Therapeutic activity included Transfer Training, Ambulation on level ground, and Standing balance to improve functional Activity tolerance, Balance, Mobility, and Strength.     TREATMENT GRID:  N/A    AFTER TREATMENT PRECAUTIONS: Bed/Chair Locked, Call light within reach, Chair, Needs within reach, and RN notified    INTERDISCIPLINARY COLLABORATION:  RN/ PCT and PT/ PTA    EDUCATION:      TIME IN/OUT:  Time In: 0930  Time Out: 0940  Minutes: 10    Shai Mohamud PTA

## 2023-02-16 NOTE — PROGRESS NOTES
Hospitalist Progress Note   Admit Date:  2023  2:53 PM   Name:  Avis Vaughan   Age:  65 y.o. Sex:  male  :  1965   MRN:  [id number]   Room:  803/01    Presenting Complaint: Leg Swelling and Groin Swelling     Reason(s) for Admission: Anasarca [R60.1]  Scrotal edema [N50.89]  Bilateral leg edema [R60.0]  Acute renal failure, unspecified acute renal failure type [facility]) [N17.9]     Hospital Course:   Aivs Vaughan is a 65 y.o. male with a h/o cirrhosis, HLD, HTN, DM2 who was admitted to our service on  with anasarca and MARS. He underwent paracentesis on  with apparently 6L removed, though continued to have edema. Baseline sCr is 1.6-1.9, on admission was 4.1. He was started on IV furosemide and albumin. Repeat para on  with 6L removed. Cr has remained >4 so Nephrology was consulted. Scrotal US showed small b/l hydroceles and small epididymal cysts. Urology consulted, conservative mgmt especially for penile/scrotal edema. Cr has improved to <4 and stable at 3.4. Subjective & 24hr Events (23): Patient is resting in bed comfortably. No fever no chills. No chest pain. No shortness of breath. Still has significant bilateral lower extremity edema. Assessment & Plan: This is a 55-year-old male with:     # Anasarca // cirrhosis with ascites              - S/p para on , 6L removed. Remains on IV Lasix, dose decreased ; given some IV albumin last weekend. Net  negative 14 L since admission. Urine output of 1700 ml in the last 24 hours. Continue IV Lasix 20 mg every 12 hours. # MARS on CKD3 present on admission  ? Hepatorenal syndrome. Retroperitoneal ultrasound negative for hydronephrosis. No indication for dialysis at this time. - Baseline Cr 1.6-1.9. Cr on admission 4, peaked at 4.3. Creatinine slightly improved to 3.3 today from 3.4 yesterday. Nephrology following. Appreciate recommendations. Continue IV Lasix.   Albumin still low at 1.7.    Hyponatremia  Sodium worsened and is 128 this morning, from 131 yesterday. Monitor sodium levels on IV diuretics. Ordered fluid restriction of 1000 mL in 24 hours. # GERD              - PPI     # LLE wound              - Wound care     # HTN              - Carvedilol     # DM2              - Basal insulin remains held and sugars are controlled with SSI only. Blood glucoses 120- 230. Discharge planning: Anticipate discharge home in the next 48 hours. Additional Medical Decision Making factors:  Complexity: 1 or more acute complicated illness or injury. (Moderate)    I conducted an independent review of: Labs   I conducted an independent review of: Imaging and/or other diagnostic studies     Treatment Risks: Prescription drug management. (Moderate)    I discussed patient's case with an interdisciplinary team.    Diet:  ADULT DIET; Regular; 4 carb choices (60 gm/meal); Low Sodium (2 gm); 1000 ml  VTE prophylaxis:Heparin  Code status: Full Code    Hospital Problems:  Principal Problem:    Anasarca  Active Problems:    Diabetic peripheral neuropathy ([address]    Essential hypertension    Type 2 diabetes mellitus (HCC)    Hypoalbuminemia    Cirrhosis of liver with ascites (HCC)    Acute kidney injury superimposed on chronic kidney disease (HCC)    Stage 3a chronic kidney disease ([address]  Resolved Problems:    * No resolved hospital problems.  *      Objective:   Patient Vitals for the past 24 hrs:   Temp Pulse Resp BP SpO2   02/16/23 1114 97.7 °F (36.5 °C) 74 18 109/80 100 %   02/16/23 0716 98.2 °F (36.8 °C) 77 16 (!) 133/94 99 %   02/16/23 0305 98.2 °F (36.8 °C) 62 18 130/86 --   02/15/23 2255 97.9 °F (36.6 °C) 78 17 (!) 127/94 97 %   02/15/23 1941 97.5 °F (36.4 °C) 69 18 (!) 139/94 100 %   02/15/23 1513 98.1 °F (36.7 °C) 76 17 115/88 100 %   02/15/23 1441 -- -- 18 -- --       Oxygen Therapy  SpO2: 100 %  Pulse Oximeter Device Mode: Continuous  O2 Device: None (Room air)    Estimated body mass index is 29.79 kg/m² as calculated from the following:    Height as of this encounter: 5' 11\" (1.803 m). Weight as of this encounter: 213 lb 10 oz (96.9 kg). Intake/Output Summary (Last 24 hours) at 2/16/2023 1229  Last data filed at 2/16/2023 0810  Gross per 24 hour   Intake --   Output 800 ml   Net -800 ml         Physical Exam:   Blood pressure 109/80, pulse 74, temperature 97.7 °F (36.5 °C), temperature source Oral, resp. rate 18, height 5' 11\" (1.803 m), weight 213 lb 10 oz (96.9 kg), SpO2 100 %. General:    Well nourished. Alert awake male, chronically ill-appearing,  Head:  Normocephalic, atraumatic  Eyes:  Sclerae appear normal.  Pupils equally round. ENT:  Nares appear normal.  Moist oral mucosa  Neck:  No restricted ROM. Trachea midline   CV:   RRR. No m/r/g. No jugular venous distension. Lungs:   CTAB. No wheezing, rhonchi, or rales. Symmetric expansion. Abdomen:   Soft, nontender, mildly distended. BS active. Extremities: No cyanosis or clubbing. Stable 2+ b/l LE pitting edema. Skin:     No rashes and normal coloration. Warm and dry. Neuro:  CN II-XII grossly intact. Psych:  Normal mood and affect.       I have personally reviewed labs and tests:  Recent Labs:  Recent Results (from the past 48 hour(s))   POCT Glucose    Collection Time: 02/14/23  4:23 PM   Result Value Ref Range    POC Glucose 233 (H) 65 - 100 mg/dL    Performed by: Alem    POCT Glucose    Collection Time: 02/14/23  8:29 PM   Result Value Ref Range    POC Glucose 160 (H) 65 - 100 mg/dL    Performed by: Johann Duncan    Basic Metabolic Panel    Collection Time: 02/15/23  4:38 AM   Result Value Ref Range    Sodium 131 (L) 133 - 143 mmol/L    Potassium 3.8 3.5 - 5.1 mmol/L    Chloride 97 (L) 101 - 110 mmol/L    CO2 25 21 - 32 mmol/L    Anion Gap 9 2 - 11 mmol/L    Glucose 117 (H) 65 - 100 mg/dL    BUN 37 (H) 6 - 23 MG/DL    Creatinine 3.40 (H) 0.8 - 1.5 MG/DL    Est, Glom Filt Rate 20 (L) >60 ml/min/1.73m2    Calcium 7.7 (L) 8.3 - 10.4 MG/DL   CBC    Collection Time: 02/15/23  4:38 AM   Result Value Ref Range    WBC 7.9 4.3 - 11.1 K/uL    RBC 2.71 (L) 4.23 - 5.6 M/uL    Hemoglobin 7.2 (L) 13.6 - 17.2 g/dL    Hematocrit 21.1 (L) 41.1 - 50.3 %    MCV 77.9 (L) 82 - 102 FL    MCH 26.6 26.1 - 32.9 PG    MCHC 34.1 31.4 - 35.0 g/dL    RDW 14.6 11.9 - 14.6 %    Platelets 015 548 - 425 K/uL    MPV 10.9 9.4 - 12.3 FL    nRBC 0.00 0.0 - 0.2 K/uL   POCT Glucose    Collection Time: 02/15/23  7:31 AM   Result Value Ref Range    POC Glucose 120 (H) 65 - 100 mg/dL    Performed by: "[employer]    POCT Glucose    Collection Time: 02/15/23 11:29 AM   Result Value Ref Range    POC Glucose 185 (H) 65 - 100 mg/dL    Performed by: "[employer][email or web address]    POCT Glucose    Collection Time: 02/15/23  4:27 PM   Result Value Ref Range    POC Glucose 180 (H) 65 - 100 mg/dL    Performed by: Yelena Lilly    POCT Glucose    Collection Time: 02/15/23  8:30 PM   Result Value Ref Range    POC Glucose 186 (H) 65 - 100 mg/dL    Performed by: Allyn Panda    Basic Metabolic Panel    Collection Time: 02/16/23  4:32 AM   Result Value Ref Range    Sodium 128 (L) 133 - 143 mmol/L    Potassium 4.3 3.5 - 5.1 mmol/L    Chloride 96 (L) 101 - 110 mmol/L    CO2 25 21 - 32 mmol/L    Anion Gap 7 2 - 11 mmol/L    Glucose 184 (H) 65 - 100 mg/dL    BUN 37 (H) 6 - 23 MG/DL    Creatinine 3.30 (H) 0.8 - 1.5 MG/DL    Est, Glom Filt Rate 21 (L) >60 ml/min/1.73m2    Calcium 8.3 8.3 - 10.4 MG/DL   CBC    Collection Time: 02/16/23  4:32 AM   Result Value Ref Range    WBC 7.9 4.3 - 11.1 K/uL    RBC 3.18 (L) 4.23 - 5.6 M/uL    Hemoglobin 8.2 (L) 13.6 - 17.2 g/dL    Hematocrit 25.7 (L) 41.1 - 50.3 %    MCV 80.8 (L) 82 - 102 FL    MCH 25.8 (L) 26.1 - 32.9 PG    MCHC 31.9 31.4 - 35.0 g/dL    RDW 14.9 (H) 11.9 - 14.6 %    Platelets 050 956 - 857 K/uL    MPV 10.5 9.4 - 12.3 FL    nRBC 0.00 0.0 - 0.2 K/uL   POCT Glucose    Collection Time: 02/16/23 7: 17 AM   Result Value Ref Range    POC Glucose 177 (H) 65 - 100 mg/dL    Performed by: Julia    Albumin    Collection Time: 02/16/23 11:04 AM   Result Value Ref Range    Albumin 1.7 (L) 3.5 - 5.0 g/dL   POCT Glucose    Collection Time: 02/16/23 11:15 AM   Result Value Ref Range    POC Glucose 208 (H) 65 - 100 mg/dL    Performed by: Julia        I have personally reviewed imaging studies:  Other Studies:  US ABDOMEN COMPLETE   Final Result   1) Probable increased renal echogenicity, nonspecific, without hydronephrosis. 2) Coarse echogenic lobular liver consistent with cirrhosis. 3) Cholelithiasis without biliary tree obstruction. US SCROTUM AND TESTICLES   Final Result   Negative for testicular torsion. Small bilateral minimally   complicated hydroceles. Small epididymal cysts on the right. Scrotal thickening   on the left. IR US GUIDED PARACENTESIS   Final Result   Uncomplicated ultrasound guided paracentesis.                   XR CHEST 1 VIEW   Final Result   No acute findings in the chest             Current Meds:  Current Facility-Administered Medications   Medication Dose Route Frequency    sodium bicarbonate tablet 650 mg  650 mg Oral TID    furosemide (LASIX) injection 20 mg  20 mg IntraVENous BID    hydrALAZINE (APRESOLINE) injection 5 mg  5 mg IntraVENous Q6H PRN    carvedilol (COREG) tablet 25 mg  25 mg Oral BID WC    insulin lispro (HUMALOG) injection vial 0-4 Units  0-4 Units SubCUTAneous TID WC    insulin lispro (HUMALOG) injection vial 0-4 Units  0-4 Units SubCUTAneous Nightly    glucose chewable tablet 16 g  4 tablet Oral PRN    dextrose bolus 10% 125 mL  125 mL IntraVENous PRN    Or    dextrose bolus 10% 250 mL  250 mL IntraVENous PRN    glucagon (rDNA) injection 1 mg  1 mg SubCUTAneous PRN    dextrose 10 % infusion   IntraVENous Continuous PRN    HYDROcodone-acetaminophen (NORCO) 7.5-325 MG per tablet 1 tablet  1 tablet Oral Q6H PRN    pantoprazole (PROTONIX) tablet 40 mg  40 mg Oral BID AC    [Held by provider] insulin glargine (LANTUS) injection vial 10 Units  10 Units SubCUTAneous Nightly    sodium chloride flush 0.9 % injection 5-40 mL  5-40 mL IntraVENous 2 times per day    sodium chloride flush 0.9 % injection 5-40 mL  5-40 mL IntraVENous PRN    0.9 % sodium chloride infusion   IntraVENous PRN    ondansetron (ZOFRAN-ODT) disintegrating tablet 4 mg  4 mg Oral Q8H PRN    Or    ondansetron (ZOFRAN) injection 4 mg  4 mg IntraVENous Q6H PRN    polyethylene glycol (GLYCOLAX) packet 17 g  17 g Oral Daily PRN    bisacodyl (DULCOLAX) suppository 10 mg  10 mg Rectal Daily PRN    famotidine (PEPCID) tablet 10 mg  10 mg Oral Daily PRN    aluminum & magnesium hydroxide-simethicone (MAALOX) 200-200-20 MG/5ML suspension 30 mL  30 mL Oral Q6H PRN    acetaminophen (TYLENOL) tablet 650 mg  650 mg Oral Q6H PRN    Or    acetaminophen (TYLENOL) suppository 650 mg  650 mg Rectal Q6H PRN    heparin (porcine) injection 5,000 Units  5,000 Units SubCUTAneous 3 times per day       Signed:  Alva Zuleta MD    Part of this note may have been written by using a voice dictation software. The note has been proof read but may still contain some grammatical/other typographical errors.

## 2023-02-16 NOTE — PROGRESS NOTES
Landen Donnelly  Admission Date: 2/6/2023         [address] Nephrology Progress Note: 2/16/2023    Follow-up for: MARS/CKD 3b    The patient's chart is reviewed and the patient is discussed with the staff.     Subjective:   Pt seen and examined in room sitting up on edge of the bed, + LE edema, abdominal distention, good uop, no overt uremic symptoms, he reports drinking lots of water and eating ice, discussed fluid restriction     ROS:  Gen - no fever, no chills, appetite unchanged    CV - no chest pain, no palpitation  Lung - no shortness of breath, no cough  Abd - no tenderness, no nausea/vomiting, no diarrhea  Ext - + edema    Current Facility-Administered Medications   Medication Dose Route Frequency    sodium bicarbonate tablet 650 mg  650 mg Oral TID    furosemide (LASIX) injection 20 mg  20 mg IntraVENous BID    hydrALAZINE (APRESOLINE) injection 5 mg  5 mg IntraVENous Q6H PRN    carvedilol (COREG) tablet 25 mg  25 mg Oral BID WC    insulin lispro (HUMALOG) injection vial 0-4 Units  0-4 Units SubCUTAneous TID WC    insulin lispro (HUMALOG) injection vial 0-4 Units  0-4 Units SubCUTAneous Nightly    glucose chewable tablet 16 g  4 tablet Oral PRN    dextrose bolus 10% 125 mL  125 mL IntraVENous PRN    Or    dextrose bolus 10% 250 mL  250 mL IntraVENous PRN    glucagon (rDNA) injection 1 mg  1 mg SubCUTAneous PRN    dextrose 10 % infusion   IntraVENous Continuous PRN    HYDROcodone-acetaminophen (NORCO) 7.5-325 MG per tablet 1 tablet  1 tablet Oral Q6H PRN    pantoprazole (PROTONIX) tablet 40 mg  40 mg Oral BID AC    [Held by provider] insulin glargine (LANTUS) injection vial 10 Units  10 Units SubCUTAneous Nightly    sodium chloride flush 0.9 % injection 5-40 mL  5-40 mL IntraVENous 2 times per day    sodium chloride flush 0.9 % injection 5-40 mL  5-40 mL IntraVENous PRN    0.9 % sodium chloride infusion   IntraVENous PRN    ondansetron (ZOFRAN-ODT) disintegrating tablet 4 mg  4 mg Oral Q8H PRN    Or    ondansetron (ZOFRAN) injection 4 mg  4 mg IntraVENous Q6H PRN    polyethylene glycol (GLYCOLAX) packet 17 g  17 g Oral Daily PRN    bisacodyl (DULCOLAX) suppository 10 mg  10 mg Rectal Daily PRN    famotidine (PEPCID) tablet 10 mg  10 mg Oral Daily PRN    aluminum & magnesium hydroxide-simethicone (MAALOX) 200-200-20 MG/5ML suspension 30 mL  30 mL Oral Q6H PRN    acetaminophen (TYLENOL) tablet 650 mg  650 mg Oral Q6H PRN    Or    acetaminophen (TYLENOL) suppository 650 mg  650 mg Rectal Q6H PRN    heparin (porcine) injection 5,000 Units  5,000 Units SubCUTAneous 3 times per day         Objective:     Vitals:    02/15/23 1941 02/15/23 2255 02/16/23 0305 02/16/23 0716   BP: (!) 139/94 (!) 127/94 130/86 (!) 133/94   Pulse: 69 78 62 77   Resp: 18 17 18 16   Temp: 97.5 °F (36.4 °C) 97.9 °F (36.6 °C) 98.2 °F (36.8 °C) 98.2 °F (36.8 °C)   TempSrc:    Oral   SpO2: 100% 97%  99%   Weight:       Height:         Intake and Output:   02/14 1901 - 02/16 0700  In: 803 [P.O.:477]  Out: 2657 [Urine:1450]  02/16 0701 - 02/16 1900  In: -   Out: 250 [Urine:250]    Physical Exam:   Constitutional:  the patient is well developed and in no acute distress, alert and following commands  HEENT:  Sclera clear, pupils equal, oral mucosa moist  Lungs: clear bilaterally   Cardiovascular:  Regular rate, S1, S2, no Rub   Abd/GI: tight distention, and non-tender; with positive bowel sounds. Ext: warm without cyanosis. There is 2+ lower leg edema/anasarca. Skin:  no jaundice or rashes  Neuro: no gross neuro deficits   Psychiatric: Calm. LAB  Recent Labs     02/15/23  0438 02/16/23  0432   WBC 7.9 7.9   HGB 7.2* 8.2*   HCT 21.1* 25.7*    205       Recent Labs     02/14/23  0425 02/15/23  0438 02/16/23  0432   * 131* 128*   K 3.9 3.8 4.3   CL 99* 97* 96*   CO2 25 25 25   BUN 41* 37* 37*   CREATININE 3.80* 3.40* 3.30*       No results for input(s): PH, PCO2, PO2, HCO3 in the last 72 hours.       Assessment/Plan: (Medical Decision Making)   MARS/CKD 3b- ? HRS, vs [facility] [place] will not be accelerate while on diuretic   -Baseline Cr 1.5-1.8  - ? Chronic HRS -some improvement in renal function seen with albumin infusion.   -renal US no evidence of obstructive nephropathy, UA RBC 10-20, P/C ratio 0.2- further work up pending clinical course   -no indication for acute RRT at this time   -Cr holding,  non oliguric,      2. Hypoalbuminemia/anasarca- sp albumin, continue lasix   Recheck albumin      3. Cirrhosis with ascites, s/p paracentesis 2/8 with 6150 cc removed     4. Hypertension stable on BB     5. Acute metabolic acidosis- continue PO NaHCO3      6. DM type II- SSI per hosp      7. Anemia- Fe studies ok    8.  Hyponatremia- 128, fluid restrict- ordered        Nandini Agent, APRN - [id number] [place]

## 2023-02-16 NOTE — PROGRESS NOTES
Pt resting in bed with eyes closed. Pt awakens when spoken to. Pt oriented times 3 at this time. Pt on RA and denies pain or distress at this time. Pt reports leg pain is relieved at this time. Pt encouraged to call for assistance if needed call light in reach, will monitor.

## 2023-02-17 ENCOUNTER — APPOINTMENT (OUTPATIENT)
Dept: ULTRASOUND IMAGING | Age: 58
DRG: 432 | End: 2023-02-17
Payer: MEDICARE

## 2023-02-17 LAB
ANION GAP SERPL CALC-SCNC: 7 MMOL/L (ref 2–11)
BUN SERPL-MCNC: 37 MG/DL (ref 6–23)
CALCIUM SERPL-MCNC: 7.7 MG/DL (ref 8.3–10.4)
CHLORIDE SERPL-SCNC: 97 MMOL/L (ref 101–110)
CO2 SERPL-SCNC: 25 MMOL/L (ref 21–32)
CREAT SERPL-MCNC: 3.3 MG/DL (ref 0.8–1.5)
ERYTHROCYTE [DISTWIDTH] IN BLOOD BY AUTOMATED COUNT: 15.4 % (ref 11.9–14.6)
GLUCOSE BLD STRIP.AUTO-MCNC: 132 MG/DL (ref 65–100)
GLUCOSE BLD STRIP.AUTO-MCNC: 193 MG/DL (ref 65–100)
GLUCOSE BLD STRIP.AUTO-MCNC: 255 MG/DL (ref 65–100)
GLUCOSE BLD STRIP.AUTO-MCNC: 270 MG/DL (ref 65–100)
GLUCOSE SERPL-MCNC: 167 MG/DL (ref 65–100)
HCT VFR BLD AUTO: 22.5 % (ref 41.1–50.3)
HGB BLD-MCNC: 7.1 G/DL (ref 13.6–17.2)
MCH RBC QN AUTO: 26.7 PG (ref 26.1–32.9)
MCHC RBC AUTO-ENTMCNC: 31.6 G/DL (ref 31.4–35)
MCV RBC AUTO: 84.6 FL (ref 82–102)
NRBC # BLD: 0 K/UL (ref 0–0.2)
PLATELET # BLD AUTO: 145 K/UL (ref 150–450)
PMV BLD AUTO: 11.8 FL (ref 9.4–12.3)
POTASSIUM SERPL-SCNC: 4.1 MMOL/L (ref 3.5–5.1)
RBC # BLD AUTO: 2.66 M/UL (ref 4.23–5.6)
SERVICE CMNT-IMP: ABNORMAL
SODIUM SERPL-SCNC: 129 MMOL/L (ref 133–143)
WBC # BLD AUTO: 7.6 K/UL (ref 4.3–11.1)

## 2023-02-17 PROCEDURE — 6360000002 HC RX W HCPCS: Performed by: NURSE PRACTITIONER

## 2023-02-17 PROCEDURE — 6370000000 HC RX 637 (ALT 250 FOR IP): Performed by: STUDENT IN AN ORGANIZED HEALTH CARE EDUCATION/TRAINING PROGRAM

## 2023-02-17 PROCEDURE — 93970 EXTREMITY STUDY: CPT

## 2023-02-17 PROCEDURE — 82962 GLUCOSE BLOOD TEST: CPT

## 2023-02-17 PROCEDURE — 6360000002 HC RX W HCPCS: Performed by: INTERNAL MEDICINE

## 2023-02-17 PROCEDURE — 85027 COMPLETE CBC AUTOMATED: CPT

## 2023-02-17 PROCEDURE — 1100000000 HC RM PRIVATE

## 2023-02-17 PROCEDURE — 36415 COLL VENOUS BLD VENIPUNCTURE: CPT

## 2023-02-17 PROCEDURE — P9047 ALBUMIN (HUMAN), 25%, 50ML: HCPCS | Performed by: NURSE PRACTITIONER

## 2023-02-17 PROCEDURE — 6370000000 HC RX 637 (ALT 250 FOR IP): Performed by: INTERNAL MEDICINE

## 2023-02-17 PROCEDURE — 80048 BASIC METABOLIC PNL TOTAL CA: CPT

## 2023-02-17 PROCEDURE — 6370000000 HC RX 637 (ALT 250 FOR IP): Performed by: NURSE PRACTITIONER

## 2023-02-17 PROCEDURE — 2580000003 HC RX 258: Performed by: INTERNAL MEDICINE

## 2023-02-17 RX ORDER — FUROSEMIDE 40 MG/1
40 TABLET ORAL DAILY
Status: DISCONTINUED | OUTPATIENT
Start: 2023-02-18 | End: 2023-02-18

## 2023-02-17 RX ORDER — ALBUMIN (HUMAN) 12.5 G/50ML
25 SOLUTION INTRAVENOUS ONCE
Status: COMPLETED | OUTPATIENT
Start: 2023-02-17 | End: 2023-02-17

## 2023-02-17 RX ORDER — SPIRONOLACTONE 25 MG/1
25 TABLET ORAL DAILY
Status: DISCONTINUED | OUTPATIENT
Start: 2023-02-18 | End: 2023-02-18

## 2023-02-17 RX ADMIN — SODIUM BICARBONATE 650 MG: 650 TABLET ORAL at 13:04

## 2023-02-17 RX ADMIN — HEPARIN SODIUM 5000 UNITS: 5000 INJECTION INTRAVENOUS; SUBCUTANEOUS at 21:30

## 2023-02-17 RX ADMIN — INSULIN LISPRO 1 UNITS: 100 INJECTION, SOLUTION INTRAVENOUS; SUBCUTANEOUS at 13:04

## 2023-02-17 RX ADMIN — ALBUMIN (HUMAN) 25 G: 0.25 INJECTION, SOLUTION INTRAVENOUS at 13:04

## 2023-02-17 RX ADMIN — HEPARIN SODIUM 5000 UNITS: 5000 INJECTION INTRAVENOUS; SUBCUTANEOUS at 06:11

## 2023-02-17 RX ADMIN — PANTOPRAZOLE SODIUM 40 MG: 40 TABLET, DELAYED RELEASE ORAL at 06:11

## 2023-02-17 RX ADMIN — SODIUM CHLORIDE, PRESERVATIVE FREE 10 ML: 5 INJECTION INTRAVENOUS at 09:42

## 2023-02-17 RX ADMIN — CARVEDILOL 25 MG: 25 TABLET, FILM COATED ORAL at 09:38

## 2023-02-17 RX ADMIN — ACETAMINOPHEN 650 MG: 325 TABLET ORAL at 21:26

## 2023-02-17 RX ADMIN — CARVEDILOL 25 MG: 25 TABLET, FILM COATED ORAL at 15:44

## 2023-02-17 RX ADMIN — HYDROCODONE BITARTRATE AND ACETAMINOPHEN 1 TABLET: 7.5; 325 TABLET ORAL at 06:21

## 2023-02-17 RX ADMIN — PANTOPRAZOLE SODIUM 40 MG: 40 TABLET, DELAYED RELEASE ORAL at 15:44

## 2023-02-17 RX ADMIN — HYDROCODONE BITARTRATE AND ACETAMINOPHEN 1 TABLET: 7.5; 325 TABLET ORAL at 15:44

## 2023-02-17 RX ADMIN — ACETAMINOPHEN 650 MG: 325 TABLET ORAL at 09:37

## 2023-02-17 RX ADMIN — FUROSEMIDE 20 MG: 10 INJECTION, SOLUTION INTRAMUSCULAR; INTRAVENOUS at 09:38

## 2023-02-17 RX ADMIN — HEPARIN SODIUM 5000 UNITS: 5000 INJECTION INTRAVENOUS; SUBCUTANEOUS at 13:04

## 2023-02-17 RX ADMIN — SODIUM CHLORIDE, PRESERVATIVE FREE 10 ML: 5 INJECTION INTRAVENOUS at 21:21

## 2023-02-17 RX ADMIN — SODIUM BICARBONATE 650 MG: 650 TABLET ORAL at 21:20

## 2023-02-17 RX ADMIN — SODIUM BICARBONATE 650 MG: 650 TABLET ORAL at 09:37

## 2023-02-17 ASSESSMENT — PAIN SCALES - WONG BAKER: WONGBAKER_NUMERICALRESPONSE: 0

## 2023-02-17 ASSESSMENT — PAIN SCALES - GENERAL
PAINLEVEL_OUTOF10: 5
PAINLEVEL_OUTOF10: 0
PAINLEVEL_OUTOF10: 1
PAINLEVEL_OUTOF10: 3

## 2023-02-17 ASSESSMENT — PAIN DESCRIPTION - LOCATION
LOCATION: LEG
LOCATION: LEG

## 2023-02-17 ASSESSMENT — PAIN DESCRIPTION - DESCRIPTORS
DESCRIPTORS: ACHING
DESCRIPTORS: ACHING

## 2023-02-17 ASSESSMENT — PAIN DESCRIPTION - ORIENTATION
ORIENTATION: RIGHT;LEFT
ORIENTATION: RIGHT;LEFT

## 2023-02-17 NOTE — PROGRESS NOTES
Pt c/o pain to marie lower extremities rating pain 5/10 on pain scale. Pt given norco per MD orders. Will con't to monitor and reassess.

## 2023-02-17 NOTE — PROGRESS NOTES
Hospitalist Progress Note   Admit Date:  2023  2:53 PM   Name:  Araseli Lewis   Age:  65 y.o. Sex:  male  :  1965   MRN:  [id number]   Room:  803/    Presenting Complaint: Leg Swelling and Groin Swelling     Reason(s) for Admission: Anasarca [R60.1]  Scrotal edema [N50.89]  Bilateral leg edema [R60.0]  Acute renal failure, unspecified acute renal failure type [facility]) [N17.9]     Hospital Course:   Araseli Lewis is a 65 y.o. male with a h/o cirrhosis, HLD, HTN, DM2 who was admitted to our service on  with anasarca and MARS. He underwent paracentesis on  with apparently 6L removed, though continued to have edema. Baseline sCr is 1.6-1.9, on admission was 4.1. He was started on IV furosemide and albumin. Repeat para on  with 6L removed. Cr has remained >4 so Nephrology was consulted. Scrotal US showed small b/l hydroceles and small epididymal cysts. Urology consulted, conservative mgmt especially for penile/scrotal edema. Cr has improved to <4 and stable at 3.4. Subjective & 24hr Events (23): Patient is sitting up in chair this morning. Denies any chest pain or shortness of breath. Still has significant bilateral lower extremity edema. Complained of leg pain last night and this morning. No abdominal pain. Assessment & Plan: This is a 60-year-old male with:     # Anasarca // cirrhosis with ascites              - S/p para on , 6L removed. Net  negative 14 L since admission. Urine output of 1200 ml in the last 24 hours. IV albumin low. Ordered IV albumin per nephrology. IV Lasix switched to p.o. Lasix. Start Aldactone. Duplex ultrasound bilateral lower extremities ordered. # MARS on CKD3 present on admission  ? Hepatorenal syndrome. Retroperitoneal ultrasound negative for hydronephrosis. No indication for dialysis at this time. - Baseline Cr 1.6-1.9. Cr on admission 4, peaked at 4.3. Creatinine stable at 3.3 today. Nephrology following.  Appreciate recommendations.    Albumin still low at 1.7.  IV albumin ordered.  Lasix switched to p.o. 40 mg daily.  Add spironolactone 25 mg daily.    Hyponatremia  Sodium stable at 129 this morning.  Continue fluid restriction of 1000 mL in 24 hours.     # GERD              - PPI     # LLE wound              - Wound care     # HTN              - Carvedilol     # DM2              - Basal insulin remains held and sugars are controlled with SSI only.  Blood glucoses 120- 270.    Discharge planning: Anticipate discharge home with home health tomorrow.    Additional Medical Decision Making factors:  Complexity: 1 or more acute complicated illness or injury. (Moderate)    I conducted an independent review of: Labs   I conducted an independent review of: Imaging and/or other diagnostic studies     Treatment Risks: Prescription drug management. (Moderate)    Current plan of care was discussed with nephrology.    Diet:  ADULT DIET; Regular; 4 carb choices (60 gm/meal); Low Sodium (2 gm); 1000 ml  VTE prophylaxis:Heparin  Code status: Full Code    Hospital Problems:  Principal Problem:    Anasarca  Active Problems:    Diabetic peripheral neuropathy (HCC)    Essential hypertension    Type 2 diabetes mellitus (HCC)    Hypoalbuminemia    Cirrhosis of liver with ascites (HCC)    Acute kidney injury superimposed on chronic kidney disease (HCC)    Stage 3a chronic kidney disease (HCC)  Resolved Problems:    * No resolved hospital problems. *      Objective:   Patient Vitals for the past 24 hrs:   Temp Pulse Resp BP SpO2   02/17/23 1209 97.9 °F (36.6 °C) 81 18 107/76 --   02/17/23 1150 97.9 °F (36.6 °C) 81 18 107/76 --   02/17/23 1115 97.9 °F (36.6 °C) 81 18 107/76 98 %   02/17/23 0713 98.1 °F (36.7 °C) 81 18 127/88 98 %   02/17/23 0651 -- -- 18 -- --   02/17/23 0621 -- -- 16 -- --   02/17/23 0302 97.7 °F (36.5 °C) 79 19 116/86 96 %   02/16/23 2256 98.1 °F (36.7 °C) 81 17 (!) 126/90 99 %   02/16/23 2031 -- -- 18 -- --   02/16/23  1943 98.1 °F (36.7 °C) 79 19 (!) 112/95 100 %   02/16/23 1457 97.9 °F (36.6 °C) 76 18 101/86 100 %       Oxygen Therapy  SpO2: 98 %  Pulse Oximeter Device Mode: Continuous  O2 Device: None (Room air)    Estimated body mass index is 29.79 kg/m² as calculated from the following:    Height as of this encounter: 5' 11\" (1.803 m). Weight as of this encounter: 213 lb 10 oz (96.9 kg). Intake/Output Summary (Last 24 hours) at 2/17/2023 1328  Last data filed at 2/17/2023 9842  Gross per 24 hour   Intake --   Output 1350 ml   Net -1350 ml         Physical Exam:   Blood pressure 107/76, pulse 81, temperature 97.9 °F (36.6 °C), temperature source Oral, resp. rate 18, height 5' 11\" (1.803 m), weight 213 lb 10 oz (96.9 kg), SpO2 98 %. General:    Well nourished. Alert awake male, chronically ill-appearing,  Head:  Normocephalic, atraumatic  Eyes:  Sclerae appear normal.  Pupils equally round. ENT:  Nares appear normal.  Moist oral mucosa  Neck:  No restricted ROM. Trachea midline   CV:   RRR. No m/r/g. No jugular venous distension. Lungs:   CTAB. No wheezing, rhonchi, or rales. Symmetric expansion. Abdomen:   Soft, nontender, mildly distended. BS active. Extremities: No cyanosis or clubbing. Stable 2+ b/l LE pitting edema. Skin:     No rashes and normal coloration. Warm and dry. Neuro:  CN II-XII grossly intact. Psych:  Normal mood and affect.       I have personally reviewed labs and tests:  Recent Labs:  Recent Results (from the past 48 hour(s))   POCT Glucose    Collection Time: 02/15/23  4:27 PM   Result Value Ref Range    POC Glucose 180 (H) 65 - 100 mg/dL    Performed by: Edgar Ibarra    POCT Glucose    Collection Time: 02/15/23  8:30 PM   Result Value Ref Range    POC Glucose 186 (H) 65 - 100 mg/dL    Performed by: Marilynn Mata    Basic Metabolic Panel    Collection Time: 02/16/23  4:32 AM   Result Value Ref Range    Sodium 128 (L) 133 - 143 mmol/L    Potassium 4.3 3.5 - 5.1 mmol/L Chloride 96 (L) 101 - 110 mmol/L    CO2 25 21 - 32 mmol/L    Anion Gap 7 2 - 11 mmol/L    Glucose 184 (H) 65 - 100 mg/dL    BUN 37 (H) 6 - 23 MG/DL    Creatinine 3.30 (H) 0.8 - 1.5 MG/DL    Est, Glom Filt Rate 21 (L) >60 ml/min/1.73m2    Calcium 8.3 8.3 - 10.4 MG/DL   CBC    Collection Time: 02/16/23  4:32 AM   Result Value Ref Range    WBC 7.9 4.3 - 11.1 K/uL    RBC 3.18 (L) 4.23 - 5.6 M/uL    Hemoglobin 8.2 (L) 13.6 - 17.2 g/dL    Hematocrit 25.7 (L) 41.1 - 50.3 %    MCV 80.8 (L) 82 - 102 FL    MCH 25.8 (L) 26.1 - 32.9 PG    MCHC 31.9 31.4 - 35.0 g/dL    RDW 14.9 (H) 11.9 - 14.6 %    Platelets 233 486 - 395 K/uL    MPV 10.5 9.4 - 12.3 FL    nRBC 0.00 0.0 - 0.2 K/uL   POCT Glucose    Collection Time: 02/16/23  7:17 AM   Result Value Ref Range    POC Glucose 177 (H) 65 - 100 mg/dL    Performed by: Julia    Albumin    Collection Time: 02/16/23 11:04 AM   Result Value Ref Range    Albumin 1.7 (L) 3.5 - 5.0 g/dL   POCT Glucose    Collection Time: 02/16/23 11:15 AM   Result Value Ref Range    POC Glucose 208 (H) 65 - 100 mg/dL    Performed by: Julia    POCT Glucose    Collection Time: 02/16/23  4:58 PM   Result Value Ref Range    POC Glucose 153 (H) 65 - 100 mg/dL    Performed by: Arnold Laughlin    POCT Glucose    Collection Time: 02/16/23  8:42 PM   Result Value Ref Range    POC Glucose 224 (H) 65 - 100 mg/dL    Performed by: Huy Alejo    Basic Metabolic Panel    Collection Time: 02/17/23  4:30 AM   Result Value Ref Range    Sodium 129 (L) 133 - 143 mmol/L    Potassium 4.1 3.5 - 5.1 mmol/L    Chloride 97 (L) 101 - 110 mmol/L    CO2 25 21 - 32 mmol/L    Anion Gap 7 2 - 11 mmol/L    Glucose 167 (H) 65 - 100 mg/dL    BUN 37 (H) 6 - 23 MG/DL    Creatinine 3.30 (H) 0.8 - 1.5 MG/DL    Est, Glom Filt Rate 21 (L) >60 ml/min/1.73m2    Calcium 7.7 (L) 8.3 - 10.4 MG/DL   CBC    Collection Time: 02/17/23  4:30 AM   Result Value Ref Range    WBC 7.6 4.3 - 11.1 K/uL    RBC 2.66 (L) 4.23 - 5.6 M/uL    Hemoglobin 7.1 (L) 13.6 - 17.2 g/dL    Hematocrit 22.5 (L) 41.1 - 50.3 %    MCV 84.6 82 - 102 FL    MCH 26.7 26.1 - 32.9 PG    MCHC 31.6 31.4 - 35.0 g/dL    RDW 15.4 (H) 11.9 - 14.6 %    Platelets 652 (L) 065 - 450 K/uL    MPV 11.8 9.4 - 12.3 FL    nRBC 0.00 0.0 - 0.2 K/uL   POCT Glucose    Collection Time: 02/17/23  7:47 AM   Result Value Ref Range    POC Glucose 132 (H) 65 - 100 mg/dL    Performed by: SediciiDamián    POCT Glucose    Collection Time: 02/17/23 11:51 AM   Result Value Ref Range    POC Glucose 270 (H) 65 - 100 mg/dL    Performed by: SediciiDamián        I have personally reviewed imaging studies:  Other Studies:  US ABDOMEN COMPLETE   Final Result   1) Probable increased renal echogenicity, nonspecific, without hydronephrosis. 2) Coarse echogenic lobular liver consistent with cirrhosis. 3) Cholelithiasis without biliary tree obstruction. US SCROTUM AND TESTICLES   Final Result   Negative for testicular torsion. Small bilateral minimally   complicated hydroceles. Small epididymal cysts on the right. Scrotal thickening   on the left. IR US GUIDED PARACENTESIS   Final Result   Uncomplicated ultrasound guided paracentesis.                   XR CHEST 1 VIEW   Final Result   No acute findings in the chest         Vascular duplex lower extremity venous bilateral    (Results Pending)       Current Meds:  Current Facility-Administered Medications   Medication Dose Route Frequency    albumin human 25 % IV solution 25 g  25 g IntraVENous Once    [START ON 2/18/2023] furosemide (LASIX) tablet 40 mg  40 mg Oral Daily    [START ON 2/18/2023] spironolactone (ALDACTONE) tablet 25 mg  25 mg Oral Daily    sodium bicarbonate tablet 650 mg  650 mg Oral TID    hydrALAZINE (APRESOLINE) injection 5 mg  5 mg IntraVENous Q6H PRN    carvedilol (COREG) tablet 25 mg  25 mg Oral BID WC    insulin lispro (HUMALOG) injection vial 0-4 Units  0-4 Units SubCUTAneous TID WC    insulin lispro (HUMALOG) injection vial 0-4 Units  0-4 Units SubCUTAneous Nightly    glucose chewable tablet 16 g  4 tablet Oral PRN    dextrose bolus 10% 125 mL  125 mL IntraVENous PRN    Or    dextrose bolus 10% 250 mL  250 mL IntraVENous PRN    glucagon (rDNA) injection 1 mg  1 mg SubCUTAneous PRN    dextrose 10 % infusion   IntraVENous Continuous PRN    HYDROcodone-acetaminophen (NORCO) 7.5-325 MG per tablet 1 tablet  1 tablet Oral Q6H PRN    pantoprazole (PROTONIX) tablet 40 mg  40 mg Oral BID AC    [Held by provider] insulin glargine (LANTUS) injection vial 10 Units  10 Units SubCUTAneous Nightly    sodium chloride flush 0.9 % injection 5-40 mL  5-40 mL IntraVENous 2 times per day    sodium chloride flush 0.9 % injection 5-40 mL  5-40 mL IntraVENous PRN    0.9 % sodium chloride infusion   IntraVENous PRN    ondansetron (ZOFRAN-ODT) disintegrating tablet 4 mg  4 mg Oral Q8H PRN    Or    ondansetron (ZOFRAN) injection 4 mg  4 mg IntraVENous Q6H PRN    polyethylene glycol (GLYCOLAX) packet 17 g  17 g Oral Daily PRN    bisacodyl (DULCOLAX) suppository 10 mg  10 mg Rectal Daily PRN    famotidine (PEPCID) tablet 10 mg  10 mg Oral Daily PRN    aluminum & magnesium hydroxide-simethicone (MAALOX) 200-200-20 MG/5ML suspension 30 mL  30 mL Oral Q6H PRN    acetaminophen (TYLENOL) tablet 650 mg  650 mg Oral Q6H PRN    Or    acetaminophen (TYLENOL) suppository 650 mg  650 mg Rectal Q6H PRN    heparin (porcine) injection 5,000 Units  5,000 Units SubCUTAneous 3 times per day       Signed:  Rodríguez Otero MD    Part of this note may have been written by using a voice dictation software. The note has been proof read but may still contain some grammatical/other typographical errors.

## 2023-02-17 NOTE — PROGRESS NOTES
Pt c/o pain 7/10 on pain scale to marie lower extremities. Pt was given norco per MD orders. Will con't to monitor and reassess.

## 2023-02-17 NOTE — PROGRESS NOTES
Physical Therapy Note:    Attempted to see patient this PM for physical therapy treatment  session. Patient states that he needs a brief change and a pain pill right now. Will follow and re-attempt as schedule permits/patient available.  Thank you,    David Dior, [facility]     Rehab Caseload Tracker

## 2023-02-17 NOTE — PROGRESS NOTES
Daniela Doctor  Admission Date: 2/6/2023         [facility] Progress Note: 2/17/2023    Follow-up for: MARS/CKD 3b    The patient's chart is reviewed and the patient is discussed with the staff. Subjective:   Pt seen and examined in room sitting up on edge of the bed, + LE edema, abdominal distention, good uop, restricting fluids now.      ROS:  Gen - no fever, no chills, appetite unchanged    CV - no chest pain, no palpitation  Lung - no shortness of breath, no cough  Abd - no tenderness, no nausea/vomiting, no diarrhea  Ext - + edema    Current Facility-Administered Medications   Medication Dose Route Frequency    sodium bicarbonate tablet 650 mg  650 mg Oral TID    furosemide (LASIX) injection 20 mg  20 mg IntraVENous BID    hydrALAZINE (APRESOLINE) injection 5 mg  5 mg IntraVENous Q6H PRN    carvedilol (COREG) tablet 25 mg  25 mg Oral BID WC    insulin lispro (HUMALOG) injection vial 0-4 Units  0-4 Units SubCUTAneous TID WC    insulin lispro (HUMALOG) injection vial 0-4 Units  0-4 Units SubCUTAneous Nightly    glucose chewable tablet 16 g  4 tablet Oral PRN    dextrose bolus 10% 125 mL  125 mL IntraVENous PRN    Or    dextrose bolus 10% 250 mL  250 mL IntraVENous PRN    glucagon (rDNA) injection 1 mg  1 mg SubCUTAneous PRN    dextrose 10 % infusion   IntraVENous Continuous PRN    HYDROcodone-acetaminophen (NORCO) 7.5-325 MG per tablet 1 tablet  1 tablet Oral Q6H PRN    pantoprazole (PROTONIX) tablet 40 mg  40 mg Oral BID AC    [Held by provider] insulin glargine (LANTUS) injection vial 10 Units  10 Units SubCUTAneous Nightly    sodium chloride flush 0.9 % injection 5-40 mL  5-40 mL IntraVENous 2 times per day    sodium chloride flush 0.9 % injection 5-40 mL  5-40 mL IntraVENous PRN    0.9 % sodium chloride infusion   IntraVENous PRN    ondansetron (ZOFRAN-ODT) disintegrating tablet 4 mg  4 mg Oral Q8H PRN    Or    ondansetron (ZOFRAN) injection 4 mg  4 mg IntraVENous Q6H PRN polyethylene glycol (GLYCOLAX) packet 17 g  17 g Oral Daily PRN    bisacodyl (DULCOLAX) suppository 10 mg  10 mg Rectal Daily PRN    famotidine (PEPCID) tablet 10 mg  10 mg Oral Daily PRN    aluminum & magnesium hydroxide-simethicone (MAALOX) 200-200-20 MG/5ML suspension 30 mL  30 mL Oral Q6H PRN    acetaminophen (TYLENOL) tablet 650 mg  650 mg Oral Q6H PRN    Or    acetaminophen (TYLENOL) suppository 650 mg  650 mg Rectal Q6H PRN    heparin (porcine) injection 5,000 Units  5,000 Units SubCUTAneous 3 times per day         Objective:     Vitals:    02/17/23 0302 02/17/23 0621 02/17/23 0651 02/17/23 0713   BP: 116/86   127/88   Pulse: 79   81   Resp: 19 16 18 18   Temp: 97.7 °F (36.5 °C)   98.1 °F (36.7 °C)   TempSrc:    Oral   SpO2: 96%   98%   Weight:       Height:         Intake and Output:   02/15 1901 - 02/17 0700  In: -   Out: 0374 [Urine:1600]  No intake/output data recorded. Physical Exam:   Constitutional:  the patient is well developed and in no acute distress, alert and following commands  HEENT:  Sclera clear, pupils equal, oral mucosa moist  Lungs: clear bilaterally   Cardiovascular:  Regular rate, S1, S2, no Rub   Abd/GI: tight distention, and non-tender; with positive bowel sounds. Ext: warm without cyanosis. There is 2+ lower leg edema/anasarca. Skin:  no jaundice or rashes  Neuro: no gross neuro deficits   Psychiatric: Calm. LAB  Recent Labs     02/15/23  0438 02/16/23  0432 02/17/23  0430   WBC 7.9 7.9 7.6   HGB 7.2* 8.2* 7.1*   HCT 21.1* 25.7* 22.5*    205 145*       Recent Labs     02/15/23  0438 02/16/23  0432 02/17/23  0430   * 128* 129*   K 3.8 4.3 4.1   CL 97* 96* 97*   CO2 25 25 25   BUN 37* 37* 37*   CREATININE 3.40* 3.30* 3.30*       No results for input(s): PH, PCO2, PO2, HCO3 in the last 72 hours. Assessment/Plan:  (Medical Decision Making)   MARS/CKD 3b- ? HRS, vs ATN Nabil Aly will not be accelerate while on diuretic   -Baseline Cr 1.5-1.8  - ?  Chronic HRS -some improvement in renal function seen with albumin infusion.   -renal US no evidence of obstructive nephropathy, UA RBC 10-20, P/C ratio 0.2- further work up pending clinical course   -no indication for acute RRT at this time   -Cr holding,  non oliguric,      2. Hypoalbuminemia/anasarca- sp albumin, continue lasix   Recheck albumin 1.7- ordered albumin dose      3. Cirrhosis with ascites, s/p paracentesis 2/8 with 6150 cc removed     4. Hypertension stable on BB     5. Acute metabolic acidosis- continue PO NaHCO3      6. DM type II- SSI per hosp      7. Anemia- Fe studies ok    8.  Hyponatremia- 129, continue fluid restriction         DESIRE [id number]

## 2023-02-17 NOTE — CARE COORDINATION
MSN, CM:  patient continues with low Sodium at 29 and creatinine at 3.3.  [facility] Patient with possible discharge on Sunday. Case Management will continue to follow.

## 2023-02-18 LAB
ANION GAP SERPL CALC-SCNC: 14 MMOL/L (ref 2–11)
BUN SERPL-MCNC: 36 MG/DL (ref 6–23)
CALCIUM SERPL-MCNC: 8 MG/DL (ref 8.3–10.4)
CHLORIDE SERPL-SCNC: 97 MMOL/L (ref 101–110)
CO2 SERPL-SCNC: 18 MMOL/L (ref 21–32)
CREAT SERPL-MCNC: 3.5 MG/DL (ref 0.8–1.5)
ERYTHROCYTE [DISTWIDTH] IN BLOOD BY AUTOMATED COUNT: 15 % (ref 11.9–14.6)
GLUCOSE BLD STRIP.AUTO-MCNC: 190 MG/DL (ref 65–100)
GLUCOSE BLD STRIP.AUTO-MCNC: 207 MG/DL (ref 65–100)
GLUCOSE BLD STRIP.AUTO-MCNC: 226 MG/DL (ref 65–100)
GLUCOSE BLD STRIP.AUTO-MCNC: 251 MG/DL (ref 65–100)
GLUCOSE SERPL-MCNC: 196 MG/DL (ref 65–100)
HCT VFR BLD AUTO: 23.1 % (ref 41.1–50.3)
HGB BLD-MCNC: 7.6 G/DL (ref 13.6–17.2)
MCH RBC QN AUTO: 26.4 PG (ref 26.1–32.9)
MCHC RBC AUTO-ENTMCNC: 32.9 G/DL (ref 31.4–35)
MCV RBC AUTO: 80.2 FL (ref 82–102)
NRBC # BLD: 0 K/UL (ref 0–0.2)
PLATELET # BLD AUTO: 203 K/UL (ref 150–450)
PMV BLD AUTO: 9.9 FL (ref 9.4–12.3)
POTASSIUM SERPL-SCNC: 4.1 MMOL/L (ref 3.5–5.1)
RBC # BLD AUTO: 2.88 M/UL (ref 4.23–5.6)
SERVICE CMNT-IMP: ABNORMAL
SODIUM SERPL-SCNC: 129 MMOL/L (ref 133–143)
WBC # BLD AUTO: 9.2 K/UL (ref 4.3–11.1)

## 2023-02-18 PROCEDURE — 6370000000 HC RX 637 (ALT 250 FOR IP): Performed by: STUDENT IN AN ORGANIZED HEALTH CARE EDUCATION/TRAINING PROGRAM

## 2023-02-18 PROCEDURE — 1100000000 HC RM PRIVATE

## 2023-02-18 PROCEDURE — 85027 COMPLETE CBC AUTOMATED: CPT

## 2023-02-18 PROCEDURE — 6370000000 HC RX 637 (ALT 250 FOR IP): Performed by: NURSE PRACTITIONER

## 2023-02-18 PROCEDURE — 36415 COLL VENOUS BLD VENIPUNCTURE: CPT

## 2023-02-18 PROCEDURE — 82962 GLUCOSE BLOOD TEST: CPT

## 2023-02-18 PROCEDURE — 80048 BASIC METABOLIC PNL TOTAL CA: CPT

## 2023-02-18 PROCEDURE — 2580000003 HC RX 258: Performed by: INTERNAL MEDICINE

## 2023-02-18 PROCEDURE — 6360000002 HC RX W HCPCS: Performed by: INTERNAL MEDICINE

## 2023-02-18 PROCEDURE — 6370000000 HC RX 637 (ALT 250 FOR IP): Performed by: HOSPITALIST

## 2023-02-18 PROCEDURE — 6370000000 HC RX 637 (ALT 250 FOR IP): Performed by: INTERNAL MEDICINE

## 2023-02-18 RX ORDER — MIDODRINE HYDROCHLORIDE 5 MG/1
2.5 TABLET ORAL
Status: DISCONTINUED | OUTPATIENT
Start: 2023-02-18 | End: 2023-02-20 | Stop reason: HOSPADM

## 2023-02-18 RX ORDER — MIDODRINE HYDROCHLORIDE 5 MG/1
5 TABLET ORAL
Status: DISCONTINUED | OUTPATIENT
Start: 2023-02-18 | End: 2023-02-18

## 2023-02-18 RX ORDER — FUROSEMIDE 40 MG/1
40 TABLET ORAL 2 TIMES DAILY
Status: DISCONTINUED | OUTPATIENT
Start: 2023-02-18 | End: 2023-02-20 | Stop reason: HOSPADM

## 2023-02-18 RX ADMIN — MIDODRINE HYDROCHLORIDE 2.5 MG: 5 TABLET ORAL at 12:39

## 2023-02-18 RX ADMIN — HEPARIN SODIUM 5000 UNITS: 5000 INJECTION INTRAVENOUS; SUBCUTANEOUS at 13:36

## 2023-02-18 RX ADMIN — SODIUM BICARBONATE 650 MG: 650 TABLET ORAL at 22:46

## 2023-02-18 RX ADMIN — INSULIN LISPRO 1 UNITS: 100 INJECTION, SOLUTION INTRAVENOUS; SUBCUTANEOUS at 12:40

## 2023-02-18 RX ADMIN — SODIUM CHLORIDE, PRESERVATIVE FREE 10 ML: 5 INJECTION INTRAVENOUS at 22:46

## 2023-02-18 RX ADMIN — CARVEDILOL 25 MG: 25 TABLET, FILM COATED ORAL at 08:38

## 2023-02-18 RX ADMIN — SODIUM CHLORIDE, PRESERVATIVE FREE 10 ML: 5 INJECTION INTRAVENOUS at 08:40

## 2023-02-18 RX ADMIN — CARVEDILOL 25 MG: 25 TABLET, FILM COATED ORAL at 16:58

## 2023-02-18 RX ADMIN — SODIUM BICARBONATE 650 MG: 650 TABLET ORAL at 08:38

## 2023-02-18 RX ADMIN — HYDROCODONE BITARTRATE AND ACETAMINOPHEN 1 TABLET: 7.5; 325 TABLET ORAL at 22:46

## 2023-02-18 RX ADMIN — INSULIN LISPRO 2 UNITS: 100 INJECTION, SOLUTION INTRAVENOUS; SUBCUTANEOUS at 17:00

## 2023-02-18 RX ADMIN — PANTOPRAZOLE SODIUM 40 MG: 40 TABLET, DELAYED RELEASE ORAL at 16:58

## 2023-02-18 RX ADMIN — HEPARIN SODIUM 5000 UNITS: 5000 INJECTION INTRAVENOUS; SUBCUTANEOUS at 05:52

## 2023-02-18 RX ADMIN — FUROSEMIDE 40 MG: 40 TABLET ORAL at 08:38

## 2023-02-18 RX ADMIN — MIDODRINE HYDROCHLORIDE 2.5 MG: 5 TABLET ORAL at 16:57

## 2023-02-18 RX ADMIN — HEPARIN SODIUM 5000 UNITS: 5000 INJECTION INTRAVENOUS; SUBCUTANEOUS at 22:46

## 2023-02-18 RX ADMIN — FUROSEMIDE 40 MG: 40 TABLET ORAL at 16:58

## 2023-02-18 RX ADMIN — SODIUM BICARBONATE 650 MG: 650 TABLET ORAL at 13:36

## 2023-02-18 RX ADMIN — HYDROCODONE BITARTRATE AND ACETAMINOPHEN 1 TABLET: 7.5; 325 TABLET ORAL at 16:58

## 2023-02-18 RX ADMIN — PANTOPRAZOLE SODIUM 40 MG: 40 TABLET, DELAYED RELEASE ORAL at 05:52

## 2023-02-18 RX ADMIN — HYDROCODONE BITARTRATE AND ACETAMINOPHEN 1 TABLET: 7.5; 325 TABLET ORAL at 00:30

## 2023-02-18 RX ADMIN — HYDROCODONE BITARTRATE AND ACETAMINOPHEN 1 TABLET: 7.5; 325 TABLET ORAL at 09:06

## 2023-02-18 RX ADMIN — SPIRONOLACTONE 25 MG: 25 TABLET ORAL at 08:38

## 2023-02-18 ASSESSMENT — PAIN SCALES - GENERAL
PAINLEVEL_OUTOF10: 7
PAINLEVEL_OUTOF10: 2
PAINLEVEL_OUTOF10: 0
PAINLEVEL_OUTOF10: 6
PAINLEVEL_OUTOF10: 6
PAINLEVEL_OUTOF10: 0

## 2023-02-18 ASSESSMENT — PAIN DESCRIPTION - ORIENTATION
ORIENTATION: RIGHT;LEFT
ORIENTATION: MID
ORIENTATION: RIGHT;LEFT

## 2023-02-18 ASSESSMENT — PAIN DESCRIPTION - LOCATION
LOCATION: LEG
LOCATION: GROIN;LEG
LOCATION: ABDOMEN;SCROTUM

## 2023-02-18 ASSESSMENT — PAIN DESCRIPTION - DESCRIPTORS
DESCRIPTORS: ACHING
DESCRIPTORS: BURNING
DESCRIPTORS: ACHING

## 2023-02-18 ASSESSMENT — PAIN DESCRIPTION - FREQUENCY: FREQUENCY: INTERMITTENT

## 2023-02-18 ASSESSMENT — PAIN DESCRIPTION - ONSET: ONSET: ON-GOING

## 2023-02-18 NOTE — PROGRESS NOTES
Report received from day shift RN. Pt sitting up in recliner awake and alert. Denied any needs at this time. NAD noted.

## 2023-02-18 NOTE — PROGRESS NOTES
Tran Parkinson  Admission Date: 2/6/2023         [address] Nephrology Progress Note: 2/18/2023    Follow-up for: MARS/CKD 3b    The patient's chart is reviewed and the patient is discussed with the staff. Subjective:   Pt seen and examined in room sitting up on edge of the bed, + LE edema, abdominal distention, good uop, restricting fluids now.      ROS:  Gen - no fever, no chills, appetite unchanged    CV - no chest pain, no palpitation  Lung - no shortness of breath, no cough  Abd - no tenderness, no nausea/vomiting, no diarrhea  Ext - + edema    Current Facility-Administered Medications   Medication Dose Route Frequency    furosemide (LASIX) tablet 40 mg  40 mg Oral Daily    sodium bicarbonate tablet 650 mg  650 mg Oral TID    hydrALAZINE (APRESOLINE) injection 5 mg  5 mg IntraVENous Q6H PRN    carvedilol (COREG) tablet 25 mg  25 mg Oral BID WC    insulin lispro (HUMALOG) injection vial 0-4 Units  0-4 Units SubCUTAneous TID WC    insulin lispro (HUMALOG) injection vial 0-4 Units  0-4 Units SubCUTAneous Nightly    glucose chewable tablet 16 g  4 tablet Oral PRN    dextrose bolus 10% 125 mL  125 mL IntraVENous PRN    Or    dextrose bolus 10% 250 mL  250 mL IntraVENous PRN    glucagon (rDNA) injection 1 mg  1 mg SubCUTAneous PRN    dextrose 10 % infusion   IntraVENous Continuous PRN    HYDROcodone-acetaminophen (NORCO) 7.5-325 MG per tablet 1 tablet  1 tablet Oral Q6H PRN    pantoprazole (PROTONIX) tablet 40 mg  40 mg Oral BID AC    [Held by provider] insulin glargine (LANTUS) injection vial 10 Units  10 Units SubCUTAneous Nightly    sodium chloride flush 0.9 % injection 5-40 mL  5-40 mL IntraVENous 2 times per day    sodium chloride flush 0.9 % injection 5-40 mL  5-40 mL IntraVENous PRN    0.9 % sodium chloride infusion   IntraVENous PRN    ondansetron (ZOFRAN-ODT) disintegrating tablet 4 mg  4 mg Oral Q8H PRN    Or    ondansetron (ZOFRAN) injection 4 mg  4 mg IntraVENous Q6H PRN polyethylene glycol (GLYCOLAX) packet 17 g  17 g Oral Daily PRN    bisacodyl (DULCOLAX) suppository 10 mg  10 mg Rectal Daily PRN    famotidine (PEPCID) tablet 10 mg  10 mg Oral Daily PRN    aluminum & magnesium hydroxide-simethicone (MAALOX) 200-200-20 MG/5ML suspension 30 mL  30 mL Oral Q6H PRN    acetaminophen (TYLENOL) tablet 650 mg  650 mg Oral Q6H PRN    Or    acetaminophen (TYLENOL) suppository 650 mg  650 mg Rectal Q6H PRN    heparin (porcine) injection 5,000 Units  5,000 Units SubCUTAneous 3 times per day         Objective:     Vitals:    02/18/23 0722 02/18/23 0838 02/18/23 0906 02/18/23 1111   BP: (!) 139/95 (!) 139/95  (!) 132/90   Pulse: 75 75  74   Resp: 18 18 18   Temp: 97.7 °F (36.5 °C)   97.9 °F (36.6 °C)   TempSrc: Oral   Oral   SpO2: 100%   100%   Weight:       Height:         Intake and Output:   02/16 1901 - 02/18 0700  In: -   Out: 1500 [Urine:1500]  No intake/output data recorded. Physical Exam:   Constitutional:  the patient is well developed and in no acute distress, alert and following commands  HEENT:  Sclera clear, pupils equal, oral mucosa moist  Lungs: clear bilaterally   Cardiovascular:  Regular rate, S1, S2, no Rub   Abd/GI: tight distention, and non-tender;   Ext:There is 3+ lower leg edema/anasarca. Skin:  no jaundice or rashes  Neuro: no gross neuro deficits   Psychiatric: Calm. LAB  Recent Labs     02/16/23 0432 02/17/23  0430 02/18/23  0339   WBC 7.9 7.6 9.2   HGB 8.2* 7.1* 7.6*   HCT 25.7* 22.5* 23.1*    145* 203       Recent Labs     02/16/23  0432 02/17/23  0430 02/18/23  0339   * 129* 129*   K 4.3 4.1 4.1   CL 96* 97* 97*   CO2 25 25 18*   BUN 37* 37* 36*   CREATININE 3.30* 3.30* 3.50*       No results for input(s): PH, PCO2, PO2, HCO3 in the last 72 hours. Assessment/Plan:  (Medical Decision Making)   MARS/CKD 3b- ? HRS, vs ATN : difficult to interpret data in presence of CKD.    -Baseline Cr 1.5-1.8  Volume overload   Fluid restriction   Lasix Avoid Aldactone ( advance renal disease )      2. Hypoalbuminemia/anasarca- albumin, continue lasix        3. Cirrhosis with ascites, s/p paracentesis 2/8 with 6150 cc removed     4. Hypertension stable on BB     5. Acute metabolic acidosis- continue PO NaHCO3      6. DM type II- SSI per hosp      7. Anemia- Fe studies ok    8.  Hyponatremia- 129, continue fluid restriction         Patric Lind MD  [facility]

## 2023-02-18 NOTE — PROGRESS NOTES
Hospitalist Progress Note   Admit Date:  2023  2:53 PM   Name:  Ramon Samayoa   Age:  65 y.o. Sex:  male  :  1965   MRN:  [id number]   Room:  803/01    Presenting Complaint: Leg Swelling and Groin Swelling     Reason(s) for Admission: Anasarca [R60.1]  Scrotal edema [N50.89]  Bilateral leg edema [R60.0]  Acute renal failure, unspecified acute renal failure type [facility]) [N17.9]     Hospital Course:   Ramon Samayoa is a 65 y.o. male with a h/o cirrhosis, HLD, HTN, DM2 who was admitted to our service on  with anasarca and MARS. He underwent paracentesis on  with apparently 6L removed, though continued to have edema. Baseline sCr is 1.6-1.9, on admission was 4.1. He was started on IV furosemide and albumin. Repeat para on  with 6L removed. Cr has remained >4 so Nephrology was consulted. Scrotal US showed small b/l hydroceles and small epididymal cysts. Urology consulted, conservative mgmt especially for penile/scrotal edema. Cr has improved to <4 and stable at 3.4. Subjective & 24hr Events (23): Duplex ultrasound negative for DVT. No fever no chills. Patient complains of pain in his legs. No nausea no vomiting. No chest pain or shortness of breath. Still has significant bilateral lower extremity edema. Assessment & Plan: This is a 73-year-old male with:     # Anasarca // cirrhosis with ascites              - S/p para on , 6L removed. Net  negative 14 L since admission. Urine output of 1200 ml in the last 24 hours. IV albumin low. Ordered IV albumin per nephrology. Aldactone discontinued in the setting of CKD stage III. Currently on p.o. Lasix 40 mg twice daily. Duplex ultrasound bilateral lower extremities negative. # MARS on CKD3 present on admission  ? Hepatorenal syndrome. Retroperitoneal ultrasound negative for hydronephrosis. No indication for dialysis at this time. - Baseline Cr 1.6-1.9. Cr on admission 4, peaked at 4.3.    Creatinine stable at 3.3 today. Nephrology following. Appreciate recommendations. Albumin still low at 1.7. IV albumin ordered. Start midodrine. DC Aldactone. Increase Lasix to 40 mg twice daily. Hyponatremia  Sodium stable at 129 this morning. Continue fluid restriction of 1000 mL in 24 hours. # GERD              - PPI     # LLE wound              - Wound care     # HTN              - Carvedilol     # DM2              - Basal insulin remains held and sugars are controlled with SSI only. Blood glucoses 120- 270. Discharge planning: Overall poor prognosis. Discussed with patient. Home with home health in the next 48 -72 hours. Additional Medical Decision Making factors:  Complexity: 1 or more acute complicated illness or injury. (Moderate)    I conducted an independent review of: Labs   I conducted an independent review of: Imaging and/or other diagnostic studies     Treatment Risks: Prescription drug management. (Moderate)    Current plan of care was discussed with nephrology. Diet:  ADULT DIET; Regular; 4 carb choices (60 gm/meal); Low Sodium (2 gm); 1000 ml  VTE prophylaxis:Heparin  Code status: Full Code    Hospital Problems:  Principal Problem:    Anasarca  Active Problems:    Diabetic peripheral neuropathy ([address]    Essential hypertension    Type 2 diabetes mellitus (HCC)    Hypoalbuminemia    Cirrhosis of liver with ascites (HCC)    Acute kidney injury superimposed on chronic kidney disease (HCC)    Stage 3a chronic kidney disease ([address]  Resolved Problems:    * No resolved hospital problems.  *      Objective:   Patient Vitals for the past 24 hrs:   Temp Pulse Resp BP SpO2   02/18/23 1111 97.9 °F (36.6 °C) 74 18 (!) 132/90 100 %   02/18/23 0906 -- -- 18 -- --   02/18/23 0838 -- 75 -- (!) 139/95 --   02/18/23 0722 97.7 °F (36.5 °C) 75 18 (!) 139/95 100 %   02/18/23 0459 97.8 °F (36.6 °C) 81 18 127/88 98 %   02/18/23 0100 -- -- 18 -- --   02/17/23 2313 98 °F (36.7 °C) 80 19 127/88 98 % 02/17/23 1903 97.5 °F (36.4 °C) 76 19 123/89 99 %   02/17/23 1517 97.7 °F (36.5 °C) 76 18 134/87 100 %       Oxygen Therapy  SpO2: 100 %  Pulse Oximeter Device Mode: Continuous  O2 Device: None (Room air)    Estimated body mass index is 29.79 kg/m² as calculated from the following:    Height as of this encounter: 5' 11\" (1.803 m). Weight as of this encounter: 213 lb 10 oz (96.9 kg). Intake/Output Summary (Last 24 hours) at 2/18/2023 1254  Last data filed at 2/18/2023 0556  Gross per 24 hour   Intake --   Output 700 ml   Net -700 ml         Physical Exam:   Blood pressure (!) 132/90, pulse 74, temperature 97.9 °F (36.6 °C), temperature source Oral, resp. rate 18, height 5' 11\" (1.803 m), weight 213 lb 10 oz (96.9 kg), SpO2 100 %. General:    Well nourished. Alert awake male, chronically ill-appearing,  Head:  Normocephalic, atraumatic  Eyes:  Sclerae appear normal.  Pupils equally round. ENT:  Nares appear normal.  Moist oral mucosa  Neck:  No restricted ROM. Trachea midline   CV:   RRR. No m/r/g. No jugular venous distension. Lungs:   CTAB. No wheezing, rhonchi, or rales. Symmetric expansion. Abdomen:   Soft, nontender, mildly distended. BS active. Extremities: No cyanosis or clubbing. Stable 2+ b/l LE pitting edema. Skin:     No rashes and normal coloration. Warm and dry. Neuro:  CN II-XII grossly intact. Psych:  Normal mood and affect.       I have personally reviewed labs and tests:  Recent Labs:  Recent Results (from the past 48 hour(s))   POCT Glucose    Collection Time: 02/16/23  4:58 PM   Result Value Ref Range    POC Glucose 153 (H) 65 - 100 mg/dL    Performed by: Karl Ordoñez    POCT Glucose    Collection Time: 02/16/23  8:42 PM   Result Value Ref Range    POC Glucose 224 (H) 65 - 100 mg/dL    Performed by: Shruti Carter    Basic Metabolic Panel    Collection Time: 02/17/23  4:30 AM   Result Value Ref Range    Sodium 129 (L) 133 - 143 mmol/L    Potassium 4.1 3.5 - 5.1 mmol/L    Chloride 97 (L) 101 - 110 mmol/L    CO2 25 21 - 32 mmol/L    Anion Gap 7 2 - 11 mmol/L    Glucose 167 (H) 65 - 100 mg/dL    BUN 37 (H) 6 - 23 MG/DL    Creatinine 3.30 (H) 0.8 - 1.5 MG/DL    Est, Glom Filt Rate 21 (L) >60 ml/min/1.73m2    Calcium 7.7 (L) 8.3 - 10.4 MG/DL   CBC    Collection Time: 02/17/23  4:30 AM   Result Value Ref Range    WBC 7.6 4.3 - 11.1 K/uL    RBC 2.66 (L) 4.23 - 5.6 M/uL    Hemoglobin 7.1 (L) 13.6 - 17.2 g/dL    Hematocrit 22.5 (L) 41.1 - 50.3 %    MCV 84.6 82 - 102 FL    MCH 26.7 26.1 - 32.9 PG    MCHC 31.6 31.4 - 35.0 g/dL    RDW 15.4 (H) 11.9 - 14.6 %    Platelets 077 (L) 470 - 450 K/uL    MPV 11.8 9.4 - 12.3 FL    nRBC 0.00 0.0 - 0.2 K/uL   POCT Glucose    Collection Time: 02/17/23  7:47 AM   Result Value Ref Range    POC Glucose 132 (H) 65 - 100 mg/dL    Performed by: MirzaPHILIPP    POCT Glucose    Collection Time: 02/17/23 11:51 AM   Result Value Ref Range    POC Glucose 270 (H) 65 - 100 mg/dL    Performed by: MirzaPHILIPP    POCT Glucose    Collection Time: 02/17/23  4:51 PM   Result Value Ref Range    POC Glucose 193 (H) 65 - 100 mg/dL    Performed by: Viki    POCT Glucose    Collection Time: 02/17/23  8:42 PM   Result Value Ref Range    POC Glucose 255 (H) 65 - 100 mg/dL    Performed by: Mello    Basic Metabolic Panel    Collection Time: 02/18/23  3:39 AM   Result Value Ref Range    Sodium 129 (L) 133 - 143 mmol/L    Potassium 4.1 3.5 - 5.1 mmol/L    Chloride 97 (L) 101 - 110 mmol/L    CO2 18 (L) 21 - 32 mmol/L    Anion Gap 14 (H) 2 - 11 mmol/L    Glucose 196 (H) 65 - 100 mg/dL    BUN 36 (H) 6 - 23 MG/DL    Creatinine 3.50 (H) 0.8 - 1.5 MG/DL    Est, Glom Filt Rate 20 (L) >60 ml/min/1.73m2    Calcium 8.0 (L) 8.3 - 10.4 MG/DL   CBC    Collection Time: 02/18/23  3:39 AM   Result Value Ref Range    WBC 9.2 4.3 - 11.1 K/uL    RBC 2.88 (L) 4.23 - 5.6 M/uL    Hemoglobin 7.6 (L) 13.6 - 17.2 g/dL    Hematocrit 23.1 (L) 41.1 - 50.3 %    MCV 80.2 (L) 82 - 102 FL    MCH 26.4 26.1 - 32.9 PG    MCHC 32.9 31.4 - 35.0 g/dL    RDW 15.0 (H) 11.9 - 14.6 %    Platelets 045 194 - 022 K/uL    MPV 9.9 9.4 - 12.3 FL    nRBC 0.00 0.0 - 0.2 K/uL   POCT Glucose    Collection Time: 02/18/23  7:59 AM   Result Value Ref Range    POC Glucose 190 (H) 65 - 100 mg/dL    Performed by: RailpodT    POCT Glucose    Collection Time: 02/18/23 11:33 AM   Result Value Ref Range    POC Glucose 207 (H) 65 - 100 mg/dL    Performed by: [facility]        I have personally reviewed imaging studies:  Other Studies:  Vascular duplex lower extremity venous bilateral   Final Result      1. No evidence of left lower extremity deep venous thrombosis. Helder Mai M.D.    2/18/2023 12:42:00 AM      US ABDOMEN COMPLETE   Final Result   1) Probable increased renal echogenicity, nonspecific, without hydronephrosis. 2) Coarse echogenic lobular liver consistent with cirrhosis. 3) Cholelithiasis without biliary tree obstruction. US SCROTUM AND TESTICLES   Final Result   Negative for testicular torsion. Small bilateral minimally   complicated hydroceles. Small epididymal cysts on the right. Scrotal thickening   on the left. IR US GUIDED PARACENTESIS   Final Result   Uncomplicated ultrasound guided paracentesis.                   XR CHEST 1 VIEW   Final Result   No acute findings in the chest             Current Meds:  Current Facility-Administered Medications   Medication Dose Route Frequency    midodrine (PROAMATINE) tablet 2.5 mg  2.5 mg Oral TID WC    furosemide (LASIX) tablet 40 mg  40 mg Oral Daily    sodium bicarbonate tablet 650 mg  650 mg Oral TID    hydrALAZINE (APRESOLINE) injection 5 mg  5 mg IntraVENous Q6H PRN    carvedilol (COREG) tablet 25 mg  25 mg Oral BID     insulin lispro (HUMALOG) injection vial 0-4 Units  0-4 Units SubCUTAneous TID     insulin lispro (HUMALOG) injection vial 0-4 Units  0-4 Units SubCUTAneous Nightly    glucose chewable tablet 16 g  4 tablet Oral PRN    dextrose bolus 10% 125 mL  125 mL IntraVENous PRN    Or    dextrose bolus 10% 250 mL  250 mL IntraVENous PRN    glucagon (rDNA) injection 1 mg  1 mg SubCUTAneous PRN    dextrose 10 % infusion   IntraVENous Continuous PRN    HYDROcodone-acetaminophen (NORCO) 7.5-325 MG per tablet 1 tablet  1 tablet Oral Q6H PRN    pantoprazole (PROTONIX) tablet 40 mg  40 mg Oral BID AC    [Held by provider] insulin glargine (LANTUS) injection vial 10 Units  10 Units SubCUTAneous Nightly    sodium chloride flush 0.9 % injection 5-40 mL  5-40 mL IntraVENous 2 times per day    sodium chloride flush 0.9 % injection 5-40 mL  5-40 mL IntraVENous PRN    0.9 % sodium chloride infusion   IntraVENous PRN    ondansetron (ZOFRAN-ODT) disintegrating tablet 4 mg  4 mg Oral Q8H PRN    Or    ondansetron (ZOFRAN) injection 4 mg  4 mg IntraVENous Q6H PRN    polyethylene glycol (GLYCOLAX) packet 17 g  17 g Oral Daily PRN    bisacodyl (DULCOLAX) suppository 10 mg  10 mg Rectal Daily PRN    famotidine (PEPCID) tablet 10 mg  10 mg Oral Daily PRN    aluminum & magnesium hydroxide-simethicone (MAALOX) 200-200-20 MG/5ML suspension 30 mL  30 mL Oral Q6H PRN    acetaminophen (TYLENOL) tablet 650 mg  650 mg Oral Q6H PRN    Or    acetaminophen (TYLENOL) suppository 650 mg  650 mg Rectal Q6H PRN    heparin (porcine) injection 5,000 Units  5,000 Units SubCUTAneous 3 times per day       Signed:  Tolu Urrutia MD    Part of this note may have been written by using a voice dictation software. The note has been proof read but may still contain some grammatical/other typographical errors.

## 2023-02-18 NOTE — PROGRESS NOTES
Pt has sat in the chair for most of the shift. Denied any needs at this time. NAD noted, will prepare report for oncoming shift.

## 2023-02-18 NOTE — PROGRESS NOTES
Bedside report received from night nurse David Whelan. Assessment done as noted  Respiration even and unlabored 20/min; denies pain or nausea at present. Encouraged to call with needs.

## 2023-02-18 NOTE — DISCHARGE INSTRUCTIONS
Leg and Ankle Edema: Care Instructions  Your Care Instructions  Swelling in the legs, ankles, and feet is called edema. It is common after you sit or stand for a while. Long plane flights or car rides often cause swelling in the legs and feet. You may also have swelling if you have to stand for long periods of time at your job. Problems with the veins in the legs (varicose veins) and changes in hormones can also cause swelling. Sometimes the swelling in the ankles and feet is caused by a more serious problem, such as heart failure, infection, blood clots, or liver or kidney disease. Follow-up care is a key part of your treatment and safety. Be sure to make and go to all appointments, and call your doctor if you are having problems. It's also a good idea to know your test results and keep a list of the medicines you take. How can you care for yourself at home? If your doctor gave you medicine, take it as prescribed. Call your doctor if you think you are having a problem with your medicine. Whenever you are resting, raise your legs up. Try to keep the swollen area higher than the level of your heart. Take breaks from standing or sitting in one position. Walk around to increase the blood flow in your lower legs. Move your feet and ankles often while you stand, or tighten and relax your leg muscles. Wear support stockings. Put them on in the morning, before swelling gets worse. Eat a balanced diet. Lose weight if you need to. Limit the amount of salt (sodium) in your diet. Salt holds fluid in the body and may increase swelling. When should you call for help? Call 911 anytime you think you may need emergency care. For example, call if:    You have symptoms of a blood clot in your lung (called a pulmonary embolism). These may include:  Sudden chest pain. Trouble breathing. Coughing up blood.    Call your doctor now or seek immediate medical care if:    You have signs of a blood clot, such as:  Pain in your calf, back of the knee, thigh, or groin. Redness and swelling in your leg or groin. You have symptoms of infection, such as: Increased pain, swelling, warmth, or redness. Red streaks or pus. A fever. Watch closely for changes in your health, and be sure to contact your doctor if:    Your swelling is getting worse. You have new or worsening pain in your legs. You do not get better as expected. Where can you learn more? Go to http://www.woods.com/ and enter N123 to learn more about \"Leg and Ankle Edema: Care Instructions. \"  Current as of: March 9, 2022               Content Version: 13.5  © 2006-2022 [employer]. Care instructions adapted under license by [facility] ([facility]). If you have questions about a medical condition or this instruction, always ask your healthcare professional. [place] [address] any warranty or liability for your use of this information.

## 2023-02-19 LAB
ANION GAP SERPL CALC-SCNC: 4 MMOL/L (ref 2–11)
BUN SERPL-MCNC: 38 MG/DL (ref 6–23)
CALCIUM SERPL-MCNC: 8.1 MG/DL (ref 8.3–10.4)
CHLORIDE SERPL-SCNC: 97 MMOL/L (ref 101–110)
CO2 SERPL-SCNC: 27 MMOL/L (ref 21–32)
CREAT SERPL-MCNC: 3.2 MG/DL (ref 0.8–1.5)
ERYTHROCYTE [DISTWIDTH] IN BLOOD BY AUTOMATED COUNT: 14.8 % (ref 11.9–14.6)
GLUCOSE BLD STRIP.AUTO-MCNC: 160 MG/DL (ref 65–100)
GLUCOSE BLD STRIP.AUTO-MCNC: 168 MG/DL (ref 65–100)
GLUCOSE BLD STRIP.AUTO-MCNC: 169 MG/DL (ref 65–100)
GLUCOSE BLD STRIP.AUTO-MCNC: 181 MG/DL (ref 65–100)
GLUCOSE SERPL-MCNC: 184 MG/DL (ref 65–100)
HCT VFR BLD AUTO: 22.8 % (ref 41.1–50.3)
HGB BLD-MCNC: 7.7 G/DL (ref 13.6–17.2)
MCH RBC QN AUTO: 26.8 PG (ref 26.1–32.9)
MCHC RBC AUTO-ENTMCNC: 33.8 G/DL (ref 31.4–35)
MCV RBC AUTO: 79.4 FL (ref 82–102)
NRBC # BLD: 0 K/UL (ref 0–0.2)
PLATELET # BLD AUTO: 234 K/UL (ref 150–450)
PMV BLD AUTO: 10.2 FL (ref 9.4–12.3)
POTASSIUM SERPL-SCNC: 4 MMOL/L (ref 3.5–5.1)
RBC # BLD AUTO: 2.87 M/UL (ref 4.23–5.6)
SERVICE CMNT-IMP: ABNORMAL
SODIUM SERPL-SCNC: 128 MMOL/L (ref 133–143)
WBC # BLD AUTO: 8.7 K/UL (ref 4.3–11.1)

## 2023-02-19 PROCEDURE — 6360000002 HC RX W HCPCS: Performed by: INTERNAL MEDICINE

## 2023-02-19 PROCEDURE — 6370000000 HC RX 637 (ALT 250 FOR IP): Performed by: NURSE PRACTITIONER

## 2023-02-19 PROCEDURE — 1100000000 HC RM PRIVATE

## 2023-02-19 PROCEDURE — 85027 COMPLETE CBC AUTOMATED: CPT

## 2023-02-19 PROCEDURE — 6370000000 HC RX 637 (ALT 250 FOR IP): Performed by: HOSPITALIST

## 2023-02-19 PROCEDURE — 6370000000 HC RX 637 (ALT 250 FOR IP): Performed by: INTERNAL MEDICINE

## 2023-02-19 PROCEDURE — 82962 GLUCOSE BLOOD TEST: CPT

## 2023-02-19 PROCEDURE — 97530 THERAPEUTIC ACTIVITIES: CPT

## 2023-02-19 PROCEDURE — 36415 COLL VENOUS BLD VENIPUNCTURE: CPT

## 2023-02-19 PROCEDURE — 2580000003 HC RX 258: Performed by: INTERNAL MEDICINE

## 2023-02-19 PROCEDURE — 6370000000 HC RX 637 (ALT 250 FOR IP): Performed by: STUDENT IN AN ORGANIZED HEALTH CARE EDUCATION/TRAINING PROGRAM

## 2023-02-19 PROCEDURE — 80048 BASIC METABOLIC PNL TOTAL CA: CPT

## 2023-02-19 RX ORDER — INSULIN GLARGINE 100 [IU]/ML
10 INJECTION, SOLUTION SUBCUTANEOUS DAILY
Status: DISCONTINUED | OUTPATIENT
Start: 2023-02-19 | End: 2023-02-20 | Stop reason: HOSPADM

## 2023-02-19 RX ADMIN — MIDODRINE HYDROCHLORIDE 2.5 MG: 5 TABLET ORAL at 13:34

## 2023-02-19 RX ADMIN — SODIUM CHLORIDE, PRESERVATIVE FREE 10 ML: 5 INJECTION INTRAVENOUS at 21:24

## 2023-02-19 RX ADMIN — MIDODRINE HYDROCHLORIDE 2.5 MG: 5 TABLET ORAL at 08:49

## 2023-02-19 RX ADMIN — HEPARIN SODIUM 5000 UNITS: 5000 INJECTION INTRAVENOUS; SUBCUTANEOUS at 13:35

## 2023-02-19 RX ADMIN — HEPARIN SODIUM 5000 UNITS: 5000 INJECTION INTRAVENOUS; SUBCUTANEOUS at 21:20

## 2023-02-19 RX ADMIN — INSULIN GLARGINE 10 UNITS: 100 INJECTION, SOLUTION SUBCUTANEOUS at 09:23

## 2023-02-19 RX ADMIN — SODIUM BICARBONATE 650 MG: 650 TABLET ORAL at 21:20

## 2023-02-19 RX ADMIN — FUROSEMIDE 40 MG: 40 TABLET ORAL at 08:50

## 2023-02-19 RX ADMIN — HYDROCODONE BITARTRATE AND ACETAMINOPHEN 1 TABLET: 7.5; 325 TABLET ORAL at 15:26

## 2023-02-19 RX ADMIN — FUROSEMIDE 40 MG: 40 TABLET ORAL at 17:30

## 2023-02-19 RX ADMIN — CARVEDILOL 25 MG: 25 TABLET, FILM COATED ORAL at 17:28

## 2023-02-19 RX ADMIN — HEPARIN SODIUM 5000 UNITS: 5000 INJECTION INTRAVENOUS; SUBCUTANEOUS at 05:31

## 2023-02-19 RX ADMIN — SODIUM BICARBONATE 650 MG: 650 TABLET ORAL at 13:35

## 2023-02-19 RX ADMIN — SODIUM CHLORIDE, PRESERVATIVE FREE 10 ML: 5 INJECTION INTRAVENOUS at 09:24

## 2023-02-19 RX ADMIN — MIDODRINE HYDROCHLORIDE 2.5 MG: 5 TABLET ORAL at 17:29

## 2023-02-19 RX ADMIN — PANTOPRAZOLE SODIUM 40 MG: 40 TABLET, DELAYED RELEASE ORAL at 05:31

## 2023-02-19 RX ADMIN — SODIUM BICARBONATE 650 MG: 650 TABLET ORAL at 08:49

## 2023-02-19 RX ADMIN — CARVEDILOL 25 MG: 25 TABLET, FILM COATED ORAL at 08:50

## 2023-02-19 RX ADMIN — PANTOPRAZOLE SODIUM 40 MG: 40 TABLET, DELAYED RELEASE ORAL at 15:27

## 2023-02-19 ASSESSMENT — PAIN DESCRIPTION - DESCRIPTORS
DESCRIPTORS: ACHING
DESCRIPTORS: ACHING

## 2023-02-19 ASSESSMENT — PAIN SCALES - GENERAL
PAINLEVEL_OUTOF10: 2
PAINLEVEL_OUTOF10: 7

## 2023-02-19 ASSESSMENT — PAIN DESCRIPTION - ORIENTATION
ORIENTATION: LEFT;RIGHT
ORIENTATION: RIGHT;LEFT

## 2023-02-19 ASSESSMENT — PAIN DESCRIPTION - LOCATION
LOCATION: LEG
LOCATION: FOOT

## 2023-02-19 NOTE — PROGRESS NOTES
ACUTE PHYSICAL THERAPY GOALS:   (Developed with and agreed upon by patient and/or caregiver. )    LTG:  (1.)Mr. Mirna Bowman will move from supine to sit and sit to supine , scoot up and down, and roll side to side in bed with MODIFIED INDEPENDENCE within 7 treatment day(s). (2.)Mr. Mirna Bowman will transfer from bed to chair and chair to bed with MODIFIED INDEPENDENCE using the least restrictive device within 7 treatment day(s). (3.)Mr. Mirna Bowman will ambulate with STAND BY ASSIST for 500+ feet with the least restrictive device within 7 treatment day(s) while maintaining normal vital signs. (4.)Mr. Mirna Bowman will perform 2 stairs with HR and CGA within 7 treatment days for ascending and descending stairs for home. PHYSICAL THERAPY: Daily Note PM   (Link to Caseload Tracking: PT Visit Days : 4  Time In/Out PT Charge Capture  Rehab Caseload Tracker  Orders    Ramon Samayoa is a 65 y.o. male   PRIMARY DIAGNOSIS: Anasarca  Anasarca [R60.1]  Scrotal edema [N50.89]  Bilateral leg edema [R60.0]  Acute renal failure, unspecified acute renal failure type ([facility] [N17.9]       Inpatient: Payor: Cherie Doll / Plan: LINDA Hugo / Product Type: *No Product type* /     ASSESSMENT:     REHAB RECOMMENDATIONS:   Recommendation to date pending progress:  Setting:  Home Health Therapy    Equipment:    None     ASSESSMENT:  Mr. Mirna Bowman was sitting EOB on contact and willing to walk. He stood and walked 250 ft with the walker and SBA without any real issues. Moving fairly well.      SUBJECTIVE:   Mr. Mirna Bowman states, \"ok\"     Social/Functional Lives With: Spouse  Type of Home: House  Home Layout: One level  Entrance Stairs - Number of Steps: 2  Bathroom Toilet: Bedside commode  Home Equipment: Tea Howell, [employer] [address] Help From: Family  OBJECTIVE:     PAIN: VITALS / O2: PRECAUTION / Rosiland Bing / DRAINS:   Pre Treatment:          Post Treatment: none mentioned Vitals        Oxygen None    RESTRICTIONS/PRECAUTIONS:        MOBILITY: I Mod I S SBA CGA Min Mod Max Total  NT x2 Comments:   Bed Mobility    Rolling [] [] [] [] [] [] [] [] [] [] []    Supine to Sit [] [] [] [] [] [] [] [] [] [] []    Scooting [] [x] [] [] [] [] [] [] [] [] []    Sit to Supine [] [] [] [] [] [] [] [] [] [] []    Transfers    Sit to Stand [] [] [] [x] [] [] [] [] [] [] []    Bed to Chair [] [] [] [x] [] [] [] [] [] [] []    Stand to Sit [] [] [] [x] [] [] [] [] [] [] []     [] [] [] [] [] [] [] [] [] [] []    I=Independent, Mod I=Modified Independent, S=Supervision, SBA=Standby Assistance, CGA=Contact Guard Assistance,   Min=Minimal Assistance, Mod=Moderate Assistance, Max=Maximal Assistance, Total=Total Assistance, NT=Not Tested    BALANCE: Good Fair+ Fair Fair- Poor NT Comments   Sitting Static [x] [] [] [] [] []    Sitting Dynamic [x] [] [] [] [] []              Standing Static [] [x] [] [] [] []    Standing Dynamic [] [x] [] [] [] []      GAIT: I Mod I S SBA CGA Min Mod Max Total  NT x2 Comments:   Level of Assistance [] [] [] [x] [] [] [] [] [] [] []    Distance 250 feet    DME Rolling Walker    Gait Quality Decreased step clearance    Weightbearing Status      Stairs      I=Independent, Mod I=Modified Independent, S=Supervision, SBA=Standby Assistance, CGA=Contact Guard Assistance,   Min=Minimal Assistance, Mod=Moderate Assistance, Max=Maximal Assistance, Total=Total Assistance, NT=Not Tested    PLAN:   FREQUENCY AND DURATION: 3 times/week for duration of hospital stay or until stated goals are met, whichever comes first.    TREATMENT:   TREATMENT:   Therapeutic Activity (10 Minutes): Therapeutic activity included Transfer Training, Ambulation on level ground, and Standing balance to improve functional Activity tolerance, Balance, Mobility, and Strength.     TREATMENT GRID:  N/A    AFTER TREATMENT PRECAUTIONS: Bed/Chair Locked, Call light within reach, Chair, Needs within reach, and RN notified    INTERDISCIPLINARY COLLABORATION:  RN/ PCT and PT/ PTA    EDUCATION:      TIME IN/OUT:  Time In: 1415  Time Out: 1430  Minutes: 15    Xochitl Calderon PTA

## 2023-02-19 NOTE — PROGRESS NOTES
Hospitalist Progress Note   Admit Date:  2023  2:53 PM   Name:  Aditi Dominguez   Age:  65 y.o. Sex:  male  :  1965   MRN:  [id number]   Room:  803/01    Presenting Complaint: Leg Swelling and Groin Swelling     Reason(s) for Admission: Anasarca [R60.1]  Scrotal edema [N50.89]  Bilateral leg edema [R60.0]  Acute renal failure, unspecified acute renal failure type [facility]) [N17.9]     Hospital Course:   Aditi Dominguez is a 65 y.o. male with a h/o cirrhosis, HLD, HTN, DM2 who was admitted to our service on  with anasarca and MARS. He underwent paracentesis on  with apparently 6L removed, though continued to have edema. Baseline sCr is 1.6-1.9, on admission was 4.1. He was started on IV furosemide and albumin. Repeat para on  with 6L removed. Cr has remained >4 so Nephrology was consulted. Scrotal US showed small b/l hydroceles and small epididymal cysts. Urology consulted, conservative mgmt especially for penile/scrotal edema. Cr has improved to <4 and stable at 3.2. Subjective & 24hr Events (23):   Pt is sitting up in chair this morning. No fever no chills. No chest pain. No shortness of breath. Still has significant bilateral lower extremity edema. Assessment & Plan: This is a 59-year-old male with:     # Anasarca // cirrhosis with ascites              - S/p para on , 6L removed. Net  negative 16.5 L since admission. Urine output of 1700 ml in the last 24 hours. Aldactone discontinued in the setting of CKD stage III. Currently on p.o. Lasix 40 mg twice daily. Duplex ultrasound bilateral lower extremities negative. # MARS on CKD3 present on admission  ? Hepatorenal syndrome. Retroperitoneal ultrasound negative for hydronephrosis. No indication for dialysis at this time. - Baseline Cr 1.6-1.9. Cr on admission 4, peaked at 4.3. Creatinine stable at 3.2 today. Nephrology following. Appreciate recommendations.     Albumin still low at 1.7. s/p IV Albumin. Continue midodrine. DC Aldactone. Continue Lasix 40 mg twice daily. Hyponatremia  Sodium stable at 129 this morning. Continue fluid restriction of 1000 mL in 24 hours. # GERD              - PPI     # LLE wound              - Wound care     # HTN              - Carvedilol     # DM2              - Blood glucose 168-251. Restart Lantus. Continue sliding scale Insulin. Discharge planning: Overall poor prognosis. Discussed with patient. Home with home health in the next 48 hours. Current plan of care was discussed with nephrology. Diet:  ADULT DIET; Regular; 4 carb choices (60 gm/meal); Low Sodium (2 gm); 1000 ml  VTE prophylaxis:Heparin  Code status: Full Code    Hospital Problems:  Principal Problem:    Anasarca  Active Problems:    Diabetic peripheral neuropathy ([facility] [address]    Essential hypertension    Type 2 diabetes mellitus (HCC)    Hypoalbuminemia    Cirrhosis of liver with ascites (HCC)    Acute kidney injury superimposed on chronic kidney disease (HCC)    Stage 3a chronic kidney disease ([facility] [address]  Resolved Problems:    * No resolved hospital problems. *      Objective:   Patient Vitals for the past 24 hrs:   Temp Pulse Resp BP SpO2   02/19/23 0850 -- 88 -- 121/85 --   02/19/23 0730 99 °F (37.2 °C) 88 -- 121/85 96 %   02/19/23 0253 98.4 °F (36.9 °C) 84 16 129/85 99 %   02/18/23 2237 98.2 °F (36.8 °C) 82 16 (!) 134/97 100 %   02/18/23 1934 97.9 °F (36.6 °C) 79 16 (!) 136/100 100 %   02/18/23 1658 -- 72 -- 119/88 --   02/18/23 1518 97.7 °F (36.5 °C) 72 16 119/88 100 %   02/18/23 1111 97.9 °F (36.6 °C) 74 18 (!) 132/90 100 %       Oxygen Therapy  SpO2: 96 %  Pulse Oximeter Device Mode: Continuous  O2 Device: None (Room air)    Estimated body mass index is 29.79 kg/m² as calculated from the following:    Height as of this encounter: 5' 11\" (1.803 m). Weight as of this encounter: 213 lb 10 oz (96.9 kg).     Intake/Output Summary (Last 24 hours) at 2/19/2023 1106  Last data filed at 2/19/2023 8301  Gross per 24 hour   Intake --   Output 275 ml   Net -275 ml         Physical Exam:   Blood pressure 121/85, pulse 88, temperature 99 °F (37.2 °C), resp. rate 16, height 5' 11\" (1.803 m), weight 213 lb 10 oz (96.9 kg), SpO2 96 %. General:    Well nourished. Alert awake male, chronically ill-appearing,  Head:  Normocephalic, atraumatic  Eyes:  Sclerae appear normal.  Pupils equally round. ENT:  Nares appear normal.  Moist oral mucosa  Neck:  No restricted ROM. Trachea midline   CV:   RRR. No m/r/g. No jugular venous distension. Lungs:   CTAB. No wheezing, rhonchi, or rales. Symmetric expansion. Abdomen:   Soft, nontender, mildly distended. BS active. Extremities: No cyanosis or clubbing. Stable 2+ b/l LE pitting edema. Skin:     No rashes and normal coloration. Warm and dry. Neuro:  CN II-XII grossly intact. Psych:  Normal mood and affect.       I have personally reviewed labs and tests:  Recent Labs:  Recent Results (from the past 48 hour(s))   POCT Glucose    Collection Time: 02/17/23 11:51 AM   Result Value Ref Range    POC Glucose 270 (H) 65 - 100 mg/dL    Performed by: Betty    POCT Glucose    Collection Time: 02/17/23  4:51 PM   Result Value Ref Range    POC Glucose 193 (H) 65 - 100 mg/dL    Performed by: Viki    POCT Glucose    Collection Time: 02/17/23  8:42 PM   Result Value Ref Range    POC Glucose 255 (H) 65 - 100 mg/dL    Performed by: Mello    Basic Metabolic Panel    Collection Time: 02/18/23  3:39 AM   Result Value Ref Range    Sodium 129 (L) 133 - 143 mmol/L    Potassium 4.1 3.5 - 5.1 mmol/L    Chloride 97 (L) 101 - 110 mmol/L    CO2 18 (L) 21 - 32 mmol/L    Anion Gap 14 (H) 2 - 11 mmol/L    Glucose 196 (H) 65 - 100 mg/dL    BUN 36 (H) 6 - 23 MG/DL    Creatinine 3.50 (H) 0.8 - 1.5 MG/DL    Est, Glom Filt Rate 20 (L) >60 ml/min/1.73m2    Calcium 8.0 (L) 8.3 - 10.4 MG/DL   CBC    Collection Time: 02/18/23  3:39 AM   Result Value Ref Range    WBC 9.2 4.3 - 11.1 K/uL    RBC 2.88 (L) 4.23 - 5.6 M/uL    Hemoglobin 7.6 (L) 13.6 - 17.2 g/dL    Hematocrit 23.1 (L) 41.1 - 50.3 %    MCV 80.2 (L) 82 - 102 FL    MCH 26.4 26.1 - 32.9 PG    MCHC 32.9 31.4 - 35.0 g/dL    RDW 15.0 (H) 11.9 - 14.6 %    Platelets 377 158 - 253 K/uL    MPV 9.9 9.4 - 12.3 FL    nRBC 0.00 0.0 - 0.2 K/uL   POCT Glucose    Collection Time: 02/18/23  7:59 AM   Result Value Ref Range    POC Glucose 190 (H) 65 - 100 mg/dL    Performed by: AlexandreRated PeoplePCPHILIPP    POCT Glucose    Collection Time: 02/18/23 11:33 AM   Result Value Ref Range    POC Glucose 207 (H) 65 - 100 mg/dL    Performed by:  Ruby    POCT Glucose    Collection Time: 02/18/23  4:09 PM   Result Value Ref Range    POC Glucose 251 (H) 65 - 100 mg/dL    Performed by: Domenica Hollis    POCT Glucose    Collection Time: 02/18/23 10:38 PM   Result Value Ref Range    POC Glucose 226 (H) 65 - 100 mg/dL    Performed by: KennethPHILIPP    Basic Metabolic Panel    Collection Time: 02/19/23  3:45 AM   Result Value Ref Range    Sodium 128 (L) 133 - 143 mmol/L    Potassium 4.0 3.5 - 5.1 mmol/L    Chloride 97 (L) 101 - 110 mmol/L    CO2 27 21 - 32 mmol/L    Anion Gap 4 2 - 11 mmol/L    Glucose 184 (H) 65 - 100 mg/dL    BUN 38 (H) 6 - 23 MG/DL    Creatinine 3.20 (H) 0.8 - 1.5 MG/DL    Est, Glom Filt Rate 22 (L) >60 ml/min/1.73m2    Calcium 8.1 (L) 8.3 - 10.4 MG/DL   CBC    Collection Time: 02/19/23  3:45 AM   Result Value Ref Range    WBC 8.7 4.3 - 11.1 K/uL    RBC 2.87 (L) 4.23 - 5.6 M/uL    Hemoglobin 7.7 (L) 13.6 - 17.2 g/dL    Hematocrit 22.8 (L) 41.1 - 50.3 %    MCV 79.4 (L) 82 - 102 FL    MCH 26.8 26.1 - 32.9 PG    MCHC 33.8 31.4 - 35.0 g/dL    RDW 14.8 (H) 11.9 - 14.6 %    Platelets 422 105 - 049 K/uL    MPV 10.2 9.4 - 12.3 FL    nRBC 0.00 0.0 - 0.2 K/uL   POCT Glucose    Collection Time: 02/19/23  9:07 AM   Result Value Ref Range    POC Glucose 168 (H) 65 - 100 mg/dL    Performed by: Mavis Blackburn        I have personally reviewed imaging studies:  Other Studies:  Vascular duplex lower extremity venous bilateral   Final Result      1. No evidence of left lower extremity deep venous thrombosis. Andrea Dumont M.D.    2/18/2023 12:42:00 AM      US ABDOMEN COMPLETE   Final Result   1) Probable increased renal echogenicity, nonspecific, without hydronephrosis. 2) Coarse echogenic lobular liver consistent with cirrhosis. 3) Cholelithiasis without biliary tree obstruction. US SCROTUM AND TESTICLES   Final Result   Negative for testicular torsion. Small bilateral minimally   complicated hydroceles. Small epididymal cysts on the right. Scrotal thickening   on the left. IR US GUIDED PARACENTESIS   Final Result   Uncomplicated ultrasound guided paracentesis.                   XR CHEST 1 VIEW   Final Result   No acute findings in the chest             Current Meds:  Current Facility-Administered Medications   Medication Dose Route Frequency    insulin glargine (LANTUS) injection vial 10 Units  10 Units SubCUTAneous Daily    midodrine (PROAMATINE) tablet 2.5 mg  2.5 mg Oral TID WC    furosemide (LASIX) tablet 40 mg  40 mg Oral BID    sodium bicarbonate tablet 650 mg  650 mg Oral TID    hydrALAZINE (APRESOLINE) injection 5 mg  5 mg IntraVENous Q6H PRN    carvedilol (COREG) tablet 25 mg  25 mg Oral BID WC    insulin lispro (HUMALOG) injection vial 0-4 Units  0-4 Units SubCUTAneous TID WC    insulin lispro (HUMALOG) injection vial 0-4 Units  0-4 Units SubCUTAneous Nightly    glucose chewable tablet 16 g  4 tablet Oral PRN    dextrose bolus 10% 125 mL  125 mL IntraVENous PRN    Or    dextrose bolus 10% 250 mL  250 mL IntraVENous PRN    glucagon (rDNA) injection 1 mg  1 mg SubCUTAneous PRN    dextrose 10 % infusion   IntraVENous Continuous PRN    HYDROcodone-acetaminophen (NORCO) 7.5-325 MG per tablet 1 tablet  1 tablet Oral Q6H PRN    pantoprazole (PROTONIX) tablet 40 mg  40 mg Oral BID AC    sodium chloride flush 0.9 % injection 5-40 mL  5-40 mL IntraVENous 2 times per day    sodium chloride flush 0.9 % injection 5-40 mL  5-40 mL IntraVENous PRN    0.9 % sodium chloride infusion   IntraVENous PRN    ondansetron (ZOFRAN-ODT) disintegrating tablet 4 mg  4 mg Oral Q8H PRN    Or    ondansetron (ZOFRAN) injection 4 mg  4 mg IntraVENous Q6H PRN    polyethylene glycol (GLYCOLAX) packet 17 g  17 g Oral Daily PRN    bisacodyl (DULCOLAX) suppository 10 mg  10 mg Rectal Daily PRN    famotidine (PEPCID) tablet 10 mg  10 mg Oral Daily PRN    aluminum & magnesium hydroxide-simethicone (MAALOX) 200-200-20 MG/5ML suspension 30 mL  30 mL Oral Q6H PRN    acetaminophen (TYLENOL) tablet 650 mg  650 mg Oral Q6H PRN    Or    acetaminophen (TYLENOL) suppository 650 mg  650 mg Rectal Q6H PRN    heparin (porcine) injection 5,000 Units  5,000 Units SubCUTAneous 3 times per day       Signed:  Alva Zuleta MD    Part of this note may have been written by using a voice dictation software. The note has been proof read but may still contain some grammatical/other typographical errors.

## 2023-02-19 NOTE — PROGRESS NOTES
Dionicio Pendleton  Admission Date: 2/6/2023         [facility] Progress Note: 2/19/2023    Follow-up for: MARS/CKD 3b    The patient's chart is reviewed and the patient is discussed with the staff.     Subjective:   Pt seen and examined in room sitting up on edge of the bed, + LE edema, abdominal distention,    ROS:  Gen - no fever, no chills, appetite unchanged    CV - no chest pain, no palpitation  Lung - no shortness of breath, no cough  Abd - no tenderness, no nausea/vomiting, no diarrhea  Ext - + edema    Current Facility-Administered Medications   Medication Dose Route Frequency    insulin glargine (LANTUS) injection vial 10 Units  10 Units SubCUTAneous Daily    midodrine (PROAMATINE) tablet 2.5 mg  2.5 mg Oral TID WC    furosemide (LASIX) tablet 40 mg  40 mg Oral BID    sodium bicarbonate tablet 650 mg  650 mg Oral TID    hydrALAZINE (APRESOLINE) injection 5 mg  5 mg IntraVENous Q6H PRN    carvedilol (COREG) tablet 25 mg  25 mg Oral BID WC    insulin lispro (HUMALOG) injection vial 0-4 Units  0-4 Units SubCUTAneous TID WC    insulin lispro (HUMALOG) injection vial 0-4 Units  0-4 Units SubCUTAneous Nightly    glucose chewable tablet 16 g  4 tablet Oral PRN    dextrose bolus 10% 125 mL  125 mL IntraVENous PRN    Or    dextrose bolus 10% 250 mL  250 mL IntraVENous PRN    glucagon (rDNA) injection 1 mg  1 mg SubCUTAneous PRN    dextrose 10 % infusion   IntraVENous Continuous PRN    HYDROcodone-acetaminophen (NORCO) 7.5-325 MG per tablet 1 tablet  1 tablet Oral Q6H PRN    pantoprazole (PROTONIX) tablet 40 mg  40 mg Oral BID AC    sodium chloride flush 0.9 % injection 5-40 mL  5-40 mL IntraVENous 2 times per day    sodium chloride flush 0.9 % injection 5-40 mL  5-40 mL IntraVENous PRN    0.9 % sodium chloride infusion   IntraVENous PRN    ondansetron (ZOFRAN-ODT) disintegrating tablet 4 mg  4 mg Oral Q8H PRN    Or    ondansetron (ZOFRAN) injection 4 mg  4 mg IntraVENous Q6H PRN polyethylene glycol (GLYCOLAX) packet 17 g  17 g Oral Daily PRN    bisacodyl (DULCOLAX) suppository 10 mg  10 mg Rectal Daily PRN    famotidine (PEPCID) tablet 10 mg  10 mg Oral Daily PRN    aluminum & magnesium hydroxide-simethicone (MAALOX) 200-200-20 MG/5ML suspension 30 mL  30 mL Oral Q6H PRN    acetaminophen (TYLENOL) tablet 650 mg  650 mg Oral Q6H PRN    Or    acetaminophen (TYLENOL) suppository 650 mg  650 mg Rectal Q6H PRN    heparin (porcine) injection 5,000 Units  5,000 Units SubCUTAneous 3 times per day         Objective:     Vitals:    02/18/23 2237 02/19/23 0253 02/19/23 0730 02/19/23 0850   BP: (!) 134/97 129/85 121/85 121/85   Pulse: 82 84 88 88   Resp: 16 16     Temp: 98.2 °F (36.8 °C) 98.4 °F (36.9 °C) 99 °F (37.2 °C)    TempSrc: Oral Oral     SpO2: 100% 99% 96%    Weight:       Height:         Intake and Output:   02/17 1901 - 02/19 0700  In: -   Out: 850 [Urine:850]  02/19 0701 - 02/19 1900  In: -   Out: 275 [Urine:275]    Physical Exam:   Constitutional:  the patient is well developed and in no acute distress, alert and following commands  HEENT:  Sclera clear, pupils equal, oral mucosa moist  Lungs: clear bilaterally   Cardiovascular:  Regular rate, S1, S2, no Rub   Abd/GI: tight distention, and non-tender;   Ext:There is 3+ lower leg edema/anasarca. Skin:  no jaundice or rashes  Neuro: no gross neuro deficits   Psychiatric: Calm. LAB  Recent Labs     02/17/23  0430 02/18/23  0339 02/19/23  0345   WBC 7.6 9.2 8.7   HGB 7.1* 7.6* 7.7*   HCT 22.5* 23.1* 22.8*   * 203 234       Recent Labs     02/17/23  0430 02/18/23  0339 02/19/23  0345   * 129* 128*   K 4.1 4.1 4.0   CL 97* 97* 97*   CO2 25 18* 27   BUN 37* 36* 38*   CREATININE 3.30* 3.50* 3.20*       No results for input(s): PH, PCO2, PO2, HCO3 in the last 72 hours. Assessment/Plan:  (Medical Decision Making)   MARS/CKD 3b- ? HRS, vs ATN : difficult to interpret data in presence of CKD.    -Baseline Cr 1.5-1.8  Volume overload   Fluid restriction   Lasix   Avoid Aldactone ( advance renal disease )   Prognosis seems to be poor      2. Hypoalbuminemia/anasarca- albumin, continue lasix        3. Cirrhosis with ascites, s/p paracentesis 2/8 with 6150 cc removed     4. Hypertension stable on BB     5. Acute metabolic acidosis- continue PO NaHCO3      6. DM type II- SSI per hosp      7. Anemia    8.  Hyponatremia- continue fluid restriction         Val Garrido MD  [facility]

## 2023-02-19 NOTE — PROGRESS NOTES
Bedside report received from night nurse Betito . Assessment done as noted  Respiration even and unlabored 20/min; denies pain or nausea at present. Encouraged to call with needs.

## 2023-02-20 VITALS
WEIGHT: 213.63 LBS | RESPIRATION RATE: 18 BRPM | DIASTOLIC BLOOD PRESSURE: 92 MMHG | BODY MASS INDEX: 29.91 KG/M2 | HEIGHT: 71 IN | OXYGEN SATURATION: 99 % | SYSTOLIC BLOOD PRESSURE: 124 MMHG | HEART RATE: 78 BPM | TEMPERATURE: 98.2 F

## 2023-02-20 LAB
ANION GAP SERPL CALC-SCNC: 6 MMOL/L (ref 2–11)
BUN SERPL-MCNC: 39 MG/DL (ref 6–23)
CALCIUM SERPL-MCNC: 8.1 MG/DL (ref 8.3–10.4)
CHLORIDE SERPL-SCNC: 97 MMOL/L (ref 101–110)
CO2 SERPL-SCNC: 27 MMOL/L (ref 21–32)
CREAT SERPL-MCNC: 3.2 MG/DL (ref 0.8–1.5)
ERYTHROCYTE [DISTWIDTH] IN BLOOD BY AUTOMATED COUNT: 14.8 % (ref 11.9–14.6)
GLUCOSE BLD STRIP.AUTO-MCNC: 80 MG/DL (ref 65–100)
GLUCOSE SERPL-MCNC: 96 MG/DL (ref 65–100)
HCT VFR BLD AUTO: 22.5 % (ref 41.1–50.3)
HGB BLD-MCNC: 7.5 G/DL (ref 13.6–17.2)
MCH RBC QN AUTO: 26.3 PG (ref 26.1–32.9)
MCHC RBC AUTO-ENTMCNC: 33.3 G/DL (ref 31.4–35)
MCV RBC AUTO: 78.9 FL (ref 82–102)
NRBC # BLD: 0 K/UL (ref 0–0.2)
PLATELET # BLD AUTO: 247 K/UL (ref 150–450)
PMV BLD AUTO: 9.7 FL (ref 9.4–12.3)
POTASSIUM SERPL-SCNC: 4 MMOL/L (ref 3.5–5.1)
RBC # BLD AUTO: 2.85 M/UL (ref 4.23–5.6)
SERVICE CMNT-IMP: NORMAL
SODIUM SERPL-SCNC: 130 MMOL/L (ref 133–143)
WBC # BLD AUTO: 8.1 K/UL (ref 4.3–11.1)

## 2023-02-20 PROCEDURE — 6370000000 HC RX 637 (ALT 250 FOR IP): Performed by: HOSPITALIST

## 2023-02-20 PROCEDURE — 80048 BASIC METABOLIC PNL TOTAL CA: CPT

## 2023-02-20 PROCEDURE — 36415 COLL VENOUS BLD VENIPUNCTURE: CPT

## 2023-02-20 PROCEDURE — 85027 COMPLETE CBC AUTOMATED: CPT

## 2023-02-20 PROCEDURE — 2580000003 HC RX 258: Performed by: INTERNAL MEDICINE

## 2023-02-20 PROCEDURE — 6370000000 HC RX 637 (ALT 250 FOR IP): Performed by: INTERNAL MEDICINE

## 2023-02-20 PROCEDURE — 6370000000 HC RX 637 (ALT 250 FOR IP): Performed by: NURSE PRACTITIONER

## 2023-02-20 PROCEDURE — 6360000002 HC RX W HCPCS: Performed by: NURSE PRACTITIONER

## 2023-02-20 PROCEDURE — 82962 GLUCOSE BLOOD TEST: CPT

## 2023-02-20 PROCEDURE — 6360000002 HC RX W HCPCS: Performed by: INTERNAL MEDICINE

## 2023-02-20 PROCEDURE — 6370000000 HC RX 637 (ALT 250 FOR IP): Performed by: STUDENT IN AN ORGANIZED HEALTH CARE EDUCATION/TRAINING PROGRAM

## 2023-02-20 RX ORDER — MIDODRINE HYDROCHLORIDE 2.5 MG/1
2.5 TABLET ORAL
Qty: 90 TABLET | Refills: 0 | Status: SHIPPED | OUTPATIENT
Start: 2023-02-20 | End: 2023-03-22

## 2023-02-20 RX ADMIN — HEPARIN SODIUM 5000 UNITS: 5000 INJECTION INTRAVENOUS; SUBCUTANEOUS at 05:50

## 2023-02-20 RX ADMIN — SODIUM CHLORIDE, PRESERVATIVE FREE 10 ML: 5 INJECTION INTRAVENOUS at 09:24

## 2023-02-20 RX ADMIN — MIDODRINE HYDROCHLORIDE 2.5 MG: 5 TABLET ORAL at 09:16

## 2023-02-20 RX ADMIN — EPOETIN ALFA-EPBX 10000 UNITS: 10000 INJECTION, SOLUTION INTRAVENOUS; SUBCUTANEOUS at 11:23

## 2023-02-20 RX ADMIN — PANTOPRAZOLE SODIUM 40 MG: 40 TABLET, DELAYED RELEASE ORAL at 05:10

## 2023-02-20 RX ADMIN — HYDROCODONE BITARTRATE AND ACETAMINOPHEN 1 TABLET: 7.5; 325 TABLET ORAL at 00:28

## 2023-02-20 RX ADMIN — HYDROCODONE BITARTRATE AND ACETAMINOPHEN 1 TABLET: 7.5; 325 TABLET ORAL at 10:49

## 2023-02-20 RX ADMIN — CARVEDILOL 25 MG: 25 TABLET, FILM COATED ORAL at 09:17

## 2023-02-20 RX ADMIN — SODIUM BICARBONATE 650 MG: 650 TABLET ORAL at 09:17

## 2023-02-20 RX ADMIN — MIDODRINE HYDROCHLORIDE 2.5 MG: 5 TABLET ORAL at 11:23

## 2023-02-20 RX ADMIN — FUROSEMIDE 40 MG: 40 TABLET ORAL at 09:17

## 2023-02-20 ASSESSMENT — PAIN DESCRIPTION - DESCRIPTORS: DESCRIPTORS: ACHING

## 2023-02-20 ASSESSMENT — PAIN SCALES - GENERAL
PAINLEVEL_OUTOF10: 6
PAINLEVEL_OUTOF10: 0
PAINLEVEL_OUTOF10: 6

## 2023-02-20 ASSESSMENT — PAIN DESCRIPTION - ORIENTATION
ORIENTATION: RIGHT;LEFT
ORIENTATION: LEFT

## 2023-02-20 ASSESSMENT — PAIN DESCRIPTION - LOCATION
LOCATION: FOOT;LEG
LOCATION: LEG

## 2023-02-20 ASSESSMENT — PAIN - FUNCTIONAL ASSESSMENT: PAIN_FUNCTIONAL_ASSESSMENT: PREVENTS OR INTERFERES SOME ACTIVE ACTIVITIES AND ADLS

## 2023-02-20 NOTE — PROGRESS NOTES
Bedside report received from night nurse [facility]. Assessment done as noted  Respiration even and unlabored 20/min; denies pain or nausea at present. Encouraged to call with needs.

## 2023-02-20 NOTE — PROGRESS NOTES
Pt resting in bed with eyes closed. RR even and nonlabored with visible rise and fall of chest noted. NAD noted. All needs within reach.

## 2023-02-20 NOTE — CARE COORDINATION
MSN, cM:  patient to be discharged home today with [facility] and [address] [place] [address]. Patient and family agree with this discharge plan. Patient has met all milestones for this admission. Family to transport patient home. 02/20/23 0932   Service Assessment   Patient Orientation Alert and Oriented   Cognition Alert   Services At/After Discharge   Transition of Care Consult (CM Consult) Home Health; Other  [facility] and [address] palliative care)   Internal Home Health Yes   Mode of Transport at Discharge Other (see comment)  (family to transport)   Confirm Follow Up Transport Family   Condition of Participation: Discharge Planning   The Plan for Transition of Care is related to the following treatment goals: Home Health to regain strength back to baseline and pallitive care   The Patient and/or Patient Representative was provided with a Choice of Provider? Patient   The Patient and/Or Patient Representative agree with the Discharge Plan? Yes   Freedom of Choice list was provided with basic dialogue that supports the patient's individualized plan of care/goals, treatment preferences, and shares the quality data associated with the providers?   Yes

## 2023-02-20 NOTE — PROGRESS NOTES
Pt rested this shift, PRN pain meds x 1 given for pain (see EMAR). Pt has denied any other needs. NAD noted, safety maintained, will report to oncoming nurse.

## 2023-02-20 NOTE — PROGRESS NOTES
Jay Oliveira  Admission Date: 2/6/2023         [facility] Progress Note: 2/20/2023    Follow-up for: MARS/CKD 3b    The patient's chart is reviewed and the patient is discussed with the staff. Subjective:   Pt seen and examined in room sitting up in chair, + LE edema, reports good uop, no overt uremic symptoms.      ROS:  Gen - no fever, no chills, appetite unchanged    CV - no chest pain, no palpitation  Lung - no shortness of breath, no cough  Abd - no tenderness, no nausea/vomiting, no diarrhea  Ext - + edema    Current Facility-Administered Medications   Medication Dose Route Frequency    [Held by provider] insulin glargine (LANTUS) injection vial 10 Units  10 Units SubCUTAneous Daily    midodrine (PROAMATINE) tablet 2.5 mg  2.5 mg Oral TID WC    furosemide (LASIX) tablet 40 mg  40 mg Oral BID    sodium bicarbonate tablet 650 mg  650 mg Oral TID    hydrALAZINE (APRESOLINE) injection 5 mg  5 mg IntraVENous Q6H PRN    carvedilol (COREG) tablet 25 mg  25 mg Oral BID WC    insulin lispro (HUMALOG) injection vial 0-4 Units  0-4 Units SubCUTAneous TID WC    insulin lispro (HUMALOG) injection vial 0-4 Units  0-4 Units SubCUTAneous Nightly    glucose chewable tablet 16 g  4 tablet Oral PRN    dextrose bolus 10% 125 mL  125 mL IntraVENous PRN    Or    dextrose bolus 10% 250 mL  250 mL IntraVENous PRN    glucagon (rDNA) injection 1 mg  1 mg SubCUTAneous PRN    dextrose 10 % infusion   IntraVENous Continuous PRN    HYDROcodone-acetaminophen (NORCO) 7.5-325 MG per tablet 1 tablet  1 tablet Oral Q6H PRN    pantoprazole (PROTONIX) tablet 40 mg  40 mg Oral BID AC    sodium chloride flush 0.9 % injection 5-40 mL  5-40 mL IntraVENous 2 times per day    sodium chloride flush 0.9 % injection 5-40 mL  5-40 mL IntraVENous PRN    0.9 % sodium chloride infusion   IntraVENous PRN    ondansetron (ZOFRAN-ODT) disintegrating tablet 4 mg  4 mg Oral Q8H PRN    Or    ondansetron (ZOFRAN) injection 4 mg  4 mg IntraVENous Q6H PRN    polyethylene glycol (GLYCOLAX) packet 17 g  17 g Oral Daily PRN    bisacodyl (DULCOLAX) suppository 10 mg  10 mg Rectal Daily PRN    famotidine (PEPCID) tablet 10 mg  10 mg Oral Daily PRN    aluminum & magnesium hydroxide-simethicone (MAALOX) 200-200-20 MG/5ML suspension 30 mL  30 mL Oral Q6H PRN    acetaminophen (TYLENOL) tablet 650 mg  650 mg Oral Q6H PRN    Or    acetaminophen (TYLENOL) suppository 650 mg  650 mg Rectal Q6H PRN    heparin (porcine) injection 5,000 Units  5,000 Units SubCUTAneous 3 times per day         Objective:     Vitals:    02/20/23 0058 02/20/23 0353 02/20/23 0719 02/20/23 0917   BP:  99/66 130/86 130/86   Pulse:  81 83 83   Resp: 18 17 20    Temp:  98.4 °F (36.9 °C) 98.6 °F (37 °C)    TempSrc:  Oral Oral    SpO2:  96% 95%    Weight:       Height:         Intake and Output:   02/18 1901 - 02/20 0700  In: 690 [P.O.:690]  Out: 8622 [Urine:1625]  No intake/output data recorded. Physical Exam:   Constitutional:  the patient is well developed and in no acute distress, alert and following commands  HEENT:  Sclera clear, pupils equal, oral mucosa moist  Lungs: clear bilaterally   Cardiovascular:  Regular rate, S1, S2, no Rub   Abd/GI: tight distention, and non-tender;   Ext:There is 2+ lower leg edema/anasarca. Skin:  no jaundice or rashes  Neuro: no gross neuro deficits   Psychiatric: Calm. LAB  Recent Labs     02/18/23  0339 02/19/23  0345 02/20/23  0446   WBC 9.2 8.7 8.1   HGB 7.6* 7.7* 7.5*   HCT 23.1* 22.8* 22.5*    234 247       Recent Labs     02/18/23  0339 02/19/23  0345 02/20/23  0446   * 128* 130*   K 4.1 4.0 4.0   CL 97* 97* 97*   CO2 18* 27 27   BUN 36* 38* 39*   CREATININE 3.50* 3.20* 3.20*       No results for input(s): PH, PCO2, PO2, HCO3 in the last 72 hours. Assessment/Plan:  (Medical Decision Making)   MARS/CKD 3b- ? HRS, vs ATN : difficult to interpret data in presence of CKD.  Nevelyn Side not accurate with diuretic on board  -Baseline Cr 1.5-1.8  Volume overload   Fluid restriction   Lasix   Avoid Aldactone ( advance renal disease )   Renal function holding, non oliguric, DW DR. Canales ok to discharge from renal standpoint, will arrange for outpt Nephrology follow up      2. Hypoalbuminemia/anasarca- albumin, continue lasix      3. Cirrhosis with ascites, s/p paracentesis 2/8 with 6150 cc removed     4. Hypertension stable on BB     5. Acute metabolic acidosis- continue PO NaHCO3      6. DM type II- SSI per hosp      7. Anemia- Fe studies ok, add marley    8. Hyponatremia- continue fluid restriction     Sign off with stabilizing renal function, please call with additional nephrology questions, thanks for this consultation.          Matthieu Rodriguez, APRN - [facility]

## 2023-02-20 NOTE — DISCHARGE SUMMARY
Hospitalist Discharge Summary   Admit Date:  2023  2:53 PM   DC Note date: 2023  Name:  Daniela Doctor   Age:  65 y.o. Sex:  male  :  1965   MRN:  [id number]   Room:  [facility]  PCP:  Babs Cunha MD    Presenting Complaint: Leg Swelling and Groin Swelling     Initial Admission Diagnosis: Anasarca [R60.1]  Scrotal edema [N50.89]  Bilateral leg edema [R60.0]  Acute renal failure, unspecified acute renal failure type ([address] [N17.9]     Problem List for this Hospitalization (present on admission):    Principal Problem:    Anasarca  Active Problems:    Diabetic peripheral neuropathy ([address]    Essential hypertension    Type 2 diabetes mellitus ([address]    Hypoalbuminemia    Cirrhosis of liver with ascites ([address]    Acute kidney injury superimposed on chronic kidney disease ([address]    Stage 3a chronic kidney disease ([address]  Resolved Problems:    * No resolved hospital problems. [facility] Course: This is a 65 y.o. male with a h/o cirrhosis, HLD, HTN, DM2 who was admitted to our service on  with anasarca and MARS. He underwent paracentesis on  with apparently 6L removed, though continued to have edema. Baseline sCr is 1.6-1.9, on admission was 4.1. He was started on IV furosemide and albumin. Repeat para on  with 6L removed. Cr has remained >4 so Nephrology was consulted. Scrotal US showed small b/l hydroceles and small epididymal cysts. Urology consulted, conservative mgmt especially for penile/scrotal edema. Cr has improved to <4 and stable at 3.2. During the course of hospitalization, patient was diuresed with IV Lasix. Creatinine stable at 3.2. Albumin was also infused. Nephrology recommends switching to p.o. diuretics. IV Lasix switched to p.o. Lasix 40 mg twice daily. He likely has hepatorenal syndrome. Patient was started on midodrine. Not a candidate for Aldactone due to his progressive stage III chronic kidney disease. Duplex ultrasound negative for DVT.   Hyponatremia with sodium of 130 on discharge. Other chronic medical monitor stable including GERD, hypertension, diabetes mellitus type 2. His overall prognosis is poor from his hepatorenal syndrome. He needs outpatient follow-up with GI and nephrology. He is discharged home with home health today in a stable condition. Patient is at increased risk of readmission given his overall poor prognosis and worsening medical comorbidities including hepatorenal syndrome. Disposition: Home with home health today  Diet: ADULT DIET; Regular; 4 carb choices (60 gm/meal); Low Sodium (2 gm); 1000 ml  Code Status: Full Code    Follow Ups:   Follow-up Information     Dimitri Gamboa MD .    Specialty: Family Medicine  Why: the office will be calling you with an appointment  Contact information:  [address]             Von Terrell MD. Schedule an appointment as soon as possible for a   visit in 1 week(s). Specialties: Nephrology, Internal Medicine  Contact information:  [address] [facility]  [phone]             Laura Wyatt MD. Schedule an appointment as soon as possible for a   visit in 1 week(s). Specialty: Gastroenterology  Why: liver cirrhosis  Contact information:  [place] [address]  [phone]                       Time spent in patient discharge and coordination 42 minutes. Follow up labs/diagnostics (ultimately defer to outpatient provider):  CBC, BMP in 3 to 5 days. Plan was discussed with the patient. All questions answered. Patient was stable at time of discharge. Instructions given to call a physician or return if any concerns.     Current Discharge Medication List        START taking these medications    Details   midodrine (PROAMATINE) 2.5 MG tablet Take 1 tablet by mouth 3 times daily (with meals)  Qty: 90 tablet, Refills: 0           CONTINUE these medications which have NOT CHANGED    Details   folic acid (FOLVITE) 1 MG tablet TAKE 1 TABLET BY MOUTH EVERY DAY  Qty: 30 tablet, Refills: 0    Associated Diagnoses: Alcoholic cirrhosis of liver with ascites (HCC)      sodium bicarbonate 650 MG tablet TAKE 2 TABLETS BY MOUTH TWICE A DAY  Qty: 60 tablet, Refills: 0    Associated Diagnoses: Alcoholic cirrhosis of liver with ascites (HCC)      oxyCODONE (ROXICODONE) 5 MG immediate release tablet oxycodone 5 mg tablet   TAKE 1 TABLET BY MOUTH EVERY 8 HOURS AS NEEDED FOR PAIN FOR UPTO 3 DAYS      furosemide (LASIX) 40 MG tablet Take 1 tablet by mouth 2 times daily  Qty: 60 tablet, Refills: 0    Associated Diagnoses: Alcoholic cirrhosis of liver with ascites (HCC)      pantoprazole (PROTONIX) 40 MG tablet Take 1 tablet by mouth 2 times daily (before meals)  Qty: 30 tablet, Refills: 0    Associated Diagnoses: Alcoholic cirrhosis of liver with ascites (HCC)      insulin glargine (LANTUS SOLOSTAR) 100 UNIT/ML injection pen Inject 10 Units into the skin daily  Qty: 5 Adjustable Dose Pre-filled Pen Syringe, Refills: 0    Associated Diagnoses: Type 2 diabetes mellitus without complication, with long-term current use of insulin (HCC)      carvedilol (COREG) 25 MG tablet Take 0.5 tablets by mouth 2 times daily (with meals)  Qty: 60 tablet, Refills: 3    Associated Diagnoses: Essential hypertension      cholestyramine light 4 g packet Take 1 packet by mouth 2 times daily  Qty: 60 packet, Refills: 0    Associated Diagnoses: Alcoholic cirrhosis of liver with ascites (HCC)      cyanocobalamin 1000 MCG tablet Take 1 tablet by mouth daily  Qty: 30 tablet, Refills: 0    Associated Diagnoses: Alcoholic cirrhosis of liver with ascites (HCC)      Blood Pressure Monitoring (BLOOD PRESSURE CUFF) MISC 1 Units by Does not apply route daily  Qty: 1 each, Refills: 0    Associated Diagnoses: Essential hypertension      acetaminophen (TYLENOL) 325 MG tablet Take 650 mg by mouth every 6 hours as needed for Pain. naloxone 4 MG/0.1ML LIQD nasal spray 1 SPRAY INTO A NOSTRIL AS NEEDED FOR OPIOID OVERDOSE.      calcium carb-cholecalciferol 250-125 MG-UNIT TABS Take 1 tablet by mouth daily  Qty: 60 tablet, Refills: 0      glucose monitoring (FREESTYLE FREEDOM) kit 1 kit by Does not apply route daily Check glucose twice daily  Qty: 1 kit, Refills: 0             Some medications may have been reported old/obsolete and marked \"stop taking\" by the system; in reality pt was already off these meds; defer to outpatient or prescribing providers. Procedures done this admission:  * No surgery found *    Consults this admission:  IP CONSULT TO INTERVENTIONAL RADIOLOGY  IP CONSULT  [address]  IP CONSULT TO UROLOGY  IP CONSULT TO UROLOGY  IP WOUND CARE NURSE CONSULT TO EVAL    Echocardiogram results:  12/13/22    TRANSTHORACIC ECHOCARDIOGRAM (TTE) COMPLETE (CONTRAST/BUBBLE/3D PRN) 12/22/2022  3:43 PM, 12/22/2022 12:00 AM (Final)    Interpretation Summary    Left Ventricle: Normal left ventricular systolic function with a visually estimated EF of 65 - 70%. Left ventricle size is normal. Mildly increased wall thickness. Findings consistent with mild concentric hypertrophy. Normal wall motion. Normal diastolic function for age. Average E/e' ratio is 9.89. Aortic Valve: Moderate sclerosis of the aortic valve, right cusp-with no significant stenosis. Left Atrium: Left atrium is mildly dilated. Extracardiac: Left pleural effusion. Signed by: Carrie Abad MD on 12/22/2022  3:43 PM, Signed by: Unknown Provider Result on 12/22/2022 12:00 AM      Diagnostic Imaging/Tests:   US ABDOMEN COMPLETE    Result Date: 2/8/2023  1) Probable increased renal echogenicity, nonspecific, without hydronephrosis. 2) Coarse echogenic lobular liver consistent with cirrhosis. 3) Cholelithiasis without biliary tree obstruction. US SCROTUM AND TESTICLES    Result Date: 2/8/2023  Negative for testicular torsion.  Small bilateral minimally complicated hydroceles. Small epididymal cysts on the right. Scrotal thickening on the left. XR CHEST 1 VIEW    Result Date: 2/6/2023  No acute findings in the chest     IR US GUIDED PARACENTESIS    Result Date: 6/2/0174  Uncomplicated ultrasound guided paracentesis. Vascular duplex lower extremity venous bilateral    Result Date: 2/18/2023  1. No evidence of left lower extremity deep venous thrombosis. Tisha Mccain M.D. 2/18/2023 12:42:00 AM       Labs: Results:       BMP, Mg, Phos Recent Labs     02/18/23 0339 02/19/23 0345 02/20/23  0446   * 128* 130*   K 4.1 4.0 4.0   CL 97* 97* 97*   CO2 18* 27 27   ANIONGAP 14* 4 6   BUN 36* 38* 39*   CREATININE 3.50* 3.20* 3.20*   LABGLOM 20* 22* 22*   CALCIUM 8.0* 8.1* 8.1*   GLUCOSE 196* 184* 96      CBC Recent Labs     02/18/23 0339 02/19/23 0345 02/20/23  0446   WBC 9.2 8.7 8.1   RBC 2.88* 2.87* 2.85*   HGB 7.6* 7.7* 7.5*   HCT 23.1* 22.8* 22.5*   MCV 80.2* 79.4* 78.9*   MCH 26.4 26.8 26.3   MCHC 32.9 33.8 33.3   RDW 15.0* 14.8* 14.8*    234 247   MPV 9.9 10.2 9.7   NRBC 0.00 0.00 0.00      LFT No results for input(s): BILITOT, BILIDIR, ALKPHOS, AST, ALT, PROT, LABALBU, GLOB in the last 72 hours.    Cardiac  Lab Results   Component Value Date/Time    NTPROBNP 1,761 02/06/2023 03:24 PM    NTPROBNP 1,086 01/10/2023 01:08 PM    NTPROBNP 1,280 12/13/2022 03:40 PM    TROPHS 9.5 02/06/2023 03:24 PM    TROPHS 11.0 01/10/2023 01:08 PM    TROPHS 12.1 12/13/2022 03:40 PM      Coags Lab Results   Component Value Date/Time    PROTIME 16.5 02/06/2023 03:24 PM    PROTIME 15.6 01/10/2023 01:08 PM    PROTIME 18.1 12/26/2022 04:44 AM    INR 1.3 02/06/2023 03:24 PM    INR 1.2 01/10/2023 01:08 PM    INR 1.5 12/26/2022 04:44 AM    APTT 43.4 02/06/2023 03:24 PM      A1c Lab Results   Component Value Date/Time    LABA1C 5.0 12/16/2022 05:32 AM    LABA1C 8.0 09/15/2022 04:16 AM    EAG 97 12/16/2022 05:32 AM     09/15/2022 04:16 AM Lipids No results found for: CHOL, LDLCALC, LABVLDL, HDL, CHOLHDLRATIO, TRIG   Thyroid  Lab Results   Component Value Date/Time    TSHELE 2.64 09/05/2022 07:44 AM        Most Recent UA Lab Results   Component Value Date/Time    COLORU YELLOW/STRAW 02/07/2023 10:30 AM    APPEARANCE CLEAR 02/07/2023 10:30 AM    SPECGRAV 1.009 02/07/2023 10:30 AM    LABPH 5.0 02/07/2023 10:30 AM    PROTEINU Negative 02/07/2023 10:30 AM    GLUCOSEU Negative 02/07/2023 10:30 AM    KETUA Negative 02/07/2023 10:30 AM    BILIRUBINUR Negative 02/07/2023 10:30 AM    BLOODU TRACE 02/07/2023 10:30 AM    UROBILINOGEN 0.2 02/07/2023 10:30 AM    NITRU Negative 02/07/2023 10:30 AM    LEUKOCYTESUR Negative 02/07/2023 10:30 AM    WBCUA 0-4 02/07/2023 10:30 AM    RBCUA 10-20 02/07/2023 10:30 AM    EPITHUA 0-5 02/07/2023 10:30 AM    BACTERIA Negative 02/07/2023 10:30 AM    LABCAST 0-2 02/07/2023 10:30 AM        No results for input(s): CULTURE in the last 720 hours.     All Labs from Last 24 Hrs:  Recent Results (from the past 24 hour(s))   POCT Glucose    Collection Time: 02/19/23 11:59 AM   Result Value Ref Range    POC Glucose 160 (H) 65 - 100 mg/dL    Performed by: Tony Quinteros    POCT Glucose    Collection Time: 02/19/23  4:36 PM   Result Value Ref Range    POC Glucose 169 (H) 65 - 100 mg/dL    Performed by: Roxana    POCT Glucose    Collection Time: 02/19/23  8:20 PM   Result Value Ref Range    POC Glucose 181 (H) 65 - 100 mg/dL    Performed by: Fe Morales    Basic Metabolic Panel    Collection Time: 02/20/23  4:46 AM   Result Value Ref Range    Sodium 130 (L) 133 - 143 mmol/L    Potassium 4.0 3.5 - 5.1 mmol/L    Chloride 97 (L) 101 - 110 mmol/L    CO2 27 21 - 32 mmol/L    Anion Gap 6 2 - 11 mmol/L    Glucose 96 65 - 100 mg/dL    BUN 39 (H) 6 - 23 MG/DL    Creatinine 3.20 (H) 0.8 - 1.5 MG/DL    Est, Glom Filt Rate 22 (L) >60 ml/min/1.73m2    Calcium 8.1 (L) 8.3 - 10.4 MG/DL   CBC    Collection Time: 02/20/23  4:46 AM   Result Value Ref Range    WBC 8.1 4.3 - 11.1 K/uL    RBC 2.85 (L) 4.23 - 5.6 M/uL    Hemoglobin 7.5 (L) 13.6 - 17.2 g/dL    Hematocrit 22.5 (L) 41.1 - 50.3 %    MCV 78.9 (L) 82 - 102 FL    MCH 26.3 26.1 - 32.9 PG    MCHC 33.3 31.4 - 35.0 g/dL    RDW 14.8 (H) 11.9 - 14.6 %    Platelets 863 163 - 354 K/uL    MPV 9.7 9.4 - 12.3 FL    nRBC 0.00 0.0 - 0.2 K/uL   POCT Glucose    Collection Time: 02/20/23  7:41 AM   Result Value Ref Range    POC Glucose 80 65 - 100 mg/dL    Performed by: Julia        No Known Allergies  Immunization History   Administered Date(s) Administered    Influenza Virus Vaccine 10/16/2017    Influenza, FLUCELVAX, (age 10 mo+), MDCK, MDV, 0.5mL 10/10/2018    PPD Test 09/01/2022, 12/15/2022    Pneumococcal Polysaccharide ([id number]) 10/16/2017    Tdap (Boostrix, Adacel) 10/10/2018       Recent Vital Data:  Patient Vitals for the past 24 hrs:   Temp Pulse Resp BP SpO2   02/20/23 1056 98.2 °F (36.8 °C) 78 18 (!) 124/92 99 %   02/20/23 1049 -- -- 18 -- --   02/20/23 0917 -- 83 -- 130/86 --   02/20/23 0719 98.6 °F (37 °C) 83 20 130/86 95 %   02/20/23 0353 98.4 °F (36.9 °C) 81 17 99/66 96 %   02/20/23 0058 -- -- 18 -- --   02/20/23 0028 -- -- 18 -- --   02/19/23 2316 97.7 °F (36.5 °C) 81 17 126/87 98 %   02/19/23 1945 98.4 °F (36.9 °C) 76 18 121/89 99 %   02/19/23 1728 -- 89 -- 126/86 --   02/19/23 1538 98.6 °F (37 °C) 89 -- 126/86 98 %   02/19/23 1526 -- -- 18 -- --   02/19/23 1158 98.6 °F (37 °C) 87 16 113/74 97 %       Oxygen Therapy  SpO2: 99 %  Pulse Oximeter Device Mode: Continuous  O2 Device: None (Room air)    Estimated body mass index is 29.79 kg/m² as calculated from the following:    Height as of this encounter: 5' 11\" (1.803 m). Weight as of this encounter: 213 lb 10 oz (96.9 kg).     Intake/Output Summary (Last 24 hours) at 2/20/2023 1116  Last data filed at 2/20/2023 0516  Gross per 24 hour   Intake 490 ml   Output 1350 ml   Net -860 ml         Physical Exam:    General: Well nourished. Alert awake male, chronically ill-appearing, no overt distress  Head:  Normocephalic, atraumatic  Eyes:  Sclerae appear normal.  Pupils equally round. HENT:  Nares appear normal, no drainage. Moist mucous membranes  Neck:  No restricted ROM. Trachea midline  CV:   RRR. No m/r/g. No JVD  Lungs:   CTAB. No wheezing, rhonchi, or rales. Respirations even, unlabored  Abdomen:   Soft, nontender, nondistended. Extremities: Warm and dry. No cyanosis or clubbing. 1+ bilateral lower extremity pitting edema. Skin:     No rashes. Normal coloration  Neuro:  CN II-XII grossly intact. No focal neurological deficits,  Psych:  Normal mood and affect. Signed:  Rodríguez Otero MD    Part of this note may have been written by using a voice dictation software. The note has been proof read but may still contain some grammatical/other typographical errors.

## 2023-02-20 NOTE — PROGRESS NOTES
Discharge orders noted. Discharge instruction reviewed and given to patient. Verbalizes understanding. Prescription given and reviewed. Peripheral IV removed with cath tip intact post removal. Exit site pink. No personal belongings or home medication to be returned back to patient prior to discharge. PCP follow up appointment reviewed. Understands that office of Nephrology, GI, [place] and [facility] to call and schedule f/u appointments. Verbalizes understanding. Dressing to [facility]. Discharged home via wheelchair and CNA assisting with  transport.

## 2023-02-20 NOTE — PROGRESS NOTES
Bedside shift report received from Roman Rose RN.pt sitting up in recliner awake and alert on RA. Pt denied any needs at this time. NAD noted. All needs within reach.

## 2023-02-21 ENCOUNTER — HOME CARE VISIT (OUTPATIENT)
Dept: SCHEDULING | Facility: HOME HEALTH | Age: 58
End: 2023-02-21
Payer: MEDICARE

## 2023-02-21 VITALS
OXYGEN SATURATION: 98 % | SYSTOLIC BLOOD PRESSURE: 122 MMHG | DIASTOLIC BLOOD PRESSURE: 80 MMHG | HEART RATE: 60 BPM | TEMPERATURE: 98.7 F | RESPIRATION RATE: 19 BRPM

## 2023-02-21 PROCEDURE — G0299 HHS/HOSPICE OF RN EA 15 MIN: HCPCS

## 2023-02-21 ASSESSMENT — ENCOUNTER SYMPTOMS
STOOL DESCRIPTION: FORMED
PAIN LOCATION - PAIN QUALITY: SORE, ACHE
DYSPNEA ACTIVITY LEVEL: AFTER AMBULATING MORE THAN 20 FT

## 2023-02-23 ENCOUNTER — HOME CARE VISIT (OUTPATIENT)
Dept: SCHEDULING | Facility: HOME HEALTH | Age: 58
End: 2023-02-23
Payer: MEDICARE

## 2023-02-23 VITALS
TEMPERATURE: 97.2 F | SYSTOLIC BLOOD PRESSURE: 92 MMHG | HEART RATE: 64 BPM | OXYGEN SATURATION: 98 % | RESPIRATION RATE: 16 BRPM | DIASTOLIC BLOOD PRESSURE: 62 MMHG

## 2023-02-23 PROCEDURE — G0299 HHS/HOSPICE OF RN EA 15 MIN: HCPCS

## 2023-02-23 ASSESSMENT — ENCOUNTER SYMPTOMS
STOOL DESCRIPTION: FORMED
PAIN LOCATION - PAIN QUALITY: SHARP

## 2023-02-25 ENCOUNTER — HOME CARE VISIT (OUTPATIENT)
Dept: SCHEDULING | Facility: HOME HEALTH | Age: 58
End: 2023-02-25
Payer: MEDICARE

## 2023-02-27 ENCOUNTER — OFFICE VISIT (OUTPATIENT)
Dept: FAMILY MEDICINE CLINIC | Facility: CLINIC | Age: 58
End: 2023-02-27

## 2023-02-27 ENCOUNTER — HOME CARE VISIT (OUTPATIENT)
Dept: SCHEDULING | Facility: HOME HEALTH | Age: 58
End: 2023-02-27
Payer: MEDICARE

## 2023-02-27 VITALS
SYSTOLIC BLOOD PRESSURE: 138 MMHG | WEIGHT: 213 LBS | OXYGEN SATURATION: 98 % | RESPIRATION RATE: 18 BRPM | HEART RATE: 72 BPM | BODY MASS INDEX: 29.82 KG/M2 | DIASTOLIC BLOOD PRESSURE: 92 MMHG | TEMPERATURE: 97.2 F | HEIGHT: 71 IN

## 2023-02-27 DIAGNOSIS — D50.9 IRON DEFICIENCY ANEMIA, UNSPECIFIED IRON DEFICIENCY ANEMIA TYPE: ICD-10-CM

## 2023-02-27 DIAGNOSIS — N17.9 ACUTE KIDNEY INJURY SUPERIMPOSED ON CHRONIC KIDNEY DISEASE (HCC): ICD-10-CM

## 2023-02-27 DIAGNOSIS — N18.9 ACUTE KIDNEY INJURY SUPERIMPOSED ON CHRONIC KIDNEY DISEASE (HCC): ICD-10-CM

## 2023-02-27 DIAGNOSIS — Z09 HOSPITAL DISCHARGE FOLLOW-UP: Primary | ICD-10-CM

## 2023-02-27 DIAGNOSIS — R18.8 CIRRHOSIS OF LIVER WITH ASCITES, UNSPECIFIED HEPATIC CIRRHOSIS TYPE (HCC): ICD-10-CM

## 2023-02-27 DIAGNOSIS — K74.60 CIRRHOSIS OF LIVER WITH ASCITES, UNSPECIFIED HEPATIC CIRRHOSIS TYPE (HCC): ICD-10-CM

## 2023-02-27 SDOH — ECONOMIC STABILITY: FOOD INSECURITY: WITHIN THE PAST 12 MONTHS, YOU WORRIED THAT YOUR FOOD WOULD RUN OUT BEFORE YOU GOT MONEY TO BUY MORE.: NEVER TRUE

## 2023-02-27 SDOH — ECONOMIC STABILITY: HOUSING INSECURITY
IN THE LAST 12 MONTHS, WAS THERE A TIME WHEN YOU DID NOT HAVE A STEADY PLACE TO SLEEP OR SLEPT IN A SHELTER (INCLUDING NOW)?: NO

## 2023-02-27 SDOH — ECONOMIC STABILITY: INCOME INSECURITY: HOW HARD IS IT FOR YOU TO PAY FOR THE VERY BASICS LIKE FOOD, HOUSING, MEDICAL CARE, AND HEATING?: VERY HARD

## 2023-02-27 SDOH — ECONOMIC STABILITY: FOOD INSECURITY: WITHIN THE PAST 12 MONTHS, THE FOOD YOU BOUGHT JUST DIDN'T LAST AND YOU DIDN'T HAVE MONEY TO GET MORE.: NEVER TRUE

## 2023-02-27 ASSESSMENT — PATIENT HEALTH QUESTIONNAIRE - PHQ9
1. LITTLE INTEREST OR PLEASURE IN DOING THINGS: 0
SUM OF ALL RESPONSES TO PHQ QUESTIONS 1-9: 0
SUM OF ALL RESPONSES TO PHQ9 QUESTIONS 1 & 2: 0
2. FEELING DOWN, DEPRESSED OR HOPELESS: 0
SUM OF ALL RESPONSES TO PHQ QUESTIONS 1-9: 0

## 2023-02-27 ASSESSMENT — ANXIETY QUESTIONNAIRES
IF YOU CHECKED OFF ANY PROBLEMS ON THIS QUESTIONNAIRE, HOW DIFFICULT HAVE THESE PROBLEMS MADE IT FOR YOU TO DO YOUR WORK, TAKE CARE OF THINGS AT HOME, OR GET ALONG WITH OTHER PEOPLE: SOMEWHAT DIFFICULT
1. FEELING NERVOUS, ANXIOUS, OR ON EDGE: 0
4. TROUBLE RELAXING: 1
2. NOT BEING ABLE TO STOP OR CONTROL WORRYING: 1
3. WORRYING TOO MUCH ABOUT DIFFERENT THINGS: 1
GAD7 TOTAL SCORE: 6
7. FEELING AFRAID AS IF SOMETHING AWFUL MIGHT HAPPEN: 1
6. BECOMING EASILY ANNOYED OR IRRITABLE: 1
5. BEING SO RESTLESS THAT IT IS HARD TO SIT STILL: 1

## 2023-02-27 NOTE — PROGRESS NOTES
Post-Discharge Transitional Care  Follow Up      Brionna Rodriguez   YOB: 1965    Date of Office Visit:  2/27/2023  Date of Hospital Admission: 2/6/23  Date of Hospital Discharge: 2/20/23  Risk of hospital readmission (high >=14%. Medium >=10%) :Readmission Risk Score: 26.2      Care management risk score Rising risk (score 2-5) and Complex Care (Scores >=6): No Risk Score On File     Non face to face  following discharge, date last encounter closed (first attempt may have been earlier): *No documented post hospital discharge outreach found in the last 14 days    Call initiated 2 business days of discharge: *No response recorded in the last 14 days    ASSESSMENT/PLAN:   Hospital discharge follow-up  -     [place] DISCHARGE MEDS RECONCILED W/ CURRENT OUTPATIENT MED LIST  Iron deficiency anemia, unspecified iron deficiency anemia type  -     CBC with Auto Differential; Future  Acute kidney injury superimposed on chronic kidney disease ([facility]  -     Comprehensive Metabolic Panel; Future  Cirrhosis of liver with ascites, unspecified hepatic cirrhosis type [facility])  Patient overall stable following hospital discharge 1 week ago. We will repeat CBC and CMP as directed on discharge. We will repeat kidney function testing, electrolytes, and evaluate anemia. Patient does have nephrology appointment tomorrow, given information regarding this. Also reviewed gastroenterology follow-up, given the phone number to call and schedule this. Is still having some scrotal pain due to edema, advised to continue elevation of the scrotum, as well as diuretic medication. Reviewed urology consult note in the hospital.      Medical Decision Making: high complexity  Return if symptoms worsen or fail to improve.     On this date 2/27/2023 I have spent 45 minutes reviewing previous notes, test results and face to face with the patient discussing the diagnosis and importance of compliance with the treatment plan as well as documenting on the day of the visit. Subjective:   HPI:  Follow up of Hospital problems/diagnosis(es): Anasarca, Diabetic neuropathy, hypertension, cirrhosis with ascites, MARS on CKD    Inpatient course: Discharge summary reviewed- see chart. Interval history/Current status: Has been home for 1 week. Overall doing better. Has some groin pain that continues. Patient Active Problem List   Diagnosis    Alcohol dependence ([facility]    Diabetic peripheral neuropathy ([facility]    Essential hypertension    Hyperlipidemia    Type 2 diabetes mellitus ([facility]    Diabetic ulcer of both feet associated with type 2 diabetes mellitus ([address]    Hypokalemia    Acute kidney injury superimposed on CKD (HCC)    PAD (peripheral artery disease) (HCC)    Hypoalbuminemia    Stage 3a chronic kidney disease (HCC)    Iron deficiency anemia    Obesity with body mass index 30 or greater    Acute blood loss anemia    Dark stools    Dry skin dermatitis    Hematochezia    High serum ferritin    Normocytic anemia    SIRS (systemic inflammatory response syndrome) (HCC)    Abdominal pain    Cirrhosis of liver with ascites (HCC)    Moderate protein malnutrition (HCC)    Cholelithiases    Positive blood culture    Partial small bowel obstruction ([address]    Anasarca    Acute kidney injury superimposed on chronic kidney disease ([facility]       Medications listed as ordered at the time of discharge from hospital     Medication List            Accurate as of February 27, 2023 12:43 PM. If you have any questions, ask your nurse or doctor.                 CONTINUE taking these medications      acetaminophen 325 MG tablet  Commonly known as: TYLENOL     Blood Pressure Cuff Misc  1 Units by Does not apply route daily     calcium carb-cholecalciferol 250-125 MG-UNIT Tabs  Take 1 tablet by mouth daily     carvedilol 25 MG tablet  Commonly known as: COREG  Take 0.5 tablets by mouth 2 times daily (with meals)     cholestyramine light 4 g packet  Take 1 packet by mouth 2 times daily     cyanocobalamin 1000 MCG tablet  Take 1 tablet by mouth daily     folic acid 1 MG tablet  Commonly known as: FOLVITE  TAKE 1 TABLET BY MOUTH EVERY DAY     furosemide 40 MG tablet  Commonly known as: LASIX  Take 1 tablet by mouth 2 times daily     glucose monitoring kit  1 kit by Does not apply route daily Check glucose twice daily     Lantus SoloStar 100 UNIT/ML injection pen  Generic drug: insulin glargine  Inject 10 Units into the skin daily     midodrine 2.5 MG tablet  Commonly known as: PROAMATINE  Take 1 tablet by mouth 3 times daily (with meals)     naloxone 4 MG/0.1ML Liqd nasal spray     oxyCODONE 5 MG immediate release tablet  Commonly known as: ROXICODONE     pantoprazole 40 MG tablet  Commonly known as: PROTONIX  Take 1 tablet by mouth 2 times daily (before meals)     sodium bicarbonate 650 MG tablet  TAKE 2 TABLETS BY MOUTH TWICE A DAY                Medications marked \"taking\" at this time  Outpatient Medications Marked as Taking for the 2/27/23 encounter (Office Visit) with Pratibha Busch MD   Medication Sig Dispense Refill    midodrine (PROAMATINE) 2.5 MG tablet Take 1 tablet by mouth 3 times daily (with meals) 90 tablet 0    sodium bicarbonate 650 MG tablet TAKE 2 TABLETS BY MOUTH TWICE A DAY 60 tablet 0    furosemide (LASIX) 40 MG tablet Take 1 tablet by mouth 2 times daily 60 tablet 0    pantoprazole (PROTONIX) 40 MG tablet Take 1 tablet by mouth 2 times daily (before meals) 30 tablet 0    insulin glargine (LANTUS SOLOSTAR) 100 UNIT/ML injection pen Inject 10 Units into the skin daily 5 Adjustable Dose Pre-filled Pen Syringe 0    carvedilol (COREG) 25 MG tablet Take 0.5 tablets by mouth 2 times daily (with meals) 60 tablet 3    acetaminophen (TYLENOL) 325 MG tablet Take 650 mg by mouth every 6 hours as needed for Pain.      glucose monitoring (FREESTYLE FREEDOM) kit 1 kit by Does not apply route daily Check glucose twice daily 1 kit 0        Medications patient taking as of now reconciled against medications ordered at time of hospital discharge: Yes    A comprehensive review of systems was negative except for what was noted in the HPI. Objective:    BP (!) 138/92 (Site: Right Upper Arm, Position: Sitting)   Pulse 72   Temp 97.2 °F (36.2 °C) (Oral)   Resp 18   Ht 5' 11\" (1.803 m)   Wt 213 lb (96.6 kg)   SpO2 98%   BMI 29.71 kg/m²   Physical Exam  Vitals reviewed. Constitutional:       General: He is not in acute distress. Appearance: Normal appearance. Cardiovascular:      Rate and Rhythm: Normal rate and regular rhythm. Heart sounds: Normal heart sounds. No murmur heard. No friction rub. No gallop. Pulmonary:      Effort: Pulmonary effort is normal. No respiratory distress. Breath sounds: Normal breath sounds. No stridor. No wheezing, rhonchi or rales. Chest:      Chest wall: No tenderness. Neurological:      Mental Status: He is alert. Psychiatric:         Mood and Affect: Mood normal.         Behavior: Behavior normal.         An electronic signature was used to authenticate this note.   --Carl Pitts MD

## 2023-03-01 ENCOUNTER — HOME CARE VISIT (OUTPATIENT)
Dept: SCHEDULING | Facility: HOME HEALTH | Age: 58
End: 2023-03-01
Payer: MEDICARE

## 2023-03-01 VITALS
RESPIRATION RATE: 18 BRPM | DIASTOLIC BLOOD PRESSURE: 70 MMHG | TEMPERATURE: 97.8 F | OXYGEN SATURATION: 100 % | SYSTOLIC BLOOD PRESSURE: 106 MMHG | HEART RATE: 85 BPM

## 2023-03-01 PROCEDURE — G0299 HHS/HOSPICE OF RN EA 15 MIN: HCPCS

## 2023-03-02 DIAGNOSIS — K70.31 ALCOHOLIC CIRRHOSIS OF LIVER WITH ASCITES (HCC): ICD-10-CM

## 2023-03-03 ENCOUNTER — APPOINTMENT (OUTPATIENT)
Dept: CT IMAGING | Age: 58
End: 2023-03-03
Payer: MEDICARE

## 2023-03-03 ENCOUNTER — HOSPITAL ENCOUNTER (OUTPATIENT)
Age: 58
Setting detail: OBSERVATION
Discharge: HOME | End: 2023-03-07
Attending: EMERGENCY MEDICINE | Admitting: FAMILY MEDICINE
Payer: MEDICARE

## 2023-03-03 ENCOUNTER — HOME CARE VISIT (OUTPATIENT)
Dept: HOME HEALTH SERVICES | Facility: HOME HEALTH | Age: 58
End: 2023-03-03
Payer: MEDICARE

## 2023-03-03 DIAGNOSIS — E88.09 HYPOALBUMINEMIA: ICD-10-CM

## 2023-03-03 DIAGNOSIS — R60.1 ANASARCA: Primary | ICD-10-CM

## 2023-03-03 DIAGNOSIS — N48.89 PENILE SWELLING: ICD-10-CM

## 2023-03-03 DIAGNOSIS — N39.0 URINARY TRACT INFECTION WITHOUT HEMATURIA, SITE UNSPECIFIED: ICD-10-CM

## 2023-03-03 PROBLEM — L85.3 DRY SKIN DERMATITIS: Status: RESOLVED | Noted: 2022-05-13 | Resolved: 2023-03-03

## 2023-03-03 PROBLEM — R78.81 POSITIVE BLOOD CULTURE: Status: RESOLVED | Noted: 2022-12-15 | Resolved: 2023-03-03

## 2023-03-03 PROBLEM — R10.9 ABDOMINAL PAIN: Status: RESOLVED | Noted: 2022-12-14 | Resolved: 2023-03-03

## 2023-03-03 PROBLEM — N17.9 ACUTE KIDNEY INJURY SUPERIMPOSED ON CKD (HCC): Status: RESOLVED | Noted: 2022-08-31 | Resolved: 2023-03-03

## 2023-03-03 PROBLEM — N18.9 ACUTE KIDNEY INJURY SUPERIMPOSED ON CKD (HCC): Status: RESOLVED | Noted: 2022-08-31 | Resolved: 2023-03-03

## 2023-03-03 PROBLEM — R65.10 SIRS (SYSTEMIC INFLAMMATORY RESPONSE SYNDROME) (HCC): Status: RESOLVED | Noted: 2022-12-13 | Resolved: 2023-03-03

## 2023-03-03 PROBLEM — N18.9 ACUTE KIDNEY INJURY SUPERIMPOSED ON CHRONIC KIDNEY DISEASE (HCC): Status: RESOLVED | Noted: 2023-02-06 | Resolved: 2023-03-03

## 2023-03-03 PROBLEM — K56.600 PARTIAL SMALL BOWEL OBSTRUCTION (HCC): Status: RESOLVED | Noted: 2022-12-17 | Resolved: 2023-03-03

## 2023-03-03 PROBLEM — I73.9 PAD (PERIPHERAL ARTERY DISEASE) (HCC): Status: RESOLVED | Noted: 2022-09-02 | Resolved: 2023-03-03

## 2023-03-03 PROBLEM — R79.89 HIGH SERUM FERRITIN: Status: RESOLVED | Noted: 2019-11-27 | Resolved: 2023-03-03

## 2023-03-03 PROBLEM — E66.9 OBESITY WITH BODY MASS INDEX 30 OR GREATER: Status: RESOLVED | Noted: 2017-10-16 | Resolved: 2023-03-03

## 2023-03-03 PROBLEM — K92.1 HEMATOCHEZIA: Status: RESOLVED | Noted: 2021-02-04 | Resolved: 2023-03-03

## 2023-03-03 PROBLEM — N17.9 ACUTE KIDNEY INJURY SUPERIMPOSED ON CHRONIC KIDNEY DISEASE (HCC): Status: RESOLVED | Noted: 2023-02-06 | Resolved: 2023-03-03

## 2023-03-03 PROBLEM — E87.6 HYPOKALEMIA: Status: RESOLVED | Noted: 2021-04-15 | Resolved: 2023-03-03

## 2023-03-03 PROBLEM — R19.5 DARK STOOLS: Status: RESOLVED | Noted: 2020-12-21 | Resolved: 2023-03-03

## 2023-03-03 PROBLEM — K80.20 CHOLELITHIASES: Status: RESOLVED | Noted: 2022-12-15 | Resolved: 2023-03-03

## 2023-03-03 LAB
ALBUMIN SERPL-MCNC: 1.5 G/DL (ref 3.5–5)
ALBUMIN/GLOB SERPL: 0.3 (ref 0.4–1.6)
ALP SERPL-CCNC: 186 U/L (ref 50–136)
ALT SERPL-CCNC: 12 U/L (ref 12–65)
ANION GAP SERPL CALC-SCNC: 8 MMOL/L (ref 2–11)
APPEARANCE UR: CLEAR
AST SERPL-CCNC: 23 U/L (ref 15–37)
BACTERIA URNS QL MICRO: ABNORMAL /HPF
BASOPHILS # BLD: 0.1 K/UL (ref 0–0.2)
BASOPHILS NFR BLD: 0 % (ref 0–2)
BILIRUB SERPL-MCNC: 0.2 MG/DL (ref 0.2–1.1)
BILIRUB UR QL: NEGATIVE
BUN SERPL-MCNC: 31 MG/DL (ref 6–23)
CALCIUM SERPL-MCNC: 8.1 MG/DL (ref 8.3–10.4)
CASTS URNS QL MICRO: 0 /LPF
CHLORIDE SERPL-SCNC: 106 MMOL/L (ref 101–110)
CO2 SERPL-SCNC: 21 MMOL/L (ref 21–32)
COLOR UR: ABNORMAL
CREAT SERPL-MCNC: 2.3 MG/DL (ref 0.8–1.5)
CRYSTALS URNS QL MICRO: 0 /LPF
DIFFERENTIAL METHOD BLD: ABNORMAL
EOSINOPHIL # BLD: 0.3 K/UL (ref 0–0.8)
EOSINOPHIL NFR BLD: 2 % (ref 0.5–7.8)
EPI CELLS #/AREA URNS HPF: ABNORMAL /HPF
ERYTHROCYTE [DISTWIDTH] IN BLOOD BY AUTOMATED COUNT: 15.9 % (ref 11.9–14.6)
GLOBULIN SER CALC-MCNC: 5.2 G/DL (ref 2.8–4.5)
GLUCOSE SERPL-MCNC: 228 MG/DL (ref 65–100)
GLUCOSE UR STRIP.AUTO-MCNC: NEGATIVE MG/DL
HCT VFR BLD AUTO: 24 % (ref 41.1–50.3)
HGB BLD-MCNC: 7.7 G/DL (ref 13.6–17.2)
HGB UR QL STRIP: ABNORMAL
IMM GRANULOCYTES # BLD AUTO: 0.1 K/UL (ref 0–0.5)
IMM GRANULOCYTES NFR BLD AUTO: 1 % (ref 0–5)
KETONES UR QL STRIP.AUTO: NEGATIVE MG/DL
LEUKOCYTE ESTERASE UR QL STRIP.AUTO: ABNORMAL
LYMPHOCYTES # BLD: 2.4 K/UL (ref 0.5–4.6)
LYMPHOCYTES NFR BLD: 15 % (ref 13–44)
MAGNESIUM SERPL-MCNC: 1.7 MG/DL (ref 1.8–2.4)
MCH RBC QN AUTO: 25.4 PG (ref 26.1–32.9)
MCHC RBC AUTO-ENTMCNC: 32.1 G/DL (ref 31.4–35)
MCV RBC AUTO: 79.2 FL (ref 82–102)
MONOCYTES # BLD: 1.3 K/UL (ref 0.1–1.3)
MONOCYTES NFR BLD: 8 % (ref 4–12)
MUCOUS THREADS URNS QL MICRO: 0 /LPF
NEUTS SEG # BLD: 12.2 K/UL (ref 1.7–8.2)
NEUTS SEG NFR BLD: 74 % (ref 43–78)
NITRITE UR QL STRIP.AUTO: NEGATIVE
NRBC # BLD: 0 K/UL (ref 0–0.2)
OTHER OBSERVATIONS: ABNORMAL
PH UR STRIP: 5.5 (ref 5–9)
PLATELET # BLD AUTO: 250 K/UL (ref 150–450)
PMV BLD AUTO: 10 FL (ref 9.4–12.3)
POTASSIUM SERPL-SCNC: 3.5 MMOL/L (ref 3.5–5.1)
PROT SERPL-MCNC: 6.7 G/DL (ref 6.3–8.2)
PROT UR STRIP-MCNC: NEGATIVE MG/DL
RBC # BLD AUTO: 3.03 M/UL (ref 4.23–5.6)
RBC #/AREA URNS HPF: ABNORMAL /HPF
SODIUM SERPL-SCNC: 135 MMOL/L (ref 133–143)
SP GR UR REFRACTOMETRY: 1.01 (ref 1–1.02)
UROBILINOGEN UR QL STRIP.AUTO: 0.2 EU/DL (ref 0.2–1)
WBC # BLD AUTO: 16.3 K/UL (ref 4.3–11.1)
WBC URNS QL MICRO: ABNORMAL /HPF

## 2023-03-03 PROCEDURE — G0378 HOSPITAL OBSERVATION PER HR: HCPCS

## 2023-03-03 PROCEDURE — 96374 THER/PROPH/DIAG INJ IV PUSH: CPT

## 2023-03-03 PROCEDURE — 87086 URINE CULTURE/COLONY COUNT: CPT

## 2023-03-03 PROCEDURE — 72192 CT PELVIS W/O DYE: CPT

## 2023-03-03 PROCEDURE — 83735 ASSAY OF MAGNESIUM: CPT

## 2023-03-03 PROCEDURE — 99285 EMERGENCY DEPT VISIT HI MDM: CPT

## 2023-03-03 PROCEDURE — 96375 TX/PRO/DX INJ NEW DRUG ADDON: CPT

## 2023-03-03 PROCEDURE — 80053 COMPREHEN METABOLIC PANEL: CPT

## 2023-03-03 PROCEDURE — 87088 URINE BACTERIA CULTURE: CPT

## 2023-03-03 PROCEDURE — 81015 MICROSCOPIC EXAM OF URINE: CPT

## 2023-03-03 PROCEDURE — 6370000000 HC RX 637 (ALT 250 FOR IP): Performed by: PHYSICIAN ASSISTANT

## 2023-03-03 PROCEDURE — 2580000003 HC RX 258: Performed by: PHYSICIAN ASSISTANT

## 2023-03-03 PROCEDURE — 6360000002 HC RX W HCPCS: Performed by: PHYSICIAN ASSISTANT

## 2023-03-03 PROCEDURE — 87186 SC STD MICRODIL/AGAR DIL: CPT

## 2023-03-03 PROCEDURE — 81001 URINALYSIS AUTO W/SCOPE: CPT

## 2023-03-03 PROCEDURE — 85025 COMPLETE CBC W/AUTO DIFF WBC: CPT

## 2023-03-03 RX ORDER — DEXTROSE MONOHYDRATE 100 MG/ML
INJECTION, SOLUTION INTRAVENOUS CONTINUOUS PRN
Status: DISCONTINUED | OUTPATIENT
Start: 2023-03-03 | End: 2023-03-07 | Stop reason: HOSPADM

## 2023-03-03 RX ORDER — INSULIN LISPRO 100 [IU]/ML
0-4 INJECTION, SOLUTION INTRAVENOUS; SUBCUTANEOUS NIGHTLY
Status: DISCONTINUED | OUTPATIENT
Start: 2023-03-03 | End: 2023-03-07 | Stop reason: HOSPADM

## 2023-03-03 RX ORDER — INSULIN LISPRO 100 [IU]/ML
0-8 INJECTION, SOLUTION INTRAVENOUS; SUBCUTANEOUS
Status: DISCONTINUED | OUTPATIENT
Start: 2023-03-04 | End: 2023-03-07 | Stop reason: HOSPADM

## 2023-03-03 RX ORDER — HYDROCODONE BITARTRATE AND ACETAMINOPHEN 5; 325 MG/1; MG/1
1 TABLET ORAL
Status: COMPLETED | OUTPATIENT
Start: 2023-03-03 | End: 2023-03-03

## 2023-03-03 RX ORDER — FUROSEMIDE 10 MG/ML
80 INJECTION INTRAMUSCULAR; INTRAVENOUS ONCE
Status: COMPLETED | OUTPATIENT
Start: 2023-03-03 | End: 2023-03-03

## 2023-03-03 RX ADMIN — CEFTRIAXONE 1000 MG: 1 INJECTION, POWDER, FOR SOLUTION INTRAMUSCULAR; INTRAVENOUS at 20:54

## 2023-03-03 RX ADMIN — HYDROCODONE BITARTRATE AND ACETAMINOPHEN 1 TABLET: 5; 325 TABLET ORAL at 18:22

## 2023-03-03 RX ADMIN — FUROSEMIDE 80 MG: 10 INJECTION, SOLUTION INTRAMUSCULAR; INTRAVENOUS at 19:33

## 2023-03-03 ASSESSMENT — PAIN DESCRIPTION - PAIN TYPE: TYPE: ACUTE PAIN

## 2023-03-03 ASSESSMENT — PAIN - FUNCTIONAL ASSESSMENT
PAIN_FUNCTIONAL_ASSESSMENT: 0-10
PAIN_FUNCTIONAL_ASSESSMENT: 0-10

## 2023-03-03 ASSESSMENT — PAIN SCALES - GENERAL
PAINLEVEL_OUTOF10: 10
PAINLEVEL_OUTOF10: 10

## 2023-03-03 ASSESSMENT — PAIN DESCRIPTION - FREQUENCY: FREQUENCY: CONTINUOUS

## 2023-03-03 ASSESSMENT — PAIN DESCRIPTION - LOCATION: LOCATION: GROIN

## 2023-03-03 ASSESSMENT — ENCOUNTER SYMPTOMS
RESPIRATORY NEGATIVE: 1
GASTROINTESTINAL NEGATIVE: 1

## 2023-03-03 ASSESSMENT — PAIN DESCRIPTION - DESCRIPTORS: DESCRIPTORS: ACHING;DISCOMFORT

## 2023-03-03 NOTE — ED TRIAGE NOTES
Pt c/o pain to groin x1wk r/t swelling (but reports swelling has improved), recently admitted w anasarca and MARS, recent paracentesis (+)dysuria, dyspnea  Pt reports improved BLE edema and abd bloating   A&Ox4

## 2023-03-04 ENCOUNTER — HOME CARE VISIT (OUTPATIENT)
Dept: SCHEDULING | Facility: HOME HEALTH | Age: 58
End: 2023-03-04
Payer: MEDICARE

## 2023-03-04 LAB
AMMONIA PLAS-SCNC: 33 UMOL/L (ref 11–32)
ANION GAP SERPL CALC-SCNC: 6 MMOL/L (ref 2–11)
BASOPHILS # BLD: 0.1 K/UL (ref 0–0.2)
BASOPHILS NFR BLD: 1 % (ref 0–2)
BUN SERPL-MCNC: 29 MG/DL (ref 6–23)
CALCIUM SERPL-MCNC: 7.7 MG/DL (ref 8.3–10.4)
CHLORIDE SERPL-SCNC: 107 MMOL/L (ref 101–110)
CO2 SERPL-SCNC: 23 MMOL/L (ref 21–32)
CREAT SERPL-MCNC: 2 MG/DL (ref 0.8–1.5)
DIFFERENTIAL METHOD BLD: ABNORMAL
EOSINOPHIL # BLD: 0.3 K/UL (ref 0–0.8)
EOSINOPHIL NFR BLD: 2 % (ref 0.5–7.8)
ERYTHROCYTE [DISTWIDTH] IN BLOOD BY AUTOMATED COUNT: 15.6 % (ref 11.9–14.6)
EST. AVERAGE GLUCOSE BLD GHB EST-MCNC: 140 MG/DL
GLUCOSE BLD STRIP.AUTO-MCNC: 105 MG/DL (ref 65–100)
GLUCOSE BLD STRIP.AUTO-MCNC: 132 MG/DL (ref 65–100)
GLUCOSE BLD STRIP.AUTO-MCNC: 62 MG/DL (ref 65–100)
GLUCOSE BLD STRIP.AUTO-MCNC: 63 MG/DL (ref 65–100)
GLUCOSE BLD STRIP.AUTO-MCNC: 73 MG/DL (ref 65–100)
GLUCOSE BLD STRIP.AUTO-MCNC: 78 MG/DL (ref 65–100)
GLUCOSE SERPL-MCNC: 112 MG/DL (ref 65–100)
HBA1C MFR BLD: 6.5 % (ref 4.8–5.6)
HCT VFR BLD AUTO: 23.1 % (ref 41.1–50.3)
HGB BLD-MCNC: 7.6 G/DL (ref 13.6–17.2)
IMM GRANULOCYTES # BLD AUTO: 0.1 K/UL (ref 0–0.5)
IMM GRANULOCYTES NFR BLD AUTO: 1 % (ref 0–5)
LYMPHOCYTES # BLD: 2.6 K/UL (ref 0.5–4.6)
LYMPHOCYTES NFR BLD: 17 % (ref 13–44)
MAGNESIUM SERPL-MCNC: 1.6 MG/DL (ref 1.8–2.4)
MCH RBC QN AUTO: 25.7 PG (ref 26.1–32.9)
MCHC RBC AUTO-ENTMCNC: 32.9 G/DL (ref 31.4–35)
MCV RBC AUTO: 78 FL (ref 82–102)
MONOCYTES # BLD: 1.6 K/UL (ref 0.1–1.3)
MONOCYTES NFR BLD: 10 % (ref 4–12)
NEUTS SEG # BLD: 10.8 K/UL (ref 1.7–8.2)
NEUTS SEG NFR BLD: 69 % (ref 43–78)
NRBC # BLD: 0 K/UL (ref 0–0.2)
PLATELET # BLD AUTO: 242 K/UL (ref 150–450)
PMV BLD AUTO: 10.4 FL (ref 9.4–12.3)
POTASSIUM SERPL-SCNC: 2.9 MMOL/L (ref 3.5–5.1)
RBC # BLD AUTO: 2.96 M/UL (ref 4.23–5.6)
SERVICE CMNT-IMP: ABNORMAL
SERVICE CMNT-IMP: NORMAL
SERVICE CMNT-IMP: NORMAL
SODIUM SERPL-SCNC: 136 MMOL/L (ref 133–143)
TSH W FREE THYROID IF ABNORMAL: 1.27 UIU/ML (ref 0.36–3.74)
VIT B12 SERPL-MCNC: 1547 PG/ML (ref 193–986)
WBC # BLD AUTO: 15.4 K/UL (ref 4.3–11.1)

## 2023-03-04 PROCEDURE — 6370000000 HC RX 637 (ALT 250 FOR IP): Performed by: FAMILY MEDICINE

## 2023-03-04 PROCEDURE — 6360000002 HC RX W HCPCS: Performed by: INTERNAL MEDICINE

## 2023-03-04 PROCEDURE — 2400000000

## 2023-03-04 PROCEDURE — 36415 COLL VENOUS BLD VENIPUNCTURE: CPT

## 2023-03-04 PROCEDURE — 80048 BASIC METABOLIC PNL TOTAL CA: CPT

## 2023-03-04 PROCEDURE — 6370000000 HC RX 637 (ALT 250 FOR IP): Performed by: INTERNAL MEDICINE

## 2023-03-04 PROCEDURE — G0378 HOSPITAL OBSERVATION PER HR: HCPCS

## 2023-03-04 PROCEDURE — 85025 COMPLETE CBC W/AUTO DIFF WBC: CPT

## 2023-03-04 PROCEDURE — 2580000003 HC RX 258: Performed by: FAMILY MEDICINE

## 2023-03-04 PROCEDURE — 83735 ASSAY OF MAGNESIUM: CPT

## 2023-03-04 PROCEDURE — 84443 ASSAY THYROID STIM HORMONE: CPT

## 2023-03-04 PROCEDURE — 82962 GLUCOSE BLOOD TEST: CPT

## 2023-03-04 PROCEDURE — 82140 ASSAY OF AMMONIA: CPT

## 2023-03-04 PROCEDURE — 6360000002 HC RX W HCPCS: Performed by: FAMILY MEDICINE

## 2023-03-04 PROCEDURE — 83036 HEMOGLOBIN GLYCOSYLATED A1C: CPT

## 2023-03-04 PROCEDURE — 82607 VITAMIN B-12: CPT

## 2023-03-04 RX ORDER — MAGNESIUM SULFATE IN WATER 40 MG/ML
2000 INJECTION, SOLUTION INTRAVENOUS PRN
Status: DISCONTINUED | OUTPATIENT
Start: 2023-03-04 | End: 2023-03-07 | Stop reason: HOSPADM

## 2023-03-04 RX ORDER — SODIUM BICARBONATE 650 MG/1
650 TABLET ORAL 2 TIMES DAILY
Status: DISCONTINUED | OUTPATIENT
Start: 2023-03-04 | End: 2023-03-07 | Stop reason: HOSPADM

## 2023-03-04 RX ORDER — MORPHINE SULFATE 4 MG/ML
4 INJECTION INTRAVENOUS EVERY 4 HOURS PRN
Status: DISCONTINUED | OUTPATIENT
Start: 2023-03-04 | End: 2023-03-07 | Stop reason: HOSPADM

## 2023-03-04 RX ORDER — CARVEDILOL 12.5 MG/1
12.5 TABLET ORAL 2 TIMES DAILY WITH MEALS
Status: DISCONTINUED | OUTPATIENT
Start: 2023-03-04 | End: 2023-03-07 | Stop reason: HOSPADM

## 2023-03-04 RX ORDER — OXYCODONE HYDROCHLORIDE 5 MG/1
5 TABLET ORAL EVERY 4 HOURS PRN
Status: DISCONTINUED | OUTPATIENT
Start: 2023-03-04 | End: 2023-03-07 | Stop reason: HOSPADM

## 2023-03-04 RX ORDER — POTASSIUM CHLORIDE 20 MEQ/1
40 TABLET, EXTENDED RELEASE ORAL ONCE
Status: COMPLETED | OUTPATIENT
Start: 2023-03-04 | End: 2023-03-04

## 2023-03-04 RX ORDER — HEPARIN SODIUM 5000 [USP'U]/ML
5000 INJECTION, SOLUTION INTRAVENOUS; SUBCUTANEOUS EVERY 8 HOURS SCHEDULED
Status: DISCONTINUED | OUTPATIENT
Start: 2023-03-04 | End: 2023-03-07 | Stop reason: HOSPADM

## 2023-03-04 RX ORDER — ACETAMINOPHEN 650 MG/1
650 SUPPOSITORY RECTAL EVERY 6 HOURS PRN
Status: DISCONTINUED | OUTPATIENT
Start: 2023-03-04 | End: 2023-03-07 | Stop reason: HOSPADM

## 2023-03-04 RX ORDER — MAGNESIUM SULFATE 1 G/100ML
1000 INJECTION INTRAVENOUS ONCE
Status: COMPLETED | OUTPATIENT
Start: 2023-03-04 | End: 2023-03-04

## 2023-03-04 RX ORDER — FUROSEMIDE 10 MG/ML
80 INJECTION INTRAMUSCULAR; INTRAVENOUS DAILY
Status: DISCONTINUED | OUTPATIENT
Start: 2023-03-04 | End: 2023-03-04

## 2023-03-04 RX ORDER — SODIUM CHLORIDE 9 MG/ML
INJECTION, SOLUTION INTRAVENOUS PRN
Status: DISCONTINUED | OUTPATIENT
Start: 2023-03-04 | End: 2023-03-07 | Stop reason: HOSPADM

## 2023-03-04 RX ORDER — INSULIN GLARGINE 100 [IU]/ML
10 INJECTION, SOLUTION SUBCUTANEOUS DAILY
Status: DISCONTINUED | OUTPATIENT
Start: 2023-03-04 | End: 2023-03-07 | Stop reason: HOSPADM

## 2023-03-04 RX ORDER — ACETAMINOPHEN 325 MG/1
650 TABLET ORAL EVERY 6 HOURS PRN
Status: DISCONTINUED | OUTPATIENT
Start: 2023-03-04 | End: 2023-03-07 | Stop reason: HOSPADM

## 2023-03-04 RX ORDER — POLYETHYLENE GLYCOL 3350 17 G/17G
17 POWDER, FOR SOLUTION ORAL DAILY
Status: DISCONTINUED | OUTPATIENT
Start: 2023-03-04 | End: 2023-03-07 | Stop reason: HOSPADM

## 2023-03-04 RX ORDER — ENOXAPARIN SODIUM 100 MG/ML
40 INJECTION SUBCUTANEOUS DAILY
Status: DISCONTINUED | OUTPATIENT
Start: 2023-03-04 | End: 2023-03-04 | Stop reason: ALTCHOICE

## 2023-03-04 RX ORDER — POTASSIUM CHLORIDE 7.45 MG/ML
10 INJECTION INTRAVENOUS PRN
Status: DISCONTINUED | OUTPATIENT
Start: 2023-03-04 | End: 2023-03-07 | Stop reason: HOSPADM

## 2023-03-04 RX ORDER — SODIUM CHLORIDE 0.9 % (FLUSH) 0.9 %
5-40 SYRINGE (ML) INJECTION PRN
Status: DISCONTINUED | OUTPATIENT
Start: 2023-03-04 | End: 2023-03-07 | Stop reason: HOSPADM

## 2023-03-04 RX ORDER — MIDODRINE HYDROCHLORIDE 5 MG/1
2.5 TABLET ORAL
Status: DISCONTINUED | OUTPATIENT
Start: 2023-03-04 | End: 2023-03-07 | Stop reason: HOSPADM

## 2023-03-04 RX ORDER — MAGNESIUM HYDROXIDE/ALUMINUM HYDROXICE/SIMETHICONE 120; 1200; 1200 MG/30ML; MG/30ML; MG/30ML
30 SUSPENSION ORAL EVERY 6 HOURS PRN
Status: DISCONTINUED | OUTPATIENT
Start: 2023-03-04 | End: 2023-03-07 | Stop reason: HOSPADM

## 2023-03-04 RX ORDER — PANTOPRAZOLE SODIUM 40 MG/1
40 TABLET, DELAYED RELEASE ORAL
Status: DISCONTINUED | OUTPATIENT
Start: 2023-03-04 | End: 2023-03-07 | Stop reason: HOSPADM

## 2023-03-04 RX ORDER — POTASSIUM CHLORIDE 20 MEQ/1
40 TABLET, EXTENDED RELEASE ORAL PRN
Status: DISCONTINUED | OUTPATIENT
Start: 2023-03-04 | End: 2023-03-07 | Stop reason: HOSPADM

## 2023-03-04 RX ORDER — ONDANSETRON 2 MG/ML
4 INJECTION INTRAMUSCULAR; INTRAVENOUS EVERY 6 HOURS PRN
Status: DISCONTINUED | OUTPATIENT
Start: 2023-03-04 | End: 2023-03-07 | Stop reason: HOSPADM

## 2023-03-04 RX ORDER — FUROSEMIDE 40 MG/1
40 TABLET ORAL 2 TIMES DAILY
Status: DISCONTINUED | OUTPATIENT
Start: 2023-03-04 | End: 2023-03-04

## 2023-03-04 RX ORDER — PROMETHAZINE HYDROCHLORIDE 25 MG/1
12.5 TABLET ORAL EVERY 6 HOURS PRN
Status: DISCONTINUED | OUTPATIENT
Start: 2023-03-04 | End: 2023-03-07 | Stop reason: HOSPADM

## 2023-03-04 RX ORDER — SODIUM CHLORIDE 0.9 % (FLUSH) 0.9 %
5-40 SYRINGE (ML) INJECTION EVERY 12 HOURS SCHEDULED
Status: DISCONTINUED | OUTPATIENT
Start: 2023-03-04 | End: 2023-03-07 | Stop reason: HOSPADM

## 2023-03-04 RX ORDER — FUROSEMIDE 10 MG/ML
40 INJECTION INTRAMUSCULAR; INTRAVENOUS DAILY
Status: DISCONTINUED | OUTPATIENT
Start: 2023-03-04 | End: 2023-03-07 | Stop reason: HOSPADM

## 2023-03-04 RX ADMIN — HEPARIN SODIUM 5000 UNITS: 5000 INJECTION INTRAVENOUS; SUBCUTANEOUS at 14:53

## 2023-03-04 RX ADMIN — HEPARIN SODIUM 5000 UNITS: 5000 INJECTION INTRAVENOUS; SUBCUTANEOUS at 05:37

## 2023-03-04 RX ADMIN — SODIUM BICARBONATE 650 MG: 650 TABLET ORAL at 00:45

## 2023-03-04 RX ADMIN — PANTOPRAZOLE SODIUM 40 MG: 40 TABLET, DELAYED RELEASE ORAL at 15:43

## 2023-03-04 RX ADMIN — SODIUM CHLORIDE, PRESERVATIVE FREE 5 ML: 5 INJECTION INTRAVENOUS at 21:07

## 2023-03-04 RX ADMIN — FUROSEMIDE 40 MG: 10 INJECTION, SOLUTION INTRAMUSCULAR; INTRAVENOUS at 08:54

## 2023-03-04 RX ADMIN — CARVEDILOL 12.5 MG: 12.5 TABLET, FILM COATED ORAL at 08:53

## 2023-03-04 RX ADMIN — MAGNESIUM SULFATE HEPTAHYDRATE 1000 MG: 1 INJECTION, SOLUTION INTRAVENOUS at 12:01

## 2023-03-04 RX ADMIN — ACETAMINOPHEN 650 MG: 325 TABLET ORAL at 00:44

## 2023-03-04 RX ADMIN — INSULIN GLARGINE 10 UNITS: 100 INJECTION, SOLUTION SUBCUTANEOUS at 08:57

## 2023-03-04 RX ADMIN — MIDODRINE HYDROCHLORIDE 2.5 MG: 5 TABLET ORAL at 08:54

## 2023-03-04 RX ADMIN — OXYCODONE 5 MG: 5 TABLET ORAL at 09:07

## 2023-03-04 RX ADMIN — SODIUM CHLORIDE, PRESERVATIVE FREE 5 ML: 5 INJECTION INTRAVENOUS at 08:55

## 2023-03-04 RX ADMIN — SODIUM BICARBONATE 650 MG: 650 TABLET ORAL at 09:07

## 2023-03-04 RX ADMIN — PANTOPRAZOLE SODIUM 40 MG: 40 TABLET, DELAYED RELEASE ORAL at 05:37

## 2023-03-04 RX ADMIN — POTASSIUM CHLORIDE 40 MEQ: 1500 TABLET, EXTENDED RELEASE ORAL at 08:52

## 2023-03-04 RX ADMIN — Medication 16 G: at 21:05

## 2023-03-04 RX ADMIN — CARVEDILOL 12.5 MG: 12.5 TABLET, FILM COATED ORAL at 18:12

## 2023-03-04 RX ADMIN — OXYCODONE 5 MG: 5 TABLET ORAL at 15:43

## 2023-03-04 RX ADMIN — CEFTRIAXONE 1000 MG: 1 INJECTION, POWDER, FOR SOLUTION INTRAMUSCULAR; INTRAVENOUS at 21:08

## 2023-03-04 RX ADMIN — HEPARIN SODIUM 5000 UNITS: 5000 INJECTION INTRAVENOUS; SUBCUTANEOUS at 21:07

## 2023-03-04 RX ADMIN — FUROSEMIDE 40 MG: 40 TABLET ORAL at 00:45

## 2023-03-04 RX ADMIN — OXYCODONE 5 MG: 5 TABLET ORAL at 01:01

## 2023-03-04 ASSESSMENT — PAIN DESCRIPTION - PAIN TYPE
TYPE: ACUTE PAIN

## 2023-03-04 ASSESSMENT — PAIN DESCRIPTION - LOCATION
LOCATION: GROIN

## 2023-03-04 ASSESSMENT — PAIN DESCRIPTION - ONSET
ONSET: GRADUAL

## 2023-03-04 ASSESSMENT — PAIN DESCRIPTION - FREQUENCY
FREQUENCY: CONTINUOUS
FREQUENCY: CONTINUOUS
FREQUENCY: INTERMITTENT
FREQUENCY: INTERMITTENT

## 2023-03-04 ASSESSMENT — PAIN DESCRIPTION - ORIENTATION
ORIENTATION: MID

## 2023-03-04 ASSESSMENT — PAIN DESCRIPTION - DESCRIPTORS
DESCRIPTORS: ACHING

## 2023-03-04 ASSESSMENT — PAIN SCALES - GENERAL
PAINLEVEL_OUTOF10: 0
PAINLEVEL_OUTOF10: 0
PAINLEVEL_OUTOF10: 7
PAINLEVEL_OUTOF10: 6
PAINLEVEL_OUTOF10: 3
PAINLEVEL_OUTOF10: 0
PAINLEVEL_OUTOF10: 0
PAINLEVEL_OUTOF10: 5
PAINLEVEL_OUTOF10: 0

## 2023-03-04 ASSESSMENT — PAIN - FUNCTIONAL ASSESSMENT
PAIN_FUNCTIONAL_ASSESSMENT: PREVENTS OR INTERFERES SOME ACTIVE ACTIVITIES AND ADLS

## 2023-03-04 ASSESSMENT — PAIN SCALES - WONG BAKER
WONGBAKER_NUMERICALRESPONSE: 0
WONGBAKER_NUMERICALRESPONSE: 0

## 2023-03-04 ASSESSMENT — ENCOUNTER SYMPTOMS
ABDOMINAL PAIN: 1
ABDOMINAL DISTENTION: 1

## 2023-03-04 NOTE — H&P
Hospitalist History and Physical   Admit Date:  3/3/2023  4:57 PM   Name:  Layne De La Vega   Age:  65 y.o. Sex:  male  :  1965   MRN:  [id number]   Room:  ER/    Presenting Complaint: Groin Pain and Groin Swelling     Reason(s) for Admission: Anasarca [R60.1]     History of Present Illness:   Patient was discussed with the ER provider prior to seeing the patient. Layne De La Vega is a 65 y.o. male with medical history of cirrhosis of the liver with ascites, diabetes type 2, hypoalbuminemia, CKD 3, who presented with worsening swelling particularly of scrotum and penis. Patient was recently admitted for the same. His albumin is 1.5. This is a chronic problem. ER asked that we admit him. They have given him IV Lasix without much urine output. They did discuss the case with urology and recommendation was for diuresis. At this point, he does not have urinary obstruction. He denies fever/chills, NVD, abdominal pain, chest pain, shortness of breath. He does report that he is unable to take a good deep breath due to his ascites. He cannot remember his last paracentesis but thinks it was a couple months ago. Review of Systems:  10 systems reviewed and negative except as noted in HPI.   Assessment & Plan:     Principal Problem:    Anasarca  Plan: Continue IV Lasix with albumin  Consult palliative care  Active Problems:    Type 2 diabetes mellitus ([address]  Plan: Continue his Lantus and place him on sliding scale protocol    Hypoalbuminemia  Plan: Secondary to liver disease    Stage 3a chronic kidney disease ([address]  Plan: avoid nephrotoxic medications; monitor labs    Cirrhosis of liver with ascites ([address]  Plan: He does not need a paracentesis urgently at this time    Moderate protein malnutrition ([address]  Plan: Consider consult to nutrition services, but patient was just in the hospital.      Dispo/Discharge Planning:   Observation    Diet: Regular  VTE ppx: Heparin   Code status: Full    Hospital Problems             Last Modified POA    * (Principal) Anasarca 3/3/2023 Yes    Type 2 diabetes mellitus ([address] 3/3/2023 Yes    Overview Signed 9/21/2022  1:48 PM by Orlin Hodges ATC     Last Assessment & Plan:   Formatting of this note might be different from the original.  Continue Lantus, ISS         Hypoalbuminemia (Chronic) 3/3/2023 Yes    Stage 3a chronic kidney disease ([address] (Chronic) 3/3/2023 Yes    Overview Signed 9/14/2022  9:02 PM by Miguel Perez MD     With HTN and DM2         Cirrhosis of liver with ascites ([address] 3/3/2023 Yes    Moderate protein malnutrition ([address] 3/3/2023 Yes       Past History:  Past Medical History:   Diagnosis Date    Alcoholic cirrhosis of liver ([address]     2022 in the hopsital dx    Anemia     Gastroesophageal reflux disease with esophagitis and hemorrhage     High serum ferritin 11/27/2019    HLD (hyperlipidemia)     Hyperplastic colon polyp     Hypertension     Obesity     PAD (peripheral artery disease) ([address] 9/2/2022    PUD (peptic ulcer disease)     Stage 3 chronic kidney disease due to diabetes mellitus ([address]     Dx in hospital    Type 2 diabetes mellitus with diabetic polyneuropathy, with long-term current use of insulin (HCC)     Ulcer of the duodenum caused by bacteria (H. pylori)     Upper GI bleed 03/28/2016    Last Assessment & Plan:  Formatting of this note might be different from the original. Status post EGD yesterday which revealed esophagitis, gastric and duodenal ulcers Continue PPI, GI follow-up. Monitor hemoglobin Avoid and states Biopsy results- pending     Past Surgical History:   Procedure Laterality Date    COLONOSCOPY      ESOPHAGOGASTRODUODENOSCOPY      UPPER GASTROINTESTINAL ENDOSCOPY N/A 9/6/2022    EGD ESOPHAGOGASTRODUODENOSCOPY/ Rm 215 performed by Tania Tavera MD at [facility] ENDOSCOPY      No Known Allergies   Social History     Tobacco Use    Smoking status: Never    Smokeless tobacco: Never   Substance Use Topics    Alcohol use:  Yes Family History   Problem Relation Age of Onset    Hypertension Sister     Diabetes Neg Hx         Immunization History   Administered Date(s) Administered    Influenza Virus Vaccine 10/16/2017    Influenza, FLUCELVAX, (age 10 mo+), MDCK, MDV, 0.5mL 10/10/2018    PPD Test 09/01/2022, 12/15/2022    Pneumococcal Polysaccharide ([id number]) 10/16/2017    Tdap (Boostrix, Adacel) 10/10/2018     Prior to Admit Medications:  Current Outpatient Medications   Medication Instructions    acetaminophen (TYLENOL) 650 mg, Oral, EVERY 6 HOURS PRN    Blood Pressure Monitoring (BLOOD PRESSURE CUFF) MISC 1 Units, Does not apply, DAILY    calcium carb-cholecalciferol 250-125 MG-UNIT TABS 1 tablet, Oral, DAILY    carvedilol (COREG) 12.5 mg, Oral, 2 TIMES DAILY WITH MEALS    cholestyramine light 4 g, Oral, 2 TIMES DAILY    cyanocobalamin 1,000 mcg, Oral, DAILY    folic acid (FOLVITE) 1 MG tablet TAKE 1 TABLET BY MOUTH EVERY DAY    furosemide (LASIX) 40 mg, Oral, 2 TIMES DAILY    glucose monitoring (FREESTYLE FREEDOM) kit 1 kit, Does not apply, DAILY, Check glucose twice daily    Lantus SoloStar 10 Units, SubCUTAneous, DAILY    midodrine (PROAMATINE) 2.5 mg, Oral, 3 TIMES DAILY WITH MEALS    naloxone 4 MG/0.1ML LIQD nasal spray 1 SPRAY INTO A NOSTRIL AS NEEDED FOR OPIOID OVERDOSE.    oxyCODONE (ROXICODONE) 5 MG immediate release tablet oxycodone 5 mg tablet   TAKE 1 TABLET BY MOUTH EVERY 8 HOURS AS NEEDED FOR PAIN FOR UPTO 3 DAYS    pantoprazole (PROTONIX) 40 mg, Oral, 2 TIMES DAILY BEFORE MEALS    sodium bicarbonate 650 MG tablet TAKE 2 TABLETS BY MOUTH TWICE A DAY         Objective:   Patient Vitals for the past 24 hrs:   Temp Pulse Resp BP SpO2   03/03/23 1806 -- 78 20 112/88 100 %   03/03/23 1642 98.2 °F (36.8 °C) 84 17 108/72 100 %       Estimated body mass index is 29.71 kg/m² as calculated from the following:    Height as of this encounter: 5' 11\" (1.803 m). Weight as of this encounter: 213 lb (96.6 kg).     Intake/Output Summary (Last 24 hours) at 3/3/2023 2105  Last data filed at 3/3/2023 2022  Gross per 24 hour   Intake 0 ml   Output 200 ml   Net -200 ml         Physical Exam:    Blood pressure 112/88, pulse 78, temperature 98.2 °F (36.8 °C), resp. rate 20, height 5' 11\" (1.803 m), weight 213 lb (96.6 kg), SpO2 100 %. General:    WD and WN, No apparent distress. Anasarca; appears older than stated age  Eyes:  PERRL; EOMI; sclera normal/non-icteric  HENT:  Normocephalic, without obvious abnormality; Oropharynx is clear with tacky mucous membranes   Neck:  No restricted ROM. Trachea midline   Resp:    Clear to auscultation bilaterally. Resp are even and unlabored  CVS/Heart: Regular rate and rhythm, 3+ LE edema    GI:    Soft, non-tender. Not distended. Bowel sounds normal.     Musc/SK: Muscle strength is poor, but probably appropriate for condition; No cyanosis. No clubbing  Skin:  No rashes and normal coloration. Warm and dry.     Neurologic: CN II - XII are grossly intact - Eye exam as noted above; moves extremities equally  Psych:  Drowsy and oriented x 4;  Judgement and insight are normal    I have reviewed ordered lab tests and independently visualized imaging below:    Last 24hr Labs:  Recent Results (from the past 24 hour(s))   CBC with Auto Differential    Collection Time: 03/03/23  4:58 PM   Result Value Ref Range    WBC 16.3 (H) 4.3 - 11.1 K/uL    RBC 3.03 (L) 4.23 - 5.6 M/uL    Hemoglobin 7.7 (L) 13.6 - 17.2 g/dL    Hematocrit 24.0 (L) 41.1 - 50.3 %    MCV 79.2 (L) 82 - 102 FL    MCH 25.4 (L) 26.1 - 32.9 PG    MCHC 32.1 31.4 - 35.0 g/dL    RDW 15.9 (H) 11.9 - 14.6 %    Platelets 809 383 - 266 K/uL    MPV 10.0 9.4 - 12.3 FL    nRBC 0.00 0.0 - 0.2 K/uL    Differential Type AUTOMATED      Seg Neutrophils 74 43 - 78 %    Lymphocytes 15 13 - 44 %    Monocytes 8 4.0 - 12.0 %    Eosinophils % 2 0.5 - 7.8 %    Basophils 0 0.0 - 2.0 %    Immature Granulocytes 1 0.0 - 5.0 %    Segs Absolute 12.2 (H) 1.7 - 8.2 K/UL Absolute Lymph # 2.4 0.5 - 4.6 K/UL    Absolute Mono # 1.3 0.1 - 1.3 K/UL    Absolute Eos # 0.3 0.0 - 0.8 K/UL    Basophils Absolute 0.1 0.0 - 0.2 K/UL    Absolute Immature Granulocyte 0.1 0.0 - 0.5 K/UL   CMP    Collection Time: 03/03/23  4:58 PM   Result Value Ref Range    Sodium 135 133 - 143 mmol/L    Potassium 3.5 3.5 - 5.1 mmol/L    Chloride 106 101 - 110 mmol/L    CO2 21 21 - 32 mmol/L    Anion Gap 8 2 - 11 mmol/L    Glucose 228 (H) 65 - 100 mg/dL    BUN 31 (H) 6 - 23 MG/DL    Creatinine 2.30 (H) 0.8 - 1.5 MG/DL    Est, Glom Filt Rate 33 (L) >60 ml/min/1.73m2    Calcium 8.1 (L) 8.3 - 10.4 MG/DL    Total Bilirubin 0.2 0.2 - 1.1 MG/DL    ALT 12 12 - 65 U/L    AST 23 15 - 37 U/L    Alk Phosphatase 186 (H) 50 - 136 U/L    Total Protein 6.7 6.3 - 8.2 g/dL    Albumin 1.5 (L) 3.5 - 5.0 g/dL    Globulin 5.2 (H) 2.8 - 4.5 g/dL    Albumin/Globulin Ratio 0.3 (L) 0.4 - 1.6     Magnesium    Collection Time: 03/03/23  4:58 PM   Result Value Ref Range    Magnesium 1.7 (L) 1.8 - 2.4 mg/dL         Other Studies:  CT PELVIS WO CONTRAST Additional Contrast? None    Result Date: 3/3/2023  Exam: CT pelvis without contrast INDICATION: Penile and scrotal swelling TECHNIQUE: Axial images with coronal and sagittal reconstructions. CT dose lowering techniques were used, to include: automated exposure control, adjustment for patient size, and or use of iterative reconstruction. COMPARISON: December 15, 2022 FINDINGS: There is marked ascites and anasarca. Normal bladder. There are a few mildly enlarged bilateral inguinal lymph nodes similar to the prior exam. No air in the soft tissues. There are no drainable collections. Diffuse arterial atherosclerosis. 1.  Marked ascites and anasarca. This could reflect liver or renal failure, for  example. 2.  Diffuse arterial atherosclerosis could be related to underlying renal disease.   Mariely Walker M.D. 3/3/2023 8:25:00 PM      12/13/22    TRANSTHORACIC ECHOCARDIOGRAM (TTE) COMPLETE (CONTRAST/BUBBLE/3D PRN) 12/22/2022  3:43 PM, 12/22/2022 12:00 AM (Final)    Interpretation Summary    Left Ventricle: Normal left ventricular systolic function with a visually estimated EF of 65 - 70%. Left ventricle size is normal. Mildly increased wall thickness. Findings consistent with mild concentric hypertrophy. Normal wall motion. Normal diastolic function for age. Average E/e' ratio is 9.89. Aortic Valve: Moderate sclerosis of the aortic valve, right cusp-with no significant stenosis. Left Atrium: Left atrium is mildly dilated. Extracardiac: Left pleural effusion. Signed by: Emily Negron MD on 12/22/2022  3:43 PM, Signed by: Unknown Provider Result on 12/22/2022 12:00 AM       cefTRIAXone (ROCEPHIN) IV  1,000 mg IntraVENous NOW         Signed:  Casandra Duran MD    Part of this note may have been written by using a voice dictation software. The note has been proof read but may still contain some grammatical/other typographical errors.

## 2023-03-04 NOTE — ED PROVIDER NOTES
Emergency Department Provider Note                   PCP:                Daniel Gordon MD               Age: 65 y.o. Sex: male     DISPOSITION Decision To Admit 03/03/2023 08:33:26 PM       ICD-10-CM    1. Anasarca  R60.1       2. Hypoalbuminemia  E88.09       3. Penile swelling  N48.89       4. Urinary tract infection without hematuria, site unspecified  N39.0           MEDICAL DECISION MAKING    Patient is a 20-year-old male with history of cirrhosis who presents with penile and testicular swelling. I did consult Dr. Waite via [facility] who believes this is not a urologic problem but rather secondary to anasarca and recommended further diuresis. Patient's urine does show mild infection. CT pelvis shows anasarca and ascites. He does have bilateral lower extremity swelling. Labs show white count of 16. Creatinine in the mid twos which is better than it was previously. Low albumin. Hemoglobin 7.7 which is stable for him. He was given 80 mg of Lasix IV without much output. Rocephin ordered. Hospitalist consulted for admission due to continued swelling despite outpatient therapy. Complexity of Problems Addressed:  1 or more chronic illnesses with a severe exacerbation or progression. Data Reviewed and Analyzed:  Category 1:   I reviewed records from an external source: provider visit notes from outside specialist.  I ordered each unique test.  I reviewed the results of each unique test.    The patients assessment required an independent historian: Nephew, who lives with him reports that he is worsening. Category 2:       Category 3: Discussion of management or test interpretation.   Labs       Risk of Complications and/or Morbidity of Patient Management:  Drug therapy given requiring intensive monitoring for toxicity     Alphia Burkitt is a 65 y.o. male who presents to the Emergency Department with chief complaint of    Chief Complaint   Patient presents with    Groin Pain Groin Swelling      Patient is 52-year-old male with history of cirrhosis just discharged on February 20 for MARS, anasarca. He presents today with worsening swelling of his testicles and penis. Still has some swelling in his legs as well. On Lasix at home without much improvement. Also having dysuria. Denying any fever or systemic symptoms. He was seen by urology when he was admitted here last month, but he tells me that his penile swelling is much worse today than it was previously. Review of Systems   Constitutional: Negative. HENT: Negative. Respiratory: Negative. Cardiovascular: Negative. Gastrointestinal: Negative. Genitourinary:  Positive for penile swelling and scrotal swelling. All other systems reviewed and are negative. Vitals signs and nursing note reviewed. Patient Vitals for the past 4 hrs:   Temp Pulse Resp BP SpO2   03/03/23 1806 -- 78 20 112/88 100 %   03/03/23 1642 98.2 °F (36.8 °C) 84 17 108/72 100 %          Physical Exam  Vitals and nursing note reviewed. Constitutional:       General: He is not in acute distress. Appearance: Normal appearance. He is obese. He is not ill-appearing or toxic-appearing. HENT:      Head: Normocephalic. Eyes:      Extraocular Movements: Extraocular movements intact. Cardiovascular:      Rate and Rhythm: Normal rate and regular rhythm. Pulses: Normal pulses. Heart sounds: Normal heart sounds. Pulmonary:      Effort: Pulmonary effort is normal.      Breath sounds: Normal breath sounds. Abdominal:      Palpations: Abdomen is soft. Tenderness: There is no abdominal tenderness. Genitourinary:     Comments: Female chaperone present for exam.  Patient has mild scrotal swelling without significant tenderness. No crepitus. Penile swelling is marked. Phimosis present. Unable to visualize the head of the penis. Musculoskeletal:         General: Swelling present. No tenderness. Normal range of motion. Cervical back: Normal range of motion and neck supple. Right lower leg: Edema present. Left lower leg: Edema present. Skin:     General: Skin is warm and dry. Coloration: Skin is pale. Findings: No rash. Neurological:      General: No focal deficit present. Mental Status: He is alert and oriented to person, place, and time. Mental status is at baseline. Psychiatric:         Mood and Affect: Mood normal.         Behavior: Behavior normal.         Thought Content: Thought content normal.        Procedures     Orders Placed This Encounter   Procedures    Culture, Urine    CT PELVIS WO CONTRAST Additional Contrast? None    CBC with Auto Differential    CMP    Urinalysis w rflx microscopic    Magnesium    POCT Urine Dipstick        Medications   cefTRIAXone (ROCEPHIN) 1,000 mg in sodium chloride 0.9 % 50 mL IVPB (mini-bag) (has no administration in time range)   HYDROcodone-acetaminophen (NORCO) 5-325 MG per tablet 1 tablet (1 tablet Oral Given 3/3/23 1822)   furosemide (LASIX) injection 80 mg (80 mg IntraVENous Given 3/3/23 1933)       New Prescriptions    No medications on file        Past Medical History:   Diagnosis Date    Alcoholic cirrhosis of liver ([facility]     2022 in the hopsital dx    Anemia     Gastroesophageal reflux disease with esophagitis and hemorrhage     HLD (hyperlipidemia)     Hyperplastic colon polyp     Hypertension     Obesity     PUD (peptic ulcer disease)     Stage 3 chronic kidney disease due to diabetes mellitus ([facility]     Dx in hospital    Type 2 diabetes mellitus with diabetic polyneuropathy, with long-term current use of insulin (HCC)     Ulcer of the duodenum caused by bacteria (H. pylori)     Upper GI bleed 03/28/2016    Last Assessment & Plan:  Formatting of this note might be different from the original. Status post EGD yesterday which revealed esophagitis, gastric and duodenal ulcers Continue PPI, GI follow-up.  Monitor hemoglobin Avoid and states Biopsy results- pending        Past Surgical History:   Procedure Laterality Date    COLONOSCOPY      ESOPHAGOGASTRODUODENOSCOPY      UPPER GASTROINTESTINAL ENDOSCOPY N/A 9/6/2022    EGD ESOPHAGOGASTRODUODENOSCOPY/ Rm 215 performed by Alka Casillas MD at [facility] ENDOSCOPY        Family History   Problem Relation Age of Onset    Hypertension Sister     Diabetes Neg Hx         Social History     Socioeconomic History    Marital status:      Spouse name: None    Number of children: None    Years of education: None    Highest education level: None   Tobacco Use    Smoking status: Never    Smokeless tobacco: Never   Vaping Use    Vaping Use: Never used   Substance and Sexual Activity    Alcohol use: Yes    Drug use: Never     Social Determinants of Health     Financial Resource Strain: High Risk    Difficulty of Paying Living Expenses: Very hard   Food Insecurity: No Food Insecurity    Worried About Running Out of Food in the Last Year: Never true    Ran Out of Food in the Last Year: Never true   Transportation Needs: Unknown    Lack of Transportation (Non-Medical): No   Housing Stability: Unknown    Unstable Housing in the Last Year: No        Allergies: Patient has no known allergies. Previous Medications    ACETAMINOPHEN (TYLENOL) 325 MG TABLET    Take 650 mg by mouth every 6 hours as needed for Pain.     BLOOD PRESSURE MONITORING (BLOOD PRESSURE CUFF) MISC    1 Units by Does not apply route daily    CALCIUM CARB-CHOLECALCIFEROL 250-125 MG-UNIT TABS    Take 1 tablet by mouth daily    CARVEDILOL (COREG) 25 MG TABLET    Take 0.5 tablets by mouth 2 times daily (with meals)    CHOLESTYRAMINE LIGHT 4 G PACKET    Take 1 packet by mouth 2 times daily    CYANOCOBALAMIN 1000 MCG TABLET    Take 1 tablet by mouth daily    FOLIC ACID (FOLVITE) 1 MG TABLET    TAKE 1 TABLET BY MOUTH EVERY DAY    FUROSEMIDE (LASIX) 40 MG TABLET    Take 1 tablet by mouth 2 times daily    GLUCOSE MONITORING (FREESTYLE FREEDOM) KIT    1 kit by Does not apply route daily Check glucose twice daily    INSULIN GLARGINE (LANTUS SOLOSTAR) 100 UNIT/ML INJECTION PEN    Inject 10 Units into the skin daily    MIDODRINE (PROAMATINE) 2.5 MG TABLET    Take 1 tablet by mouth 3 times daily (with meals)    NALOXONE 4 MG/0.1ML LIQD NASAL SPRAY    1 SPRAY INTO A NOSTRIL AS NEEDED FOR OPIOID OVERDOSE. OXYCODONE (ROXICODONE) 5 MG IMMEDIATE RELEASE TABLET    oxycodone 5 mg tablet   TAKE 1 TABLET BY MOUTH EVERY 8 HOURS AS NEEDED FOR PAIN FOR UPTO 3 DAYS    PANTOPRAZOLE (PROTONIX) 40 MG TABLET    Take 1 tablet by mouth 2 times daily (before meals)    SODIUM BICARBONATE 650 MG TABLET    TAKE 2 TABLETS BY MOUTH TWICE A DAY        Results for orders placed or performed during the hospital encounter of 03/03/23   CT PELVIS WO CONTRAST Additional Contrast? None    Narrative    Exam: CT pelvis without contrast    INDICATION: Penile and scrotal swelling    TECHNIQUE: Axial images with coronal and sagittal reconstructions. CT dose   lowering techniques were used, to include: automated exposure control,   adjustment for patient size, and or use of iterative reconstruction. COMPARISON: December 15, 2022    FINDINGS:  There is marked ascites and anasarca. Normal bladder. There are a few mildly enlarged bilateral inguinal lymph nodes similar to the   prior exam.    No air in the soft tissues. There are no drainable collections. Diffuse arterial atherosclerosis. Impression    1. Marked ascites and anasarca. This could reflect liver or renal failure, for   example. 2.  Diffuse arterial atherosclerosis could be related to underlying renal   disease.      Rajendra Perez M.D.   3/3/2023 8:25:00 PM   CBC with Auto Differential   Result Value Ref Range    WBC 16.3 (H) 4.3 - 11.1 K/uL    RBC 3.03 (L) 4.23 - 5.6 M/uL    Hemoglobin 7.7 (L) 13.6 - 17.2 g/dL    Hematocrit 24.0 (L) 41.1 - 50.3 %    MCV 79.2 (L) 82 - 102 FL    MCH 25.4 (L) 26.1 - 32.9 PG    MCHC 32.1 31.4 - 35.0 g/dL    RDW 15.9 (H) 11.9 - 14.6 %    Platelets 994 642 - 799 K/uL    MPV 10.0 9.4 - 12.3 FL    nRBC 0.00 0.0 - 0.2 K/uL    Differential Type AUTOMATED      Seg Neutrophils 74 43 - 78 %    Lymphocytes 15 13 - 44 %    Monocytes 8 4.0 - 12.0 %    Eosinophils % 2 0.5 - 7.8 %    Basophils 0 0.0 - 2.0 %    Immature Granulocytes 1 0.0 - 5.0 %    Segs Absolute 12.2 (H) 1.7 - 8.2 K/UL    Absolute Lymph # 2.4 0.5 - 4.6 K/UL    Absolute Mono # 1.3 0.1 - 1.3 K/UL    Absolute Eos # 0.3 0.0 - 0.8 K/UL    Basophils Absolute 0.1 0.0 - 0.2 K/UL    Absolute Immature Granulocyte 0.1 0.0 - 0.5 K/UL   CMP   Result Value Ref Range    Sodium 135 133 - 143 mmol/L    Potassium 3.5 3.5 - 5.1 mmol/L    Chloride 106 101 - 110 mmol/L    CO2 21 21 - 32 mmol/L    Anion Gap 8 2 - 11 mmol/L    Glucose 228 (H) 65 - 100 mg/dL    BUN 31 (H) 6 - 23 MG/DL    Creatinine 2.30 (H) 0.8 - 1.5 MG/DL    Est, Glom Filt Rate 33 (L) >60 ml/min/1.73m2    Calcium 8.1 (L) 8.3 - 10.4 MG/DL    Total Bilirubin 0.2 0.2 - 1.1 MG/DL    ALT 12 12 - 65 U/L    AST 23 15 - 37 U/L    Alk Phosphatase 186 (H) 50 - 136 U/L    Total Protein 6.7 6.3 - 8.2 g/dL    Albumin 1.5 (L) 3.5 - 5.0 g/dL    Globulin 5.2 (H) 2.8 - 4.5 g/dL    Albumin/Globulin Ratio 0.3 (L) 0.4 - 1.6     Magnesium   Result Value Ref Range    Magnesium 1.7 (L) 1.8 - 2.4 mg/dL        CT PELVIS WO CONTRAST Additional Contrast? None   Final Result   1. Marked ascites and anasarca. This could reflect liver or renal failure, for    example. 2.  Diffuse arterial atherosclerosis could be related to underlying renal    disease. Mesfin Mata M.D.    3/3/2023 8:25:00 PM                        Voice dictation software was used during the making of this note. This software is not perfect and grammatical and other typographical errors may be present. This note has not been completely proofread for errors.        Diamond Wright, [id number]  03/03/23 2037

## 2023-03-04 NOTE — PLAN OF CARE
Problem: Discharge Planning  Goal: Discharge to home or other facility with appropriate resources  Outcome: Progressing     Problem: Pain  Goal: Verbalizes/displays adequate comfort level or baseline comfort level  Outcome: Progressing     Problem: Skin/Tissue Integrity  Goal: Absence of new skin breakdown  Description: 1.  Monitor for areas of redness and/or skin breakdown  2.  Assess vascular access sites hourly  3.  Every 4-6 hours minimum:  Change oxygen saturation probe site  4.  Every 4-6 hours:  If on nasal continuous positive airway pressure, respiratory therapy assess nares and determine need for appliance change or resting period.  Outcome: Progressing     Problem: Safety - Adult  Goal: Free from fall injury  Outcome: Progressing

## 2023-03-04 NOTE — CONSULTS
Consult Note            Date:3/4/2023        Patient Janis Vu     YOB: 1965     Age:57 y.o. Inpatient consult to GI  Consult performed by: Tayo Ontiveros MD  Consult ordered by: Mani Paniagua MD  Reason for consult: Cirrhosis with secondary ascites  Assessment/Recommendations: Recommend paracentesis        Chief Complaint     Chief Complaint   Patient presents with    Groin Pain    Groin Swelling          History Obtained From   patient    History of Present Illness   Patient is a 78-year-old male gentleman known with cirrhosis taken secondary gastritis [facility] by [facility] who are no longer providing care in the hospital.  Presented back with anasarca large scrotal edema and ascites. He has a history of alcohol cirrhosis, and non compliant with his medications. He denied any fever , bleeding. His hemoglobin is 7.3 which has been stable around that level for the past 2 to 3 months. Had a prior endoscopy that showed varices. But there is no gross melena at this moment    Past Medical History     Past Medical History:   Diagnosis Date    Alcoholic cirrhosis of liver ([facility]     2022 in the hopsital dx    Anemia     Gastroesophageal reflux disease with esophagitis and hemorrhage     High serum ferritin 11/27/2019    HLD (hyperlipidemia)     Hyperplastic colon polyp     Hypertension     Obesity     PAD (peripheral artery disease) ([facility] [address] 9/2/2022    PUD (peptic ulcer disease)     Stage 3 chronic kidney disease due to diabetes mellitus ([facility] [address]     Dx in hospital    Type 2 diabetes mellitus with diabetic polyneuropathy, with long-term current use of insulin (HCC)     Ulcer of the duodenum caused by bacteria (H. pylori)     Upper GI bleed 03/28/2016    Last Assessment & Plan:  Formatting of this note might be different from the original. Status post EGD yesterday which revealed esophagitis, gastric and duodenal ulcers Continue PPI, GI follow-up.  Monitor hemoglobin Avoid and states Biopsy results- pending        Past Surgical History     Past Surgical History:   Procedure Laterality Date    COLONOSCOPY      ESOPHAGOGASTRODUODENOSCOPY      UPPER GASTROINTESTINAL ENDOSCOPY N/A 9/6/2022    EGD ESOPHAGOGASTRODUODENOSCOPY/ Rm 215 performed by Shira Zayas MD at [facility] ENDOSCOPY        Medications     Prior to Admission medications    Medication Sig Start Date End Date Taking?  Authorizing Provider   midodrine (PROAMATINE) 2.5 MG tablet Take 1 tablet by mouth 3 times daily (with meals)  Patient not taking: Reported on 3/4/2023 2/20/23 3/22/23  Tasneem Onofre Ala, MD   folic acid (FOLVITE) 1 MG tablet TAKE 1 TABLET BY MOUTH EVERY DAY  Patient not taking: No sig reported 2/2/23   Danica Sandoval MD   sodium bicarbonate 650 MG tablet TAKE 2 TABLETS BY MOUTH TWICE A DAY 2/2/23   Danica Sandoval MD   oxyCODONE (ROXICODONE) 5 MG immediate release tablet oxycodone 5 mg tablet   TAKE 1 TABLET BY MOUTH EVERY 8 HOURS AS NEEDED FOR PAIN FOR UPTO 3 DAYS  Patient not taking: No sig reported    Historical Provider, MD   furosemide (LASIX) 40 MG tablet Take 1 tablet by mouth 2 times daily 1/25/23   Danica Sandoval MD   pantoprazole (PROTONIX) 40 MG tablet Take 1 tablet by mouth 2 times daily (before meals) 1/25/23   Danica Sandoval MD   insulin glargine (LANTUS SOLOSTAR) 100 UNIT/ML injection pen Inject 10 Units into the skin daily 1/25/23   Danica Sandoval MD   carvedilol (COREG) 25 MG tablet Take 0.5 tablets by mouth 2 times daily (with meals) 1/11/23   Danica Sandoval MD   cholestyramine light 4 g packet Take 1 packet by mouth 2 times daily  Patient not taking: No sig reported 1/11/23   Danica Sandoval MD   cyanocobalamin 1000 MCG tablet Take 1 tablet by mouth daily  Patient not taking: No sig reported 1/11/23   Danica Sandoval MD   Blood Pressure Monitoring (BLOOD PRESSURE CUFF) MISC 1 Units by Does not apply route daily  Patient not taking: No sig reported 1/11/23   Danica Sandoval MD acetaminophen (TYLENOL) 325 MG tablet Take 650 mg by mouth every 6 hours as needed for Pain. Historical Provider, MD   naloxone 4 MG/0.1ML LIQD nasal spray 1 SPRAY INTO A NOSTRIL AS NEEDED FOR OPIOID OVERDOSE.   Patient not taking: No sig reported 9/8/22   Historical Provider, MD   metoprolol tartrate (LOPRESSOR) 50 MG tablet metoprolol tartrate 50 mg tablet 3/30/16 9/26/22  Historical Provider, MD   calcium carb-cholecalciferol 250-125 MG-UNIT TABS Take 1 tablet by mouth daily  Patient not taking: No sig reported 9/8/22   Jayy Garcia MD   glucose monitoring (FREESTYLE FREEDOM) kit 1 kit by Does not apply route daily Check glucose twice daily  Patient not taking: Reported on 3/4/2023 9/7/22   Jayy Garcia MD        carvedilol (COREG) tablet 12.5 mg, BID WC  insulin glargine (LANTUS) injection vial 10 Units, Daily  midodrine (PROAMATINE) tablet 2.5 mg, TID WC  pantoprazole (PROTONIX) tablet 40 mg, BID AC  sodium bicarbonate tablet 650 mg, BID  sodium chloride flush 0.9 % injection 5-40 mL, 2 times per day  sodium chloride flush 0.9 % injection 5-40 mL, PRN  0.9 % sodium chloride infusion, PRN  potassium chloride (KLOR-CON M) extended release tablet 40 mEq, PRN   Or  potassium bicarb-citric acid (EFFER-K) effervescent tablet 40 mEq, PRN   Or  potassium chloride 10 mEq/100 mL IVPB (Peripheral Line), PRN  magnesium sulfate 2000 mg in 50 mL IVPB premix, PRN  promethazine (PHENERGAN) tablet 12.5 mg, Q6H PRN   Or  ondansetron (ZOFRAN) injection 4 mg, Q6H PRN  polyethylene glycol (GLYCOLAX) packet 17 g, Daily  bisacodyl (DULCOLAX) EC tablet 5 mg, Daily PRN  aluminum & magnesium hydroxide-simethicone (MAALOX) 200-200-20 MG/5ML suspension 30 mL, Q6H PRN  acetaminophen (TYLENOL) tablet 650 mg, Q6H PRN   Or  acetaminophen (TYLENOL) suppository 650 mg, Q6H PRN  heparin (porcine) injection 5,000 Units, 3 times per day  oxyCODONE (ROXICODONE) immediate release tablet 5 mg, Q4H PRN  morphine injection 4 mg, Q4H PRN  furosemide (LASIX) injection 40 mg, Daily  magnesium sulfate 1000 mg in dextrose 5% 100 mL IVPB, Once  insulin lispro (HUMALOG) injection vial 0-8 Units, TID WC  insulin lispro (HUMALOG) injection vial 0-4 Units, Nightly  glucose chewable tablet 16 g, PRN  dextrose bolus 10% 125 mL, PRN   Or  dextrose bolus 10% 250 mL, PRN  glucagon (rDNA) injection 1 mg, PRN  dextrose 10 % infusion, Continuous PRN  cefTRIAXone (ROCEPHIN) 1,000 mg in sodium chloride 0.9 % 50 mL IVPB (mini-bag), Q24H        Allergies   Patient has no known allergies. Social History     Social History       Tobacco History       Smoking Status  Never      Smokeless Tobacco Use  Never              Alcohol History       Alcohol Use Status  Yes              Drug Use       Drug Use Status  Never              Sexual Activity       Sexually Active  Not Asked                    Family History     Family History   Problem Relation Age of Onset    Hypertension Sister     Diabetes Neg Hx        Review of Systems   Review of Systems   Constitutional:  Positive for fatigue. Gastrointestinal:  Positive for abdominal distention and abdominal pain. Physical Exam   BP (!) 150/98   Pulse 79   Temp 97.5 °F (36.4 °C) (Oral)   Resp 14   Ht 5' 11\" (1.803 m)   Wt 213 lb (96.6 kg)   SpO2 100%   BMI 29.71 kg/m²      Physical Exam  Constitutional:       Appearance: He is ill-appearing. HENT:      Head: Normocephalic. Mouth/Throat:      Mouth: Mucous membranes are moist.   Eyes:      General: No scleral icterus. Cardiovascular:      Rate and Rhythm: Normal rate and regular rhythm. Pulmonary:      Effort: No respiratory distress. Abdominal:      General: There is distension. Tenderness: There is abdominal tenderness. There is no rebound. Lymphadenopathy:      Cervical: No cervical adenopathy. Skin:     Coloration: Skin is not jaundiced. Findings: No bruising.    Neurological:      Mental Status: He is alert and oriented to person, place, and time. Labs    CBC:  Recent Labs     03/03/23 1658 03/04/23  0428   WBC 16.3* 15.4*   RBC 3.03* 2.96*   HGB 7.7* 7.6*   HCT 24.0* 23.1*   MCV 79.2* 78.0*   RDW 15.9* 15.6*    242     CHEMISTRIES:  Recent Labs     03/03/23 1658 03/04/23 0428    136   K 3.5 2.9*    107   CO2 21 23   BUN 31* 29*   CREATININE 2.30* 2.00*   GLUCOSE 228* 112*   MG 1.7* 1.6*     PT/INR:No results for input(s): PROTIME, INR in the last 72 hours. APTT:No results for input(s): APTT in the last 72 hours. LIVER PROFILE:  Recent Labs     03/03/23 1658   AST 23   ALT 12   BILITOT 0.2   ALKPHOS 186*       Imaging/Diagnostics   CT PELVIS WO CONTRAST Additional Contrast? None    Result Date: 3/3/2023  1. Marked ascites and anasarca. This could reflect liver or renal failure, for  example. 2.  Diffuse arterial atherosclerosis could be related to underlying renal disease. Don Garcia M.D. 3/3/2023 8:25:00 PM      Assessment      Hospital Problems             Last Modified POA    * (Principal) Anasarca 3/3/2023 Yes    Type 2 diabetes mellitus ([facility] [address] 3/3/2023 Yes    Overview Signed 9/21/2022  1:48 PM by Laura Enriquez ATC     Last Assessment & Plan:   Formatting of this note might be different from the original.  Continue Lantus, ISS         Hypoalbuminemia (Chronic) 3/3/2023 Yes    Cirrhosis of liver with ascites ([address] 3/3/2023 Yes    Moderate protein malnutrition ([address] 3/3/2023 Yes    Stage 3a chronic kidney disease ([address] (Chronic) 3/3/2023 Yes    Overview Signed 9/14/2022  9:02 PM by Oscar Calle MD     With HTN and DM2            Plan   1. Patient needed paracentesis. Continue ceftriaxone prophylaxis along with the judicious dosages of diuretics watch his creatinine closely.     Electronically signed by Tanja Moya MD on 3/4/23 at 12:20 PM EST

## 2023-03-04 NOTE — PROGRESS NOTES
Progress Note    Patient: Jeannie Lundberg MRN: [id number]  SSN: [id number]    YOB: 1965  Age: 65 y.o. Sex: male      Admit Date: 3/3/2023    LOS: 0 days     Assessment and Plan:   65 y.o. male with medical history of cirrhosis of the liver with ascites, diabetes type 2, hypoalbuminemia, CKD 3, who presented with worsening swelling     1. Anasarca in the setting of acute decompensated liver cirrhosis and concerns of SBP  Continue Lasix  Monitor volume status  Strict input and output  Daily weights  Continue ceftriaxone  Albumin as needed  Monitor LFTs  IR consult for paracentesis  Gastroenterology consult    2. Acute kidney injury on CKD stage III   Continue diuresis  Avoid nephrotoxic agents  Monitor renal function  No radiologic findings of hydronephrosis    3. Hypokalemia  Repleted  Monitor potassium levels    4. Hypomagnesemia  Replete  Monitor magnesium levels    5. Microcytic anemia likely in setting of chronic disease  Currently no signs of active bleed  Monitor H&H  Transfuse if hemoglobin lower than 7    6. Diabetes mellitus  Basal insulin  Insulin sliding scale    DVT prophylaxis with heparin subcu    Subjective:   65 y.o. male with medical history of cirrhosis of the liver with ascites, diabetes type 2, hypoalbuminemia, CKD 3, who presented with worsening swelling. Patient seen and examined at bedside. This morning he still presented with abdominal distention and lower extremity swelling. Denies any nausea or vomiting. Objective:     Vitals:    03/04/23 0021 03/04/23 0418 03/04/23 0445 03/04/23 0735   BP: (!) 134/98 (!) 129/90  (!) 132/93   Pulse: 71 63  66   Resp: 18 18  16   Temp:   (!) 93.9 °F (34.4 °C)    TempSrc: Axillary Oral Rectal Axillary   SpO2: 100% 100%  100%   Weight:       Height:            Intake and Output:  Current Shift: No intake/output data recorded.   Last three shifts: 03/02 1901 - 03/04 0700  In: 0   Out: 200 [Urine:200]    ROS  10 ROS negative except from stated on subjective    Physical Exam:   General: Alert, oriented, NAD  HEENT: NC/AT, EOM are intact  Neck: supple, no JVD  Cardiovascular: RRR, S1, S2, no murmurs  Respiratory: Lungs are clear, no wheezes or rales  Abdomen: Soft, NT, distended  Back: No CVA tenderness, no paraspinal tenderness  Extremities: Bilateral lower extremity edema  Neuro: A&O, CN are intact, no focal deficits  Skin: no rash or ulcers  Psych: good mood and affect    Lab/Data Review:  I have personally reviewed patients laboratory data showing  Recent Results (from the past 24 hour(s))   CBC with Auto Differential    Collection Time: 03/03/23  4:58 PM   Result Value Ref Range    WBC 16.3 (H) 4.3 - 11.1 K/uL    RBC 3.03 (L) 4.23 - 5.6 M/uL    Hemoglobin 7.7 (L) 13.6 - 17.2 g/dL    Hematocrit 24.0 (L) 41.1 - 50.3 %    MCV 79.2 (L) 82 - 102 FL    MCH 25.4 (L) 26.1 - 32.9 PG    MCHC 32.1 31.4 - 35.0 g/dL    RDW 15.9 (H) 11.9 - 14.6 %    Platelets 178 631 - 540 K/uL    MPV 10.0 9.4 - 12.3 FL    nRBC 0.00 0.0 - 0.2 K/uL    Differential Type AUTOMATED      Seg Neutrophils 74 43 - 78 %    Lymphocytes 15 13 - 44 %    Monocytes 8 4.0 - 12.0 %    Eosinophils % 2 0.5 - 7.8 %    Basophils 0 0.0 - 2.0 %    Immature Granulocytes 1 0.0 - 5.0 %    Segs Absolute 12.2 (H) 1.7 - 8.2 K/UL    Absolute Lymph # 2.4 0.5 - 4.6 K/UL    Absolute Mono # 1.3 0.1 - 1.3 K/UL    Absolute Eos # 0.3 0.0 - 0.8 K/UL    Basophils Absolute 0.1 0.0 - 0.2 K/UL    Absolute Immature Granulocyte 0.1 0.0 - 0.5 K/UL   CMP    Collection Time: 03/03/23  4:58 PM   Result Value Ref Range    Sodium 135 133 - 143 mmol/L    Potassium 3.5 3.5 - 5.1 mmol/L    Chloride 106 101 - 110 mmol/L    CO2 21 21 - 32 mmol/L    Anion Gap 8 2 - 11 mmol/L    Glucose 228 (H) 65 - 100 mg/dL    BUN 31 (H) 6 - 23 MG/DL    Creatinine 2.30 (H) 0.8 - 1.5 MG/DL    Est, Glom Filt Rate 33 (L) >60 ml/min/1.73m2    Calcium 8.1 (L) 8.3 - 10.4 MG/DL    Total Bilirubin 0.2 0.2 - 1.1 MG/DL    ALT 12 12 - 65 U/L    AST 23 15 - 37 U/L    Alk Phosphatase 186 (H) 50 - 136 U/L    Total Protein 6.7 6.3 - 8.2 g/dL    Albumin 1.5 (L) 3.5 - 5.0 g/dL    Globulin 5.2 (H) 2.8 - 4.5 g/dL    Albumin/Globulin Ratio 0.3 (L) 0.4 - 1.6     Magnesium    Collection Time: 03/03/23  4:58 PM   Result Value Ref Range    Magnesium 1.7 (L) 1.8 - 2.4 mg/dL   Urinalysis w rflx microscopic    Collection Time: 03/03/23  6:04 PM   Result Value Ref Range    Color, UA YELLOW/STRAW      Appearance CLEAR      Specific Gravity, UA 1.013 1.001 - 1.023      pH, Urine 5.5 5.0 - 9.0      Protein, UA Negative NEG mg/dL    Glucose, UA Negative mg/dL    Ketones, Urine Negative NEG mg/dL    Bilirubin Urine Negative NEG      Blood, Urine TRACE (A) NEG      Urobilinogen, Urine 0.2 0.2 - 1.0 EU/dL    Nitrite, Urine Negative NEG      Leukocyte Esterase, Urine MODERATE (A) NEG     Culture, Urine    Collection Time: 03/03/23  6:04 PM    Specimen: Urine, clean catch   Result Value Ref Range    Special Requests NO SPECIAL REQUESTS      Culture        No growth after short period of incubation. Further results to follow after overnight incubation.    Urinalysis, Micro    Collection Time: 03/03/23  6:04 PM   Result Value Ref Range    WBC, UA 20-50 0 /hpf    RBC, UA 0-3 0 /hpf    Epithelial Cells UA 0-3 0 /hpf    BACTERIA, URINE 1+ (H) 0 /hpf    Casts 0 0 /lpf    Crystals 0 0 /LPF    Mucus, UA 0 0 /lpf    Other observations RESULTS VERIFIED MANUALLY     Hemoglobin A1c    Collection Time: 03/04/23  4:28 AM   Result Value Ref Range    Hemoglobin A1C 6.5 (H) 4.8 - 5.6 %    eAG 140 mg/dL   Basic Metabolic Panel w/ Reflex to MG    Collection Time: 03/04/23  4:28 AM   Result Value Ref Range    Sodium 136 133 - 143 mmol/L    Potassium 2.9 (L) 3.5 - 5.1 mmol/L    Chloride 107 101 - 110 mmol/L    CO2 23 21 - 32 mmol/L    Anion Gap 6 2 - 11 mmol/L    Glucose 112 (H) 65 - 100 mg/dL    BUN 29 (H) 6 - 23 MG/DL    Creatinine 2.00 (H) 0.8 - 1.5 MG/DL    Est, Glom Filt Rate 38 (L) >60 ml/min/1.73m2 Calcium 7.7 (L) 8.3 - 10.4 MG/DL   CBC with Auto Differential    Collection Time: 03/04/23  4:28 AM   Result Value Ref Range    WBC 15.4 (H) 4.3 - 11.1 K/uL    RBC 2.96 (L) 4.23 - 5.6 M/uL    Hemoglobin 7.6 (L) 13.6 - 17.2 g/dL    Hematocrit 23.1 (L) 41.1 - 50.3 %    MCV 78.0 (L) 82 - 102 FL    MCH 25.7 (L) 26.1 - 32.9 PG    MCHC 32.9 31.4 - 35.0 g/dL    RDW 15.6 (H) 11.9 - 14.6 %    Platelets 596 575 - 962 K/uL    MPV 10.4 9.4 - 12.3 FL    nRBC 0.00 0.0 - 0.2 K/uL    Differential Type AUTOMATED      Seg Neutrophils 69 43 - 78 %    Lymphocytes 17 13 - 44 %    Monocytes 10 4.0 - 12.0 %    Eosinophils % 2 0.5 - 7.8 %    Basophils 1 0.0 - 2.0 %    Immature Granulocytes 1 0.0 - 5.0 %    Segs Absolute 10.8 (H) 1.7 - 8.2 K/UL    Absolute Lymph # 2.6 0.5 - 4.6 K/UL    Absolute Mono # 1.6 (H) 0.1 - 1.3 K/UL    Absolute Eos # 0.3 0.0 - 0.8 K/UL    Basophils Absolute 0.1 0.0 - 0.2 K/UL    Absolute Immature Granulocyte 0.1 0.0 - 0.5 K/UL   Magnesium    Collection Time: 03/04/23  4:28 AM   Result Value Ref Range    Magnesium 1.6 (L) 1.8 - 2.4 mg/dL   POCT Glucose    Collection Time: 03/04/23  8:22 AM   Result Value Ref Range    POC Glucose 132 (H) 65 - 100 mg/dL    Performed by: [employer]         Image:  I have personally reviewed patients imaging showing  CT PELVIS WO CONTRAST Additional Contrast? None   Final Result   1. Marked ascites and anasarca. This could reflect liver or renal failure, for    example. 2.  Diffuse arterial atherosclerosis could be related to underlying renal    disease.        Vern Vaughn M.D.    3/3/2023 8:25:00 PM           Current Facility-Administered Medications   Medication Dose Route Frequency Provider Last Rate Last Admin    carvedilol (COREG) tablet 12.5 mg  12.5 mg Oral BID  Ez Lee MD   12.5 mg at 03/04/23 0853    insulin glargine (LANTUS) injection vial 10 Units  10 Units SubCUTAneous Daily Ez Lee MD   10 Units at 03/04/23 0857    midodrine (PROAMATINE) tablet 2.5 mg  2.5 mg Oral TID WC Gabrielle Gerber MD   2.5 mg at 03/04/23 0854    pantoprazole (PROTONIX) tablet 40 mg  40 mg Oral BID AC Gabrielle Gerber MD   40 mg at 03/04/23 4491    sodium bicarbonate tablet 650 mg  650 mg Oral BID Gabrielle Gerber MD   650 mg at 03/04/23 3608    sodium chloride flush 0.9 % injection 5-40 mL  5-40 mL IntraVENous 2 times per day Gabrielle Gerber MD   5 mL at 03/04/23 0855    sodium chloride flush 0.9 % injection 5-40 mL  5-40 mL IntraVENous PRN Gabrielle Gerber MD        0.9 % sodium chloride infusion   IntraVENous PRN Gabrielle Gerber MD        potassium chloride (KLOR-CON M) extended release tablet 40 mEq  40 mEq Oral PRN Gabrielle Gerber MD        Or    potassium bicarb-citric acid (EFFER-K) effervescent tablet 40 mEq  40 mEq Oral PRN Gabrielle Gerber MD        Or    potassium chloride 10 mEq/100 mL IVPB (Peripheral Line)  10 mEq IntraVENous PRN Gabrielle Gerber MD        magnesium sulfate 2000 mg in 50 mL IVPB premix  2,000 mg IntraVENous PRN Gabrielle Gerber MD        promethazine (PHENERGAN) tablet 12.5 mg  12.5 mg Oral Q6H PRN Gabrielle Gerber MD        Or    ondansetron [facility]) injection 4 mg  4 mg IntraVENous Q6H PRN Gabrielle Gerber MD        polyethylene glycol [facility]) packet 17 g  17 g Oral Daily Gabrielle Gerber MD        bisacodyl (DULCOLAX) EC tablet 5 mg  5 mg Oral Daily PRN Gabrielle Gerber MD        aluminum & magnesium hydroxide-simethicone (MAALOX) 717-789-09 MG/5ML suspension 30 mL  30 mL Oral Q6H PRN Gabrielle Gerber MD        acetaminophen (TYLENOL) tablet 650 mg  650 mg Oral Q6H PRN Gabrielle Gerber MD   650 mg at 03/04/23 0044    Or    acetaminophen (TYLENOL) suppository 650 mg  650 mg Rectal Q6H PRN Gabrielle Gerber MD        heparin (porcine) injection 5,000 Units  5,000 Units SubCUTAneous 3 times per day Gabrielle Gerber MD   5,000 Units at 03/04/23 7730 oxyCODONE (ROXICODONE) immediate release tablet 5 mg  5 mg Oral Q4H PRN Gabrielle Gerber MD   5 mg at 03/04/23 3724    morphine injection 4 mg  4 mg IntraVENous Q4H PRN Gabrielle Gerber MD        furosemide (LASIX) injection 40 mg  40 mg IntraVENous Daily Cha Talley MD   40 mg at 03/04/23 0854    insulin lispro (HUMALOG) injection vial 0-8 Units  0-8 Units SubCUTAneous TID WC Gabrielle Gerber MD        insulin lispro (HUMALOG) injection vial 0-4 Units  0-4 Units SubCUTAneous Nightly Gabrielle Gerber MD        glucose chewable tablet 16 g  4 tablet Oral PRN Gabrielle Gerber MD        dextrose bolus 10% 125 mL  125 mL IntraVENous PRN Gabrielle eGrber MD        Or    dextrose bolus 10% 250 mL  250 mL IntraVENous PRN Gabrielle Gerber MD        glucagon (rDNA) injection 1 mg  1 mg SubCUTAneous PRN Gabrielle Gerber MD        dextrose 10 % infusion   IntraVENous Continuous PRN Gabrielle Gerber MD        cefTRIAXone (ROCEPHIN) 1,000 mg in sodium chloride 0.9 % 50 mL IVPB (mini-bag)  1,000 mg IntraVENous Q24H Gabrielle Gerber MD            Hospital problems     Patient Active Problem List   Diagnosis    Alcohol dependence ([address]    Diabetic peripheral neuropathy ([address]    Essential hypertension    Hyperlipidemia    Type 2 diabetes mellitus ([address]    Diabetic ulcer of both feet associated with type 2 diabetes mellitus ([address]    Hypoalbuminemia    Stage 3a chronic kidney disease ([address]    Iron deficiency anemia    Normocytic anemia    Cirrhosis of liver with ascites ([address]    Moderate protein malnutrition ([address]    Gurdeep        I have reviewed, updated, and verified this note's content and spent 38 minutes of my 42 minutes visit performing counseling and coordination of care regarding medical management.        Signed By: Cha Talley MD     March 4, 2023

## 2023-03-04 NOTE — PROGRESS NOTES
Pt's BS 62 at 1549. Pt asymptomatic, resting comfortably in bed. Pt given 4oz of orange juice and crackers & peanut butter. BS 73 when rechecked 15 minutes after administering food and drink. Pt still asymptomatic, eating dinner and resting comfortably. Humalog held for 1700 dose. Hospitalist notified.

## 2023-03-04 NOTE — PROGRESS NOTES
Pt oral and axillary temp unreadable. Attempted on different vital sign machines and could not attain. Rectal temp 93.9 hospitalist notified.  Orders for [employer]

## 2023-03-04 NOTE — ED NOTES
TRANSFER - OUT REPORT:    Verbal report given to Kevin Luna on Jeannie Lundberg  being transferred to 1 for routine progression of patient care       Report consisted of patient's Situation, Background, Assessment and   Recommendations(SBAR). Information from the following report(s) ED SBAR was reviewed with the receiving nurse. [place] Assessment: No data recorded  Lines:   Peripheral IV 03/03/23 Left Forearm (Active)   Site Assessment Clean, dry & intact 03/03/23 1658   Phlebitis Assessment No symptoms 03/03/23 1658   Infiltration Assessment 0 03/03/23 1658        Opportunity for questions and clarification was provided.       Patient transported with:  Valentin Aguilar RN  03/03/23 1492

## 2023-03-05 LAB
ALBUMIN SERPL-MCNC: 1.6 G/DL (ref 3.5–5)
ALBUMIN/GLOB SERPL: 0.3 (ref 0.4–1.6)
ALP SERPL-CCNC: 216 U/L (ref 50–136)
ALT SERPL-CCNC: 11 U/L (ref 12–65)
ANION GAP SERPL CALC-SCNC: 4 MMOL/L (ref 2–11)
AST SERPL-CCNC: 20 U/L (ref 15–37)
BASOPHILS # BLD: 0.1 K/UL (ref 0–0.2)
BASOPHILS NFR BLD: 0 % (ref 0–2)
BILIRUB SERPL-MCNC: 0.4 MG/DL (ref 0.2–1.1)
BUN SERPL-MCNC: 27 MG/DL (ref 6–23)
CALCIUM SERPL-MCNC: 8 MG/DL (ref 8.3–10.4)
CHLORIDE SERPL-SCNC: 100 MMOL/L (ref 101–110)
CO2 SERPL-SCNC: 25 MMOL/L (ref 21–32)
CREAT SERPL-MCNC: 2.1 MG/DL (ref 0.8–1.5)
DIFFERENTIAL METHOD BLD: ABNORMAL
EOSINOPHIL # BLD: 0.2 K/UL (ref 0–0.8)
EOSINOPHIL NFR BLD: 1 % (ref 0.5–7.8)
ERYTHROCYTE [DISTWIDTH] IN BLOOD BY AUTOMATED COUNT: 15.5 % (ref 11.9–14.6)
GLOBULIN SER CALC-MCNC: 5.6 G/DL (ref 2.8–4.5)
GLUCOSE BLD STRIP.AUTO-MCNC: 101 MG/DL (ref 65–100)
GLUCOSE BLD STRIP.AUTO-MCNC: 120 MG/DL (ref 65–100)
GLUCOSE BLD STRIP.AUTO-MCNC: 152 MG/DL (ref 65–100)
GLUCOSE BLD STRIP.AUTO-MCNC: 157 MG/DL (ref 65–100)
GLUCOSE BLD STRIP.AUTO-MCNC: 46 MG/DL (ref 65–100)
GLUCOSE BLD STRIP.AUTO-MCNC: 95 MG/DL (ref 65–100)
GLUCOSE SERPL-MCNC: 143 MG/DL (ref 65–100)
HCT VFR BLD AUTO: 26.3 % (ref 41.1–50.3)
HGB BLD-MCNC: 8.5 G/DL (ref 13.6–17.2)
IMM GRANULOCYTES # BLD AUTO: 0.1 K/UL (ref 0–0.5)
IMM GRANULOCYTES NFR BLD AUTO: 1 % (ref 0–5)
LYMPHOCYTES # BLD: 2.7 K/UL (ref 0.5–4.6)
LYMPHOCYTES NFR BLD: 15 % (ref 13–44)
MCH RBC QN AUTO: 25.4 PG (ref 26.1–32.9)
MCHC RBC AUTO-ENTMCNC: 32.3 G/DL (ref 31.4–35)
MCV RBC AUTO: 78.5 FL (ref 82–102)
MONOCYTES # BLD: 1.9 K/UL (ref 0.1–1.3)
MONOCYTES NFR BLD: 10 % (ref 4–12)
NEUTS SEG # BLD: 13 K/UL (ref 1.7–8.2)
NEUTS SEG NFR BLD: 72 % (ref 43–78)
NRBC # BLD: 0 K/UL (ref 0–0.2)
PLATELET # BLD AUTO: 255 K/UL (ref 150–450)
PMV BLD AUTO: 10 FL (ref 9.4–12.3)
POTASSIUM SERPL-SCNC: 3.7 MMOL/L (ref 3.5–5.1)
PROT SERPL-MCNC: 7.2 G/DL (ref 6.3–8.2)
RBC # BLD AUTO: 3.35 M/UL (ref 4.23–5.6)
SERVICE CMNT-IMP: ABNORMAL
SERVICE CMNT-IMP: NORMAL
SODIUM SERPL-SCNC: 129 MMOL/L (ref 133–143)
WBC # BLD AUTO: 17.9 K/UL (ref 4.3–11.1)

## 2023-03-05 PROCEDURE — 36415 COLL VENOUS BLD VENIPUNCTURE: CPT

## 2023-03-05 PROCEDURE — 6360000002 HC RX W HCPCS: Performed by: FAMILY MEDICINE

## 2023-03-05 PROCEDURE — 2580000003 HC RX 258: Performed by: FAMILY MEDICINE

## 2023-03-05 PROCEDURE — 6360000002 HC RX W HCPCS: Performed by: INTERNAL MEDICINE

## 2023-03-05 PROCEDURE — 85025 COMPLETE CBC W/AUTO DIFF WBC: CPT

## 2023-03-05 PROCEDURE — 82962 GLUCOSE BLOOD TEST: CPT

## 2023-03-05 PROCEDURE — G0378 HOSPITAL OBSERVATION PER HR: HCPCS

## 2023-03-05 PROCEDURE — 80053 COMPREHEN METABOLIC PANEL: CPT

## 2023-03-05 PROCEDURE — 6370000000 HC RX 637 (ALT 250 FOR IP): Performed by: FAMILY MEDICINE

## 2023-03-05 RX ADMIN — OXYCODONE 5 MG: 5 TABLET ORAL at 18:30

## 2023-03-05 RX ADMIN — SODIUM CHLORIDE, PRESERVATIVE FREE 5 ML: 5 INJECTION INTRAVENOUS at 08:58

## 2023-03-05 RX ADMIN — PANTOPRAZOLE SODIUM 40 MG: 40 TABLET, DELAYED RELEASE ORAL at 16:18

## 2023-03-05 RX ADMIN — DEXTROSE MONOHYDRATE 250 ML: 100 INJECTION, SOLUTION INTRAVENOUS at 02:57

## 2023-03-05 RX ADMIN — HEPARIN SODIUM 5000 UNITS: 5000 INJECTION INTRAVENOUS; SUBCUTANEOUS at 15:42

## 2023-03-05 RX ADMIN — SODIUM BICARBONATE 650 MG: 650 TABLET ORAL at 08:57

## 2023-03-05 RX ADMIN — SODIUM CHLORIDE, PRESERVATIVE FREE 10 ML: 5 INJECTION INTRAVENOUS at 21:23

## 2023-03-05 RX ADMIN — OXYCODONE 5 MG: 5 TABLET ORAL at 05:28

## 2023-03-05 RX ADMIN — HEPARIN SODIUM 5000 UNITS: 5000 INJECTION INTRAVENOUS; SUBCUTANEOUS at 21:30

## 2023-03-05 RX ADMIN — HEPARIN SODIUM 5000 UNITS: 5000 INJECTION INTRAVENOUS; SUBCUTANEOUS at 05:27

## 2023-03-05 RX ADMIN — CARVEDILOL 12.5 MG: 12.5 TABLET, FILM COATED ORAL at 16:18

## 2023-03-05 RX ADMIN — CEFTRIAXONE 1000 MG: 1 INJECTION, POWDER, FOR SOLUTION INTRAMUSCULAR; INTRAVENOUS at 21:21

## 2023-03-05 RX ADMIN — SODIUM BICARBONATE 650 MG: 650 TABLET ORAL at 21:22

## 2023-03-05 RX ADMIN — FUROSEMIDE 40 MG: 10 INJECTION, SOLUTION INTRAMUSCULAR; INTRAVENOUS at 08:57

## 2023-03-05 RX ADMIN — CARVEDILOL 12.5 MG: 12.5 TABLET, FILM COATED ORAL at 08:57

## 2023-03-05 RX ADMIN — OXYCODONE 5 MG: 5 TABLET ORAL at 00:14

## 2023-03-05 ASSESSMENT — PAIN DESCRIPTION - ORIENTATION
ORIENTATION: MID

## 2023-03-05 ASSESSMENT — PAIN DESCRIPTION - LOCATION
LOCATION: GROIN

## 2023-03-05 ASSESSMENT — PAIN DESCRIPTION - DESCRIPTORS
DESCRIPTORS: ACHING
DESCRIPTORS: ACHING

## 2023-03-05 ASSESSMENT — PAIN SCALES - GENERAL
PAINLEVEL_OUTOF10: 6
PAINLEVEL_OUTOF10: 4
PAINLEVEL_OUTOF10: 0
PAINLEVEL_OUTOF10: 6

## 2023-03-05 ASSESSMENT — PAIN SCALES - WONG BAKER: WONGBAKER_NUMERICALRESPONSE: 0

## 2023-03-05 ASSESSMENT — PAIN - FUNCTIONAL ASSESSMENT: PAIN_FUNCTIONAL_ASSESSMENT: ACTIVITIES ARE NOT PREVENTED

## 2023-03-05 ASSESSMENT — PAIN DESCRIPTION - PAIN TYPE: TYPE: ACUTE PAIN

## 2023-03-05 NOTE — CARE COORDINATION
Chart reviewed by  for potential discharge needs or concerns.  Pt was current with [facility] prior to admission.  CM met with pt/spouse/family to confirm they want these services to continue at [facility]. Pt is in agreement for HH services to resume at [facility].  CELSO order/referral submitted to [facility].  [facility] is scheduled to see the pt at home on 3/9/2023.  No other dc needs or concerns identified or reported.  Please notify/consult  if other discharge needs arise.       03/05/23 9312   Service Assessment   Patient Orientation Alert and Oriented   Cognition Alert   History Provided By Patient;Significant Other;Child/Family   Primary Caregiver Family   Support Systems Spouse/Significant Other;Family Members;Home Care Staff   Patient's Healthcare Decision Maker is: Legal Next of Kin   PCP Verified by CM Yes   Last Visit to PCP Within last 3 months  (2/27/2023)   Prior Functional Level Assistance with the following:;Bathing;Dressing;Toileting;Feeding;Cooking;Housework   Current Functional Level Assistance with the following:;Bathing;Dressing;Toileting;Feeding;Cooking;Housework   Can patient return to prior living arrangement Yes   Ability to make needs known: Good   Family able to assist with home care needs: Yes   Would you like for me to discuss the discharge plan with any other family members/significant others, and if so, who? Yes  (spouse)   Financial Resources Medicare   Community Resources ECF/Home Care;Other (Comment)  ([facility])   Social/Functional History   Lives With Spouse   Type of Home House   Home Layout One level   Entrance Stairs - Number of Steps 2   Bathroom Toilet Bedside commode   Home Equipment Cane;Walker, rolling   Receives Help From Family   ADL Assistance Needs assistance   Toileting Needs assistance   Homemaking Assistance Needs assistance   Ambulation Assistance Needs assistance   Transfer Assistance Needs assistance   Active  No   Patient's   Info family   Occupation On disability   Discharge Planning   Type of Residence [employer]   Living Arrangements Spouse/Significant Other   Current Services Prior To Admission [address]; Other (Comment)  (palliative care)   Current DME Prior to [address]; Other (Comment)  (palliative care)   DME Ordered? No   Potential Assistance Purchasing Medications No   Type of Home Care Services Nursing Services   Patient expects to be discharged to: House   One/Two Story Residence One story   History of falls? 0   Services At/After Discharge   Transition of Care Consult (CM Consult) Home Health; Other  ([address] [place] [address])   Internal [address] Discharge Home Health;Nursing services   [facility] Information Provided? No   Mode of Transport at Discharge Other (see comment)  (family)   Confirm Follow Up Transport Family   Condition of Participation: Discharge Planning   The Plan for Transition of Care is related to the following treatment goals: Home health nursing and therapy services to support pt's care and recovery in the home. The Patient and/or Patient Representative was provided with a Choice of Provider? Patient   The Patient and/Or Patient Representative agree with the Discharge Plan? Yes   Freedom of Choice list was provided with basic dialogue that supports the patient's individualized plan of care/goals, treatment preferences, and shares the quality data associated with the providers?   No  (pt was current with [facility] prior to admission and wishes to remain with this agency.)

## 2023-03-05 NOTE — PROGRESS NOTES
Progress Note  Date:3/5/2023       Room:[facility]  Patient Yasmine Blanc     YOB: 1965     Age:58 y.o. Subjective    Subjective still with the face paracentesis not yet performed. What kind is a 17,000 hemoglobin 8.5. On antibiotic therapy electrically. No report of gross bleeding. Complete metabolic panel  results not out yet  Review of Systems cirrhosis with secondary anasarca and ascites  Objective         Vitals Last 24 Hours:  TEMPERATURE:  Temp  Av.9 °F (36.6 °C)  Min: 97 °F (36.1 °C)  Max: 98.6 °F (37 °C)  RESPIRATIONS RANGE: Resp  Avg: 15.5  Min: 14  Max: 17  PULSE OXIMETRY RANGE: SpO2  Av %  Min: 98 %  Max: 100 %  PULSE RANGE: Pulse  Av.5  Min: 79  Max: 86  BLOOD PRESSURE RANGE: Systolic (05ZHQ), UCJ:847 , Min:121 , NTC:348   ; Diastolic (79DUG), DXS:413, Min:98, Max:113    I/O (24Hr): Intake/Output Summary (Last 24 hours) at 3/5/2023 0850  Last data filed at 3/4/2023 1742  Gross per 24 hour   Intake 480 ml   Output 450 ml   Net 30 ml     Objective: Abdomen distended with ascites  Labs/Imaging/Diagnostics    Labs:  CBC:  Recent Labs     23  0428 23  0802   WBC 16.3* 15.4* 17.9*   RBC 3.03* 2.96* 3.35*   HGB 7.7* 7.6* 8.5*   HCT 24.0* 23.1* 26.3*   MCV 79.2* 78.0* 78.5*   RDW 15.9* 15.6* 15.5*    242 255     CHEMISTRIES:  Recent Labs     23  0428    136   K 3.5 2.9*    107   CO2 21 23   BUN 31* 29*   CREATININE 2.30* 2.00*   GLUCOSE 228* 112*   MG 1.7* 1.6*     PT/INR:No results for input(s): PROTIME, INR in the last 72 hours. APTT:No results for input(s): APTT in the last 72 hours.   LIVER PROFILE:  Recent Labs     23  1658   AST 23   ALT 12   BILITOT 0.2   ALKPHOS 186*       Imaging Last 24 Hours:  CT PELVIS WO CONTRAST Additional Contrast? None    Result Date: 3/3/2023  Exam: CT pelvis without contrast INDICATION: Penile and scrotal swelling TECHNIQUE: Axial images with coronal and sagittal reconstructions. CT dose lowering techniques were used, to include: automated exposure control, adjustment for patient size, and or use of iterative reconstruction. COMPARISON: December 15, 2022 FINDINGS: There is marked ascites and anasarca. Normal bladder. There are a few mildly enlarged bilateral inguinal lymph nodes similar to the prior exam. No air in the soft tissues. There are no drainable collections. Diffuse arterial atherosclerosis. 1.  Marked ascites and anasarca. This could reflect liver or renal failure, for  example. 2.  Diffuse arterial atherosclerosis could be related to underlying renal disease. Bella Saunders M.D. 3/3/2023 8:25:00 PM    Assessment//Plan           Hospital Problems             Last Modified POA    * (Principal) Anasarca 3/3/2023 Yes    Type 2 diabetes mellitus ([address] 3/3/2023 Yes    Overview Signed 9/21/2022  1:48 PM by Bree Hester ATC     Last Assessment & Plan:   Formatting of this note might be different from the original.  Continue Lantus, ISS         Hypoalbuminemia (Chronic) 3/3/2023 Yes    Cirrhosis of liver with ascites ([address] 3/3/2023 Yes    Moderate protein malnutrition ([address] 3/3/2023 Yes    Stage 3a chronic kidney disease ([address] (Chronic) 3/3/2023 Yes    Overview Signed 9/14/2022  9:02 PM by Stefanie Cerda MD     With HTN and DM2          Assessment & Plan awaiting paracentesis to be performed by interventional radiology. Continue moderate diuresis.   Watch his creatinine level    Electronically signed by Quinten Tsang MD on 3/5/23 at 8:50 AM EST

## 2023-03-05 NOTE — PROGRESS NOTES
Beginning of shift pt bs check was 61. Oral glucose tablets given. Bs increased to 105. 46 Pt complaining of feeling light headed and saying his \"sugar felt low\". BS check 46. Hospitalist notified. D10 bolus given.  BS recheck 157

## 2023-03-05 NOTE — PROGRESS NOTES
Progress Note    Patient: Jared Lui MRN: [id number]  SSN: [id number]    YOB: 1965  Age: 63 y.o. Sex: male      Admit Date: 3/3/2023    LOS: 0 days     Assessment and Plan:   65 y.o. male with medical history of cirrhosis of the liver with ascites, diabetes type 2, hypoalbuminemia, CKD 3, who presented with worsening swelling     1. Anasarca in the setting of acute decompensated liver cirrhosis and concerns of SBP  Continue Lasix  Monitor volume status  Strict input and output  Daily weights  Continue ceftriaxone  Albumin as needed  Monitor LFTs  IR consulted for paracentesis  Gastroenterology recommendations appreciated    2. Acute kidney injury on CKD stage III   Continue diuresis  Avoid nephrotoxic agents  Monitor renal function  No radiologic findings of hydronephrosis    3. Hypokalemia  Repleted  Monitor potassium levels    4. Hypomagnesemia  Replete  Monitor magnesium levels    5. Microcytic anemia likely in setting of chronic disease  Currently no signs of active bleed  Monitor H&H  Transfuse if hemoglobin lower than 7    6. Diabetes mellitus  Basal insulin  Insulin sliding scale    DVT prophylaxis with heparin subcu    Subjective:   65 y.o. male with medical history of cirrhosis of the liver with ascites, diabetes type 2, hypoalbuminemia, CKD 3, who presented with worsening swelling. Patient seen and examined at bedside. This morning he still presented with abdominal distention and lower extremity swelling. Denies any nausea or vomiting. Objective:     Vitals:    03/04/23 1550 03/04/23 1946 03/05/23 0700 03/05/23 1200   BP: (!) 155/113 (!) 150/103 (!) 121/103 (!) 133/94   Pulse: 79 86 82 54   Resp: 16 17 15 16   Temp: 97 °F (36.1 °C) 98.4 °F (36.9 °C) 98.6 °F (37 °C) 98.9 °F (37.2 °C)   TempSrc: Axillary Oral Oral Oral   SpO2: 99% 99% 98% 100%   Weight:       Height:            Intake and Output:  Current Shift: No intake/output data recorded.   Last three shifts: 03/03 1901 - 03/05 0700  In: 720 [P.O.:720]  Out: 650 [Urine:650]    ROS  10 ROS negative except from stated on subjective    Physical Exam:   General: Alert, oriented, NAD  HEENT: NC/AT, EOM are intact  Neck: supple, no JVD  Cardiovascular: RRR, S1, S2, no murmurs  Respiratory: Lungs are clear, no wheezes or rales  Abdomen: Soft, NT, distended  Back: No CVA tenderness, no paraspinal tenderness  Extremities: Bilateral lower extremity edema  Neuro: A&O, CN are intact, no focal deficits  Skin: no rash or ulcers  Psych: good mood and affect    Lab/Data Review:  I have personally reviewed patients laboratory data showing  Recent Results (from the past 24 hour(s))   POCT Glucose    Collection Time: 03/04/23  3:49 PM   Result Value Ref Range    POC Glucose 62 (L) 65 - 100 mg/dL    Performed by: [employer]    POCT Glucose    Collection Time: 03/04/23  4:16 PM   Result Value Ref Range    POC Glucose 73 65 - 100 mg/dL    Performed by: [employer]    POCT Glucose    Collection Time: 03/04/23  9:00 PM   Result Value Ref Range    POC Glucose 63 (L) 65 - 100 mg/dL    Performed by: [employer]    POCT Glucose    Collection Time: 03/04/23  9:57 PM   Result Value Ref Range    POC Glucose 105 (H) 65 - 100 mg/dL    Performed by: [employer]    POCT Glucose    Collection Time: 03/05/23  2:52 AM   Result Value Ref Range    POC Glucose 46 (L) 65 - 100 mg/dL    Performed by: [employer]    POCT Glucose    Collection Time: 03/05/23  3:22 AM   Result Value Ref Range    POC Glucose 157 (H) 65 - 100 mg/dL    Performed by: [employer]    POCT Glucose    Collection Time: 03/05/23  6:27 AM   Result Value Ref Range    POC Glucose 101 (H) 65 - 100 mg/dL    Performed by: Vanessa Erickson    CBC with Auto Differential    Collection Time: 03/05/23  8:02 AM   Result Value Ref Range    WBC 17.9 (H) 4.3 - 11.1 K/uL    RBC 3.35 (L) 4.23 - 5.6 M/uL    Hemoglobin 8.5 (L) 13.6 - 17.2 g/dL    Hematocrit 26.3 (L) 41.1 - 50.3 %    MCV 78.5 (L) 82 - 102 FL    MCH 25.4 (L) 26.1 - 32.9 PG    MCHC 32.3 31.4 - 35.0 g/dL    RDW 15.5 (H) 11.9 - 14.6 %    Platelets 793 496 - 477 K/uL    MPV 10.0 9.4 - 12.3 FL    nRBC 0.00 0.0 - 0.2 K/uL    Differential Type AUTOMATED      Seg Neutrophils 72 43 - 78 %    Lymphocytes 15 13 - 44 %    Monocytes 10 4.0 - 12.0 %    Eosinophils % 1 0.5 - 7.8 %    Basophils 0 0.0 - 2.0 %    Immature Granulocytes 1 0.0 - 5.0 %    Segs Absolute 13.0 (H) 1.7 - 8.2 K/UL    Absolute Lymph # 2.7 0.5 - 4.6 K/UL    Absolute Mono # 1.9 (H) 0.1 - 1.3 K/UL    Absolute Eos # 0.2 0.0 - 0.8 K/UL    Basophils Absolute 0.1 0.0 - 0.2 K/UL    Absolute Immature Granulocyte 0.1 0.0 - 0.5 K/UL   Comprehensive Metabolic Panel    Collection Time: 03/05/23  8:02 AM   Result Value Ref Range    Sodium 129 (L) 133 - 143 mmol/L    Potassium 3.7 3.5 - 5.1 mmol/L    Chloride 100 (L) 101 - 110 mmol/L    CO2 25 21 - 32 mmol/L    Anion Gap 4 2 - 11 mmol/L    Glucose 143 (H) 65 - 100 mg/dL    BUN 27 (H) 6 - 23 MG/DL    Creatinine 2.10 (H) 0.8 - 1.5 MG/DL    Est, Glom Filt Rate 36 (L) >60 ml/min/1.73m2    Calcium 8.0 (L) 8.3 - 10.4 MG/DL    Total Bilirubin 0.4 0.2 - 1.1 MG/DL    ALT 11 (L) 12 - 65 U/L    AST 20 15 - 37 U/L    Alk Phosphatase 216 (H) 50 - 136 U/L    Total Protein 7.2 6.3 - 8.2 g/dL    Albumin 1.6 (L) 3.5 - 5.0 g/dL    Globulin 5.6 (H) 2.8 - 4.5 g/dL    Albumin/Globulin Ratio 0.3 (L) 0.4 - 1.6     POCT Glucose    Collection Time: 03/05/23 11:38 AM   Result Value Ref Range    POC Glucose 95 65 - 100 mg/dL    Performed by: [employer]         Image:  I have personally reviewed patients imaging showing  CT PELVIS WO CONTRAST Additional Contrast? None   Final Result   1. Marked ascites and anasarca. This could reflect liver or renal failure, for    example. 2.  Diffuse arterial atherosclerosis could be related to underlying renal    disease.        Guero Rey M.D.    3/3/2023 8:25:00 PM           Current Facility-Administered Medications   Medication Dose Route Frequency Provider Last Rate Last Admin    carvedilol (COREG) tablet 12.5 mg  12.5 mg Oral BID  Hanna Ding MD   12.5 mg at 03/05/23 0857    [Held by provider] insulin glargine (LANTUS) injection vial 10 Units  10 Units SubCUTAneous Daily Hanna Ding MD   10 Units at 03/04/23 0857    [Held by provider] midodrine (PROAMATINE) tablet 2.5 mg  2.5 mg Oral TID  Hanna Ding MD   2.5 mg at 03/04/23 0854    pantoprazole (PROTONIX) tablet 40 mg  40 mg Oral BID AC Hanna Ding MD   40 mg at 03/04/23 1543    sodium bicarbonate tablet 650 mg  650 mg Oral BID Hanna Ding MD   650 mg at 03/05/23 0857    sodium chloride flush 0.9 % injection 5-40 mL  5-40 mL IntraVENous 2 times per day Hanna Ding MD   5 mL at 03/05/23 0858    sodium chloride flush 0.9 % injection 5-40 mL  5-40 mL IntraVENous PRN Hanna Ding MD        0.9 % sodium chloride infusion   IntraVENous PRN Hanna Ding MD        potassium chloride (KLOR-CON M) extended release tablet 40 mEq  40 mEq Oral PRN Hanna Ding MD        Or    potassium bicarb-citric acid (EFFER-K) effervescent tablet 40 mEq  40 mEq Oral PRN Hanna Ding MD        Or    potassium chloride 10 mEq/100 mL IVPB (Peripheral Line)  10 mEq IntraVENous PRN Hanna Ding MD        magnesium sulfate 2000 mg in 50 mL IVPB premix  2,000 mg IntraVENous PRN Hanna Ding MD        promethazine (PHENERGAN) tablet 12.5 mg  12.5 mg Oral Q6H PRN Hanna Ding MD        Or    ondansetron [facility]) injection 4 mg  4 mg IntraVENous Q6H PRN Hanna Ding MD        polyethylene glycol [facility]) packet 17 g  17 g Oral Daily Hanna Ding MD        bisacodyl (DULCOLAX) EC tablet 5 mg  5 mg Oral Daily PRN Hanna Ding MD        aluminum & magnesium hydroxide-simethicone (MAALOX) 497859-20 MG/5ML suspension 30 mL  30 mL Oral Q6H PRN Hanna Ding MD        acetaminophen (TYLENOL) tablet 650 mg  650 mg Oral Q6H PRN Milagro Auguste MD   650 mg at 03/04/23 0044    Or    acetaminophen (TYLENOL) suppository 650 mg  650 mg Rectal Q6H PRN Milagro Auguste MD        heparin (porcine) injection 5,000 Units  5,000 Units SubCUTAneous 3 times per day Milagro Auguste MD   5,000 Units at 03/05/23 4868    oxyCODONE (ROXICODONE) immediate release tablet 5 mg  5 mg Oral Q4H PRN Milagro Auguste MD   5 mg at 03/05/23 0468    morphine injection 4 mg  4 mg IntraVENous Q4H PRN Milagro Auguste MD        furosemide (LASIX) injection 40 mg  40 mg IntraVENous Daily Gigi De La Garza MD   40 mg at 03/05/23 0857    insulin lispro (HUMALOG) injection vial 0-8 Units  0-8 Units SubCUTAneous TID WC Milagro Auguste MD        insulin lispro (HUMALOG) injection vial 0-4 Units  0-4 Units SubCUTAneous Nightly Milagro Auguste MD        glucose chewable tablet 16 g  4 tablet Oral PRN Milagro Auguste MD   16 g at 03/04/23 2105    dextrose bolus 10% 125 mL  125 mL IntraVENous PRN Milagro Auguste MD        Or    dextrose bolus 10% 250 mL  250 mL IntraVENous PRN Milagro Auguste MD   Stopped at 03/05/23 0313    glucagon (rDNA) injection 1 mg  1 mg SubCUTAneous PRN Milagro Auguste MD        dextrose 10 % infusion   IntraVENous Continuous PRN Milagro Auguste MD        cefTRIAXone (ROCEPHIN) 1,000 mg in sodium chloride 0.9 % 50 mL IVPB (mini-bag)  1,000 mg IntraVENous Q24H Milagro Auguste MD   Stopped at 03/04/23 2138        Hospital problems     Patient Active Problem List   Diagnosis    Alcohol dependence ([address]    Diabetic peripheral neuropathy ([address]    Essential hypertension    Hyperlipidemia    Type 2 diabetes mellitus ([address]    Diabetic ulcer of both feet associated with type 2 diabetes mellitus ([address]    Hypoalbuminemia    Stage 3a chronic kidney disease ([address]    Iron deficiency anemia    Normocytic anemia    Cirrhosis of liver with ascites ([address]    Moderate protein malnutrition ([facility]    Anasarca        Signed By: Katelynn Camacho MD     March 5, 2023

## 2023-03-06 ENCOUNTER — APPOINTMENT (OUTPATIENT)
Dept: INTERVENTIONAL RADIOLOGY/VASCULAR | Age: 58
End: 2023-03-06
Payer: MEDICARE

## 2023-03-06 LAB
ALBUMIN SERPL-MCNC: 1.6 G/DL (ref 3.5–5)
ALBUMIN/GLOB SERPL: 0.3 (ref 0.4–1.6)
ALP SERPL-CCNC: 208 U/L (ref 50–136)
ALT SERPL-CCNC: 15 U/L (ref 12–65)
ANION GAP SERPL CALC-SCNC: 7 MMOL/L (ref 2–11)
AST SERPL-CCNC: 22 U/L (ref 15–37)
BASOPHILS # BLD: 0.1 K/UL (ref 0–0.2)
BASOPHILS NFR BLD: 1 % (ref 0–2)
BILIRUB SERPL-MCNC: 0.4 MG/DL (ref 0.2–1.1)
BUN SERPL-MCNC: 31 MG/DL (ref 6–23)
CALCIUM SERPL-MCNC: 8.2 MG/DL (ref 8.3–10.4)
CHLORIDE SERPL-SCNC: 100 MMOL/L (ref 101–110)
CO2 SERPL-SCNC: 23 MMOL/L (ref 21–32)
CREAT SERPL-MCNC: 2.2 MG/DL (ref 0.8–1.5)
DIFFERENTIAL METHOD BLD: ABNORMAL
EOSINOPHIL # BLD: 0.2 K/UL (ref 0–0.8)
EOSINOPHIL NFR BLD: 1 % (ref 0.5–7.8)
ERYTHROCYTE [DISTWIDTH] IN BLOOD BY AUTOMATED COUNT: 15.6 % (ref 11.9–14.6)
GLOBULIN SER CALC-MCNC: 5.4 G/DL (ref 2.8–4.5)
GLUCOSE BLD STRIP.AUTO-MCNC: 104 MG/DL (ref 65–100)
GLUCOSE BLD STRIP.AUTO-MCNC: 126 MG/DL (ref 65–100)
GLUCOSE BLD STRIP.AUTO-MCNC: 155 MG/DL (ref 65–100)
GLUCOSE BLD STRIP.AUTO-MCNC: 193 MG/DL (ref 65–100)
GLUCOSE BLD STRIP.AUTO-MCNC: 209 MG/DL (ref 65–100)
GLUCOSE SERPL-MCNC: 82 MG/DL (ref 65–100)
HCT VFR BLD AUTO: 23.1 % (ref 41.1–50.3)
HGB BLD-MCNC: 7.6 G/DL (ref 13.6–17.2)
IMM GRANULOCYTES # BLD AUTO: 0.2 K/UL (ref 0–0.5)
IMM GRANULOCYTES NFR BLD AUTO: 1 % (ref 0–5)
LYMPHOCYTES # BLD: 2.7 K/UL (ref 0.5–4.6)
LYMPHOCYTES NFR BLD: 16 % (ref 13–44)
MCH RBC QN AUTO: 25.5 PG (ref 26.1–32.9)
MCHC RBC AUTO-ENTMCNC: 32.9 G/DL (ref 31.4–35)
MCV RBC AUTO: 77.5 FL (ref 82–102)
MONOCYTES # BLD: 2.1 K/UL (ref 0.1–1.3)
MONOCYTES NFR BLD: 13 % (ref 4–12)
NEUTS SEG # BLD: 11.7 K/UL (ref 1.7–8.2)
NEUTS SEG NFR BLD: 69 % (ref 43–78)
NRBC # BLD: 0 K/UL (ref 0–0.2)
PLATELET # BLD AUTO: 271 K/UL (ref 150–450)
PMV BLD AUTO: 11 FL (ref 9.4–12.3)
POTASSIUM SERPL-SCNC: 4 MMOL/L (ref 3.5–5.1)
PROT SERPL-MCNC: 7 G/DL (ref 6.3–8.2)
RBC # BLD AUTO: 2.98 M/UL (ref 4.23–5.6)
SERVICE CMNT-IMP: ABNORMAL
SODIUM SERPL-SCNC: 130 MMOL/L (ref 133–143)
WBC # BLD AUTO: 17 K/UL (ref 4.3–11.1)

## 2023-03-06 PROCEDURE — 82962 GLUCOSE BLOOD TEST: CPT

## 2023-03-06 PROCEDURE — 97165 OT EVAL LOW COMPLEX 30 MIN: CPT

## 2023-03-06 PROCEDURE — 80053 COMPREHEN METABOLIC PANEL: CPT

## 2023-03-06 PROCEDURE — 6360000002 HC RX W HCPCS: Performed by: FAMILY MEDICINE

## 2023-03-06 PROCEDURE — 85025 COMPLETE CBC W/AUTO DIFF WBC: CPT

## 2023-03-06 PROCEDURE — 97535 SELF CARE MNGMENT TRAINING: CPT

## 2023-03-06 PROCEDURE — 2500000003 HC RX 250 WO HCPCS: Performed by: PHYSICIAN ASSISTANT

## 2023-03-06 PROCEDURE — G0378 HOSPITAL OBSERVATION PER HR: HCPCS

## 2023-03-06 PROCEDURE — 97161 PT EVAL LOW COMPLEX 20 MIN: CPT

## 2023-03-06 PROCEDURE — 6360000002 HC RX W HCPCS: Performed by: INTERNAL MEDICINE

## 2023-03-06 PROCEDURE — 2580000003 HC RX 258: Performed by: FAMILY MEDICINE

## 2023-03-06 PROCEDURE — 99213 OFFICE O/P EST LOW 20 MIN: CPT | Performed by: NURSE PRACTITIONER

## 2023-03-06 PROCEDURE — 6370000000 HC RX 637 (ALT 250 FOR IP): Performed by: FAMILY MEDICINE

## 2023-03-06 PROCEDURE — 97530 THERAPEUTIC ACTIVITIES: CPT

## 2023-03-06 PROCEDURE — 49083 ABD PARACENTESIS W/IMAGING: CPT

## 2023-03-06 PROCEDURE — 36415 COLL VENOUS BLD VENIPUNCTURE: CPT

## 2023-03-06 RX ORDER — SODIUM BICARBONATE 650 MG/1
TABLET ORAL
Qty: 60 TABLET | Refills: 0 | OUTPATIENT
Start: 2023-03-06

## 2023-03-06 RX ORDER — FUROSEMIDE 40 MG/1
TABLET ORAL
Qty: 60 TABLET | Refills: 0 | OUTPATIENT
Start: 2023-03-06

## 2023-03-06 RX ORDER — LIDOCAINE HYDROCHLORIDE 20 MG/ML
INJECTION, SOLUTION INFILTRATION; PERINEURAL
Status: COMPLETED | OUTPATIENT
Start: 2023-03-06 | End: 2023-03-06

## 2023-03-06 RX ADMIN — SODIUM BICARBONATE 650 MG: 650 TABLET ORAL at 09:35

## 2023-03-06 RX ADMIN — SODIUM BICARBONATE 650 MG: 650 TABLET ORAL at 22:05

## 2023-03-06 RX ADMIN — CARVEDILOL 12.5 MG: 12.5 TABLET, FILM COATED ORAL at 16:45

## 2023-03-06 RX ADMIN — HEPARIN SODIUM 5000 UNITS: 5000 INJECTION INTRAVENOUS; SUBCUTANEOUS at 22:04

## 2023-03-06 RX ADMIN — POLYETHYLENE GLYCOL 3350 17 G: 17 POWDER, FOR SOLUTION ORAL at 09:35

## 2023-03-06 RX ADMIN — OXYCODONE 5 MG: 5 TABLET ORAL at 09:47

## 2023-03-06 RX ADMIN — CARVEDILOL 12.5 MG: 12.5 TABLET, FILM COATED ORAL at 09:35

## 2023-03-06 RX ADMIN — MORPHINE SULFATE 4 MG: 4 INJECTION, SOLUTION INTRAMUSCULAR; INTRAVENOUS at 13:29

## 2023-03-06 RX ADMIN — SODIUM CHLORIDE, PRESERVATIVE FREE 5 ML: 5 INJECTION INTRAVENOUS at 09:48

## 2023-03-06 RX ADMIN — CEFTRIAXONE 1000 MG: 1 INJECTION, POWDER, FOR SOLUTION INTRAMUSCULAR; INTRAVENOUS at 21:30

## 2023-03-06 RX ADMIN — SODIUM CHLORIDE, PRESERVATIVE FREE 10 ML: 5 INJECTION INTRAVENOUS at 22:05

## 2023-03-06 RX ADMIN — MORPHINE SULFATE 4 MG: 4 INJECTION, SOLUTION INTRAMUSCULAR; INTRAVENOUS at 04:58

## 2023-03-06 RX ADMIN — PANTOPRAZOLE SODIUM 40 MG: 40 TABLET, DELAYED RELEASE ORAL at 04:59

## 2023-03-06 RX ADMIN — HEPARIN SODIUM 5000 UNITS: 5000 INJECTION INTRAVENOUS; SUBCUTANEOUS at 13:30

## 2023-03-06 RX ADMIN — LIDOCAINE HYDROCHLORIDE 200 MG: 20 INJECTION, SOLUTION INFILTRATION; PERINEURAL at 08:16

## 2023-03-06 RX ADMIN — PANTOPRAZOLE SODIUM 40 MG: 40 TABLET, DELAYED RELEASE ORAL at 16:45

## 2023-03-06 RX ADMIN — FUROSEMIDE 40 MG: 10 INJECTION, SOLUTION INTRAMUSCULAR; INTRAVENOUS at 09:35

## 2023-03-06 ASSESSMENT — PAIN SCALES - WONG BAKER: WONGBAKER_NUMERICALRESPONSE: 0

## 2023-03-06 ASSESSMENT — PAIN DESCRIPTION - LOCATION
LOCATION: GROIN
LOCATION: GROIN
LOCATION: GROIN;SCROTUM
LOCATION: GROIN
LOCATION: GROIN;SCROTUM
LOCATION: GROIN

## 2023-03-06 ASSESSMENT — PAIN SCALES - GENERAL
PAINLEVEL_OUTOF10: 8
PAINLEVEL_OUTOF10: 10
PAINLEVEL_OUTOF10: 8
PAINLEVEL_OUTOF10: 6
PAINLEVEL_OUTOF10: 10
PAINLEVEL_OUTOF10: 0
PAINLEVEL_OUTOF10: 0
PAINLEVEL_OUTOF10: 10
PAINLEVEL_OUTOF10: 0
PAINLEVEL_OUTOF10: 10

## 2023-03-06 ASSESSMENT — PAIN DESCRIPTION - DESCRIPTORS
DESCRIPTORS: ACHING;DISCOMFORT
DESCRIPTORS: ACHING
DESCRIPTORS: STABBING;BURNING
DESCRIPTORS: ACHING

## 2023-03-06 ASSESSMENT — PAIN - FUNCTIONAL ASSESSMENT: PAIN_FUNCTIONAL_ASSESSMENT: PREVENTS OR INTERFERES SOME ACTIVE ACTIVITIES AND ADLS

## 2023-03-06 ASSESSMENT — PAIN DESCRIPTION - ORIENTATION
ORIENTATION: MID
ORIENTATION: MID

## 2023-03-06 NOTE — OR NURSING
TRANSFER - OUT REPORT:           Verbal report given to Leticia Valle RN on Ana Velásquez  being transferred to room 708 for  Transfer [id number]}            Report consisted of patients Situation, Background, Assessment and      Recommendations(SBAR). Information from the following report(s) SBAR, Procedure Summary and MAR was reviewed with the receiving nurse. Opportunity for questions and clarification was provided. Pt tolerated procedure well.          [id number] and nontender    VITALS:  BP (!) 155/91   Pulse 91   Temp 98.5 °F (36.9 °C) (Oral)   Resp 16   Ht 5' 11\" (1.803 m)   Wt 213 lb (96.6 kg)   SpO2 96%   BMI 29.71 kg/m²

## 2023-03-06 NOTE — THERAPY EVALUATION
ACUTE PHYSICAL THERAPY GOALS:   (Developed with and agreed upon by patient and/or caregiver.)    (1.) Alphia Burkitt  will move from supine to sit and sit to supine  with INDEPENDENT within 7 treatment day(s). (2.) Alphia Burkitt will transfer from bed to chair and chair to bed with MODIFIED INDEPENDENCE using the least restrictive device within 7 treatment day(s). (3.) Alphia Burkitt will ambulate with MODIFIED INDEPENDENCE for 500 feet with the least restrictive device within 7 treatment day(s). (4.) Alphia Burkitt will perform standing static and dynamic balance activities x 15 minutes with MODIFIED INDEPENDENCE to improve safety within 7 treatment day(s). (5.) Alphia Burkitt will ascend and descend 2 stairs using unilateral hand rail(s) with MODIFIED INDEPENDENCE to improve functional mobility and safety within 7 treatment day(s). PHYSICAL THERAPY Initial Assessment, Daily Note, and AM  (Link to Caseload Tracking: PT Visit Days : 1  Acknowledge Orders  Time In/Out  PT Charge Capture  Rehab Caseload Tracker    Alphia Burkitt is a 63 y.o. male   PRIMARY DIAGNOSIS: Anasarca  Penile swelling [N48.89]  Anasarca [R60.1]  Hypoalbuminemia [E88.09]  Urinary tract infection without hematuria, site unspecified [N39.0]       Reason for Referral: Generalized Muscle Weakness (M62.81)  Difficulty in walking, Not elsewhere classified (R26.2)  Observation: Payor: [employer]ICARE / Plan: [employer] / Product Type: *No Product type* /     ASSESSMENT:     REHAB RECOMMENDATIONS:   Recommendation to date pending progress:  Setting:  Home Health Therapy    Equipment:    None     ASSESSMENT:  Mr. Yesenia Anguiano  is a 63year old male who presents with penile swelling and anasarca. Paracentesis performed earlier today. Pt was recently discharged from [facility] home with HHPT and family. This date pt performs mobility including transfers with Josy and amb x250ft with RW and CGA.  Pt presents as functioning below his baseline, with deficits in mobility including transfers, gait, balance, and activity tolerance. Pt will benefit from skilled therapy services to address stated deficits to promote return to highest level of function, independence, and safety. Will continue to follow.      [address] [facility] [address] [address] AM-PAC 6 Clicks Basic Mobility Inpatient Short Form  AM-PAC Basic Mobility - Inpatient   How much help is needed turning from your back to your side while in a flat bed without using bedrails?: A Little  How much help is needed moving from lying on your back to sitting on the side of a flat bed without using bedrails?: A Little  How much help is needed moving to and from a bed to a chair?: A Little  How much help is needed standing up from a chair using your arms?: A Little  How much help is needed walking in hospital room?: A Little  How much help is needed climbing 3-5 steps with a railing?: A Little  [facility] 18  AM-PAC Inpatient T-Scale Score : 43.63  Mobility Inpatient CMS 0-100% Score: 46.58  Mobility Inpatient CMS G-Code Modifier : CK    SUBJECTIVE:   Mr. Roxy Golden states, \"My wife and nephew take turns taking care of me\"     Social/Functional Lives With: Spouse  Type of Home: House  Home Layout: One level  Entrance Stairs - Number of Steps: 2  Bathroom Toilet: Bedside commode  Home Equipment: Александр Bearden, [address] Help From: Family  ADL Assistance: Needs assistance  Toileting: Needs assistance  Homemaking Assistance: Needs assistance  Ambulation Assistance: Needs assistance  Transfer Assistance: Needs assistance  Active : No  Patient's  Info: family  Occupation: On disability    OBJECTIVE:     PAIN: Gaylyn Nam / O2: PRECAUTION / Lavena Doug / DRAINS:   Pre Treatment: Groin pain, no number stated         Post Treatment: Same Vitals    VSS    Oxygen   Room air   None    RESTRICTIONS/PRECAUTIONS:  Restrictions/Precautions: Fall Risk                 GROSS EVALUATION: Intact Impaired (Comments):   AROM [x]     PROM [x]    Strength []  BLE strength grossly 3+/5   Balance []  Good seated, fair+ standing balance   Sensation [x]     Coordination [x]      Tone [x]     Edema [x]    Activity Tolerance []  Limited by groin pain    []      COGNITION/  PERCEPTION: Intact Impaired (Comments):   Orientation [x]     Vision [x]     Hearing [x]     Cognition  [x]       MOBILITY: I Mod I S SBA CGA Min Mod Max Total  NT x2 Comments: seated in recliner upon PT entry   Bed Mobility    Rolling [] [] [] [] [] [] [] [] [] [x] []    Supine to Sit [] [] [] [] [] [] [] [] [] [x] []    Scooting [] [] [] [] [] [] [] [] [] [x] []    Sit to Supine [] [] [] [] [] [] [] [] [] [x] []    Transfers    Sit to Stand [] [] [] [] [] [x] [] [] [] [] []    Bed to Chair [] [] [] [] [] [] [] [] [] [x] []    Stand to Sit [] [] [] [] [x] [] [] [] [] [] []     [] [] [] [] [] [] [] [] [] [] []    I=Independent, Mod I=Modified Independent, S=Supervision, SBA=Standby Assistance, CGA=Contact Guard Assistance,   Min=Minimal Assistance, Mod=Moderate Assistance, Max=Maximal Assistance, Total=Total Assistance, NT=Not Tested    GAIT: I Mod I S SBA CGA Min Mod Max Total  NT x2 Comments:   Level of Assistance [] [] [] [] [x] [] [] [] [] [] []    Distance 250  feet    DME Gait Belt and Rolling Walker    Gait Quality Decreased jason , Decreased step clearance, Decreased step length, Trunk flexion, and Trunk sway increased    Weightbearing Status Restrictions/Precautions  Restrictions/Precautions: Fall Risk    Stairs      I=Independent, Mod I=Modified Independent, S=Supervision, SBA=Standby Assistance, CGA=Contact Guard Assistance,   Min=Minimal Assistance, Mod=Moderate Assistance, Max=Maximal Assistance, Total=Total Assistance, NT=Not Tested    PLAN:   FREQUENCY AND DURATION: 3 times/week for duration of hospital stay or until stated goals are met, whichever comes first.    THERAPY PROGNOSIS: Excellent    PROBLEM LIST: (Skilled intervention is medically necessary to address:)  Decreased ADL/Functional Activities  Decreased Activity Tolerance  Decreased Balance  Decreased Gait Ability  Decreased Strength  Decreased Transfer Abilities  Increased Pain INTERVENTIONS PLANNED:   (Benefits and precautions of physical therapy have been discussed with the patient.)  Therapeutic Activity  Therapeutic Exercise/HEP  Neuromuscular Re-education  Gait Training  Education       TREATMENT:   EVALUATION: LOW COMPLEXITY: (Untimed Charge)    AFTER TREATMENT PRECAUTIONS: Bed/Chair Locked, Call light within reach, Chair, and Needs within reach    INTERDISCIPLINARY COLLABORATION:  RN/ PCT    EDUCATION: Education Given To: Patient  Education Provided: Role of Therapy;Plan of Care  Education Outcome: Verbalized understanding    TIME IN/OUT:  Time In: 1206  Time Out: 1218  Minutes: [facility],

## 2023-03-06 NOTE — WOUND CARE
Patient seen for left posterior lower leg wound and buttock skin. Sacrum clear. Small area at top and to left of cleft that is a linear split 0.7cm long. Silicone foam in use and recommend adding zinc barrier cream as patient is moist and wears disposable brief. Left leg has pink healing wound. Using hydrofera blue at home, not on formulary here. Cleaned and silicone foam placed. Hospitalist came in and stated patient may go home later today. Instructed patient that he can resume his hydrofera blue when back at home. All questions answered.

## 2023-03-06 NOTE — PROGRESS NOTES
TRANSFER - IN REPORT:    Verbal report received from Letha Marquez RN on Glorious Duty  being received from IR for routine progression of patient care      Report consisted of patient's Situation, Background, Assessment and   Recommendations(SBAR). Information from the following report(s) Nurse Handoff Report was reviewed with the receiving nurse. Opportunity for questions and clarification was provided. Assessment completed upon patient's arrival to unit and care assumed.

## 2023-03-06 NOTE — OR NURSING
Interventional Radiology Post Paracentesis/Thoracentesis Note    3/6/2023    Procedure(s): Ultrasound guided Therapeutic Paracentesis Performed with 8 Latvian catheter total volume 4800 ml. Preliminary Findings: medium clear and yellow. Complications: None    Plan:  Observation, check labs if drawn.           Chest X-Ray:  no    Full dictated report to follow

## 2023-03-06 NOTE — PROGRESS NOTES
ACUTE OCCUPATIONAL THERAPY GOALS:   (Developed with and agreed upon by patient and/or caregiver.)  1. Pt will toilet with SBA   2. Pt will complete functional mobility for ADLs with SBA using AD   3. Pt will complete lower body dressing with SBA using AE as needed  4. Pt will complete grooming and hygiene at sink with SBA  5. Pt will demonstrate independence with HEP to promote increased BUE strength and functional use for ADLs    Timeframe: 7 days      OCCUPATIONAL THERAPY Initial Assessment and Daily Note       OT Visit Days: 1  Acknowledge Orders  Time  OT Charge Capture  Rehab Caseload Tracker      Shannon Torres is a 63 y.o. male   PRIMARY DIAGNOSIS: Anasarca  Penile swelling [N48.89]  Anasarca [R60.1]  Hypoalbuminemia [E88.09]  Urinary tract infection without hematuria, site unspecified [N39.0]       Reason for Referral: Generalized Muscle Weakness (M62.81)  Observation: Payor: [employer] MEDICARE / Plan: [employer] CHOICE-[employer]O MEDICARE / Product Type: *No Product type* /     ASSESSMENT:     REHAB RECOMMENDATIONS:   Recommendation to date pending progress:  Setting:  Home Health Therapy    Equipment:    None     ASSESSMENT:  Mr. Connie Ley presented with generalized weakness, LE and scrotal edema, and deficits in strength, endurance, mobility, and balance impacting ADLs. Pt required mod A for LB dressing d/t edema and discomfort, min A for functional mobility using RW. Pt demonstrated limited endurance d/t pain and fatigue. Pt is below his functional baseline and would benefit from skilled OT services to address deficits.       [employer] AM-PAC 6 Clicks Daily Activity Inpatient Short Form:    AM-PAC Daily Activity - Inpatient   How much help is needed for putting on and taking off regular lower body clothing?: A Lot  How much help is needed for bathing (which includes washing, rinsing, drying)?: A Little  How much help is needed for toileting (which includes using toilet, bedpan, or urinal)?: A Little  How much help is needed for putting on and taking off regular upper body clothing?: None  How much help is needed for taking care of personal grooming?: None  How much help for eating meals?: None  AM-PAC Inpatient Daily Activity Raw Score: 20  AM-PAC Inpatient ADL T-Scale Score : 42.03  ADL Inpatient CMS 0-100% Score: 38.32  ADL Inpatient CMS G-Code Modifier : CJ           SUBJECTIVE:     Mr. Valdes [place] states, \"I've never had pain like this. \"     Social/Functional Lives With: Spouse  Type of Home: House  Home Layout: One level  Entrance Stairs - Number of Steps: 2  Bathroom Shower/Tub: Tub/Shower unit  Bathroom Toilet: Bedside commode  Bathroom Equipment: Toilet raiser  Home Equipment: [place], rolling  Receives Help From: Family  ADL Assistance: Needs assistance  Toileting: Needs assistance  Homemaking Assistance: Needs assistance  Ambulation Assistance: Needs assistance  Transfer Assistance: Needs assistance  Active : No  Patient's  Info: family  Occupation: On disability  Additional Comments: Normally independent w/ ADLs, has recently required assistance w/ LB ADLs d/t edema. Lives w/ wife and nephew. Has been using BSC d/t decreased mobility.     OBJECTIVE:     Marilyn Stout / Renetta Grand / AIRWAY: NA    RESTRICTIONS/PRECAUTIONS:  Restrictions/Precautions: Fall Risk    PAIN: VITALS / O2:   Pre Treatment: 7, groin, RN aware and pre-medicated         Post Treatment: 7 (same)       Vitals          Oxygen            GROSS EVALUATION: INTACT IMPAIRED   (See Comments)   UE AROM [x] []   UE PROM [] []   Strength [x]  BUE WFL     Posture / Balance []  CGA-Min A w / RW   Sensation []     Coordination []       Tone []       Edema []    Activity Tolerance []  Poor; limited by pain and fatigue     Hand Dominance R [] L []      COGNITION/  PERCEPTION: INTACT IMPAIRED   (See Comments)   Orientation [x]     Vision []     Hearing []     Cognition  []     Perception []       MOBILITY: I Mod I S SBA CGA Min Mod Max Total  NT x2 Comments:   Bed Mobility    Rolling [] [] [] [] [] [] [] [] [] [] []    Supine to Sit [] [] [] [] [] [] [] [] [] [] []    Scooting [] [] [] [] [] [] [] [] [] [] []    Sit to Supine [] [] [] [] [] [] [] [] [] [] []    Transfers    Sit to Stand [] [] [] [] [] [x] [x] [] [] [] []    Bed to Chair [] [] [] [] [] [x] [] [] [] [] []    Stand to Sit [] [] [] [] [x] [] [] [] [] [] []    Tub/Shower [] [] [] [] [] [] [] [] [] [] []     Toilet [] [] [] [] [] [] [] [] [] [] []      [] [] [] [] [] [] [] [] [] [] []    I=Independent, Mod I=Modified Independent, S=Supervision/Setup, SBA=Standby Assistance, CGA=Contact Guard Assistance, Min=Minimal Assistance, Mod=Moderate Assistance, Max=Maximal Assistance, Total=Total Assistance, NT=Not Tested    ACTIVITIES OF DAILY LIVING: I Mod I S SBA CGA Min Mod Max Total NT Comments   BASIC ADLs:              Upper Body Bathing  [] [] [] [] [] [] [] [] [] []    Lower Body Bathing [] [] [] [] [] [] [] [] [] []    Toileting [] [] [] [] [] [] [] [] [] []    Upper Body Dressing [] [] [] [] [] [] [] [] [] []    Lower Body Dressing [] [] [] [] [] [] [x] [] [] []    Feeding [] [] [] [] [] [] [] [] [] []    Grooming [] [] [] [] [x] [] [] [] [] []    Personal Device Care [] [] [] [] [] [] [] [] [] []    Functional Mobility [] [] [] [] [] [x] [] [] [] []    I=Independent, Mod I=Modified Independent, S=Supervision/Setup, SBA=Standby Assistance, CGA=Contact Guard Assistance, Min=Minimal Assistance, Mod=Moderate Assistance, Max=Maximal Assistance, Total=Total Assistance, NT=Not Tested    PLAN:   FREQUENCY/DURATION   OT Plan of Care: 3 times/week for duration of hospital stay or until stated goals are met, whichever comes first.    PROBLEM LIST:   (Skilled intervention is medically necessary to address:)  Decreased ADL/Functional Activities  Decreased Activity Tolerance  Decreased Balance  Decreased Strength  Decreased Transfer Abilities  Increased Pain   INTERVENTIONS PLANNED:  (Benefits and precautions of occupational therapy have been discussed with the patient.)  Self Care Training  Therapeutic Activity  Therapeutic Exercise/HEP  Neuromuscular Re-education  Manual Therapy  Education         TREATMENT:     EVALUATION: LOW COMPLEXITY: (Untimed Charge)    TREATMENT:   Therapeutic Activity (10 Minutes): Patient participated in therapeutic activities including functional transfer training, functional mobility of household distances, and standing tolerance activity with minimal assistance, verbal cues, and adaptive equipment in order to increase independence, increase activity tolerance, decrease assistance required, and prepare for discharge home.     TREATMENT GRID:  N/A    AFTER TREATMENT PRECAUTIONS: Call light within reach, Chair, Needs within reach, and RN notified    INTERDISCIPLINARY COLLABORATION:  RN/ PCT    EDUCATION:  Education Given To: Patient  Education Provided: Role of Therapy;Plan of Care    TOTAL TREATMENT DURATION AND TIME:  Time In: 1102  Time Out: 1120  Minutes: 18    Maria Luz Washington OT

## 2023-03-06 NOTE — BRIEF OP NOTE
Department of Interventional Radiology  [phone]        Interventional Radiology Brief Procedure Note    Patient: Providence Favre MRN: [id number]  SSN: [id number]    YOB: 1965  Age: 63 y.o.   Sex: male      Date of Procedure: 3/6/2023    Pre-Procedure Diagnosis: ascites    Post-Procedure Diagnosis: SAME    Procedure(s): Paracentesis    Brief Description of Procedure: US guided paracentesis, 4800 ml thin yellow fluid removed    Performed By: Fior Javier PA-C     Assistants: None    Anesthesia:Lidocaine    Estimated Blood Loss: None    Specimens:  None    Implants:  None    Findings: large volume ascites     Complications: None    Recommendations: routine wound care     Follow Up: prn    Signed By: Fior Javier PA-C     March 6, 2023

## 2023-03-06 NOTE — CONSULTS
Palliative Care    Patient: Ammon Medeiros MRN: [id number]  SSN: [id number]    YOB: 1965  Age: 63 y.o. Sex: male       Date of Request: 3/3/2023  Date of Consult:  3/6/2023  Reason for Consult:  goals of care  Requesting Physician: Dr Helene Waddell      Assessment/Plan:     Principal Diagnosis:    Edema  R60.9    Additional Diagnoses:   Fatigue, Lethargy  R53.83  Counseling, Encounter for Medical Advice  Z71.9  Encounter for Palliative Care  Z51.5    Palliative Performance Scale (PPS):       Medical Decision Making:   Reviewed and summarized notes from admission to present  Discussed case with appropriate providers. Reviewed laboratory and x-ray data from admission to present     Pt resting in recliner, appears comfortable. No visitors at bedside. Introduced role of PC and reviewed events. Pt has fair understanding of his condition. He states he is feeling a lot better after the paracentesis, and voiced hope that the fluid would not return. Counseled on the chronic and progressive nature of cirrhosis, and the importance of taking medications as prescribed and seeing his physicians regularly. Pt voiced understanding. He states he is okay with returning to the hospital when needed, and is okay with aggressive interventions such as CPR/life support. Pt is , and plans on returning home at discharge. He states the doctor told him he may be able to go home today. No further PC needs identified, we will not plan to follow.      Will discuss findings with members of the interdisciplinary team.      Thank you for this referral.          .    Subjective:     History obtained from:  Patient and Chart    Chief Complaint: Acute decompensated liver cirrhosis  History of Present Illness:  Mr Adolfo Vila is a 64 yo male with PMH of alcoholic cirrhosis, HLD, HTN, PAD, PUD, CKD, DM, and other conditions listed below, who presented to the ER from home on 3/3/2023 with c/o swelling of his scrotum and penis for several days. Pt denied  fever, chills, n/v/d, dyspnea. Work up revealed anasarca, hypoalbuminemia, and ascites. Pt was admitted for further management. He was evaluated by IG and IR, and underwent paracentesis today, with 4800 ml of fluid removed. He reports he is feeling better. Advance Directive: No       Code Status:  Full Code            Health Care Power of : No - Patient does not have a [address] Past Medical History:   Diagnosis Date    Alcoholic cirrhosis of liver ([facility] [address]     2022 in the hopsital dx    Anemia     Gastroesophageal reflux disease with esophagitis and hemorrhage     High serum ferritin 11/27/2019    HLD (hyperlipidemia)     Hyperplastic colon polyp     Hypertension     Obesity     PAD (peripheral artery disease) ([facility] [address] 9/2/2022    PUD (peptic ulcer disease)     Stage 3 chronic kidney disease due to diabetes mellitus ([facility] [address]     Dx in hospital    Type 2 diabetes mellitus with diabetic polyneuropathy, with long-term current use of insulin (HCC)     Ulcer of the duodenum caused by bacteria (H. pylori)     Upper GI bleed 03/28/2016    Last Assessment & Plan:  Formatting of this note might be different from the original. Status post EGD yesterday which revealed esophagitis, gastric and duodenal ulcers Continue PPI, GI follow-up. Monitor hemoglobin Avoid and states Biopsy results- pending      Past Surgical History:   Procedure Laterality Date    COLONOSCOPY      ESOPHAGOGASTRODUODENOSCOPY      UPPER GASTROINTESTINAL ENDOSCOPY N/A 9/6/2022    EGD ESOPHAGOGASTRODUODENOSCOPY/ Rm 215 performed by Stephania Schirmer, MD at [facility] ENDOSCOPY     Family History   Problem Relation Age of Onset    Hypertension Sister     Diabetes Neg Hx       Social History     Tobacco Use    Smoking status: Never    Smokeless tobacco: Never   Substance Use Topics    Alcohol use: Yes     Prior to Admission medications    Medication Sig Start Date End Date Taking?  Authorizing Provider midodrine (PROAMATINE) 2.5 MG tablet Take 1 tablet by mouth 3 times daily (with meals)  Patient not taking: Reported on 3/4/2023 2/20/23 3/22/23  Francesco Harrington Ala, MD   folic acid (FOLVITE) 1 MG tablet TAKE 1 TABLET BY MOUTH EVERY DAY  Patient not taking: No sig reported 2/2/23   Irwin Paez MD   sodium bicarbonate 650 MG tablet TAKE 2 TABLETS BY MOUTH TWICE A DAY 2/2/23   Irwin Paez MD   oxyCODONE (ROXICODONE) 5 MG immediate release tablet oxycodone 5 mg tablet   TAKE 1 TABLET BY MOUTH EVERY 8 HOURS AS NEEDED FOR PAIN FOR UPTO 3 DAYS  Patient not taking: No sig reported    Historical Provider, MD   furosemide (LASIX) 40 MG tablet Take 1 tablet by mouth 2 times daily 1/25/23   Irwin Paez MD   pantoprazole (PROTONIX) 40 MG tablet Take 1 tablet by mouth 2 times daily (before meals) 1/25/23   Irwin Paez MD   insulin glargine (LANTUS SOLOSTAR) 100 UNIT/ML injection pen Inject 10 Units into the skin daily 1/25/23   Irwin Paez MD   carvedilol (COREG) 25 MG tablet Take 0.5 tablets by mouth 2 times daily (with meals) 1/11/23   Irwin Paez MD   cholestyramine light 4 g packet Take 1 packet by mouth 2 times daily  Patient not taking: No sig reported 1/11/23   Irwin Paez MD   cyanocobalamin 1000 MCG tablet Take 1 tablet by mouth daily  Patient not taking: No sig reported 1/11/23   Irwin Paez MD   Blood Pressure Monitoring (BLOOD PRESSURE CUFF) MISC 1 Units by Does not apply route daily  Patient not taking: No sig reported 1/11/23   Irwin Paez MD   acetaminophen (TYLENOL) 325 MG tablet Take 650 mg by mouth every 6 hours as needed for Pain. Historical Provider, MD   naloxone 4 MG/0.1ML LIQD nasal spray 1 SPRAY INTO A NOSTRIL AS NEEDED FOR OPIOID OVERDOSE.   Patient not taking: No sig reported 9/8/22   Historical Provider, MD   metoprolol tartrate (LOPRESSOR) 50 MG tablet metoprolol tartrate 50 mg tablet 3/30/16 9/26/22  Historical Provider, MD   calcium carb-cholecalciferol 250-125 MG-UNIT TABS Take 1 tablet by mouth daily  Patient not taking: No sig reported 9/8/22   Jayy Garcia MD   glucose monitoring (FREESTYLE FREEDOM) kit 1 kit by Does not apply route daily Check glucose twice daily  Patient not taking: Reported on 3/4/2023 9/7/22   Jayy Garcia MD       No Known Allergies     Review of Systems:  A comprehensive review of systems was negative except for:   Constitutional: Positive for fatigue. Genitourinary: Positive for scrotal/penile edema  Musculoskeletal: Positive for BLE     Objective:     Visit Vitals  BP (!) 155/91   Pulse 91   Temp 98.5 °F (36.9 °C) (Oral)   Resp 16   Ht 5' 11\" (1.803 m)   Wt 213 lb (96.6 kg)   SpO2 96%   BMI 29.71 kg/m²        Physical Exam:    General:  Cooperative. No acute distress. Eyes:  Conjunctivae/corneas clear    Nose: Nares normal. Septum midline. Neck: Supple, symmetrical, trachea midline   Lungs:   Clear to auscultation bilaterally, unlabored   Heart:  Regular rate and rhythm   Abdomen:   Soft, non-tender, non-distended   Extremities: Normal, atraumatic, no cyanosis.  BLE edema   Skin: Skin color, texture, turgor normal.    Neurologic: Nonfocal   Psych: Alert and oriented      Assessment:     [unfilled]    Signed By: DESIRE Maharaj - CNP     March 6, 2023

## 2023-03-06 NOTE — PROGRESS NOTES
Progress Note    Patient: Alphia Burkitt MRN: [id number]  SSN: [id number]    YOB: 1965  Age: 63 y.o. Sex: male      Admit Date: 3/3/2023    LOS: 0 days     Assessment and Plan:   65 y.o. male with medical history of cirrhosis of the liver with ascites, diabetes type 2, hypoalbuminemia, CKD 3, who presented with worsening swelling     1. Anasarca in the setting of acute decompensated liver cirrhosis and concerns of SBP  Continue Lasix  Monitor volume status  Strict input and output  Daily weights  Continue ceftriaxone  Albumin as needed  Monitor LFTs  Monitor CBC  IR consulted for paracentesis  Gastroenterology recommendations appreciated    2. Acute kidney injury on CKD stage III   Continue diuresis  Avoid nephrotoxic agents  Monitor renal function  No radiologic findings of hydronephrosis    3. Hypokalemia  Repleted  Monitor potassium levels    4. Hypomagnesemia  Repleted  Monitor magnesium levels    5. Microcytic anemia likely in setting of chronic disease  Currently no signs of active bleed  Monitor H&H  Transfuse if hemoglobin lower than 7    6. Diabetes mellitus  Basal insulin  Insulin sliding scale    7. Hyponatremia  Monitor Na levels   Avoid rapid correction     DVT prophylaxis with heparin subcu    Subjective:   65 y.o. male with medical history of cirrhosis of the liver with ascites, diabetes type 2, hypoalbuminemia, CKD 3, who presented with worsening swelling. Patient seen and examined at bedside. This morning feeling a little tired. Denies any nausea or vomiting. Objective:     Vitals:    03/06/23 0826 03/06/23 0846 03/06/23 0859 03/06/23 0938   BP: (!) 170/106 (!) 156/100 (!) 155/91 (!) 147/93   Pulse: 91 91 91 92   Resp: 16 16 16 19   Temp:    100 °F (37.8 °C)   TempSrc:    Oral   SpO2: 100% 97% 96% 99%   Weight:       Height:            Intake and Output:  Current Shift: No intake/output data recorded.   Last three shifts: 03/04 1901 - 03/06 0700  In: -   Out: 410 [Urine:410]    ROS  10 ROS negative except from stated on subjective    Physical Exam:   General: Alert, oriented, NAD  HEENT: NC/AT, EOM are intact  Neck: supple, no JVD  Cardiovascular: RRR, S1, S2, no murmurs  Respiratory: Lungs are clear, no wheezes or rales  Abdomen: Soft, NT, distended  Back: No CVA tenderness, no paraspinal tenderness  Extremities: Bilateral lower extremity edema  Neuro: A&O, CN are intact, no focal deficits  Skin: no rash or ulcers  Psych: good mood and affect    Lab/Data Review:  I have personally reviewed patients laboratory data showing  Recent Results (from the past 24 hour(s))   POCT Glucose    Collection Time: 03/05/23  4:07 PM   Result Value Ref Range    POC Glucose 120 (H) 65 - 100 mg/dL    Performed by: Camacho    POCT Glucose    Collection Time: 03/05/23  9:27 PM   Result Value Ref Range    POC Glucose 152 (H) 65 - 100 mg/dL    Performed by: MellolasDeandraPCT    POCT Glucose    Collection Time: 03/06/23  2:52 AM   Result Value Ref Range    POC Glucose 126 (H) 65 - 100 mg/dL    Performed by: BenDouglasDeandraPCT    POCT Glucose    Collection Time: 03/06/23  6:03 AM   Result Value Ref Range    POC Glucose 104 (H) 65 - 100 mg/dL    Performed by: PatrickumDouglasDeandraPCT    CBC with Auto Differential    Collection Time: 03/06/23  9:47 AM   Result Value Ref Range    WBC 17.0 (H) 4.3 - 11.1 K/uL    RBC 2.98 (L) 4.23 - 5.6 M/uL    Hemoglobin 7.6 (L) 13.6 - 17.2 g/dL    Hematocrit 23.1 (L) 41.1 - 50.3 %    MCV 77.5 (L) 82 - 102 FL    MCH 25.5 (L) 26.1 - 32.9 PG    MCHC 32.9 31.4 - 35.0 g/dL    RDW 15.6 (H) 11.9 - 14.6 %    Platelets 047 610 - 975 K/uL    MPV 11.0 9.4 - 12.3 FL    nRBC 0.00 0.0 - 0.2 K/uL    Differential Type AUTOMATED      Seg Neutrophils 69 43 - 78 %    Lymphocytes 16 13 - 44 %    Monocytes 13 (H) 4.0 - 12.0 %    Eosinophils % 1 0.5 - 7.8 %    Basophils 1 0.0 - 2.0 %    Immature Granulocytes 1 0.0 - 5.0 %    Segs Absolute 11.7 (H) 1.7 - 8.2 K/UL    Absolute Lymph # 2.7 0.5 - 4.6 K/UL    Absolute Mono # 2.1 (H) 0.1 - 1.3 K/UL    Absolute Eos # 0.2 0.0 - 0.8 K/UL    Basophils Absolute 0.1 0.0 - 0.2 K/UL    Absolute Immature Granulocyte 0.2 0.0 - 0.5 K/UL   Comprehensive Metabolic Panel    Collection Time: 03/06/23  9:47 AM   Result Value Ref Range    Sodium 130 (L) 133 - 143 mmol/L    Potassium 4.0 3.5 - 5.1 mmol/L    Chloride 100 (L) 101 - 110 mmol/L    CO2 23 21 - 32 mmol/L    Anion Gap 7 2 - 11 mmol/L    Glucose 82 65 - 100 mg/dL    BUN 31 (H) 6 - 23 MG/DL    Creatinine 2.20 (H) 0.8 - 1.5 MG/DL    Est, Glom Filt Rate 34 (L) >60 ml/min/1.73m2    Calcium 8.2 (L) 8.3 - 10.4 MG/DL    Total Bilirubin 0.4 0.2 - 1.1 MG/DL    ALT 15 12 - 65 U/L    AST 22 15 - 37 U/L    Alk Phosphatase 208 (H) 50 - 136 U/L    Total Protein 7.0 6.3 - 8.2 g/dL    Albumin 1.6 (L) 3.5 - 5.0 g/dL    Globulin 5.4 (H) 2.8 - 4.5 g/dL    Albumin/Globulin Ratio 0.3 (L) 0.4 - 1.6          Image:  I have personally reviewed patients imaging showing  CT PELVIS WO CONTRAST Additional Contrast? None   Final Result   1. Marked ascites and anasarca. This could reflect liver or renal failure, for    example. 2.  Diffuse arterial atherosclerosis could be related to underlying renal    disease.        Amilcar Putnam M.D.    3/3/2023 8:25:00 PM      IR [address] [employer]    (Results Pending)        Current Facility-Administered Medications   Medication Dose Route Frequency Provider Last Rate Last Admin    carvedilol (COREG) tablet 12.5 mg  12.5 mg Oral BID  Wm Hdz MD   12.5 mg at 03/06/23 0935    [Held by provider] insulin glargine (LANTUS) injection vial 10 Units  10 Units SubCUTAneous Daily Wm Hdz MD   10 Units at 03/04/23 0857    [Held by provider] midodrine (PROAMATINE) tablet 2.5 mg  2.5 mg Oral TID WC Wm Hdz MD   2.5 mg at 03/04/23 0854    pantoprazole (PROTONIX) tablet 40 mg  40 mg Oral BID AC Wm Hdz MD   40 mg at 03/06/23 0459    sodium bicarbonate tablet 650 mg  650 mg Oral BID Sera Haynes MD   650 mg at 03/06/23 0935    sodium chloride flush 0.9 % injection 5-40 mL  5-40 mL IntraVENous 2 times per day Sera Haynes MD   5 mL at 03/06/23 0948    sodium chloride flush 0.9 % injection 5-40 mL  5-40 mL IntraVENous PRN Sera Haynes MD        0.9 % sodium chloride infusion   IntraVENous PRN Sera Haynes MD        potassium chloride (KLOR-CON M) extended release tablet 40 mEq  40 mEq Oral PRN Sera Haynes MD        Or    potassium bicarb-citric acid (EFFER-K) effervescent tablet 40 mEq  40 mEq Oral PRN Sera Haynes MD        Or    potassium chloride 10 mEq/100 mL IVPB (Peripheral Line)  10 mEq IntraVENous PRN Sera Haynes MD        magnesium sulfate 2000 mg in 50 mL IVPB premix  2,000 mg IntraVENous PRN Sera Haynes MD        promethazine (PHENERGAN) tablet 12.5 mg  12.5 mg Oral Q6H PRN Sera Haynes MD        Or    ondansetron [facility]) injection 4 mg  4 mg IntraVENous Q6H PRN Sera Haynes MD        polyethylene glycol [facility]) packet 17 g  17 g Oral Daily Sera Haynes MD   17 g at 03/06/23 0935    bisacodyl (DULCOLAX) EC tablet 5 mg  5 mg Oral Daily PRN Sera Haynes MD        aluminum & magnesium hydroxide-simethicone (MAALOX) 616-385-22 MG/5ML suspension 30 mL  30 mL Oral Q6H PRN Sera Haynes MD        acetaminophen (TYLENOL) tablet 650 mg  650 mg Oral Q6H PRN Sera Haynes MD   650 mg at 03/04/23 0044    Or    acetaminophen (TYLENOL) suppository 650 mg  650 mg Rectal Q6H PRN Sera Haynes MD        heparin (porcine) injection 5,000 Units  5,000 Units SubCUTAneous 3 times per day Sera Haynes MD   5,000 Units at 03/05/23 2130    oxyCODONE (ROXICODONE) immediate release tablet 5 mg  5 mg Oral Q4H PRN Sera Haynes MD   5 mg at 03/06/23 0947    morphine injection 4 mg  4 mg IntraVENous Q4H PRN Sera Haynes MD   4 mg at 03/06/23 0458    furosemide (LASIX) injection 40 mg  40 mg IntraVENous Daily Brandon Mcmahan MD   40 mg at 03/06/23 0935    insulin lispro (HUMALOG) injection vial 0-8 Units  0-8 Units SubCUTAneous TID WC Guerrero Shields MD        insulin lispro (HUMALOG) injection vial 0-4 Units  0-4 Units SubCUTAneous Nightly Guerrero Shields MD        glucose chewable tablet 16 g  4 tablet Oral PRN Guerrero Shields MD   16 g at 03/04/23 2105    dextrose bolus 10% 125 mL  125 mL IntraVENous PRN Guerrero Shields MD        Or    dextrose bolus 10% 250 mL  250 mL IntraVENous PRN Guerrero Shields MD   Stopped at 03/05/23 0313    glucagon (rDNA) injection 1 mg  1 mg SubCUTAneous PRN Guerrero Shields MD        dextrose 10 % infusion   IntraVENous Continuous PRN Guerrero Shields MD        cefTRIAXone (ROCEPHIN) 1,000 mg in sodium chloride 0.9 % 50 mL IVPB (mini-bag)  1,000 mg IntraVENous Q24H Guerrero hSields MD   Stopped at 03/05/23 2151        Hospital problems     Patient Active Problem List   Diagnosis    Alcohol dependence ([address]    Diabetic peripheral neuropathy ([address]    Essential hypertension    Hyperlipidemia    Type 2 diabetes mellitus ([address]    Diabetic ulcer of both feet associated with type 2 diabetes mellitus ([address]    Hypoalbuminemia    Stage 3a chronic kidney disease ([address]    Iron deficiency anemia    Normocytic anemia    Cirrhosis of liver with ascites ([address]    Moderate protein malnutrition ([address]    Anasarca        Signed By: Brandon Mcmahan MD     March 6, 2023

## 2023-03-07 VITALS
WEIGHT: 210.1 LBS | TEMPERATURE: 99.3 F | SYSTOLIC BLOOD PRESSURE: 118 MMHG | BODY MASS INDEX: 29.41 KG/M2 | RESPIRATION RATE: 18 BRPM | OXYGEN SATURATION: 100 % | HEIGHT: 71 IN | DIASTOLIC BLOOD PRESSURE: 84 MMHG | HEART RATE: 79 BPM

## 2023-03-07 LAB
ANION GAP SERPL CALC-SCNC: 6 MMOL/L (ref 2–11)
BACTERIA SPEC CULT: ABNORMAL
BACTERIA SPEC CULT: ABNORMAL
BUN SERPL-MCNC: 28 MG/DL (ref 6–23)
CALCIUM SERPL-MCNC: 7.9 MG/DL (ref 8.3–10.4)
CHLORIDE SERPL-SCNC: 100 MMOL/L (ref 101–110)
CO2 SERPL-SCNC: 23 MMOL/L (ref 21–32)
CREAT SERPL-MCNC: 2.1 MG/DL (ref 0.8–1.5)
ERYTHROCYTE [DISTWIDTH] IN BLOOD BY AUTOMATED COUNT: 15.5 % (ref 11.9–14.6)
GLUCOSE BLD STRIP.AUTO-MCNC: 147 MG/DL (ref 65–100)
GLUCOSE BLD STRIP.AUTO-MCNC: 183 MG/DL (ref 65–100)
GLUCOSE SERPL-MCNC: 132 MG/DL (ref 65–100)
HCT VFR BLD AUTO: 21.3 % (ref 41.1–50.3)
HGB BLD-MCNC: 7 G/DL (ref 13.6–17.2)
MCH RBC QN AUTO: 24.9 PG (ref 26.1–32.9)
MCHC RBC AUTO-ENTMCNC: 32.9 G/DL (ref 31.4–35)
MCV RBC AUTO: 75.8 FL (ref 82–102)
NRBC # BLD: 0 K/UL (ref 0–0.2)
PLATELET # BLD AUTO: 206 K/UL (ref 150–450)
PMV BLD AUTO: 9.9 FL (ref 9.4–12.3)
POTASSIUM SERPL-SCNC: 3.8 MMOL/L (ref 3.5–5.1)
RBC # BLD AUTO: 2.81 M/UL (ref 4.23–5.6)
SERVICE CMNT-IMP: ABNORMAL
SODIUM SERPL-SCNC: 129 MMOL/L (ref 133–143)
WBC # BLD AUTO: 15.6 K/UL (ref 4.3–11.1)

## 2023-03-07 PROCEDURE — 6370000000 HC RX 637 (ALT 250 FOR IP): Performed by: FAMILY MEDICINE

## 2023-03-07 PROCEDURE — G0378 HOSPITAL OBSERVATION PER HR: HCPCS

## 2023-03-07 PROCEDURE — 85027 COMPLETE CBC AUTOMATED: CPT

## 2023-03-07 PROCEDURE — 6360000002 HC RX W HCPCS: Performed by: FAMILY MEDICINE

## 2023-03-07 PROCEDURE — 6360000002 HC RX W HCPCS: Performed by: INTERNAL MEDICINE

## 2023-03-07 PROCEDURE — 80048 BASIC METABOLIC PNL TOTAL CA: CPT

## 2023-03-07 PROCEDURE — 36415 COLL VENOUS BLD VENIPUNCTURE: CPT

## 2023-03-07 PROCEDURE — 82962 GLUCOSE BLOOD TEST: CPT

## 2023-03-07 PROCEDURE — 2580000003 HC RX 258: Performed by: FAMILY MEDICINE

## 2023-03-07 RX ORDER — CIPROFLOXACIN 250 MG/1
250 TABLET, FILM COATED ORAL 2 TIMES DAILY
Qty: 6 TABLET | Refills: 0 | Status: SHIPPED | OUTPATIENT
Start: 2023-03-07 | End: 2023-03-10

## 2023-03-07 RX ADMIN — SODIUM BICARBONATE 650 MG: 650 TABLET ORAL at 08:01

## 2023-03-07 RX ADMIN — SODIUM CHLORIDE, PRESERVATIVE FREE 10 ML: 5 INJECTION INTRAVENOUS at 08:01

## 2023-03-07 RX ADMIN — FUROSEMIDE 40 MG: 10 INJECTION, SOLUTION INTRAMUSCULAR; INTRAVENOUS at 08:00

## 2023-03-07 RX ADMIN — ACETAMINOPHEN 650 MG: 325 TABLET ORAL at 08:00

## 2023-03-07 RX ADMIN — HEPARIN SODIUM 5000 UNITS: 5000 INJECTION INTRAVENOUS; SUBCUTANEOUS at 05:52

## 2023-03-07 RX ADMIN — PANTOPRAZOLE SODIUM 40 MG: 40 TABLET, DELAYED RELEASE ORAL at 05:52

## 2023-03-07 RX ADMIN — POLYETHYLENE GLYCOL 3350 17 G: 17 POWDER, FOR SOLUTION ORAL at 08:00

## 2023-03-07 RX ADMIN — CARVEDILOL 12.5 MG: 12.5 TABLET, FILM COATED ORAL at 08:00

## 2023-03-07 NOTE — CARE COORDINATION
Pt is medically cleared for dc to home today. [facility] nursing and therapy services with [facility] [place] will resume. Pt will also have Palliative Care follow up with [facility] and Palliative Care. No other dc needs or concerns identified or reported. CM remains available to assist as needed. 03/05/23 4745   Service Assessment   Patient Orientation Alert and Oriented   Cognition Alert   History Provided By Patient;Significant Other;Child/Family   Primary [place] Spouse/Significant Other;Family Members;Home Care Staff   Patient's Healthcare Decision Maker is: Legal Next of Mariana 70   PCP Verified by CM Yes   Last Visit to PCP Within last 3 months  (2/27/2023)   Prior Functional Level Assistance with the following:;Bathing;Dressing; Toileting;Feeding;Cooking;Housework   Current Functional Level Assistance with the following:;Bathing;Dressing; Toileting;Feeding;Cooking;Housework   Can patient return to prior living arrangement Yes   Ability to make needs known: Good   Family able to assist with home care needs: Yes   Would you like for me to discuss the discharge plan with any other family members/significant others, and if so, who? Yes  (spouse)   Financial Resources Medicare   Community Resources ECF/Home Care; Other (Comment)  ([facility])   Social/Functional History   Lives With Spouse   Type of [address] One level   Entrance Stairs - Number of Steps 2   Bathroom Toilet Bedside commode   Home Equipment Cane;Walker, rolling   Receives Help From Family   ADL Assistance Needs assistance   Toileting Needs assistance   Homemaking Assistance Needs assistance   Ambulation Assistance Needs assistance   Transfer Assistance Needs assistance   Active  No   Patient's  Info family   Occupation On disability   Discharge Planning   Type of Residence House   Living Arrangements Spouse/Significant Other   Current Services Prior To Admission Home Care;Durable Medical Equipment; Other (Comment)  (palliative care)   Current DME Prior to [address]; Other (Comment)  (palliative care)   DME Ordered? No   Potential Assistance Purchasing Medications No   Type of Home Care Services Nursing Services   Patient expects to be discharged to: House   One/Two Story Residence One story   History of falls? 0   Services At/After Discharge   Transition of Care Consult (CM Consult) Home Health; Other  ([address] [place] [address])   Internal [address] Discharge Home Health;Nursing services   [facility] Information Provided? No   Mode of Transport at Discharge Other (see comment)  (family)   Confirm Follow Up Transport Family   Condition of Participation: Discharge Planning   The Plan for Transition of Care is related to the following treatment goals: Home health nursing and therapy services to support pt's care and recovery in the home. The Patient and/or Patient Representative was provided with a Choice of Provider? Patient   The Patient and/Or Patient Representative agree with the Discharge Plan? Yes   Freedom of Choice list was provided with basic dialogue that supports the patient's individualized plan of care/goals, treatment preferences, and shares the quality data associated with the providers?   No  (pt was current with [facility] prior to admission and wishes to remain with this agency.)

## 2023-03-07 NOTE — DISCHARGE SUMMARY
Date of Admission: 3/3/2023  Date of Discharge: 3/7/2023    Discharge Diagnoses:  Principal Problem:    Anasarca  Active Problems:    Type 2 diabetes mellitus (HCC)    Hypoalbuminemia    Cirrhosis of liver with ascites (HCC)    Moderate protein malnutrition (HCC)    Stage 3a chronic kidney disease ([facility]  Resolved Problems:    * No resolved hospital problems. *       Discharge Medications:  Discharge Medication List as of 3/7/2023  9:53 AM        START taking these medications    Details   ciprofloxacin (CIPRO) 250 MG tablet Take 1 tablet by mouth 2 times daily for 3 days, Disp-6 tablet, R-0Normal           CONTINUE these medications which have NOT CHANGED    Details   sodium bicarbonate 650 MG tablet TAKE 2 TABLETS BY MOUTH TWICE A DAY, Disp-60 tablet, R-0Normal      oxyCODONE (ROXICODONE) 5 MG immediate release tablet oxycodone 5 mg tablet   TAKE 1 TABLET BY MOUTH EVERY 8 HOURS AS NEEDED FOR PAIN FOR UPTO 3 DAYSHistorical Med      furosemide (LASIX) 40 MG tablet Take 1 tablet by mouth 2 times daily, Disp-60 tablet, R-0Normal      pantoprazole (PROTONIX) 40 MG tablet Take 1 tablet by mouth 2 times daily (before meals), Disp-30 tablet, R-0Normal      insulin glargine (LANTUS SOLOSTAR) 100 UNIT/ML injection pen Inject 10 Units into the skin daily, Disp-5 Adjustable Dose Pre-filled Pen Syringe, R-0Normal      carvedilol (COREG) 25 MG tablet Take 0.5 tablets by mouth 2 times daily (with meals), Disp-60 tablet, R-3Normal      Blood Pressure Monitoring (BLOOD PRESSURE CUFF) MISC DAILY Starting Wed 1/11/2023, Disp-1 each, R-0, Normal      acetaminophen (TYLENOL) 325 MG tablet Take 650 mg by mouth every 6 hours as needed for Pain. Historical Med      naloxone 4 MG/0.1ML LIQD nasal spray 1 SPRAY INTO A NOSTRIL AS NEEDED FOR OPIOID OVERDOSE. Historical Med      glucose monitoring (FREESTYLE FREEDOM) kit DAILY Starting Wed 9/7/2022, Disp-1 kit, R-0, PrintCheck glucose twice daily           STOP taking these medications midodrine (PROAMATINE) 2.5 MG tablet Comments:   Reason for Stopping:         folic acid (FOLVITE) 1 MG tablet Comments:   Reason for Stopping:         cholestyramine light 4 g packet Comments:   Reason for Stopping:         cyanocobalamin 1000 MCG tablet Comments:   Reason for Stopping:         calcium carb-cholecalciferol 250-125 MG-UNIT TABS Comments:   Reason for Stopping:               Pending Labs:  None    Follow-up (including scheduled tests):  PMD    History of Present Illness:  65 y.o. male with medical history of cirrhosis of the liver with ascites, diabetes type 2, hypoalbuminemia, CKD 3, who presented with worsening swelling particularly of scrotum and penis. Patient was recently admitted for the same. His albumin is 1.5. This is a chronic problem. ER asked that we admit him. They have given him IV Lasix without much urine output. They did discuss the case with urology and recommendation was for diuresis. At this point, he does not have urinary obstruction. He denies fever/chills, NVD, abdominal pain, chest pain, shortness of breath. He does report that he is unable to take a good deep breath due to his ascites. He cannot remember his last paracentesis but thinks it was a couple months ago.     Past Medical History:  Past Medical History:   Diagnosis Date    Alcoholic cirrhosis of liver ([facility]     2022 in the hopsital dx    Anemia     Gastroesophageal reflux disease with esophagitis and hemorrhage     High serum ferritin 11/27/2019    HLD (hyperlipidemia)     Hyperplastic colon polyp     Hypertension     Obesity     PAD (peripheral artery disease) ([facility] 9/2/2022    PUD (peptic ulcer disease)     Stage 3 chronic kidney disease due to diabetes mellitus ([facility]     Dx in hospital    Type 2 diabetes mellitus with diabetic polyneuropathy, with long-term current use of insulin (HCC)     Ulcer of the duodenum caused by bacteria (H. pylori)     Upper GI bleed 03/28/2016    Last Assessment & Plan: Formatting of this note might be different from the original. Status post EGD yesterday which revealed esophagitis, gastric and duodenal ulcers Continue PPI, GI follow-up. Monitor hemoglobin Avoid and states Biopsy results- pending       Allergies:  No Known Allergies    Hospital Course:  65 y.o. male with medical history of cirrhosis of the liver with ascites, diabetes type 2, hypoalbuminemia, CKD 3, who presented with worsening swelling      1. Anasarca in the setting of acute decompensated liver cirrhosis and concerns of SBP  Started Lasix  Started ceftriaxone  GI consulted  S/p paracentesis  Improved symptomatology  Hemodynamically stable on discharge     2.   Acute kidney injury on CKD stage III   Started on Lasix  Improved renal function    Procedures:  None    Discharge Day Information:  Follow with PMD    Discharge Physical Exam:  General: Alert, oriented, NAD  HEENT: NC/AT, EOM are intact  Neck: supple, no JVD  Cardiovascular: RRR, S1, S2, no murmurs  Respiratory: Lungs are clear, no wheezes or rales  Abdomen: Soft, NT, ND  Back: No CVA tenderness, no paraspinal tenderness  Extremities: LE without pedal edema, no erythema  Neuro: A&O, CN are intact, no focal deficits  Skin: no rash or ulcers  Psych: good mood and affect    Recent Results (from the past 24 hour(s))   POCT Glucose    Collection Time: 03/06/23  4:19 PM   Result Value Ref Range    POC Glucose 193 (H) 65 - 100 mg/dL    Performed by: Abraham Amaral    POCT Glucose    Collection Time: 03/06/23  8:33 PM   Result Value Ref Range    POC Glucose 209 (H) 65 - 100 mg/dL    Performed by: Scarlet(PCT)DARINEL    POCT Glucose    Collection Time: 03/07/23  6:11 AM   Result Value Ref Range    POC Glucose 147 (H) 65 - 100 mg/dL    Performed by: Scarlet(PCT)SNE    CBC    Collection Time: 03/07/23  6:58 AM   Result Value Ref Range    WBC 15.6 (H) 4.3 - 11.1 K/uL    RBC 2.81 (L) 4.23 - 5.6 M/uL    Hemoglobin 7.0 (L) 13.6 - 17.2 g/dL    Hematocrit 21.3 (L) 41.1 - 50.3 %    MCV 75.8 (L) 82 - 102 FL    MCH 24.9 (L) 26.1 - 32.9 PG    MCHC 32.9 31.4 - 35.0 g/dL    RDW 15.5 (H) 11.9 - 14.6 %    Platelets 628 572 - 168 K/uL    MPV 9.9 9.4 - 12.3 FL    nRBC 0.00 0.0 - 0.2 K/uL   Basic Metabolic Panel    Collection Time: 03/07/23  6:58 AM   Result Value Ref Range    Sodium 129 (L) 133 - 143 mmol/L    Potassium 3.8 3.5 - 5.1 mmol/L    Chloride 100 (L) 101 - 110 mmol/L    CO2 23 21 - 32 mmol/L    Anion Gap 6 2 - 11 mmol/L    Glucose 132 (H) 65 - 100 mg/dL    BUN 28 (H) 6 - 23 MG/DL    Creatinine 2.10 (H) 0.8 - 1.5 MG/DL    Est, Glom Filt Rate 36 (L) >60 ml/min/1.73m2    Calcium 7.9 (L) 8.3 - 10.4 MG/DL   POCT Glucose    Collection Time: 03/07/23 10:53 AM   Result Value Ref Range    POC Glucose 183 (H) 65 - 100 mg/dL    Performed by: Radha VEGA US GUIDED PARACENTESIS   Final Result   Uncomplicated ultrasound guided paracentesis. CT PELVIS WO CONTRAST Additional Contrast? None   Final Result   1. Marked ascites and anasarca. This could reflect liver or renal failure, for    example. 2.  Diffuse arterial atherosclerosis could be related to underlying renal    disease.        Rocael Lundberg M.D.    3/3/2023 8:25:00 PM           Condition: Improved    Disposition: Home    Consultants During This Hospitalization: GI, IR            Spent 31 minutes on discharge services

## 2023-03-07 NOTE — PLAN OF CARE
Problem: Discharge Planning  Goal: Discharge to home or other facility with appropriate resources  3/6/2023 2253 by Na Jonas RN  Outcome: Progressing  3/6/2023 1037 by Yogi Olivares RN  Outcome: Progressing  Flowsheets (Taken 3/6/2023 0715)  Discharge to home or other facility with appropriate resources: Identify barriers to discharge with patient and caregiver     Problem: Pain  Goal: Verbalizes/displays adequate comfort level or baseline comfort level  3/6/2023 2253 by Na Jonas RN  Outcome: Progressing  3/6/2023 1037 by Yogi Olivares RN  Outcome: Progressing     Problem: Skin/Tissue Integrity  Goal: Absence of new skin breakdown  Description: 1.  Monitor for areas of redness and/or skin breakdown  2.  Assess vascular access sites hourly  3.  Every 4-6 hours minimum:  Change oxygen saturation probe site  4.  Every 4-6 hours:  If on nasal continuous positive airway pressure, respiratory therapy assess nares and determine need for appliance change or resting period.  3/6/2023 2253 by Na Jonas RN  Outcome: Progressing  3/6/2023 1037 by Yogi Olivares RN  Outcome: Progressing     Problem: Safety - Adult  Goal: Free from fall injury  3/6/2023 2253 by Na Jonas RN  Outcome: Progressing  3/6/2023 1037 by Yogi Olivares RN  Outcome: Progressing  Flowsheets (Taken 3/6/2023 0715)  Free From Fall Injury: Instruct family/caregiver on patient safety     Problem: Chronic Conditions and Co-morbidities  Goal: Patient's chronic conditions and co-morbidity symptoms are monitored and maintained or improved  3/6/2023 2253 by Na Jonas RN  Outcome: Progressing  3/6/2023 1037 by Yogi Olivares RN  Outcome: Progressing  Flowsheets (Taken 3/6/2023 0715)  Care Plan - Patient's Chronic Conditions and Co-Morbidity Symptoms are Monitored and Maintained or Improved: Monitor and assess patient's chronic conditions and comorbid symptoms for stability, deterioration, or improvement

## 2023-03-09 ENCOUNTER — HOME CARE VISIT (OUTPATIENT)
Dept: SCHEDULING | Facility: HOME HEALTH | Age: 58
End: 2023-03-09

## 2023-03-09 ENCOUNTER — TELEPHONE (OUTPATIENT)
Dept: FAMILY MEDICINE CLINIC | Facility: CLINIC | Age: 58
End: 2023-03-09

## 2023-03-09 VITALS
SYSTOLIC BLOOD PRESSURE: 128 MMHG | OXYGEN SATURATION: 97 % | DIASTOLIC BLOOD PRESSURE: 67 MMHG | RESPIRATION RATE: 18 BRPM | HEART RATE: 80 BPM | TEMPERATURE: 97.9 F

## 2023-03-09 PROCEDURE — G0299 HHS/HOSPICE OF RN EA 15 MIN: HCPCS

## 2023-03-09 ASSESSMENT — ENCOUNTER SYMPTOMS
DYSPNEA ACTIVITY LEVEL: AFTER AMBULATING 10 - 20 FT
PAIN LOCATION - PAIN QUALITY: ACHING

## 2023-03-11 ENCOUNTER — HOME CARE VISIT (OUTPATIENT)
Dept: SCHEDULING | Facility: HOME HEALTH | Age: 58
End: 2023-03-11

## 2023-03-11 VITALS
TEMPERATURE: 98 F | HEART RATE: 70 BPM | DIASTOLIC BLOOD PRESSURE: 65 MMHG | OXYGEN SATURATION: 97 % | SYSTOLIC BLOOD PRESSURE: 105 MMHG | WEIGHT: 206 LBS | BODY MASS INDEX: 28.73 KG/M2 | RESPIRATION RATE: 18 BRPM

## 2023-03-11 PROCEDURE — G0299 HHS/HOSPICE OF RN EA 15 MIN: HCPCS

## 2023-03-11 ASSESSMENT — ENCOUNTER SYMPTOMS: PAIN LOCATION - PAIN QUALITY: ACHING

## 2023-03-13 ENCOUNTER — HOME CARE VISIT (OUTPATIENT)
Dept: HOME HEALTH SERVICES | Facility: HOME HEALTH | Age: 58
End: 2023-03-13
Payer: MEDICARE

## 2023-03-15 ENCOUNTER — HOME CARE VISIT (OUTPATIENT)
Dept: SCHEDULING | Facility: HOME HEALTH | Age: 58
End: 2023-03-15
Payer: MEDICARE

## 2023-03-15 VITALS
HEART RATE: 78 BPM | OXYGEN SATURATION: 97 % | TEMPERATURE: 97.9 F | RESPIRATION RATE: 17 BRPM | DIASTOLIC BLOOD PRESSURE: 74 MMHG | SYSTOLIC BLOOD PRESSURE: 124 MMHG

## 2023-03-15 PROCEDURE — G0299 HHS/HOSPICE OF RN EA 15 MIN: HCPCS

## 2023-03-15 ASSESSMENT — ENCOUNTER SYMPTOMS
STOOL DESCRIPTION: FORMED
DYSPNEA ACTIVITY LEVEL: AFTER AMBULATING 10 - 20 FT
PAIN LOCATION - PAIN QUALITY: SORE

## 2023-03-17 ENCOUNTER — HOME CARE VISIT (OUTPATIENT)
Dept: SCHEDULING | Facility: HOME HEALTH | Age: 58
End: 2023-03-17
Payer: MEDICARE

## 2023-03-17 PROCEDURE — G0299 HHS/HOSPICE OF RN EA 15 MIN: HCPCS

## 2023-03-18 VITALS
DIASTOLIC BLOOD PRESSURE: 78 MMHG | TEMPERATURE: 98 F | SYSTOLIC BLOOD PRESSURE: 130 MMHG | OXYGEN SATURATION: 96 % | HEART RATE: 81 BPM | RESPIRATION RATE: 18 BRPM

## 2023-03-20 ENCOUNTER — HOME CARE VISIT (OUTPATIENT)
Dept: SCHEDULING | Facility: HOME HEALTH | Age: 58
End: 2023-03-20
Payer: MEDICARE

## 2023-03-20 ENCOUNTER — NURSE ONLY (OUTPATIENT)
Dept: FAMILY MEDICINE CLINIC | Facility: CLINIC | Age: 58
End: 2023-03-20

## 2023-03-20 DIAGNOSIS — E11.9 TYPE 2 DIABETES MELLITUS WITHOUT COMPLICATION, WITH LONG-TERM CURRENT USE OF INSULIN (HCC): ICD-10-CM

## 2023-03-20 DIAGNOSIS — N17.9 ACUTE KIDNEY INJURY SUPERIMPOSED ON CHRONIC KIDNEY DISEASE (HCC): ICD-10-CM

## 2023-03-20 DIAGNOSIS — N18.9 ACUTE KIDNEY INJURY SUPERIMPOSED ON CHRONIC KIDNEY DISEASE (HCC): ICD-10-CM

## 2023-03-20 DIAGNOSIS — Z79.4 TYPE 2 DIABETES MELLITUS WITHOUT COMPLICATION, WITH LONG-TERM CURRENT USE OF INSULIN (HCC): ICD-10-CM

## 2023-03-20 DIAGNOSIS — D50.9 IRON DEFICIENCY ANEMIA, UNSPECIFIED IRON DEFICIENCY ANEMIA TYPE: ICD-10-CM

## 2023-03-20 LAB
ALBUMIN SERPL-MCNC: 1.6 G/DL (ref 3.5–5)
ALBUMIN/GLOB SERPL: 0.3 (ref 0.4–1.6)
ALP SERPL-CCNC: 202 U/L (ref 50–136)
ALT SERPL-CCNC: 8 U/L (ref 12–65)
ANION GAP SERPL CALC-SCNC: 1 MMOL/L (ref 2–11)
AST SERPL-CCNC: 13 U/L (ref 15–37)
BASOPHILS # BLD: 0.1 K/UL (ref 0–0.2)
BASOPHILS NFR BLD: 0 % (ref 0–2)
BILIRUB SERPL-MCNC: 0.4 MG/DL (ref 0.2–1.1)
BUN SERPL-MCNC: 16 MG/DL (ref 6–23)
CALCIUM SERPL-MCNC: 8 MG/DL (ref 8.3–10.4)
CHLORIDE SERPL-SCNC: 105 MMOL/L (ref 101–110)
CO2 SERPL-SCNC: 27 MMOL/L (ref 21–32)
CREAT SERPL-MCNC: 1.4 MG/DL (ref 0.8–1.5)
DIFFERENTIAL METHOD BLD: ABNORMAL
EOSINOPHIL # BLD: 0.5 K/UL (ref 0–0.8)
EOSINOPHIL NFR BLD: 4 % (ref 0.5–7.8)
ERYTHROCYTE [DISTWIDTH] IN BLOOD BY AUTOMATED COUNT: 16.5 % (ref 11.9–14.6)
EST. AVERAGE GLUCOSE BLD GHB EST-MCNC: 120 MG/DL
GLOBULIN SER CALC-MCNC: 5 G/DL (ref 2.8–4.5)
GLUCOSE SERPL-MCNC: 178 MG/DL (ref 65–100)
HBA1C MFR BLD: 5.8 % (ref 4.8–5.6)
HCT VFR BLD AUTO: 21.9 % (ref 41.1–50.3)
HGB BLD-MCNC: 7 G/DL (ref 13.6–17.2)
IMM GRANULOCYTES # BLD AUTO: 0.1 K/UL (ref 0–0.5)
IMM GRANULOCYTES NFR BLD AUTO: 1 % (ref 0–5)
LYMPHOCYTES # BLD: 2.4 K/UL (ref 0.5–4.6)
LYMPHOCYTES NFR BLD: 17 % (ref 13–44)
MCH RBC QN AUTO: 25.5 PG (ref 26.1–32.9)
MCHC RBC AUTO-ENTMCNC: 32 G/DL (ref 31.4–35)
MCV RBC AUTO: 79.9 FL (ref 82–102)
MONOCYTES # BLD: 1.4 K/UL (ref 0.1–1.3)
MONOCYTES NFR BLD: 10 % (ref 4–12)
NEUTS SEG # BLD: 10.1 K/UL (ref 1.7–8.2)
NEUTS SEG NFR BLD: 69 % (ref 43–78)
NRBC # BLD: 0 K/UL (ref 0–0.2)
PLATELET # BLD AUTO: 297 K/UL (ref 150–450)
PMV BLD AUTO: 10.7 FL (ref 9.4–12.3)
POTASSIUM SERPL-SCNC: 4.2 MMOL/L (ref 3.5–5.1)
PROT SERPL-MCNC: 6.6 G/DL (ref 6.3–8.2)
RBC # BLD AUTO: 2.74 M/UL (ref 4.23–5.6)
SODIUM SERPL-SCNC: 133 MMOL/L (ref 133–143)
WBC # BLD AUTO: 14.6 K/UL (ref 4.3–11.1)

## 2023-03-20 PROCEDURE — G0299 HHS/HOSPICE OF RN EA 15 MIN: HCPCS

## 2023-03-21 VITALS
TEMPERATURE: 98.9 F | SYSTOLIC BLOOD PRESSURE: 126 MMHG | HEART RATE: 79 BPM | OXYGEN SATURATION: 97 % | DIASTOLIC BLOOD PRESSURE: 72 MMHG | RESPIRATION RATE: 15 BRPM

## 2023-03-21 ASSESSMENT — ENCOUNTER SYMPTOMS: DYSPNEA ACTIVITY LEVEL: AFTER AMBULATING LESS THAN 10 FT

## 2023-03-22 ENCOUNTER — HOME CARE VISIT (OUTPATIENT)
Dept: SCHEDULING | Facility: HOME HEALTH | Age: 58
End: 2023-03-22
Payer: MEDICARE

## 2023-03-22 PROCEDURE — G0299 HHS/HOSPICE OF RN EA 15 MIN: HCPCS

## 2023-03-23 VITALS
TEMPERATURE: 97.8 F | SYSTOLIC BLOOD PRESSURE: 118 MMHG | DIASTOLIC BLOOD PRESSURE: 64 MMHG | OXYGEN SATURATION: 95 % | HEART RATE: 68 BPM | RESPIRATION RATE: 18 BRPM

## 2023-03-24 ENCOUNTER — HOME CARE VISIT (OUTPATIENT)
Dept: SCHEDULING | Facility: HOME HEALTH | Age: 58
End: 2023-03-24
Payer: MEDICARE

## 2023-03-24 VITALS
TEMPERATURE: 98 F | HEART RATE: 81 BPM | RESPIRATION RATE: 18 BRPM | DIASTOLIC BLOOD PRESSURE: 72 MMHG | OXYGEN SATURATION: 95 % | SYSTOLIC BLOOD PRESSURE: 118 MMHG

## 2023-03-24 PROCEDURE — G0299 HHS/HOSPICE OF RN EA 15 MIN: HCPCS

## 2023-03-27 ENCOUNTER — HOME CARE VISIT (OUTPATIENT)
Dept: HOME HEALTH SERVICES | Facility: HOME HEALTH | Age: 58
End: 2023-03-27
Payer: MEDICARE

## 2023-03-27 ENCOUNTER — OFFICE VISIT (OUTPATIENT)
Dept: FAMILY MEDICINE CLINIC | Facility: CLINIC | Age: 58
End: 2023-03-27
Payer: MEDICARE

## 2023-03-27 VITALS
DIASTOLIC BLOOD PRESSURE: 82 MMHG | RESPIRATION RATE: 18 BRPM | BODY MASS INDEX: 28.84 KG/M2 | OXYGEN SATURATION: 99 % | HEIGHT: 71 IN | HEART RATE: 77 BPM | SYSTOLIC BLOOD PRESSURE: 131 MMHG | TEMPERATURE: 98 F | WEIGHT: 206 LBS

## 2023-03-27 DIAGNOSIS — E11.9 TYPE 2 DIABETES MELLITUS WITHOUT COMPLICATION, WITH LONG-TERM CURRENT USE OF INSULIN (HCC): ICD-10-CM

## 2023-03-27 DIAGNOSIS — Z79.4 TYPE 2 DIABETES MELLITUS WITHOUT COMPLICATION, WITH LONG-TERM CURRENT USE OF INSULIN (HCC): ICD-10-CM

## 2023-03-27 DIAGNOSIS — K70.31 ALCOHOLIC CIRRHOSIS OF LIVER WITH ASCITES (HCC): Primary | ICD-10-CM

## 2023-03-27 DIAGNOSIS — D50.9 IRON DEFICIENCY ANEMIA, UNSPECIFIED IRON DEFICIENCY ANEMIA TYPE: ICD-10-CM

## 2023-03-27 PROCEDURE — 3044F HG A1C LEVEL LT 7.0%: CPT | Performed by: FAMILY MEDICINE

## 2023-03-27 PROCEDURE — G8417 CALC BMI ABV UP PARAM F/U: HCPCS | Performed by: FAMILY MEDICINE

## 2023-03-27 PROCEDURE — 2022F DILAT RTA XM EVC RTNOPTHY: CPT | Performed by: FAMILY MEDICINE

## 2023-03-27 PROCEDURE — 3017F COLORECTAL CA SCREEN DOC REV: CPT | Performed by: FAMILY MEDICINE

## 2023-03-27 PROCEDURE — 3075F SYST BP GE 130 - 139MM HG: CPT | Performed by: FAMILY MEDICINE

## 2023-03-27 PROCEDURE — G8427 DOCREV CUR MEDS BY ELIG CLIN: HCPCS | Performed by: FAMILY MEDICINE

## 2023-03-27 PROCEDURE — 1036F TOBACCO NON-USER: CPT | Performed by: FAMILY MEDICINE

## 2023-03-27 PROCEDURE — 3079F DIAST BP 80-89 MM HG: CPT | Performed by: FAMILY MEDICINE

## 2023-03-27 PROCEDURE — G8484 FLU IMMUNIZE NO ADMIN: HCPCS | Performed by: FAMILY MEDICINE

## 2023-03-27 PROCEDURE — 99215 OFFICE O/P EST HI 40 MIN: CPT | Performed by: FAMILY MEDICINE

## 2023-03-27 RX ORDER — FUROSEMIDE 40 MG/1
40 TABLET ORAL 2 TIMES DAILY
Qty: 60 TABLET | Refills: 3 | Status: SHIPPED | OUTPATIENT
Start: 2023-03-27

## 2023-03-27 RX ORDER — SODIUM BICARBONATE 650 MG/1
1300 TABLET ORAL 2 TIMES DAILY
Qty: 60 TABLET | Refills: 3 | Status: SHIPPED | OUTPATIENT
Start: 2023-03-27

## 2023-03-27 ASSESSMENT — PATIENT HEALTH QUESTIONNAIRE - PHQ9
1. LITTLE INTEREST OR PLEASURE IN DOING THINGS: 0
SUM OF ALL RESPONSES TO PHQ QUESTIONS 1-9: 0
SUM OF ALL RESPONSES TO PHQ9 QUESTIONS 1 & 2: 0
2. FEELING DOWN, DEPRESSED OR HOPELESS: 0

## 2023-03-27 NOTE — PROGRESS NOTES
David Metro (: 1965) is a 63 y.o. male, established patient, here for evaluation of the following chief complaint(s):  Follow-up (2 month follow up//Pain in groin area)       ASSESSMENT/PLAN:  1. Alcoholic cirrhosis of liver with ascites (HCC)  -     furosemide (LASIX) 40 MG tablet; Take 1 tablet by mouth 2 times daily, Disp-60 tablet, R-3Normal  -     sodium bicarbonate 650 MG tablet; Take 2 tablets by mouth 2 times daily, Disp-60 tablet, R-3Normal  2. Type 2 diabetes mellitus without complication, with long-term current use of insulin ([address]  3. Iron deficiency anemia, unspecified iron deficiency anemia type    Discussed the scrotal swelling and how he needs to elevate, avoid high sodium diet, take diuretics as prescribed. Overall he does appear much better, much more alert overall well compared to his prior visits. Advised him to continue to follow along closely with gastroenterology. His A1c was much improved at 5.8, discussed potentially getting off insulin in the future. Will monitor closely for lows. Is only on 10 units of Lantus at this time. Kidney function was much improved on recent blood work. Iron deficiency anemia still quite significant, his hemoglobin was maintaining at 7. And he appeared well, more energetic today than in the past.  Vitals were stable. We will need to monitor this closely. Transfusion threshold is less than 7. Return in about 3 months (around 2023) for follow up DM2 with non fasting labs 1 week prior. SUBJECTIVE/OBJECTIVE:  HPI  71-year-old male with a history of hypertension, diabetes, alcoholic cirrhosis of the liver, stage III chronic kidney disease, anemia who presents for follow-up. He still having issues with groin pain as well as scrotal swelling. He was told there is nothing he can do about this. He is taking 10 units of his insulin daily, has had no low blood sugars. Denies any highs above 200.     Needs refills on his

## 2023-03-29 ENCOUNTER — HOME CARE VISIT (OUTPATIENT)
Dept: SCHEDULING | Facility: HOME HEALTH | Age: 58
End: 2023-03-29
Payer: MEDICARE

## 2023-03-29 PROCEDURE — G0299 HHS/HOSPICE OF RN EA 15 MIN: HCPCS

## 2023-03-29 NOTE — CASE COMMUNICATION
No Answer from pt after requested text to schedule visit. 3.27.28 omitted due to no response from pt.

## 2023-03-30 VITALS
HEART RATE: 65 BPM | RESPIRATION RATE: 18 BRPM | OXYGEN SATURATION: 98 % | TEMPERATURE: 98 F | SYSTOLIC BLOOD PRESSURE: 112 MMHG | DIASTOLIC BLOOD PRESSURE: 67 MMHG

## 2023-03-30 ASSESSMENT — ENCOUNTER SYMPTOMS: PAIN LOCATION - PAIN QUALITY: ACHING

## 2023-03-31 ENCOUNTER — HOME CARE VISIT (OUTPATIENT)
Dept: SCHEDULING | Facility: HOME HEALTH | Age: 58
End: 2023-03-31
Payer: MEDICARE

## 2023-03-31 PROCEDURE — G0299 HHS/HOSPICE OF RN EA 15 MIN: HCPCS

## 2023-04-01 VITALS — RESPIRATION RATE: 18 BRPM

## 2023-04-03 ENCOUNTER — HOME CARE VISIT (OUTPATIENT)
Dept: SCHEDULING | Facility: HOME HEALTH | Age: 58
End: 2023-04-03
Payer: MEDICARE

## 2023-04-03 VITALS
OXYGEN SATURATION: 97 % | HEART RATE: 71 BPM | DIASTOLIC BLOOD PRESSURE: 65 MMHG | RESPIRATION RATE: 18 BRPM | SYSTOLIC BLOOD PRESSURE: 115 MMHG | TEMPERATURE: 98.3 F

## 2023-04-03 PROCEDURE — G0299 HHS/HOSPICE OF RN EA 15 MIN: HCPCS

## 2023-04-04 ENCOUNTER — HOSPITAL ENCOUNTER (EMERGENCY)
Age: 58
Discharge: HOME | DRG: 871 | End: 2023-04-04
Attending: EMERGENCY MEDICINE
Payer: MEDICARE

## 2023-04-04 VITALS
BODY MASS INDEX: 28.45 KG/M2 | TEMPERATURE: 98.6 F | HEART RATE: 60 BPM | WEIGHT: 204 LBS | DIASTOLIC BLOOD PRESSURE: 94 MMHG | OXYGEN SATURATION: 100 % | SYSTOLIC BLOOD PRESSURE: 160 MMHG | RESPIRATION RATE: 18 BRPM

## 2023-04-04 DIAGNOSIS — N48.89 PENILE SWELLING: Primary | ICD-10-CM

## 2023-04-04 DIAGNOSIS — R60.1 ANASARCA: ICD-10-CM

## 2023-04-04 DIAGNOSIS — K70.30 ALCOHOLIC CIRRHOSIS, UNSPECIFIED WHETHER ASCITES PRESENT (HCC): ICD-10-CM

## 2023-04-04 LAB
ALBUMIN SERPL-MCNC: 1.7 G/DL (ref 3.5–5)
ALBUMIN/GLOB SERPL: 0.3 (ref 0.4–1.6)
ALP SERPL-CCNC: 230 U/L (ref 50–136)
ALT SERPL-CCNC: 13 U/L (ref 12–65)
AMMONIA PLAS-SCNC: 14 UMOL/L (ref 11–32)
ANION GAP SERPL CALC-SCNC: 4 MMOL/L (ref 2–11)
AST SERPL-CCNC: 27 U/L (ref 15–37)
BILIRUB SERPL-MCNC: 0.4 MG/DL (ref 0.2–1.1)
BUN SERPL-MCNC: 16 MG/DL (ref 6–23)
CALCIUM SERPL-MCNC: 8.7 MG/DL (ref 8.3–10.4)
CHLORIDE SERPL-SCNC: 102 MMOL/L (ref 101–110)
CO2 SERPL-SCNC: 26 MMOL/L (ref 21–32)
CREAT SERPL-MCNC: 1.6 MG/DL (ref 0.8–1.5)
ERYTHROCYTE [DISTWIDTH] IN BLOOD BY AUTOMATED COUNT: 16.5 % (ref 11.9–14.6)
ETHANOL SERPL-MCNC: <3 MG/DL (ref 0–0.08)
GLOBULIN SER CALC-MCNC: 6 G/DL (ref 2.8–4.5)
GLUCOSE BLD STRIP.AUTO-MCNC: 63 MG/DL (ref 65–100)
GLUCOSE SERPL-MCNC: 58 MG/DL (ref 65–100)
HCT VFR BLD AUTO: 21.7 % (ref 41.1–50.3)
HGB BLD-MCNC: 7.1 G/DL (ref 13.6–17.2)
LIPASE SERPL-CCNC: 81 U/L (ref 73–393)
MCH RBC QN AUTO: 24.9 PG (ref 26.1–32.9)
MCHC RBC AUTO-ENTMCNC: 32.7 G/DL (ref 31.4–35)
MCV RBC AUTO: 76.1 FL (ref 82–102)
NRBC # BLD: 0 K/UL (ref 0–0.2)
PLATELET # BLD AUTO: 152 K/UL (ref 150–450)
PMV BLD AUTO: 10 FL (ref 9.4–12.3)
POTASSIUM SERPL-SCNC: 4.1 MMOL/L (ref 3.5–5.1)
PROT SERPL-MCNC: 7.7 G/DL (ref 6.3–8.2)
RBC # BLD AUTO: 2.85 M/UL (ref 4.23–5.6)
SERVICE CMNT-IMP: ABNORMAL
SODIUM SERPL-SCNC: 132 MMOL/L (ref 133–143)
WBC # BLD AUTO: 10.9 K/UL (ref 4.3–11.1)

## 2023-04-04 PROCEDURE — 83690 ASSAY OF LIPASE: CPT

## 2023-04-04 PROCEDURE — 80053 COMPREHEN METABOLIC PANEL: CPT

## 2023-04-04 PROCEDURE — 85027 COMPLETE CBC AUTOMATED: CPT

## 2023-04-04 PROCEDURE — 99283 EMERGENCY DEPT VISIT LOW MDM: CPT

## 2023-04-04 PROCEDURE — 82962 GLUCOSE BLOOD TEST: CPT

## 2023-04-04 PROCEDURE — 82140 ASSAY OF AMMONIA: CPT

## 2023-04-04 PROCEDURE — 82077 ASSAY SPEC XCP UR&BREATH IA: CPT

## 2023-04-04 RX ORDER — SPIRONOLACTONE 25 MG/1
25 TABLET ORAL DAILY
Qty: 30 TABLET | Refills: 0 | Status: SHIPPED | OUTPATIENT
Start: 2023-04-04

## 2023-04-04 RX ORDER — LACTULOSE 10 G/10G
10 SOLUTION ORAL 2 TIMES DAILY
Qty: 60 PACKET | Refills: 0 | Status: SHIPPED | OUTPATIENT
Start: 2023-04-04 | End: 2023-05-04

## 2023-04-04 ASSESSMENT — ENCOUNTER SYMPTOMS
SHORTNESS OF BREATH: 0
COLOR CHANGE: 0
VOMITING: 0
NAUSEA: 0
COUGH: 0
BACK PAIN: 0
RHINORRHEA: 0
DIARRHEA: 0
ABDOMINAL PAIN: 0

## 2023-04-04 ASSESSMENT — PAIN SCALES - GENERAL: PAINLEVEL_OUTOF10: 10

## 2023-04-04 ASSESSMENT — PAIN DESCRIPTION - LOCATION: LOCATION: OTHER (COMMENT)

## 2023-04-04 NOTE — ED TRIAGE NOTES
Pt arrives via w/c to triage. A/o x4. Pt reports some tingling with urination and some ongoing weakness for a few days . Pt rpeorts bilateral testicle pain x3 weeks   Wife reports since Sunday pt has been talking out of his head and seeing things that are not there.  Wife also reports noticing urinary frequency

## 2023-04-05 ENCOUNTER — HOME CARE VISIT (OUTPATIENT)
Dept: SCHEDULING | Facility: HOME HEALTH | Age: 58
End: 2023-04-05
Payer: MEDICARE

## 2023-04-05 DIAGNOSIS — K70.31 ALCOHOLIC CIRRHOSIS OF LIVER WITH ASCITES (HCC): ICD-10-CM

## 2023-04-05 PROCEDURE — G0299 HHS/HOSPICE OF RN EA 15 MIN: HCPCS

## 2023-04-05 NOTE — ED PROVIDER NOTES
50.3 %    MCV 76.1 (L) 82 - 102 FL    MCH 24.9 (L) 26.1 - 32.9 PG    MCHC 32.7 31.4 - 35.0 g/dL    RDW 16.5 (H) 11.9 - 14.6 %    Platelets 026 055 - 632 K/uL    MPV 10.0 9.4 - 12.3 FL    nRBC 0.00 0.0 - 0.2 K/uL   Comprehensive Metabolic Panel   Result Value Ref Range    Sodium 132 (L) 133 - 143 mmol/L    Potassium 4.1 3.5 - 5.1 mmol/L    Chloride 102 101 - 110 mmol/L    CO2 26 21 - 32 mmol/L    Anion Gap 4 2 - 11 mmol/L    Glucose 58 (L) 65 - 100 mg/dL    BUN 16 6 - 23 MG/DL    Creatinine 1.60 (H) 0.8 - 1.5 MG/DL    Est, Glom Filt Rate 50 (L) >60 ml/min/1.73m2    Calcium 8.7 8.3 - 10.4 MG/DL    Total Bilirubin 0.4 0.2 - 1.1 MG/DL    ALT 13 12 - 65 U/L    AST 27 15 - 37 U/L    Alk Phosphatase 230 (H) 50 - 136 U/L    Total Protein 7.7 6.3 - 8.2 g/dL    Albumin 1.7 (L) 3.5 - 5.0 g/dL    Globulin 6.0 (H) 2.8 - 4.5 g/dL    Albumin/Globulin Ratio 0.3 (L) 0.4 - 1.6     Ammonia   Result Value Ref Range    Ammonia 14 11 - 32 UMOL/L   Ethanol   Result Value Ref Range    Ethanol Lvl <3 MG/DL   Lipase   Result Value Ref Range    Lipase 81 73 - 393 U/L   POCT Glucose   Result Value Ref Range    POC Glucose 63 (L) 65 - 100 mg/dL    Performed by: Mervin Carlson         No orders to display                     Voice dictation software was used during the making of this note. This software is not perfect and grammatical and other typographical errors may be present. This note has not been completely proofread for errors.      Zonia Kelley MD  04/04/23 2034

## 2023-04-05 NOTE — DISCHARGE INSTRUCTIONS
Add spironolactone to your medicines to help with swelling of the penile area. Follow-up with your primary care doctor in 4 to 7 days if not improving for further outpatient evaluation. Return if any new, worsening or concerning symptoms.

## 2023-04-05 NOTE — ED NOTES
I have reviewed discharge instructions with the patient. The patient verbalized understanding. Patient left ED via Discharge Method: wheelchair to Home with wife. Opportunity for questions and clarification provided. Patient given 2 scripts. To continue your aftercare when you leave the hospital, you may receive an automated call from our care team to check in on how you are doing. This is a free service and part of our promise to provide the best care and service to meet your aftercare needs.  If you have questions, or wish to unsubscribe from this service please call [phone]. Thank you for Choosing our [facility].          Geri Mohan RN  04/04/23 1944

## 2023-04-06 ENCOUNTER — APPOINTMENT (OUTPATIENT)
Dept: ULTRASOUND IMAGING | Age: 58
DRG: 871 | End: 2023-04-06
Payer: MEDICARE

## 2023-04-06 ENCOUNTER — APPOINTMENT (OUTPATIENT)
Dept: GENERAL RADIOLOGY | Age: 58
DRG: 871 | End: 2023-04-06
Payer: MEDICARE

## 2023-04-06 ENCOUNTER — APPOINTMENT (OUTPATIENT)
Dept: CT IMAGING | Age: 58
DRG: 871 | End: 2023-04-06
Payer: MEDICARE

## 2023-04-06 ENCOUNTER — HOSPITAL ENCOUNTER (INPATIENT)
Age: 58
LOS: 15 days | Discharge: NURSING, REHAB OR LONG-TERM CARE | DRG: 871 | End: 2023-04-21
Attending: EMERGENCY MEDICINE | Admitting: FAMILY MEDICINE
Payer: MEDICARE

## 2023-04-06 VITALS
TEMPERATURE: 97.2 F | OXYGEN SATURATION: 100 % | RESPIRATION RATE: 1 BRPM | HEART RATE: 56 BPM | SYSTOLIC BLOOD PRESSURE: 128 MMHG | DIASTOLIC BLOOD PRESSURE: 74 MMHG

## 2023-04-06 DIAGNOSIS — A41.9 SEPTICEMIA (HCC): Primary | ICD-10-CM

## 2023-04-06 DIAGNOSIS — R55 SYNCOPE, UNSPECIFIED SYNCOPE TYPE: ICD-10-CM

## 2023-04-06 DIAGNOSIS — K70.31 ALCOHOLIC CIRRHOSIS OF LIVER WITH ASCITES (HCC): ICD-10-CM

## 2023-04-06 DIAGNOSIS — L08.9 DIABETIC FOOT INFECTION (HCC): ICD-10-CM

## 2023-04-06 DIAGNOSIS — E11.628 DIABETIC FOOT INFECTION (HCC): ICD-10-CM

## 2023-04-06 PROBLEM — E87.1 CHRONIC HYPONATREMIA: Status: ACTIVE | Noted: 2022-09-15

## 2023-04-06 PROBLEM — K21.9 GASTROESOPHAGEAL REFLUX DISEASE WITHOUT ESOPHAGITIS: Status: ACTIVE | Noted: 2023-03-03

## 2023-04-06 PROBLEM — K70.30 ALCOHOLIC CIRRHOSIS (HCC): Status: ACTIVE | Noted: 2023-04-06

## 2023-04-06 PROBLEM — I95.9 HYPOTENSION: Status: ACTIVE | Noted: 2023-04-06

## 2023-04-06 PROBLEM — E16.2 HYPOGLYCEMIA: Status: ACTIVE | Noted: 2023-04-06

## 2023-04-06 PROBLEM — G93.41 ACUTE METABOLIC ENCEPHALOPATHY: Status: ACTIVE | Noted: 2023-04-06

## 2023-04-06 PROBLEM — D50.9 MICROCYTIC ANEMIA: Status: ACTIVE | Noted: 2020-12-21

## 2023-04-06 PROBLEM — T68.XXXA HYPOTHERMIA: Status: ACTIVE | Noted: 2023-04-06

## 2023-04-06 LAB
ALBUMIN SERPL-MCNC: 1.7 G/DL (ref 3.5–5)
ALBUMIN/GLOB SERPL: 0.3 (ref 0.4–1.6)
ALP SERPL-CCNC: 181 U/L (ref 50–136)
ALT SERPL-CCNC: 16 U/L (ref 12–65)
AMMONIA PLAS-SCNC: <10 UMOL/L (ref 11–32)
AMPHET UR QL SCN: NEGATIVE
ANION GAP SERPL CALC-SCNC: 5 MMOL/L (ref 2–11)
APPEARANCE UR: CLEAR
AST SERPL-CCNC: 36 U/L (ref 15–37)
BACTERIA URNS QL MICRO: NEGATIVE /HPF
BARBITURATES UR QL SCN: NEGATIVE
BASOPHILS # BLD: 0 K/UL (ref 0–0.2)
BASOPHILS NFR BLD: 0 % (ref 0–2)
BENZODIAZ UR QL: NEGATIVE
BILIRUB SERPL-MCNC: 0.5 MG/DL (ref 0.2–1.1)
BILIRUB UR QL: NEGATIVE
BUN SERPL-MCNC: 17 MG/DL (ref 6–23)
CALCIUM SERPL-MCNC: 8.6 MG/DL (ref 8.3–10.4)
CANNABINOIDS UR QL SCN: POSITIVE
CASTS URNS QL MICRO: ABNORMAL /LPF
CHLORIDE SERPL-SCNC: 98 MMOL/L (ref 101–110)
CHP ED QC CHECK: NORMAL
CHP ED QC CHECK: NORMAL
CO2 SERPL-SCNC: 25 MMOL/L (ref 21–32)
COCAINE UR QL SCN: NEGATIVE
COLOR UR: ABNORMAL
CREAT SERPL-MCNC: 1.6 MG/DL (ref 0.8–1.5)
CRP SERPL-MCNC: 10 MG/DL (ref 0–0.9)
DIFFERENTIAL METHOD BLD: ABNORMAL
EKG ATRIAL RATE: 71 BPM
EKG DIAGNOSIS: NORMAL
EKG P AXIS: 41 DEGREES
EKG P-R INTERVAL: 185 MS
EKG Q-T INTERVAL: 482 MS
EKG QRS DURATION: 112 MS
EKG QTC CALCULATION (BAZETT): 521 MS
EKG R AXIS: -57 DEGREES
EKG T AXIS: 46 DEGREES
EKG VENTRICULAR RATE: 70 BPM
EOSINOPHIL # BLD: 0.2 K/UL (ref 0–0.8)
EOSINOPHIL NFR BLD: 1 % (ref 0.5–7.8)
EPI CELLS #/AREA URNS HPF: ABNORMAL /HPF
ERYTHROCYTE [DISTWIDTH] IN BLOOD BY AUTOMATED COUNT: 16.2 % (ref 11.9–14.6)
ERYTHROCYTE [SEDIMENTATION RATE] IN BLOOD: 104 MM/HR
FERRITIN SERPL-MCNC: 521 NG/ML (ref 8–388)
FOLATE SERPL-MCNC: 7.5 NG/ML (ref 3.1–17.5)
GLOBULIN SER CALC-MCNC: 5.5 G/DL (ref 2.8–4.5)
GLUCOSE BLD STRIP.AUTO-MCNC: 100 MG/DL (ref 65–100)
GLUCOSE BLD STRIP.AUTO-MCNC: 105 MG/DL (ref 65–100)
GLUCOSE BLD STRIP.AUTO-MCNC: 116 MG/DL (ref 65–100)
GLUCOSE BLD STRIP.AUTO-MCNC: 123 MG/DL (ref 65–100)
GLUCOSE BLD STRIP.AUTO-MCNC: 146 MG/DL (ref 65–100)
GLUCOSE BLD STRIP.AUTO-MCNC: 150 MG/DL (ref 65–100)
GLUCOSE BLD STRIP.AUTO-MCNC: 67 MG/DL (ref 65–100)
GLUCOSE BLD STRIP.AUTO-MCNC: 69 MG/DL (ref 65–100)
GLUCOSE BLD STRIP.AUTO-MCNC: 92 MG/DL (ref 65–100)
GLUCOSE BLD STRIP.AUTO-MCNC: 92 MG/DL (ref 65–100)
GLUCOSE BLD-MCNC: 123 MG/DL
GLUCOSE BLD-MCNC: 67 MG/DL
GLUCOSE BLD-MCNC: 69 MG/DL
GLUCOSE BLD-MCNC: 92 MG/DL
GLUCOSE BLD-MCNC: 92 MG/DL
GLUCOSE SERPL-MCNC: 57 MG/DL (ref 65–100)
GLUCOSE UR STRIP.AUTO-MCNC: NEGATIVE MG/DL
HCT VFR BLD AUTO: 22.5 % (ref 41.1–50.3)
HGB BLD-MCNC: 7.4 G/DL (ref 13.6–17.2)
HGB UR QL STRIP: ABNORMAL
HIV 1+2 AB+HIV1 P24 AG SERPL QL IA: NONREACTIVE
HIV 1/2 RESULT COMMENT: NORMAL
IMM GRANULOCYTES # BLD AUTO: 0.1 K/UL (ref 0–0.5)
IMM GRANULOCYTES NFR BLD AUTO: 1 % (ref 0–5)
IRON SATN MFR SERPL: 13 %
IRON SERPL-MCNC: 19 UG/DL (ref 35–150)
KETONES UR QL STRIP.AUTO: NEGATIVE MG/DL
LACTATE SERPL-SCNC: 1.4 MMOL/L (ref 0.4–2)
LACTATE SERPL-SCNC: 1.7 MMOL/L (ref 0.4–2)
LEUKOCYTE ESTERASE UR QL STRIP.AUTO: ABNORMAL
LYMPHOCYTES # BLD: 1.5 K/UL (ref 0.5–4.6)
LYMPHOCYTES NFR BLD: 9 % (ref 13–44)
MCH RBC QN AUTO: 25.1 PG (ref 26.1–32.9)
MCHC RBC AUTO-ENTMCNC: 32.9 G/DL (ref 31.4–35)
MCV RBC AUTO: 76.3 FL (ref 82–102)
METHADONE UR QL: NEGATIVE
MONOCYTES # BLD: 0.9 K/UL (ref 0.1–1.3)
MONOCYTES NFR BLD: 6 % (ref 4–12)
NEUTS SEG # BLD: 12.9 K/UL (ref 1.7–8.2)
NEUTS SEG NFR BLD: 83 % (ref 43–78)
NITRITE UR QL STRIP.AUTO: NEGATIVE
NRBC # BLD: 0 K/UL (ref 0–0.2)
OPIATES UR QL: NEGATIVE
PCP UR QL: NEGATIVE
PH UR STRIP: 7.5 (ref 5–9)
PLATELET # BLD AUTO: 134 K/UL (ref 150–450)
PMV BLD AUTO: 10.1 FL (ref 9.4–12.3)
POTASSIUM SERPL-SCNC: 4 MMOL/L (ref 3.5–5.1)
PROCALCITONIN SERPL-MCNC: 0.32 NG/ML (ref 0–0.49)
PROT SERPL-MCNC: 7.2 G/DL (ref 6.3–8.2)
PROT UR STRIP-MCNC: NEGATIVE MG/DL
RBC # BLD AUTO: 2.95 M/UL (ref 4.23–5.6)
RBC #/AREA URNS HPF: ABNORMAL /HPF
SERVICE CMNT-IMP: ABNORMAL
SERVICE CMNT-IMP: NORMAL
SODIUM SERPL-SCNC: 128 MMOL/L (ref 133–143)
SP GR UR REFRACTOMETRY: 1.01 (ref 1–1.02)
TIBC SERPL-MCNC: 148 UG/DL (ref 250–450)
TSH W FREE THYROID IF ABNORMAL: 1.33 UIU/ML (ref 0.36–3.74)
UROBILINOGEN UR QL STRIP.AUTO: 0.2 EU/DL (ref 0.2–1)
VIT B12 SERPL-MCNC: 1197 PG/ML (ref 193–986)
WBC # BLD AUTO: 15.6 K/UL (ref 4.3–11.1)
WBC URNS QL MICRO: ABNORMAL /HPF

## 2023-04-06 PROCEDURE — 82746 ASSAY OF FOLIC ACID SERUM: CPT

## 2023-04-06 PROCEDURE — 70450 CT HEAD/BRAIN W/O DYE: CPT

## 2023-04-06 PROCEDURE — 93925 LOWER EXTREMITY STUDY: CPT

## 2023-04-06 PROCEDURE — 96365 THER/PROPH/DIAG IV INF INIT: CPT

## 2023-04-06 PROCEDURE — 81001 URINALYSIS AUTO W/SCOPE: CPT

## 2023-04-06 PROCEDURE — 71045 X-RAY EXAM CHEST 1 VIEW: CPT

## 2023-04-06 PROCEDURE — 6370000000 HC RX 637 (ALT 250 FOR IP): Performed by: FAMILY MEDICINE

## 2023-04-06 PROCEDURE — 96361 HYDRATE IV INFUSION ADD-ON: CPT

## 2023-04-06 PROCEDURE — 80307 DRUG TEST PRSMV CHEM ANLYZR: CPT

## 2023-04-06 PROCEDURE — 87389 HIV-1 AG W/HIV-1&-2 AB AG IA: CPT

## 2023-04-06 PROCEDURE — 2100000000 HC CCU R&B

## 2023-04-06 PROCEDURE — 86140 C-REACTIVE PROTEIN: CPT

## 2023-04-06 PROCEDURE — 84145 PROCALCITONIN (PCT): CPT

## 2023-04-06 PROCEDURE — 82728 ASSAY OF FERRITIN: CPT

## 2023-04-06 PROCEDURE — 2580000003 HC RX 258: Performed by: FAMILY MEDICINE

## 2023-04-06 PROCEDURE — 86592 SYPHILIS TEST NON-TREP QUAL: CPT

## 2023-04-06 PROCEDURE — 82140 ASSAY OF AMMONIA: CPT

## 2023-04-06 PROCEDURE — 83605 ASSAY OF LACTIC ACID: CPT

## 2023-04-06 PROCEDURE — 6360000002 HC RX W HCPCS: Performed by: EMERGENCY MEDICINE

## 2023-04-06 PROCEDURE — 96375 TX/PRO/DX INJ NEW DRUG ADDON: CPT

## 2023-04-06 PROCEDURE — 87040 BLOOD CULTURE FOR BACTERIA: CPT

## 2023-04-06 PROCEDURE — 93005 ELECTROCARDIOGRAM TRACING: CPT | Performed by: EMERGENCY MEDICINE

## 2023-04-06 PROCEDURE — 82962 GLUCOSE BLOOD TEST: CPT

## 2023-04-06 PROCEDURE — 6360000002 HC RX W HCPCS: Performed by: FAMILY MEDICINE

## 2023-04-06 PROCEDURE — 83036 HEMOGLOBIN GLYCOSYLATED A1C: CPT

## 2023-04-06 PROCEDURE — 6370000000 HC RX 637 (ALT 250 FOR IP): Performed by: EMERGENCY MEDICINE

## 2023-04-06 PROCEDURE — 80053 COMPREHEN METABOLIC PANEL: CPT

## 2023-04-06 PROCEDURE — 96367 TX/PROPH/DG ADDL SEQ IV INF: CPT

## 2023-04-06 PROCEDURE — 99285 EMERGENCY DEPT VISIT HI MDM: CPT

## 2023-04-06 PROCEDURE — 84443 ASSAY THYROID STIM HORMONE: CPT

## 2023-04-06 PROCEDURE — 83540 ASSAY OF IRON: CPT

## 2023-04-06 PROCEDURE — 85652 RBC SED RATE AUTOMATED: CPT

## 2023-04-06 PROCEDURE — 85025 COMPLETE CBC W/AUTO DIFF WBC: CPT

## 2023-04-06 PROCEDURE — 82607 VITAMIN B-12: CPT

## 2023-04-06 PROCEDURE — 73630 X-RAY EXAM OF FOOT: CPT

## 2023-04-06 PROCEDURE — 2580000003 HC RX 258: Performed by: EMERGENCY MEDICINE

## 2023-04-06 RX ORDER — SODIUM BICARBONATE 650 MG/1
1300 TABLET ORAL 2 TIMES DAILY
Status: DISCONTINUED | OUTPATIENT
Start: 2023-04-06 | End: 2023-04-21 | Stop reason: HOSPADM

## 2023-04-06 RX ORDER — DEXTROSE MONOHYDRATE 100 MG/ML
INJECTION, SOLUTION INTRAVENOUS CONTINUOUS PRN
Status: DISCONTINUED | OUTPATIENT
Start: 2023-04-06 | End: 2023-04-21 | Stop reason: HOSPADM

## 2023-04-06 RX ORDER — ACETAMINOPHEN 325 MG/1
650 TABLET ORAL EVERY 6 HOURS PRN
Status: DISCONTINUED | OUTPATIENT
Start: 2023-04-06 | End: 2023-04-21 | Stop reason: HOSPADM

## 2023-04-06 RX ORDER — LACTULOSE 10 G/15ML
20 SOLUTION ORAL 3 TIMES DAILY
Status: DISCONTINUED | OUTPATIENT
Start: 2023-04-06 | End: 2023-04-21 | Stop reason: HOSPADM

## 2023-04-06 RX ORDER — SODIUM, POTASSIUM,MAG SULFATES 17.5-3.13G
SOLUTION, RECONSTITUTED, ORAL ORAL
COMMUNITY
Start: 2023-01-30

## 2023-04-06 RX ORDER — ACETAMINOPHEN 650 MG/1
650 SUPPOSITORY RECTAL EVERY 6 HOURS PRN
Status: DISCONTINUED | OUTPATIENT
Start: 2023-04-06 | End: 2023-04-21 | Stop reason: HOSPADM

## 2023-04-06 RX ORDER — LORAZEPAM 2 MG/ML
1 INJECTION INTRAMUSCULAR EVERY 4 HOURS PRN
Status: DISCONTINUED | OUTPATIENT
Start: 2023-04-06 | End: 2023-04-15

## 2023-04-06 RX ORDER — ONDANSETRON 2 MG/ML
4 INJECTION INTRAMUSCULAR; INTRAVENOUS EVERY 6 HOURS PRN
Status: DISCONTINUED | OUTPATIENT
Start: 2023-04-06 | End: 2023-04-21 | Stop reason: HOSPADM

## 2023-04-06 RX ORDER — L. ACIDOPHILUS/L.BULGARICUS 1MM CELL
4 TABLET ORAL 3 TIMES DAILY
Status: DISCONTINUED | OUTPATIENT
Start: 2023-04-06 | End: 2023-04-21 | Stop reason: HOSPADM

## 2023-04-06 RX ORDER — INSULIN LISPRO 100 [IU]/ML
0-4 INJECTION, SOLUTION INTRAVENOUS; SUBCUTANEOUS
Status: DISCONTINUED | OUTPATIENT
Start: 2023-04-06 | End: 2023-04-21 | Stop reason: HOSPADM

## 2023-04-06 RX ORDER — DEXTROSE, SODIUM CHLORIDE, SODIUM LACTATE, POTASSIUM CHLORIDE, AND CALCIUM CHLORIDE 5; .6; .31; .03; .02 G/100ML; G/100ML; G/100ML; G/100ML; G/100ML
INJECTION, SOLUTION INTRAVENOUS CONTINUOUS
Status: ACTIVE | OUTPATIENT
Start: 2023-04-06 | End: 2023-04-07

## 2023-04-06 RX ORDER — INSULIN LISPRO 100 [IU]/ML
0-4 INJECTION, SOLUTION INTRAVENOUS; SUBCUTANEOUS NIGHTLY
Status: DISCONTINUED | OUTPATIENT
Start: 2023-04-06 | End: 2023-04-21 | Stop reason: HOSPADM

## 2023-04-06 RX ORDER — SODIUM CHLORIDE, SODIUM LACTATE, POTASSIUM CHLORIDE, AND CALCIUM CHLORIDE .6; .31; .03; .02 G/100ML; G/100ML; G/100ML; G/100ML
30 INJECTION, SOLUTION INTRAVENOUS ONCE
Status: COMPLETED | OUTPATIENT
Start: 2023-04-06 | End: 2023-04-06

## 2023-04-06 RX ORDER — SODIUM CHLORIDE 0.9 % (FLUSH) 0.9 %
5-40 SYRINGE (ML) INJECTION PRN
Status: DISCONTINUED | OUTPATIENT
Start: 2023-04-06 | End: 2023-04-21 | Stop reason: HOSPADM

## 2023-04-06 RX ORDER — LORAZEPAM 2 MG/ML
1 INJECTION INTRAMUSCULAR EVERY 4 HOURS PRN
Status: DISCONTINUED | OUTPATIENT
Start: 2023-04-06 | End: 2023-04-06

## 2023-04-06 RX ORDER — LACTULOSE 10 G/15ML
10 SOLUTION ORAL 2 TIMES DAILY
Status: DISCONTINUED | OUTPATIENT
Start: 2023-04-06 | End: 2023-04-06

## 2023-04-06 RX ORDER — METRONIDAZOLE 500 MG/1
500 TABLET ORAL EVERY 8 HOURS SCHEDULED
Status: DISCONTINUED | OUTPATIENT
Start: 2023-04-06 | End: 2023-04-06

## 2023-04-06 RX ORDER — SODIUM CHLORIDE 9 MG/ML
INJECTION, SOLUTION INTRAVENOUS PRN
Status: DISCONTINUED | OUTPATIENT
Start: 2023-04-06 | End: 2023-04-21 | Stop reason: HOSPADM

## 2023-04-06 RX ORDER — PANTOPRAZOLE SODIUM 40 MG/1
40 TABLET, DELAYED RELEASE ORAL
Status: DISCONTINUED | OUTPATIENT
Start: 2023-04-06 | End: 2023-04-21 | Stop reason: HOSPADM

## 2023-04-06 RX ORDER — SODIUM CHLORIDE 0.9 % (FLUSH) 0.9 %
5-40 SYRINGE (ML) INJECTION EVERY 12 HOURS SCHEDULED
Status: DISCONTINUED | OUTPATIENT
Start: 2023-04-06 | End: 2023-04-21 | Stop reason: HOSPADM

## 2023-04-06 RX ORDER — POLYETHYLENE GLYCOL 3350 17 G/17G
17 POWDER, FOR SOLUTION ORAL DAILY PRN
Status: DISCONTINUED | OUTPATIENT
Start: 2023-04-06 | End: 2023-04-21 | Stop reason: HOSPADM

## 2023-04-06 RX ORDER — ONDANSETRON 4 MG/1
4 TABLET, ORALLY DISINTEGRATING ORAL EVERY 8 HOURS PRN
Status: DISCONTINUED | OUTPATIENT
Start: 2023-04-06 | End: 2023-04-21 | Stop reason: HOSPADM

## 2023-04-06 RX ADMIN — SODIUM CHLORIDE, SODIUM LACTATE, POTASSIUM CHLORIDE, CALCIUM CHLORIDE AND DEXTROSE MONOHYDRATE: 5; 600; 310; 30; 20 INJECTION, SOLUTION INTRAVENOUS at 15:46

## 2023-04-06 RX ADMIN — LEVETIRACETAM 1000 MG: 100 INJECTION, SOLUTION INTRAVENOUS at 15:57

## 2023-04-06 RX ADMIN — LORAZEPAM 1 MG: 2 INJECTION INTRAMUSCULAR; INTRAVENOUS at 21:20

## 2023-04-06 RX ADMIN — VANCOMYCIN HYDROCHLORIDE 2000 MG: 10 INJECTION, POWDER, LYOPHILIZED, FOR SOLUTION INTRAVENOUS at 10:03

## 2023-04-06 RX ADMIN — Medication 16 G: at 09:05

## 2023-04-06 RX ADMIN — CEFTRIAXONE 1000 MG: 1 INJECTION, POWDER, FOR SOLUTION INTRAMUSCULAR; INTRAVENOUS at 09:07

## 2023-04-06 RX ADMIN — SODIUM CHLORIDE, PRESERVATIVE FREE 10 ML: 5 INJECTION INTRAVENOUS at 21:21

## 2023-04-06 RX ADMIN — SODIUM CHLORIDE, POTASSIUM CHLORIDE, SODIUM LACTATE AND CALCIUM CHLORIDE 2259 ML: 600; 310; 30; 20 INJECTION, SOLUTION INTRAVENOUS at 08:23

## 2023-04-06 RX ADMIN — SODIUM BICARBONATE 1300 MG: 650 TABLET ORAL at 15:47

## 2023-04-06 RX ADMIN — Medication 4 TABLET: at 15:47

## 2023-04-06 RX ADMIN — PANTOPRAZOLE SODIUM 40 MG: 40 TABLET, DELAYED RELEASE ORAL at 15:47

## 2023-04-06 RX ADMIN — PIPERACILLIN AND TAZOBACTAM 4500 MG: 4; .5 INJECTION, POWDER, FOR SOLUTION INTRAVENOUS at 15:55

## 2023-04-06 ASSESSMENT — PAIN - FUNCTIONAL ASSESSMENT: PAIN_FUNCTIONAL_ASSESSMENT: NONE - DENIES PAIN

## 2023-04-06 ASSESSMENT — PAIN SCALES - GENERAL
PAINLEVEL_OUTOF10: 0

## 2023-04-06 ASSESSMENT — LIFESTYLE VARIABLES
HOW MANY STANDARD DRINKS CONTAINING ALCOHOL DO YOU HAVE ON A TYPICAL DAY: PATIENT DOES NOT DRINK
HOW OFTEN DO YOU HAVE A DRINK CONTAINING ALCOHOL: NEVER

## 2023-04-06 ASSESSMENT — ENCOUNTER SYMPTOMS
EYES NEGATIVE: 1
STOOL DESCRIPTION: FORMED
GASTROINTESTINAL NEGATIVE: 1
RESPIRATORY NEGATIVE: 1

## 2023-04-07 ENCOUNTER — APPOINTMENT (OUTPATIENT)
Dept: NON INVASIVE DIAGNOSTICS | Age: 58
DRG: 871 | End: 2023-04-07
Payer: MEDICARE

## 2023-04-07 ENCOUNTER — HOME CARE VISIT (OUTPATIENT)
Dept: SCHEDULING | Facility: HOME HEALTH | Age: 58
End: 2023-04-07
Payer: MEDICARE

## 2023-04-07 ENCOUNTER — APPOINTMENT (OUTPATIENT)
Dept: GENERAL RADIOLOGY | Age: 58
DRG: 871 | End: 2023-04-07
Payer: MEDICARE

## 2023-04-07 LAB
ANION GAP SERPL CALC-SCNC: 5 MMOL/L (ref 2–11)
BASOPHILS # BLD: 0 K/UL (ref 0–0.2)
BASOPHILS NFR BLD: 0 % (ref 0–2)
BUN SERPL-MCNC: 16 MG/DL (ref 6–23)
CALCIUM SERPL-MCNC: 8.4 MG/DL (ref 8.3–10.4)
CHLORIDE SERPL-SCNC: 104 MMOL/L (ref 101–110)
CO2 SERPL-SCNC: 24 MMOL/L (ref 21–32)
CREAT SERPL-MCNC: 1.6 MG/DL (ref 0.8–1.5)
DIFFERENTIAL METHOD BLD: ABNORMAL
ECHO BSA: 2.13 M2
ECHO LA DIAMETER INDEX: 1.66 CM/M2
ECHO LA DIAMETER: 3.5 CM
ECHO LV EDV 3D: 156 ML
ECHO LV EDV INDEX 3D: 74 ML/M2
ECHO LV EJECTION FRACTION 3D: 59 %
ECHO LV ESV 3D: 63 ML
ECHO LV ESV INDEX 3D: 30 ML/M2
ECHO LV FRACTIONAL SHORTENING: 28 % (ref 28–44)
ECHO LV INTERNAL DIMENSION DIASTOLE INDEX: 2.04 CM/M2
ECHO LV INTERNAL DIMENSION DIASTOLIC: 4.3 CM (ref 4.2–5.9)
ECHO LV INTERNAL DIMENSION SYSTOLIC INDEX: 1.47 CM/M2
ECHO LV INTERNAL DIMENSION SYSTOLIC: 3.1 CM
ECHO LV IVSD: 1.2 CM (ref 0.6–1)
ECHO LV MASS 2D: 184.7 G (ref 88–224)
ECHO LV MASS 3D INDEX: 70.1 G/M2
ECHO LV MASS 3D: 148 G
ECHO LV MASS INDEX 2D: 87.5 G/M2 (ref 49–115)
ECHO LV POSTERIOR WALL DIASTOLIC: 1.2 CM (ref 0.6–1)
ECHO LV RELATIVE WALL THICKNESS RATIO: 0.56
EOSINOPHIL # BLD: 0.2 K/UL (ref 0–0.8)
EOSINOPHIL NFR BLD: 1 % (ref 0.5–7.8)
ERYTHROCYTE [DISTWIDTH] IN BLOOD BY AUTOMATED COUNT: 16.2 % (ref 11.9–14.6)
GLUCOSE BLD STRIP.AUTO-MCNC: 107 MG/DL (ref 65–100)
GLUCOSE BLD STRIP.AUTO-MCNC: 132 MG/DL (ref 65–100)
GLUCOSE BLD STRIP.AUTO-MCNC: 136 MG/DL (ref 65–100)
GLUCOSE BLD STRIP.AUTO-MCNC: 137 MG/DL (ref 65–100)
GLUCOSE SERPL-MCNC: 117 MG/DL (ref 65–100)
HCT VFR BLD AUTO: 17.8 % (ref 41.1–50.3)
HCT VFR BLD AUTO: 18.3 % (ref 41.1–50.3)
HCT VFR BLD AUTO: 20 % (ref 41.1–50.3)
HGB BLD-MCNC: 5.8 G/DL (ref 13.6–17.2)
HGB BLD-MCNC: 6 G/DL (ref 13.6–17.2)
HGB BLD-MCNC: 6.7 G/DL (ref 13.6–17.2)
HISTORY CHECK: NORMAL
HISTORY CHECK: NORMAL
IMM GRANULOCYTES # BLD AUTO: 0.2 K/UL (ref 0–0.5)
IMM GRANULOCYTES NFR BLD AUTO: 1 % (ref 0–5)
LYMPHOCYTES # BLD: 1.5 K/UL (ref 0.5–4.6)
LYMPHOCYTES NFR BLD: 6 % (ref 13–44)
MCH RBC QN AUTO: 25 PG (ref 26.1–32.9)
MCHC RBC AUTO-ENTMCNC: 32.8 G/DL (ref 31.4–35)
MCV RBC AUTO: 76.3 FL (ref 82–102)
MONOCYTES # BLD: 1 K/UL (ref 0.1–1.3)
MONOCYTES NFR BLD: 4 % (ref 4–12)
NEUTS SEG # BLD: 21.1 K/UL (ref 1.7–8.2)
NEUTS SEG NFR BLD: 88 % (ref 43–78)
NRBC # BLD: 0 K/UL (ref 0–0.2)
PLATELET # BLD AUTO: 94 K/UL (ref 150–450)
PMV BLD AUTO: 10.1 FL (ref 9.4–12.3)
POTASSIUM SERPL-SCNC: 4.3 MMOL/L (ref 3.5–5.1)
RBC # BLD AUTO: 2.4 M/UL (ref 4.23–5.6)
RPR SER QL: NONREACTIVE
SERVICE CMNT-IMP: ABNORMAL
SODIUM SERPL-SCNC: 133 MMOL/L (ref 133–143)
WBC # BLD AUTO: 24.1 K/UL (ref 4.3–11.1)

## 2023-04-07 PROCEDURE — 99222 1ST HOSP IP/OBS MODERATE 55: CPT | Performed by: PSYCHIATRY & NEUROLOGY

## 2023-04-07 PROCEDURE — 85018 HEMOGLOBIN: CPT

## 2023-04-07 PROCEDURE — P9016 RBC LEUKOCYTES REDUCED: HCPCS

## 2023-04-07 PROCEDURE — 85025 COMPLETE CBC W/AUTO DIFF WBC: CPT

## 2023-04-07 PROCEDURE — 6360000002 HC RX W HCPCS: Performed by: FAMILY MEDICINE

## 2023-04-07 PROCEDURE — 86923 COMPATIBILITY TEST ELECTRIC: CPT

## 2023-04-07 PROCEDURE — 86901 BLOOD TYPING SEROLOGIC RH(D): CPT

## 2023-04-07 PROCEDURE — 95822 EEG COMA OR SLEEP ONLY: CPT | Performed by: PSYCHIATRY & NEUROLOGY

## 2023-04-07 PROCEDURE — 93308 TTE F-UP OR LMTD: CPT

## 2023-04-07 PROCEDURE — 86644 CMV ANTIBODY: CPT

## 2023-04-07 PROCEDURE — 1100000000 HC RM PRIVATE

## 2023-04-07 PROCEDURE — 95819 EEG AWAKE AND ASLEEP: CPT

## 2023-04-07 PROCEDURE — 6370000000 HC RX 637 (ALT 250 FOR IP): Performed by: FAMILY MEDICINE

## 2023-04-07 PROCEDURE — 2700000000 HC OXYGEN THERAPY PER DAY

## 2023-04-07 PROCEDURE — 80048 BASIC METABOLIC PNL TOTAL CA: CPT

## 2023-04-07 PROCEDURE — 36430 TRANSFUSION BLD/BLD COMPNT: CPT

## 2023-04-07 PROCEDURE — 82962 GLUCOSE BLOOD TEST: CPT

## 2023-04-07 PROCEDURE — 2580000003 HC RX 258: Performed by: FAMILY MEDICINE

## 2023-04-07 PROCEDURE — 71045 X-RAY EXAM CHEST 1 VIEW: CPT

## 2023-04-07 PROCEDURE — 36415 COLL VENOUS BLD VENIPUNCTURE: CPT

## 2023-04-07 RX ORDER — PANTOPRAZOLE SODIUM 40 MG/1
TABLET, DELAYED RELEASE ORAL
Qty: 30 TABLET | Refills: 0 | OUTPATIENT
Start: 2023-04-07

## 2023-04-07 RX ORDER — SODIUM CHLORIDE 9 MG/ML
INJECTION, SOLUTION INTRAVENOUS PRN
Status: DISCONTINUED | OUTPATIENT
Start: 2023-04-07 | End: 2023-04-21 | Stop reason: HOSPADM

## 2023-04-07 RX ORDER — FUROSEMIDE 10 MG/ML
40 INJECTION INTRAMUSCULAR; INTRAVENOUS ONCE
Status: COMPLETED | OUTPATIENT
Start: 2023-04-07 | End: 2023-04-07

## 2023-04-07 RX ADMIN — Medication 4 TABLET: at 21:23

## 2023-04-07 RX ADMIN — SODIUM CHLORIDE, PRESERVATIVE FREE 10 ML: 5 INJECTION INTRAVENOUS at 09:10

## 2023-04-07 RX ADMIN — LEVETIRACETAM 500 MG: 500 INJECTION, SOLUTION INTRAVENOUS at 17:07

## 2023-04-07 RX ADMIN — SODIUM CHLORIDE, SODIUM LACTATE, POTASSIUM CHLORIDE, CALCIUM CHLORIDE AND DEXTROSE MONOHYDRATE: 5; 600; 310; 30; 20 INJECTION, SOLUTION INTRAVENOUS at 05:55

## 2023-04-07 RX ADMIN — LACTULOSE 20 G: 20 SOLUTION ORAL at 21:23

## 2023-04-07 RX ADMIN — SODIUM BICARBONATE 1300 MG: 650 TABLET ORAL at 21:23

## 2023-04-07 RX ADMIN — SODIUM CHLORIDE, PRESERVATIVE FREE 10 ML: 5 INJECTION INTRAVENOUS at 21:24

## 2023-04-07 RX ADMIN — FUROSEMIDE 40 MG: 10 INJECTION, SOLUTION INTRAMUSCULAR; INTRAVENOUS at 22:48

## 2023-04-07 RX ADMIN — PIPERACILLIN AND TAZOBACTAM 3375 MG: 3; .375 INJECTION, POWDER, LYOPHILIZED, FOR SOLUTION INTRAVENOUS at 06:30

## 2023-04-07 RX ADMIN — PIPERACILLIN AND TAZOBACTAM 3375 MG: 3; .375 INJECTION, POWDER, LYOPHILIZED, FOR SOLUTION INTRAVENOUS at 16:02

## 2023-04-07 RX ADMIN — SODIUM BICARBONATE 1300 MG: 650 TABLET ORAL at 09:39

## 2023-04-07 RX ADMIN — PANTOPRAZOLE SODIUM 40 MG: 40 TABLET, DELAYED RELEASE ORAL at 17:07

## 2023-04-07 RX ADMIN — Medication 4 TABLET: at 09:39

## 2023-04-07 RX ADMIN — VANCOMYCIN HYDROCHLORIDE 1250 MG: 10 INJECTION, POWDER, LYOPHILIZED, FOR SOLUTION INTRAVENOUS at 11:59

## 2023-04-07 RX ADMIN — LACTULOSE 20 G: 20 SOLUTION ORAL at 09:39

## 2023-04-07 RX ADMIN — PANTOPRAZOLE SODIUM 40 MG: 40 TABLET, DELAYED RELEASE ORAL at 09:39

## 2023-04-07 RX ADMIN — LACTULOSE 20 G: 20 SOLUTION ORAL at 13:49

## 2023-04-07 RX ADMIN — LORAZEPAM 1 MG: 2 INJECTION INTRAMUSCULAR; INTRAVENOUS at 21:51

## 2023-04-07 RX ADMIN — LEVETIRACETAM 500 MG: 500 INJECTION, SOLUTION INTRAVENOUS at 04:50

## 2023-04-07 RX ADMIN — PIPERACILLIN AND TAZOBACTAM 3375 MG: 3; .375 INJECTION, POWDER, LYOPHILIZED, FOR SOLUTION INTRAVENOUS at 00:21

## 2023-04-07 ASSESSMENT — PAIN SCALES - GENERAL
PAINLEVEL_OUTOF10: 0

## 2023-04-08 ENCOUNTER — APPOINTMENT (OUTPATIENT)
Dept: GENERAL RADIOLOGY | Age: 58
DRG: 871 | End: 2023-04-08
Payer: MEDICARE

## 2023-04-08 PROBLEM — G93.40 ENCEPHALOPATHY ACUTE: Status: ACTIVE | Noted: 2023-04-08

## 2023-04-08 PROBLEM — J96.01 ACUTE RESPIRATORY FAILURE WITH HYPOXIA (HCC): Status: ACTIVE | Noted: 2023-04-08

## 2023-04-08 LAB
ABO + RH BLD: NORMAL
ALBUMIN SERPL-MCNC: 1.5 G/DL (ref 3.5–5)
ALBUMIN/GLOB SERPL: 0.3 (ref 0.4–1.6)
ALP SERPL-CCNC: 178 U/L (ref 50–136)
ALT SERPL-CCNC: 13 U/L (ref 12–65)
ANION GAP SERPL CALC-SCNC: 5 MMOL/L (ref 2–11)
AST SERPL-CCNC: 32 U/L (ref 15–37)
BASOPHILS # BLD: 0.1 K/UL (ref 0–0.2)
BASOPHILS NFR BLD: 0 % (ref 0–2)
BILIRUB SERPL-MCNC: 1.7 MG/DL (ref 0.2–1.1)
BLD PROD TYP BPU: NORMAL
BLD PROD TYP BPU: NORMAL
BLOOD BANK DISPENSE STATUS: NORMAL
BLOOD BANK DISPENSE STATUS: NORMAL
BLOOD GROUP ANTIBODIES SERPL: NORMAL
BPU ID: NORMAL
BPU ID: NORMAL
BUN SERPL-MCNC: 13 MG/DL (ref 6–23)
CALCIUM SERPL-MCNC: 8.2 MG/DL (ref 8.3–10.4)
CHLORIDE SERPL-SCNC: 108 MMOL/L (ref 101–110)
CO2 SERPL-SCNC: 24 MMOL/L (ref 21–32)
CREAT SERPL-MCNC: 1.7 MG/DL (ref 0.8–1.5)
CROSSMATCH RESULT: NORMAL
CROSSMATCH RESULT: NORMAL
CRP SERPL-MCNC: 13.9 MG/DL (ref 0–0.9)
DIFFERENTIAL METHOD BLD: ABNORMAL
EOSINOPHIL # BLD: 0.5 K/UL (ref 0–0.8)
EOSINOPHIL NFR BLD: 2 % (ref 0.5–7.8)
ERYTHROCYTE [DISTWIDTH] IN BLOOD BY AUTOMATED COUNT: 16.6 % (ref 11.9–14.6)
ERYTHROCYTE [SEDIMENTATION RATE] IN BLOOD: 98 MM/HR
GLOBULIN SER CALC-MCNC: 4.6 G/DL (ref 2.8–4.5)
GLUCOSE BLD STRIP.AUTO-MCNC: 150 MG/DL (ref 65–100)
GLUCOSE BLD STRIP.AUTO-MCNC: 158 MG/DL (ref 65–100)
GLUCOSE BLD STRIP.AUTO-MCNC: 87 MG/DL (ref 65–100)
GLUCOSE BLD STRIP.AUTO-MCNC: 90 MG/DL (ref 65–100)
GLUCOSE SERPL-MCNC: 78 MG/DL (ref 65–100)
HCT VFR BLD AUTO: 23.9 % (ref 41.1–50.3)
HGB BLD-MCNC: 8 G/DL (ref 13.6–17.2)
IMM GRANULOCYTES # BLD AUTO: 0.2 K/UL (ref 0–0.5)
IMM GRANULOCYTES NFR BLD AUTO: 1 % (ref 0–5)
LYMPHOCYTES # BLD: 1.6 K/UL (ref 0.5–4.6)
LYMPHOCYTES NFR BLD: 6 % (ref 13–44)
MCH RBC QN AUTO: 26 PG (ref 26.1–32.9)
MCHC RBC AUTO-ENTMCNC: 33.5 G/DL (ref 31.4–35)
MCV RBC AUTO: 77.6 FL (ref 82–102)
MONOCYTES # BLD: 1.4 K/UL (ref 0.1–1.3)
MONOCYTES NFR BLD: 6 % (ref 4–12)
NEUTS SEG # BLD: 20.6 K/UL (ref 1.7–8.2)
NEUTS SEG NFR BLD: 85 % (ref 43–78)
NRBC # BLD: 0 K/UL (ref 0–0.2)
NT PRO BNP: 7755 PG/ML (ref 5–125)
PLATELET # BLD AUTO: 75 K/UL (ref 150–450)
PMV BLD AUTO: 9.9 FL (ref 9.4–12.3)
POTASSIUM SERPL-SCNC: 4.3 MMOL/L (ref 3.5–5.1)
PROT SERPL-MCNC: 6.1 G/DL (ref 6.3–8.2)
RBC # BLD AUTO: 3.08 M/UL (ref 4.23–5.6)
SERVICE CMNT-IMP: ABNORMAL
SERVICE CMNT-IMP: ABNORMAL
SERVICE CMNT-IMP: NORMAL
SERVICE CMNT-IMP: NORMAL
SODIUM SERPL-SCNC: 137 MMOL/L (ref 133–143)
SPECIMEN EXP DATE BLD: NORMAL
UNIT DIVISION: 0
UNIT DIVISION: 0
VANCOMYCIN SERPL-MCNC: 21.9 UG/ML
WBC # BLD AUTO: 24.3 K/UL (ref 4.3–11.1)

## 2023-04-08 PROCEDURE — 80202 ASSAY OF VANCOMYCIN: CPT

## 2023-04-08 PROCEDURE — 6370000000 HC RX 637 (ALT 250 FOR IP): Performed by: FAMILY MEDICINE

## 2023-04-08 PROCEDURE — 97162 PT EVAL MOD COMPLEX 30 MIN: CPT

## 2023-04-08 PROCEDURE — 97535 SELF CARE MNGMENT TRAINING: CPT

## 2023-04-08 PROCEDURE — 85025 COMPLETE CBC W/AUTO DIFF WBC: CPT

## 2023-04-08 PROCEDURE — 94664 DEMO&/EVAL PT USE INHALER: CPT

## 2023-04-08 PROCEDURE — 2580000003 HC RX 258: Performed by: FAMILY MEDICINE

## 2023-04-08 PROCEDURE — 6360000002 HC RX W HCPCS: Performed by: FAMILY MEDICINE

## 2023-04-08 PROCEDURE — 85652 RBC SED RATE AUTOMATED: CPT

## 2023-04-08 PROCEDURE — 97167 OT EVAL HIGH COMPLEX 60 MIN: CPT

## 2023-04-08 PROCEDURE — 86140 C-REACTIVE PROTEIN: CPT

## 2023-04-08 PROCEDURE — 94640 AIRWAY INHALATION TREATMENT: CPT

## 2023-04-08 PROCEDURE — 83880 ASSAY OF NATRIURETIC PEPTIDE: CPT

## 2023-04-08 PROCEDURE — 1100000003 HC PRIVATE W/ TELEMETRY

## 2023-04-08 PROCEDURE — 97530 THERAPEUTIC ACTIVITIES: CPT

## 2023-04-08 PROCEDURE — 80053 COMPREHEN METABOLIC PANEL: CPT

## 2023-04-08 PROCEDURE — 6360000002 HC RX W HCPCS: Performed by: INTERNAL MEDICINE

## 2023-04-08 PROCEDURE — 99221 1ST HOSP IP/OBS SF/LOW 40: CPT | Performed by: UROLOGY

## 2023-04-08 PROCEDURE — 2580000003 HC RX 258: Performed by: INTERNAL MEDICINE

## 2023-04-08 PROCEDURE — 82962 GLUCOSE BLOOD TEST: CPT

## 2023-04-08 PROCEDURE — 71045 X-RAY EXAM CHEST 1 VIEW: CPT

## 2023-04-08 PROCEDURE — 36415 COLL VENOUS BLD VENIPUNCTURE: CPT

## 2023-04-08 RX ORDER — FUROSEMIDE 10 MG/ML
40 INJECTION INTRAMUSCULAR; INTRAVENOUS ONCE
Status: DISCONTINUED | OUTPATIENT
Start: 2023-04-08 | End: 2023-04-08

## 2023-04-08 RX ORDER — FUROSEMIDE 10 MG/ML
40 INJECTION INTRAMUSCULAR; INTRAVENOUS DAILY
Status: DISCONTINUED | OUTPATIENT
Start: 2023-04-09 | End: 2023-04-11

## 2023-04-08 RX ORDER — FUROSEMIDE 10 MG/ML
40 INJECTION INTRAMUSCULAR; INTRAVENOUS ONCE
Status: COMPLETED | OUTPATIENT
Start: 2023-04-08 | End: 2023-04-08

## 2023-04-08 RX ORDER — ALBUTEROL SULFATE 2.5 MG/3ML
2.5 SOLUTION RESPIRATORY (INHALATION) EVERY 4 HOURS PRN
Status: DISCONTINUED | OUTPATIENT
Start: 2023-04-08 | End: 2023-04-21 | Stop reason: HOSPADM

## 2023-04-08 RX ORDER — DEXTROSE AND SODIUM CHLORIDE 5; .45 G/100ML; G/100ML
INJECTION, SOLUTION INTRAVENOUS CONTINUOUS
Status: DISCONTINUED | OUTPATIENT
Start: 2023-04-08 | End: 2023-04-11

## 2023-04-08 RX ORDER — ALBUTEROL SULFATE 2.5 MG/3ML
2.5 SOLUTION RESPIRATORY (INHALATION) ONCE
Status: COMPLETED | OUTPATIENT
Start: 2023-04-08 | End: 2023-04-08

## 2023-04-08 RX ORDER — FUROSEMIDE 40 MG/1
40 TABLET ORAL DAILY
Status: DISCONTINUED | OUTPATIENT
Start: 2023-04-09 | End: 2023-04-08

## 2023-04-08 RX ORDER — MORPHINE SULFATE 2 MG/ML
2 INJECTION, SOLUTION INTRAMUSCULAR; INTRAVENOUS ONCE
Status: COMPLETED | OUTPATIENT
Start: 2023-04-08 | End: 2023-04-08

## 2023-04-08 RX ADMIN — MORPHINE SULFATE 2 MG: 2 INJECTION, SOLUTION INTRAMUSCULAR; INTRAVENOUS at 02:38

## 2023-04-08 RX ADMIN — SODIUM CHLORIDE, PRESERVATIVE FREE 5 ML: 5 INJECTION INTRAVENOUS at 21:26

## 2023-04-08 RX ADMIN — PANTOPRAZOLE SODIUM 40 MG: 40 TABLET, DELAYED RELEASE ORAL at 18:05

## 2023-04-08 RX ADMIN — LEVETIRACETAM 500 MG: 500 INJECTION, SOLUTION INTRAVENOUS at 16:35

## 2023-04-08 RX ADMIN — PIPERACILLIN AND TAZOBACTAM 3375 MG: 3; .375 INJECTION, POWDER, LYOPHILIZED, FOR SOLUTION INTRAVENOUS at 00:58

## 2023-04-08 RX ADMIN — ALBUTEROL SULFATE 2.5 MG: 2.5 SOLUTION RESPIRATORY (INHALATION) at 11:18

## 2023-04-08 RX ADMIN — LACTULOSE 20 G: 20 SOLUTION ORAL at 21:25

## 2023-04-08 RX ADMIN — Medication 4 TABLET: at 09:11

## 2023-04-08 RX ADMIN — PIPERACILLIN AND TAZOBACTAM 3375 MG: 3; .375 INJECTION, POWDER, LYOPHILIZED, FOR SOLUTION INTRAVENOUS at 21:27

## 2023-04-08 RX ADMIN — FUROSEMIDE 40 MG: 10 INJECTION, SOLUTION INTRAMUSCULAR; INTRAVENOUS at 04:53

## 2023-04-08 RX ADMIN — SODIUM CHLORIDE, PRESERVATIVE FREE 10 ML: 5 INJECTION INTRAVENOUS at 09:12

## 2023-04-08 RX ADMIN — PIPERACILLIN AND TAZOBACTAM 3375 MG: 3; .375 INJECTION, POWDER, LYOPHILIZED, FOR SOLUTION INTRAVENOUS at 09:11

## 2023-04-08 RX ADMIN — SODIUM BICARBONATE 1300 MG: 650 TABLET ORAL at 09:12

## 2023-04-08 RX ADMIN — LACTULOSE 20 G: 20 SOLUTION ORAL at 16:58

## 2023-04-08 RX ADMIN — Medication 4 TABLET: at 16:44

## 2023-04-08 RX ADMIN — LACTULOSE 20 G: 20 SOLUTION ORAL at 09:11

## 2023-04-08 RX ADMIN — SODIUM BICARBONATE 1300 MG: 650 TABLET ORAL at 21:25

## 2023-04-08 RX ADMIN — ALBUTEROL SULFATE 2.5 MG: 2.5 SOLUTION RESPIRATORY (INHALATION) at 02:23

## 2023-04-08 RX ADMIN — LORAZEPAM 1 MG: 2 INJECTION INTRAMUSCULAR; INTRAVENOUS at 03:18

## 2023-04-08 RX ADMIN — DEXTROSE AND SODIUM CHLORIDE: 5; 450 INJECTION, SOLUTION INTRAVENOUS at 16:35

## 2023-04-08 RX ADMIN — LEVETIRACETAM 500 MG: 500 INJECTION, SOLUTION INTRAVENOUS at 04:54

## 2023-04-08 RX ADMIN — VANCOMYCIN HYDROCHLORIDE 1000 MG: 1 INJECTION, POWDER, LYOPHILIZED, FOR SOLUTION INTRAVENOUS at 15:13

## 2023-04-08 RX ADMIN — Medication 4 TABLET: at 21:25

## 2023-04-08 RX ADMIN — PIPERACILLIN AND TAZOBACTAM 3375 MG: 3; .375 INJECTION, POWDER, LYOPHILIZED, FOR SOLUTION INTRAVENOUS at 15:19

## 2023-04-08 RX ADMIN — PANTOPRAZOLE SODIUM 40 MG: 40 TABLET, DELAYED RELEASE ORAL at 06:32

## 2023-04-08 ASSESSMENT — PAIN SCALES - GENERAL
PAINLEVEL_OUTOF10: 0

## 2023-04-09 PROBLEM — E83.42 HYPOMAGNESEMIA: Status: ACTIVE | Noted: 2023-04-09

## 2023-04-09 LAB
ALBUMIN SERPL-MCNC: 1.6 G/DL (ref 3.5–5)
ALBUMIN/GLOB SERPL: 0.3 (ref 0.4–1.6)
ALP SERPL-CCNC: 190 U/L (ref 50–136)
ALT SERPL-CCNC: 13 U/L (ref 12–65)
ANION GAP SERPL CALC-SCNC: 3 MMOL/L (ref 2–11)
AST SERPL-CCNC: 26 U/L (ref 15–37)
BACTERIA SPEC CULT: NORMAL
BASOPHILS # BLD: 0.1 K/UL (ref 0–0.2)
BASOPHILS NFR BLD: 0 % (ref 0–2)
BILIRUB SERPL-MCNC: 0.8 MG/DL (ref 0.2–1.1)
BUN SERPL-MCNC: 15 MG/DL (ref 6–23)
CALCIUM SERPL-MCNC: 8.5 MG/DL (ref 8.3–10.4)
CHLORIDE SERPL-SCNC: 108 MMOL/L (ref 101–110)
CO2 SERPL-SCNC: 26 MMOL/L (ref 21–32)
CREAT SERPL-MCNC: 2 MG/DL (ref 0.8–1.5)
DIFFERENTIAL METHOD BLD: ABNORMAL
EOSINOPHIL # BLD: 0.6 K/UL (ref 0–0.8)
EOSINOPHIL NFR BLD: 3 % (ref 0.5–7.8)
ERYTHROCYTE [DISTWIDTH] IN BLOOD BY AUTOMATED COUNT: 16.8 % (ref 11.9–14.6)
GLOBULIN SER CALC-MCNC: 5.5 G/DL (ref 2.8–4.5)
GLUCOSE BLD STRIP.AUTO-MCNC: 128 MG/DL (ref 65–100)
GLUCOSE BLD STRIP.AUTO-MCNC: 160 MG/DL (ref 65–100)
GLUCOSE BLD STRIP.AUTO-MCNC: 166 MG/DL (ref 65–100)
GLUCOSE BLD STRIP.AUTO-MCNC: 186 MG/DL (ref 65–100)
GLUCOSE SERPL-MCNC: 124 MG/DL (ref 65–100)
HCT VFR BLD AUTO: 26.1 % (ref 41.1–50.3)
HEMOCCULT STL QL: NEGATIVE
HGB BLD-MCNC: 8.6 G/DL (ref 13.6–17.2)
IMM GRANULOCYTES # BLD AUTO: 0.1 K/UL (ref 0–0.5)
IMM GRANULOCYTES NFR BLD AUTO: 1 % (ref 0–5)
LYMPHOCYTES # BLD: 2.1 K/UL (ref 0.5–4.6)
LYMPHOCYTES NFR BLD: 10 % (ref 13–44)
MAGNESIUM SERPL-MCNC: 1.6 MG/DL (ref 1.8–2.4)
MCH RBC QN AUTO: 25.7 PG (ref 26.1–32.9)
MCHC RBC AUTO-ENTMCNC: 33 G/DL (ref 31.4–35)
MCV RBC AUTO: 77.9 FL (ref 82–102)
MONOCYTES # BLD: 1.4 K/UL (ref 0.1–1.3)
MONOCYTES NFR BLD: 7 % (ref 4–12)
NEUTS SEG # BLD: 17.1 K/UL (ref 1.7–8.2)
NEUTS SEG NFR BLD: 80 % (ref 43–78)
NRBC # BLD: 0 K/UL (ref 0–0.2)
PLATELET # BLD AUTO: 71 K/UL (ref 150–450)
PMV BLD AUTO: 10 FL (ref 9.4–12.3)
POTASSIUM SERPL-SCNC: 4 MMOL/L (ref 3.5–5.1)
PROT SERPL-MCNC: 7.1 G/DL (ref 6.3–8.2)
RBC # BLD AUTO: 3.35 M/UL (ref 4.23–5.6)
SERVICE CMNT-IMP: ABNORMAL
SERVICE CMNT-IMP: NORMAL
SODIUM SERPL-SCNC: 137 MMOL/L (ref 133–143)
VANCOMYCIN SERPL-MCNC: 24.1 UG/ML
WBC # BLD AUTO: 21.3 K/UL (ref 4.3–11.1)

## 2023-04-09 PROCEDURE — 80202 ASSAY OF VANCOMYCIN: CPT

## 2023-04-09 PROCEDURE — 36415 COLL VENOUS BLD VENIPUNCTURE: CPT

## 2023-04-09 PROCEDURE — 87641 MR-STAPH DNA AMP PROBE: CPT

## 2023-04-09 PROCEDURE — 6360000002 HC RX W HCPCS: Performed by: FAMILY MEDICINE

## 2023-04-09 PROCEDURE — 6370000000 HC RX 637 (ALT 250 FOR IP): Performed by: FAMILY MEDICINE

## 2023-04-09 PROCEDURE — 94760 N-INVAS EAR/PLS OXIMETRY 1: CPT

## 2023-04-09 PROCEDURE — 80053 COMPREHEN METABOLIC PANEL: CPT

## 2023-04-09 PROCEDURE — 83735 ASSAY OF MAGNESIUM: CPT

## 2023-04-09 PROCEDURE — 1100000003 HC PRIVATE W/ TELEMETRY

## 2023-04-09 PROCEDURE — 2700000000 HC OXYGEN THERAPY PER DAY

## 2023-04-09 PROCEDURE — 85025 COMPLETE CBC W/AUTO DIFF WBC: CPT

## 2023-04-09 PROCEDURE — 94640 AIRWAY INHALATION TREATMENT: CPT

## 2023-04-09 PROCEDURE — 87205 SMEAR GRAM STAIN: CPT

## 2023-04-09 PROCEDURE — 87186 SC STD MICRODIL/AGAR DIL: CPT

## 2023-04-09 PROCEDURE — 82272 OCCULT BLD FECES 1-3 TESTS: CPT

## 2023-04-09 PROCEDURE — 82962 GLUCOSE BLOOD TEST: CPT

## 2023-04-09 PROCEDURE — 6360000002 HC RX W HCPCS: Performed by: INTERNAL MEDICINE

## 2023-04-09 PROCEDURE — 87077 CULTURE AEROBIC IDENTIFY: CPT

## 2023-04-09 PROCEDURE — 2580000003 HC RX 258: Performed by: FAMILY MEDICINE

## 2023-04-09 PROCEDURE — 2580000003 HC RX 258: Performed by: INTERNAL MEDICINE

## 2023-04-09 RX ORDER — POTASSIUM CHLORIDE 20 MEQ/1
40 TABLET, EXTENDED RELEASE ORAL PRN
Status: DISCONTINUED | OUTPATIENT
Start: 2023-04-09 | End: 2023-04-21 | Stop reason: HOSPADM

## 2023-04-09 RX ORDER — MAGNESIUM SULFATE IN WATER 40 MG/ML
2000 INJECTION, SOLUTION INTRAVENOUS PRN
Status: DISCONTINUED | OUTPATIENT
Start: 2023-04-09 | End: 2023-04-21 | Stop reason: HOSPADM

## 2023-04-09 RX ORDER — POTASSIUM CHLORIDE 7.45 MG/ML
10 INJECTION INTRAVENOUS PRN
Status: DISCONTINUED | OUTPATIENT
Start: 2023-04-09 | End: 2023-04-21 | Stop reason: HOSPADM

## 2023-04-09 RX ADMIN — ALBUTEROL SULFATE 2.5 MG: 2.5 SOLUTION RESPIRATORY (INHALATION) at 03:02

## 2023-04-09 RX ADMIN — SODIUM CHLORIDE, PRESERVATIVE FREE 5 ML: 5 INJECTION INTRAVENOUS at 20:39

## 2023-04-09 RX ADMIN — ALBUTEROL SULFATE 2.5 MG: 2.5 SOLUTION RESPIRATORY (INHALATION) at 21:17

## 2023-04-09 RX ADMIN — LEVETIRACETAM 500 MG: 500 INJECTION, SOLUTION INTRAVENOUS at 16:42

## 2023-04-09 RX ADMIN — DEXTROSE AND SODIUM CHLORIDE: 5; 450 INJECTION, SOLUTION INTRAVENOUS at 13:05

## 2023-04-09 RX ADMIN — PIPERACILLIN AND TAZOBACTAM 3375 MG: 3; .375 INJECTION, POWDER, LYOPHILIZED, FOR SOLUTION INTRAVENOUS at 06:11

## 2023-04-09 RX ADMIN — Medication 4 TABLET: at 14:42

## 2023-04-09 RX ADMIN — PIPERACILLIN AND TAZOBACTAM 3375 MG: 3; .375 INJECTION, POWDER, LYOPHILIZED, FOR SOLUTION INTRAVENOUS at 14:42

## 2023-04-09 RX ADMIN — Medication 4 TABLET: at 09:55

## 2023-04-09 RX ADMIN — LEVETIRACETAM 500 MG: 500 INJECTION, SOLUTION INTRAVENOUS at 04:37

## 2023-04-09 RX ADMIN — MAGNESIUM SULFATE HEPTAHYDRATE 2000 MG: 40 INJECTION, SOLUTION INTRAVENOUS at 02:59

## 2023-04-09 RX ADMIN — LACTULOSE 20 G: 20 SOLUTION ORAL at 09:57

## 2023-04-09 RX ADMIN — SODIUM CHLORIDE, PRESERVATIVE FREE 10 ML: 5 INJECTION INTRAVENOUS at 09:57

## 2023-04-09 RX ADMIN — PANTOPRAZOLE SODIUM 40 MG: 40 TABLET, DELAYED RELEASE ORAL at 16:48

## 2023-04-09 RX ADMIN — PIPERACILLIN AND TAZOBACTAM 3375 MG: 3; .375 INJECTION, POWDER, LYOPHILIZED, FOR SOLUTION INTRAVENOUS at 21:31

## 2023-04-09 RX ADMIN — PANTOPRAZOLE SODIUM 40 MG: 40 TABLET, DELAYED RELEASE ORAL at 06:11

## 2023-04-09 RX ADMIN — Medication 4 TABLET: at 20:39

## 2023-04-09 ASSESSMENT — PAIN SCALES - GENERAL
PAINLEVEL_OUTOF10: 0

## 2023-04-10 ENCOUNTER — APPOINTMENT (OUTPATIENT)
Dept: CT IMAGING | Age: 58
DRG: 871 | End: 2023-04-10
Payer: MEDICARE

## 2023-04-10 ENCOUNTER — APPOINTMENT (OUTPATIENT)
Dept: GENERAL RADIOLOGY | Age: 58
DRG: 871 | End: 2023-04-10
Payer: MEDICARE

## 2023-04-10 ENCOUNTER — APPOINTMENT (OUTPATIENT)
Dept: MRI IMAGING | Age: 58
DRG: 871 | End: 2023-04-10
Payer: MEDICARE

## 2023-04-10 LAB
ANION GAP SERPL CALC-SCNC: 4 MMOL/L (ref 2–11)
BASOPHILS # BLD: 0.1 K/UL (ref 0–0.2)
BASOPHILS NFR BLD: 0 % (ref 0–2)
BUN SERPL-MCNC: 16 MG/DL (ref 6–23)
CALCIUM SERPL-MCNC: 8.6 MG/DL (ref 8.3–10.4)
CHLORIDE SERPL-SCNC: 106 MMOL/L (ref 101–110)
CO2 SERPL-SCNC: 24 MMOL/L (ref 21–32)
CREAT SERPL-MCNC: 1.7 MG/DL (ref 0.8–1.5)
DIFFERENTIAL METHOD BLD: ABNORMAL
EOSINOPHIL # BLD: 0.5 K/UL (ref 0–0.8)
EOSINOPHIL NFR BLD: 2 % (ref 0.5–7.8)
ERYTHROCYTE [DISTWIDTH] IN BLOOD BY AUTOMATED COUNT: 17.4 % (ref 11.9–14.6)
EST. AVERAGE GLUCOSE BLD GHB EST-MCNC: 105 MG/DL
GLUCOSE BLD STRIP.AUTO-MCNC: 127 MG/DL (ref 65–100)
GLUCOSE BLD STRIP.AUTO-MCNC: 134 MG/DL (ref 65–100)
GLUCOSE BLD STRIP.AUTO-MCNC: 136 MG/DL (ref 65–100)
GLUCOSE BLD STRIP.AUTO-MCNC: 137 MG/DL (ref 65–100)
GLUCOSE BLD STRIP.AUTO-MCNC: 160 MG/DL (ref 65–100)
GLUCOSE SERPL-MCNC: 123 MG/DL (ref 65–100)
HBA1C MFR BLD: 5.3 % (ref 4.8–5.6)
HCT VFR BLD AUTO: 25.5 % (ref 41.1–50.3)
HGB BLD-MCNC: 8.4 G/DL (ref 13.6–17.2)
IMM GRANULOCYTES # BLD AUTO: 0.2 K/UL (ref 0–0.5)
IMM GRANULOCYTES NFR BLD AUTO: 1 % (ref 0–5)
LYMPHOCYTES # BLD: 1.7 K/UL (ref 0.5–4.6)
LYMPHOCYTES NFR BLD: 8 % (ref 13–44)
MAGNESIUM SERPL-MCNC: 1.8 MG/DL (ref 1.8–2.4)
MCH RBC QN AUTO: 26 PG (ref 26.1–32.9)
MCHC RBC AUTO-ENTMCNC: 32.9 G/DL (ref 31.4–35)
MCV RBC AUTO: 78.9 FL (ref 82–102)
MONOCYTES # BLD: 1.6 K/UL (ref 0.1–1.3)
MONOCYTES NFR BLD: 8 % (ref 4–12)
NEUTS SEG # BLD: 17.5 K/UL (ref 1.7–8.2)
NEUTS SEG NFR BLD: 81 % (ref 43–78)
NRBC # BLD: 0 K/UL (ref 0–0.2)
PLATELET # BLD AUTO: 50 K/UL (ref 150–450)
PMV BLD AUTO: 10 FL (ref 9.4–12.3)
POTASSIUM SERPL-SCNC: 3.6 MMOL/L (ref 3.5–5.1)
RBC # BLD AUTO: 3.23 M/UL (ref 4.23–5.6)
SERVICE CMNT-IMP: ABNORMAL
SODIUM SERPL-SCNC: 134 MMOL/L (ref 133–143)
WBC # BLD AUTO: 21.5 K/UL (ref 4.3–11.1)

## 2023-04-10 PROCEDURE — 71045 X-RAY EXAM CHEST 1 VIEW: CPT

## 2023-04-10 PROCEDURE — 6370000000 HC RX 637 (ALT 250 FOR IP): Performed by: FAMILY MEDICINE

## 2023-04-10 PROCEDURE — 1100000003 HC PRIVATE W/ TELEMETRY

## 2023-04-10 PROCEDURE — 6360000002 HC RX W HCPCS: Performed by: INTERNAL MEDICINE

## 2023-04-10 PROCEDURE — 2500000003 HC RX 250 WO HCPCS: Performed by: FAMILY MEDICINE

## 2023-04-10 PROCEDURE — A9579 GAD-BASE MR CONTRAST NOS,1ML: HCPCS | Performed by: INTERNAL MEDICINE

## 2023-04-10 PROCEDURE — 82962 GLUCOSE BLOOD TEST: CPT

## 2023-04-10 PROCEDURE — 6360000004 HC RX CONTRAST MEDICATION: Performed by: INTERNAL MEDICINE

## 2023-04-10 PROCEDURE — 6360000002 HC RX W HCPCS: Performed by: FAMILY MEDICINE

## 2023-04-10 PROCEDURE — 85025 COMPLETE CBC W/AUTO DIFF WBC: CPT

## 2023-04-10 PROCEDURE — 36415 COLL VENOUS BLD VENIPUNCTURE: CPT

## 2023-04-10 PROCEDURE — 73720 MRI LWR EXTREMITY W/O&W/DYE: CPT

## 2023-04-10 PROCEDURE — 71250 CT THORAX DX C-: CPT

## 2023-04-10 PROCEDURE — 2580000003 HC RX 258: Performed by: FAMILY MEDICINE

## 2023-04-10 PROCEDURE — 83735 ASSAY OF MAGNESIUM: CPT

## 2023-04-10 PROCEDURE — 80048 BASIC METABOLIC PNL TOTAL CA: CPT

## 2023-04-10 PROCEDURE — 2580000003 HC RX 258: Performed by: INTERNAL MEDICINE

## 2023-04-10 RX ORDER — HYDRALAZINE HYDROCHLORIDE 20 MG/ML
10 INJECTION INTRAMUSCULAR; INTRAVENOUS EVERY 6 HOURS PRN
Status: DISCONTINUED | OUTPATIENT
Start: 2023-04-10 | End: 2023-04-21 | Stop reason: HOSPADM

## 2023-04-10 RX ORDER — SODIUM CHLORIDE 0.9 % (FLUSH) 0.9 %
10 SYRINGE (ML) INJECTION AS NEEDED
Status: DISCONTINUED | OUTPATIENT
Start: 2023-04-10 | End: 2023-04-21 | Stop reason: HOSPADM

## 2023-04-10 RX ADMIN — LEVETIRACETAM 500 MG: 500 INJECTION, SOLUTION INTRAVENOUS at 16:22

## 2023-04-10 RX ADMIN — GADOTERIDOL 19 ML: 279.3 INJECTION, SOLUTION INTRAVENOUS at 19:28

## 2023-04-10 RX ADMIN — Medication 4 TABLET: at 14:39

## 2023-04-10 RX ADMIN — LACTULOSE 20 G: 20 SOLUTION ORAL at 08:58

## 2023-04-10 RX ADMIN — SODIUM CHLORIDE, PRESERVATIVE FREE 10 ML: 5 INJECTION INTRAVENOUS at 19:28

## 2023-04-10 RX ADMIN — Medication 4 TABLET: at 08:57

## 2023-04-10 RX ADMIN — HYDRALAZINE HYDROCHLORIDE 10 MG: 20 INJECTION INTRAMUSCULAR; INTRAVENOUS at 02:05

## 2023-04-10 RX ADMIN — PIPERACILLIN AND TAZOBACTAM 3375 MG: 3; .375 INJECTION, POWDER, LYOPHILIZED, FOR SOLUTION INTRAVENOUS at 06:06

## 2023-04-10 RX ADMIN — LEVETIRACETAM 500 MG: 500 INJECTION, SOLUTION INTRAVENOUS at 03:54

## 2023-04-10 RX ADMIN — PIPERACILLIN AND TAZOBACTAM 3375 MG: 3; .375 INJECTION, POWDER, LYOPHILIZED, FOR SOLUTION INTRAVENOUS at 22:04

## 2023-04-10 RX ADMIN — SODIUM CHLORIDE, PRESERVATIVE FREE 10 ML: 5 INJECTION INTRAVENOUS at 20:30

## 2023-04-10 RX ADMIN — Medication 4 TABLET: at 22:04

## 2023-04-10 RX ADMIN — SODIUM CHLORIDE, PRESERVATIVE FREE 10 ML: 5 INJECTION INTRAVENOUS at 09:06

## 2023-04-10 RX ADMIN — PANTOPRAZOLE SODIUM 40 MG: 40 TABLET, DELAYED RELEASE ORAL at 06:13

## 2023-04-10 RX ADMIN — PIPERACILLIN AND TAZOBACTAM 3375 MG: 3; .375 INJECTION, POWDER, LYOPHILIZED, FOR SOLUTION INTRAVENOUS at 14:39

## 2023-04-10 RX ADMIN — TUBERCULIN PURIFIED PROTEIN DERIVATIVE 5 UNITS: 5 INJECTION, SOLUTION INTRADERMAL at 13:12

## 2023-04-10 RX ADMIN — VANCOMYCIN HYDROCHLORIDE 750 MG: 750 INJECTION, POWDER, LYOPHILIZED, FOR SOLUTION INTRAVENOUS at 10:20

## 2023-04-10 RX ADMIN — PANTOPRAZOLE SODIUM 40 MG: 40 TABLET, DELAYED RELEASE ORAL at 16:22

## 2023-04-10 RX ADMIN — DEXTROSE AND SODIUM CHLORIDE: 5; 450 INJECTION, SOLUTION INTRAVENOUS at 12:53

## 2023-04-10 RX ADMIN — FUROSEMIDE 40 MG: 10 INJECTION, SOLUTION INTRAMUSCULAR; INTRAVENOUS at 06:06

## 2023-04-10 ASSESSMENT — PAIN SCALES - GENERAL
PAINLEVEL_OUTOF10: 0
PAINLEVEL_OUTOF10: 0

## 2023-04-11 PROBLEM — J18.9 PNEUMONIA: Status: ACTIVE | Noted: 2023-04-11

## 2023-04-11 PROBLEM — D69.6 THROMBOCYTOPENIA (HCC): Status: ACTIVE | Noted: 2023-04-11

## 2023-04-11 PROBLEM — R56.9 SEIZURE-LIKE ACTIVITY (HCC): Status: ACTIVE | Noted: 2023-04-11

## 2023-04-11 PROBLEM — N48.9 PENILE LESION: Status: ACTIVE | Noted: 2023-04-11

## 2023-04-11 LAB
AMMONIA PLAS-SCNC: <10 UMOL/L (ref 11–32)
ANION GAP SERPL CALC-SCNC: 6 MMOL/L (ref 2–11)
BACTERIA SPEC CULT: ABNORMAL
BACTERIA SPEC CULT: ABNORMAL
BACTERIA SPEC CULT: NORMAL
BACTERIA SPEC CULT: NORMAL
BASOPHILS # BLD: 0.1 K/UL (ref 0–0.2)
BASOPHILS NFR BLD: 0 % (ref 0–2)
BUN SERPL-MCNC: 15 MG/DL (ref 6–23)
CALCIUM SERPL-MCNC: 8.8 MG/DL (ref 8.3–10.4)
CHLORIDE SERPL-SCNC: 107 MMOL/L (ref 101–110)
CO2 SERPL-SCNC: 23 MMOL/L (ref 21–32)
CREAT SERPL-MCNC: 1.7 MG/DL (ref 0.8–1.5)
DIFFERENTIAL METHOD BLD: ABNORMAL
EOSINOPHIL # BLD: 0.3 K/UL (ref 0–0.8)
EOSINOPHIL NFR BLD: 2 % (ref 0.5–7.8)
ERYTHROCYTE [DISTWIDTH] IN BLOOD BY AUTOMATED COUNT: 17.9 % (ref 11.9–14.6)
GLUCOSE BLD STRIP.AUTO-MCNC: 122 MG/DL (ref 65–100)
GLUCOSE BLD STRIP.AUTO-MCNC: 132 MG/DL (ref 65–100)
GLUCOSE BLD STRIP.AUTO-MCNC: 153 MG/DL (ref 65–100)
GLUCOSE BLD STRIP.AUTO-MCNC: 189 MG/DL (ref 65–100)
GLUCOSE SERPL-MCNC: 127 MG/DL (ref 65–100)
GRAM STN SPEC: ABNORMAL
GRAM STN SPEC: ABNORMAL
HAV IGM SER QL: NONREACTIVE
HBV CORE IGM SER QL: NONREACTIVE
HBV SURFACE AG SER QL: NONREACTIVE
HCT VFR BLD AUTO: 26.8 % (ref 41.1–50.3)
HCV AB SER QL: NONREACTIVE
HGB BLD-MCNC: 8.9 G/DL (ref 13.6–17.2)
IMM GRANULOCYTES # BLD AUTO: 0.2 K/UL (ref 0–0.5)
IMM GRANULOCYTES NFR BLD AUTO: 1 % (ref 0–5)
LYMPHOCYTES # BLD: 1.5 K/UL (ref 0.5–4.6)
LYMPHOCYTES NFR BLD: 7 % (ref 13–44)
MCH RBC QN AUTO: 26.1 PG (ref 26.1–32.9)
MCHC RBC AUTO-ENTMCNC: 33.2 G/DL (ref 31.4–35)
MCV RBC AUTO: 78.6 FL (ref 82–102)
MM INDURATION, POC: 0 MM (ref 0–5)
MONOCYTES # BLD: 1.5 K/UL (ref 0.1–1.3)
MONOCYTES NFR BLD: 8 % (ref 4–12)
NEUTS SEG # BLD: 16.4 K/UL (ref 1.7–8.2)
NEUTS SEG NFR BLD: 82 % (ref 43–78)
NRBC # BLD: 0 K/UL (ref 0–0.2)
PLATELET # BLD AUTO: 35 K/UL (ref 150–450)
PMV BLD AUTO: 10.7 FL (ref 9.4–12.3)
POTASSIUM SERPL-SCNC: 3.6 MMOL/L (ref 3.5–5.1)
PPD, POC: NEGATIVE
RBC # BLD AUTO: 3.41 M/UL (ref 4.23–5.6)
SERVICE CMNT-IMP: ABNORMAL
SERVICE CMNT-IMP: NORMAL
SERVICE CMNT-IMP: NORMAL
SODIUM SERPL-SCNC: 136 MMOL/L (ref 133–143)
WBC # BLD AUTO: 19.9 K/UL (ref 4.3–11.1)

## 2023-04-11 PROCEDURE — 6360000002 HC RX W HCPCS: Performed by: INTERNAL MEDICINE

## 2023-04-11 PROCEDURE — 2580000003 HC RX 258: Performed by: FAMILY MEDICINE

## 2023-04-11 PROCEDURE — 1100000003 HC PRIVATE W/ TELEMETRY

## 2023-04-11 PROCEDURE — 94761 N-INVAS EAR/PLS OXIMETRY MLT: CPT

## 2023-04-11 PROCEDURE — 82962 GLUCOSE BLOOD TEST: CPT

## 2023-04-11 PROCEDURE — 80048 BASIC METABOLIC PNL TOTAL CA: CPT

## 2023-04-11 PROCEDURE — 36415 COLL VENOUS BLD VENIPUNCTURE: CPT

## 2023-04-11 PROCEDURE — 80074 ACUTE HEPATITIS PANEL: CPT

## 2023-04-11 PROCEDURE — 82140 ASSAY OF AMMONIA: CPT

## 2023-04-11 PROCEDURE — 94640 AIRWAY INHALATION TREATMENT: CPT

## 2023-04-11 PROCEDURE — 2580000003 HC RX 258: Performed by: INTERNAL MEDICINE

## 2023-04-11 PROCEDURE — 6360000002 HC RX W HCPCS: Performed by: FAMILY MEDICINE

## 2023-04-11 PROCEDURE — 85025 COMPLETE CBC W/AUTO DIFF WBC: CPT

## 2023-04-11 PROCEDURE — 6370000000 HC RX 637 (ALT 250 FOR IP): Performed by: FAMILY MEDICINE

## 2023-04-11 RX ORDER — SPIRONOLACTONE 25 MG/1
25 TABLET ORAL DAILY
Status: DISCONTINUED | OUTPATIENT
Start: 2023-04-11 | End: 2023-04-21 | Stop reason: HOSPADM

## 2023-04-11 RX ORDER — LANOLIN ALCOHOL/MO/W.PET/CERES
6 CREAM (GRAM) TOPICAL NIGHTLY PRN
Status: DISCONTINUED | OUTPATIENT
Start: 2023-04-11 | End: 2023-04-21 | Stop reason: HOSPADM

## 2023-04-11 RX ORDER — FUROSEMIDE 10 MG/ML
40 INJECTION INTRAMUSCULAR; INTRAVENOUS 2 TIMES DAILY
Status: DISCONTINUED | OUTPATIENT
Start: 2023-04-11 | End: 2023-04-13

## 2023-04-11 RX ADMIN — LEVETIRACETAM 500 MG: 500 INJECTION, SOLUTION INTRAVENOUS at 05:30

## 2023-04-11 RX ADMIN — ALBUTEROL SULFATE 2.5 MG: 2.5 SOLUTION RESPIRATORY (INHALATION) at 12:29

## 2023-04-11 RX ADMIN — PANTOPRAZOLE SODIUM 40 MG: 40 TABLET, DELAYED RELEASE ORAL at 05:33

## 2023-04-11 RX ADMIN — FUROSEMIDE 40 MG: 10 INJECTION, SOLUTION INTRAMUSCULAR; INTRAVENOUS at 08:49

## 2023-04-11 RX ADMIN — THIAMINE HYDROCHLORIDE 500 MG: 100 INJECTION, SOLUTION INTRAMUSCULAR; INTRAVENOUS at 16:37

## 2023-04-11 RX ADMIN — FUROSEMIDE 40 MG: 10 INJECTION, SOLUTION INTRAMUSCULAR; INTRAVENOUS at 17:42

## 2023-04-11 RX ADMIN — Medication 4 TABLET: at 14:52

## 2023-04-11 RX ADMIN — ALBUTEROL SULFATE 2.5 MG: 2.5 SOLUTION RESPIRATORY (INHALATION) at 00:41

## 2023-04-11 RX ADMIN — SPIRONOLACTONE 25 MG: 25 TABLET ORAL at 12:14

## 2023-04-11 RX ADMIN — PANTOPRAZOLE SODIUM 40 MG: 40 TABLET, DELAYED RELEASE ORAL at 14:52

## 2023-04-11 RX ADMIN — SODIUM CHLORIDE, PRESERVATIVE FREE 5 ML: 5 INJECTION INTRAVENOUS at 08:50

## 2023-04-11 RX ADMIN — MEROPENEM 1000 MG: 1 INJECTION, POWDER, FOR SOLUTION INTRAVENOUS at 14:45

## 2023-04-11 RX ADMIN — VANCOMYCIN HYDROCHLORIDE 750 MG: 750 INJECTION, POWDER, LYOPHILIZED, FOR SOLUTION INTRAVENOUS at 11:04

## 2023-04-11 RX ADMIN — Medication 4 TABLET: at 08:50

## 2023-04-11 ASSESSMENT — PAIN SCALES - WONG BAKER: WONGBAKER_NUMERICALRESPONSE: 0

## 2023-04-11 ASSESSMENT — PAIN SCALES - GENERAL
PAINLEVEL_OUTOF10: 0

## 2023-04-12 ENCOUNTER — APPOINTMENT (OUTPATIENT)
Dept: GENERAL RADIOLOGY | Age: 58
DRG: 871 | End: 2023-04-12
Payer: MEDICARE

## 2023-04-12 LAB
ANION GAP SERPL CALC-SCNC: 8 MMOL/L (ref 2–11)
B PERT DNA SPEC QL NAA+PROBE: NOT DETECTED
BASOPHILS # BLD: 0 K/UL (ref 0–0.2)
BASOPHILS NFR BLD: 0 % (ref 0–2)
BORDETELLA PARAPERTUSSIS BY PCR: NOT DETECTED
BUN SERPL-MCNC: 24 MG/DL (ref 6–23)
C PNEUM DNA SPEC QL NAA+PROBE: NOT DETECTED
CALCIUM SERPL-MCNC: 9 MG/DL (ref 8.3–10.4)
CHLORIDE SERPL-SCNC: 106 MMOL/L (ref 101–110)
CO2 SERPL-SCNC: 24 MMOL/L (ref 21–32)
CREAT SERPL-MCNC: 1.6 MG/DL (ref 0.8–1.5)
DIFFERENTIAL METHOD BLD: ABNORMAL
EOSINOPHIL # BLD: 0.3 K/UL (ref 0–0.8)
EOSINOPHIL NFR BLD: 2 % (ref 0.5–7.8)
ERYTHROCYTE [DISTWIDTH] IN BLOOD BY AUTOMATED COUNT: 18.5 % (ref 11.9–14.6)
FLUAV SUBTYP SPEC NAA+PROBE: NOT DETECTED
FLUBV RNA SPEC QL NAA+PROBE: NOT DETECTED
GLUCOSE BLD STRIP.AUTO-MCNC: 116 MG/DL (ref 65–100)
GLUCOSE BLD STRIP.AUTO-MCNC: 155 MG/DL (ref 65–100)
GLUCOSE BLD STRIP.AUTO-MCNC: 163 MG/DL (ref 65–100)
GLUCOSE BLD STRIP.AUTO-MCNC: 181 MG/DL (ref 65–100)
GLUCOSE SERPL-MCNC: 98 MG/DL (ref 65–100)
HADV DNA SPEC QL NAA+PROBE: NOT DETECTED
HCOV 229E RNA SPEC QL NAA+PROBE: NOT DETECTED
HCOV HKU1 RNA SPEC QL NAA+PROBE: NOT DETECTED
HCOV NL63 RNA SPEC QL NAA+PROBE: NOT DETECTED
HCOV OC43 RNA SPEC QL NAA+PROBE: NOT DETECTED
HCT VFR BLD AUTO: 25.7 % (ref 41.1–50.3)
HGB BLD-MCNC: 8.4 G/DL (ref 13.6–17.2)
HMPV RNA SPEC QL NAA+PROBE: NOT DETECTED
HPIV1 RNA SPEC QL NAA+PROBE: NOT DETECTED
HPIV2 RNA SPEC QL NAA+PROBE: NOT DETECTED
HPIV3 RNA SPEC QL NAA+PROBE: NOT DETECTED
HPIV4 RNA SPEC QL NAA+PROBE: NOT DETECTED
IMM GRANULOCYTES # BLD AUTO: 0.1 K/UL (ref 0–0.5)
IMM GRANULOCYTES NFR BLD AUTO: 1 % (ref 0–5)
LYMPHOCYTES # BLD: 1.5 K/UL (ref 0.5–4.6)
LYMPHOCYTES NFR BLD: 7 % (ref 13–44)
M PNEUMO DNA SPEC QL NAA+PROBE: NOT DETECTED
MAGNESIUM SERPL-MCNC: 1.7 MG/DL (ref 1.8–2.4)
MCH RBC QN AUTO: 26 PG (ref 26.1–32.9)
MCHC RBC AUTO-ENTMCNC: 32.7 G/DL (ref 31.4–35)
MCV RBC AUTO: 79.6 FL (ref 82–102)
MM INDURATION, POC: 0 MM (ref 0–5)
MONOCYTES # BLD: 1.5 K/UL (ref 0.1–1.3)
MONOCYTES NFR BLD: 7 % (ref 4–12)
NEUTS SEG # BLD: 17.7 K/UL (ref 1.7–8.2)
NEUTS SEG NFR BLD: 84 % (ref 43–78)
NRBC # BLD: 0 K/UL (ref 0–0.2)
PATH REV BLD -IMP: NORMAL
PLATELET # BLD AUTO: 37 K/UL (ref 150–450)
PMV BLD AUTO: ABNORMAL FL (ref 9.4–12.3)
POTASSIUM SERPL-SCNC: 3.2 MMOL/L (ref 3.5–5.1)
PPD, POC: NEGATIVE
RBC # BLD AUTO: 3.23 M/UL (ref 4.23–5.6)
RSV RNA SPEC QL NAA+PROBE: NOT DETECTED
RV+EV RNA SPEC QL NAA+PROBE: NOT DETECTED
SARS-COV-2 RNA RESP QL NAA+PROBE: NOT DETECTED
SERVICE CMNT-IMP: ABNORMAL
SODIUM SERPL-SCNC: 138 MMOL/L (ref 133–143)
WBC # BLD AUTO: 21.2 K/UL (ref 4.3–11.1)

## 2023-04-12 PROCEDURE — 1100000003 HC PRIVATE W/ TELEMETRY

## 2023-04-12 PROCEDURE — 2700000000 HC OXYGEN THERAPY PER DAY

## 2023-04-12 PROCEDURE — 71045 X-RAY EXAM CHEST 1 VIEW: CPT

## 2023-04-12 PROCEDURE — 85025 COMPLETE CBC W/AUTO DIFF WBC: CPT

## 2023-04-12 PROCEDURE — 87045 FECES CULTURE AEROBIC BACT: CPT

## 2023-04-12 PROCEDURE — 0202U NFCT DS 22 TRGT SARS-COV-2: CPT

## 2023-04-12 PROCEDURE — 82962 GLUCOSE BLOOD TEST: CPT

## 2023-04-12 PROCEDURE — 94760 N-INVAS EAR/PLS OXIMETRY 1: CPT

## 2023-04-12 PROCEDURE — 87324 CLOSTRIDIUM AG IA: CPT

## 2023-04-12 PROCEDURE — 2580000003 HC RX 258: Performed by: FAMILY MEDICINE

## 2023-04-12 PROCEDURE — 2580000003 HC RX 258: Performed by: INTERNAL MEDICINE

## 2023-04-12 PROCEDURE — 83735 ASSAY OF MAGNESIUM: CPT

## 2023-04-12 PROCEDURE — 6360000002 HC RX W HCPCS: Performed by: FAMILY MEDICINE

## 2023-04-12 PROCEDURE — 6360000002 HC RX W HCPCS: Performed by: INTERNAL MEDICINE

## 2023-04-12 PROCEDURE — 80048 BASIC METABOLIC PNL TOTAL CA: CPT

## 2023-04-12 PROCEDURE — 6370000000 HC RX 637 (ALT 250 FOR IP): Performed by: FAMILY MEDICINE

## 2023-04-12 PROCEDURE — 36415 COLL VENOUS BLD VENIPUNCTURE: CPT

## 2023-04-12 RX ORDER — MAGNESIUM SULFATE IN WATER 40 MG/ML
2000 INJECTION, SOLUTION INTRAVENOUS ONCE
Status: COMPLETED | OUTPATIENT
Start: 2023-04-12 | End: 2023-04-12

## 2023-04-12 RX ORDER — LANOLIN ALCOHOL/MO/W.PET/CERES
250 CREAM (GRAM) TOPICAL DAILY
Status: DISCONTINUED | OUTPATIENT
Start: 2023-04-14 | End: 2023-04-13

## 2023-04-12 RX ADMIN — THIAMINE HYDROCHLORIDE 500 MG: 100 INJECTION, SOLUTION INTRAMUSCULAR; INTRAVENOUS at 09:13

## 2023-04-12 RX ADMIN — SODIUM CHLORIDE, PRESERVATIVE FREE 10 ML: 5 INJECTION INTRAVENOUS at 01:20

## 2023-04-12 RX ADMIN — MEROPENEM 1000 MG: 1 INJECTION, POWDER, FOR SOLUTION INTRAVENOUS at 09:13

## 2023-04-12 RX ADMIN — SPIRONOLACTONE 25 MG: 25 TABLET ORAL at 09:15

## 2023-04-12 RX ADMIN — Medication 4 TABLET: at 09:13

## 2023-04-12 RX ADMIN — SODIUM BICARBONATE 1300 MG: 650 TABLET ORAL at 20:49

## 2023-04-12 RX ADMIN — THIAMINE HYDROCHLORIDE 500 MG: 100 INJECTION, SOLUTION INTRAMUSCULAR; INTRAVENOUS at 01:07

## 2023-04-12 RX ADMIN — Medication 4 TABLET: at 20:49

## 2023-04-12 RX ADMIN — PANTOPRAZOLE SODIUM 40 MG: 40 TABLET, DELAYED RELEASE ORAL at 06:21

## 2023-04-12 RX ADMIN — Medication 4 TABLET: at 01:20

## 2023-04-12 RX ADMIN — MAGNESIUM SULFATE HEPTAHYDRATE 2000 MG: 40 INJECTION, SOLUTION INTRAVENOUS at 16:50

## 2023-04-12 RX ADMIN — SODIUM CHLORIDE, PRESERVATIVE FREE 10 ML: 5 INJECTION INTRAVENOUS at 09:15

## 2023-04-12 RX ADMIN — FUROSEMIDE 40 MG: 10 INJECTION, SOLUTION INTRAMUSCULAR; INTRAVENOUS at 09:15

## 2023-04-12 RX ADMIN — POTASSIUM BICARBONATE 40 MEQ: 782 TABLET, EFFERVESCENT ORAL at 11:09

## 2023-04-12 RX ADMIN — SODIUM BICARBONATE 1300 MG: 650 TABLET ORAL at 09:14

## 2023-04-12 RX ADMIN — MEROPENEM 1000 MG: 1 INJECTION, POWDER, FOR SOLUTION INTRAVENOUS at 01:13

## 2023-04-12 RX ADMIN — SODIUM CHLORIDE, PRESERVATIVE FREE 5 ML: 5 INJECTION INTRAVENOUS at 20:49

## 2023-04-12 RX ADMIN — SODIUM BICARBONATE 1300 MG: 650 TABLET ORAL at 01:26

## 2023-04-12 RX ADMIN — MEROPENEM 1000 MG: 1 INJECTION, POWDER, FOR SOLUTION INTRAVENOUS at 17:40

## 2023-04-12 RX ADMIN — THIAMINE HYDROCHLORIDE 500 MG: 100 INJECTION, SOLUTION INTRAMUSCULAR; INTRAVENOUS at 20:49

## 2023-04-12 RX ADMIN — Medication 4 TABLET: at 15:04

## 2023-04-12 RX ADMIN — FUROSEMIDE 40 MG: 10 INJECTION, SOLUTION INTRAMUSCULAR; INTRAVENOUS at 17:40

## 2023-04-12 ASSESSMENT — PAIN SCALES - WONG BAKER
WONGBAKER_NUMERICALRESPONSE: 0
WONGBAKER_NUMERICALRESPONSE: 0

## 2023-04-12 ASSESSMENT — PAIN SCALES - GENERAL
PAINLEVEL_OUTOF10: 0

## 2023-04-13 ENCOUNTER — APPOINTMENT (OUTPATIENT)
Dept: GENERAL RADIOLOGY | Age: 58
DRG: 871 | End: 2023-04-13
Payer: MEDICARE

## 2023-04-13 ENCOUNTER — APPOINTMENT (OUTPATIENT)
Dept: CT IMAGING | Age: 58
DRG: 871 | End: 2023-04-13
Payer: MEDICARE

## 2023-04-13 PROBLEM — J81.1 PULMONARY EDEMA: Status: ACTIVE | Noted: 2023-04-13

## 2023-04-13 PROBLEM — J96.02 ACUTE RESPIRATORY FAILURE WITH HYPOXIA AND HYPERCAPNIA (HCC): Status: ACTIVE | Noted: 2023-04-08

## 2023-04-13 LAB
ANION GAP SERPL CALC-SCNC: 6 MMOL/L (ref 2–11)
ARTERIAL PATENCY WRIST A: ABNORMAL
ARTERIAL PATENCY WRIST A: POSITIVE
ARTERIAL PATENCY WRIST A: POSITIVE
BASE DEFICIT BLD-SCNC: 0.9 MMOL/L
BASE DEFICIT BLD-SCNC: 6.3 MMOL/L
BASE EXCESS BLDV CALC-SCNC: 0.2 MMOL/L
BASOPHILS # BLD: 0.1 K/UL (ref 0–0.2)
BASOPHILS NFR BLD: 1 % (ref 0–2)
BDY SITE: ABNORMAL
BUN SERPL-MCNC: 30 MG/DL (ref 6–23)
CA-I BLD-MCNC: 1.2 MMOL/L (ref 1.12–1.32)
CALCIUM SERPL-MCNC: 9 MG/DL (ref 8.3–10.4)
CHLORIDE SERPL-SCNC: 107 MMOL/L (ref 101–110)
CO2 BLD-SCNC: 23 MMOL/L (ref 13–23)
CO2 BLD-SCNC: 25 MMOL/L (ref 13–23)
CO2 SERPL-SCNC: 24 MMOL/L (ref 21–32)
CREAT SERPL-MCNC: 1.6 MG/DL (ref 0.8–1.5)
D DIMER PPP FEU-MCNC: 4.43 UG/ML(FEU)
DIFFERENTIAL METHOD BLD: ABNORMAL
EOSINOPHIL # BLD: 0.5 K/UL (ref 0–0.8)
EOSINOPHIL NFR BLD: 2 % (ref 0.5–7.8)
ERYTHROCYTE [DISTWIDTH] IN BLOOD BY AUTOMATED COUNT: 18.6 % (ref 11.9–14.6)
FIO2 ON VENT: 60 %
GAS FLOW.O2 O2 DELIVERY SYS: ABNORMAL
GLUCOSE BLD STRIP.AUTO-MCNC: 120 MG/DL (ref 65–100)
GLUCOSE BLD STRIP.AUTO-MCNC: 134 MG/DL (ref 65–100)
GLUCOSE BLD STRIP.AUTO-MCNC: 138 MG/DL (ref 65–100)
GLUCOSE BLD STRIP.AUTO-MCNC: 144 MG/DL (ref 65–100)
GLUCOSE BLD STRIP.AUTO-MCNC: 152 MG/DL (ref 65–100)
GLUCOSE BLD STRIP.AUTO-MCNC: 158 MG/DL (ref 65–100)
GLUCOSE SERPL-MCNC: 115 MG/DL (ref 65–100)
HCO3 BLD-SCNC: 22.8 MMOL/L (ref 22–26)
HCO3 BLD-SCNC: 23.7 MMOL/L (ref 22–26)
HCO3 BLDV-SCNC: 25.7 MMOL/L (ref 23–28)
HCT VFR BLD AUTO: 24.9 % (ref 41.1–50.3)
HGB BLD-MCNC: 8.1 G/DL (ref 13.6–17.2)
IMM GRANULOCYTES # BLD AUTO: 0.2 K/UL (ref 0–0.5)
IMM GRANULOCYTES NFR BLD AUTO: 1 % (ref 0–5)
LACTATE SERPL-SCNC: 1.3 MMOL/L (ref 0.4–2)
LYMPHOCYTES # BLD: 1.6 K/UL (ref 0.5–4.6)
LYMPHOCYTES NFR BLD: 8 % (ref 13–44)
MAGNESIUM SERPL-MCNC: 2 MG/DL (ref 1.8–2.4)
MCH RBC QN AUTO: 26 PG (ref 26.1–32.9)
MCHC RBC AUTO-ENTMCNC: 32.5 G/DL (ref 31.4–35)
MCV RBC AUTO: 79.8 FL (ref 82–102)
MONOCYTES # BLD: 1.6 K/UL (ref 0.1–1.3)
MONOCYTES NFR BLD: 8 % (ref 4–12)
NEUTS SEG # BLD: 15.6 K/UL (ref 1.7–8.2)
NEUTS SEG NFR BLD: 80 % (ref 43–78)
NRBC # BLD: 0.02 K/UL (ref 0–0.2)
O2/TOTAL GAS SETTING VFR VENT: 100 %
O2/TOTAL GAS SETTING VFR VENT: 70 %
PCO2 BLD: 37.7 MMHG (ref 35–45)
PCO2 BLD: 64.4 MMHG (ref 35–45)
PCO2 BLDV: 44.4 MMHG (ref 41–51)
PEEP RESPIRATORY: 8
PH BLD: 7.16 (ref 7.35–7.45)
PH BLD: 7.41 (ref 7.35–7.45)
PH BLDV: 7.37 (ref 7.32–7.42)
PLATELET # BLD AUTO: 39 K/UL (ref 150–450)
PMV BLD AUTO: ABNORMAL FL (ref 9.4–12.3)
PO2 BLD: 257 MMHG (ref 75–100)
PO2 BLD: 68 MMHG (ref 75–100)
PO2 BLDV: 47 MMHG
POTASSIUM BLD-SCNC: 3.2 MMOL/L (ref 3.5–5.1)
POTASSIUM SERPL-SCNC: 3.2 MMOL/L (ref 3.5–5.1)
PRESSURE SUPPORT SETTING VENT: 4
RBC # BLD AUTO: 3.12 M/UL (ref 4.23–5.6)
RESPIRATORY RATE: 35
SAO2 % BLD: 94 %
SAO2 % BLD: 99.7 % (ref 95–98)
SAO2 % BLDV: 81.1 % (ref 65–88)
SERVICE CMNT-IMP: ABNORMAL
SODIUM BLD-SCNC: 140 MMOL/L (ref 136–145)
SODIUM SERPL-SCNC: 137 MMOL/L (ref 133–143)
SPECIMEN SITE: ABNORMAL
SPECIMEN TYPE: ABNORMAL
SPECIMEN TYPE: ABNORMAL
VENTILATION MODE VENT: ABNORMAL
WBC # BLD AUTO: 19.6 K/UL (ref 4.3–11.1)

## 2023-04-13 PROCEDURE — 0BH17EZ INSERTION OF ENDOTRACHEAL AIRWAY INTO TRACHEA, VIA NATURAL OR ARTIFICIAL OPENING: ICD-10-PCS | Performed by: FAMILY MEDICINE

## 2023-04-13 PROCEDURE — 71045 X-RAY EXAM CHEST 1 VIEW: CPT

## 2023-04-13 PROCEDURE — 6360000002 HC RX W HCPCS: Performed by: INTERNAL MEDICINE

## 2023-04-13 PROCEDURE — 2580000003 HC RX 258: Performed by: FAMILY MEDICINE

## 2023-04-13 PROCEDURE — 36415 COLL VENOUS BLD VENIPUNCTURE: CPT

## 2023-04-13 PROCEDURE — 83605 ASSAY OF LACTIC ACID: CPT

## 2023-04-13 PROCEDURE — 84295 ASSAY OF SERUM SODIUM: CPT

## 2023-04-13 PROCEDURE — 2580000003 HC RX 258: Performed by: INTERNAL MEDICINE

## 2023-04-13 PROCEDURE — 82962 GLUCOSE BLOOD TEST: CPT

## 2023-04-13 PROCEDURE — 31500 INSERT EMERGENCY AIRWAY: CPT | Performed by: INTERNAL MEDICINE

## 2023-04-13 PROCEDURE — 2700000000 HC OXYGEN THERAPY PER DAY

## 2023-04-13 PROCEDURE — 6360000002 HC RX W HCPCS: Performed by: STUDENT IN AN ORGANIZED HEALTH CARE EDUCATION/TRAINING PROGRAM

## 2023-04-13 PROCEDURE — 6360000002 HC RX W HCPCS: Performed by: FAMILY MEDICINE

## 2023-04-13 PROCEDURE — 94761 N-INVAS EAR/PLS OXIMETRY MLT: CPT

## 2023-04-13 PROCEDURE — 83735 ASSAY OF MAGNESIUM: CPT

## 2023-04-13 PROCEDURE — 99223 1ST HOSP IP/OBS HIGH 75: CPT | Performed by: EMERGENCY MEDICINE

## 2023-04-13 PROCEDURE — 94002 VENT MGMT INPAT INIT DAY: CPT

## 2023-04-13 PROCEDURE — 94640 AIRWAY INHALATION TREATMENT: CPT

## 2023-04-13 PROCEDURE — 6360000002 HC RX W HCPCS

## 2023-04-13 PROCEDURE — 80048 BASIC METABOLIC PNL TOTAL CA: CPT

## 2023-04-13 PROCEDURE — 85025 COMPLETE CBC W/AUTO DIFF WBC: CPT

## 2023-04-13 PROCEDURE — 6370000000 HC RX 637 (ALT 250 FOR IP): Performed by: FAMILY MEDICINE

## 2023-04-13 PROCEDURE — 2000000000 HC ICU R&B

## 2023-04-13 PROCEDURE — 82803 BLOOD GASES ANY COMBINATION: CPT

## 2023-04-13 PROCEDURE — 5A1945Z RESPIRATORY VENTILATION, 24-96 CONSECUTIVE HOURS: ICD-10-PCS | Performed by: FAMILY MEDICINE

## 2023-04-13 PROCEDURE — 36600 WITHDRAWAL OF ARTERIAL BLOOD: CPT

## 2023-04-13 PROCEDURE — 85379 FIBRIN DEGRADATION QUANT: CPT

## 2023-04-13 PROCEDURE — 94760 N-INVAS EAR/PLS OXIMETRY 1: CPT

## 2023-04-13 RX ORDER — PROPOFOL 10 MG/ML
30 INJECTION, EMULSION INTRAVENOUS ONCE
Status: COMPLETED | OUTPATIENT
Start: 2023-04-13 | End: 2023-04-13

## 2023-04-13 RX ORDER — 0.9 % SODIUM CHLORIDE 0.9 %
100 INTRAVENOUS SOLUTION INTRAVENOUS
Status: DISCONTINUED | OUTPATIENT
Start: 2023-04-13 | End: 2023-04-21 | Stop reason: HOSPADM

## 2023-04-13 RX ORDER — SODIUM CHLORIDE 0.9 % (FLUSH) 0.9 %
10 SYRINGE (ML) INJECTION
Status: DISCONTINUED | OUTPATIENT
Start: 2023-04-13 | End: 2023-04-21 | Stop reason: HOSPADM

## 2023-04-13 RX ORDER — FENTANYL CITRATE-0.9 % NACL/PF 10 MCG/ML
25-200 PLASTIC BAG, INJECTION (ML) INTRAVENOUS CONTINUOUS
Status: DISCONTINUED | OUTPATIENT
Start: 2023-04-13 | End: 2023-04-15

## 2023-04-13 RX ORDER — LOPERAMIDE HYDROCHLORIDE 2 MG/1
4 CAPSULE ORAL ONCE
Status: DISCONTINUED | OUTPATIENT
Start: 2023-04-13 | End: 2023-04-21 | Stop reason: HOSPADM

## 2023-04-13 RX ORDER — THIAMINE HYDROCHLORIDE 100 MG/ML
250 INJECTION, SOLUTION INTRAMUSCULAR; INTRAVENOUS DAILY
Status: COMPLETED | OUTPATIENT
Start: 2023-04-14 | End: 2023-04-18

## 2023-04-13 RX ORDER — ATROPINE SULFATE 0.1 MG/ML
INJECTION INTRAVENOUS
Status: COMPLETED | OUTPATIENT
Start: 2023-04-13 | End: 2023-04-13

## 2023-04-13 RX ORDER — HYDROMORPHONE HYDROCHLORIDE 1 MG/ML
0.5 INJECTION, SOLUTION INTRAMUSCULAR; INTRAVENOUS; SUBCUTANEOUS
Status: DISCONTINUED | OUTPATIENT
Start: 2023-04-13 | End: 2023-04-15

## 2023-04-13 RX ORDER — PROPOFOL 10 MG/ML
5-50 INJECTION, EMULSION INTRAVENOUS CONTINUOUS
Status: DISCONTINUED | OUTPATIENT
Start: 2023-04-13 | End: 2023-04-13

## 2023-04-13 RX ORDER — PROPOFOL 10 MG/ML
INJECTION, EMULSION INTRAVENOUS
Status: COMPLETED
Start: 2023-04-13 | End: 2023-04-13

## 2023-04-13 RX ORDER — MIDAZOLAM HYDROCHLORIDE 1 MG/ML
1-10 INJECTION, SOLUTION INTRAVENOUS CONTINUOUS
Status: DISCONTINUED | OUTPATIENT
Start: 2023-04-13 | End: 2023-04-15

## 2023-04-13 RX ORDER — FUROSEMIDE 10 MG/ML
40 INJECTION INTRAMUSCULAR; INTRAVENOUS 3 TIMES DAILY
Status: DISCONTINUED | OUTPATIENT
Start: 2023-04-13 | End: 2023-04-16

## 2023-04-13 RX ADMIN — PROPOFOL 10 MCG/KG/MIN: 10 INJECTION, EMULSION INTRAVENOUS at 15:41

## 2023-04-13 RX ADMIN — PANTOPRAZOLE SODIUM 40 MG: 40 TABLET, DELAYED RELEASE ORAL at 05:00

## 2023-04-13 RX ADMIN — SODIUM BICARBONATE 1300 MG: 650 TABLET ORAL at 10:08

## 2023-04-13 RX ADMIN — PROPOFOL 30 MG: 10 INJECTION, EMULSION INTRAVENOUS at 16:14

## 2023-04-13 RX ADMIN — ATROPINE SULFATE 1 MG: 0.1 INJECTION, SOLUTION INTRAVENOUS at 15:18

## 2023-04-13 RX ADMIN — FUROSEMIDE 40 MG: 10 INJECTION, SOLUTION INTRAMUSCULAR; INTRAVENOUS at 23:39

## 2023-04-13 RX ADMIN — THIAMINE HYDROCHLORIDE 500 MG: 100 INJECTION, SOLUTION INTRAMUSCULAR; INTRAVENOUS at 12:08

## 2023-04-13 RX ADMIN — MEROPENEM 1000 MG: 1 INJECTION, POWDER, FOR SOLUTION INTRAVENOUS at 01:13

## 2023-04-13 RX ADMIN — FUROSEMIDE 40 MG: 10 INJECTION, SOLUTION INTRAMUSCULAR; INTRAVENOUS at 15:48

## 2023-04-13 RX ADMIN — MIDAZOLAM 2 MG/HR: 5 INJECTION INTRAMUSCULAR; INTRAVENOUS at 16:36

## 2023-04-13 RX ADMIN — MEROPENEM 1000 MG: 1 INJECTION, POWDER, FOR SOLUTION INTRAVENOUS at 16:47

## 2023-04-13 RX ADMIN — SPIRONOLACTONE 25 MG: 25 TABLET ORAL at 10:09

## 2023-04-13 RX ADMIN — SODIUM CHLORIDE, PRESERVATIVE FREE 10 ML: 5 INJECTION INTRAVENOUS at 20:30

## 2023-04-13 RX ADMIN — ALBUTEROL SULFATE 2.5 MG: 2.5 SOLUTION RESPIRATORY (INHALATION) at 06:14

## 2023-04-13 RX ADMIN — MEROPENEM 1000 MG: 1 INJECTION, POWDER, FOR SOLUTION INTRAVENOUS at 09:48

## 2023-04-13 RX ADMIN — FENTANYL CITRATE 25 MCG/HR: 0.05 INJECTION, SOLUTION INTRAMUSCULAR; INTRAVENOUS at 16:23

## 2023-04-13 RX ADMIN — POTASSIUM CHLORIDE 10 MEQ: 7.46 INJECTION, SOLUTION INTRAVENOUS at 20:32

## 2023-04-13 RX ADMIN — Medication 4 TABLET: at 10:08

## 2023-04-13 RX ADMIN — SODIUM CHLORIDE, PRESERVATIVE FREE 10 ML: 5 INJECTION INTRAVENOUS at 10:00

## 2023-04-13 RX ADMIN — POTASSIUM BICARBONATE 40 MEQ: 782 TABLET, EFFERVESCENT ORAL at 10:16

## 2023-04-13 ASSESSMENT — PULMONARY FUNCTION TESTS
PIF_VALUE: 21
PIF_VALUE: 20

## 2023-04-13 ASSESSMENT — PAIN SCALES - GENERAL
PAINLEVEL_OUTOF10: 0

## 2023-04-14 ENCOUNTER — APPOINTMENT (OUTPATIENT)
Dept: GENERAL RADIOLOGY | Age: 58
DRG: 871 | End: 2023-04-14
Payer: MEDICARE

## 2023-04-14 LAB
ANION GAP SERPL CALC-SCNC: 6 MMOL/L (ref 2–11)
ARTERIAL PATENCY WRIST A: POSITIVE
BACTERIA SPEC CULT: NORMAL
BACTERIA SPEC CULT: NORMAL
BASE EXCESS BLD CALC-SCNC: 0.3 MMOL/L
BASOPHILS # BLD: 0.1 K/UL (ref 0–0.2)
BASOPHILS NFR BLD: 0 % (ref 0–2)
BDY SITE: ABNORMAL
BUN SERPL-MCNC: 36 MG/DL (ref 6–23)
C DIFF GDH STL QL: NORMAL
C DIFF TOX A+B STL QL IA: NORMAL
C DIFF TOXIN INTERPRETATION: NORMAL
CALCIUM SERPL-MCNC: 8.4 MG/DL (ref 8.3–10.4)
CHLORIDE SERPL-SCNC: 113 MMOL/L (ref 101–110)
CLINICAL CONSIDERATION: NORMAL
CO2 SERPL-SCNC: 23 MMOL/L (ref 21–32)
CREAT SERPL-MCNC: 1.8 MG/DL (ref 0.8–1.5)
DIFFERENTIAL METHOD BLD: ABNORMAL
EOSINOPHIL # BLD: 0.7 K/UL (ref 0–0.8)
EOSINOPHIL NFR BLD: 4 % (ref 0.5–7.8)
ERYTHROCYTE [DISTWIDTH] IN BLOOD BY AUTOMATED COUNT: 18.7 % (ref 11.9–14.6)
GAS FLOW.O2 O2 DELIVERY SYS: ABNORMAL
GLUCOSE BLD STRIP.AUTO-MCNC: 100 MG/DL (ref 65–100)
GLUCOSE BLD STRIP.AUTO-MCNC: 106 MG/DL (ref 65–100)
GLUCOSE BLD STRIP.AUTO-MCNC: 107 MG/DL (ref 65–100)
GLUCOSE BLD STRIP.AUTO-MCNC: 97 MG/DL (ref 65–100)
GLUCOSE SERPL-MCNC: 99 MG/DL (ref 65–100)
HCO3 BLD-SCNC: 25.5 MMOL/L (ref 22–26)
HCT VFR BLD AUTO: 23.5 % (ref 41.1–50.3)
HGB BLD-MCNC: 7.7 G/DL (ref 13.6–17.2)
IMM GRANULOCYTES # BLD AUTO: 0.2 K/UL (ref 0–0.5)
IMM GRANULOCYTES NFR BLD AUTO: 1 % (ref 0–5)
IPAP/PIP/HIGH PEEP: 20
LYMPHOCYTES # BLD: 2.4 K/UL (ref 0.5–4.6)
LYMPHOCYTES NFR BLD: 13 % (ref 13–44)
MAGNESIUM SERPL-MCNC: 2.2 MG/DL (ref 1.8–2.4)
MCH RBC QN AUTO: 25.4 PG (ref 26.1–32.9)
MCHC RBC AUTO-ENTMCNC: 32.8 G/DL (ref 31.4–35)
MCV RBC AUTO: 77.6 FL (ref 82–102)
MONOCYTES # BLD: 1.7 K/UL (ref 0.1–1.3)
MONOCYTES NFR BLD: 9 % (ref 4–12)
NEUTS SEG # BLD: 13.2 K/UL (ref 1.7–8.2)
NEUTS SEG NFR BLD: 72 % (ref 43–78)
NRBC # BLD: 0 K/UL (ref 0–0.2)
NT PRO BNP: ABNORMAL PG/ML (ref 5–125)
O2/TOTAL GAS SETTING VFR VENT: 60 %
PCO2 BLD: 43.3 MMHG (ref 35–45)
PEEP RESPIRATORY: 8 CMH2O
PH BLD: 7.38 (ref 7.35–7.45)
PLATELET # BLD AUTO: 40 K/UL (ref 150–450)
PMV BLD AUTO: ABNORMAL FL (ref 9.4–12.3)
PO2 BLD: 201 MMHG (ref 75–100)
POTASSIUM SERPL-SCNC: 4.1 MMOL/L (ref 3.5–5.1)
RBC # BLD AUTO: 3.03 M/UL (ref 4.23–5.6)
REFLEX: NORMAL
RESPIRATORY RATE, POC: 28 (ref 5–40)
SAO2 % BLD: 99.7 % (ref 95–98)
SERVICE CMNT-IMP: ABNORMAL
SERVICE CMNT-IMP: NORMAL
SODIUM SERPL-SCNC: 142 MMOL/L (ref 133–143)
SPECIMEN TYPE: ABNORMAL
VENTILATION MODE VENT: ABNORMAL
WBC # BLD AUTO: 18.3 K/UL (ref 4.3–11.1)

## 2023-04-14 PROCEDURE — 80048 BASIC METABOLIC PNL TOTAL CA: CPT

## 2023-04-14 PROCEDURE — 6360000002 HC RX W HCPCS: Performed by: INTERNAL MEDICINE

## 2023-04-14 PROCEDURE — 84145 PROCALCITONIN (PCT): CPT

## 2023-04-14 PROCEDURE — 83735 ASSAY OF MAGNESIUM: CPT

## 2023-04-14 PROCEDURE — 36415 COLL VENOUS BLD VENIPUNCTURE: CPT

## 2023-04-14 PROCEDURE — 2000000000 HC ICU R&B

## 2023-04-14 PROCEDURE — 85025 COMPLETE CBC W/AUTO DIFF WBC: CPT

## 2023-04-14 PROCEDURE — 99291 CRITICAL CARE FIRST HOUR: CPT | Performed by: INTERNAL MEDICINE

## 2023-04-14 PROCEDURE — 2580000003 HC RX 258: Performed by: INTERNAL MEDICINE

## 2023-04-14 PROCEDURE — 71045 X-RAY EXAM CHEST 1 VIEW: CPT

## 2023-04-14 PROCEDURE — 82803 BLOOD GASES ANY COMBINATION: CPT

## 2023-04-14 PROCEDURE — 2580000003 HC RX 258: Performed by: FAMILY MEDICINE

## 2023-04-14 PROCEDURE — 82962 GLUCOSE BLOOD TEST: CPT

## 2023-04-14 PROCEDURE — 94003 VENT MGMT INPAT SUBQ DAY: CPT

## 2023-04-14 PROCEDURE — 36600 WITHDRAWAL OF ARTERIAL BLOOD: CPT

## 2023-04-14 PROCEDURE — 86022 PLATELET ANTIBODIES: CPT

## 2023-04-14 PROCEDURE — 6360000002 HC RX W HCPCS: Performed by: FAMILY MEDICINE

## 2023-04-14 PROCEDURE — 83880 ASSAY OF NATRIURETIC PEPTIDE: CPT

## 2023-04-14 RX ADMIN — MEROPENEM 1000 MG: 1 INJECTION, POWDER, FOR SOLUTION INTRAVENOUS at 08:52

## 2023-04-14 RX ADMIN — THIAMINE HYDROCHLORIDE 250 MG: 100 INJECTION, SOLUTION INTRAMUSCULAR; INTRAVENOUS at 08:50

## 2023-04-14 RX ADMIN — FUROSEMIDE 40 MG: 10 INJECTION, SOLUTION INTRAMUSCULAR; INTRAVENOUS at 23:56

## 2023-04-14 RX ADMIN — MIDAZOLAM 4 MG/HR: 5 INJECTION INTRAMUSCULAR; INTRAVENOUS at 06:09

## 2023-04-14 RX ADMIN — LORAZEPAM 1 MG: 2 INJECTION INTRAMUSCULAR; INTRAVENOUS at 22:53

## 2023-04-14 RX ADMIN — MEROPENEM 1000 MG: 1 INJECTION, POWDER, FOR SOLUTION INTRAVENOUS at 00:30

## 2023-04-14 RX ADMIN — FUROSEMIDE 40 MG: 10 INJECTION, SOLUTION INTRAMUSCULAR; INTRAVENOUS at 08:48

## 2023-04-14 RX ADMIN — SODIUM CHLORIDE, PRESERVATIVE FREE 20 ML: 5 INJECTION INTRAVENOUS at 08:52

## 2023-04-14 RX ADMIN — SODIUM CHLORIDE, PRESERVATIVE FREE 10 ML: 5 INJECTION INTRAVENOUS at 22:33

## 2023-04-14 RX ADMIN — FUROSEMIDE 40 MG: 10 INJECTION, SOLUTION INTRAMUSCULAR; INTRAVENOUS at 16:10

## 2023-04-14 RX ADMIN — MEROPENEM 1000 MG: 1 INJECTION, POWDER, FOR SOLUTION INTRAVENOUS at 17:23

## 2023-04-14 ASSESSMENT — PAIN SCALES - GENERAL
PAINLEVEL_OUTOF10: 0
PAINLEVEL_OUTOF10: 0
PAINLEVEL_OUTOF10: 3
PAINLEVEL_OUTOF10: 0
PAINLEVEL_OUTOF10: 0

## 2023-04-14 ASSESSMENT — PULMONARY FUNCTION TESTS
PIF_VALUE: 20
PIF_VALUE: 14
PIF_VALUE: 20
PIF_VALUE: 14
PIF_VALUE: 20

## 2023-04-15 LAB
AMMONIA PLAS-SCNC: <10 UMOL/L (ref 11–32)
ANION GAP SERPL CALC-SCNC: 8 MMOL/L (ref 2–11)
BUN SERPL-MCNC: 41 MG/DL (ref 6–23)
CALCIUM SERPL-MCNC: 8.6 MG/DL (ref 8.3–10.4)
CHLORIDE SERPL-SCNC: 113 MMOL/L (ref 101–110)
CO2 SERPL-SCNC: 23 MMOL/L (ref 21–32)
CREAT SERPL-MCNC: 1.9 MG/DL (ref 0.8–1.5)
ERYTHROCYTE [DISTWIDTH] IN BLOOD BY AUTOMATED COUNT: 19.1 % (ref 11.9–14.6)
GLUCOSE BLD STRIP.AUTO-MCNC: 100 MG/DL (ref 65–100)
GLUCOSE BLD STRIP.AUTO-MCNC: 109 MG/DL (ref 65–100)
GLUCOSE BLD STRIP.AUTO-MCNC: 113 MG/DL (ref 65–100)
GLUCOSE BLD STRIP.AUTO-MCNC: 119 MG/DL (ref 65–100)
GLUCOSE SERPL-MCNC: 111 MG/DL (ref 65–100)
HCT VFR BLD AUTO: 24.6 % (ref 41.1–50.3)
HGB BLD-MCNC: 7.7 G/DL (ref 13.6–17.2)
MAGNESIUM SERPL-MCNC: 2.1 MG/DL (ref 1.8–2.4)
MCH RBC QN AUTO: 25.3 PG (ref 26.1–32.9)
MCHC RBC AUTO-ENTMCNC: 31.3 G/DL (ref 31.4–35)
MCV RBC AUTO: 80.9 FL (ref 82–102)
NRBC # BLD: 0.02 K/UL (ref 0–0.2)
PLATELET # BLD AUTO: 47 K/UL (ref 150–450)
PMV BLD AUTO: 9.8 FL (ref 9.4–12.3)
POTASSIUM SERPL-SCNC: 3.3 MMOL/L (ref 3.5–5.1)
PROCALCITONIN SERPL-MCNC: 0.82 NG/ML (ref 0–0.49)
RBC # BLD AUTO: 3.04 M/UL (ref 4.23–5.6)
SERVICE CMNT-IMP: ABNORMAL
SERVICE CMNT-IMP: NORMAL
SODIUM SERPL-SCNC: 144 MMOL/L (ref 133–143)
WBC # BLD AUTO: 19.7 K/UL (ref 4.3–11.1)

## 2023-04-15 PROCEDURE — 6370000000 HC RX 637 (ALT 250 FOR IP): Performed by: FAMILY MEDICINE

## 2023-04-15 PROCEDURE — 92610 EVALUATE SWALLOWING FUNCTION: CPT

## 2023-04-15 PROCEDURE — 80048 BASIC METABOLIC PNL TOTAL CA: CPT

## 2023-04-15 PROCEDURE — 6360000002 HC RX W HCPCS: Performed by: INTERNAL MEDICINE

## 2023-04-15 PROCEDURE — 1100000000 HC RM PRIVATE

## 2023-04-15 PROCEDURE — 82962 GLUCOSE BLOOD TEST: CPT

## 2023-04-15 PROCEDURE — 99232 SBSQ HOSP IP/OBS MODERATE 35: CPT | Performed by: INTERNAL MEDICINE

## 2023-04-15 PROCEDURE — 2580000003 HC RX 258: Performed by: FAMILY MEDICINE

## 2023-04-15 PROCEDURE — 83735 ASSAY OF MAGNESIUM: CPT

## 2023-04-15 PROCEDURE — 2580000003 HC RX 258: Performed by: INTERNAL MEDICINE

## 2023-04-15 PROCEDURE — 36415 COLL VENOUS BLD VENIPUNCTURE: CPT

## 2023-04-15 PROCEDURE — 51702 INSERT TEMP BLADDER CATH: CPT

## 2023-04-15 PROCEDURE — 82140 ASSAY OF AMMONIA: CPT

## 2023-04-15 PROCEDURE — 85027 COMPLETE CBC AUTOMATED: CPT

## 2023-04-15 PROCEDURE — 1100000003 HC PRIVATE W/ TELEMETRY

## 2023-04-15 PROCEDURE — 6360000002 HC RX W HCPCS: Performed by: FAMILY MEDICINE

## 2023-04-15 RX ORDER — DIMETHICONE, CAMPHOR (SYNTHETIC), MENTHOL, AND PHENOL 1.1; .5; .625; .5 G/100G; G/100G; G/100G; G/100G
OINTMENT TOPICAL PRN
Status: DISCONTINUED | OUTPATIENT
Start: 2023-04-15 | End: 2023-04-21 | Stop reason: HOSPADM

## 2023-04-15 RX ADMIN — LORAZEPAM 1 MG: 2 INJECTION INTRAMUSCULAR; INTRAVENOUS at 03:24

## 2023-04-15 RX ADMIN — SODIUM CHLORIDE, PRESERVATIVE FREE 10 ML: 5 INJECTION INTRAVENOUS at 21:50

## 2023-04-15 RX ADMIN — THIAMINE HYDROCHLORIDE 250 MG: 100 INJECTION, SOLUTION INTRAMUSCULAR; INTRAVENOUS at 08:35

## 2023-04-15 RX ADMIN — MEROPENEM 1000 MG: 1 INJECTION, POWDER, FOR SOLUTION INTRAVENOUS at 00:30

## 2023-04-15 RX ADMIN — SODIUM CHLORIDE, PRESERVATIVE FREE 10 ML: 5 INJECTION INTRAVENOUS at 08:35

## 2023-04-15 RX ADMIN — MEROPENEM 1000 MG: 1 INJECTION, POWDER, FOR SOLUTION INTRAVENOUS at 08:35

## 2023-04-15 RX ADMIN — MEROPENEM 1000 MG: 1 INJECTION, POWDER, FOR SOLUTION INTRAVENOUS at 21:49

## 2023-04-15 ASSESSMENT — PAIN SCALES - GENERAL
PAINLEVEL_OUTOF10: 0
PAINLEVEL_OUTOF10: 0

## 2023-04-16 PROBLEM — Z16.12 INFECTION DUE TO EXTENDED-SPECTRUM BETA-LACTAMASE-PRODUCING KLEBSIELLA PNEUMONIAE: Status: ACTIVE | Noted: 2023-04-16

## 2023-04-16 PROBLEM — A49.8 INFECTION DUE TO EXTENDED-SPECTRUM BETA-LACTAMASE-PRODUCING KLEBSIELLA PNEUMONIAE: Status: ACTIVE | Noted: 2023-04-16

## 2023-04-16 LAB
ANION GAP SERPL CALC-SCNC: 8 MMOL/L (ref 2–11)
BUN SERPL-MCNC: 42 MG/DL (ref 6–23)
CALCIUM SERPL-MCNC: 8.9 MG/DL (ref 8.3–10.4)
CHLORIDE SERPL-SCNC: 115 MMOL/L (ref 101–110)
CO2 SERPL-SCNC: 25 MMOL/L (ref 21–32)
CREAT SERPL-MCNC: 1.9 MG/DL (ref 0.8–1.5)
GLUCOSE BLD STRIP.AUTO-MCNC: 119 MG/DL (ref 65–100)
GLUCOSE BLD STRIP.AUTO-MCNC: 122 MG/DL (ref 65–100)
GLUCOSE BLD STRIP.AUTO-MCNC: 146 MG/DL (ref 65–100)
GLUCOSE BLD STRIP.AUTO-MCNC: 149 MG/DL (ref 65–100)
GLUCOSE SERPL-MCNC: 109 MG/DL (ref 65–100)
HEPARIN INDUCED PLATELET ANTIBODY: NEGATIVE
HIT INTERPRETATION: NEGATIVE
HIT OPTICAL DENSITY: 0.06 ABS
HIT PROFILE: NORMAL
MAGNESIUM SERPL-MCNC: 1.9 MG/DL (ref 1.8–2.4)
POTASSIUM SERPL-SCNC: 3.4 MMOL/L (ref 3.5–5.1)
SERVICE CMNT-IMP: ABNORMAL
SODIUM SERPL-SCNC: 148 MMOL/L (ref 133–143)

## 2023-04-16 PROCEDURE — 97535 SELF CARE MNGMENT TRAINING: CPT

## 2023-04-16 PROCEDURE — 6360000002 HC RX W HCPCS: Performed by: FAMILY MEDICINE

## 2023-04-16 PROCEDURE — 2580000003 HC RX 258: Performed by: INTERNAL MEDICINE

## 2023-04-16 PROCEDURE — 83735 ASSAY OF MAGNESIUM: CPT

## 2023-04-16 PROCEDURE — 97162 PT EVAL MOD COMPLEX 30 MIN: CPT

## 2023-04-16 PROCEDURE — 6360000002 HC RX W HCPCS: Performed by: INTERNAL MEDICINE

## 2023-04-16 PROCEDURE — 1100000003 HC PRIVATE W/ TELEMETRY

## 2023-04-16 PROCEDURE — 82962 GLUCOSE BLOOD TEST: CPT

## 2023-04-16 PROCEDURE — 97165 OT EVAL LOW COMPLEX 30 MIN: CPT

## 2023-04-16 PROCEDURE — 97530 THERAPEUTIC ACTIVITIES: CPT

## 2023-04-16 PROCEDURE — 2580000003 HC RX 258: Performed by: FAMILY MEDICINE

## 2023-04-16 PROCEDURE — 1100000000 HC RM PRIVATE

## 2023-04-16 PROCEDURE — 99233 SBSQ HOSP IP/OBS HIGH 50: CPT | Performed by: INTERNAL MEDICINE

## 2023-04-16 PROCEDURE — 51702 INSERT TEMP BLADDER CATH: CPT

## 2023-04-16 PROCEDURE — 36415 COLL VENOUS BLD VENIPUNCTURE: CPT

## 2023-04-16 PROCEDURE — 6370000000 HC RX 637 (ALT 250 FOR IP): Performed by: FAMILY MEDICINE

## 2023-04-16 PROCEDURE — 80048 BASIC METABOLIC PNL TOTAL CA: CPT

## 2023-04-16 RX ORDER — FUROSEMIDE 40 MG/1
40 TABLET ORAL DAILY
Status: DISCONTINUED | OUTPATIENT
Start: 2023-04-17 | End: 2023-04-21 | Stop reason: HOSPADM

## 2023-04-16 RX ORDER — DEXTROSE MONOHYDRATE 50 MG/ML
INJECTION, SOLUTION INTRAVENOUS CONTINUOUS
Status: DISCONTINUED | OUTPATIENT
Start: 2023-04-16 | End: 2023-04-17

## 2023-04-16 RX ADMIN — DEXTROSE MONOHYDRATE: 5 INJECTION, SOLUTION INTRAVENOUS at 15:12

## 2023-04-16 RX ADMIN — Medication 4 TABLET: at 21:41

## 2023-04-16 RX ADMIN — SODIUM CHLORIDE, PRESERVATIVE FREE 10 ML: 5 INJECTION INTRAVENOUS at 09:10

## 2023-04-16 RX ADMIN — MEROPENEM 1000 MG: 1 INJECTION, POWDER, FOR SOLUTION INTRAVENOUS at 09:09

## 2023-04-16 RX ADMIN — Medication 4 TABLET: at 12:52

## 2023-04-16 RX ADMIN — THIAMINE HYDROCHLORIDE 250 MG: 100 INJECTION, SOLUTION INTRAMUSCULAR; INTRAVENOUS at 09:09

## 2023-04-16 RX ADMIN — SODIUM CHLORIDE, PRESERVATIVE FREE 10 ML: 5 INJECTION INTRAVENOUS at 21:42

## 2023-04-16 RX ADMIN — SODIUM BICARBONATE 1300 MG: 650 TABLET ORAL at 21:41

## 2023-04-16 RX ADMIN — MEROPENEM 1000 MG: 1 INJECTION, POWDER, FOR SOLUTION INTRAVENOUS at 21:41

## 2023-04-17 PROBLEM — R09.02 HYPOXEMIA: Status: ACTIVE | Noted: 2023-04-17

## 2023-04-17 LAB
ANION GAP SERPL CALC-SCNC: 5 MMOL/L (ref 2–11)
BASOPHILS # BLD: 0.1 K/UL (ref 0–0.2)
BASOPHILS NFR BLD: 0 % (ref 0–2)
BUN SERPL-MCNC: 37 MG/DL (ref 6–23)
CALCIUM SERPL-MCNC: 8.7 MG/DL (ref 8.3–10.4)
CHLORIDE SERPL-SCNC: 117 MMOL/L (ref 101–110)
CO2 SERPL-SCNC: 26 MMOL/L (ref 21–32)
CREAT SERPL-MCNC: 1.6 MG/DL (ref 0.8–1.5)
DIFFERENTIAL METHOD BLD: ABNORMAL
EOSINOPHIL # BLD: 1 K/UL (ref 0–0.8)
EOSINOPHIL NFR BLD: 4 % (ref 0.5–7.8)
ERYTHROCYTE [DISTWIDTH] IN BLOOD BY AUTOMATED COUNT: 19.4 % (ref 11.9–14.6)
GLUCOSE BLD STRIP.AUTO-MCNC: 181 MG/DL (ref 65–100)
GLUCOSE BLD STRIP.AUTO-MCNC: 230 MG/DL (ref 65–100)
GLUCOSE BLD STRIP.AUTO-MCNC: 280 MG/DL (ref 65–100)
GLUCOSE BLD STRIP.AUTO-MCNC: 282 MG/DL (ref 65–100)
GLUCOSE SERPL-MCNC: 144 MG/DL (ref 65–100)
HCT VFR BLD AUTO: 26.6 % (ref 41.1–50.3)
HGB BLD-MCNC: 8.4 G/DL (ref 13.6–17.2)
IMM GRANULOCYTES # BLD AUTO: 0.2 K/UL (ref 0–0.5)
IMM GRANULOCYTES NFR BLD AUTO: 1 % (ref 0–5)
LYMPHOCYTES # BLD: 2 K/UL (ref 0.5–4.6)
LYMPHOCYTES NFR BLD: 9 % (ref 13–44)
MAGNESIUM SERPL-MCNC: 1.8 MG/DL (ref 1.8–2.4)
MCH RBC QN AUTO: 25.4 PG (ref 26.1–32.9)
MCHC RBC AUTO-ENTMCNC: 31.6 G/DL (ref 31.4–35)
MCV RBC AUTO: 80.4 FL (ref 82–102)
MONOCYTES # BLD: 2.1 K/UL (ref 0.1–1.3)
MONOCYTES NFR BLD: 9 % (ref 4–12)
NEUTS SEG # BLD: 18.1 K/UL (ref 1.7–8.2)
NEUTS SEG NFR BLD: 77 % (ref 43–78)
NRBC # BLD: 0.02 K/UL (ref 0–0.2)
PLATELET # BLD AUTO: 81 K/UL (ref 150–450)
PMV BLD AUTO: 10.4 FL (ref 9.4–12.3)
POTASSIUM SERPL-SCNC: 3.2 MMOL/L (ref 3.5–5.1)
RBC # BLD AUTO: 3.31 M/UL (ref 4.23–5.6)
SERVICE CMNT-IMP: ABNORMAL
SODIUM SERPL-SCNC: 148 MMOL/L (ref 133–143)
WBC # BLD AUTO: 23.4 K/UL (ref 4.3–11.1)

## 2023-04-17 PROCEDURE — 94761 N-INVAS EAR/PLS OXIMETRY MLT: CPT

## 2023-04-17 PROCEDURE — 83735 ASSAY OF MAGNESIUM: CPT

## 2023-04-17 PROCEDURE — 85025 COMPLETE CBC W/AUTO DIFF WBC: CPT

## 2023-04-17 PROCEDURE — 2580000003 HC RX 258: Performed by: INTERNAL MEDICINE

## 2023-04-17 PROCEDURE — 6370000000 HC RX 637 (ALT 250 FOR IP): Performed by: INTERNAL MEDICINE

## 2023-04-17 PROCEDURE — 92526 ORAL FUNCTION THERAPY: CPT

## 2023-04-17 PROCEDURE — 6370000000 HC RX 637 (ALT 250 FOR IP): Performed by: FAMILY MEDICINE

## 2023-04-17 PROCEDURE — 2700000000 HC OXYGEN THERAPY PER DAY

## 2023-04-17 PROCEDURE — 6360000002 HC RX W HCPCS: Performed by: FAMILY MEDICINE

## 2023-04-17 PROCEDURE — 82962 GLUCOSE BLOOD TEST: CPT

## 2023-04-17 PROCEDURE — 99232 SBSQ HOSP IP/OBS MODERATE 35: CPT | Performed by: INTERNAL MEDICINE

## 2023-04-17 PROCEDURE — 6360000002 HC RX W HCPCS: Performed by: INTERNAL MEDICINE

## 2023-04-17 PROCEDURE — 2580000003 HC RX 258: Performed by: FAMILY MEDICINE

## 2023-04-17 PROCEDURE — 36415 COLL VENOUS BLD VENIPUNCTURE: CPT

## 2023-04-17 PROCEDURE — 80048 BASIC METABOLIC PNL TOTAL CA: CPT

## 2023-04-17 PROCEDURE — 1100000000 HC RM PRIVATE

## 2023-04-17 RX ADMIN — SODIUM CHLORIDE, PRESERVATIVE FREE 5 ML: 5 INJECTION INTRAVENOUS at 21:16

## 2023-04-17 RX ADMIN — THIAMINE HYDROCHLORIDE 250 MG: 100 INJECTION, SOLUTION INTRAMUSCULAR; INTRAVENOUS at 08:22

## 2023-04-17 RX ADMIN — SODIUM BICARBONATE 1300 MG: 650 TABLET ORAL at 21:16

## 2023-04-17 RX ADMIN — POTASSIUM BICARBONATE 20 MEQ: 782 TABLET, EFFERVESCENT ORAL at 10:55

## 2023-04-17 RX ADMIN — DEXTROSE MONOHYDRATE: 5 INJECTION, SOLUTION INTRAVENOUS at 12:29

## 2023-04-17 RX ADMIN — SPIRONOLACTONE 25 MG: 25 TABLET ORAL at 08:23

## 2023-04-17 RX ADMIN — INSULIN LISPRO 1 UNITS: 100 INJECTION, SOLUTION INTRAVENOUS; SUBCUTANEOUS at 12:28

## 2023-04-17 RX ADMIN — SODIUM BICARBONATE 1300 MG: 650 TABLET ORAL at 08:22

## 2023-04-17 RX ADMIN — Medication 4 TABLET: at 13:50

## 2023-04-17 RX ADMIN — FUROSEMIDE 40 MG: 40 TABLET ORAL at 08:23

## 2023-04-17 RX ADMIN — Medication 4 TABLET: at 08:22

## 2023-04-17 RX ADMIN — MEROPENEM 1000 MG: 1 INJECTION, POWDER, FOR SOLUTION INTRAVENOUS at 08:23

## 2023-04-17 RX ADMIN — Medication 4 TABLET: at 21:16

## 2023-04-17 RX ADMIN — HYDRALAZINE HYDROCHLORIDE 10 MG: 20 INJECTION INTRAMUSCULAR; INTRAVENOUS at 12:28

## 2023-04-17 RX ADMIN — SODIUM CHLORIDE, PRESERVATIVE FREE 10 ML: 5 INJECTION INTRAVENOUS at 08:23

## 2023-04-17 RX ADMIN — INSULIN LISPRO 2 UNITS: 100 INJECTION, SOLUTION INTRAVENOUS; SUBCUTANEOUS at 17:17

## 2023-04-17 RX ADMIN — PANTOPRAZOLE SODIUM 40 MG: 40 TABLET, DELAYED RELEASE ORAL at 17:17

## 2023-04-17 NOTE — CARE COORDINATION
Chart screened by CM for d/c planning. Pt has been weaned to 3L O2 NC. Continue to wean as tolerated. PT/OT recommend STR at d/c.  CM met with pt to discuss d/c planning. Pt declined STR and stated he \"is going home with HH. \"  CM updated Dr. Erica Davidson via [email or web address].

## 2023-04-18 LAB
ANION GAP SERPL CALC-SCNC: 4 MMOL/L (ref 2–11)
BASOPHILS # BLD: 0.1 K/UL (ref 0–0.2)
BASOPHILS NFR BLD: 0 % (ref 0–2)
BUN SERPL-MCNC: 30 MG/DL (ref 6–23)
CALCIUM SERPL-MCNC: 8.7 MG/DL (ref 8.3–10.4)
CHLORIDE SERPL-SCNC: 104 MMOL/L (ref 101–110)
CO2 SERPL-SCNC: 24 MMOL/L (ref 21–32)
CREAT SERPL-MCNC: 1.4 MG/DL (ref 0.8–1.5)
DIFFERENTIAL METHOD BLD: ABNORMAL
EOSINOPHIL # BLD: 0.9 K/UL (ref 0–0.8)
EOSINOPHIL NFR BLD: 4 % (ref 0.5–7.8)
ERYTHROCYTE [DISTWIDTH] IN BLOOD BY AUTOMATED COUNT: 18.9 % (ref 11.9–14.6)
GLUCOSE BLD STRIP.AUTO-MCNC: 286 MG/DL (ref 65–100)
GLUCOSE BLD STRIP.AUTO-MCNC: 307 MG/DL (ref 65–100)
GLUCOSE BLD STRIP.AUTO-MCNC: 372 MG/DL (ref 65–100)
GLUCOSE BLD STRIP.AUTO-MCNC: 376 MG/DL (ref 65–100)
GLUCOSE SERPL-MCNC: 264 MG/DL (ref 65–100)
HCT VFR BLD AUTO: 25.1 % (ref 41.1–50.3)
HGB BLD-MCNC: 8.3 G/DL (ref 13.6–17.2)
IMM GRANULOCYTES # BLD AUTO: 0.3 K/UL (ref 0–0.5)
IMM GRANULOCYTES NFR BLD AUTO: 1 % (ref 0–5)
LYMPHOCYTES # BLD: 2.7 K/UL (ref 0.5–4.6)
LYMPHOCYTES NFR BLD: 12 % (ref 13–44)
MAGNESIUM SERPL-MCNC: 1.7 MG/DL (ref 1.8–2.4)
MCH RBC QN AUTO: 25.8 PG (ref 26.1–32.9)
MCHC RBC AUTO-ENTMCNC: 33.1 G/DL (ref 31.4–35)
MCV RBC AUTO: 78 FL (ref 82–102)
MONOCYTES # BLD: 1.3 K/UL (ref 0.1–1.3)
MONOCYTES NFR BLD: 6 % (ref 4–12)
NEUTS SEG # BLD: 17.7 K/UL (ref 1.7–8.2)
NEUTS SEG NFR BLD: 78 % (ref 43–78)
NRBC # BLD: 0.03 K/UL (ref 0–0.2)
PLATELET # BLD AUTO: 66 K/UL (ref 150–450)
PMV BLD AUTO: 11.2 FL (ref 9.4–12.3)
POTASSIUM SERPL-SCNC: 3.2 MMOL/L (ref 3.5–5.1)
RBC # BLD AUTO: 3.22 M/UL (ref 4.23–5.6)
SERVICE CMNT-IMP: ABNORMAL
SODIUM SERPL-SCNC: 132 MMOL/L (ref 133–143)
WBC # BLD AUTO: 22.8 K/UL (ref 4.3–11.1)

## 2023-04-18 PROCEDURE — 2580000003 HC RX 258: Performed by: FAMILY MEDICINE

## 2023-04-18 PROCEDURE — 85025 COMPLETE CBC W/AUTO DIFF WBC: CPT

## 2023-04-18 PROCEDURE — 97112 NEUROMUSCULAR REEDUCATION: CPT

## 2023-04-18 PROCEDURE — 6360000002 HC RX W HCPCS: Performed by: FAMILY MEDICINE

## 2023-04-18 PROCEDURE — 82962 GLUCOSE BLOOD TEST: CPT

## 2023-04-18 PROCEDURE — 97530 THERAPEUTIC ACTIVITIES: CPT

## 2023-04-18 PROCEDURE — 83735 ASSAY OF MAGNESIUM: CPT

## 2023-04-18 PROCEDURE — 2700000000 HC OXYGEN THERAPY PER DAY

## 2023-04-18 PROCEDURE — 6370000000 HC RX 637 (ALT 250 FOR IP): Performed by: HOSPITALIST

## 2023-04-18 PROCEDURE — 6370000000 HC RX 637 (ALT 250 FOR IP): Performed by: FAMILY MEDICINE

## 2023-04-18 PROCEDURE — 6360000002 HC RX W HCPCS: Performed by: INTERNAL MEDICINE

## 2023-04-18 PROCEDURE — 97535 SELF CARE MNGMENT TRAINING: CPT

## 2023-04-18 PROCEDURE — 36415 COLL VENOUS BLD VENIPUNCTURE: CPT

## 2023-04-18 PROCEDURE — 94760 N-INVAS EAR/PLS OXIMETRY 1: CPT

## 2023-04-18 PROCEDURE — 6370000000 HC RX 637 (ALT 250 FOR IP): Performed by: INTERNAL MEDICINE

## 2023-04-18 PROCEDURE — 80048 BASIC METABOLIC PNL TOTAL CA: CPT

## 2023-04-18 PROCEDURE — 1100000000 HC RM PRIVATE

## 2023-04-18 RX ORDER — INSULIN LISPRO 100 [IU]/ML
4 INJECTION, SOLUTION INTRAVENOUS; SUBCUTANEOUS ONCE
Status: DISCONTINUED | OUTPATIENT
Start: 2023-04-18 | End: 2023-04-18

## 2023-04-18 RX ORDER — MAGNESIUM SULFATE IN WATER 40 MG/ML
2000 INJECTION, SOLUTION INTRAVENOUS ONCE
Status: COMPLETED | OUTPATIENT
Start: 2023-04-18 | End: 2023-04-18

## 2023-04-18 RX ORDER — LOPERAMIDE HYDROCHLORIDE 2 MG/1
2 CAPSULE ORAL 4 TIMES DAILY PRN
Status: DISCONTINUED | OUTPATIENT
Start: 2023-04-18 | End: 2023-04-21 | Stop reason: HOSPADM

## 2023-04-18 RX ORDER — INSULIN GLARGINE 100 [IU]/ML
5 INJECTION, SOLUTION SUBCUTANEOUS NIGHTLY
Status: DISCONTINUED | OUTPATIENT
Start: 2023-04-18 | End: 2023-04-21 | Stop reason: HOSPADM

## 2023-04-18 RX ORDER — INSULIN LISPRO 100 [IU]/ML
6 INJECTION, SOLUTION INTRAVENOUS; SUBCUTANEOUS ONCE
Status: COMPLETED | OUTPATIENT
Start: 2023-04-18 | End: 2023-04-18

## 2023-04-18 RX ADMIN — POTASSIUM BICARBONATE 40 MEQ: 782 TABLET, EFFERVESCENT ORAL at 09:08

## 2023-04-18 RX ADMIN — PANTOPRAZOLE SODIUM 40 MG: 40 TABLET, DELAYED RELEASE ORAL at 05:54

## 2023-04-18 RX ADMIN — Medication 4 TABLET: at 21:57

## 2023-04-18 RX ADMIN — LOPERAMIDE HYDROCHLORIDE 2 MG: 2 CAPSULE ORAL at 12:45

## 2023-04-18 RX ADMIN — THIAMINE HYDROCHLORIDE 250 MG: 100 INJECTION, SOLUTION INTRAMUSCULAR; INTRAVENOUS at 09:08

## 2023-04-18 RX ADMIN — PANTOPRAZOLE SODIUM 40 MG: 40 TABLET, DELAYED RELEASE ORAL at 17:22

## 2023-04-18 RX ADMIN — SPIRONOLACTONE 25 MG: 25 TABLET ORAL at 09:08

## 2023-04-18 RX ADMIN — INSULIN LISPRO 4 UNITS: 100 INJECTION, SOLUTION INTRAVENOUS; SUBCUTANEOUS at 22:37

## 2023-04-18 RX ADMIN — FUROSEMIDE 40 MG: 40 TABLET ORAL at 09:08

## 2023-04-18 RX ADMIN — Medication 4 TABLET: at 15:40

## 2023-04-18 RX ADMIN — SODIUM BICARBONATE 1300 MG: 650 TABLET ORAL at 21:57

## 2023-04-18 RX ADMIN — Medication 4 TABLET: at 09:08

## 2023-04-18 RX ADMIN — INSULIN LISPRO 6 UNITS: 100 INJECTION, SOLUTION INTRAVENOUS; SUBCUTANEOUS at 22:37

## 2023-04-18 RX ADMIN — INSULIN LISPRO 4 UNITS: 100 INJECTION, SOLUTION INTRAVENOUS; SUBCUTANEOUS at 17:22

## 2023-04-18 RX ADMIN — INSULIN LISPRO 3 UNITS: 100 INJECTION, SOLUTION INTRAVENOUS; SUBCUTANEOUS at 12:27

## 2023-04-18 RX ADMIN — MAGNESIUM SULFATE HEPTAHYDRATE 2000 MG: 40 INJECTION, SOLUTION INTRAVENOUS at 09:07

## 2023-04-18 RX ADMIN — SODIUM CHLORIDE, PRESERVATIVE FREE 5 ML: 5 INJECTION INTRAVENOUS at 22:39

## 2023-04-18 RX ADMIN — INSULIN GLARGINE 5 UNITS: 100 INJECTION, SOLUTION SUBCUTANEOUS at 22:38

## 2023-04-18 RX ADMIN — INSULIN LISPRO 2 UNITS: 100 INJECTION, SOLUTION INTRAVENOUS; SUBCUTANEOUS at 09:07

## 2023-04-18 RX ADMIN — SODIUM BICARBONATE 1300 MG: 650 TABLET ORAL at 09:08

## 2023-04-18 RX ADMIN — SODIUM CHLORIDE, PRESERVATIVE FREE 10 ML: 5 INJECTION INTRAVENOUS at 09:08

## 2023-04-18 NOTE — WOUND CARE
Patient seen for follow up. Left heel has soft intact eschar. Right heel shallow pink ulceration that is healing. Right knee has pink scrape that is healing. Left posterior leg is healed, pink scars noted. Discontinued dressing. he has red and some denuded skin perianal and in gluteal cleft fold from incontinence. Thick layer of zinc barrier cream applied at this time. Updated patient on wound status. Tristan hugger in use. Placed bilateral heels back into suspension boots. Continue current wound cares. Wound team will follow.

## 2023-04-19 LAB
ANION GAP SERPL CALC-SCNC: 5 MMOL/L (ref 2–11)
BASOPHILS # BLD: 0.1 K/UL (ref 0–0.2)
BASOPHILS NFR BLD: 0 % (ref 0–2)
BUN SERPL-MCNC: 31 MG/DL (ref 6–23)
CALCIUM SERPL-MCNC: 8.3 MG/DL (ref 8.3–10.4)
CHLORIDE SERPL-SCNC: 104 MMOL/L (ref 101–110)
CO2 SERPL-SCNC: 24 MMOL/L (ref 21–32)
CREAT SERPL-MCNC: 1.3 MG/DL (ref 0.8–1.5)
DIFFERENTIAL METHOD BLD: ABNORMAL
EOSINOPHIL # BLD: 0.7 K/UL (ref 0–0.8)
EOSINOPHIL NFR BLD: 5 % (ref 0.5–7.8)
ERYTHROCYTE [DISTWIDTH] IN BLOOD BY AUTOMATED COUNT: 18.6 % (ref 11.9–14.6)
GLUCOSE BLD STRIP.AUTO-MCNC: 118 MG/DL (ref 65–100)
GLUCOSE BLD STRIP.AUTO-MCNC: 158 MG/DL (ref 65–100)
GLUCOSE BLD STRIP.AUTO-MCNC: 231 MG/DL (ref 65–100)
GLUCOSE BLD STRIP.AUTO-MCNC: 52 MG/DL (ref 65–100)
GLUCOSE BLD STRIP.AUTO-MCNC: 56 MG/DL (ref 65–100)
GLUCOSE BLD STRIP.AUTO-MCNC: 88 MG/DL (ref 65–100)
GLUCOSE SERPL-MCNC: 136 MG/DL (ref 65–100)
HCT VFR BLD AUTO: 23.8 % (ref 41.1–50.3)
HGB BLD-MCNC: 7.9 G/DL (ref 13.6–17.2)
IMM GRANULOCYTES # BLD AUTO: 0.2 K/UL (ref 0–0.5)
IMM GRANULOCYTES NFR BLD AUTO: 1 % (ref 0–5)
LYMPHOCYTES # BLD: 2.5 K/UL (ref 0.5–4.6)
LYMPHOCYTES NFR BLD: 17 % (ref 13–44)
MAGNESIUM SERPL-MCNC: 1.9 MG/DL (ref 1.8–2.4)
MCH RBC QN AUTO: 25.7 PG (ref 26.1–32.9)
MCHC RBC AUTO-ENTMCNC: 33.2 G/DL (ref 31.4–35)
MCV RBC AUTO: 77.5 FL (ref 82–102)
MONOCYTES # BLD: 1 K/UL (ref 0.1–1.3)
MONOCYTES NFR BLD: 7 % (ref 4–12)
NEUTS SEG # BLD: 10.5 K/UL (ref 1.7–8.2)
NEUTS SEG NFR BLD: 71 % (ref 43–78)
NRBC # BLD: 0.02 K/UL (ref 0–0.2)
PLATELET # BLD AUTO: 51 K/UL (ref 150–450)
PMV BLD AUTO: ABNORMAL FL (ref 9.4–12.3)
POTASSIUM SERPL-SCNC: 3.4 MMOL/L (ref 3.5–5.1)
RBC # BLD AUTO: 3.07 M/UL (ref 4.23–5.6)
SERVICE CMNT-IMP: ABNORMAL
SERVICE CMNT-IMP: NORMAL
SODIUM SERPL-SCNC: 133 MMOL/L (ref 133–143)
WBC # BLD AUTO: 14.8 K/UL (ref 4.3–11.1)

## 2023-04-19 PROCEDURE — 94760 N-INVAS EAR/PLS OXIMETRY 1: CPT

## 2023-04-19 PROCEDURE — 6370000000 HC RX 637 (ALT 250 FOR IP): Performed by: FAMILY MEDICINE

## 2023-04-19 PROCEDURE — 80048 BASIC METABOLIC PNL TOTAL CA: CPT

## 2023-04-19 PROCEDURE — 36415 COLL VENOUS BLD VENIPUNCTURE: CPT

## 2023-04-19 PROCEDURE — 2700000000 HC OXYGEN THERAPY PER DAY

## 2023-04-19 PROCEDURE — 85025 COMPLETE CBC W/AUTO DIFF WBC: CPT

## 2023-04-19 PROCEDURE — 83735 ASSAY OF MAGNESIUM: CPT

## 2023-04-19 PROCEDURE — 6370000000 HC RX 637 (ALT 250 FOR IP): Performed by: INTERNAL MEDICINE

## 2023-04-19 PROCEDURE — 2580000003 HC RX 258: Performed by: FAMILY MEDICINE

## 2023-04-19 PROCEDURE — 1100000000 HC RM PRIVATE

## 2023-04-19 PROCEDURE — 82962 GLUCOSE BLOOD TEST: CPT

## 2023-04-19 RX ADMIN — SODIUM CHLORIDE, PRESERVATIVE FREE 10 ML: 5 INJECTION INTRAVENOUS at 08:29

## 2023-04-19 RX ADMIN — Medication 4 TABLET: at 08:28

## 2023-04-19 RX ADMIN — SODIUM BICARBONATE 1300 MG: 650 TABLET ORAL at 22:18

## 2023-04-19 RX ADMIN — PANTOPRAZOLE SODIUM 40 MG: 40 TABLET, DELAYED RELEASE ORAL at 17:55

## 2023-04-19 RX ADMIN — Medication 4 TABLET: at 22:19

## 2023-04-19 RX ADMIN — SODIUM BICARBONATE 1300 MG: 650 TABLET ORAL at 08:28

## 2023-04-19 RX ADMIN — Medication 4 TABLET: at 14:33

## 2023-04-19 RX ADMIN — SODIUM CHLORIDE, PRESERVATIVE FREE 10 ML: 5 INJECTION INTRAVENOUS at 21:00

## 2023-04-19 RX ADMIN — FUROSEMIDE 40 MG: 40 TABLET ORAL at 08:28

## 2023-04-19 RX ADMIN — PANTOPRAZOLE SODIUM 40 MG: 40 TABLET, DELAYED RELEASE ORAL at 05:29

## 2023-04-19 RX ADMIN — Medication 16 G: at 08:20

## 2023-04-19 RX ADMIN — SPIRONOLACTONE 25 MG: 25 TABLET ORAL at 08:28

## 2023-04-19 ASSESSMENT — PAIN SCALES - WONG BAKER
WONGBAKER_NUMERICALRESPONSE: 0

## 2023-04-19 ASSESSMENT — PAIN SCALES - GENERAL
PAINLEVEL_OUTOF10: 0

## 2023-04-19 NOTE — CARE COORDINATION
CM met with pt at the bedside to discuss d/c planning. PT/OT continue to recommend STR at d/c. Pt continues to refuse and states he wishes to d/c home. He is agreeable to [facility]. CM updated Dr. Tab Randall. Pt had low BG this morning. Anticipate d/c home in the next 48 hours if medically stable. Pt is currently requiring 2L O2 NC. Wean as tolerated. If unable to wean at d/c pt will require a 6MWT. Pt is current with [facility] and [address]. Resume both upon d/c. CM will continue to follow.   LOS = 13 days

## 2023-04-20 LAB
ERYTHROCYTE [DISTWIDTH] IN BLOOD BY AUTOMATED COUNT: 18.8 % (ref 11.9–14.6)
GLUCOSE BLD STRIP.AUTO-MCNC: 134 MG/DL (ref 65–100)
GLUCOSE BLD STRIP.AUTO-MCNC: 185 MG/DL (ref 65–100)
GLUCOSE BLD STRIP.AUTO-MCNC: 266 MG/DL (ref 65–100)
GLUCOSE BLD STRIP.AUTO-MCNC: 281 MG/DL (ref 65–100)
HCT VFR BLD AUTO: 24.5 % (ref 41.1–50.3)
HGB BLD-MCNC: 8 G/DL (ref 13.6–17.2)
MCH RBC QN AUTO: 25.2 PG (ref 26.1–32.9)
MCHC RBC AUTO-ENTMCNC: 32.7 G/DL (ref 31.4–35)
MCV RBC AUTO: 77 FL (ref 82–102)
NRBC # BLD: 0.02 K/UL (ref 0–0.2)
PLATELET # BLD AUTO: 64 K/UL (ref 150–450)
PMV BLD AUTO: ABNORMAL FL (ref 9.4–12.3)
RBC # BLD AUTO: 3.18 M/UL (ref 4.23–5.6)
SERVICE CMNT-IMP: ABNORMAL
WBC # BLD AUTO: 11.2 K/UL (ref 4.3–11.1)

## 2023-04-20 PROCEDURE — 6370000000 HC RX 637 (ALT 250 FOR IP): Performed by: FAMILY MEDICINE

## 2023-04-20 PROCEDURE — 2580000003 HC RX 258: Performed by: FAMILY MEDICINE

## 2023-04-20 PROCEDURE — 6370000000 HC RX 637 (ALT 250 FOR IP): Performed by: INTERNAL MEDICINE

## 2023-04-20 PROCEDURE — 82962 GLUCOSE BLOOD TEST: CPT

## 2023-04-20 PROCEDURE — 1100000000 HC RM PRIVATE

## 2023-04-20 PROCEDURE — 97530 THERAPEUTIC ACTIVITIES: CPT

## 2023-04-20 PROCEDURE — 85027 COMPLETE CBC AUTOMATED: CPT

## 2023-04-20 PROCEDURE — 36415 COLL VENOUS BLD VENIPUNCTURE: CPT

## 2023-04-20 RX ADMIN — SODIUM CHLORIDE, PRESERVATIVE FREE 10 ML: 5 INJECTION INTRAVENOUS at 22:47

## 2023-04-20 RX ADMIN — Medication 4 TABLET: at 10:27

## 2023-04-20 RX ADMIN — Medication 4 TABLET: at 16:41

## 2023-04-20 RX ADMIN — FUROSEMIDE 40 MG: 40 TABLET ORAL at 10:27

## 2023-04-20 RX ADMIN — SODIUM BICARBONATE 1300 MG: 650 TABLET ORAL at 10:28

## 2023-04-20 RX ADMIN — INSULIN LISPRO 2 UNITS: 100 INJECTION, SOLUTION INTRAVENOUS; SUBCUTANEOUS at 17:50

## 2023-04-20 RX ADMIN — SPIRONOLACTONE 25 MG: 25 TABLET ORAL at 10:27

## 2023-04-20 RX ADMIN — SODIUM CHLORIDE, PRESERVATIVE FREE 10 ML: 5 INJECTION INTRAVENOUS at 10:29

## 2023-04-20 RX ADMIN — PANTOPRAZOLE SODIUM 40 MG: 40 TABLET, DELAYED RELEASE ORAL at 10:31

## 2023-04-20 RX ADMIN — Medication 4 TABLET: at 22:47

## 2023-04-20 RX ADMIN — PANTOPRAZOLE SODIUM 40 MG: 40 TABLET, DELAYED RELEASE ORAL at 16:40

## 2023-04-20 RX ADMIN — SODIUM BICARBONATE 1300 MG: 650 TABLET ORAL at 22:47

## 2023-04-20 ASSESSMENT — PAIN SCALES - WONG BAKER
WONGBAKER_NUMERICALRESPONSE: 0

## 2023-04-20 ASSESSMENT — PAIN SCALES - GENERAL
PAINLEVEL_OUTOF10: 0

## 2023-04-20 NOTE — PLAN OF CARE
Problem: Safety - Adult  Goal: Free from fall injury  Outcome: Progressing     Problem: Discharge Planning  Goal: Discharge to home or other facility with appropriate resources  Outcome: Progressing     Problem: Pain  Goal: Verbalizes/displays adequate comfort level or baseline comfort level  Outcome: Progressing     Problem: Chronic Conditions and Co-morbidities  Goal: Patient's chronic conditions and co-morbidity symptoms are monitored and maintained or improved  Outcome: Progressing     Problem: Confusion  Goal: Confusion, delirium, dementia, or psychosis is improved or at baseline  Description: INTERVENTIONS:  1. Assess for possible contributors to thought disturbance, including medications, impaired vision or hearing, underlying metabolic abnormalities, dehydration, psychiatric diagnoses, and notify attending LIP  2. [place] high risk fall precautions, as indicated  3. Provide frequent short contacts to provide reality reorientation, refocusing and direction  4. Decrease environmental stimuli, including noise as appropriate  5. Monitor and intervene to maintain adequate nutrition, hydration, elimination, sleep and activity  6. If unable to ensure safety without constant attention obtain sitter and review sitter guidelines with assigned personnel  7. Initiate Psychosocial CNS and Spiritual Care consult, as indicated  Outcome: Progressing     Problem: Skin/Tissue Integrity  Goal: Absence of new skin breakdown  Description: 1. Monitor for areas of redness and/or skin breakdown  2. Assess vascular access sites hourly  3. Every 4-6 hours minimum:  Change oxygen saturation probe site  4. Every 4-6 hours:  If on nasal continuous positive airway pressure, respiratory therapy assess nares and determine need for appliance change or resting period.   Outcome: Progressing     Problem: ABCDS Injury Assessment  Goal: Absence of physical injury  Outcome: Progressing     Problem: Safety - Medical Restraint  Goal:

## 2023-04-20 NOTE — CARE COORDINATION
8106 Update:  Spoke with patient and notified him that [facility] has offered a bed. Patient accepted. Spoke with patient's wife who stated this option is fine with her. Insurance auth submitted with [employer] via [employer] - supporting documents faxed. CM awaiting insurance approval.  Updated [place] with [facility]. -------------------    Met with patient re: STR choices. Patient stated he just wishes to be close to his home. I attempted to call patient's spouse at [phone] and [phone] - unable to reach her. Referrals sent to [facility], [facility], and [facility]. CM following.

## 2023-04-21 VITALS
SYSTOLIC BLOOD PRESSURE: 135 MMHG | TEMPERATURE: 97.7 F | OXYGEN SATURATION: 100 % | BODY MASS INDEX: 25.2 KG/M2 | DIASTOLIC BLOOD PRESSURE: 92 MMHG | HEART RATE: 78 BPM | RESPIRATION RATE: 22 BRPM | HEIGHT: 71 IN | WEIGHT: 180 LBS

## 2023-04-21 PROBLEM — E83.42 HYPOMAGNESEMIA: Status: RESOLVED | Noted: 2023-04-09 | Resolved: 2023-04-21

## 2023-04-21 PROBLEM — R19.7 ACUTE DIARRHEA: Status: RESOLVED | Noted: 2022-09-14 | Resolved: 2023-04-21

## 2023-04-21 PROBLEM — A49.8 INFECTION DUE TO EXTENDED-SPECTRUM BETA-LACTAMASE-PRODUCING KLEBSIELLA PNEUMONIAE: Status: RESOLVED | Noted: 2023-04-16 | Resolved: 2023-04-21

## 2023-04-21 PROBLEM — R55 SYNCOPE: Status: RESOLVED | Noted: 2023-04-06 | Resolved: 2023-04-21

## 2023-04-21 PROBLEM — J96.01 ACUTE RESPIRATORY FAILURE WITH HYPOXIA AND HYPERCAPNIA (HCC): Status: RESOLVED | Noted: 2023-04-08 | Resolved: 2023-04-21

## 2023-04-21 PROBLEM — A41.9 SEPTICEMIA (HCC): Status: RESOLVED | Noted: 2023-04-06 | Resolved: 2023-04-21

## 2023-04-21 PROBLEM — G93.40 ENCEPHALOPATHY ACUTE: Status: RESOLVED | Noted: 2023-04-08 | Resolved: 2023-04-21

## 2023-04-21 PROBLEM — J96.02 ACUTE RESPIRATORY FAILURE WITH HYPOXIA AND HYPERCAPNIA (HCC): Status: RESOLVED | Noted: 2023-04-08 | Resolved: 2023-04-21

## 2023-04-21 PROBLEM — G93.41 ACUTE METABOLIC ENCEPHALOPATHY: Status: RESOLVED | Noted: 2023-04-06 | Resolved: 2023-04-21

## 2023-04-21 PROBLEM — E16.2 HYPOGLYCEMIA: Status: RESOLVED | Noted: 2023-04-06 | Resolved: 2023-04-21

## 2023-04-21 PROBLEM — T68.XXXA HYPOTHERMIA: Status: RESOLVED | Noted: 2023-04-06 | Resolved: 2023-04-21

## 2023-04-21 PROBLEM — Z16.12 INFECTION DUE TO EXTENDED-SPECTRUM BETA-LACTAMASE-PRODUCING KLEBSIELLA PNEUMONIAE: Status: RESOLVED | Noted: 2023-04-16 | Resolved: 2023-04-21

## 2023-04-21 PROBLEM — I95.9 HYPOTENSION: Status: RESOLVED | Noted: 2023-04-06 | Resolved: 2023-04-21

## 2023-04-21 PROBLEM — R09.02 HYPOXEMIA: Status: RESOLVED | Noted: 2023-04-17 | Resolved: 2023-04-21

## 2023-04-21 PROBLEM — R56.9 SEIZURE-LIKE ACTIVITY (HCC): Status: RESOLVED | Noted: 2023-04-11 | Resolved: 2023-04-21

## 2023-04-21 LAB
GLUCOSE BLD STRIP.AUTO-MCNC: 147 MG/DL (ref 65–100)
GLUCOSE BLD STRIP.AUTO-MCNC: 171 MG/DL (ref 65–100)
GLUCOSE BLD STRIP.AUTO-MCNC: 189 MG/DL (ref 65–100)
SARS-COV-2 RDRP RESP QL NAA+PROBE: NOT DETECTED
SERVICE CMNT-IMP: ABNORMAL
SOURCE: NORMAL

## 2023-04-21 PROCEDURE — 6370000000 HC RX 637 (ALT 250 FOR IP): Performed by: FAMILY MEDICINE

## 2023-04-21 PROCEDURE — 87635 SARS-COV-2 COVID-19 AMP PRB: CPT

## 2023-04-21 PROCEDURE — 6370000000 HC RX 637 (ALT 250 FOR IP): Performed by: INTERNAL MEDICINE

## 2023-04-21 PROCEDURE — 82962 GLUCOSE BLOOD TEST: CPT

## 2023-04-21 PROCEDURE — 2580000003 HC RX 258: Performed by: FAMILY MEDICINE

## 2023-04-21 RX ORDER — LOPERAMIDE HYDROCHLORIDE 2 MG/1
2 CAPSULE ORAL 4 TIMES DAILY PRN
Qty: 1 CAPSULE | Refills: 0
Start: 2023-04-21 | End: 2023-05-01

## 2023-04-21 RX ORDER — L. ACIDOPHILUS/L.BULGARICUS 1MM CELL
4 TABLET ORAL 3 TIMES DAILY
Qty: 1 TABLET | Refills: 0
Start: 2023-04-21

## 2023-04-21 RX ORDER — FUROSEMIDE 40 MG/1
40 TABLET ORAL DAILY
Qty: 1 TABLET | Refills: 0
Start: 2023-04-21

## 2023-04-21 RX ADMIN — Medication 4 TABLET: at 09:38

## 2023-04-21 RX ADMIN — Medication 4 TABLET: at 14:32

## 2023-04-21 RX ADMIN — FUROSEMIDE 40 MG: 40 TABLET ORAL at 09:38

## 2023-04-21 RX ADMIN — SPIRONOLACTONE 25 MG: 25 TABLET ORAL at 09:43

## 2023-04-21 RX ADMIN — SODIUM CHLORIDE, PRESERVATIVE FREE 10 ML: 5 INJECTION INTRAVENOUS at 09:40

## 2023-04-21 RX ADMIN — PANTOPRAZOLE SODIUM 40 MG: 40 TABLET, DELAYED RELEASE ORAL at 06:20

## 2023-04-21 RX ADMIN — PANTOPRAZOLE SODIUM 40 MG: 40 TABLET, DELAYED RELEASE ORAL at 16:55

## 2023-04-21 RX ADMIN — SODIUM BICARBONATE 1300 MG: 650 TABLET ORAL at 09:39

## 2023-04-21 ASSESSMENT — PAIN SCALES - GENERAL: PAINLEVEL_OUTOF10: 0

## 2023-04-21 NOTE — DISCHARGE SUMMARY
atelectasis. XR CHEST 1 VIEW    Result Date: 4/12/2023  FINDINGS / IMPRESSION: Diffuse bilateral interstitial and alveolar infiltrates without significant change. MRI FOOT LEFT W WO CONTRAST    Result Date: 4/11/2023  -Soft tissue ulceration along the plantar aspect of the heel with subjacent soft tissue edema, possibly reflecting cellulitis. Nonenhancing subcutaneous tissue is seen posterior to the proximal calcaneus, concerning for devitalized/necrotic tissue. No discrete abscess. -Mild increased T2 signal in the subcortical posterior calcaneus without corresponding T1 hypointense signal, likely reflecting reactive marrow edema. No definitive evidence of osteomyelitis at this time, though this area is at risk for development of osteomyelitis given proximity to the ulceration. -Mild multifocal degenerative changes of the hindfoot. -Mild Achilles tendinosis without evidence of tear. -Mild thickening of the proximal plantar fascia with adjacent edema, which may reflect plantar fasciitis. Vascular duplex lower extremity arteries bilateral with ALEICA    Result Date: 4/7/2023  Diffuse bilateral lower extremity peripheral arterial disease. Bilateral lower extremity soft tissue edema. No significant high-grade stenosis or occlusion of the femoral or popliteal arteries. Severe bilateral lower extremity distal disease. Noncompressible arteries. Unreliable ALECIA measurements. Right great toe pressure 97. Left great toe pressure 77        Labs: Results:       BMP, Mg, Phos Recent Labs     04/19/23  0336      K 3.4*      CO2 24   ANIONGAP 5   BUN 31*   CREATININE 1.30   LABGLOM >60   CALCIUM 8.3   GLUCOSE 136*   MG 1.9      CBC Recent Labs     04/19/23  0336 04/20/23  0551   WBC 14.8* 11.2*   RBC 3.07* 3.18*   HGB 7.9* 8.0*   HCT 23.8* 24.5*   MCV 77.5* 77.0*   MCH 25.7* 25.2*   MCHC 33.2 32.7   RDW 18.6* 18.8*   PLT 51* 64*   MPV Unable to calculate. Recommend adding IPF. Unable to calculate.  Recommend

## 2023-04-21 NOTE — PLAN OF CARE
Problem: Safety - Adult  Goal: Free from fall injury  Outcome: Progressing     Problem: Discharge Planning  Goal: Discharge to home or other facility with appropriate resources  Outcome: Progressing     Problem: Pain  Goal: Verbalizes/displays adequate comfort level or baseline comfort level  Outcome: Progressing     Problem: Chronic Conditions and Co-morbidities  Goal: Patient's chronic conditions and co-morbidity symptoms are monitored and maintained or improved  Outcome: Progressing     Problem: Confusion  Goal: Confusion, delirium, dementia, or psychosis is improved or at baseline  Description: INTERVENTIONS:  1. Assess for possible contributors to thought disturbance, including medications, impaired vision or hearing, underlying metabolic abnormalities, dehydration, psychiatric diagnoses, and notify attending LIP  2. [place] high risk fall precautions, as indicated  3. Provide frequent short contacts to provide reality reorientation, refocusing and direction  4. Decrease environmental stimuli, including noise as appropriate  5. Monitor and intervene to maintain adequate nutrition, hydration, elimination, sleep and activity  6. If unable to ensure safety without constant attention obtain sitter and review sitter guidelines with assigned personnel  7. Initiate Psychosocial CNS and Spiritual Care consult, as indicated  Outcome: Progressing     Problem: Skin/Tissue Integrity  Goal: Absence of new skin breakdown  Description: 1. Monitor for areas of redness and/or skin breakdown  2. Assess vascular access sites hourly  3. Every 4-6 hours minimum:  Change oxygen saturation probe site  4. Every 4-6 hours:  If on nasal continuous positive airway pressure, respiratory therapy assess nares and determine need for appliance change or resting period.   Outcome: Progressing     Problem: ABCDS Injury Assessment  Goal: Absence of physical injury  Outcome: Progressing     Problem: Safety - Medical Restraint  Goal:

## 2023-04-21 NOTE — FLOWSHEET NOTE
04/21/23 0724   Handoff   Communication Given Shift Handoff   Handoff Received From Lindsay Kirkpatrick RN   Handoff Communication Face to Face; At bedside   Time Handoff Given 0730     Resting in bed with no distress noted on RA. SR up x2, bed low locked with call light within reach. Bed alarm on.

## 2023-04-21 NOTE — PROGRESS NOTES
ACUTE PHYSICAL THERAPY GOALS:   (Developed with and agreed upon by patient and/or caregiver.)  STG:  (1.)Mr. Jerad Hilario will move from supine to sit and sit to supine , scoot up and down, and roll side to side with MINIMAL ASSIST within 4 treatment day(s). Goal met 4/16 - progress to mod I.  (2.)Mr. Jerad Hilario will transfer from bed to chair and chair to bed with MINIMAL ASSIST using the least restrictive device within 4 treatment day(s). Goal not met as of 4/16  (3.)Mr. Jerad Hilario will ambulate with MINIMAL ASSIST for 25 feet with the least restrictive device within 4 treatment day(s). Goal not met as of 4/16     LTG:  (1.)Mr. Jerad Hilario will move from supine to sit and sit to supine , scoot up and down, and roll side to side in bed with CONTACT GUARD ASSIST within 7 treatment day(s). Goal not met as of 4/16  (2.)Mr. Jerad Hilario will transfer from bed to chair and chair to bed with CONTACT GUARD ASSIST using the least restrictive device within 7 treatment day(s). Goal not met as of 4/16  (3.)Mr. Jerad Hilario will ambulate with CONTACT GUARD ASSIST for 50 feet with the least restrictive device within 7 treatment day(s).  Goal not met as of 4/16  ________________________________________________________________________________________________     PHYSICAL THERAPY: Daily Note PM   (Link to Caseload Tracking: PT Visit Days : 3  Time In/Out PT Charge Capture  Rehab Caseload Tracker  Orders    Carlie Wolf is a 63 y.o. male   PRIMARY DIAGNOSIS: Acute metabolic encephalopathy  Septicemia ([facility] [A41.9]  [facility] [E11.628, L08.9]  Sepsis ([facility] [A41.9]  Syncope, unspecified syncope type [R55]       Inpatient: Payor: Heaven Dent / Plan: Mello Cosme / Product Type: *No Product type* /     ASSESSMENT:     REHAB RECOMMENDATIONS:   Recommendation to date pending progress:  Setting:  Short-term Rehab    Equipment:    To Be Determined     ASSESSMENT:  Mr. Jerad Hilario presents supine and
Ambulance transport here now. Patient belongings packed up by staff and sent with him.
Antonietta Jara  Admission Date: 4/6/2023         Daily Progress Note: 4/17/2023    The patient's chart is reviewed and the patient is discussed with the staff. Background: Patient is a 63 y.o.  male seen and evaluated at the request of Dr. Mejia Boswell. Briefly patient has a history of type 2 diabetes, CKD, hypertension and alcoholic cirrhosis found unresponsive by family on 4/6. Found with blood glucose 40 by EMS, improved with dextrose. He was also hypoxic at 80% on room air. He was hypothermic in the ED, noted to have ulcerations on his left heel. He also has a possible seizure-like activity, started on Keppra. He was admitted and started on antibiotics. ID consulted for wound infections and abx management. On 4/8, his white count continued to rise and he was hypothermic. His O2 sat was noted to be 88% on room air. Chest x-ray showed what appeared to be infiltrates versus possible pulmonary edema. RVP negative, wound cultures positive for klebsiella ESBL MDR, blood cx remained NG. Non-contrast CT scan done on 4/10 shows extensive multilobular infiltrates. Echocardiogram on 4/7 showed EF 60 to 65%. On 4/12, he was on O2 3 L. His oxygen needs then increased, and was placed on Airvo 35 L at 60% O2 with O2 sat 96%. [facility] was consulted for acute respiratory failure. After a rapid response on the floor on 4/13 where he was found with a thready pulse and Airvo out of nares, bradycardic received atropine and intubated. Was moved to the ICU at that time. On 4/14 he was extubated to nasal cannula. He continued to be hypothermic, lethargic. Was moved to floor on 4/15. ST recommended MBS for dysphagia. Subjective:     Has been hypothermic overnight, placed on bear hugger. Is on 2 lpm NC with sat >94%. Is alert and talking today. Some statements seem confused still. Wife at bedside. No complaints other than wanting to eat.      Current Facility-Administered Medications
At 2200 this shift pt temp 92.5 - pt A/O x4 with no complaints - warmer applied to pt    At 2345 pt temp 93.3 rectal - warmer still applied to pt - will continue to assess
At evening vitals, CNA unable to obtain a temperature. Temperature obtained rectally and read 92.1F. Tristan hugger applied to patient. Dr. Elvia Hua paged and notified. At end of shift, rectal temp reading 96.4F. Tristan hugger still in place.
Attempting to call report to [facility] and Rehab # [phone] with no success. Patient going to room # 419. Patient was finally able to reach is son who stated he would let his wife know.
Brief visit    Will come back per pt request
Comprehensive Nutrition Assessment    Type and Reason for Visit: Reassess  Poor Intake/Appetite 5 or More Days (Hospitalists)    Nutrition Recommendations/Plan:   ? Meals and Snacks:  Diet: Continue current order  Nutrition Supplement Therapy:  Medical food supplement therapy:  Continue Magic Cup three times per day (this provides 290 kcal and 9 grams protein per serving) and Jacques twice per day (this provides 90 kcal and 2.5 grams protein per packet)     Malnutrition Assessment:  Malnutrition Status: Insufficient data (no sven wasting noted on visual exam)  no sven wasting of fat or muscle stores noted  Nutrition Assessment:  Nutrition History: Limited history from pt. He reports he has had a decreased appetite for an unknown period of time. He is unable to say if he has had any recent changes in wt. Nutrition Background:       PMH significant for T2DM, CKD3, HTN, and alcoholic cirrhosis. Pt presents this admission after being found unresponsive with blood glucose of 40. Upon admission pt was found to have sepsis. Intubated 4/13, extubated 4/14. Nutrition Interval:  RD met w/pt at bedside. He reports good appetite and good po intake. States he is eating % of his meals and % of his nutrition supplements. PO intake appears to be meeting estimated nutrition needs at this time. No acute nutrition concerns. Continue current diet and nutrition supplements. Current Nutrition Therapies:  ADULT DIET; Regular  ADULT ORAL NUTRITION SUPPLEMENT; Breakfast, Dinner; Wound Healing Oral Supplement  ADULT ORAL NUTRITION SUPPLEMENT; Breakfast, Lunch, Dinner; Frozen Oral Supplement    Current Intake:   Average Meal Intake: % (per patient) Average Supplements Intake: % (per patient Jacques BID and MC TID)      Anthropometric Measures:  Height: 5' 11\" (180.3 cm)  Current Body Wt: 182 lb 8.7 oz (82.8 kg) (4/20), Weight source: Bed Scale  BMI: 25.5, Overweight (BMI 25.0-29. 9)  Admission Body Weight:
Covid sent down. . Attempted to notify patients spouse, Kelsy Montgomery # [phone], that he was accepted to [facility].
Infectious Disease Progress Note    Today's Date: 2023   Admit Date: 2023    Impression:   Diabetic foot ulcer/infection: superficial wound swab cx ()- ESBL E. coli  MRI 04/10 with no definite evidence of osteomyelitis at this time  No vascular intervention planned  Pneumonia, CT chest with extensive multilobar infiltrates and bilateral pleural effusions    Acute respiratory failure s/p emergent intubation 2023. CXR with diffuse pulmonary edema , extubated now  PAD  DM2  CKD stage 3  Cirrhosis of liver  New fever/diarrhea --follow. Stool testing is negative    Plan: Will discontinue Merrem and plan to observe: he is having diarrhea, suspect from antbx. He needs diligent wound care and off loading for heel; would NPT start bactrim  Recommend balderas removal-potential cause if fever? Follow temps    Anti-infectives:   Merrem  -   Vancomycin  -    Zosyn  -   Ceftriaxone  -x 1 dose     Subjective: Interval History:   Seen with family at bedside. Pt feeling better. Reports diarrhea       No Known Allergies     Review of Systems:  A comprehensive review of systems is negative except as stated in the HPI.       Objective:     Visit Vitals  BP (!) 178/101   Pulse 79   Temp 98.1 °F (36.7 °C) (Oral)   Resp 20   Ht 5' 11\" (1.803 m)   Wt 173 lb 1 oz (78.5 kg)   SpO2 92%   BMI 24.14 kg/m²     Temp (24hrs), Av.2 °F (36.2 °C), Min:94.2 °F (34.6 °C), Max:98.1 °F (36.7 °C)     Patient examined 2023, exam remains unchanged except as noted below:    General:  Alert, cooperative, no acute distress   Head:  Normocephalic, atraumatic    Eyes:  Anicteric, no drainage/not injected   Throat: Mucus membranes moist OP clear   Neck: Supple, symmetrical, trachea midline, no JVD   Lungs:   Mild crackles throughout lung fields, no increased work of breathing, 2LNC   Heart:  Regular rate and rhythm, without murmur   Abdomen:   Soft, non-tender, no guarding, no distention, bowel
Patient had a rectal temp 94.2. New orders placed to re-apply alireza hugger. Setting at 43 degrees C. Will continue to monitor.
Patient had been pulling off alireza hugger. Checked an oral temp. 97.5  Alireza hugger was removed at this time. Will continue to monitor.
Pt BG reads 56, orange juice given p.o. Pt is alert, oriented, using urinal at this time. O2 put back on pt O2 saturation found in the 80's on R.A. O2 saturation back to normal within one minute. Will re-check Bg in 15 minutes per protocol.
Pt temp 97.4 oral at this time
RRT Clinical Rounding Nurse Update    Vitals:    04/17/23 0315 04/17/23 0407 04/17/23 0722 04/17/23 1111   BP: (!) 157/91  (!) 169/90 (!) 178/101   Pulse: 90 90 68 79   Resp: 18  18 20   Temp: 97.7 °F (36.5 °C)  97.2 °F (36.2 °C) 98.1 °F (36.7 °C)   TempSrc: Oral  Oral Oral   SpO2: 94%  97% 92%   Weight:       Height:            DETERIORATION INDEX SCORE: 72    ASSESSMENT:  Previous outreach assessment was reviewed. Mentation improved today. Alert to self, place, and time but with delayed responses. Following commands, requesting water.   Awaiting SLP eval.    PLAN:  Will follow per  [address]: [facility] [place] [phone]
Received report from nAnelise Larios. Patient is alert to self at this time. Patient is on RA at this time with no s/s of distress. Patient has D5 running at 50mL/hr. Hector is in place and draining clear yellow urine. Call bell is within reach. Will continue to monitor.
[facility] @ [address] [place] [address] and Rehab.discharge packet prepared by case mgmt.
(g/day): 79-94 Weight Used: (Current) 78.5 kg  Fluid (ml/day):   (1 ml/kcal)    Nutrition Diagnosis:   Inadequate oral intake related to  (poor appetite) as evidenced by intake 26-50% (pt reports decreased appetite)  Nutrition Interventions:   Food and/or Nutrient Delivery: Continue Current Diet, Modify Oral Nutrition Supplement  Nutrition Education/Counseling: No recommendation at this time  Coordination of Nutrition Care: Continue to monitor while inpatient    Goals:   Previous Goal Met: Progressing toward Goal(s)  Active Goal: PO intake 50% or greater       Nutrition Monitoring and Evaluation:   Behavioral-Environmental Outcomes: None Identified  Food/Nutrient Intake Outcomes: Food and Nutrient Intake, Supplement Intake  Physical Signs/Symptoms Outcomes: Diarrhea, GI Status, Fluid Status or Edema, Meal Time Behavior, Weight    Discharge Planning:     Too soon to determine    Jorge Shetty RD
Extremities without edema, pulses palpable   Skin: Skin without rash, Left heel images reviewed, wounds are covered     Lines/Devices: PIVs             Data Review:     CBC:  Recent Labs     04/17/23 0338 04/18/23 0417   WBC 23.4* 22.8*   HGB 8.4* 8.3*   HCT 26.6* 25.1*   PLT 81* 66*         BMP:  Recent Labs     04/16/23  0855 04/17/23 0338 04/18/23 0417   BUN 42* 37* 30*   * 148* 132*   K 3.4* 3.2* 3.2*   * 117* 104   CO2 25 26 24         LFTS:  No results for input(s): ALT, TP, ALB in the last 72 hours.     Invalid input(s): TBILI, SGOT, AP      Microbiology:   Reviewed    Imaging:   Reviewed    Signed By: DESIRE Tsang - CNP     April 18, 2023
Klebsiella ESBL/MDR. ID and vascular surgery were consulted. Neurology has seen due to concerns for seizures. Patient was started on Keppra after rapid response on admission. EEG 4/9 was abnormal due to disorganized and slow background which overall is poorly reactive there are no recorded seizures. Discussed with neurology Dr. Solis Ramp 4/11 and no further Keppra was recommended. Patient became alert and oriented x3, follows commands and responded appropriately though still with poor cognition. Hypotension resolved. Patient was noted to have acute respiratory failure with hypoxia with increasing oxygen requirement. CT chest 4/10 shows extensive multilobar infiltrates with bilateral pleural effusions/upper abdominal ascites; evidence of remote granulomatous disease. He is already on broad-spectrum antibiotics that would cover PNA as well. TTE 4/7/23 with EF 60-65%. Patient does have a history of anasarca due to cirrhosis/ascites. Home spironolactone and IV Lasix were started. Pt had increase in O2 requirement to Airvo on 4/12 and Pulm was consulted. On 4/13, patient was found to be hypoxic with 75% on room air and CODE BLUE was called. Patient was noted to have BP of 90/60 with heart rate of 40s with faint pulse and shallow breathing. Patient was unresponsive. Given 1 mg atropine with improvement in heart rate. Patient was intubated for airway protection and transferred to ICU. He was extubated on 4/14 and transferred to floor on 4/15. We were consulted to assume care. Subjective & 24hr Events (04/20/23): In bed, doing well, didn't require bear hugger last night. Told me again today that he is agreeable to placement if it'll help him get better, said he's willing to do anything. Assessment & Plan:   # Sepsis              - Leukocytosis + hypothermia + foot wound              - Resolved. Has PAD.  ID has seen, monitor off abx              - Doing well 4/20, no hypothermia
PAD  Patient sepsis hypothymia has resolved. Arterial duplex/ALECIA shows diffuse bilateral lower extremity PAD. MRI of the left foot for any evidence of definitive osteomyelitis at this time.  -Currently on meropenem since 4/11 given wound culture growing Klebsiella ESBL/MDR.  -ID recs appreciated. Dc meropenem today and monitor off abx.    -Vascular recs appreciated: no vascular intervention. -wound care  -balderas catheter removal    Acute respiratory failure with hypoxia   Multilobar PNA  CT chest with sensitive multilobular infiltrates and bilateral pleural effusion. Status post emergent intubation on 4/13. Extubated on 4/15. TTE 4/7/23 with EF 60-65%   Chest x-ray with diffuse pulmonary edema.    - ID recommending dc abx for now and monitor  - Pulm on board, appreciate recs     Acute metabolic encephalopathy  Syncope // Seizure like activities  2/2 hypoglycemia and sepsis on admission  EEG 4/9 abnormal due to disorganized and slow background which overall is poorly reactive there are no recorded seizures. - Avoid sedating meds  - Fall precautions  - Delirium precautions  - D/c Keppra 4/11. D/w Neuro Dr. Anyi Carroll and no Keppra. - now aaox2-3 and improving. Diarrhea  Cont probiotics  Add imodium as Cdiff neg      DMII  Hypoglycemia, resolved  lO8i--0.3 on 4/6  Hold home Lantus--d/c indefinitely on d/c  Hypoglycemic protocol  SSI for now  Diabetes management on board     Hypertension  Hypotension, resolved  - now on lasix and aldactone     Microcytic anemia  CHUY  Hemoglobin baseline 7-8, most likely due to CKD, at risk for worsening in setting of sepsis.  Iron studies c/w chronic disease  CTM hemoglobin, transfuse if Hgb <7 or if symptomatic     CKD stage III  Creatinine baseline~2-2.2  - Avoid nephrotoxic meds  - Cont home bicarb     Hypokalemia  On Potassium Bicarb daily  Check BMP and Mag     Alcohol cirrhosis  LFT's wnl on admission  Resumed Lasix and Aldactone  Holding home Lactulose 4/12 d/t
[] [] [] [x] [] [] [] [] [x]    Stand to Sit [] [] [] [] [] [x] [] [] [] [] [x]     [] [] [] [] [] [] [] [] [] [] []    I=Independent, Mod I=Modified Independent, S=Supervision, SBA=Standby Assistance, CGA=Contact Guard Assistance,   Min=Minimal Assistance, Mod=Moderate Assistance, Max=Maximal Assistance, Total=Total Assistance, NT=Not Tested    BALANCE: Good Fair+ Fair Fair- Poor NT Comments   Sitting Static [x] [] [] [] [] []    Sitting Dynamic [] [x] [] [] [] []              Standing Static [] [x] [] [] [] []    Standing Dynamic [] [] [x] [] [] []      GAIT: I Mod I S SBA CGA Min Mod Max Total  NT x2 Comments:   Level of Assistance [] [] [] [] [] [x] [] [] [] [] [x]    Distance 6' x 2     DME Rolling Walker    Gait Quality Decreased jason , Decreased step clearance, Decreased step length, and Trunk sway increased    Weightbearing Status      Stairs      I=Independent, Mod I=Modified Independent, S=Supervision, SBA=Standby Assistance, CGA=Contact Guard Assistance,   Min=Minimal Assistance, Mod=Moderate Assistance, Max=Maximal Assistance, Total=Total Assistance, NT=Not Tested    PLAN:   FREQUENCY AND DURATION: 3 times/week for duration of hospital stay or until stated goals are met, whichever comes first.    TREATMENT:   TREATMENT:   Co-Treatment PT/OT necessary due to patient's decreased overall endurance/tolerance levels, as well as need for high level skilled assistance to complete functional transfers/mobility and functional tasks  Therapeutic Activity (34 Minutes): Therapeutic activity included Rolling, Supine to Sit, Scooting, Transfer Training, Ambulation on level ground, Sitting balance , and Standing balance to improve functional Activity tolerance, Balance, Mobility, and Strength.     TREATMENT GRID:  N/A    AFTER TREATMENT PRECAUTIONS: Bed/Chair Locked, Call light within reach, Chair, Needs within reach, RN notified, and Visitors at bedside    INTERDISCIPLINARY COLLABORATION:  RN/ PCT, PT/ PTA, and OT/
foot for any evidence of definitive osteomyelitis at this time. 04/18/23: Sepsis has resolved and completed a course of IV merrem on 4/17 per ID. With intermittent hypothermia  -appreciate ID's rec  -Vascular recs appreciated: no vascular intervention. -wound care    Acute respiratory failure with hypoxia with possible Multilobar PNA - resolved  CT chest with sensitive multilobular infiltrates and bilateral pleural effusion. Status post emergent intubation on 4/13. Extubated on 4/15. TTE 4/7/23 with EF 60-65%   - ID recommending dc abx for now and monitor  - Pulm on board, appreciate recs     Acute metabolic encephalopathy  Syncope // Seizure like activities  2/2 hypoglycemia and sepsis on admission  EEG 4/9 abnormal due to disorganized and slow background which overall is poorly reactive there are no recorded seizures. - Avoid sedating meds  - Fall precautions  - Delirium precautions  - D/c Keppra 4/11. D/w Neuro Dr. Arnol Ochoa and no Keppra. - now aaox3. Diarrhea  Cont probiotics  Add imodium as Cdiff neg      DMII  Hypoglycemia has resolved and FS glucose now trending up. A1c--5.3 on 4/6  - start low dose lantus tonight. - Hypoglycemic protocol  - SSI for now  - Diabetes management on board     Hypertension  Hypotension, resolved  - now on lasix and aldactone     Microcytic anemia  CHUY  Hemoglobin baseline 7-8, most likely due to CKD, at risk for worsening in setting of sepsis. Iron studies c/w chronic disease  CTM hemoglobin, transfuse if Hgb <7 or if symptomatic     CKD stage III  Creatinine baseline~2-2.2  - Avoid nephrotoxic meds  - Cont home bicarb     Hypokalemia and Hypomagnesemia  Mg of 1.7 and K+ low at 3.2 today.  Replete with Kcl PO and Mg IV  - recheck BMP     Alcohol cirrhosis  LFT's wnl on admission  Resumed Lasix and Aldactone  Holding home Lactulose 4/12 d/t diarrhea     Chronic hyponatremia, stable  Na 128 on admission (baseline 868-475'X d/ hx of alcoholic cirrhosis) but has been
treatment indicated at this time    Dysphagia Outcome and Severity Scale (KENIA)  Dysphagia Outcome Severity Scale: Level 7: Normal in all situations  Interpretation of Tool: The Dysphagia Outcome and Severity Scale (KENIA) is a simple, easy-to-use, 7-point scale developed to systematically rate the functional severity of dysphagia based on objective assessment and make recommendations for diet level, independence level, and type of nutrition. Normal(7), Functional(6), Mild(5), Mild-Moderate(4), Moderate(3), Moderate-Severe(2), Severe(1)    Speech Therapy Prognosis  Prognosis: Good  Prognosis Considerations: Previous Level of Function    Education: Patient, RN, Physician    Role of SLP, Dysphagia concerns, Evaluation results, Recommended diet, SLP Plan    Patient Education Response: Verbalizes understanding    PRECAUTIONS/ALLERGIES: Patient has no known allergies.    Safety Devices in place: Yes  Type of devices: Nurse notified, Call light within reach    Therapy Time  SLP Individual Minutes  Time In: 1101  Time Out: 1114  Minutes: 1017 [address]  4/17/2023 11:20 AM
[] [] [x] [] [] []        PLAN:     FREQUENCY/DURATION   OT Plan of Care: 3 times/week for duration of hospital stay or until stated goals are met, whichever comes first.    TREATMENT:     TREATMENT:   Co-Treatment PT/OT necessary due to patient's decreased overall endurance/tolerance levels, as well as need for high level skilled assistance to complete functional transfers/mobility and functional tasks  Neuromuscular Re-education (14 Minutes): Patient participated in neuromuscular re-education including functional reaching, weight shifting, postural training, midline training, standing tolerance activity , and sitting balance activity   with minimal verbal cues and tactile cues to improve sitting balance, standing balance, posture, coordination, static balance, and dynamic balance in order to prepare for functional task, prepare for seated ADLs, prepare for standing ADLs, prepare for functional transfer, increase safety awareness, and prepare for self care. .   Self Care (20 minutes): Patient participated in toileting, lower body dressing, and grooming ADLs in unsupported sitting and standing with minimal verbal and tactile cueing to increase independence, decrease assistance required, increase activity tolerance, and increase safety awareness. Patient also participated in functional mobility and functional transfer training to increase independence, decrease assistance required, and increase activity tolerance.      TREATMENT GRID:  N/A    AFTER TREATMENT PRECAUTIONS: Alarm Activated, Call light within reach, Chair, Needs within reach, RN notified, and Visitors at bedside    INTERDISCIPLINARY COLLABORATION:  RN/ PCT, PT/ PTA, and OT/ HAYDEN    EDUCATION:       TOTAL TREATMENT DURATION AND TIME:  Time In: 1046  Time Out: [address]  Minutes: [facility], 
as of this encounter: 182 lb 8.7 oz (82.8 kg). Intake/Output Summary (Last 24 hours) at 4/19/2023 1418  Last data filed at 4/19/2023 1206  Gross per 24 hour   Intake 120 ml   Output 476 ml   Net -356 ml         Physical Exam:   Blood pressure 131/88, pulse 83, temperature 97.5 °F (36.4 °C), temperature source Oral, resp. rate 20, height 5' 11\" (1.803 m), weight 182 lb 8.7 oz (82.8 kg), SpO2 99 %. General:    Well nourished. Awake, pleasant, in bed. Head:  Normocephalic, atraumatic  Eyes:  Sclerae appear normal.  Pupils equally round. ENT:  Nares appear normal.  Moist oral mucosa  Neck:  No restricted ROM. Trachea midline   CV:   RRR. No m/r/g. No jugular venous distension. Lungs:   CTAB. No wheezing, rhonchi, or rales. Symmetric expansion. Abdomen:   Soft, nontender, nondistended. Extremities: No cyanosis or clubbing. No edema  Skin:     No rashes and normal coloration. Warm and dry. Neuro:  CN II-XII grossly intact. Psych:  Normal mood and affect.       I have personally reviewed labs and tests:  Recent Labs:  Recent Results (from the past 48 hour(s))   POCT Glucose    Collection Time: 04/17/23  4:38 PM   Result Value Ref Range    POC Glucose 280 (H) 65 - 100 mg/dL    Performed by: Bart Carl    POCT Glucose    Collection Time: 04/17/23  8:36 PM   Result Value Ref Range    POC Glucose 282 (H) 65 - 100 mg/dL    Performed by: Jennifer    CBC with Auto Differential    Collection Time: 04/18/23  4:17 AM   Result Value Ref Range    WBC 22.8 (H) 4.3 - 11.1 K/uL    RBC 3.22 (L) 4.23 - 5.6 M/uL    Hemoglobin 8.3 (L) 13.6 - 17.2 g/dL    Hematocrit 25.1 (L) 41.1 - 50.3 %    MCV 78.0 (L) 82 - 102 FL    MCH 25.8 (L) 26.1 - 32.9 PG    MCHC 33.1 31.4 - 35.0 g/dL    RDW 18.9 (H) 11.9 - 14.6 %    Platelets 66 (L) 220 - 450 K/uL    MPV 11.2 9.4 - 12.3 FL    nRBC 0.03 0.0 - 0.2 K/uL    Differential Type AUTOMATED      Seg Neutrophils 78 43 - 78 %    Lymphocytes 12 (L) 13 - 44 %

## 2023-04-21 NOTE — CARE COORDINATION
MSN, CM:  patient to be discharged to [address] for services. Patient and family agree with this discharge plan. Patient has met all milestones for this admission. Attempted to contact patient's wife with no success and VM is full. [facility] to transport patient to facility. 04/21/23 1540   Service Assessment   Patient Orientation Alert and [address] [place] Discharge   Transition of Care Consult (CM Consult) SNF  ([address])   Partner SNF No   Reason Why Partner SNF Not Chosen Location   Mode of Transport at Discharge [facility]  ([facility])   [facility] Transport Time of Discharge 1800   Confirm Follow Up Transport Family   Condition of Participation: Discharge Planning   The Plan for Transition of Care is related to the following treatment goals: Short term rehab   The Patient and/or Patient Representative was provided with a Choice of Provider? Patient   The Patient and/Or Patient Representative agree with the Discharge Plan? Yes   Freedom of Choice list was provided with basic dialogue that supports the patient's individualized plan of care/goals, treatment preferences, and shares the quality data associated with the providers?   Yes

## 2023-04-22 ENCOUNTER — HOME CARE VISIT (OUTPATIENT)
Dept: HOME HEALTH SERVICES | Facility: HOME HEALTH | Age: 58
End: 2023-04-22
Payer: MEDICARE

## 2023-04-24 ENCOUNTER — CARE COORDINATION (OUTPATIENT)
Dept: CARE COORDINATION | Facility: CLINIC | Age: 58
End: 2023-04-24

## 2023-04-24 NOTE — CARE COORDINATION
Transition of care outreach postponed for 14 days due to patient's discharge to SNF.  Patient d/c to [facility]

## 2023-05-05 ENCOUNTER — HOME HEALTH ADMISSION (OUTPATIENT)
Dept: HOME HEALTH SERVICES | Facility: HOME HEALTH | Age: 58
End: 2023-05-05

## 2023-05-06 ENCOUNTER — APPOINTMENT (OUTPATIENT)
Dept: GENERAL RADIOLOGY | Age: 58
DRG: 871 | End: 2023-05-06
Payer: MEDICARE

## 2023-05-06 ENCOUNTER — APPOINTMENT (OUTPATIENT)
Dept: CT IMAGING | Age: 58
DRG: 871 | End: 2023-05-06
Payer: MEDICARE

## 2023-05-06 ENCOUNTER — HOSPITAL ENCOUNTER (INPATIENT)
Age: 58
LOS: 12 days | Discharge: NURSING, REHAB OR LONG-TERM CARE | DRG: 871 | End: 2023-05-18
Attending: EMERGENCY MEDICINE | Admitting: HOSPITALIST
Payer: MEDICARE

## 2023-05-06 DIAGNOSIS — D64.9 ANEMIA, UNSPECIFIED TYPE: ICD-10-CM

## 2023-05-06 DIAGNOSIS — T68.XXXA HYPOTHERMIA, INITIAL ENCOUNTER: ICD-10-CM

## 2023-05-06 DIAGNOSIS — D69.6 THROMBOCYTOPENIA (HCC): ICD-10-CM

## 2023-05-06 DIAGNOSIS — N17.9 ACUTE KIDNEY INJURY (HCC): Primary | ICD-10-CM

## 2023-05-06 DIAGNOSIS — K70.31 ALCOHOLIC CIRRHOSIS OF LIVER WITH ASCITES (HCC): ICD-10-CM

## 2023-05-06 PROBLEM — E87.5 HYPERKALEMIA: Status: ACTIVE | Noted: 2023-05-06

## 2023-05-06 PROBLEM — E87.20 LACTIC ACIDOSIS: Status: ACTIVE | Noted: 2023-05-06

## 2023-05-06 PROBLEM — N18.31 ACUTE RENAL FAILURE SUPERIMPOSED ON STAGE 3A CHRONIC KIDNEY DISEASE (HCC): Status: ACTIVE | Noted: 2023-02-06

## 2023-05-06 PROBLEM — R65.10 SIRS (SYSTEMIC INFLAMMATORY RESPONSE SYNDROME) (HCC): Status: ACTIVE | Noted: 2023-05-06

## 2023-05-06 LAB
ALBUMIN SERPL-MCNC: 1.5 G/DL (ref 3.5–5)
ALBUMIN/GLOB SERPL: 0.3 (ref 0.4–1.6)
ALP SERPL-CCNC: 249 U/L (ref 50–136)
ALT SERPL-CCNC: 17 U/L (ref 12–65)
AMMONIA PLAS-SCNC: <10 UMOL/L (ref 11–32)
ANION GAP SERPL CALC-SCNC: 3 MMOL/L (ref 2–11)
APPEARANCE UR: CLEAR
AST SERPL-CCNC: 27 U/L (ref 15–37)
BACTERIA URNS QL MICRO: ABNORMAL /HPF
BASOPHILS # BLD: 0 K/UL (ref 0–0.2)
BASOPHILS NFR BLD: 0 % (ref 0–2)
BILIRUB SERPL-MCNC: 0.4 MG/DL (ref 0.2–1.1)
BILIRUB UR QL: NEGATIVE
BUN SERPL-MCNC: 31 MG/DL (ref 6–23)
CALCIUM SERPL-MCNC: 8.8 MG/DL (ref 8.3–10.4)
CASTS URNS QL MICRO: 0 /LPF
CHLORIDE SERPL-SCNC: 118 MMOL/L (ref 101–110)
CO2 SERPL-SCNC: 21 MMOL/L (ref 21–32)
COLOR UR: ABNORMAL
CREAT SERPL-MCNC: 3.2 MG/DL (ref 0.8–1.5)
CRYSTALS URNS QL MICRO: 0 /LPF
DIFFERENTIAL METHOD BLD: ABNORMAL
EKG ATRIAL RATE: 48 BPM
EKG DIAGNOSIS: ABNORMAL
EKG P AXIS: 21 DEGREES
EKG P-R INTERVAL: 110 MS
EKG Q-T INTERVAL: 547 MS
EKG QRS DURATION: 168 MS
EKG QTC CALCULATION (BAZETT): 489 MS
EKG R AXIS: -34 DEGREES
EKG T AXIS: 44 DEGREES
EKG VENTRICULAR RATE: 48 BPM
EOSINOPHIL # BLD: 0.1 K/UL (ref 0–0.8)
EOSINOPHIL NFR BLD: 2 % (ref 0.5–7.8)
EPI CELLS #/AREA URNS HPF: ABNORMAL /HPF
ERYTHROCYTE [DISTWIDTH] IN BLOOD BY AUTOMATED COUNT: 18.9 % (ref 11.9–14.6)
GLOBULIN SER CALC-MCNC: 5.7 G/DL (ref 2.8–4.5)
GLUCOSE BLD STRIP.AUTO-MCNC: 229 MG/DL (ref 65–100)
GLUCOSE BLD STRIP.AUTO-MCNC: 31 MG/DL (ref 65–100)
GLUCOSE BLD STRIP.AUTO-MCNC: 62 MG/DL (ref 65–100)
GLUCOSE SERPL-MCNC: 130 MG/DL (ref 65–100)
GLUCOSE UR STRIP.AUTO-MCNC: NEGATIVE MG/DL
HCT VFR BLD AUTO: 24.4 % (ref 41.1–50.3)
HGB BLD-MCNC: 7.7 G/DL (ref 13.6–17.2)
HGB UR QL STRIP: ABNORMAL
IMM GRANULOCYTES # BLD AUTO: 0 K/UL (ref 0–0.5)
IMM GRANULOCYTES NFR BLD AUTO: 0 % (ref 0–5)
KETONES UR QL STRIP.AUTO: NEGATIVE MG/DL
LACTATE SERPL-SCNC: 1.2 MMOL/L (ref 0.4–2)
LACTATE SERPL-SCNC: 2.1 MMOL/L (ref 0.4–2)
LEUKOCYTE ESTERASE UR QL STRIP.AUTO: ABNORMAL
LIPASE SERPL-CCNC: 325 U/L (ref 73–393)
LYMPHOCYTES # BLD: 1.7 K/UL (ref 0.5–4.6)
LYMPHOCYTES NFR BLD: 25 % (ref 13–44)
MCH RBC QN AUTO: 25.4 PG (ref 26.1–32.9)
MCHC RBC AUTO-ENTMCNC: 31.6 G/DL (ref 31.4–35)
MCV RBC AUTO: 80.5 FL (ref 82–102)
MONOCYTES # BLD: 0.8 K/UL (ref 0.1–1.3)
MONOCYTES NFR BLD: 12 % (ref 4–12)
MUCOUS THREADS URNS QL MICRO: 0 /LPF
NEUTS SEG # BLD: 4.2 K/UL (ref 1.7–8.2)
NEUTS SEG NFR BLD: 61 % (ref 43–78)
NITRITE UR QL STRIP.AUTO: NEGATIVE
NRBC # BLD: 0.02 K/UL (ref 0–0.2)
OTHER OBSERVATIONS: ABNORMAL
PH UR STRIP: 8 (ref 5–9)
PLATELET # BLD AUTO: 29 K/UL (ref 150–450)
PLATELET COMMENT: ABNORMAL
PMV BLD AUTO: ABNORMAL FL (ref 9.4–12.3)
POTASSIUM SERPL-SCNC: 5.5 MMOL/L (ref 3.5–5.1)
PROCALCITONIN SERPL-MCNC: 0.22 NG/ML (ref 0–0.49)
PROT SERPL-MCNC: 7.2 G/DL (ref 6.3–8.2)
PROT UR STRIP-MCNC: ABNORMAL MG/DL
RBC # BLD AUTO: 3.03 M/UL (ref 4.23–5.6)
RBC #/AREA URNS HPF: ABNORMAL /HPF
RBC MORPH BLD: ABNORMAL
SERVICE CMNT-IMP: ABNORMAL
SODIUM SERPL-SCNC: 142 MMOL/L (ref 133–143)
SP GR UR REFRACTOMETRY: 1.01 (ref 1–1.02)
UROBILINOGEN UR QL STRIP.AUTO: 0.2 EU/DL (ref 0.2–1)
WBC # BLD AUTO: 6.8 K/UL (ref 4.3–11.1)
WBC MORPH BLD: ABNORMAL
WBC URNS QL MICRO: ABNORMAL /HPF

## 2023-05-06 PROCEDURE — 2580000003 HC RX 258: Performed by: HOSPITALIST

## 2023-05-06 PROCEDURE — 2580000003 HC RX 258: Performed by: EMERGENCY MEDICINE

## 2023-05-06 PROCEDURE — 6370000000 HC RX 637 (ALT 250 FOR IP): Performed by: HOSPITALIST

## 2023-05-06 PROCEDURE — 82962 GLUCOSE BLOOD TEST: CPT

## 2023-05-06 PROCEDURE — 87040 BLOOD CULTURE FOR BACTERIA: CPT

## 2023-05-06 PROCEDURE — 82140 ASSAY OF AMMONIA: CPT

## 2023-05-06 PROCEDURE — 96365 THER/PROPH/DIAG IV INF INIT: CPT

## 2023-05-06 PROCEDURE — 80053 COMPREHEN METABOLIC PANEL: CPT

## 2023-05-06 PROCEDURE — 6360000002 HC RX W HCPCS: Performed by: EMERGENCY MEDICINE

## 2023-05-06 PROCEDURE — 87088 URINE BACTERIA CULTURE: CPT

## 2023-05-06 PROCEDURE — 81015 MICROSCOPIC EXAM OF URINE: CPT

## 2023-05-06 PROCEDURE — 93005 ELECTROCARDIOGRAM TRACING: CPT | Performed by: EMERGENCY MEDICINE

## 2023-05-06 PROCEDURE — 87086 URINE CULTURE/COLONY COUNT: CPT

## 2023-05-06 PROCEDURE — 85025 COMPLETE CBC W/AUTO DIFF WBC: CPT

## 2023-05-06 PROCEDURE — 87186 SC STD MICRODIL/AGAR DIL: CPT

## 2023-05-06 PROCEDURE — 1100000003 HC PRIVATE W/ TELEMETRY

## 2023-05-06 PROCEDURE — 99285 EMERGENCY DEPT VISIT HI MDM: CPT

## 2023-05-06 PROCEDURE — 71045 X-RAY EXAM CHEST 1 VIEW: CPT

## 2023-05-06 PROCEDURE — 83605 ASSAY OF LACTIC ACID: CPT

## 2023-05-06 PROCEDURE — 36415 COLL VENOUS BLD VENIPUNCTURE: CPT

## 2023-05-06 PROCEDURE — 84145 PROCALCITONIN (PCT): CPT

## 2023-05-06 PROCEDURE — 83690 ASSAY OF LIPASE: CPT

## 2023-05-06 PROCEDURE — 81001 URINALYSIS AUTO W/SCOPE: CPT

## 2023-05-06 RX ORDER — SODIUM CHLORIDE 9 MG/ML
INJECTION, SOLUTION INTRAVENOUS PRN
Status: DISCONTINUED | OUTPATIENT
Start: 2023-05-06 | End: 2023-05-18 | Stop reason: HOSPADM

## 2023-05-06 RX ORDER — L. ACIDOPHILUS/L.BULGARICUS 1MM CELL
4 TABLET ORAL 3 TIMES DAILY
Status: DISCONTINUED | OUTPATIENT
Start: 2023-05-06 | End: 2023-05-08

## 2023-05-06 RX ORDER — MAGNESIUM HYDROXIDE/ALUMINUM HYDROXICE/SIMETHICONE 120; 1200; 1200 MG/30ML; MG/30ML; MG/30ML
30 SUSPENSION ORAL EVERY 6 HOURS PRN
Status: DISCONTINUED | OUTPATIENT
Start: 2023-05-06 | End: 2023-05-18 | Stop reason: HOSPADM

## 2023-05-06 RX ORDER — MAGNESIUM SULFATE IN WATER 40 MG/ML
2000 INJECTION, SOLUTION INTRAVENOUS PRN
Status: DISCONTINUED | OUTPATIENT
Start: 2023-05-06 | End: 2023-05-07

## 2023-05-06 RX ORDER — 0.9 % SODIUM CHLORIDE 0.9 %
1000 INTRAVENOUS SOLUTION INTRAVENOUS
Status: COMPLETED | OUTPATIENT
Start: 2023-05-06 | End: 2023-05-06

## 2023-05-06 RX ORDER — INSULIN LISPRO 100 [IU]/ML
0-4 INJECTION, SOLUTION INTRAVENOUS; SUBCUTANEOUS NIGHTLY
Status: DISCONTINUED | OUTPATIENT
Start: 2023-05-06 | End: 2023-05-18 | Stop reason: HOSPADM

## 2023-05-06 RX ORDER — ACETAMINOPHEN 650 MG/1
650 SUPPOSITORY RECTAL EVERY 6 HOURS PRN
Status: DISCONTINUED | OUTPATIENT
Start: 2023-05-06 | End: 2023-05-18 | Stop reason: HOSPADM

## 2023-05-06 RX ORDER — ACETAMINOPHEN 325 MG/1
650 TABLET ORAL EVERY 6 HOURS PRN
Status: DISCONTINUED | OUTPATIENT
Start: 2023-05-06 | End: 2023-05-18 | Stop reason: HOSPADM

## 2023-05-06 RX ORDER — ONDANSETRON 2 MG/ML
4 INJECTION INTRAMUSCULAR; INTRAVENOUS EVERY 6 HOURS PRN
Status: DISCONTINUED | OUTPATIENT
Start: 2023-05-06 | End: 2023-05-18 | Stop reason: HOSPADM

## 2023-05-06 RX ORDER — SODIUM CHLORIDE 0.9 % (FLUSH) 0.9 %
5-40 SYRINGE (ML) INJECTION EVERY 12 HOURS SCHEDULED
Status: DISCONTINUED | OUTPATIENT
Start: 2023-05-06 | End: 2023-05-18 | Stop reason: HOSPADM

## 2023-05-06 RX ORDER — ONDANSETRON 4 MG/1
4 TABLET, ORALLY DISINTEGRATING ORAL EVERY 8 HOURS PRN
Status: DISCONTINUED | OUTPATIENT
Start: 2023-05-06 | End: 2023-05-18 | Stop reason: HOSPADM

## 2023-05-06 RX ORDER — DEXTROSE MONOHYDRATE 100 MG/ML
INJECTION, SOLUTION INTRAVENOUS CONTINUOUS PRN
Status: DISCONTINUED | OUTPATIENT
Start: 2023-05-06 | End: 2023-05-18 | Stop reason: HOSPADM

## 2023-05-06 RX ORDER — SODIUM CHLORIDE, SODIUM LACTATE, POTASSIUM CHLORIDE, CALCIUM CHLORIDE 600; 310; 30; 20 MG/100ML; MG/100ML; MG/100ML; MG/100ML
INJECTION, SOLUTION INTRAVENOUS CONTINUOUS
Status: DISCONTINUED | OUTPATIENT
Start: 2023-05-06 | End: 2023-05-07

## 2023-05-06 RX ORDER — INSULIN LISPRO 100 [IU]/ML
0-4 INJECTION, SOLUTION INTRAVENOUS; SUBCUTANEOUS
Status: DISCONTINUED | OUTPATIENT
Start: 2023-05-06 | End: 2023-05-18 | Stop reason: HOSPADM

## 2023-05-06 RX ORDER — SODIUM BICARBONATE 650 MG/1
1300 TABLET ORAL 2 TIMES DAILY
Status: DISCONTINUED | OUTPATIENT
Start: 2023-05-06 | End: 2023-05-11

## 2023-05-06 RX ORDER — IBUPROFEN 600 MG/1
1 TABLET ORAL PRN
Status: DISCONTINUED | OUTPATIENT
Start: 2023-05-06 | End: 2023-05-18 | Stop reason: HOSPADM

## 2023-05-06 RX ORDER — SODIUM CHLORIDE 0.9 % (FLUSH) 0.9 %
5-40 SYRINGE (ML) INJECTION PRN
Status: DISCONTINUED | OUTPATIENT
Start: 2023-05-06 | End: 2023-05-18 | Stop reason: HOSPADM

## 2023-05-06 RX ADMIN — SODIUM BICARBONATE 650 MG TABLET 1300 MG: at 21:04

## 2023-05-06 RX ADMIN — CEFTRIAXONE 1000 MG: 1 INJECTION, POWDER, FOR SOLUTION INTRAMUSCULAR; INTRAVENOUS at 16:58

## 2023-05-06 RX ADMIN — SODIUM CHLORIDE, PRESERVATIVE FREE 10 ML: 5 INJECTION INTRAVENOUS at 19:39

## 2023-05-06 RX ADMIN — DEXTROSE MONOHYDRATE 250 ML: 100 INJECTION, SOLUTION INTRAVENOUS at 20:59

## 2023-05-06 RX ADMIN — SODIUM ZIRCONIUM CYCLOSILICATE 10 G: 10 POWDER, FOR SUSPENSION ORAL at 21:50

## 2023-05-06 RX ADMIN — SODIUM CHLORIDE, POTASSIUM CHLORIDE, SODIUM LACTATE AND CALCIUM CHLORIDE: 600; 310; 30; 20 INJECTION, SOLUTION INTRAVENOUS at 19:38

## 2023-05-06 RX ADMIN — SODIUM CHLORIDE 1000 ML: 9 INJECTION, SOLUTION INTRAVENOUS at 16:53

## 2023-05-06 RX ADMIN — DEXTROSE MONOHYDRATE 250 ML: 100 INJECTION, SOLUTION INTRAVENOUS at 21:51

## 2023-05-07 ENCOUNTER — APPOINTMENT (OUTPATIENT)
Dept: CT IMAGING | Age: 58
DRG: 871 | End: 2023-05-07
Payer: MEDICARE

## 2023-05-07 ENCOUNTER — APPOINTMENT (OUTPATIENT)
Dept: ULTRASOUND IMAGING | Age: 58
DRG: 871 | End: 2023-05-07
Payer: MEDICARE

## 2023-05-07 LAB
ALBUMIN SERPL-MCNC: 1.5 G/DL (ref 3.5–5)
ALBUMIN/GLOB SERPL: 0.3 (ref 0.4–1.6)
ALP SERPL-CCNC: 249 U/L (ref 50–136)
ALT SERPL-CCNC: 16 U/L (ref 12–65)
ANION GAP SERPL CALC-SCNC: 4 MMOL/L (ref 2–11)
AST SERPL-CCNC: 27 U/L (ref 15–37)
BASOPHILS # BLD: 0.1 K/UL (ref 0–0.2)
BASOPHILS NFR BLD: 1 % (ref 0–2)
BILIRUB SERPL-MCNC: 0.3 MG/DL (ref 0.2–1.1)
BUN SERPL-MCNC: 30 MG/DL (ref 6–23)
CALCIUM SERPL-MCNC: 8.3 MG/DL (ref 8.3–10.4)
CHLORIDE SERPL-SCNC: 116 MMOL/L (ref 101–110)
CO2 SERPL-SCNC: 19 MMOL/L (ref 21–32)
CREAT SERPL-MCNC: 3.3 MG/DL (ref 0.8–1.5)
DIFFERENTIAL METHOD BLD: ABNORMAL
EKG ATRIAL RATE: 77 BPM
EKG DIAGNOSIS: NORMAL
EKG P AXIS: 21 DEGREES
EKG P-R INTERVAL: 176 MS
EKG Q-T INTERVAL: 412 MS
EKG QRS DURATION: 94 MS
EKG QTC CALCULATION (BAZETT): 466 MS
EKG R AXIS: -29 DEGREES
EKG T AXIS: 61 DEGREES
EKG VENTRICULAR RATE: 77 BPM
EOSINOPHIL # BLD: 0.2 K/UL (ref 0–0.8)
EOSINOPHIL NFR BLD: 2 % (ref 0.5–7.8)
ERYTHROCYTE [DISTWIDTH] IN BLOOD BY AUTOMATED COUNT: 18.7 % (ref 11.9–14.6)
GLOBULIN SER CALC-MCNC: 5.8 G/DL (ref 2.8–4.5)
GLUCOSE BLD STRIP.AUTO-MCNC: 104 MG/DL (ref 65–100)
GLUCOSE BLD STRIP.AUTO-MCNC: 112 MG/DL (ref 65–100)
GLUCOSE BLD STRIP.AUTO-MCNC: 122 MG/DL (ref 65–100)
GLUCOSE BLD STRIP.AUTO-MCNC: 124 MG/DL (ref 65–100)
GLUCOSE BLD STRIP.AUTO-MCNC: 164 MG/DL (ref 65–100)
GLUCOSE BLD STRIP.AUTO-MCNC: 179 MG/DL (ref 65–100)
GLUCOSE BLD STRIP.AUTO-MCNC: 198 MG/DL (ref 65–100)
GLUCOSE BLD STRIP.AUTO-MCNC: 203 MG/DL (ref 65–100)
GLUCOSE BLD STRIP.AUTO-MCNC: 45 MG/DL (ref 65–100)
GLUCOSE BLD STRIP.AUTO-MCNC: 73 MG/DL (ref 65–100)
GLUCOSE BLD STRIP.AUTO-MCNC: 80 MG/DL (ref 65–100)
GLUCOSE BLD STRIP.AUTO-MCNC: 82 MG/DL (ref 65–100)
GLUCOSE BLD STRIP.AUTO-MCNC: 93 MG/DL (ref 65–100)
GLUCOSE BLD STRIP.AUTO-MCNC: 95 MG/DL (ref 65–100)
GLUCOSE BLD STRIP.AUTO-MCNC: 97 MG/DL (ref 65–100)
GLUCOSE SERPL-MCNC: 152 MG/DL (ref 65–100)
HCT VFR BLD AUTO: 23.1 % (ref 41.1–50.3)
HGB BLD-MCNC: 7.4 G/DL (ref 13.6–17.2)
IMM GRANULOCYTES # BLD AUTO: 0.1 K/UL (ref 0–0.5)
IMM GRANULOCYTES NFR BLD AUTO: 1 % (ref 0–5)
LYMPHOCYTES # BLD: 2.3 K/UL (ref 0.5–4.6)
LYMPHOCYTES NFR BLD: 23 % (ref 13–44)
MCH RBC QN AUTO: 25.1 PG (ref 26.1–32.9)
MCHC RBC AUTO-ENTMCNC: 32 G/DL (ref 31.4–35)
MCV RBC AUTO: 78.3 FL (ref 82–102)
MONOCYTES # BLD: 1.3 K/UL (ref 0.1–1.3)
MONOCYTES NFR BLD: 13 % (ref 4–12)
NEUTS SEG # BLD: 5.8 K/UL (ref 1.7–8.2)
NEUTS SEG NFR BLD: 60 % (ref 43–78)
NRBC # BLD: 0.04 K/UL (ref 0–0.2)
PLATELET # BLD AUTO: 32 K/UL (ref 150–450)
PLATELET COMMENT: ABNORMAL
PMV BLD AUTO: ABNORMAL FL (ref 9.4–12.3)
POTASSIUM SERPL-SCNC: 5.4 MMOL/L (ref 3.5–5.1)
POTASSIUM SERPL-SCNC: 5.5 MMOL/L (ref 3.5–5.1)
PROT SERPL-MCNC: 7.3 G/DL (ref 6.3–8.2)
RBC # BLD AUTO: 2.95 M/UL (ref 4.23–5.6)
RBC MORPH BLD: ABNORMAL
RBC MORPH BLD: ABNORMAL
SERVICE CMNT-IMP: ABNORMAL
SERVICE CMNT-IMP: NORMAL
SODIUM SERPL-SCNC: 139 MMOL/L (ref 133–143)
WBC # BLD AUTO: 9.8 K/UL (ref 4.3–11.1)
WBC MORPH BLD: ABNORMAL

## 2023-05-07 PROCEDURE — 80053 COMPREHEN METABOLIC PANEL: CPT

## 2023-05-07 PROCEDURE — 85025 COMPLETE CBC W/AUTO DIFF WBC: CPT

## 2023-05-07 PROCEDURE — 2580000003 HC RX 258: Performed by: FAMILY MEDICINE

## 2023-05-07 PROCEDURE — 1100000003 HC PRIVATE W/ TELEMETRY

## 2023-05-07 PROCEDURE — 6360000002 HC RX W HCPCS: Performed by: NURSE PRACTITIONER

## 2023-05-07 PROCEDURE — 2580000003 HC RX 258: Performed by: HOSPITALIST

## 2023-05-07 PROCEDURE — 76770 US EXAM ABDO BACK WALL COMP: CPT

## 2023-05-07 PROCEDURE — 84132 ASSAY OF SERUM POTASSIUM: CPT

## 2023-05-07 PROCEDURE — 70450 CT HEAD/BRAIN W/O DYE: CPT

## 2023-05-07 PROCEDURE — 36415 COLL VENOUS BLD VENIPUNCTURE: CPT

## 2023-05-07 PROCEDURE — 93005 ELECTROCARDIOGRAM TRACING: CPT | Performed by: NURSE PRACTITIONER

## 2023-05-07 PROCEDURE — 51702 INSERT TEMP BLADDER CATH: CPT

## 2023-05-07 PROCEDURE — 74176 CT ABD & PELVIS W/O CONTRAST: CPT

## 2023-05-07 PROCEDURE — 92610 EVALUATE SWALLOWING FUNCTION: CPT

## 2023-05-07 PROCEDURE — 82962 GLUCOSE BLOOD TEST: CPT

## 2023-05-07 PROCEDURE — 6360000002 HC RX W HCPCS: Performed by: FAMILY MEDICINE

## 2023-05-07 PROCEDURE — 2580000003 HC RX 258: Performed by: NURSE PRACTITIONER

## 2023-05-07 RX ORDER — MORPHINE SULFATE 2 MG/ML
0.5 INJECTION, SOLUTION INTRAMUSCULAR; INTRAVENOUS ONCE
Status: COMPLETED | OUTPATIENT
Start: 2023-05-07 | End: 2023-05-07

## 2023-05-07 RX ORDER — DEXTROSE MONOHYDRATE 100 MG/ML
INJECTION, SOLUTION INTRAVENOUS CONTINUOUS
Status: DISCONTINUED | OUTPATIENT
Start: 2023-05-07 | End: 2023-05-07

## 2023-05-07 RX ORDER — SODIUM CHLORIDE 9 MG/ML
INJECTION, SOLUTION INTRAVENOUS CONTINUOUS
Status: DISCONTINUED | OUTPATIENT
Start: 2023-05-07 | End: 2023-05-07

## 2023-05-07 RX ORDER — DEXTROSE AND SODIUM CHLORIDE 5; .45 G/100ML; G/100ML
INJECTION, SOLUTION INTRAVENOUS CONTINUOUS
Status: DISCONTINUED | OUTPATIENT
Start: 2023-05-07 | End: 2023-05-17

## 2023-05-07 RX ORDER — MORPHINE SULFATE 2 MG/ML
0.5 INJECTION, SOLUTION INTRAMUSCULAR; INTRAVENOUS EVERY 6 HOURS PRN
Status: DISCONTINUED | OUTPATIENT
Start: 2023-05-07 | End: 2023-05-08

## 2023-05-07 RX ORDER — SODIUM CHLORIDE, SODIUM LACTATE, POTASSIUM CHLORIDE, CALCIUM CHLORIDE 600; 310; 30; 20 MG/100ML; MG/100ML; MG/100ML; MG/100ML
INJECTION, SOLUTION INTRAVENOUS CONTINUOUS
Status: DISCONTINUED | OUTPATIENT
Start: 2023-05-07 | End: 2023-05-07

## 2023-05-07 RX ORDER — DEXTROSE MONOHYDRATE 50 MG/ML
INJECTION, SOLUTION INTRAVENOUS CONTINUOUS
Status: DISCONTINUED | OUTPATIENT
Start: 2023-05-07 | End: 2023-05-07

## 2023-05-07 RX ORDER — DEXTROSE MONOHYDRATE 100 MG/ML
INJECTION, SOLUTION INTRAVENOUS CONTINUOUS
Status: DISCONTINUED | OUTPATIENT
Start: 2023-05-07 | End: 2023-05-08

## 2023-05-07 RX ADMIN — MORPHINE SULFATE 0.5 MG: 2 INJECTION, SOLUTION INTRAMUSCULAR; INTRAVENOUS at 21:02

## 2023-05-07 RX ADMIN — DEXTROSE MONOHYDRATE: 10 INJECTION, SOLUTION INTRAVENOUS at 21:47

## 2023-05-07 RX ADMIN — DEXTROSE AND SODIUM CHLORIDE: 5; 450 INJECTION, SOLUTION INTRAVENOUS at 10:41

## 2023-05-07 RX ADMIN — CEFTRIAXONE 1000 MG: 1 INJECTION, POWDER, FOR SOLUTION INTRAMUSCULAR; INTRAVENOUS at 15:29

## 2023-05-07 RX ADMIN — DEXTROSE MONOHYDRATE: 100 INJECTION, SOLUTION INTRAVENOUS at 05:56

## 2023-05-07 RX ADMIN — DEXTROSE MONOHYDRATE 250 ML: 100 INJECTION, SOLUTION INTRAVENOUS at 02:45

## 2023-05-07 RX ADMIN — MORPHINE SULFATE 0.5 MG: 2 INJECTION, SOLUTION INTRAMUSCULAR; INTRAVENOUS at 13:09

## 2023-05-07 RX ADMIN — SODIUM CHLORIDE, PRESERVATIVE FREE 10 ML: 5 INJECTION INTRAVENOUS at 22:41

## 2023-05-07 RX ADMIN — SODIUM CHLORIDE, PRESERVATIVE FREE 10 ML: 5 INJECTION INTRAVENOUS at 08:32

## 2023-05-07 RX ADMIN — MORPHINE SULFATE 0.5 MG: 2 INJECTION, SOLUTION INTRAMUSCULAR; INTRAVENOUS at 03:54

## 2023-05-07 RX ADMIN — DEXTROSE MONOHYDRATE 250 ML: 100 INJECTION, SOLUTION INTRAVENOUS at 06:24

## 2023-05-07 RX ADMIN — DEXTROSE MONOHYDRATE: 100 INJECTION, SOLUTION INTRAVENOUS at 03:09

## 2023-05-07 RX ADMIN — SODIUM CHLORIDE: 9 INJECTION, SOLUTION INTRAVENOUS at 08:31

## 2023-05-07 ASSESSMENT — PAIN SCALES - GENERAL
PAINLEVEL_OUTOF10: 9
PAINLEVEL_OUTOF10: 9

## 2023-05-07 ASSESSMENT — PAIN DESCRIPTION - LOCATION: LOCATION: OTHER (COMMENT)

## 2023-05-08 ENCOUNTER — CARE COORDINATION (OUTPATIENT)
Dept: CARE COORDINATION | Facility: CLINIC | Age: 58
End: 2023-05-08

## 2023-05-08 LAB
ALBUMIN SERPL-MCNC: 1.3 G/DL (ref 3.5–5)
ALBUMIN/GLOB SERPL: 0.2 (ref 0.4–1.6)
ALP SERPL-CCNC: 230 U/L (ref 50–136)
ALT SERPL-CCNC: 13 U/L (ref 12–65)
ANION GAP SERPL CALC-SCNC: 5 MMOL/L (ref 2–11)
AST SERPL-CCNC: 25 U/L (ref 15–37)
BACTERIA SPEC CULT: ABNORMAL
BACTERIA SPEC CULT: ABNORMAL
BASOPHILS # BLD: 0 K/UL (ref 0–0.2)
BASOPHILS NFR BLD: 0 % (ref 0–2)
BILIRUB SERPL-MCNC: 0.5 MG/DL (ref 0.2–1.1)
BUN SERPL-MCNC: 28 MG/DL (ref 6–23)
CALCIUM SERPL-MCNC: 8.8 MG/DL (ref 8.3–10.4)
CHLORIDE SERPL-SCNC: 115 MMOL/L (ref 101–110)
CO2 SERPL-SCNC: 17 MMOL/L (ref 21–32)
CREAT SERPL-MCNC: 3.5 MG/DL (ref 0.8–1.5)
DIFFERENTIAL METHOD BLD: ABNORMAL
EOSINOPHIL # BLD: 0.2 K/UL (ref 0–0.8)
EOSINOPHIL NFR BLD: 1 % (ref 0.5–7.8)
ERYTHROCYTE [DISTWIDTH] IN BLOOD BY AUTOMATED COUNT: 19.2 % (ref 11.9–14.6)
GLOBULIN SER CALC-MCNC: 5.5 G/DL (ref 2.8–4.5)
GLUCOSE BLD STRIP.AUTO-MCNC: 115 MG/DL (ref 65–100)
GLUCOSE BLD STRIP.AUTO-MCNC: 131 MG/DL (ref 65–100)
GLUCOSE BLD STRIP.AUTO-MCNC: 153 MG/DL (ref 65–100)
GLUCOSE BLD STRIP.AUTO-MCNC: 159 MG/DL (ref 65–100)
GLUCOSE BLD STRIP.AUTO-MCNC: 98 MG/DL (ref 65–100)
GLUCOSE SERPL-MCNC: 153 MG/DL (ref 65–100)
HCT VFR BLD AUTO: 23.8 % (ref 41.1–50.3)
HGB BLD-MCNC: 7.5 G/DL (ref 13.6–17.2)
IMM GRANULOCYTES # BLD AUTO: 0.2 K/UL (ref 0–0.5)
IMM GRANULOCYTES NFR BLD AUTO: 1 % (ref 0–5)
LYMPHOCYTES # BLD: 2 K/UL (ref 0.5–4.6)
LYMPHOCYTES NFR BLD: 8 % (ref 13–44)
MCH RBC QN AUTO: 24.8 PG (ref 26.1–32.9)
MCHC RBC AUTO-ENTMCNC: 31.5 G/DL (ref 31.4–35)
MCV RBC AUTO: 78.8 FL (ref 82–102)
MONOCYTES # BLD: 2.2 K/UL (ref 0.1–1.3)
MONOCYTES NFR BLD: 9 % (ref 4–12)
NEUTS SEG # BLD: 20.1 K/UL (ref 1.7–8.2)
NEUTS SEG NFR BLD: 81 % (ref 43–78)
NRBC # BLD: 0.05 K/UL (ref 0–0.2)
PLATELET # BLD AUTO: 31 K/UL (ref 150–450)
PLATELET COMMENT: ABNORMAL
PMV BLD AUTO: ABNORMAL FL (ref 9.4–12.3)
POTASSIUM SERPL-SCNC: 5.3 MMOL/L (ref 3.5–5.1)
PROT SERPL-MCNC: 6.8 G/DL (ref 6.3–8.2)
RBC # BLD AUTO: 3.02 M/UL (ref 4.23–5.6)
RBC MORPH BLD: ABNORMAL
SERVICE CMNT-IMP: ABNORMAL
SERVICE CMNT-IMP: NORMAL
SODIUM SERPL-SCNC: 137 MMOL/L (ref 133–143)
WBC # BLD AUTO: 24.7 K/UL (ref 4.3–11.1)
WBC MORPH BLD: ABNORMAL

## 2023-05-08 PROCEDURE — 80053 COMPREHEN METABOLIC PANEL: CPT

## 2023-05-08 PROCEDURE — 99223 1ST HOSP IP/OBS HIGH 75: CPT | Performed by: NURSE PRACTITIONER

## 2023-05-08 PROCEDURE — 1100000003 HC PRIVATE W/ TELEMETRY

## 2023-05-08 PROCEDURE — 2580000003 HC RX 258: Performed by: NURSE PRACTITIONER

## 2023-05-08 PROCEDURE — 2580000003 HC RX 258: Performed by: HOSPITALIST

## 2023-05-08 PROCEDURE — 51798 US URINE CAPACITY MEASURE: CPT

## 2023-05-08 PROCEDURE — 82962 GLUCOSE BLOOD TEST: CPT

## 2023-05-08 PROCEDURE — 6360000002 HC RX W HCPCS: Performed by: FAMILY MEDICINE

## 2023-05-08 PROCEDURE — 6360000002 HC RX W HCPCS: Performed by: NURSE PRACTITIONER

## 2023-05-08 PROCEDURE — 36415 COLL VENOUS BLD VENIPUNCTURE: CPT

## 2023-05-08 PROCEDURE — 2700000000 HC OXYGEN THERAPY PER DAY

## 2023-05-08 PROCEDURE — 85025 COMPLETE CBC W/AUTO DIFF WBC: CPT

## 2023-05-08 RX ORDER — MORPHINE SULFATE 2 MG/ML
0.5 INJECTION, SOLUTION INTRAMUSCULAR; INTRAVENOUS ONCE
Status: COMPLETED | OUTPATIENT
Start: 2023-05-08 | End: 2023-05-08

## 2023-05-08 RX ORDER — MORPHINE SULFATE 2 MG/ML
2 INJECTION, SOLUTION INTRAMUSCULAR; INTRAVENOUS EVERY 6 HOURS PRN
Status: DISCONTINUED | OUTPATIENT
Start: 2023-05-08 | End: 2023-05-17

## 2023-05-08 RX ORDER — MORPHINE SULFATE 2 MG/ML
2 INJECTION, SOLUTION INTRAMUSCULAR; INTRAVENOUS ONCE
Status: COMPLETED | OUTPATIENT
Start: 2023-05-08 | End: 2023-05-08

## 2023-05-08 RX ORDER — 0.9 % SODIUM CHLORIDE 0.9 %
500 INTRAVENOUS SOLUTION INTRAVENOUS ONCE
Status: COMPLETED | OUTPATIENT
Start: 2023-05-08 | End: 2023-05-08

## 2023-05-08 RX ORDER — MIDODRINE HYDROCHLORIDE 5 MG/1
10 TABLET ORAL
Status: DISCONTINUED | OUTPATIENT
Start: 2023-05-08 | End: 2023-05-18 | Stop reason: HOSPADM

## 2023-05-08 RX ORDER — LORAZEPAM 2 MG/ML
1 INJECTION INTRAMUSCULAR ONCE
Status: COMPLETED | OUTPATIENT
Start: 2023-05-08 | End: 2023-05-08

## 2023-05-08 RX ADMIN — MORPHINE SULFATE 2 MG: 2 INJECTION, SOLUTION INTRAMUSCULAR; INTRAVENOUS at 08:40

## 2023-05-08 RX ADMIN — MORPHINE SULFATE 0.5 MG: 2 INJECTION, SOLUTION INTRAMUSCULAR; INTRAVENOUS at 06:24

## 2023-05-08 RX ADMIN — LORAZEPAM 1 MG: 2 INJECTION INTRAMUSCULAR; INTRAVENOUS at 10:25

## 2023-05-08 RX ADMIN — CEFTRIAXONE 1000 MG: 1 INJECTION, POWDER, FOR SOLUTION INTRAMUSCULAR; INTRAVENOUS at 16:10

## 2023-05-08 RX ADMIN — SODIUM CHLORIDE 500 ML: 9 INJECTION, SOLUTION INTRAVENOUS at 11:29

## 2023-05-08 RX ADMIN — SODIUM CHLORIDE, PRESERVATIVE FREE 10 ML: 5 INJECTION INTRAVENOUS at 09:08

## 2023-05-08 RX ADMIN — DEXTROSE MONOHYDRATE: 10 INJECTION, SOLUTION INTRAVENOUS at 06:26

## 2023-05-08 RX ADMIN — MORPHINE SULFATE 0.5 MG: 2 INJECTION, SOLUTION INTRAMUSCULAR; INTRAVENOUS at 03:07

## 2023-05-08 RX ADMIN — SODIUM CHLORIDE, PRESERVATIVE FREE 10 ML: 5 INJECTION INTRAVENOUS at 20:44

## 2023-05-08 ASSESSMENT — PAIN SCALES - GENERAL
PAINLEVEL_OUTOF10: 0
PAINLEVEL_OUTOF10: 0
PAINLEVEL_OUTOF10: 1

## 2023-05-08 NOTE — CARE COORDINATION
CTN opened outreach to follow up on SNF d/c. Patient currently readmit to inpatient facility. CTN to follow for LEIDA ARCEOS call as indicated.

## 2023-05-09 LAB
ALBUMIN SERPL-MCNC: 1.2 G/DL (ref 3.5–5)
ALBUMIN/GLOB SERPL: 0.2 (ref 0.4–1.6)
ALP SERPL-CCNC: 199 U/L (ref 50–136)
ALT SERPL-CCNC: 13 U/L (ref 12–65)
ANION GAP SERPL CALC-SCNC: 6 MMOL/L (ref 2–11)
AST SERPL-CCNC: 26 U/L (ref 15–37)
BASOPHILS # BLD: 0 K/UL (ref 0–0.2)
BASOPHILS NFR BLD: 0 % (ref 0–2)
BILIRUB SERPL-MCNC: 0.5 MG/DL (ref 0.2–1.1)
BUN SERPL-MCNC: 27 MG/DL (ref 6–23)
CALCIUM SERPL-MCNC: 8.9 MG/DL (ref 8.3–10.4)
CHLORIDE SERPL-SCNC: 114 MMOL/L (ref 101–110)
CO2 SERPL-SCNC: 18 MMOL/L (ref 21–32)
CREAT SERPL-MCNC: 3.2 MG/DL (ref 0.8–1.5)
DIFFERENTIAL METHOD BLD: ABNORMAL
EOSINOPHIL # BLD: 0.2 K/UL (ref 0–0.8)
EOSINOPHIL NFR BLD: 1 % (ref 0.5–7.8)
ERYTHROCYTE [DISTWIDTH] IN BLOOD BY AUTOMATED COUNT: 19.3 % (ref 11.9–14.6)
GLOBULIN SER CALC-MCNC: 5.1 G/DL (ref 2.8–4.5)
GLUCOSE BLD STRIP.AUTO-MCNC: 119 MG/DL (ref 65–100)
GLUCOSE BLD STRIP.AUTO-MCNC: 133 MG/DL (ref 65–100)
GLUCOSE BLD STRIP.AUTO-MCNC: 162 MG/DL (ref 65–100)
GLUCOSE BLD STRIP.AUTO-MCNC: 72 MG/DL (ref 65–100)
GLUCOSE SERPL-MCNC: 117 MG/DL (ref 65–100)
HCT VFR BLD AUTO: 21.6 % (ref 41.1–50.3)
HGB BLD-MCNC: 6.7 G/DL (ref 13.6–17.2)
HISTORY CHECK: NORMAL
IMM GRANULOCYTES # BLD AUTO: 0 K/UL (ref 0–0.5)
IMM GRANULOCYTES NFR BLD AUTO: 0 % (ref 0–5)
LYMPHOCYTES # BLD: 1.9 K/UL (ref 0.5–4.6)
LYMPHOCYTES NFR BLD: 9 % (ref 13–44)
MCH RBC QN AUTO: 25 PG (ref 26.1–32.9)
MCHC RBC AUTO-ENTMCNC: 31 G/DL (ref 31.4–35)
MCV RBC AUTO: 80.6 FL (ref 82–102)
MONOCYTES # BLD: 1.5 K/UL (ref 0.1–1.3)
MONOCYTES NFR BLD: 7 % (ref 4–12)
NEUTS SEG # BLD: 17.8 K/UL (ref 1.7–8.2)
NEUTS SEG NFR BLD: 83 % (ref 43–78)
NRBC # BLD: 0 K/UL (ref 0–0.2)
PLATELET # BLD AUTO: 27 K/UL (ref 150–450)
PLATELET COMMENT: ABNORMAL
PMV BLD AUTO: ABNORMAL FL (ref 9.4–12.3)
POTASSIUM SERPL-SCNC: 4.4 MMOL/L (ref 3.5–5.1)
PROT SERPL-MCNC: 6.3 G/DL (ref 6.3–8.2)
RBC # BLD AUTO: 2.68 M/UL (ref 4.23–5.6)
RBC MORPH BLD: ABNORMAL
RBC MORPH BLD: ABNORMAL
SERVICE CMNT-IMP: ABNORMAL
SERVICE CMNT-IMP: NORMAL
SODIUM SERPL-SCNC: 138 MMOL/L (ref 133–143)
WBC # BLD AUTO: 21.4 K/UL (ref 4.3–11.1)
WBC MORPH BLD: ABNORMAL

## 2023-05-09 PROCEDURE — 92526 ORAL FUNCTION THERAPY: CPT

## 2023-05-09 PROCEDURE — 86850 RBC ANTIBODY SCREEN: CPT

## 2023-05-09 PROCEDURE — 2500000003 HC RX 250 WO HCPCS: Performed by: NURSE PRACTITIONER

## 2023-05-09 PROCEDURE — 86900 BLOOD TYPING SEROLOGIC ABO: CPT

## 2023-05-09 PROCEDURE — 6370000000 HC RX 637 (ALT 250 FOR IP): Performed by: HOSPITALIST

## 2023-05-09 PROCEDURE — 51798 US URINE CAPACITY MEASURE: CPT

## 2023-05-09 PROCEDURE — 80053 COMPREHEN METABOLIC PANEL: CPT

## 2023-05-09 PROCEDURE — 6360000002 HC RX W HCPCS: Performed by: NURSE PRACTITIONER

## 2023-05-09 PROCEDURE — 36430 TRANSFUSION BLD/BLD COMPNT: CPT

## 2023-05-09 PROCEDURE — 2580000003 HC RX 258: Performed by: NURSE PRACTITIONER

## 2023-05-09 PROCEDURE — 92610 EVALUATE SWALLOWING FUNCTION: CPT

## 2023-05-09 PROCEDURE — P9016 RBC LEUKOCYTES REDUCED: HCPCS

## 2023-05-09 PROCEDURE — 2580000003 HC RX 258: Performed by: HOSPITALIST

## 2023-05-09 PROCEDURE — 2700000000 HC OXYGEN THERAPY PER DAY

## 2023-05-09 PROCEDURE — 1100000003 HC PRIVATE W/ TELEMETRY

## 2023-05-09 PROCEDURE — 86644 CMV ANTIBODY: CPT

## 2023-05-09 PROCEDURE — 82962 GLUCOSE BLOOD TEST: CPT

## 2023-05-09 PROCEDURE — 86901 BLOOD TYPING SEROLOGIC RH(D): CPT

## 2023-05-09 PROCEDURE — 85025 COMPLETE CBC W/AUTO DIFF WBC: CPT

## 2023-05-09 PROCEDURE — 36415 COLL VENOUS BLD VENIPUNCTURE: CPT

## 2023-05-09 PROCEDURE — 86923 COMPATIBILITY TEST ELECTRIC: CPT

## 2023-05-09 RX ORDER — SODIUM CHLORIDE 9 MG/ML
INJECTION, SOLUTION INTRAVENOUS PRN
Status: DISCONTINUED | OUTPATIENT
Start: 2023-05-09 | End: 2023-05-11

## 2023-05-09 RX ADMIN — DEXTROSE AND SODIUM CHLORIDE: 5; 450 INJECTION, SOLUTION INTRAVENOUS at 19:39

## 2023-05-09 RX ADMIN — CEFTRIAXONE 1000 MG: 1 INJECTION, POWDER, FOR SOLUTION INTRAMUSCULAR; INTRAVENOUS at 16:02

## 2023-05-09 RX ADMIN — TUBERCULIN PURIFIED PROTEIN DERIVATIVE 5 UNITS: 5 INJECTION, SOLUTION INTRADERMAL at 11:30

## 2023-05-09 RX ADMIN — DEXTROSE AND SODIUM CHLORIDE: 5; 450 INJECTION, SOLUTION INTRAVENOUS at 00:58

## 2023-05-09 RX ADMIN — SODIUM BICARBONATE 650 MG TABLET 1300 MG: at 11:52

## 2023-05-09 RX ADMIN — SODIUM BICARBONATE 650 MG TABLET 1300 MG: at 20:29

## 2023-05-09 RX ADMIN — SODIUM CHLORIDE, PRESERVATIVE FREE 10 ML: 5 INJECTION INTRAVENOUS at 20:29

## 2023-05-09 ASSESSMENT — PAIN SCALES - GENERAL: PAINLEVEL_OUTOF10: 0

## 2023-05-10 PROBLEM — E61.1 IRON DEFICIENCY: Status: ACTIVE | Noted: 2023-05-10

## 2023-05-10 LAB
25(OH)D3 SERPL-MCNC: 7.2 NG/ML (ref 30–100)
ABO + RH BLD: NORMAL
ANION GAP SERPL CALC-SCNC: 1 MMOL/L (ref 2–11)
APTT PPP: 62.3 SEC (ref 24.5–34.2)
BASOPHILS # BLD: 0 K/UL (ref 0–0.2)
BASOPHILS NFR BLD: 0 % (ref 0–2)
BLD PROD TYP BPU: NORMAL
BLOOD BANK DISPENSE STATUS: NORMAL
BLOOD GROUP ANTIBODIES SERPL: NORMAL
BPU ID: NORMAL
BUN SERPL-MCNC: 27 MG/DL (ref 6–23)
CALCIUM SERPL-MCNC: 8.4 MG/DL (ref 8.3–10.4)
CHLORIDE SERPL-SCNC: 116 MMOL/L (ref 101–110)
CO2 SERPL-SCNC: 18 MMOL/L (ref 21–32)
CREAT SERPL-MCNC: 3 MG/DL (ref 0.8–1.5)
CROSSMATCH RESULT: NORMAL
D DIMER PPP FEU-MCNC: 1.8 UG/ML(FEU)
DIFFERENTIAL METHOD BLD: ABNORMAL
EOSINOPHIL # BLD: 0.5 K/UL (ref 0–0.8)
EOSINOPHIL NFR BLD: 4 % (ref 0.5–7.8)
ERYTHROCYTE [DISTWIDTH] IN BLOOD BY AUTOMATED COUNT: 18.3 % (ref 11.9–14.6)
FERRITIN SERPL-MCNC: 1010 NG/ML (ref 8–388)
FIBRINOGEN PPP-MCNC: 428 MG/DL (ref 190–501)
FOLATE SERPL-MCNC: 3.3 NG/ML (ref 3.1–17.5)
GLUCOSE BLD STRIP.AUTO-MCNC: 122 MG/DL (ref 65–100)
GLUCOSE BLD STRIP.AUTO-MCNC: 148 MG/DL (ref 65–100)
GLUCOSE BLD STRIP.AUTO-MCNC: 89 MG/DL (ref 65–100)
GLUCOSE BLD STRIP.AUTO-MCNC: 92 MG/DL (ref 65–100)
GLUCOSE SERPL-MCNC: 95 MG/DL (ref 65–100)
HCT VFR BLD AUTO: 24.5 % (ref 41.1–50.3)
HGB BLD-MCNC: 7.9 G/DL (ref 13.6–17.2)
IMM GRANULOCYTES # BLD AUTO: 0 K/UL (ref 0–0.5)
IMM GRANULOCYTES NFR BLD AUTO: 0 % (ref 0–5)
IMM RETICS NFR: 8.9 % (ref 2.3–13.4)
INR PPP: 1.8
IRON SATN MFR SERPL: 75 %
IRON SERPL-MCNC: 67 UG/DL (ref 35–150)
LDH SERPL L TO P-CCNC: 206 U/L (ref 100–190)
LYMPHOCYTES # BLD: 1.5 K/UL (ref 0.5–4.6)
LYMPHOCYTES NFR BLD: 13 % (ref 13–44)
MCH RBC QN AUTO: 24.9 PG (ref 26.1–32.9)
MCHC RBC AUTO-ENTMCNC: 32.2 G/DL (ref 31.4–35)
MCV RBC AUTO: 77.3 FL (ref 82–102)
MM INDURATION, POC: 0 MM (ref 0–5)
MONOCYTES # BLD: 1.2 K/UL (ref 0.1–1.3)
MONOCYTES NFR BLD: 10 % (ref 4–12)
NEUTS SEG # BLD: 8.4 K/UL (ref 1.7–8.2)
NEUTS SEG NFR BLD: 73 % (ref 43–78)
NRBC # BLD: 0.03 K/UL (ref 0–0.2)
PLATELET # BLD AUTO: 23 K/UL (ref 150–450)
PMV BLD AUTO: ABNORMAL FL (ref 9.4–12.3)
POTASSIUM SERPL-SCNC: 4.2 MMOL/L (ref 3.5–5.1)
PPD, POC: NEGATIVE
PROTHROMBIN TIME: 21.1 SEC (ref 12.6–14.3)
RBC # BLD AUTO: 3.17 M/UL (ref 4.23–5.6)
RETICS # AUTO: 0.03 M/UL (ref 0.03–0.1)
RETICS/RBC NFR AUTO: 0.8 % (ref 0.3–2)
SERVICE CMNT-IMP: ABNORMAL
SERVICE CMNT-IMP: ABNORMAL
SERVICE CMNT-IMP: NORMAL
SERVICE CMNT-IMP: NORMAL
SODIUM SERPL-SCNC: 135 MMOL/L (ref 133–143)
SPECIMEN EXP DATE BLD: NORMAL
TIBC SERPL-MCNC: 89 UG/DL (ref 250–450)
UNIT DIVISION: 0
WBC # BLD AUTO: 11.6 K/UL (ref 4.3–11.1)

## 2023-05-10 PROCEDURE — 99222 1ST HOSP IP/OBS MODERATE 55: CPT | Performed by: INTERNAL MEDICINE

## 2023-05-10 PROCEDURE — 83550 IRON BINDING TEST: CPT

## 2023-05-10 PROCEDURE — 92526 ORAL FUNCTION THERAPY: CPT

## 2023-05-10 PROCEDURE — 85610 PROTHROMBIN TIME: CPT

## 2023-05-10 PROCEDURE — 85025 COMPLETE CBC W/AUTO DIFF WBC: CPT

## 2023-05-10 PROCEDURE — 85379 FIBRIN DEGRADATION QUANT: CPT

## 2023-05-10 PROCEDURE — 83010 ASSAY OF HAPTOGLOBIN QUANT: CPT

## 2023-05-10 PROCEDURE — 36415 COLL VENOUS BLD VENIPUNCTURE: CPT

## 2023-05-10 PROCEDURE — 6370000000 HC RX 637 (ALT 250 FOR IP): Performed by: NURSE PRACTITIONER

## 2023-05-10 PROCEDURE — 2580000003 HC RX 258: Performed by: NURSE PRACTITIONER

## 2023-05-10 PROCEDURE — 85384 FIBRINOGEN ACTIVITY: CPT

## 2023-05-10 PROCEDURE — 82306 VITAMIN D 25 HYDROXY: CPT

## 2023-05-10 PROCEDURE — 87899 AGENT NOS ASSAY W/OPTIC: CPT

## 2023-05-10 PROCEDURE — 85046 RETICYTE/HGB CONCENTRATE: CPT

## 2023-05-10 PROCEDURE — 82525 ASSAY OF COPPER: CPT

## 2023-05-10 PROCEDURE — 82728 ASSAY OF FERRITIN: CPT

## 2023-05-10 PROCEDURE — 83615 LACTATE (LD) (LDH) ENZYME: CPT

## 2023-05-10 PROCEDURE — 86022 PLATELET ANTIBODIES: CPT

## 2023-05-10 PROCEDURE — 87046 STOOL CULTR AEROBIC BACT EA: CPT

## 2023-05-10 PROCEDURE — 80048 BASIC METABOLIC PNL TOTAL CA: CPT

## 2023-05-10 PROCEDURE — 82962 GLUCOSE BLOOD TEST: CPT

## 2023-05-10 PROCEDURE — 51798 US URINE CAPACITY MEASURE: CPT

## 2023-05-10 PROCEDURE — 2700000000 HC OXYGEN THERAPY PER DAY

## 2023-05-10 PROCEDURE — 2580000003 HC RX 258: Performed by: HOSPITALIST

## 2023-05-10 PROCEDURE — 87427 SHIGA-LIKE TOXIN AG IA: CPT

## 2023-05-10 PROCEDURE — 1100000003 HC PRIVATE W/ TELEMETRY

## 2023-05-10 PROCEDURE — 6370000000 HC RX 637 (ALT 250 FOR IP): Performed by: HOSPITALIST

## 2023-05-10 PROCEDURE — 82746 ASSAY OF FOLIC ACID SERUM: CPT

## 2023-05-10 PROCEDURE — 87045 FECES CULTURE AEROBIC BACT: CPT

## 2023-05-10 PROCEDURE — 83540 ASSAY OF IRON: CPT

## 2023-05-10 PROCEDURE — 85730 THROMBOPLASTIN TIME PARTIAL: CPT

## 2023-05-10 PROCEDURE — 6360000002 HC RX W HCPCS: Performed by: NURSE PRACTITIONER

## 2023-05-10 PROCEDURE — 94760 N-INVAS EAR/PLS OXIMETRY 1: CPT

## 2023-05-10 PROCEDURE — 82668 ASSAY OF ERYTHROPOIETIN: CPT

## 2023-05-10 RX ORDER — ERGOCALCIFEROL 1.25 MG/1
50000 CAPSULE ORAL WEEKLY
Status: DISCONTINUED | OUTPATIENT
Start: 2023-05-10 | End: 2023-05-18 | Stop reason: HOSPADM

## 2023-05-10 RX ADMIN — SODIUM BICARBONATE 650 MG TABLET 1300 MG: at 20:16

## 2023-05-10 RX ADMIN — MIDODRINE HYDROCHLORIDE 10 MG: 5 TABLET ORAL at 08:55

## 2023-05-10 RX ADMIN — MIDODRINE HYDROCHLORIDE 10 MG: 5 TABLET ORAL at 17:14

## 2023-05-10 RX ADMIN — SODIUM CHLORIDE, PRESERVATIVE FREE 10 ML: 5 INJECTION INTRAVENOUS at 20:17

## 2023-05-10 RX ADMIN — SODIUM CHLORIDE 125 MG: 9 INJECTION, SOLUTION INTRAVENOUS at 15:10

## 2023-05-10 RX ADMIN — SODIUM CHLORIDE, PRESERVATIVE FREE 10 ML: 5 INJECTION INTRAVENOUS at 08:56

## 2023-05-10 RX ADMIN — DEXTROSE AND SODIUM CHLORIDE: 5; 450 INJECTION, SOLUTION INTRAVENOUS at 17:14

## 2023-05-10 RX ADMIN — SODIUM CHLORIDE, PRESERVATIVE FREE 10 ML: 5 INJECTION INTRAVENOUS at 08:55

## 2023-05-10 RX ADMIN — SODIUM BICARBONATE 650 MG TABLET 1300 MG: at 08:55

## 2023-05-10 RX ADMIN — ERGOCALCIFEROL 50000 UNITS: 1.25 CAPSULE ORAL at 15:10

## 2023-05-10 RX ADMIN — MIDODRINE HYDROCHLORIDE 10 MG: 5 TABLET ORAL at 12:12

## 2023-05-11 ENCOUNTER — APPOINTMENT (OUTPATIENT)
Dept: GENERAL RADIOLOGY | Age: 58
DRG: 871 | End: 2023-05-11
Payer: MEDICARE

## 2023-05-11 LAB
ANION GAP SERPL CALC-SCNC: 10 MMOL/L (ref 2–11)
BACTERIA SPEC CULT: NORMAL
BACTERIA SPEC CULT: NORMAL
BASOPHILS # BLD: 0 K/UL (ref 0–0.2)
BASOPHILS NFR BLD: 0 % (ref 0–2)
BUN SERPL-MCNC: 26 MG/DL (ref 6–23)
CALCIUM SERPL-MCNC: 8 MG/DL (ref 8.3–10.4)
CHLORIDE SERPL-SCNC: 113 MMOL/L (ref 101–110)
CO2 SERPL-SCNC: 14 MMOL/L (ref 21–32)
CREAT SERPL-MCNC: 3.2 MG/DL (ref 0.8–1.5)
DIFFERENTIAL METHOD BLD: ABNORMAL
EOSINOPHIL # BLD: 0.4 K/UL (ref 0–0.8)
EOSINOPHIL NFR BLD: 2 % (ref 0.5–7.8)
ERYTHROCYTE [DISTWIDTH] IN BLOOD BY AUTOMATED COUNT: 18.6 % (ref 11.9–14.6)
GLUCOSE BLD STRIP.AUTO-MCNC: 155 MG/DL (ref 65–100)
GLUCOSE BLD STRIP.AUTO-MCNC: 163 MG/DL (ref 65–100)
GLUCOSE BLD STRIP.AUTO-MCNC: 182 MG/DL (ref 65–100)
GLUCOSE BLD STRIP.AUTO-MCNC: 48 MG/DL (ref 65–100)
GLUCOSE BLD STRIP.AUTO-MCNC: 52 MG/DL (ref 65–100)
GLUCOSE SERPL-MCNC: 89 MG/DL (ref 65–100)
HAPTOGLOB SERPL-MCNC: 104 MG/DL (ref 30–200)
HCT VFR BLD AUTO: 24.8 % (ref 41.1–50.3)
HEPARIN INDUCED PLATELET ANTIBODY: NEGATIVE
HGB BLD-MCNC: 8.1 G/DL (ref 13.6–17.2)
HIT INTERPRETATION: NEGATIVE
HIT OPTICAL DENSITY: 0.07 ABS
HIT PROFILE: NORMAL
IMM GRANULOCYTES # BLD AUTO: 0.2 K/UL (ref 0–0.5)
IMM GRANULOCYTES NFR BLD AUTO: 1 % (ref 0–5)
LACTATE SERPL-SCNC: 2.4 MMOL/L (ref 0.4–2)
LYMPHOCYTES # BLD: 1.8 K/UL (ref 0.5–4.6)
LYMPHOCYTES NFR BLD: 9 % (ref 13–44)
MCH RBC QN AUTO: 25.2 PG (ref 26.1–32.9)
MCHC RBC AUTO-ENTMCNC: 32.7 G/DL (ref 31.4–35)
MCV RBC AUTO: 77 FL (ref 82–102)
MM INDURATION, POC: 0 MM (ref 0–5)
MONOCYTES # BLD: 1.6 K/UL (ref 0.1–1.3)
MONOCYTES NFR BLD: 8 % (ref 4–12)
NEUTS SEG # BLD: 16.4 K/UL (ref 1.7–8.2)
NEUTS SEG NFR BLD: 80 % (ref 43–78)
NRBC # BLD: 0.07 K/UL (ref 0–0.2)
PATH REV BLD -IMP: NORMAL
PLATELET # BLD AUTO: 43 K/UL (ref 150–450)
PLATELET COMMENT: ABNORMAL
PMV BLD AUTO: ABNORMAL FL (ref 9.4–12.3)
POTASSIUM SERPL-SCNC: 4.8 MMOL/L (ref 3.5–5.1)
PPD, POC: NEGATIVE
PROCALCITONIN SERPL-MCNC: 0.97 NG/ML (ref 0–0.49)
RBC # BLD AUTO: 3.22 M/UL (ref 4.23–5.6)
RBC MORPH BLD: ABNORMAL
SERVICE CMNT-IMP: ABNORMAL
SERVICE CMNT-IMP: NORMAL
SERVICE CMNT-IMP: NORMAL
SODIUM SERPL-SCNC: 137 MMOL/L (ref 133–143)
WBC # BLD AUTO: 20.4 K/UL (ref 4.3–11.1)
WBC MORPH BLD: ABNORMAL

## 2023-05-11 PROCEDURE — 83605 ASSAY OF LACTIC ACID: CPT

## 2023-05-11 PROCEDURE — 2580000003 HC RX 258: Performed by: NURSE PRACTITIONER

## 2023-05-11 PROCEDURE — 85025 COMPLETE CBC W/AUTO DIFF WBC: CPT

## 2023-05-11 PROCEDURE — 94760 N-INVAS EAR/PLS OXIMETRY 1: CPT

## 2023-05-11 PROCEDURE — 6360000002 HC RX W HCPCS: Performed by: NURSE PRACTITIONER

## 2023-05-11 PROCEDURE — 71045 X-RAY EXAM CHEST 1 VIEW: CPT

## 2023-05-11 PROCEDURE — 6370000000 HC RX 637 (ALT 250 FOR IP): Performed by: HOSPITALIST

## 2023-05-11 PROCEDURE — 1100000003 HC PRIVATE W/ TELEMETRY

## 2023-05-11 PROCEDURE — 2700000000 HC OXYGEN THERAPY PER DAY

## 2023-05-11 PROCEDURE — 6370000000 HC RX 637 (ALT 250 FOR IP): Performed by: NURSE PRACTITIONER

## 2023-05-11 PROCEDURE — 36415 COLL VENOUS BLD VENIPUNCTURE: CPT

## 2023-05-11 PROCEDURE — 82962 GLUCOSE BLOOD TEST: CPT

## 2023-05-11 PROCEDURE — 87040 BLOOD CULTURE FOR BACTERIA: CPT

## 2023-05-11 PROCEDURE — 2580000003 HC RX 258: Performed by: HOSPITALIST

## 2023-05-11 PROCEDURE — 84145 PROCALCITONIN (PCT): CPT

## 2023-05-11 PROCEDURE — 80048 BASIC METABOLIC PNL TOTAL CA: CPT

## 2023-05-11 RX ORDER — SODIUM BICARBONATE 650 MG/1
1300 TABLET ORAL 3 TIMES DAILY
Status: DISCONTINUED | OUTPATIENT
Start: 2023-05-11 | End: 2023-05-18 | Stop reason: HOSPADM

## 2023-05-11 RX ADMIN — SODIUM BICARBONATE 650 MG TABLET 1300 MG: at 22:05

## 2023-05-11 RX ADMIN — MIDODRINE HYDROCHLORIDE 10 MG: 5 TABLET ORAL at 09:59

## 2023-05-11 RX ADMIN — SODIUM CHLORIDE, PRESERVATIVE FREE 10 ML: 5 INJECTION INTRAVENOUS at 10:01

## 2023-05-11 RX ADMIN — DEXTROSE AND SODIUM CHLORIDE: 5; 450 INJECTION, SOLUTION INTRAVENOUS at 19:57

## 2023-05-11 RX ADMIN — DEXTROSE MONOHYDRATE 125 ML: 100 INJECTION, SOLUTION INTRAVENOUS at 10:43

## 2023-05-11 RX ADMIN — CEFTRIAXONE 1000 MG: 1 INJECTION, POWDER, FOR SOLUTION INTRAMUSCULAR; INTRAVENOUS at 08:30

## 2023-05-11 RX ADMIN — DEXTROSE AND SODIUM CHLORIDE: 5; 450 INJECTION, SOLUTION INTRAVENOUS at 16:56

## 2023-05-11 RX ADMIN — MIDODRINE HYDROCHLORIDE 10 MG: 5 TABLET ORAL at 12:56

## 2023-05-11 RX ADMIN — MIDODRINE HYDROCHLORIDE 10 MG: 5 TABLET ORAL at 17:39

## 2023-05-11 RX ADMIN — SODIUM BICARBONATE 650 MG TABLET 1300 MG: at 15:57

## 2023-05-11 RX ADMIN — SODIUM BICARBONATE 650 MG TABLET 1300 MG: at 09:59

## 2023-05-11 RX ADMIN — PIPERACILLIN AND TAZOBACTAM 4500 MG: 4; .5 INJECTION, POWDER, LYOPHILIZED, FOR SOLUTION INTRAVENOUS at 16:00

## 2023-05-11 RX ADMIN — SODIUM CHLORIDE, PRESERVATIVE FREE 10 ML: 5 INJECTION INTRAVENOUS at 22:10

## 2023-05-11 RX ADMIN — PIPERACILLIN AND TAZOBACTAM 3375 MG: 3; .375 INJECTION, POWDER, LYOPHILIZED, FOR SOLUTION INTRAVENOUS at 22:18

## 2023-05-11 RX ADMIN — SODIUM CHLORIDE 125 MG: 9 INJECTION, SOLUTION INTRAVENOUS at 15:56

## 2023-05-12 ENCOUNTER — APPOINTMENT (OUTPATIENT)
Dept: ULTRASOUND IMAGING | Age: 58
DRG: 871 | End: 2023-05-12
Payer: MEDICARE

## 2023-05-12 LAB
ANION GAP SERPL CALC-SCNC: 5 MMOL/L (ref 2–11)
BACTERIA SPEC CULT: NORMAL
BASOPHILS # BLD: 0.1 K/UL (ref 0–0.2)
BASOPHILS NFR BLD: 0 % (ref 0–2)
BUN SERPL-MCNC: 31 MG/DL (ref 6–23)
CALCIUM SERPL-MCNC: 7.8 MG/DL (ref 8.3–10.4)
CHLORIDE SERPL-SCNC: 113 MMOL/L (ref 101–110)
CO2 SERPL-SCNC: 17 MMOL/L (ref 21–32)
CREAT SERPL-MCNC: 3 MG/DL (ref 0.8–1.5)
DIFFERENTIAL METHOD BLD: ABNORMAL
EOSINOPHIL # BLD: 0.6 K/UL (ref 0–0.8)
EOSINOPHIL NFR BLD: 3 % (ref 0.5–7.8)
EPO SERPL-ACNC: 36.6 MIU/ML (ref 2.6–18.5)
ERYTHROCYTE [DISTWIDTH] IN BLOOD BY AUTOMATED COUNT: 18.6 % (ref 11.9–14.6)
GLUCOSE BLD STRIP.AUTO-MCNC: 117 MG/DL (ref 65–100)
GLUCOSE BLD STRIP.AUTO-MCNC: 95 MG/DL (ref 65–100)
GLUCOSE BLD STRIP.AUTO-MCNC: 95 MG/DL (ref 65–100)
GLUCOSE SERPL-MCNC: 115 MG/DL (ref 65–100)
HCT VFR BLD AUTO: 22.8 % (ref 41.1–50.3)
HGB BLD-MCNC: 7.3 G/DL (ref 13.6–17.2)
IMM GRANULOCYTES # BLD AUTO: 0.1 K/UL (ref 0–0.5)
IMM GRANULOCYTES NFR BLD AUTO: 0 % (ref 0–5)
LACTATE SERPL-SCNC: 1.9 MMOL/L (ref 0.4–2)
LYMPHOCYTES # BLD: 2.6 K/UL (ref 0.5–4.6)
LYMPHOCYTES NFR BLD: 12 % (ref 13–44)
MCH RBC QN AUTO: 24.8 PG (ref 26.1–32.9)
MCHC RBC AUTO-ENTMCNC: 32 G/DL (ref 31.4–35)
MCV RBC AUTO: 77.6 FL (ref 82–102)
MONOCYTES # BLD: 2.3 K/UL (ref 0.1–1.3)
MONOCYTES NFR BLD: 11 % (ref 4–12)
NEUTS SEG # BLD: 15.5 K/UL (ref 1.7–8.2)
NEUTS SEG NFR BLD: 73 % (ref 43–78)
NRBC # BLD: 0 K/UL (ref 0–0.2)
PLATELET # BLD AUTO: 54 K/UL (ref 150–450)
PMV BLD AUTO: ABNORMAL FL (ref 9.4–12.3)
POTASSIUM SERPL-SCNC: 4.5 MMOL/L (ref 3.5–5.1)
RBC # BLD AUTO: 2.94 M/UL (ref 4.23–5.6)
SERVICE CMNT-IMP: ABNORMAL
SERVICE CMNT-IMP: NORMAL
SODIUM SERPL-SCNC: 135 MMOL/L (ref 133–143)
WBC # BLD AUTO: 21.2 K/UL (ref 4.3–11.1)

## 2023-05-12 PROCEDURE — 2580000003 HC RX 258: Performed by: HOSPITALIST

## 2023-05-12 PROCEDURE — 2580000003 HC RX 258: Performed by: NURSE PRACTITIONER

## 2023-05-12 PROCEDURE — 80048 BASIC METABOLIC PNL TOTAL CA: CPT

## 2023-05-12 PROCEDURE — 1100000003 HC PRIVATE W/ TELEMETRY

## 2023-05-12 PROCEDURE — 76700 US EXAM ABDOM COMPLETE: CPT

## 2023-05-12 PROCEDURE — 6360000002 HC RX W HCPCS: Performed by: NURSE PRACTITIONER

## 2023-05-12 PROCEDURE — 36415 COLL VENOUS BLD VENIPUNCTURE: CPT

## 2023-05-12 PROCEDURE — 6370000000 HC RX 637 (ALT 250 FOR IP): Performed by: NURSE PRACTITIONER

## 2023-05-12 PROCEDURE — 82962 GLUCOSE BLOOD TEST: CPT

## 2023-05-12 PROCEDURE — 85025 COMPLETE CBC W/AUTO DIFF WBC: CPT

## 2023-05-12 PROCEDURE — 83605 ASSAY OF LACTIC ACID: CPT

## 2023-05-12 RX ADMIN — SODIUM CHLORIDE 125 MG: 9 INJECTION, SOLUTION INTRAVENOUS at 15:45

## 2023-05-12 RX ADMIN — PIPERACILLIN AND TAZOBACTAM 3375 MG: 3; .375 INJECTION, POWDER, LYOPHILIZED, FOR SOLUTION INTRAVENOUS at 05:33

## 2023-05-12 RX ADMIN — MIDODRINE HYDROCHLORIDE 10 MG: 5 TABLET ORAL at 09:54

## 2023-05-12 RX ADMIN — PIPERACILLIN AND TAZOBACTAM 3375 MG: 3; .375 INJECTION, POWDER, LYOPHILIZED, FOR SOLUTION INTRAVENOUS at 12:52

## 2023-05-12 RX ADMIN — SODIUM BICARBONATE 650 MG TABLET 1300 MG: at 09:54

## 2023-05-12 RX ADMIN — SODIUM BICARBONATE 650 MG TABLET 1300 MG: at 15:45

## 2023-05-12 RX ADMIN — MIDODRINE HYDROCHLORIDE 10 MG: 5 TABLET ORAL at 17:22

## 2023-05-12 RX ADMIN — SODIUM CHLORIDE, PRESERVATIVE FREE 10 ML: 5 INJECTION INTRAVENOUS at 22:46

## 2023-05-12 RX ADMIN — SODIUM CHLORIDE, PRESERVATIVE FREE 10 ML: 5 INJECTION INTRAVENOUS at 09:54

## 2023-05-12 RX ADMIN — DEXTROSE AND SODIUM CHLORIDE: 5; 450 INJECTION, SOLUTION INTRAVENOUS at 17:31

## 2023-05-12 RX ADMIN — SODIUM BICARBONATE 650 MG TABLET 1300 MG: at 22:46

## 2023-05-12 RX ADMIN — MIDODRINE HYDROCHLORIDE 10 MG: 5 TABLET ORAL at 12:52

## 2023-05-13 LAB
ANION GAP SERPL CALC-SCNC: 7 MMOL/L (ref 2–11)
BUN SERPL-MCNC: 29 MG/DL (ref 6–23)
CALCIUM SERPL-MCNC: 7.9 MG/DL (ref 8.3–10.4)
CHLORIDE SERPL-SCNC: 110 MMOL/L (ref 101–110)
CO2 SERPL-SCNC: 19 MMOL/L (ref 21–32)
COPPER SERPL-MCNC: 108 UG/DL (ref 69–132)
CORTIS AM PEAK SERPL-MCNC: 9.8 UG/DL (ref 7–25)
CREAT SERPL-MCNC: 2.7 MG/DL (ref 0.8–1.5)
ERYTHROCYTE [DISTWIDTH] IN BLOOD BY AUTOMATED COUNT: 18.6 % (ref 11.9–14.6)
GLUCOSE BLD STRIP.AUTO-MCNC: 112 MG/DL (ref 65–100)
GLUCOSE BLD STRIP.AUTO-MCNC: 129 MG/DL (ref 65–100)
GLUCOSE BLD STRIP.AUTO-MCNC: 163 MG/DL (ref 65–100)
GLUCOSE SERPL-MCNC: 112 MG/DL (ref 65–100)
HCT VFR BLD AUTO: 23.6 % (ref 41.1–50.3)
HGB BLD-MCNC: 7.7 G/DL (ref 13.6–17.2)
MCH RBC QN AUTO: 24.9 PG (ref 26.1–32.9)
MCHC RBC AUTO-ENTMCNC: 32.6 G/DL (ref 31.4–35)
MCV RBC AUTO: 76.4 FL (ref 82–102)
NRBC # BLD: 0.02 K/UL (ref 0–0.2)
PLATELET # BLD AUTO: 48 K/UL (ref 150–450)
PMV BLD AUTO: ABNORMAL FL (ref 9.4–12.3)
POTASSIUM SERPL-SCNC: 3.8 MMOL/L (ref 3.5–5.1)
PROCALCITONIN SERPL-MCNC: 2.13 NG/ML (ref 0–0.49)
PSA SERPL-MCNC: 0.3 NG/ML
RBC # BLD AUTO: 3.09 M/UL (ref 4.23–5.6)
SERVICE CMNT-IMP: ABNORMAL
SODIUM SERPL-SCNC: 136 MMOL/L (ref 133–143)
WBC # BLD AUTO: 15.6 K/UL (ref 4.3–11.1)

## 2023-05-13 PROCEDURE — 1100000003 HC PRIVATE W/ TELEMETRY

## 2023-05-13 PROCEDURE — 84145 PROCALCITONIN (PCT): CPT

## 2023-05-13 PROCEDURE — 80048 BASIC METABOLIC PNL TOTAL CA: CPT

## 2023-05-13 PROCEDURE — 82962 GLUCOSE BLOOD TEST: CPT

## 2023-05-13 PROCEDURE — 6370000000 HC RX 637 (ALT 250 FOR IP): Performed by: NURSE PRACTITIONER

## 2023-05-13 PROCEDURE — 36415 COLL VENOUS BLD VENIPUNCTURE: CPT

## 2023-05-13 PROCEDURE — 2580000003 HC RX 258: Performed by: NURSE PRACTITIONER

## 2023-05-13 PROCEDURE — 85027 COMPLETE CBC AUTOMATED: CPT

## 2023-05-13 PROCEDURE — 2580000003 HC RX 258: Performed by: HOSPITALIST

## 2023-05-13 PROCEDURE — 82533 TOTAL CORTISOL: CPT

## 2023-05-13 PROCEDURE — 84153 ASSAY OF PSA TOTAL: CPT

## 2023-05-13 RX ADMIN — SODIUM BICARBONATE 650 MG TABLET 1300 MG: at 09:50

## 2023-05-13 RX ADMIN — SODIUM BICARBONATE 650 MG TABLET 1300 MG: at 21:32

## 2023-05-13 RX ADMIN — SODIUM CHLORIDE, PRESERVATIVE FREE 10 ML: 5 INJECTION INTRAVENOUS at 10:22

## 2023-05-13 RX ADMIN — SODIUM CHLORIDE, PRESERVATIVE FREE 10 ML: 5 INJECTION INTRAVENOUS at 21:32

## 2023-05-13 RX ADMIN — MIDODRINE HYDROCHLORIDE 10 MG: 5 TABLET ORAL at 09:55

## 2023-05-13 RX ADMIN — SODIUM BICARBONATE 650 MG TABLET 1300 MG: at 12:12

## 2023-05-13 RX ADMIN — MIDODRINE HYDROCHLORIDE 10 MG: 5 TABLET ORAL at 12:12

## 2023-05-13 RX ADMIN — DEXTROSE AND SODIUM CHLORIDE: 5; 450 INJECTION, SOLUTION INTRAVENOUS at 17:01

## 2023-05-13 ASSESSMENT — PAIN SCALES - GENERAL
PAINLEVEL_OUTOF10: 0
PAINLEVEL_OUTOF10: 0

## 2023-05-14 LAB
ANION GAP SERPL CALC-SCNC: 5 MMOL/L (ref 2–11)
BUN SERPL-MCNC: 27 MG/DL (ref 6–23)
CALCIUM SERPL-MCNC: 8 MG/DL (ref 8.3–10.4)
CHLORIDE SERPL-SCNC: 108 MMOL/L (ref 101–110)
CO2 SERPL-SCNC: 21 MMOL/L (ref 21–32)
CREAT SERPL-MCNC: 2.3 MG/DL (ref 0.8–1.5)
ERYTHROCYTE [DISTWIDTH] IN BLOOD BY AUTOMATED COUNT: 18.4 % (ref 11.9–14.6)
GLUCOSE BLD STRIP.AUTO-MCNC: 110 MG/DL (ref 65–100)
GLUCOSE BLD STRIP.AUTO-MCNC: 150 MG/DL (ref 65–100)
GLUCOSE BLD STRIP.AUTO-MCNC: 66 MG/DL (ref 65–100)
GLUCOSE BLD STRIP.AUTO-MCNC: 93 MG/DL (ref 65–100)
GLUCOSE BLD STRIP.AUTO-MCNC: 98 MG/DL (ref 65–100)
GLUCOSE SERPL-MCNC: 116 MG/DL (ref 65–100)
HCT VFR BLD AUTO: 23.5 % (ref 41.1–50.3)
HGB BLD-MCNC: 7.8 G/DL (ref 13.6–17.2)
MCH RBC QN AUTO: 25 PG (ref 26.1–32.9)
MCHC RBC AUTO-ENTMCNC: 33.2 G/DL (ref 31.4–35)
MCV RBC AUTO: 75.3 FL (ref 82–102)
MM INDURATION, POC: 0 MM (ref 0–5)
NRBC # BLD: 0 K/UL (ref 0–0.2)
PLATELET # BLD AUTO: 37 K/UL (ref 150–450)
PMV BLD AUTO: ABNORMAL FL (ref 9.4–12.3)
POTASSIUM SERPL-SCNC: 3.5 MMOL/L (ref 3.5–5.1)
PPD, POC: NEGATIVE
RBC # BLD AUTO: 3.12 M/UL (ref 4.23–5.6)
SERVICE CMNT-IMP: ABNORMAL
SERVICE CMNT-IMP: ABNORMAL
SERVICE CMNT-IMP: NORMAL
SODIUM SERPL-SCNC: 134 MMOL/L (ref 133–143)
WBC # BLD AUTO: 10.5 K/UL (ref 4.3–11.1)

## 2023-05-14 PROCEDURE — 87324 CLOSTRIDIUM AG IA: CPT

## 2023-05-14 PROCEDURE — 80048 BASIC METABOLIC PNL TOTAL CA: CPT

## 2023-05-14 PROCEDURE — 85027 COMPLETE CBC AUTOMATED: CPT

## 2023-05-14 PROCEDURE — 82962 GLUCOSE BLOOD TEST: CPT

## 2023-05-14 PROCEDURE — 2580000003 HC RX 258: Performed by: HOSPITALIST

## 2023-05-14 PROCEDURE — 1100000003 HC PRIVATE W/ TELEMETRY

## 2023-05-14 PROCEDURE — 6370000000 HC RX 637 (ALT 250 FOR IP): Performed by: NURSE PRACTITIONER

## 2023-05-14 PROCEDURE — 36415 COLL VENOUS BLD VENIPUNCTURE: CPT

## 2023-05-14 PROCEDURE — 94760 N-INVAS EAR/PLS OXIMETRY 1: CPT

## 2023-05-14 PROCEDURE — 2700000000 HC OXYGEN THERAPY PER DAY

## 2023-05-14 PROCEDURE — 87449 NOS EACH ORGANISM AG IA: CPT

## 2023-05-14 RX ADMIN — SODIUM BICARBONATE 650 MG TABLET 1300 MG: at 14:45

## 2023-05-14 RX ADMIN — SODIUM BICARBONATE 650 MG TABLET 1300 MG: at 22:26

## 2023-05-14 RX ADMIN — SODIUM CHLORIDE, PRESERVATIVE FREE 10 ML: 5 INJECTION INTRAVENOUS at 10:35

## 2023-05-14 RX ADMIN — SODIUM CHLORIDE, PRESERVATIVE FREE 10 ML: 5 INJECTION INTRAVENOUS at 22:29

## 2023-05-14 RX ADMIN — SODIUM BICARBONATE 650 MG TABLET 1300 MG: at 10:31

## 2023-05-14 ASSESSMENT — PAIN SCALES - GENERAL
PAINLEVEL_OUTOF10: 0
PAINLEVEL_OUTOF10: 0

## 2023-05-15 LAB
ANION GAP SERPL CALC-SCNC: 6 MMOL/L (ref 2–11)
BUN SERPL-MCNC: 26 MG/DL (ref 6–23)
CALCIUM SERPL-MCNC: 7.6 MG/DL (ref 8.3–10.4)
CHLORIDE SERPL-SCNC: 110 MMOL/L (ref 101–110)
CO2 SERPL-SCNC: 19 MMOL/L (ref 21–32)
CORTIS AM PEAK SERPL-MCNC: 7.4 UG/DL (ref 7–25)
CREAT SERPL-MCNC: 2.1 MG/DL (ref 0.8–1.5)
ERYTHROCYTE [DISTWIDTH] IN BLOOD BY AUTOMATED COUNT: 18.3 % (ref 11.9–14.6)
GLUCOSE BLD STRIP.AUTO-MCNC: 118 MG/DL (ref 65–100)
GLUCOSE BLD STRIP.AUTO-MCNC: 123 MG/DL (ref 65–100)
GLUCOSE BLD STRIP.AUTO-MCNC: 130 MG/DL (ref 65–100)
GLUCOSE BLD STRIP.AUTO-MCNC: 68 MG/DL (ref 65–100)
GLUCOSE BLD STRIP.AUTO-MCNC: 88 MG/DL (ref 65–100)
GLUCOSE SERPL-MCNC: 89 MG/DL (ref 65–100)
HCT VFR BLD AUTO: 22.8 % (ref 41.1–50.3)
HGB BLD-MCNC: 7.4 G/DL (ref 13.6–17.2)
MCH RBC QN AUTO: 24.8 PG (ref 26.1–32.9)
MCHC RBC AUTO-ENTMCNC: 32.5 G/DL (ref 31.4–35)
MCV RBC AUTO: 76.5 FL (ref 82–102)
MM INDURATION, POC: 0 MM (ref 0–5)
NRBC # BLD: 0 K/UL (ref 0–0.2)
PLATELET # BLD AUTO: 56 K/UL (ref 150–450)
PMV BLD AUTO: ABNORMAL FL (ref 9.4–12.3)
POTASSIUM SERPL-SCNC: 3.7 MMOL/L (ref 3.5–5.1)
PPD, POC: NEGATIVE
RBC # BLD AUTO: 2.98 M/UL (ref 4.23–5.6)
SERVICE CMNT-IMP: ABNORMAL
SERVICE CMNT-IMP: NORMAL
SERVICE CMNT-IMP: NORMAL
SODIUM SERPL-SCNC: 135 MMOL/L (ref 133–143)
WBC # BLD AUTO: 9 K/UL (ref 4.3–11.1)

## 2023-05-15 PROCEDURE — 6370000000 HC RX 637 (ALT 250 FOR IP): Performed by: NURSE PRACTITIONER

## 2023-05-15 PROCEDURE — 2700000000 HC OXYGEN THERAPY PER DAY

## 2023-05-15 PROCEDURE — 94760 N-INVAS EAR/PLS OXIMETRY 1: CPT

## 2023-05-15 PROCEDURE — 80048 BASIC METABOLIC PNL TOTAL CA: CPT

## 2023-05-15 PROCEDURE — 36415 COLL VENOUS BLD VENIPUNCTURE: CPT

## 2023-05-15 PROCEDURE — 2580000003 HC RX 258: Performed by: HOSPITALIST

## 2023-05-15 PROCEDURE — 6370000000 HC RX 637 (ALT 250 FOR IP): Performed by: HOSPITALIST

## 2023-05-15 PROCEDURE — 2580000003 HC RX 258: Performed by: INTERNAL MEDICINE

## 2023-05-15 PROCEDURE — 82533 TOTAL CORTISOL: CPT

## 2023-05-15 PROCEDURE — 82962 GLUCOSE BLOOD TEST: CPT

## 2023-05-15 PROCEDURE — 85027 COMPLETE CBC AUTOMATED: CPT

## 2023-05-15 PROCEDURE — 1100000003 HC PRIVATE W/ TELEMETRY

## 2023-05-15 RX ORDER — COSYNTROPIN 0.25 MG/ML
250 INJECTION, POWDER, FOR SOLUTION INTRAMUSCULAR; INTRAVENOUS ONCE
Status: COMPLETED | OUTPATIENT
Start: 2023-05-16 | End: 2023-05-16

## 2023-05-15 RX ORDER — 0.9 % SODIUM CHLORIDE 0.9 %
250 INTRAVENOUS SOLUTION INTRAVENOUS ONCE
Status: COMPLETED | OUTPATIENT
Start: 2023-05-15 | End: 2023-05-15

## 2023-05-15 RX ORDER — 0.9 % SODIUM CHLORIDE 0.9 %
500 INTRAVENOUS SOLUTION INTRAVENOUS ONCE
Status: COMPLETED | OUTPATIENT
Start: 2023-05-15 | End: 2023-05-15

## 2023-05-15 RX ADMIN — SODIUM CHLORIDE 250 ML: 9 INJECTION, SOLUTION INTRAVENOUS at 09:42

## 2023-05-15 RX ADMIN — MIDODRINE HYDROCHLORIDE 10 MG: 5 TABLET ORAL at 08:00

## 2023-05-15 RX ADMIN — MIDODRINE HYDROCHLORIDE 10 MG: 5 TABLET ORAL at 11:40

## 2023-05-15 RX ADMIN — SODIUM CHLORIDE 500 ML: 9 INJECTION, SOLUTION INTRAVENOUS at 07:57

## 2023-05-15 RX ADMIN — SODIUM BICARBONATE 650 MG TABLET 1300 MG: at 08:00

## 2023-05-15 RX ADMIN — Medication 16 G: at 11:40

## 2023-05-15 RX ADMIN — DEXTROSE AND SODIUM CHLORIDE: 5; 450 INJECTION, SOLUTION INTRAVENOUS at 23:37

## 2023-05-15 RX ADMIN — SODIUM CHLORIDE, PRESERVATIVE FREE 10 ML: 5 INJECTION INTRAVENOUS at 21:00

## 2023-05-15 RX ADMIN — SODIUM BICARBONATE 650 MG TABLET 1300 MG: at 22:06

## 2023-05-15 RX ADMIN — SODIUM BICARBONATE 650 MG TABLET 1300 MG: at 13:48

## 2023-05-15 RX ADMIN — MIDODRINE HYDROCHLORIDE 10 MG: 5 TABLET ORAL at 17:08

## 2023-05-15 ASSESSMENT — PAIN SCALES - GENERAL: PAINLEVEL_OUTOF10: 0

## 2023-05-15 ASSESSMENT — PAIN SCALES - WONG BAKER: WONGBAKER_NUMERICALRESPONSE: 0

## 2023-05-15 NOTE — CARE COORDINATION
Patient chart reviewed for continued stay. Per MD patient is hypothermic and hypotensive today. Patient has a bed at [facility] and will need pre-cert prior to transferring. Per MD not ready to start pre-cert. Will continue to follow patient's plan of care and assist further with supportive care needs as appropriate.

## 2023-05-15 NOTE — FLOWSHEET NOTE
05/15/23 0915   Vital Signs   Pulse 95   BP (!) 87/59   MAP (Calculated) 68     500 Bolus was given.  MD made aware

## 2023-05-15 NOTE — FLOWSHEET NOTE
05/15/23 0734   Vital Signs   Pulse 90   Heart Rate Source Monitor   Respirations 20   BP 82/64       MD made aware.

## 2023-05-16 LAB
ANION GAP SERPL CALC-SCNC: ABNORMAL MMOL/L (ref 2–11)
BACTERIA SPEC CULT: NORMAL
BACTERIA SPEC CULT: NORMAL
BASOPHILS # BLD: 0.1 K/UL (ref 0–0.2)
BASOPHILS NFR BLD: 0 % (ref 0–2)
BUN SERPL-MCNC: 27 MG/DL (ref 6–23)
C DIFF GDH STL QL: NORMAL
C DIFF TOX A+B STL QL IA: NORMAL
C DIFF TOXIN INTERPRETATION: NORMAL
CALCIUM SERPL-MCNC: 7.8 MG/DL (ref 8.3–10.4)
CHLORIDE SERPL-SCNC: 113 MMOL/L (ref 101–110)
CLINICAL CONSIDERATION: NORMAL
CO2 SERPL-SCNC: 21 MMOL/L (ref 21–32)
CORTIS 1H P CHAL SERPL-MCNC: 12.8 UG/DL
CORTIS 30M P CHAL SERPL-MCNC: 12.5 UG/DL
CORTIS BS SERPL-MCNC: 6.7 UG/DL
CREAT SERPL-MCNC: 2.1 MG/DL (ref 0.8–1.5)
DIFFERENTIAL METHOD BLD: ABNORMAL
EOSINOPHIL # BLD: 0.4 K/UL (ref 0–0.8)
EOSINOPHIL NFR BLD: 3 % (ref 0.5–7.8)
ERYTHROCYTE [DISTWIDTH] IN BLOOD BY AUTOMATED COUNT: 18.2 % (ref 11.9–14.6)
GLUCOSE BLD STRIP.AUTO-MCNC: 103 MG/DL (ref 65–100)
GLUCOSE BLD STRIP.AUTO-MCNC: 167 MG/DL (ref 65–100)
GLUCOSE BLD STRIP.AUTO-MCNC: 179 MG/DL (ref 65–100)
GLUCOSE BLD STRIP.AUTO-MCNC: 180 MG/DL (ref 65–100)
GLUCOSE SERPL-MCNC: 103 MG/DL (ref 65–100)
HCT VFR BLD AUTO: 22.9 % (ref 41.1–50.3)
HGB BLD-MCNC: 7.5 G/DL (ref 13.6–17.2)
IMM GRANULOCYTES # BLD AUTO: 0.1 K/UL (ref 0–0.5)
IMM GRANULOCYTES NFR BLD AUTO: 1 % (ref 0–5)
LYMPHOCYTES # BLD: 3.3 K/UL (ref 0.5–4.6)
LYMPHOCYTES NFR BLD: 29 % (ref 13–44)
MCH RBC QN AUTO: 25 PG (ref 26.1–32.9)
MCHC RBC AUTO-ENTMCNC: 32.8 G/DL (ref 31.4–35)
MCV RBC AUTO: 76.3 FL (ref 82–102)
MONOCYTES # BLD: 1.3 K/UL (ref 0.1–1.3)
MONOCYTES NFR BLD: 11 % (ref 4–12)
NEUTS SEG # BLD: 6.6 K/UL (ref 1.7–8.2)
NEUTS SEG NFR BLD: 57 % (ref 43–78)
NRBC # BLD: 0.02 K/UL (ref 0–0.2)
PLATELET # BLD AUTO: 78 K/UL (ref 150–450)
PMV BLD AUTO: ABNORMAL FL (ref 9.4–12.3)
POTASSIUM SERPL-SCNC: 3.7 MMOL/L (ref 3.5–5.1)
RBC # BLD AUTO: 3 M/UL (ref 4.23–5.6)
REFLEX: NORMAL
SERVICE CMNT-IMP: ABNORMAL
SERVICE CMNT-IMP: NORMAL
SERVICE CMNT-IMP: NORMAL
SODIUM SERPL-SCNC: 129 MMOL/L (ref 133–143)
WBC # BLD AUTO: 11.7 K/UL (ref 4.3–11.1)

## 2023-05-16 PROCEDURE — 87449 NOS EACH ORGANISM AG IA: CPT

## 2023-05-16 PROCEDURE — 85025 COMPLETE CBC W/AUTO DIFF WBC: CPT

## 2023-05-16 PROCEDURE — 36415 COLL VENOUS BLD VENIPUNCTURE: CPT

## 2023-05-16 PROCEDURE — 82533 TOTAL CORTISOL: CPT

## 2023-05-16 PROCEDURE — 2580000003 HC RX 258: Performed by: HOSPITALIST

## 2023-05-16 PROCEDURE — 6370000000 HC RX 637 (ALT 250 FOR IP): Performed by: NURSE PRACTITIONER

## 2023-05-16 PROCEDURE — 2580000003 HC RX 258: Performed by: INTERNAL MEDICINE

## 2023-05-16 PROCEDURE — 1100000003 HC PRIVATE W/ TELEMETRY

## 2023-05-16 PROCEDURE — 6360000002 HC RX W HCPCS: Performed by: INTERNAL MEDICINE

## 2023-05-16 PROCEDURE — 80048 BASIC METABOLIC PNL TOTAL CA: CPT

## 2023-05-16 PROCEDURE — 82962 GLUCOSE BLOOD TEST: CPT

## 2023-05-16 PROCEDURE — 6370000000 HC RX 637 (ALT 250 FOR IP): Performed by: INTERNAL MEDICINE

## 2023-05-16 PROCEDURE — 87324 CLOSTRIDIUM AG IA: CPT

## 2023-05-16 RX ORDER — HYDROCORTISONE 5 MG/1
15 TABLET ORAL DAILY
Status: DISCONTINUED | OUTPATIENT
Start: 2023-05-17 | End: 2023-05-17

## 2023-05-16 RX ORDER — LOPERAMIDE HYDROCHLORIDE 2 MG/1
2 CAPSULE ORAL 4 TIMES DAILY PRN
Status: DISCONTINUED | OUTPATIENT
Start: 2023-05-16 | End: 2023-05-18 | Stop reason: HOSPADM

## 2023-05-16 RX ORDER — HYDROCORTISONE 5 MG/1
10 TABLET ORAL
Status: DISCONTINUED | OUTPATIENT
Start: 2023-05-16 | End: 2023-05-17

## 2023-05-16 RX ORDER — PANTOPRAZOLE SODIUM 40 MG/1
40 TABLET, DELAYED RELEASE ORAL
Status: DISCONTINUED | OUTPATIENT
Start: 2023-05-16 | End: 2023-05-18 | Stop reason: HOSPADM

## 2023-05-16 RX ADMIN — SODIUM BICARBONATE 650 MG TABLET 1300 MG: at 20:48

## 2023-05-16 RX ADMIN — HYDROCORTISONE 10 MG: 5 TABLET ORAL at 17:33

## 2023-05-16 RX ADMIN — SODIUM CHLORIDE, PRESERVATIVE FREE 10 ML: 5 INJECTION INTRAVENOUS at 20:48

## 2023-05-16 RX ADMIN — MIDODRINE HYDROCHLORIDE 10 MG: 5 TABLET ORAL at 12:06

## 2023-05-16 RX ADMIN — PANTOPRAZOLE SODIUM 40 MG: 40 TABLET, DELAYED RELEASE ORAL at 12:06

## 2023-05-16 RX ADMIN — SODIUM BICARBONATE 650 MG TABLET 1300 MG: at 08:21

## 2023-05-16 RX ADMIN — MIDODRINE HYDROCHLORIDE 10 MG: 5 TABLET ORAL at 17:33

## 2023-05-16 RX ADMIN — COSYNTROPIN 250 MCG: 0.25 INJECTION, POWDER, LYOPHILIZED, FOR SOLUTION INTRAVENOUS at 08:22

## 2023-05-16 RX ADMIN — DEXTROSE AND SODIUM CHLORIDE: 5; 450 INJECTION, SOLUTION INTRAVENOUS at 12:06

## 2023-05-16 RX ADMIN — SODIUM BICARBONATE 650 MG TABLET 1300 MG: at 14:33

## 2023-05-16 RX ADMIN — LOPERAMIDE HYDROCHLORIDE 2 MG: 2 CAPSULE ORAL at 17:33

## 2023-05-16 RX ADMIN — MIDODRINE HYDROCHLORIDE 10 MG: 5 TABLET ORAL at 08:21

## 2023-05-16 ASSESSMENT — PAIN SCALES - WONG BAKER: WONGBAKER_NUMERICALRESPONSE: 0

## 2023-05-16 ASSESSMENT — PAIN SCALES - GENERAL: PAINLEVEL_OUTOF10: 0

## 2023-05-17 PROBLEM — E87.20 LACTIC ACIDOSIS: Status: RESOLVED | Noted: 2023-05-06 | Resolved: 2023-05-17

## 2023-05-17 PROBLEM — E61.1 IRON DEFICIENCY: Chronic | Status: ACTIVE | Noted: 2023-05-10

## 2023-05-17 PROBLEM — N17.9 ACUTE KIDNEY INJURY (HCC): Status: RESOLVED | Noted: 2023-02-06 | Resolved: 2023-05-17

## 2023-05-17 PROBLEM — E27.40 ADRENAL INSUFFICIENCY (HCC): Status: ACTIVE | Noted: 2023-04-06

## 2023-05-17 PROBLEM — D69.6 THROMBOCYTOPENIA (HCC): Chronic | Status: ACTIVE | Noted: 2023-04-11

## 2023-05-17 PROBLEM — T68.XXXA HYPOTHERMIA: Status: RESOLVED | Noted: 2023-04-06 | Resolved: 2023-05-17

## 2023-05-17 PROBLEM — R18.8 CIRRHOSIS OF LIVER WITH ASCITES (HCC): Chronic | Status: ACTIVE | Noted: 2022-12-15

## 2023-05-17 PROBLEM — N18.32 STAGE 3B CHRONIC KIDNEY DISEASE (HCC): Chronic | Status: ACTIVE | Noted: 2022-09-14

## 2023-05-17 PROBLEM — A41.59 SEPSIS DUE TO PROTEUS SPECIES (HCC): Status: ACTIVE | Noted: 2023-05-06

## 2023-05-17 PROBLEM — I73.9 PAD (PERIPHERAL ARTERY DISEASE) (HCC): Chronic | Status: ACTIVE | Noted: 2022-09-02

## 2023-05-17 PROBLEM — D64.9 ANEMIA: Chronic | Status: ACTIVE | Noted: 2020-12-21

## 2023-05-17 PROBLEM — N18.31 STAGE 3A CHRONIC KIDNEY DISEASE (HCC): Status: ACTIVE | Noted: 2022-09-14

## 2023-05-17 PROBLEM — E87.5 HYPERKALEMIA: Status: RESOLVED | Noted: 2023-05-06 | Resolved: 2023-05-17

## 2023-05-17 PROBLEM — K74.60 CIRRHOSIS OF LIVER WITH ASCITES (HCC): Chronic | Status: ACTIVE | Noted: 2022-12-15

## 2023-05-17 PROBLEM — K21.9 GASTROESOPHAGEAL REFLUX DISEASE WITHOUT ESOPHAGITIS: Chronic | Status: ACTIVE | Noted: 2023-03-03

## 2023-05-17 LAB
ANION GAP SERPL CALC-SCNC: 5 MMOL/L (ref 2–11)
BASOPHILS # BLD: 0 K/UL (ref 0–0.2)
BASOPHILS NFR BLD: 0 % (ref 0–2)
BUN SERPL-MCNC: 23 MG/DL (ref 6–23)
CALCIUM SERPL-MCNC: 8 MG/DL (ref 8.3–10.4)
CHLORIDE SERPL-SCNC: 109 MMOL/L (ref 101–110)
CO2 SERPL-SCNC: 19 MMOL/L (ref 21–32)
CREAT SERPL-MCNC: 2 MG/DL (ref 0.8–1.5)
DIFFERENTIAL METHOD BLD: ABNORMAL
EOSINOPHIL # BLD: 0.1 K/UL (ref 0–0.8)
EOSINOPHIL NFR BLD: 1 % (ref 0.5–7.8)
ERYTHROCYTE [DISTWIDTH] IN BLOOD BY AUTOMATED COUNT: 17.7 % (ref 11.9–14.6)
GLUCOSE BLD STRIP.AUTO-MCNC: 184 MG/DL (ref 65–100)
GLUCOSE BLD STRIP.AUTO-MCNC: 199 MG/DL (ref 65–100)
GLUCOSE BLD STRIP.AUTO-MCNC: 211 MG/DL (ref 65–100)
GLUCOSE BLD STRIP.AUTO-MCNC: 227 MG/DL (ref 65–100)
GLUCOSE SERPL-MCNC: 189 MG/DL (ref 65–100)
HCT VFR BLD AUTO: 21.5 % (ref 41.1–50.3)
HGB BLD-MCNC: 7 G/DL (ref 13.6–17.2)
IMM GRANULOCYTES # BLD AUTO: 0.1 K/UL (ref 0–0.5)
IMM GRANULOCYTES NFR BLD AUTO: 1 % (ref 0–5)
LYMPHOCYTES # BLD: 2.4 K/UL (ref 0.5–4.6)
LYMPHOCYTES NFR BLD: 22 % (ref 13–44)
MCH RBC QN AUTO: 24.8 PG (ref 26.1–32.9)
MCHC RBC AUTO-ENTMCNC: 32.6 G/DL (ref 31.4–35)
MCV RBC AUTO: 76.2 FL (ref 82–102)
MONOCYTES # BLD: 0.7 K/UL (ref 0.1–1.3)
MONOCYTES NFR BLD: 7 % (ref 4–12)
NEUTS SEG # BLD: 7.5 K/UL (ref 1.7–8.2)
NEUTS SEG NFR BLD: 70 % (ref 43–78)
NRBC # BLD: 0.02 K/UL (ref 0–0.2)
PLATELET # BLD AUTO: 76 K/UL (ref 150–450)
PMV BLD AUTO: 11.2 FL (ref 9.4–12.3)
POTASSIUM SERPL-SCNC: 3.7 MMOL/L (ref 3.5–5.1)
RBC # BLD AUTO: 2.82 M/UL (ref 4.23–5.6)
SERVICE CMNT-IMP: ABNORMAL
SODIUM SERPL-SCNC: 133 MMOL/L (ref 133–143)
WBC # BLD AUTO: 10.7 K/UL (ref 4.3–11.1)

## 2023-05-17 PROCEDURE — 6370000000 HC RX 637 (ALT 250 FOR IP): Performed by: HOSPITALIST

## 2023-05-17 PROCEDURE — 2580000003 HC RX 258: Performed by: HOSPITALIST

## 2023-05-17 PROCEDURE — 92526 ORAL FUNCTION THERAPY: CPT

## 2023-05-17 PROCEDURE — 82962 GLUCOSE BLOOD TEST: CPT

## 2023-05-17 PROCEDURE — 97166 OT EVAL MOD COMPLEX 45 MIN: CPT

## 2023-05-17 PROCEDURE — 6370000000 HC RX 637 (ALT 250 FOR IP): Performed by: INTERNAL MEDICINE

## 2023-05-17 PROCEDURE — 6370000000 HC RX 637 (ALT 250 FOR IP): Performed by: NURSE PRACTITIONER

## 2023-05-17 PROCEDURE — 1100000000 HC RM PRIVATE

## 2023-05-17 PROCEDURE — 97530 THERAPEUTIC ACTIVITIES: CPT

## 2023-05-17 PROCEDURE — 85025 COMPLETE CBC W/AUTO DIFF WBC: CPT

## 2023-05-17 PROCEDURE — 2580000003 HC RX 258: Performed by: INTERNAL MEDICINE

## 2023-05-17 PROCEDURE — 36415 COLL VENOUS BLD VENIPUNCTURE: CPT

## 2023-05-17 PROCEDURE — 80048 BASIC METABOLIC PNL TOTAL CA: CPT

## 2023-05-17 PROCEDURE — 97535 SELF CARE MNGMENT TRAINING: CPT

## 2023-05-17 PROCEDURE — 97161 PT EVAL LOW COMPLEX 20 MIN: CPT

## 2023-05-17 RX ORDER — FUROSEMIDE 40 MG/1
40 TABLET ORAL DAILY
Status: DISCONTINUED | OUTPATIENT
Start: 2023-05-17 | End: 2023-05-17

## 2023-05-17 RX ORDER — LOPERAMIDE HYDROCHLORIDE 2 MG/1
4 CAPSULE ORAL 2 TIMES DAILY
Status: DISCONTINUED | OUTPATIENT
Start: 2023-05-17 | End: 2023-05-18 | Stop reason: HOSPADM

## 2023-05-17 RX ORDER — SPIRONOLACTONE 25 MG/1
25 TABLET ORAL DAILY
Status: DISCONTINUED | OUTPATIENT
Start: 2023-05-17 | End: 2023-05-17

## 2023-05-17 RX ORDER — HYDROCORTISONE 5 MG/1
5 TABLET ORAL 2 TIMES DAILY
Status: DISCONTINUED | OUTPATIENT
Start: 2023-05-17 | End: 2023-05-17

## 2023-05-17 RX ORDER — HYDROCORTISONE 5 MG/1
10 TABLET ORAL 2 TIMES DAILY
Status: DISCONTINUED | OUTPATIENT
Start: 2023-05-17 | End: 2023-05-18

## 2023-05-17 RX ADMIN — HYDROCORTISONE 10 MG: 5 TABLET ORAL at 21:14

## 2023-05-17 RX ADMIN — SODIUM CHLORIDE, PRESERVATIVE FREE 10 ML: 5 INJECTION INTRAVENOUS at 21:17

## 2023-05-17 RX ADMIN — HYDROCORTISONE 15 MG: 5 TABLET ORAL at 08:12

## 2023-05-17 RX ADMIN — SODIUM CHLORIDE, PRESERVATIVE FREE 10 ML: 5 INJECTION INTRAVENOUS at 08:54

## 2023-05-17 RX ADMIN — SODIUM BICARBONATE 650 MG TABLET 1300 MG: at 21:14

## 2023-05-17 RX ADMIN — MIDODRINE HYDROCHLORIDE 10 MG: 5 TABLET ORAL at 11:40

## 2023-05-17 RX ADMIN — ERGOCALCIFEROL 50000 UNITS: 1.25 CAPSULE ORAL at 08:53

## 2023-05-17 RX ADMIN — PANTOPRAZOLE SODIUM 40 MG: 40 TABLET, DELAYED RELEASE ORAL at 05:22

## 2023-05-17 RX ADMIN — LOPERAMIDE HYDROCHLORIDE 4 MG: 2 CAPSULE ORAL at 11:40

## 2023-05-17 RX ADMIN — SODIUM BICARBONATE 650 MG TABLET 1300 MG: at 13:54

## 2023-05-17 RX ADMIN — SODIUM BICARBONATE 650 MG TABLET 1300 MG: at 08:12

## 2023-05-17 RX ADMIN — LOPERAMIDE HYDROCHLORIDE 4 MG: 2 CAPSULE ORAL at 21:13

## 2023-05-17 RX ADMIN — DEXTROSE AND SODIUM CHLORIDE: 5; 450 INJECTION, SOLUTION INTRAVENOUS at 08:15

## 2023-05-17 RX ADMIN — INSULIN LISPRO 1 UNITS: 100 INJECTION, SOLUTION INTRAVENOUS; SUBCUTANEOUS at 11:39

## 2023-05-17 ASSESSMENT — PAIN SCALES - WONG BAKER: WONGBAKER_NUMERICALRESPONSE: 0

## 2023-05-17 ASSESSMENT — PAIN SCALES - GENERAL: PAINLEVEL_OUTOF10: 0

## 2023-05-18 VITALS
BODY MASS INDEX: 22.99 KG/M2 | DIASTOLIC BLOOD PRESSURE: 79 MMHG | SYSTOLIC BLOOD PRESSURE: 131 MMHG | WEIGHT: 164.24 LBS | HEIGHT: 71 IN | TEMPERATURE: 97.7 F | RESPIRATION RATE: 17 BRPM | OXYGEN SATURATION: 97 % | HEART RATE: 79 BPM

## 2023-05-18 LAB
ANION GAP SERPL CALC-SCNC: 1 MMOL/L (ref 2–11)
BASOPHILS # BLD: 0 K/UL (ref 0–0.2)
BASOPHILS NFR BLD: 0 % (ref 0–2)
BUN SERPL-MCNC: 22 MG/DL (ref 6–23)
CALCIUM SERPL-MCNC: 8.2 MG/DL (ref 8.3–10.4)
CHLORIDE SERPL-SCNC: 108 MMOL/L (ref 101–110)
CO2 SERPL-SCNC: 21 MMOL/L (ref 21–32)
CREAT SERPL-MCNC: 1.7 MG/DL (ref 0.8–1.5)
DIFFERENTIAL METHOD BLD: ABNORMAL
EOSINOPHIL # BLD: 0 K/UL (ref 0–0.8)
EOSINOPHIL NFR BLD: 0 % (ref 0.5–7.8)
ERYTHROCYTE [DISTWIDTH] IN BLOOD BY AUTOMATED COUNT: 18 % (ref 11.9–14.6)
GLUCOSE BLD STRIP.AUTO-MCNC: 257 MG/DL (ref 65–100)
GLUCOSE BLD STRIP.AUTO-MCNC: 264 MG/DL (ref 65–100)
GLUCOSE BLD STRIP.AUTO-MCNC: 288 MG/DL (ref 65–100)
GLUCOSE BLD STRIP.AUTO-MCNC: 292 MG/DL (ref 65–100)
GLUCOSE SERPL-MCNC: 270 MG/DL (ref 65–100)
HCT VFR BLD AUTO: 21.9 % (ref 41.1–50.3)
HGB BLD-MCNC: 7.2 G/DL (ref 13.6–17.2)
IMM GRANULOCYTES # BLD AUTO: 0.1 K/UL (ref 0–0.5)
IMM GRANULOCYTES NFR BLD AUTO: 1 % (ref 0–5)
LYMPHOCYTES # BLD: 2 K/UL (ref 0.5–4.6)
LYMPHOCYTES NFR BLD: 16 % (ref 13–44)
MCH RBC QN AUTO: 25.1 PG (ref 26.1–32.9)
MCHC RBC AUTO-ENTMCNC: 32.9 G/DL (ref 31.4–35)
MCV RBC AUTO: 76.3 FL (ref 82–102)
MONOCYTES # BLD: 0.6 K/UL (ref 0.1–1.3)
MONOCYTES NFR BLD: 5 % (ref 4–12)
NEUTS SEG # BLD: 9.5 K/UL (ref 1.7–8.2)
NEUTS SEG NFR BLD: 78 % (ref 43–78)
NRBC # BLD: 0.02 K/UL (ref 0–0.2)
PLATELET # BLD AUTO: 101 K/UL (ref 150–450)
PMV BLD AUTO: 10.6 FL (ref 9.4–12.3)
POTASSIUM SERPL-SCNC: 3.8 MMOL/L (ref 3.5–5.1)
RBC # BLD AUTO: 2.87 M/UL (ref 4.23–5.6)
SARS-COV-2 RDRP RESP QL NAA+PROBE: NOT DETECTED
SERVICE CMNT-IMP: ABNORMAL
SODIUM SERPL-SCNC: 130 MMOL/L (ref 133–143)
SOURCE: NORMAL
WBC # BLD AUTO: 12.2 K/UL (ref 4.3–11.1)

## 2023-05-18 PROCEDURE — 2580000003 HC RX 258: Performed by: HOSPITALIST

## 2023-05-18 PROCEDURE — 97110 THERAPEUTIC EXERCISES: CPT

## 2023-05-18 PROCEDURE — 82962 GLUCOSE BLOOD TEST: CPT

## 2023-05-18 PROCEDURE — 6370000000 HC RX 637 (ALT 250 FOR IP): Performed by: NURSE PRACTITIONER

## 2023-05-18 PROCEDURE — 6370000000 HC RX 637 (ALT 250 FOR IP): Performed by: INTERNAL MEDICINE

## 2023-05-18 PROCEDURE — 80048 BASIC METABOLIC PNL TOTAL CA: CPT

## 2023-05-18 PROCEDURE — 85025 COMPLETE CBC W/AUTO DIFF WBC: CPT

## 2023-05-18 PROCEDURE — 97530 THERAPEUTIC ACTIVITIES: CPT

## 2023-05-18 PROCEDURE — 36415 COLL VENOUS BLD VENIPUNCTURE: CPT

## 2023-05-18 PROCEDURE — 6370000000 HC RX 637 (ALT 250 FOR IP): Performed by: HOSPITALIST

## 2023-05-18 PROCEDURE — 87635 SARS-COV-2 COVID-19 AMP PRB: CPT

## 2023-05-18 RX ORDER — HYDRALAZINE HYDROCHLORIDE 20 MG/ML
20 INJECTION INTRAMUSCULAR; INTRAVENOUS EVERY 4 HOURS PRN
Status: DISCONTINUED | OUTPATIENT
Start: 2023-05-18 | End: 2023-05-18 | Stop reason: HOSPADM

## 2023-05-18 RX ORDER — FERROUS SULFATE 325(65) MG
325 TABLET ORAL
Qty: 90 TABLET | Refills: 1
Start: 2023-05-18

## 2023-05-18 RX ORDER — LISINOPRIL 5 MG/1
5 TABLET ORAL DAILY
Status: DISCONTINUED | OUTPATIENT
Start: 2023-05-18 | End: 2023-05-18 | Stop reason: HOSPADM

## 2023-05-18 RX ORDER — LISINOPRIL 5 MG/1
5 TABLET ORAL DAILY
Qty: 30 TABLET | Refills: 3
Start: 2023-05-19

## 2023-05-18 RX ORDER — CHOLECALCIFEROL (VITAMIN D3) 50 MCG
2000 TABLET ORAL DAILY
Qty: 84 TABLET | Refills: 0
Start: 2023-05-18

## 2023-05-18 RX ORDER — HYDROCORTISONE 5 MG/1
5 TABLET ORAL 2 TIMES DAILY
Qty: 60 TABLET | Refills: 1
Start: 2023-05-18

## 2023-05-18 RX ORDER — PANTOPRAZOLE SODIUM 40 MG/1
40 TABLET, DELAYED RELEASE ORAL
Qty: 30 TABLET | Refills: 3
Start: 2023-05-19

## 2023-05-18 RX ORDER — LOPERAMIDE HYDROCHLORIDE 2 MG/1
2 CAPSULE ORAL 4 TIMES DAILY PRN
Qty: 30 CAPSULE | Refills: 0
Start: 2023-05-18

## 2023-05-18 RX ORDER — HYDROCORTISONE 5 MG/1
5 TABLET ORAL 2 TIMES DAILY
Status: DISCONTINUED | OUTPATIENT
Start: 2023-05-18 | End: 2023-05-18 | Stop reason: HOSPADM

## 2023-05-18 RX ADMIN — SODIUM BICARBONATE 650 MG TABLET 1300 MG: at 13:19

## 2023-05-18 RX ADMIN — SODIUM BICARBONATE 650 MG TABLET 1300 MG: at 09:05

## 2023-05-18 RX ADMIN — SODIUM CHLORIDE, PRESERVATIVE FREE 10 ML: 5 INJECTION INTRAVENOUS at 09:06

## 2023-05-18 RX ADMIN — INSULIN LISPRO 2 UNITS: 100 INJECTION, SOLUTION INTRAVENOUS; SUBCUTANEOUS at 12:38

## 2023-05-18 RX ADMIN — PANTOPRAZOLE SODIUM 40 MG: 40 TABLET, DELAYED RELEASE ORAL at 04:56

## 2023-05-18 RX ADMIN — HYDROCORTISONE 10 MG: 5 TABLET ORAL at 09:05

## 2023-05-18 RX ADMIN — INSULIN LISPRO 2 UNITS: 100 INJECTION, SOLUTION INTRAVENOUS; SUBCUTANEOUS at 09:04

## 2023-05-18 RX ADMIN — LISINOPRIL 5 MG: 5 TABLET ORAL at 09:06

## 2023-05-18 RX ADMIN — LOPERAMIDE HYDROCHLORIDE 4 MG: 2 CAPSULE ORAL at 09:05

## 2023-05-18 NOTE — CARE COORDINATION
Pt is for discharge today to [facility] as planned. [facility] [address] prepared to go with pt to facility. Spoke with Emily Mars by phone with update on anticipated transport time. Patient will be going to [facility] and report can be called to [phone].

## 2023-05-18 NOTE — CARE COORDINATION
Received call from [place] on behalf of [facility]. Patient has been approved for [facility] for 5 days with a start date of 5/18/23. Next review date is 5/22/23.     Daniel reference #: [id number]    Auth ID: [id number]    Care Coordinator: Timothy Michaud

## 2023-05-18 NOTE — PROGRESS NOTES
ACUTE OCCUPATIONAL THERAPY GOALS:   (Developed with and agreed upon by patient and/or caregiver.)  1. Patient will complete lower body bathing and dressing with minimal assistance and adaptive equipment as needed. 2. Patient will complete toileting with minimal assistance. 3. Patient will tolerate 30 minutes of OT treatment with 1-2 rest breaks to increase activity tolerance for ADLs. 4. Patient will complete functional transfers with supervision and adaptive equipment as needed. 5. Patient will complete functional mobility for household distances with CGA and good safety awareness. 6. Patient will complete dynamic standing balance for 5 minutes with CGA to improve standing balance/tolerance for ADL. 7. Patient will complete therapeutic exercises for 10 minutes to improve strength/coordination for daily tasks. Timeframe: 7 visits         OCCUPATIONAL THERAPY Initial Assessment, Daily Note, and PM       OT Visit Days: 1  Acknowledge Orders  Time  OT Charge Capture  Rehab Caseload Tracker      Dionicio Pendleton is a 63 y.o. male   PRIMARY DIAGNOSIS: Sepsis due to Proteus species ([address]  Thrombocytopenia ([address] [D69.6]  SIRS (systemic inflammatory response syndrome) ([address] [R65.10]  Acute kidney injury ([address] [N17.9]  Hypothermia, initial encounter Kiesha Santee. XXXA]  Anemia, unspecified type [D64.9]       Reason for Referral: Generalized Muscle Weakness (M62.81)  Other lack of cordination (R27.8)  History of falling (Z91.81)  Inpatient: Payor: Jose Hoffman / Plan: Paula Elliott / Product Type: *No Product type* /     ASSESSMENT:     REHAB RECOMMENDATIONS:   Recommendation to date pending progress:  Setting:  Short-term Rehab    Equipment:    To Be Determined     ASSESSMENT:  Mr. Jesus Otero presents to the hospital with SIRS, sepsis, thrombocytopenia, hypothermia, and anemia. Pt was supine in bed upon arrival. Pt is alert and oriented to person, place, and time.  Pt not the greatest historian at
ACUTE PHYSICAL THERAPY GOALS:   (Developed with and agreed upon by patient and/or caregiver.)     (1.) Divya Garcia  will move from supine to sit and sit to supine , scoot up and down, and roll side to side with SUPERVISION within 7 treatment day(s). (2.) Divya Garcia will transfer from bed to chair and chair to bed with SUPERVISION using the least restrictive device within 7 treatment day(s). (3.) Divya Garcia will ambulate with STAND BY ASSIST for 40 feet with the least restrictive device within 7 treatment day(s). (4.) Divya Culverer will perform standing static and dynamic balance activities x 25 minutes with SUPERVISION to improve safety within 7 treatment day(s). (5.) Divya Garcia will perform therapeutic exercises x 25 min for HEP with SUPERVISION to improve strength, endurance, and functional mobility within 7 treatment day(s). PHYSICAL THERAPY: Daily Note AM   (Link to Caseload Tracking: PT Visit Days : 2  Time In/Out PT Charge Capture  Rehab Caseload Tracker  Orders    Divya Garcia is a 63 y.o. male   PRIMARY DIAGNOSIS: Sepsis due to Proteus species ([address]  Thrombocytopenia ([facility] [D69.6]  SIRS (systemic inflammatory response syndrome) ([address] [R65.10]  Acute kidney injury ([address] [N17.9]  Hypothermia, initial encounter Ethyl Maple. XXXA]  Anemia, unspecified type [D64.9]       Inpatient: Payor: Nain Bueno / Plan: [facility] / Product Type: *No Product type* /     ASSESSMENT:     REHAB RECOMMENDATIONS:   Recommendation to date pending progress:  Setting:  Short-term Rehab    Equipment:    To Be Determined     ASSESSMENT:  Mr. Sarath Mitchell was supine in bed and agreeable to PT on arrival. Supine to sit on edge of bed with SBA. Sit to stand with min A with R knee buckling noted. He was able to transfer over to chair with min A. BLE exercises performed while sitting up in chair. Pt left in chair with needs in reach.      SUBJECTIVE:   Mr. Sarath Mitchell states, \"I will get
Attempted to call report to [facility], message left.
Attempted to call wife and got voice mail  Scarlett Rodriguez MD
BP still low, will order 250 cc IVF bolus, given midodrine, cortisol low, will do stim testing tomorrow, stool not meeting cdiff criteria  Sadia Dolan MD
Comprehensive Nutrition Assessment    Type and Reason for Visit: Reassess  Pressure Injury    Nutrition Recommendations/Plan:   Meals and Snacks:  Diet: Continue current order  Modifications per SLP. Nutrition Supplement Therapy:  Medical food supplement therapy:  Modify Jacques twice per day (this provides 90 kcal and 2.5 grams protein per packet) and Banatrol Plus three times per day (this provides 40 kcal, 0 grams protein and 2 grams fiber per serving)   Banatrol mixed in vanilla pudding at RD visit which pt began to eat immediately. Discussed he must consume 3 servings daily and it may take a few days to see results. Malnutrition Assessment:  Malnutrition Status: Moderate malnutrition  Context: Chronic Illness  Findings of clinical characteristics of malnutrition:   Energy Intake:  Unable to assess (pt unable to provide a reliable hx, review of prior RD notes indicates periods of inadequate oral intake and times when pt met needs)  Weight Loss:  Unable to assess (highly variable EMR wt hx likely associated with fluid variances)     Body Fat Loss:  Mild body fat loss Triceps   Muscle Mass Loss:  Mild muscle mass loss Temples (temporalis), Clavicles (pectoralis & deltoids)  Fluid Accumulation:  Unable to assess     Strength:  Not Performed   NFPE limited to exposed upper body secondary bear hugger in place. Nutrition Assessment:  Nutrition History: Review of prior RD records reveal inconsistent oral intake and periods of meeting needs orally. Pt has previously accepted jacques and magic cup limited acceptance of other oral supplements with complaints of diarrhea associated with them. Nutrition Background:       PMH remarkable for DM, CKD, HTN, alcoholic cirrhosis, PAD, macrocytic anemia, GERD, thrombocytopenia, s/p cardiac  arrest during last hospitalization in April. Admitted with [facility] for SIRS related to UTI, hypotension, hypoglycemia, dehydration.     Additional findings
Hospitalist Progress Note   Admit Date:  2023  3:05 PM   Name:  Geraldine Lott   Age:  63 y.o. Sex:  male  :  1965   MRN:  [id number]   Room:  [facility]/    Presenting/Chief Complaint: Hypotension     Reason(s) for Admission: Thrombocytopenia ([facility] [D69.6]  SIRS (systemic inflammatory response syndrome) (HCC) [R65.10]  Acute kidney injury ([facility] [N17.9]  Hypothermia, initial encounter Clayborn Root. XXXA]  Anemia, unspecified type [D64.9]     Hospital Course:     Geraldine Lott is a 63 y.o. male with medical history of ETOH cirrhosis, DM2, CKD3, HTN, PAD, thrombocytopenia, cardiac arrest, admitted with MARS on CKD, hypoglycemia, hypothermia. He was managed with IVF, rewarming and dextrose. Nephrology following MARS. Renal function gradually improving. Hematology following for progressive anemia/ thrombocytopenia. S/p IV iron. He has been seen by ID. Negative infectious workup excluding UTI-proteus, to also include:  CXR- pulm edema  Blood cultures negative  US ABD- \"  IMPRESSION:  1. Cirrhotic morphology and echotexture to the liver. Trace ascites  2. Cholelithiasis\"  Had diarrhea  Stool culture negative. Cdiff negative  Antibiotics stopped. CT head negative. cosyntropin stim testing + for adrenal insufficiency, started on solucortef  Seen by palliative care and is a full code. Discharge plans pending. Was recently at rehab. Subjective & 24hr Events (23): Still c/o loose stools. No abd pain, n/v.  Cdiff negative. Temp and BG improved. No other complaints      Assessment & Plan:       Sepsis ([address]    Proteus UTI:  S/p abx course here in hospital.  ID recommends observation off abx    Adrenal Insufficiency:  Cortef 10mg BID      Type 2 diabetes mellitus with hypoglycemia (HCC)  SSI   Glucose will run low at times due to cirrhosis and variable oral intake.   Complicated by adrenal insuff also      Anemia  Daily Hb; still low      Cirrhosis of liver with ascites ([address]  Holding home
Hospitalist Progress Note   Admit Date:  2023  3:05 PM   Name:  Guillermo Zimmerman   Age:  63 y.o. Sex:  male  :  1965   MRN:  [id number]   Room:  [facility]/    Presenting/Chief Complaint: Hypotension     Reason(s) for Admission: Thrombocytopenia ([facility] [D69.6]  SIRS (systemic inflammatory response syndrome) (HCC) [R65.10]  Acute kidney injury ([facility] [N17.9]  Hypothermia, initial encounter Leti Cagle. XXXA]  Anemia, unspecified type [D64.9]     Hospital Course:       Guillermo Zimmerman is a 63 y.o. male with medical history of ETOH cirrhosis, DM2, CKD3, HTN, PAD, thrombocytopenia, cardiac arrest, admitted with MARS on CKD, hypoglycemia, hypothermia. He was managed with IVF, rewarming and dextrose. Nephrology following MARS. Renal function gradually improving. Hematology following for progressive anemia/ thrombocytopenia. S/p IV iron. He has been seen by ID. Negative infectious workup excluding UTI-proteus, to also include  CXR- pulm edema    Blood cultures negative    US ABD- \"  IMPRESSION:  1. Cirrhotic morphology and echotexture to the liver. Trace ascites  2. Cholelithiasis\"      Has diarrhea  Stool culture negative. Cdiff ordered 5-14 pending     Antibiotics stopped. CT head negative. Seen by palliative care and is a full code. Discharge plans pending. Was recently at rehab.       Subjective & 24hr Events (05/15/23):       BP low but feels ok, says diarrhea has improved overnight, ate ok, no dyspnea, no abdominal pain, making urine       Assessment & Plan:     Principal Problem:    SIRS (systemic inflammatory response syndrome) (HCC)  Plan:   Proteus UTI:  Stopped antibiotics   WBC downtrending to normal   ID following        Active Problems:    Type 2 diabetes mellitus ([facility]  Plan:   SSI   On IVF  Glucoses improved on dextrose IVF  States good oral intake           Anemia  Plan:  Trending HGB          Cirrhosis of liver with ascites ([facility]  Plan:   Stable
Hourly rounds complete this shift, no new complaints at this time, Patient Temp down to 91.1 this evening shift blanket warmer in use temp currently 98.2, bed in low, locked position, call light and bedside table within reach,  all needs met. Report to day shift nurse.
Hourly rounds complete this shift, no new complaints at this time, Temp down to 95.6 notified MD orders for blanket warmer patient temp now 98.8 stool sample sent, bed in low, locked position, call light and bedside table within reach,  all needs met. Will continue to monitor Report to day shift nurse.
Infectious Disease Progress      Today's Date: 5/15/2023   Admit Date: 5/6/2023    Impression:   Sepsis due to proteus mirabilis UTI: was on Rocephin 5/6-5/11 with elevated WBC. Hypothermia: resolved. Leukocytosis: unclear etiology of spike of WBC, RUQ US pending this am.    Acute encephalopathy   MARS on CKD3  DM type 2, controlled: A1c 5.3%  Diarrhea:  Patient reports multiple episodes of diarrhea daily at home. Nursing reports 3 episodes of diarrhea already this morning. Plan:   Continue to monitor patient's response off of abx therapy. Check C.diff  ID will follow. Anti-infectives:   Rocephin IV 5/6-5/11  Zosyn 5/11-5/12    Subjective: Interval Events:   Afebrile and WBC is now WNLs. Patient reports diarrhea this morning. Nursing reports patient has already had 3 episodes of diarrhea this morning. Patient reports multiple episodes of diarrhea/day at home PTA. Patient has no other complaints currently        Date of Consultation:  May 15, 2023  Date of Admission: 5/6/2023   Referring Provider: Denita Schuster,   Reason for consult: \"proteus mirabilis UTI, hypotension, elevated WBC    Patient is a 63 y.o. male with PMH of CKD3, DM, PAD, HTN, GERD, alcoholic cirrhosis, macrocytic anemia, thrombocytopenia, s/p postcardiac arrest last hospitalization in April 2023. Pt was admitted from [facility] for SIRS with hypotension, hypoglycemia, hypothermia, and dehydration. In ER, pt's temp was 91.3, HR 51, lactate of 2.1, platelet of 29 and Hgb 7.7. CXR was unremarkable, EKG with sinus kimi. LA resolved with IV Fluid bolus in ED. Pt had similar presentation in April admission. His urine culture with proteus mirabilis and has been receiving Rocephin 1g daily. Blood culture from 5/6 NGTD. WBC spiked to 20.4 from 11.6. It was 24.7 on 5/8. Pt reports has had diarrhea but C-diff was negative back in April. Stool cx was negative.  His stools was soft formed, not watery enough to be tested for C-diff per
Infectious Disease Progress Note      Today's Date: 5/16/2023   Admit Date: 5/6/2023    Impression:   Sepsis due to proteus mirabilis UTI: was on Rocephin 5/6-5/11, Zosyn 5/11-5/12    Hypothermia  Leukocytosis:  improving. RUQ US 5/12 with cirrhotic morphology and echotexture of the liver, trace ascites, and cholelithiasis. Acute encephalopathy   MARS on CKD3  DM type 2, controlled: A1c 5.3%  Diarrhea:  C.diff 516/23 negative. Plan:   Continue to monitor patient's response off of abx therapy. ID will sign off today but please reach out with any concerns/questions. Anti-infectives:   Rocephin IV 5/6-5/11  Zosyn 5/11-5/12    Subjective: Interval Events:   WBC 11.7 this morning. Patient has been afebrile but note documented temp down to 95.6 in the past 24 hours. Patient reports he doesn't feel great today but he is unable to elaborate/tell me specific symptoms. Date of Consultation:  May 16, 2023  Date of Admission: 5/6/2023   Referring Provider: Mook Gandhi,   Reason for consult: \"proteus mirabilis UTI, hypotension, elevated WBC    Patient is a 63 y.o. male with PMH of CKD3, DM, PAD, HTN, GERD, alcoholic cirrhosis, macrocytic anemia, thrombocytopenia, s/p postcardiac arrest last hospitalization in April 2023. Pt was admitted from [facility] for SIRS with hypotension, hypoglycemia, hypothermia, and dehydration. In ER, pt's temp was 91.3, HR 51, lactate of 2.1, platelet of 29 and Hgb 7.7. CXR was unremarkable, EKG with sinus kimi. LA resolved with IV Fluid bolus in ED. Pt had similar presentation in April admission. His urine culture with proteus mirabilis and has been receiving Rocephin 1g daily. Blood culture from 5/6 NGTD. WBC spiked to 20.4 from 11.6. It was 24.7 on 5/8. Pt reports has had diarrhea but C-diff was negative back in April. Stool cx was negative. His stools was soft formed, not watery enough to be tested for C-diff per nursing. Pt has had 1-2 episodes.  Wife reports he
LTG: Pt will tolerate safest/least restrictive diet w/out overt s/sx of airway compromise. Goal Met  STG: Pt will tolerate ongoing swallow assessment of various consistencies to determine safest po diet w/out overt s/sx of airway compromise x3 sessions. progressing  Goal Met  STG: Patient will tolerate full liquid diet/thin liquids without overt s/sx aspiration. Added  Goal Met  STG: Pt will participate in MBS/FEES if/when indicated to formally assess swallow function with 100% compliance. Goal Met    SPEECH LANGUAGE PATHOLOGY: DYSPHAGIA  Daily Note #2    NAME: Julián Cooepr  : 1965  MRN: [id number]    ADMISSION DATE: 2023  PRIMARY DIAGNOSIS: Sepsis due to Proteus species ([facility]  Thrombocytopenia ([facility] [D69.6]  SIRS (systemic inflammatory response syndrome) (HCC) [R65.10]  Acute kidney injury ([facility] [N17.9]  Hypothermia, initial encounter Edelmira Blake. XXXA]  Anemia, unspecified type [D64.9]    ICD-10: Treatment Diagnosis: R13.12 Dysphagia, Oropharyngeal Phase    RECOMMENDATIONS   Diet:  Diet Solids Recommendation: Easy to Chew  Liquid Consistency Recommendation: Thin    Medications: PO     Compensatory Swallowing Strategies: Set up assist   Recommendations: Assistance with meals        Patient continues to require skilled intervention:  No. Please re-consult if new concerns arise. ASSESSMENT    Dysphagia Diagnosis: Swallow function appears WFL  Dysphagia Impression : Oral and pharyngeal phases of swallow were unremarkable. Patient agreeable to diet advancement though also requesting softer food textures - recommend easy to chew diet - patient agreeable to recommendation.     GENERAL    History of Present Injury/Illness: Mr. Shirley Mayo  has a past medical history of Alcoholic cirrhosis of liver ([facility] Anemia, Gastroesophageal reflux disease with esophagitis and hemorrhage, High serum ferritin, HLD (hyperlipidemia), Hyperplastic colon polyp, Hypertension, Obesity, PAD (peripheral artery disease) ([facility] PUD
SPEECH LANGUAGE PATHOLOGY NOTE    Attempted to see for diet tolerance, however patient declined po trials. Reports full from breakfast, although appears to have consumed minimal amount. Will re attempt at later time/date.        Dinah [address] CCC-SLP
Assistance, NT=Not Tested    PLAN:   FREQUENCY AND DURATION: 3 times/week for duration of hospital stay or until stated goals are met, whichever comes first.    THERAPY PROGNOSIS: Fair    PROBLEM LIST:   (Skilled intervention is medically necessary to address:)  Decreased ADL/Functional Activities  Decreased Activity Tolerance  Decreased AROM/PROM  Decreased Balance  Decreased Coordination  Decreased Gait Ability  Decreased Safety Awareness  Decreased Strength  Decreased Transfer Abilities INTERVENTIONS PLANNED:   (Benefits and precautions of physical therapy have been discussed with the patient.)  Self Care Training  Therapeutic Activity  Therapeutic Exercise/HEP  Neuromuscular Re-education  Gait Training  Education       TREATMENT:   EVALUATION: LOW COMPLEXITY: (Untimed Charge)    TREATMENT:   Co-Treatment PT/OT necessary due to patient's decreased overall endurance/tolerance levels, as well as need for high level skilled assistance to complete functional transfers/mobility and functional tasks  Therapeutic Activity (23 Minutes): Therapeutic activity included Supine to Sit, Scooting, Transfer Training, Ambulation on level ground, Sitting balance , and Standing balance to improve functional Activity tolerance, Balance, Coordination, Mobility, and Strength. TREATMENT GRID:  N/A    AFTER TREATMENT PRECAUTIONS: Alarm Activated, Bed/Chair Locked, Call light within reach, Chair, and Needs within reach    INTERDISCIPLINARY COLLABORATION:  RN/ PCT and OT/ HAYDEN    EDUCATION: Education Given To: Patient  Education Provided: Role of Therapy;Plan of Care;Precautions;Transfer Training; Fall Prevention Strategies  Education Method: Verbal  Barriers to Learning: None  Education Outcome: Verbalized understanding;Continued education needed    TIME IN/OUT:  Time In: 1354  Time Out: [address]  Minutes: Mame 69, PT
Final Result   There is new pulmonary vascular congestion bilaterally concerning   for mild edema. Stable mild cardiomegaly. No pneumothorax. No pleural   effusions. US RETROPERITONEAL COMPLETE   Final Result   Impression:    1. No hydronephrosis. 2. Small volume free fluid in the pelvis, of uncertain etiology. Thank you for the referral of this patient. This exam was interpreted by an    American Board of Radiology certified radiologist with subspecialty fellowship    training in body imaging. If there are any questions regarding this exam    please feel free to contact a radiologist directly at [phone]. Rodríguez Aguilar M.D.    5/7/2023 11:19:00 PM      CT ABDOMEN PELVIS WO CONTRAST Additional Contrast? Oral   Final Result   1. Moderately sized bilateral pleural effusions. 2. Hepatic steatosis. The liver demonstrates a micronodular contour suggestive    of liver disease or cirrhosis. Correlation with hepatic function is recommended. 3. Cholelithiasis. 4. Small volume of intra-abdominal ascites. 5. Anasarca. 6. Edematous urinary bladder wall thickening. Correlate for infectious cystitis. 7. Severe atherosclerotic disease. Cam Waller M.D.    5/7/2023 7:50:00 PM      CT HEAD WO CONTRAST   Final Result   1. No acute intracranial process. MRI is more sensitive for the detection of    acute nonhemorrhagic infarct. 2. Mild changes small vessel ischemic disease of indeterminate age, presumably    mostly chronic. Volume loss. Atherosclerosis.                  Yordan Michel M.D.    5/7/2023 7:47:00 PM      XR CHEST PORTABLE   Final Result          Current Meds:  Current Facility-Administered Medications   Medication Dose Route Frequency    sodium bicarbonate tablet 1,300 mg  1,300 mg Oral TID    vitamin D (ERGOCALCIFEROL) capsule 50,000 Units  50,000 Units Oral Weekly    morphine injection 2 mg  2 mg IntraVENous Q6H PRN    midodrine (PROAMATINE) tablet 10

## 2023-05-18 NOTE — DISCHARGE SUMMARY
Glucose 292 (H) 65 - 100 mg/dL    Performed by: Kadeem    POCT Glucose    Collection Time: 05/18/23 10:28 AM   Result Value Ref Range    POC Glucose 257 (H) 65 - 100 mg/dL    Performed by: Kadeem        No Known Allergies  Immunization History   Administered Date(s) Administered    Influenza Virus Vaccine 10/16/2017    Influenza, FLUCELVAX, (age 10 mo+), MDCK, MDV, 0.5mL 10/10/2018    PPD Test 09/01/2022, 12/15/2022, 04/10/2023, 05/09/2023    Pneumococcal, PPSV23, PNEUMOVAX 23, (age 2y+), SC/IM, 0.5mL 10/16/2017    TDaP, ADACEL (age 10y-63y), BOOSTRIX (age 10y+), IM, 0.5mL 10/10/2018       Recent Vital Data:  Patient Vitals for the past 24 hrs:   Temp Pulse Resp BP SpO2   05/18/23 1027 97.7 °F (36.5 °C) 79 17 131/79 97 %   05/18/23 0717 97.5 °F (36.4 °C) 74 18 (!) 173/105 99 %   05/18/23 0500 98.2 °F (36.8 °C) 78 18 (!) 146/81 100 %   05/18/23 0018 97.5 °F (36.4 °C) 85 18 (!) 163/97 98 %   05/17/23 1959 97.3 °F (36.3 °C) 76 18 137/65 100 %   05/17/23 1600 97.5 °F (36.4 °C) 74 17 (!) 142/95 93 %   05/17/23 1108 98.4 °F (36.9 °C) 74 17 131/72 97 %       Oxygen Therapy  SpO2: 97 %  Pulse via Oximetry: 70 beats per minute  Pulse Oximeter Device Mode: Intermittent  Pulse Oximeter Device Location: Finger  O2 Device: None (Room air)  O2 Flow Rate (L/min): 2 L/min    Estimated body mass index is 22.91 kg/m² as calculated from the following:    Height as of this encounter: 5' 11\" (1.803 m). Weight as of this encounter: 164 lb 3.9 oz (74.5 kg). Intake/Output Summary (Last 24 hours) at 5/18/2023 1102  Last data filed at 5/18/2023 0717  Gross per 24 hour   Intake --   Output 400 ml   Net -400 ml         Physical Exam:  General:          Well nourished. Alert, no distress, interactive and pleasant   CV:                  RRR. No jugular venous distension. No edema   Lungs:             ND.  Symmetric expansion. Abdomen:        Soft, nontender, nondistended. Extremities:     No cyanosis or clubbing.   No

## 2023-05-19 ENCOUNTER — CARE COORDINATION (OUTPATIENT)
Dept: CARE COORDINATION | Facility: CLINIC | Age: 58
End: 2023-05-19

## 2023-05-19 NOTE — CARE COORDINATION
Transition of care outreach postponed for 14 days due to patient's discharge to [facility].

## 2023-05-22 ENCOUNTER — TELEPHONE (OUTPATIENT)
Dept: FAMILY MEDICINE CLINIC | Facility: CLINIC | Age: 58
End: 2023-05-22

## 2023-05-22 NOTE — TELEPHONE ENCOUNTER
----- Message from Juan M [address] sent at 5/19/2023 10:09 AM EDT -----  Regarding: Labs  Pt needs lab orders.

## 2023-06-02 ENCOUNTER — CARE COORDINATION (OUTPATIENT)
Dept: CARE COORDINATION | Facility: CLINIC | Age: 58
End: 2023-06-02

## 2023-06-02 NOTE — CARE COORDINATION
[facility] Update Call    2023    Patient: Vazquez Suarez Patient : 1965   MRN: [id number]  Reason for Admission: sepsis  Discharge Date: 23 RARS: Readmission Risk Score: 31.2     CTN left VM with Trg Eligio 13 PA for tentative d/c dates after IP admission to short term rehab.      Care Transitions Post Acute Facility Update    Care Transitions Interventions  Post Acute Facility Update

## 2023-06-08 ENCOUNTER — CARE COORDINATION (OUTPATIENT)
Dept: CARE COORDINATION | Facility: CLINIC | Age: 58
End: 2023-06-08

## 2023-06-08 NOTE — CARE COORDINATION
[facility] Update Call    2023    Patient: Ezequiel Lutz Patient : 1965   MRN: [id number]  Reason for Admission: sepsis  Discharge Date: 23 RARS: Readmission Risk Score: 31.2     CTN left  with Leona Martini for tentative d/c dates and/or discharge plan for follow up. CTN to wait on return call from facility.      Care Transitions Post Acute Facility Update    Care Transitions Interventions  Post Acute Facility Update

## 2023-06-21 ENCOUNTER — CARE COORDINATION (OUTPATIENT)
Dept: CARE COORDINATION | Facility: CLINIC | Age: 58
End: 2023-06-21

## 2023-06-21 NOTE — CARE COORDINATION
Care Transitions Outreach Attempt    Call within 2 business days of discharge: Yes   Attempted to reach patient for transitions of care follow up. Unable to reach patient. Patient: Homero Ventura Patient : 1965 MRN: [id number]    Last Discharge  [address]       Date Complaint Diagnosis Description Type Department Provider    23 Hypotension Acute kidney injury ([facility] [address] . .. ED to Hosp-Admission (Discharged) (ADMITTED) [id number] Robby Lazaro MD; Tawnya Wilkerson . .. Was this an external facility discharge? No Discharge Facility: [facility] [address]    Noted following upcoming appointments from discharge chart review:   [address] follow up appointment(s):   Future Appointments   Date Time Provider Ora Mccullough   2023 10:00 AM Riana Garay MD [facility] AMB     Non-[facility] follow up appointment(s): na     No further WILLIAM attempts at this time. Patient d/c > 30 days from IP admission, unable to reach facility for update or patient for LEIDA NAVA call.

## 2023-06-22 DIAGNOSIS — K70.31 ALCOHOLIC CIRRHOSIS OF LIVER WITH ASCITES (HCC): ICD-10-CM

## 2023-06-23 RX ORDER — FUROSEMIDE 40 MG/1
TABLET ORAL
Qty: 180 TABLET | Refills: 1 | OUTPATIENT
Start: 2023-06-23

## 2023-07-12 ENCOUNTER — TELEPHONE (OUTPATIENT)
Dept: FAMILY MEDICINE CLINIC | Facility: CLINIC | Age: 58
End: 2023-07-12

## 2023-07-12 NOTE — TELEPHONE ENCOUNTER
Jessica from [facility] verbalized pt was in the hospital for blood loss anemia was wondering if provider sign off for home health.     Call back [phone]

## 2023-07-14 NOTE — TELEPHONE ENCOUNTER
Attempted to contact interim. No answer and voicemail not available. Stated that the number was not in service.

## (undated) DEVICE — SYRINGE MED 10ML LUERLOCK TIP W/O SFTY DISP

## (undated) DEVICE — AIRLIFE™ OXYGEN TUBING 7 FEET (2.1 M) CRUSH RESISTANT OXYGEN TUBING, VINYL TIPPED: Brand: AIRLIFE™

## (undated) DEVICE — SYRINGE MED 3ML CLR PLAS STD N CTRL LUERLOCK TIP DISP

## (undated) DEVICE — CANNULA NSL ORAL AD FOR CAPNOFLEX CO2 O2 AIRLFE

## (undated) DEVICE — GAUZE,SPONGE,4"X4",12PLY,WOVEN,NS,LF: Brand: MEDLINE

## (undated) DEVICE — CONNECTOR TBNG OD5-7MM O2 END DISP

## (undated) DEVICE — NEEDLE SYR 18GA L1.5IN RED PLAS HUB S STL BLNT FILL W/O

## (undated) DEVICE — YANKAUER,BULB TIP,W/O VENT,RIGID,STERILE: Brand: MEDLINE

## (undated) DEVICE — BLOCK BITE AD 60FR W/ VELC STRP ADDRESSES MOST PT AND

## (undated) DEVICE — SINGLE PORT MANIFOLD: Brand: NEPTUNE 2

## (undated) DEVICE — KENDALL RADIOLUCENT FOAM MONITORING ELECTRODE RECTANGULAR SHAPE: Brand: KENDALL

## (undated) DEVICE — LUBE JELLY FOIL PACK 1.4 OZ: Brand: MEDLINE INDUSTRIES, INC.